# Patient Record
Sex: MALE | Race: WHITE | NOT HISPANIC OR LATINO | Employment: OTHER | URBAN - METROPOLITAN AREA
[De-identification: names, ages, dates, MRNs, and addresses within clinical notes are randomized per-mention and may not be internally consistent; named-entity substitution may affect disease eponyms.]

---

## 2017-01-11 ENCOUNTER — OFFICE VISIT (OUTPATIENT)
Dept: FAMILY MEDICINE | Facility: CLINIC | Age: 67
End: 2017-01-11
Payer: MEDICARE

## 2017-01-11 VITALS
HEART RATE: 63 BPM | HEIGHT: 68 IN | TEMPERATURE: 98 F | DIASTOLIC BLOOD PRESSURE: 78 MMHG | BODY MASS INDEX: 28.95 KG/M2 | SYSTOLIC BLOOD PRESSURE: 130 MMHG | WEIGHT: 191 LBS | OXYGEN SATURATION: 99 %

## 2017-01-11 DIAGNOSIS — R07.89 RIGHT-SIDED CHEST WALL PAIN: Primary | ICD-10-CM

## 2017-01-11 DIAGNOSIS — F41.1 GENERALIZED ANXIETY DISORDER: ICD-10-CM

## 2017-01-11 PROCEDURE — 1159F MED LIST DOCD IN RCRD: CPT | Mod: S$GLB,,,

## 2017-01-11 PROCEDURE — 99499 UNLISTED E&M SERVICE: CPT | Mod: S$GLB,,,

## 2017-01-11 PROCEDURE — 3075F SYST BP GE 130 - 139MM HG: CPT | Mod: S$GLB,,,

## 2017-01-11 PROCEDURE — 3078F DIAST BP <80 MM HG: CPT | Mod: S$GLB,,,

## 2017-01-11 PROCEDURE — 99213 OFFICE O/P EST LOW 20 MIN: CPT | Mod: S$GLB,,,

## 2017-01-11 PROCEDURE — 99999 PR PBB SHADOW E&M-EST. PATIENT-LVL III: CPT | Mod: PBBFAC,,,

## 2017-01-11 RX ORDER — ALPRAZOLAM 0.5 MG/1
0.5 TABLET ORAL 3 TIMES DAILY PRN
Qty: 90 TABLET | Refills: 2 | Status: SHIPPED | OUTPATIENT
Start: 2017-01-19 | End: 2017-04-16 | Stop reason: SDUPTHER

## 2017-01-11 NOTE — MR AVS SNAPSHOT
[facility]  [address]  [place] [address]  Phone: [phone]  Fax: [phone]                  Yair Owens   2017 1:00 PM   Office Visit    Description:  Male : 1950   Provider:  Tiburcio Sierra Jr., MD   Department:  [facility] Medicine           Reason for Visit     Back Pain           Diagnoses this Visit        Comments    Right-sided chest wall pain    -  Primary     Generalized anxiety disorder                To Do List           Goals (5 Years of Data)     None       These Medications        Disp Refills Start End    alprazolam (XANAX) 0.5 MG tablet 90 tablet 2 2017    Take 1 tablet (0.5 mg total) by mouth 3 (three) times daily as needed. - Oral    Pharmacy: [facility]/pharmacy #[id number] - [place]ONATHAN Grubbs - [address].  #: [phone]         [facility] On Call     [facility] On Call Nurse Care Line -  Assistance  Registered nurses in the [facility] provide clinical advisement, health education, appointment booking, and other advisory services.  Call for this free service at [phone].             Medications           Message regarding Medications     Verify the changes and/or additions to your medication regime listed below are the same as discussed with your clinician today.  If any of these changes or additions are incorrect, please notify your healthcare provider.             Verify that the below list of medications is an accurate representation of the medications you are currently taking.  If none reported, the list may be blank. If incorrect, please contact your healthcare provider. Carry this list with you in case of emergency.           Current Medications     alprazolam (XANAX) 0.5 MG tablet Starting on 2017. Take 1 tablet (0.5 mg total) by mouth 3 (three) times daily as needed.    atorvastatin (LIPITOR) 40 MG tablet TAKE 1 TABLET BY MOUTH ONCE DAILY    CIALIS 5 mg tablet TAKE 1 TABLET BY MOUTH EVERY DAY  "AS NEEDED FOR ERECTILE DYSFUNCTION    lisinopril 10 MG tablet Take 1 tablet (10 mg total) by mouth once daily.    indomethacin (INDOCIN) 50 MG capsule Take 1 capsule (50 mg total) by mouth 3 (three) times daily as needed.           Clinical Reference Information           Vital Signs - Last Recorded  Most recent update: 1/11/2017 12:50 PM by Debby Moy LPN    BP Pulse Temp Ht Wt SpO2    130/78 (BP Location: Left arm, Patient Position: Sitting, BP Method: Manual) 63 97.7 °F (36.5 °C) (Oral) 5' 8" (1.727 m) 86.6 kg (191 lb) 99%    BMI                29.04 kg/m2          Blood Pressure          Most Recent Value    BP  130/78      Allergies as of 1/11/2017     Codeine    Ibuprofen      Immunizations Administered on Date of Encounter - 1/11/2017     None      [facility] Sign-Up     Activating your [facility] account is as easy as 1-2-3!     1) Visit [email or web address], select Sign Up Now, enter this activation code and your date of birth, then select Next.  [email or web address]  Expires: 2/25/2017  1:21 PM      2) Create a username and password to use when you visit [facility] in the future and select a security question in case you lose your password and select Next.    3) Enter your e-mail address and click Sign Up!    Additional Information  If you have questions, please e-mail [email or web address] Office or call [phone] to talk to our [facility] staff. Remember, [facility] is NOT to be used for urgent needs. For medical emergencies, dial 911.         Smoking Cessation     If you would like to quit smoking:   You may be eligible for free services if you are a [place] resident and started smoking cigarettes before September 1, 1988.  Call the Smoking Cessation [employer] (SCT) toll free at (669) [phone] or (880) [phone].   Call 5-251-QUIT-NOW if you do not meet the above criteria.            "

## 2017-01-11 NOTE — PROGRESS NOTES
Chief Complaint   Patient presents with    Back Pain     left side       HPI  Yair Owens is a 66 y.o. male who presents to the office for evaluation of some discomfort that he has in the right lateral abdomen/lower chest area.  There is been no traumatic injury, patient notes that this is been going on about 2 weeks, it hurts with certain motions, such as when he turns over in bed from one side to another.  It does not hurt if he laughs, sneezes or coughs.  The patient is a smoker, however, he has cut way down on his cigarette intake.  Pt is known to me.    It is of note the patient has a piece of shrapnel left over from of [place] injury in the right lateral flank area.  This has not given the patient any problems.    HPI    PAST MEDICAL HISTORY:  Past Medical History   Diagnosis Date    Chest pain syndrome     GERD (gastroesophageal reflux disease)     Hypercholesteremia     Hyperlipidemia        PAST SURGICAL HISTORY:  Past Surgical History   Procedure Laterality Date    Bone resection, rib       for thoracic outlet syndrome    Hand surgery      Scrotal surgery       mass    Appendectomy      Spine surgery       C-spine disk (remote)       SOCIAL HISTORY:  Social History     Social History    Marital status:      Spouse name: N/A    Number of children: N/A    Years of education: N/A     Occupational History     [employer] [[email or web address]     Social History Main Topics    Smoking status: Current Every Day Smoker     Packs/day: 0.75     Types: Cigarettes    Smokeless tobacco: Not on file      Comment: .  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 [employer] .      Alcohol use 1.2 oz/week     2 Cans of beer per week      Comment: once a week    Drug use: No    Sexual activity: Yes     Partners: Female     Other Topics Concern    Not on file     Social History Narrative       FAMILY HISTORY:  Family History   Problem Relation Age of Onset    Hypertension Mother     Cancer Father      "Heart disease Father     Asthma Sister        ALLERGIES AND MEDICATIONS: updated and reviewed.  Review of patient's allergies indicates:   Allergen Reactions    Codeine Itching    Ibuprofen Itching     Current Outpatient Prescriptions   Medication Sig Dispense Refill    [START ON 1/19/2017] alprazolam (XANAX) 0.5 MG tablet Take 1 tablet (0.5 mg total) by mouth 3 (three) times daily as needed. 90 tablet 2    atorvastatin (LIPITOR) 40 MG tablet TAKE 1 TABLET BY MOUTH ONCE DAILY 90 tablet 0    CIALIS 5 mg tablet TAKE 1 TABLET BY MOUTH EVERY DAY AS NEEDED FOR ERECTILE DYSFUNCTION 30 tablet 6    lisinopril 10 MG tablet Take 1 tablet (10 mg total) by mouth once daily. 90 tablet 3    indomethacin (INDOCIN) 50 MG capsule Take 1 capsule (50 mg total) by mouth 3 (three) times daily as needed. 45 capsule 3     No current facility-administered medications for this visit.        ROS  Review of Systems   Respiratory: Negative for cough.    Cardiovascular: Positive for chest pain.   Skin: Negative for rash.       Physical Exam  Vitals:    01/11/17 1246   BP: 130/78   Pulse: 63   Temp: 97.7 °F (36.5 °C)    Body mass index is 29.04 kg/(m^2).  Weight: 86.6 kg (191 lb)   Height: 5' 8" (172.7 cm)     Physical Exam   Constitutional: He appears well-developed and well-nourished. No distress.   Pulmonary/Chest: Effort normal and breath sounds normal. He exhibits tenderness (there is some tenderness in the lower most rib and laterally, this is the exact area where the patient has been having his discomfort.  There is no overlying rash, or anything to suggest zoster.).   Abdominal: He exhibits no distension.   Vitals reviewed.      Health Maintenance       Date Due Completion Date    TETANUS VACCINE 1/22/1969 ---    Zoster Vaccine 1/22/2011 ---    Abdominal Aortic Aneurysm Screening 1/22/2016 ---    Influenza Vaccine 8/1/2016 10/29/2012 (Declined)    Override on 10/29/2012: Declined    Pneumococcal (65+) (2 of 2 - PPSV23) 7/18/2017 " 7/18/2016    Lipid Panel 10/20/2021 10/20/2016    Colonoscopy 11/18/2023 11/18/2013    Override on 10/29/2012: Declined          Assessment & Plan    1. Right-sided chest wall pain  I have recommended that the patient uses some Aleve, heat or ice to the area, avoid aggravating activity.  - alprazolam (XANAX) 0.5 MG tablet; Take 1 tablet (0.5 mg total) by mouth 3 (three) times daily as needed.  Dispense: 90 tablet; Refill: 2    2. Generalized anxiety disorder  Alprazolam was refilled.      No Follow-up on file.

## 2017-02-08 ENCOUNTER — OFFICE VISIT (OUTPATIENT)
Dept: FAMILY MEDICINE | Facility: CLINIC | Age: 67
End: 2017-02-08
Payer: MEDICARE

## 2017-02-08 VITALS
WEIGHT: 191.69 LBS | HEART RATE: 68 BPM | TEMPERATURE: 100 F | HEIGHT: 67 IN | RESPIRATION RATE: 16 BRPM | OXYGEN SATURATION: 95 % | BODY MASS INDEX: 30.09 KG/M2 | DIASTOLIC BLOOD PRESSURE: 72 MMHG | SYSTOLIC BLOOD PRESSURE: 134 MMHG

## 2017-02-08 DIAGNOSIS — R05.9 COUGH: ICD-10-CM

## 2017-02-08 DIAGNOSIS — J10.1 INFLUENZA B: Primary | ICD-10-CM

## 2017-02-08 DIAGNOSIS — R50.9 FEVER, UNSPECIFIED FEVER CAUSE: ICD-10-CM

## 2017-02-08 DIAGNOSIS — J06.9 UPPER RESPIRATORY TRACT INFECTION, UNSPECIFIED TYPE: ICD-10-CM

## 2017-02-08 LAB
CTP QC/QA: YES
FLUAV AG NPH QL: NEGATIVE
FLUBV AG NPH QL: POSITIVE

## 2017-02-08 PROCEDURE — 1157F ADVNC CARE PLAN IN RCRD: CPT | Mod: S$GLB,,, | Performed by: NURSE PRACTITIONER

## 2017-02-08 PROCEDURE — 3078F DIAST BP <80 MM HG: CPT | Mod: S$GLB,,, | Performed by: NURSE PRACTITIONER

## 2017-02-08 PROCEDURE — 87804 INFLUENZA ASSAY W/OPTIC: CPT | Mod: QW,S$GLB,, | Performed by: NURSE PRACTITIONER

## 2017-02-08 PROCEDURE — 99999 PR PBB SHADOW E&M-EST. PATIENT-LVL IV: CPT | Mod: PBBFAC,,, | Performed by: NURSE PRACTITIONER

## 2017-02-08 PROCEDURE — 1160F RVW MEDS BY RX/DR IN RCRD: CPT | Mod: S$GLB,,, | Performed by: NURSE PRACTITIONER

## 2017-02-08 PROCEDURE — 1125F AMNT PAIN NOTED PAIN PRSNT: CPT | Mod: S$GLB,,, | Performed by: NURSE PRACTITIONER

## 2017-02-08 PROCEDURE — 99214 OFFICE O/P EST MOD 30 MIN: CPT | Mod: 25,S$GLB,, | Performed by: NURSE PRACTITIONER

## 2017-02-08 PROCEDURE — 1159F MED LIST DOCD IN RCRD: CPT | Mod: S$GLB,,, | Performed by: NURSE PRACTITIONER

## 2017-02-08 PROCEDURE — 3075F SYST BP GE 130 - 139MM HG: CPT | Mod: S$GLB,,, | Performed by: NURSE PRACTITIONER

## 2017-02-08 RX ORDER — PROMETHAZINE HYDROCHLORIDE AND DEXTROMETHORPHAN HYDROBROMIDE 6.25; 15 MG/5ML; MG/5ML
5 SYRUP ORAL
Qty: 180 ML | Refills: 0 | Status: SHIPPED | OUTPATIENT
Start: 2017-02-08 | End: 2017-02-15

## 2017-02-08 RX ORDER — OSELTAMIVIR PHOSPHATE 75 MG/1
75 CAPSULE ORAL 2 TIMES DAILY
Qty: 10 CAPSULE | Refills: 0 | Status: SHIPPED | OUTPATIENT
Start: 2017-02-08 | End: 2017-02-13

## 2017-02-08 NOTE — MEDICAL/APP STUDENT
Subjective:       Patient ID: Yair Owens is a 67 y.o. male.    Chief Complaint: Cough (x's 3 days) and Generalized Body Aches    Cough   This is a recurrent problem. Episode onset: 2 days. The problem has been unchanged. The problem occurs constantly. Cough characteristics: hacking. Associated symptoms include chills, a fever, headaches, myalgias, postnasal drip, rhinorrhea, a sore throat and sweats. Pertinent negatives include no ear pain, nasal congestion, shortness of breath (sore throat) or wheezing. Treatments tried: theraflu. The treatment provided no relief.   Sore Throat    This is a new problem. Episode onset: 3 days. The problem has been unchanged. Neither side of throat is experiencing more pain than the other. There has been no fever. The pain is at a severity of 7/10. The pain is moderate. Associated symptoms include coughing, headaches and a plugged ear sensation. Pertinent negatives include no abdominal pain, congestion, diarrhea, drooling, ear discharge, ear pain, hoarse voice, neck pain, shortness of breath (sore throat), stridor, swollen glands, trouble swallowing or vomiting. He has tried acetaminophen for the symptoms. The treatment provided no relief.     Review of Systems   Constitutional: Positive for appetite change, chills, diaphoresis, fatigue and fever.   HENT: Positive for postnasal drip, rhinorrhea, sneezing and sore throat. Negative for congestion, drooling, ear discharge, ear pain, hoarse voice, sinus pressure and trouble swallowing.    Eyes: Negative for pain, discharge and itching.   Respiratory: Positive for cough. Negative for shortness of breath (sore throat), wheezing and stridor.    Gastrointestinal: Negative for abdominal pain, diarrhea, nausea and vomiting.   Musculoskeletal: Positive for myalgias. Negative for neck pain.   Skin: Negative for color change and pallor.   Neurological: Positive for dizziness, light-headedness and headaches.   Hematological: Negative for  adenopathy.       Objective:      Physical Exam   Constitutional: He is oriented to person, place, and time. Vital signs are normal. He appears well-developed and well-nourished.   HENT:   Head: Normocephalic and atraumatic.   Right Ear: Tympanic membrane, external ear and ear canal normal.   Left Ear: Tympanic membrane, external ear and ear canal normal.   Nose: Mucosal edema and rhinorrhea present. Right sinus exhibits no maxillary sinus tenderness and no frontal sinus tenderness. Left sinus exhibits no maxillary sinus tenderness and no frontal sinus tenderness.   Mouth/Throat: Mucous membranes are normal. Posterior oropharyngeal erythema present. No oropharyngeal exudate.   Eyes: EOM are normal. Pupils are equal, round, and reactive to light.   Neck: Normal range of motion. Neck supple.   Cardiovascular: Normal rate, regular rhythm and normal heart sounds.    Pulmonary/Chest: Effort normal and breath sounds normal. No respiratory distress. He has no wheezes. He has no rales.   Abdominal: Soft.   Lymphadenopathy:     He has no cervical adenopathy.   Neurological: He is alert and oriented to person, place, and time.   Skin: Skin is warm, dry and intact. No rash noted.   Psychiatric: He has a normal mood and affect.   Nursing note and vitals reviewed.      Assessment:       1. Influenza B    2. Upper respiratory tract infection, unspecified type        Plan:     Yair was seen today for cough and generalized body aches.    Diagnoses and all orders for this visit:    Influenza B  -     oseltamivir (TAMIFLU) 75 MG capsule; Take 1 capsule (75 mg total) by mouth 2 (two) times daily.  -     promethazine-dextromethorphan (PROMETHAZINE-DM) 6.25-15 mg/5 mL Syrp; Take 5 mLs by mouth every 4 to 6 hours as needed.    Upper respiratory tract infection, unspecified type  -     oseltamivir (TAMIFLU) 75 MG capsule; Take 1 capsule (75 mg total) by mouth 2 (two) times daily.  -     promethazine-dextromethorphan (PROMETHAZINE-DM)  6.25-15 mg/5 mL Syrp; Take 5 mLs by mouth every 4 to 6 hours as needed.    Take your temperature several times a day. If your fever is 100.4°F for more than a day, call your doctor.   Relax, lie down. Go to bed if you want. Just get off your feet and rest. Also, drink plenty of fluids to avoid dehydration.   Tylenol 500 mg or ibuprofen 200 mg 2 tabs every 6 hours as needed for fever and pain  Breathe steam to open blocked nasal passages.  a hot shower or use a vaporizer. Be careful not to get burned by the steam.   Saline nasal sprays and decongestant tablets help open a stuffy nose. Dry up a runny nose or postnasal drip with antihistamines. Recommend Claritin D daily   Gargle every 2 hours with 1/4 teaspoon of salt dissolved in 1/2 cup of warm water. Suck on throat lozenges and cough drops to moisten your throat.   If coughing is a problem, take an expectorant to loosen mucus. If you have a dry cough, use a cough suppressant. Recommend Delsym.   Put fluid back into your body. Take frequent sips of clear liquids such as water or broth. Do not drink beverages with a lot of sugar in them, such as juices and sodas. These can make diarrhea worse. Also, do not drink sport drinks, such as Gatorade, which dont have the right mix of water, sugar, and minerals, and can make the symptoms worse.   As your appetite returns, you can resume your normal diet. Ask your doctor whether there are any foods you should avoid.     When to Call Your Doctor   When you first notice symptoms, ask your healthcare provider about antiviral medication. If taken soon after flu symptoms start, this can help you get well sooner. (Antibiotics should not be taken for colds or flu.) Also, call your doctor if you have any of the following symptoms or if you arent feeling better after 7 days:   Shortness of breath   Pain or pressure in the chest or abdomen   Worsening symptoms, especially after a period of improvement   Fever of 100.4°F or  higher, or fever that doesnt go down with medication   Sudden dizziness or confusion   Severe or continued vomiting   Signs of dehydration, including extreme thirst, dark urine, infrequent urination, dry mouth   Spotted, red, or very sore throat    © 4779-6209 [employer], [address] [place] [address], [place] [address]. All rights reserved. This information is not intended as a substitute for professional medical care. Always follow your healthcare professional's instructions.    Return if symptoms worsen or fail to improve.

## 2017-02-08 NOTE — PATIENT INSTRUCTIONS
Influenza (Adult)    Influenza is also called the flu. It is a viral illness that affects the air passages of your lungs. It is different from the common cold. The flu can easily be passed from one to person to another. It may be spread through the air by coughing and sneezing. Or it can be spread by touching the sick person and then touching your own eyes, nose, or mouth.  The flu starts 1 to 3 days after you are exposed to the flu virus. It may last for 1 to 2 weeks. You usually dont need to take antibiotics unless you have a complication. This might be an ear or sinus infection or pneumonia.  Symptoms of the flu may be mild or severe. They can include extreme tiredness (wanting to stay in bed all day), chills, fevers, muscle aches, soreness with eye movement, headache, and a dry, hacking cough.  Home care  Follow these guidelines when caring for yourself at home:  · Avoid being around cigarette smoke, whether yours or other peoples.  · Acetaminophen or ibuprofen will help ease your fever, muscle aches, and headache. Dont give aspirin to anyone younger than 18 who has the flu. Aspirin can harm the liver.  · Nausea and loss of appetite are common with the flu. Eat light meals. Drink 6 to 8 glasses of liquids every day. Good choices are water, sport drinks, soft drinks without caffeine, juices, tea, and soup. Extra fluids will also help loosen secretions in your nose and lungs.  · Over-the-counter cold medicines will not make the flu go away faster. But the medicines may help with coughing, sore throat, and congestion in your nose and sinuses. Dont use a decongestant if you have high blood pressure.  · Stay home until your fever has been gone for at least 24 hours without using medicine to reduce fever.  Follow-up care  Follow up with your healthcare provider, or as advised, if you are not getting better over the next week.  If you are 65 or older, talk with your provider about getting a pneumococcal vaccine  every 5 years. You should also get this vaccine if you have chronic asthma or COPD. All adults should get a flu vaccine every fall. Ask your provider about this.  When to seek medical advice  Call your healthcare provider right away if any of these occur:  · Cough with lots of colored sputum (mucus) or blood in your sputum  · Chest pain, shortness of breath, wheezing, or difficulty breathing  · Severe headache, or face, neck, or ear pain  · New rash with fever  · Fever of 100.4°F (38°C) or higher, or as directed by your healthcare provider  · Confusion, behavior change, or seizure  · Severe weakness or dizziness  · You get a fever or cough after getting better for a few days  Date Last Reviewed: 12/23/2014  © 4992-3410 [employer], [employer]. [address] [place] [address], [place] [address]. All rights reserved. This information is not intended as a substitute for professional medical care. Always follow your healthcare professional's instructions.

## 2017-02-08 NOTE — MR AVS SNAPSHOT
[facility]  [address]  [place] [address]  Phone: [phone]  Fax: [phone]                  Yair Owens   2017 8:15 AM   Office Visit    Description:  Male : 1950   Provider:  Clem Felix NP   Department:  [facility]           Reason for Visit     Cough     Generalized Body Aches           Diagnoses this Visit        Comments    Influenza B    -  Primary     Upper respiratory tract infection, unspecified type                To Do List           Goals (5 Years of Data)     None      Follow-Up and Disposition     Return if symptoms worsen or fail to improve.       These Medications        Disp Refills Start End    oseltamivir (TAMIFLU) 75 MG capsule 10 capsule 0 2017    Take 1 capsule (75 mg total) by mouth 2 (two) times daily. - Oral    Pharmacy: [facility]/pharmacy #[id number] - [place], [place]  [address]. Ph #: [phone]       promethazine-dextromethorphan (PROMETHAZINE-DM) 6.25-15 mg/5 mL Syrp 180 mL 0 2017 2/15/2017    Take 5 mLs by mouth every 4 to 6 hours as needed. - Oral    Pharmacy: [facility]/pharmacy #[id number] - [place] - [address]. Ph #: [phone]         [facility] On Call     [facility] On Call Nurse Care Line - 24/7 Assistance  Registered nurses in the [facility] provide clinical advisement, health education, appointment booking, and other advisory services.  Call for this free service at [phone].             Medications           Message regarding Medications     Verify the changes and/or additions to your medication regime listed below are the same as discussed with your clinician today.  If any of these changes or additions are incorrect, please notify your healthcare provider.        START taking these NEW medications        Refills    oseltamivir (TAMIFLU) 75 MG capsule 0    Sig: Take 1 capsule (75 mg total) by mouth 2 (two) times daily.    Class: Normal    Route: Oral     "promethazine-dextromethorphan (PROMETHAZINE-DM) 6.25-15 mg/5 mL Syrp 0    Sig: Take 5 mLs by mouth every 4 to 6 hours as needed.    Class: Normal    Route: Oral           Verify that the below list of medications is an accurate representation of the medications you are currently taking.  If none reported, the list may be blank. If incorrect, please contact your healthcare provider. Carry this list with you in case of emergency.           Current Medications     alprazolam (XANAX) 0.5 MG tablet Take 1 tablet (0.5 mg total) by mouth 3 (three) times daily as needed.    atorvastatin (LIPITOR) 40 MG tablet TAKE 1 TABLET BY MOUTH ONCE DAILY    indomethacin (INDOCIN) 50 MG capsule Take 1 capsule (50 mg total) by mouth 3 (three) times daily as needed.    lisinopril 10 MG tablet Take 1 tablet (10 mg total) by mouth once daily.    CIALIS 5 mg tablet TAKE 1 TABLET BY MOUTH EVERY DAY AS NEEDED FOR ERECTILE DYSFUNCTION    oseltamivir (TAMIFLU) 75 MG capsule Take 1 capsule (75 mg total) by mouth 2 (two) times daily.    promethazine-dextromethorphan (PROMETHAZINE-DM) 6.25-15 mg/5 mL Syrp Take 5 mLs by mouth every 4 to 6 hours as needed.           Clinical Reference Information           Your Vitals Were     BP Pulse Temp Resp Height Weight    134/72 (BP Location: Right arm, Patient Position: Sitting, BP Method: Manual) 68 99.7 °F (37.6 °C) (Oral) 16 5' 7" (1.702 m) 87 kg (191 lb 11 oz)    SpO2 BMI             95% 30.02 kg/m2         Blood Pressure          Most Recent Value    BP  134/72      Allergies as of 2/8/2017     Codeine    Ibuprofen      Immunizations Administered on Date of Encounter - 2/8/2017     None      [facility] Sign-Up     Activating your [facility] account is as easy as 1-2-3!     1) Visit [email or web address], select Sign Up Now, enter this activation code and your date of birth, then select Next.  [email or web address]  Expires: 2/25/2017  1:21 PM      2) Create a username and password to use when you visit [facility] in " the future and select a security question in case you lose your password and select Next.    3) Enter your e-mail address and click Sign Up!    Additional Information  If you have questions, please e-mail [email or web address] or call [phone] to talk to our [facility] staff. Remember, [facility] is NOT to be used for urgent needs. For medical emergencies, dial 911.         Instructions      Influenza (Adult)    Influenza is also called the flu. It is a viral illness that affects the air passages of your lungs. It is different from the common cold. The flu can easily be passed from one to person to another. It may be spread through the air by coughing and sneezing. Or it can be spread by touching the sick person and then touching your own eyes, nose, or mouth.  The flu starts 1 to 3 days after you are exposed to the flu virus. It may last for 1 to 2 weeks. You usually dont need to take antibiotics unless you have a complication. This might be an ear or sinus infection or pneumonia.  Symptoms of the flu may be mild or severe. They can include extreme tiredness (wanting to stay in bed all day), chills, fevers, muscle aches, soreness with eye movement, headache, and a dry, hacking cough.  Home care  Follow these guidelines when caring for yourself at home:  · Avoid being around cigarette smoke, whether yours or other peoples.  · Acetaminophen or ibuprofen will help ease your fever, muscle aches, and headache. Dont give aspirin to anyone younger than 18 who has the flu. Aspirin can harm the liver.  · Nausea and loss of appetite are common with the flu. Eat light meals. Drink 6 to 8 glasses of liquids every day. Good choices are water, sport drinks, soft drinks without caffeine, juices, tea, and soup. Extra fluids will also help loosen secretions in your nose and lungs.  · Over-the-counter cold medicines will not make the flu go away faster. But the medicines may help with coughing, sore throat, and congestion in  your nose and sinuses. Dont use a decongestant if you have high blood pressure.  · Stay home until your fever has been gone for at least 24 hours without using medicine to reduce fever.  Follow-up care  Follow up with your healthcare provider, or as advised, if you are not getting better over the next week.  If you are 65 or older, talk with your provider about getting a pneumococcal vaccine every 5 years. You should also get this vaccine if you have chronic asthma or COPD. All adults should get a flu vaccine every fall. Ask your provider about this.  When to seek medical advice  Call your healthcare provider right away if any of these occur:  · Cough with lots of colored sputum (mucus) or blood in your sputum  · Chest pain, shortness of breath, wheezing, or difficulty breathing  · Severe headache, or face, neck, or ear pain  · New rash with fever  · Fever of 100.4°F (38°C) or higher, or as directed by your healthcare provider  · Confusion, behavior change, or seizure  · Severe weakness or dizziness  · You get a fever or cough after getting better for a few days  Date Last Reviewed: 12/23/2014  © 1789-0769 [employer]. [address] [place] [address]. All rights reserved. This information is not intended as a substitute for professional medical care. Always follow your healthcare professional's instructions.             Smoking Cessation     If you would like to quit smoking:   You may be eligible for free services if you are a [place] resident and started smoking cigarettes before September 1, 1988.  Call the Smoking Cessation Trust (SCT) toll free at (026) 684-8898 or (173) 099-5045.   Call 7-800-QUIT-NOW if you do not meet the above criteria.            Language Assistance Services     ATTENTION: Language assistance services are available, free of charge. Please call [phone].      ATENCIÓN: Si habla español, tiene a cormier disposición servicios gratuitos de asistencia lingüística.  Ray olivarez [phone].     MAXWELL Ý: N?u b?n nói Ti?ng Vi?t, có các d?ch v? h? tr? ngôn ng? mi?n phí dành cho b?n. G?i s? [phone].         [facility] complies with applicable Federal civil rights laws and does not discriminate on the basis of race, color, national origin, age, disability, or sex.

## 2017-02-10 ENCOUNTER — HOSPITAL ENCOUNTER (EMERGENCY)
Facility: OTHER | Age: 67
Discharge: HOME | End: 2017-02-10
Attending: EMERGENCY MEDICINE
Payer: MEDICARE

## 2017-02-10 VITALS
BODY MASS INDEX: 28.93 KG/M2 | SYSTOLIC BLOOD PRESSURE: 117 MMHG | HEIGHT: 66 IN | DIASTOLIC BLOOD PRESSURE: 80 MMHG | RESPIRATION RATE: 18 BRPM | TEMPERATURE: 98 F | WEIGHT: 180 LBS | OXYGEN SATURATION: 100 % | HEART RATE: 80 BPM

## 2017-02-10 DIAGNOSIS — R11.0 NAUSEA: Primary | ICD-10-CM

## 2017-02-10 PROCEDURE — 25000003 PHARM REV CODE 250: Performed by: EMERGENCY MEDICINE

## 2017-02-10 PROCEDURE — 99283 EMERGENCY DEPT VISIT LOW MDM: CPT

## 2017-02-10 RX ORDER — ONDANSETRON 4 MG/1
4 TABLET, FILM COATED ORAL EVERY 8 HOURS PRN
Qty: 12 TABLET | Refills: 0 | Status: SHIPPED | OUTPATIENT
Start: 2017-02-10 | End: 2017-02-16 | Stop reason: CLARIF

## 2017-02-10 RX ORDER — ONDANSETRON 4 MG/1
4 TABLET, ORALLY DISINTEGRATING ORAL
Status: COMPLETED | OUTPATIENT
Start: 2017-02-10 | End: 2017-02-10

## 2017-02-10 RX ADMIN — ONDANSETRON 4 MG: 4 TABLET, ORALLY DISINTEGRATING ORAL at 11:02

## 2017-02-10 NOTE — ED PROVIDER NOTES
Encounter Date: 2/10/2017       History     Chief Complaint   Patient presents with    Nausea     nausea vomiting      Review of patient's allergies indicates:   Allergen Reactions    Codeine Itching    Ibuprofen Itching     HPI Comments: Patient is a 67-year-old male who reports he was diagnosed with influenza on Monday.  He reports he was here in the ED with his wife, when he had sudden onset of feeling hot, nausea, dry heaves, and weakness.  Reports she's feeling better since it occurred with systems mild amount of nausea left.    Patient is a 67 y.o. male presenting with the following complaint: general illness. The history is provided by the patient.   General Illness    The current episode started several days ago. Nothing relieves the symptoms. Nothing aggravates the symptoms. Associated symptoms include abdominal pain, constipation, nausea, congestion, rhinorrhea, cough and URI. Pertinent negatives include no fever, no diarrhea, no vomiting, no sore throat and no shortness of breath.     Past Medical History   Diagnosis Date    Chest pain syndrome     GERD (gastroesophageal reflux disease)     Hypercholesteremia     Hyperlipidemia      No past medical history pertinent negatives.  Past Surgical History   Procedure Laterality Date    Bone resection, rib       for thoracic outlet syndrome    Hand surgery      Scrotal surgery       mass    Appendectomy      Spine surgery       C-spine disk (remote)     Family History   Problem Relation Age of Onset    Hypertension Mother     Cancer Father     Heart disease Father     Asthma Sister      Social History   Substance Use Topics    Smoking status: Current Every Day Smoker     Packs/day: 0.75     Types: Cigarettes    Smokeless tobacco: None      Comment: .  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 [employer] .      Alcohol use 1.2 oz/week     2 Cans of beer per week      Comment: once a week     Review of Systems   Constitutional:  Positive for diaphoresis. Negative for chills and fever.   HENT: Positive for congestion and rhinorrhea. Negative for sore throat.    Respiratory: Positive for cough. Negative for shortness of breath.    Cardiovascular: Negative for palpitations and leg swelling.   Gastrointestinal: Positive for abdominal pain, constipation and nausea. Negative for diarrhea and vomiting.   Skin: Negative for color change and wound.   Neurological: Positive for weakness. Negative for light-headedness.       Physical Exam   Initial Vitals   BP Pulse Resp Temp SpO2   -- -- -- -- --            Physical Exam    Nursing note and vitals reviewed.  Constitutional: Vital signs are normal. He appears well-developed and well-nourished. He is cooperative.   HENT:   Head: Normocephalic and atraumatic.   Cardiovascular: Normal rate, regular rhythm and normal heart sounds.   Pulses:       Radial pulses are 2+ on the right side, and 2+ on the left side.   No pedal edema bilateral lower extremities   Pulmonary/Chest: Effort normal and breath sounds normal.   Abdominal: Normal appearance and bowel sounds are normal. There is no tenderness.   Musculoskeletal:        Cervical back: Normal.        Thoracic back: Normal.        Lumbar back: Normal.   Neurological: He is alert and oriented to person, place, and time.   Skin: Skin is warm and dry.         ED Course   Procedures  Labs Reviewed - No data to display         Possible vasovagal episode, the patient currently receiving therapy for the flu.  Discharged home with recommendations for symptomatically management.                    ED Course     Clinical Impression:   The encounter diagnosis was Nausea.          Rossi Jewell MD  02/10/17 9517

## 2017-02-10 NOTE — DISCHARGE INSTRUCTIONS
Continue any medications are taken for your recent diagnosis of influenza.  Zofran 4 mg 1 dissolved on the tongue every 8 hours as needed for nausea.  Frequent handwashing for household contacts.    Clear liquids as a diet for the next several hours and advance as tolerated.

## 2017-02-10 NOTE — ED AVS SNAPSHOT
[facility] EMERGENCY DEPARTMENT  [address] [place] [address]               Yair Owens   2/10/2017  9:40 AM   ED    Description:  Male : 1950   Department:  [facility] Emergency Department           Your Care was Coordinated By:     Provider Role From To    Rossi Jewell MD Attending Provider 02/10/17 1037 --      Reason for Visit     Nausea           Diagnoses this Visit        Comments    Nausea    -  Primary       ED Disposition     ED Disposition Condition Comment    Discharge             To Do List           Follow-up Information     Follow up with Tiburcio Sierra Jr, MD. Schedule an appointment as soon as possible for a visit in 2 days.    Specialty:  Internal Medicine    Why:  For recheck if symptoms fail to improve or resolve    Contact information:    [place] [address]  [phone]         These Medications        Disp Refills Start End    ondansetron (ZOFRAN) 4 MG tablet 12 tablet 0 2/10/2017     Take 1 tablet (4 mg total) by mouth every 8 (eight) hours as needed for Nausea. - Oral    Pharmacy: [facility]/pharmacy #[id number] - [place] - [address]  #: [phone]         [facility]     [facility] Care Line -  Assistance  Registered nurses in the [facility] On Call Center provide clinical advisement, health education, appointment booking, and other advisory services.  Call for this free service at [phone].             Medications           Message regarding Medications     Verify the changes and/or additions to your medication regime listed below are the same as discussed with your clinician today.  If any of these changes or additions are incorrect, please notify your healthcare provider.        START taking these NEW medications        Refills    ondansetron (ZOFRAN) 4 MG tablet 0    Sig: Take 1 tablet (4 mg total) by mouth every 8 (eight) hours as needed for Nausea.    Class: Print    Route: Oral      These medications were  "administered today        Dose Freq    ondansetron disintegrating tablet 4 mg 4 mg ED 1 Time    Sig: Take 1 tablet (4 mg total) by mouth ED 1 Time.    Class: Normal    Route: Oral           Verify that the below list of medications is an accurate representation of the medications you are currently taking.  If none reported, the list may be blank. If incorrect, please contact your healthcare provider. Carry this list with you in case of emergency.           Current Medications     alprazolam (XANAX) 0.5 MG tablet Take 1 tablet (0.5 mg total) by mouth 3 (three) times daily as needed.    atorvastatin (LIPITOR) 40 MG tablet TAKE 1 TABLET BY MOUTH ONCE DAILY    CIALIS 5 mg tablet TAKE 1 TABLET BY MOUTH EVERY DAY AS NEEDED FOR ERECTILE DYSFUNCTION    indomethacin (INDOCIN) 50 MG capsule Take 1 capsule (50 mg total) by mouth 3 (three) times daily as needed.    lisinopril 10 MG tablet Take 1 tablet (10 mg total) by mouth once daily.    ondansetron (ZOFRAN) 4 MG tablet Take 1 tablet (4 mg total) by mouth every 8 (eight) hours as needed for Nausea.    ondansetron disintegrating tablet 4 mg Take 1 tablet (4 mg total) by mouth ED 1 Time.    oseltamivir (TAMIFLU) 75 MG capsule Take 1 capsule (75 mg total) by mouth 2 (two) times daily.    promethazine-dextromethorphan (PROMETHAZINE-DM) 6.25-15 mg/5 mL Syrp Take 5 mLs by mouth every 4 to 6 hours as needed.           Clinical Reference Information           Your Vitals Were     BP Pulse Temp Height Weight BMI    128/68 (BP Location: Right arm, Patient Position: Lying) 60 98.5 °F (36.9 °C) (Tympanic) 5' 6" (1.676 m) 81.6 kg (180 lb) 29.05 kg/m2      Allergies as of 2/10/2017        Reactions    Codeine Itching    Ibuprofen Itching      Immunizations Administered on Date of Encounter - 2/10/2017     None      ED Micro, Lab, POCT     None      ED Imaging Orders     None        Discharge Instructions       Continue any medications are taken for your recent diagnosis of " influenza.  Zofran 4 mg 1 dissolved on the tongue every 8 hours as needed for nausea.  Frequent handwashing for household contacts.    Clear liquids as a diet for the next several hours and advance as tolerated.      Discharge References/Attachments     NAUSEA AND VOMITING, HOW TO CONTROL (ENGLISH)      [facility] Sign-Up     Activating your [facility] account is as easy as 1-2-3!     1) Visit [email or web address], select Sign Up Now, enter this activation code and your date of birth, then select Next.  [email or web address]  Expires: 2/25/2017  1:21 PM      2) Create a username and password to use when you visit [facility] in the future and select a security question in case you lose your password and select Next.    3) Enter your e-mail address and click Sign Up!    Additional Information  If you have questions, please e-mail [email or web address] or call [phone] to talk to our [facility] staff. Remember, [facility] is NOT to be used for urgent needs. For medical emergencies, dial 911.         Smoking Cessation     If you would like to quit smoking:   You may be eligible for free services if you are a [place] resident and started smoking cigarettes before September 1, 1988.  Call the Smoking Cessation Trust (SCT) toll free at (970) 123-4141 or (964) 238-1046.   Call 4-800-QUIT-NOW if you do not meet the above criteria.             [facility] Emergency Department complies with applicable Federal civil rights laws and does not discriminate on the basis of race, color, national origin, age, disability, or sex.        Language Assistance Services     ATTENTION: Language assistance services are available, free of charge. Please call [phone].      ATENCIÓN: Si habla español, tiene a cormier disposición servicios gratuitos de asistencia lingüística. Llame al [phone].     CHÚ Ý: N?u b?n nói Ti?ng Vi?t, có các d?ch v? h? tr? ngôn ng? mi?n phí dành cho b?n. G?i s? [phone].

## 2017-02-10 NOTE — PROGRESS NOTES
Patient ID: Yair Owens is a 67 y.o. male.     Chief Complaint: Cough (x's 3 days) and Generalized Body Aches     Cough   This is a recurrent problem. Episode onset: 2 days. The problem has been unchanged. The problem occurs constantly. Cough characteristics: hacking. Associated symptoms include chills, a fever, headaches, myalgias, postnasal drip, rhinorrhea, a sore throat and sweats. Pertinent negatives include no ear pain, nasal congestion, shortness of breath (sore throat) or wheezing. Treatments tried: theraflu. The treatment provided no relief.   Sore Throat    This is a new problem. Episode onset: 3 days. The problem has been unchanged. Neither side of throat is experiencing more pain than the other. There has been no fever. The pain is at a severity of 7/10. The pain is moderate. Associated symptoms include coughing, headaches and a plugged ear sensation. Pertinent negatives include no abdominal pain, congestion, diarrhea, drooling, ear discharge, ear pain, hoarse voice, neck pain, shortness of breath (sore throat), stridor, swollen glands, trouble swallowing or vomiting. He has tried acetaminophen for the symptoms. The treatment provided no relief.      Review of Systems   Constitutional: Positive for appetite change, chills, diaphoresis, fatigue and fever.   HENT: Positive for postnasal drip, rhinorrhea, sneezing and sore throat. Negative for congestion, drooling, ear discharge, ear pain, hoarse voice, sinus pressure and trouble swallowing.   Eyes: Negative for pain, discharge and itching.   Respiratory: Positive for cough. Negative for shortness of breath (sore throat), wheezing and stridor.   Gastrointestinal: Negative for abdominal pain, diarrhea, nausea and vomiting.   Musculoskeletal: Positive for myalgias. Negative for neck pain.   Skin: Negative for color change and pallor.   Neurological: Positive for dizziness, light-headedness and headaches.   Hematological: Negative for adenopathy.        Objective:      Physical Exam   Constitutional: He is oriented to person, place, and time. Vital signs are normal. He appears well-developed and well-nourished.   HENT:   Head: Normocephalic and atraumatic.   Right Ear: Tympanic membrane, external ear and ear canal normal.   Left Ear: Tympanic membrane, external ear and ear canal normal.   Nose: Mucosal edema and rhinorrhea present. Right sinus exhibits no maxillary sinus tenderness and no frontal sinus tenderness. Left sinus exhibits no maxillary sinus tenderness and no frontal sinus tenderness.   Mouth/Throat: Mucous membranes are normal. Posterior oropharyngeal erythema present. No oropharyngeal exudate.   Eyes: EOM are normal. Pupils are equal, round, and reactive to light.   Neck: Normal range of motion. Neck supple.   Cardiovascular: Normal rate, regular rhythm and normal heart sounds.   Pulmonary/Chest: Effort normal and breath sounds normal. No respiratory distress. He has no wheezes. He has no rales.   Abdominal: Soft.   Lymphadenopathy:   He has no cervical adenopathy.   Neurological: He is alert and oriented to person, place, and time.   Skin: Skin is warm, dry and intact. No rash noted.   Psychiatric: He has a normal mood and affect.   Nursing note and vitals reviewed.      Assessment:       1. Influenza B    2. Upper respiratory tract infection, unspecified type          Plan:     Yair was seen today for cough and generalized body aches.     Diagnoses and all orders for this visit:     Influenza B  - oseltamivir (TAMIFLU) 75 MG capsule; Take 1 capsule (75 mg total) by mouth 2 (two) times daily.  - promethazine-dextromethorphan (PROMETHAZINE-DM) 6.25-15 mg/5 mL Syrp; Take 5 mLs by mouth every 4 to 6 hours as needed.     Upper respiratory tract infection, unspecified type  - oseltamivir (TAMIFLU) 75 MG capsule; Take 1 capsule (75 mg total) by mouth 2 (two) times daily.  - promethazine-dextromethorphan (PROMETHAZINE-DM) 6.25-15 mg/5 mL Syrp; Take 5 mLs  by mouth every 4 to 6 hours as needed.     · Take your temperature several times a day. If your fever is 100.4°F for more than a day, call your doctor.   · Relax, lie down. Go to bed if you want. Just get off your feet and rest. Also, drink plenty of fluids to avoid dehydration.   · Tylenol 500 mg or ibuprofen 200 mg 2 tabs every 6 hours as needed for fever and pain  · Breathe steam to open blocked nasal passages.  a hot shower or use a vaporizer. Be careful not to get burned by the steam.   · Saline nasal sprays and decongestant tablets help open a stuffy nose. Dry up a runny nose or postnasal drip with antihistamines. Recommend Claritin D daily   · Gargle every 2 hours with 1/4 teaspoon of salt dissolved in 1/2 cup of warm water. Suck on throat lozenges and cough drops to moisten your throat.   · If coughing is a problem, take an expectorant to loosen mucus. If you have a dry cough, use a cough suppressant. Recommend Delsym.   · Put fluid back into your body. Take frequent sips of clear liquids such as water or broth. Do not drink beverages with a lot of sugar in them, such as juices and sodas. These can make diarrhea worse. Also, do not drink sport drinks, such as Gatorade, which dont have the right mix of water, sugar, and minerals, and can make the symptoms worse.   · As your appetite returns, you can resume your normal diet. Ask your doctor whether there are any foods you should avoid.   ·    When to Call Your Doctor   When you first notice symptoms, ask your healthcare provider about antiviral medication. If taken soon after flu symptoms start, this can help you get well sooner. (Antibiotics should not be taken for colds or flu.) Also, call your doctor if you have any of the following symptoms or if you arent feeling better after 7 days:   · Shortness of breath   · Pain or pressure in the chest or abdomen   · Worsening symptoms, especially after a period of improvement   · Fever of 100.4°F or  higher, or fever that doesnt go down with medication   · Sudden dizziness or confusion   · Severe or continued vomiting   · Signs of dehydration, including extreme thirst, dark urine, infrequent urination, dry mouth   · Spotted, red, or very sore throat  ·   © 8873-3543 [employer], [address] [place] [address], [place] [address]. All rights reserved. This information is not intended as a substitute for professional medical care. Always follow your healthcare professional's instructions.     Return if symptoms worsen or fail to improve.

## 2017-02-16 ENCOUNTER — LAB VISIT (OUTPATIENT)
Dept: LAB | Facility: HOSPITAL | Age: 67
End: 2017-02-16
Payer: MEDICARE

## 2017-02-16 ENCOUNTER — OFFICE VISIT (OUTPATIENT)
Dept: FAMILY MEDICINE | Facility: CLINIC | Age: 67
End: 2017-02-16
Payer: MEDICARE

## 2017-02-16 VITALS
TEMPERATURE: 99 F | OXYGEN SATURATION: 98 % | WEIGHT: 182 LBS | BODY MASS INDEX: 28.56 KG/M2 | DIASTOLIC BLOOD PRESSURE: 70 MMHG | SYSTOLIC BLOOD PRESSURE: 132 MMHG | HEART RATE: 64 BPM | HEIGHT: 67 IN

## 2017-02-16 DIAGNOSIS — Z00.00 ROUTINE MEDICAL EXAM: ICD-10-CM

## 2017-02-16 DIAGNOSIS — R53.83 FATIGUE, UNSPECIFIED TYPE: Primary | ICD-10-CM

## 2017-02-16 LAB
ALBUMIN SERPL BCP-MCNC: 3.9 G/DL
ALP SERPL-CCNC: 65 U/L
ALT SERPL W/O P-5'-P-CCNC: 34 U/L
ANION GAP SERPL CALC-SCNC: 7 MMOL/L
AST SERPL-CCNC: 30 U/L
BASOPHILS # BLD AUTO: 0.01 K/UL
BASOPHILS NFR BLD: 0.2 %
BILIRUB SERPL-MCNC: 1 MG/DL
BUN SERPL-MCNC: 13 MG/DL
CALCIUM SERPL-MCNC: 9.7 MG/DL
CHLORIDE SERPL-SCNC: 106 MMOL/L
CHOLEST/HDLC SERPL: 4.4 {RATIO}
CO2 SERPL-SCNC: 27 MMOL/L
COMPLEXED PSA SERPL-MCNC: 1.7 NG/ML
CREAT SERPL-MCNC: 1.2 MG/DL
DIFFERENTIAL METHOD: ABNORMAL
EOSINOPHIL # BLD AUTO: 0 K/UL
EOSINOPHIL NFR BLD: 0.7 %
ERYTHROCYTE [DISTWIDTH] IN BLOOD BY AUTOMATED COUNT: 12.8 %
EST. GFR  (AFRICAN AMERICAN): >60 ML/MIN/1.73 M^2
EST. GFR  (NON AFRICAN AMERICAN): >60 ML/MIN/1.73 M^2
GLUCOSE SERPL-MCNC: 102 MG/DL
HCT VFR BLD AUTO: 43.9 %
HDL/CHOLESTEROL RATIO: 22.5 %
HDLC SERPL-MCNC: 120 MG/DL
HDLC SERPL-MCNC: 27 MG/DL
HGB BLD-MCNC: 15 G/DL
LDLC SERPL CALC-MCNC: 66.4 MG/DL
LYMPHOCYTES # BLD AUTO: 1.8 K/UL
LYMPHOCYTES NFR BLD: 31.8 %
MCH RBC QN AUTO: 30.4 PG
MCHC RBC AUTO-ENTMCNC: 34.2 %
MCV RBC AUTO: 89 FL
MONOCYTES # BLD AUTO: 0.9 K/UL
MONOCYTES NFR BLD: 15.5 %
NEUTROPHILS # BLD AUTO: 2.9 K/UL
NEUTROPHILS NFR BLD: 51.4 %
NONHDLC SERPL-MCNC: 93 MG/DL
PLATELET # BLD AUTO: 200 K/UL
PMV BLD AUTO: 11 FL
POTASSIUM SERPL-SCNC: 5.3 MMOL/L
PROT SERPL-MCNC: 7.5 G/DL
RBC # BLD AUTO: 4.94 M/UL
SODIUM SERPL-SCNC: 140 MMOL/L
TRIGL SERPL-MCNC: 133 MG/DL
WBC # BLD AUTO: 5.56 K/UL

## 2017-02-16 PROCEDURE — 3075F SYST BP GE 130 - 139MM HG: CPT | Mod: S$GLB,,,

## 2017-02-16 PROCEDURE — 1160F RVW MEDS BY RX/DR IN RCRD: CPT | Mod: S$GLB,,,

## 2017-02-16 PROCEDURE — 3078F DIAST BP <80 MM HG: CPT | Mod: S$GLB,,,

## 2017-02-16 PROCEDURE — 80061 LIPID PANEL: CPT

## 2017-02-16 PROCEDURE — 36415 COLL VENOUS BLD VENIPUNCTURE: CPT | Mod: PO

## 2017-02-16 PROCEDURE — 99999 PR PBB SHADOW E&M-EST. PATIENT-LVL III: CPT | Mod: PBBFAC,,,

## 2017-02-16 PROCEDURE — 1159F MED LIST DOCD IN RCRD: CPT | Mod: S$GLB,,,

## 2017-02-16 PROCEDURE — 80053 COMPREHEN METABOLIC PANEL: CPT

## 2017-02-16 PROCEDURE — 1157F ADVNC CARE PLAN IN RCRD: CPT | Mod: S$GLB,,,

## 2017-02-16 PROCEDURE — 99214 OFFICE O/P EST MOD 30 MIN: CPT | Mod: S$GLB,,,

## 2017-02-16 PROCEDURE — 85025 COMPLETE CBC W/AUTO DIFF WBC: CPT

## 2017-02-16 PROCEDURE — 84153 ASSAY OF PSA TOTAL: CPT

## 2017-02-16 NOTE — PROGRESS NOTES
Chief Complaint   Patient presents with    Fatigue       HPI  Yair Owens is a 67 y.o. male who presents to the office today as a follow-up of his influenza B, test proven perhaps a week or more ago.  The patient is still very, very fatigued, his taste buds are not what they used to be, appetite is very poor.  Not having any chills, fever, has a mild residual cough.  At this point, the patient also stated that he's been so sick that he actually stopped smoking.  He wants to stay this way, and I will help him in any way I can.  Pt is known to me.    There is been no specific weight loss, no nausea, vomiting, chest pain, or hemoptysis.    HPI    PAST MEDICAL HISTORY:  Past Medical History   Diagnosis Date    Chest pain syndrome     GERD (gastroesophageal reflux disease)     Hypercholesteremia     Hyperlipidemia        PAST SURGICAL HISTORY:  Past Surgical History   Procedure Laterality Date    Bone resection, rib       for thoracic outlet syndrome    Hand surgery      Scrotal surgery       mass    Appendectomy      Spine surgery       C-spine disk (remote)       SOCIAL HISTORY:  Social History     Social History    Marital status:      Spouse name: N/A    Number of children: N/A    Years of education: N/A     Occupational History     [employer]     Social History Main Topics    Smoking status: Current Every Day Smoker     Packs/day: 0.75     Types: Cigarettes    Smokeless tobacco: Not on file      Comment: .  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 [employer] .      Alcohol use 1.2 oz/week     2 Cans of beer per week      Comment: once a week    Drug use: No    Sexual activity: Yes     Partners: Female     Other Topics Concern    Not on file     Social History Narrative       FAMILY HISTORY:  Family History   Problem Relation Age of Onset    Hypertension Mother     Cancer Father     Heart disease Father     Asthma Sister        ALLERGIES AND MEDICATIONS: updated and  "reviewed.  Review of patient's allergies indicates:   Allergen Reactions    Codeine Itching    Ibuprofen Itching     Current Outpatient Prescriptions   Medication Sig Dispense Refill    alprazolam (XANAX) 0.5 MG tablet Take 1 tablet (0.5 mg total) by mouth 3 (three) times daily as needed. 90 tablet 2    atorvastatin (LIPITOR) 40 MG tablet TAKE 1 TABLET BY MOUTH ONCE DAILY 90 tablet 0    CIALIS 5 mg tablet TAKE 1 TABLET BY MOUTH EVERY DAY AS NEEDED FOR ERECTILE DYSFUNCTION 30 tablet 6    lisinopril 10 MG tablet Take 1 tablet (10 mg total) by mouth once daily. 90 tablet 3    indomethacin (INDOCIN) 50 MG capsule Take 1 capsule (50 mg total) by mouth 3 (three) times daily as needed. 45 capsule 3     No current facility-administered medications for this visit.        ROS  Review of Systems   Constitutional: Positive for appetite change and fatigue.   HENT: Negative for postnasal drip, sinus pressure and sore throat.    Respiratory: Positive for cough (much improved.). Negative for shortness of breath and wheezing.        Physical Exam  Vitals:    02/16/17 1340   BP: 132/70   Pulse: 64   Temp: 98.6 °F (37 °C)    Body mass index is 28.5 kg/(m^2).  Weight: 82.6 kg (181 lb 15.8 oz)   Height: 5' 7" (170.2 cm)     Physical Exam   Constitutional: He is oriented to person, place, and time. He appears well-developed and well-nourished.   HENT:   TMs are normal.  Oropharynx is clear, well hydrated.   Eyes: Conjunctivae are normal. Pupils are equal, round, and reactive to light.   Cardiovascular: Normal rate and regular rhythm.    Pulmonary/Chest: Effort normal and breath sounds normal.   Clear lungs.   Neurological: He is alert and oriented to person, place, and time.   Psychiatric: He has a normal mood and affect. His behavior is normal.   Vitals reviewed.      Health Maintenance       Date Due Completion Date    TETANUS VACCINE 1/22/1969 ---    Zoster Vaccine 1/22/2011 ---    Abdominal Aortic Aneurysm Screening 1/22/2016 " ---    Influenza Vaccine 8/1/2016 10/29/2012 (Declined)    Override on 10/29/2012: Declined    Pneumococcal (65+) (2 of 2 - PPSV23) 7/18/2017 7/18/2016    Lipid Panel 10/20/2021 10/20/2016    Colonoscopy 11/18/2023 11/18/2013    Override on 10/29/2012: Declined          Assessment & Plan    1. Fatigue, unspecified type  This should resolve with time.    2.  flu type B  See history of present illness.    3. Routine medical exam  Patient does not have blood work for a long time, people self as suggested a panel of tests, we'll be happy to do that.  I'll let the patient know the results.  - CBC auto differential; Future  - Lipid panel; Future  - Comprehensive metabolic panel; Future  - PSA, Screening; Future      No Follow-up on file.

## 2017-02-16 NOTE — MR AVS SNAPSHOT
[facility]  [address] [place] [address]  [place] [address]  Phone: [phone]  Fax: [phone]                  Yair Owens   2017 1:40 PM   Office Visit    Description:  Male : 1950   Provider:  Tiburcio Sierra Jr., MD   [facility]  [facility] - [facility]           Reason for Visit     Fatigue           Diagnoses this Visit        Comments    Fatigue, unspecified type    -  Primary      flu type B         Routine medical exam                To Do List           Goals (5 Years of Data)     None      [facility]     [facility] On Call Nurse Care Line -  Assistance  Registered nurses in the [facility] On Call Center provide clinical advisement, health education, appointment booking, and other advisory services.  Call for this free service at [phone].             Medications           Message regarding Medications     Verify the changes and/or additions to your medication regime listed below are the same as discussed with your clinician today.  If any of these changes or additions are incorrect, please notify your healthcare provider.        STOP taking these medications     ondansetron (ZOFRAN) 4 MG tablet Take 1 tablet (4 mg total) by mouth every 8 (eight) hours as needed for Nausea.           Verify that the below list of medications is an accurate representation of the medications you are currently taking.  If none reported, the list may be blank. If incorrect, please contact your healthcare provider. Carry this list with you in case of emergency.           Current Medications     alprazolam (XANAX) 0.5 MG tablet Take 1 tablet (0.5 mg total) by mouth 3 (three) times daily as needed.    atorvastatin (LIPITOR) 40 MG tablet TAKE 1 TABLET BY MOUTH ONCE DAILY    CIALIS 5 mg tablet TAKE 1 TABLET BY MOUTH EVERY DAY AS NEEDED FOR ERECTILE DYSFUNCTION    lisinopril 10 MG tablet Take 1 tablet (10 mg total) by mouth once daily.    indomethacin (INDOCIN) 50 MG capsule Take 1  "capsule (50 mg total) by mouth 3 (three) times daily as needed.           Clinical Reference Information           Your Vitals Were     BP Pulse Temp Height Weight SpO2    132/70 (BP Location: Right arm, Patient Position: Sitting, BP Method: Manual) 64 98.6 °F (37 °C) 5' 7" (1.702 m) 82.6 kg (181 lb 15.8 oz) 98%    BMI                28.5 kg/m2          Blood Pressure          Most Recent Value    BP  132/70      Allergies as of 2/16/2017     Codeine    Ibuprofen      Immunizations Administered on Date of Encounter - 2/16/2017     None      Orders Placed During Today's Visit     Future Labs/Procedures Expected by Expires    CBC auto differential  2/16/2017 2/16/2018    Comprehensive metabolic panel  2/16/2017 2/16/2018    Lipid panel  2/16/2017 2/16/2018    PSA, Screening  2/16/2017 2/16/2018      [facility] Sign-Up     Activating your [facility] account is as easy as 1-2-3!     1) Visit [email or web address], select Sign Up Now, enter this activation code and your date of birth, then select Next.  [email or web address]  Expires: 2/25/2017  1:21 PM      2) Create a username and password to use when you visit [facility] in the future and select a security question in case you lose your password and select Next.    3) Enter your e-mail address and click Sign Up!    Additional Information  If you have questions, please e-mail [email or web address] or call [phone] to talk to our [facility] staff. Remember, [facility] is NOT to be used for urgent needs. For medical emergencies, dial 911.         Smoking Cessation     If you would like to quit smoking:   You may be eligible for free services if you are a [place] resident and started smoking cigarettes before September 1, 1988.  Call the Smoking Cessation Trust (SCT) toll free at (844) 762-8323 or (344) 366-8247.   Call 4-895-QUIT-NOW if you do not meet the above criteria.            Language Assistance Services     ATTENTION: Language assistance services are available, free of " charge. Please call [phone].      ATENCIÓN: Si habla español, tiene a cormier disposición servicios gratuitos de asistencia lingüística. Llame al [phone].     CHÚ Ý: N?u b?n nói Ti?ng Vi?t, có các d?ch v? h? tr? ngôn ng? mi?n phí dành cho b?n. G?i s? [phone].         [facility] complies with applicable Federal civil rights laws and does not discriminate on the basis of race, color, national origin, age, disability, or sex.

## 2017-03-23 RX ORDER — ATORVASTATIN CALCIUM 40 MG/1
40 TABLET, FILM COATED ORAL DAILY
Qty: 90 TABLET | Refills: 3 | Status: SHIPPED | OUTPATIENT
Start: 2017-03-23 | End: 2018-01-23 | Stop reason: SDUPTHER

## 2017-03-23 NOTE — TELEPHONE ENCOUNTER
----- Message from Feliz Hernandez sent at 3/23/2017  9:24 AM CDT -----  Contact: [facility]  REFILL: atorvastatin (LIPITOR) 40 MG tablet

## 2017-04-13 RX ORDER — OMEPRAZOLE 20 MG/1
CAPSULE, DELAYED RELEASE ORAL
Qty: 30 CAPSULE | Refills: 11 | Status: SHIPPED | OUTPATIENT
Start: 2017-04-13 | End: 2018-06-07 | Stop reason: SDUPTHER

## 2017-04-16 DIAGNOSIS — R07.89 RIGHT-SIDED CHEST WALL PAIN: ICD-10-CM

## 2017-04-17 RX ORDER — ALPRAZOLAM 0.5 MG/1
TABLET ORAL
Qty: 90 TABLET | Refills: 2 | Status: SHIPPED | OUTPATIENT
Start: 2017-04-17 | End: 2017-07-17 | Stop reason: SDUPTHER

## 2017-04-19 ENCOUNTER — OFFICE VISIT (OUTPATIENT)
Dept: FAMILY MEDICINE | Facility: CLINIC | Age: 67
End: 2017-04-19
Payer: MEDICARE

## 2017-04-19 VITALS
SYSTOLIC BLOOD PRESSURE: 130 MMHG | OXYGEN SATURATION: 99 % | HEIGHT: 68 IN | TEMPERATURE: 98 F | DIASTOLIC BLOOD PRESSURE: 64 MMHG | WEIGHT: 189.25 LBS | BODY MASS INDEX: 28.68 KG/M2 | HEART RATE: 64 BPM

## 2017-04-19 DIAGNOSIS — K63.5 POLYP OF COLON, UNSPECIFIED PART OF COLON, UNSPECIFIED TYPE: ICD-10-CM

## 2017-04-19 DIAGNOSIS — R43.0 ANOSMIA: Primary | ICD-10-CM

## 2017-04-19 DIAGNOSIS — R43.2 DYSGEUSIA: ICD-10-CM

## 2017-04-19 PROCEDURE — 99999 PR PBB SHADOW E&M-EST. PATIENT-LVL III: CPT | Mod: PBBFAC,,,

## 2017-04-19 PROCEDURE — 1160F RVW MEDS BY RX/DR IN RCRD: CPT | Mod: S$GLB,,,

## 2017-04-19 PROCEDURE — 3078F DIAST BP <80 MM HG: CPT | Mod: S$GLB,,,

## 2017-04-19 PROCEDURE — 3075F SYST BP GE 130 - 139MM HG: CPT | Mod: S$GLB,,,

## 2017-04-19 PROCEDURE — 1159F MED LIST DOCD IN RCRD: CPT | Mod: S$GLB,,,

## 2017-04-19 PROCEDURE — 99213 OFFICE O/P EST LOW 20 MIN: CPT | Mod: S$GLB,,,

## 2017-04-19 NOTE — PROGRESS NOTES
"Chief Complaint   Patient presents with    Follow-up       HPI  Yair Owens is a 67 y.o. male who presents to the office with loss of sense of smell and loss of taste.  He has only partial sensation with each.  Patient is a smoker, he has started smoking again, not smoking very much.  Patient notes that this has happened ever since he had the "flu" in February.  This is been about 2 months.  Not complaining of any postnasal drip, vision changes, dry mouth or the like, but he is using some medications/solutions for dry mouth.  Pt is known to me.Patient had a history of colon polyps, needs to have a redo of his colonoscopy.    HPI    PAST MEDICAL HISTORY:  Past Medical History:   Diagnosis Date    Chest pain syndrome     GERD (gastroesophageal reflux disease)     Hypercholesteremia     Hyperlipidemia        PAST SURGICAL HISTORY:  Past Surgical History:   Procedure Laterality Date    APPENDECTOMY      BONE RESECTION, RIB      for thoracic outlet syndrome    HAND SURGERY      SCROTAL SURGERY      mass    SPINE SURGERY      C-spine disk (remote)       SOCIAL HISTORY:  Social History     Social History    Marital status:      Spouse name: N/A    Number of children: N/A    Years of education: N/A     Occupational History     [employer]     Social History Main Topics    Smoking status: Current Every Day Smoker     Packs/day: 0.75     Types: Cigarettes    Smokeless tobacco: Not on file      Comment: .  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 [employer] .      Alcohol use 1.2 oz/week     2 Cans of beer per week      Comment: once a week    Drug use: No    Sexual activity: Yes     Partners: Female     Other Topics Concern    Not on file     Social History Narrative    No narrative on file       FAMILY HISTORY:  Family History   Problem Relation Age of Onset    Hypertension Mother     Cancer Father     Heart disease Father     Asthma Sister        ALLERGIES AND MEDICATIONS: " "updated and reviewed.  Review of patient's allergies indicates:   Allergen Reactions    Codeine Itching    Ibuprofen Itching     Current Outpatient Prescriptions   Medication Sig Dispense Refill    alprazolam (XANAX) 0.5 MG tablet TAKE 1 TABELT BY MOUTH THREE TIMES DAILY AS NEEDED 90 tablet 2    atorvastatin (LIPITOR) 40 MG tablet Take 1 tablet (40 mg total) by mouth once daily. 90 tablet 3    CIALIS 5 mg tablet TAKE 1 TABLET BY MOUTH EVERY DAY AS NEEDED FOR ERECTILE DYSFUNCTION 30 tablet 6    lisinopril 10 MG tablet Take 1 tablet (10 mg total) by mouth once daily. 90 tablet 3    omeprazole (PRILOSEC) 20 MG capsule TAKE 1 CAPSULE BY MOUTH DAILY 30 capsule 11    indomethacin (INDOCIN) 50 MG capsule Take 1 capsule (50 mg total) by mouth 3 (three) times daily as needed. 45 capsule 3     No current facility-administered medications for this visit.        ROS  Review of Systems   Constitutional: Negative for appetite change and fever.   HENT:        See history of present illness.   Eyes: Negative for visual disturbance.   Gastrointestinal:        No change of bowel habits.  No melena.       Physical Exam  Vitals:    04/19/17 1109   BP: 130/64   Pulse: 64   Temp: 98.4 °F (36.9 °C)    Body mass index is 28.78 kg/(m^2).  Weight: 85.9 kg (189 lb 4.2 oz)   Height: 5' 8" (172.7 cm)     Physical Exam   Constitutional: He appears well-developed and well-nourished. No distress.   HENT:   TMs are normal.  Sinuses are nontender to percussion, oropharynx is clear, well hydrated.   Eyes: Pupils are equal, round, and reactive to light.   Visual fields seem to be normal.   Vitals reviewed.      Health Maintenance       Date Due Completion Date    TETANUS VACCINE 1/22/1969 ---    Zoster Vaccine 1/22/2011 ---    Abdominal Aortic Aneurysm Screening 1/22/2016 ---    Influenza Vaccine 8/1/2016 10/29/2012 (Declined)    Override on 10/29/2012: Declined    Pneumococcal (65+) (2 of 2 - PPSV23) 7/18/2017 7/18/2016    Lipid Panel " 2/16/2022 2/16/2017    Colonoscopy 11/18/2023 11/18/2013    Override on 10/29/2012: Declined          Assessment & Plan    1. Anosmia  Probably related all to ALLERGY or mucous membrane problems, suggest Claritin or some nasal saline on a regular basis.  Patient really needs to stop smoking altogether.  If the patient doesn't have any relief, I recommend ENT referral.    2. Dysgeusia  Actually, same as above.    3. Polyp of colon, unspecified part of colon, unspecified type  Colonoscopy re-ordered.  - Case request GI: COLONOSCOPY      No Follow-up on file.

## 2017-04-19 NOTE — MR AVS SNAPSHOT
[facility]  [address]  [place] [address]  Phone: [phone]  Fax: [phone]                  Yair Owens   2017 11:00 AM   Office Visit    Description:  Male : 1950   Provider:  Tiburcio Sierra Jr., MD   Department:  [facility]           Reason for Visit     Follow-up           Diagnoses this Visit        Comments    Anosmia    -  Primary     Dysgeusia         Polyp of colon, unspecified part of colon, unspecified type                To Do List           Goals (5 Years of Data)     None      [facility]     [facility] Nurse Care Line -  Assistance  Unless otherwise directed by your provider, please contact [facility], our nurse care line that is available for  assistance.     Registered nurses in the [facility] provide: appointment scheduling, clinical advisement, health education, and other advisory services.  Call: [phone] (toll free)               Medications           Message regarding Medications     Verify the changes and/or additions to your medication regime listed below are the same as discussed with your clinician today.  If any of these changes or additions are incorrect, please notify your healthcare provider.             Verify that the below list of medications is an accurate representation of the medications you are currently taking.  If none reported, the list may be blank. If incorrect, please contact your healthcare provider. Carry this list with you in case of emergency.           Current Medications     alprazolam (XANAX) 0.5 MG tablet TAKE 1 TABELT BY MOUTH THREE TIMES DAILY AS NEEDED    atorvastatin (LIPITOR) 40 MG tablet Take 1 tablet (40 mg total) by mouth once daily.    CIALIS 5 mg tablet TAKE 1 TABLET BY MOUTH EVERY DAY AS NEEDED FOR ERECTILE DYSFUNCTION    lisinopril 10 MG tablet Take 1 tablet (10 mg total) by mouth once daily.    omeprazole (PRILOSEC) 20 MG capsule TAKE 1 CAPSULE BY  "MOUTH DAILY    indomethacin (INDOCIN) 50 MG capsule Take 1 capsule (50 mg total) by mouth 3 (three) times daily as needed.           Clinical Reference Information           Your Vitals Were     BP Pulse Temp Height Weight SpO2    130/64 (BP Location: Right arm, Patient Position: Sitting, BP Method: Manual) 64 98.4 °F (36.9 °C) 5' 8" (1.727 m) 85.9 kg (189 lb 4.2 oz) 99%    BMI                28.78 kg/m2          Blood Pressure          Most Recent Value    BP  130/64      Allergies as of 4/19/2017     Codeine    Ibuprofen      Immunizations Administered on Date of Encounter - 4/19/2017     None      Orders Placed During Today's Visit      Normal Orders This Visit    Case request GI: COLONOSCOPY       [facility] Sign-Up     Activating your [facility] account is as easy as 1-2-3!     1) Visit [email or web address], select Sign Up Now, enter this activation code and your date of birth, then select Next.  [email or web address]  Expires: 6/3/2017 11:31 AM      2) Create a username and password to use when you visit [facility] in the future and select a security question in case you lose your password and select Next.    3) Enter your e-mail address and click Sign Up!    Additional Information  If you have questions, please e-mail [email or web address] or call [phone] to talk to our [facility] staff. Remember, [facility] is NOT to be used for urgent needs. For medical emergencies, dial 911.         Smoking Cessation     If you would like to quit smoking:   You may be eligible for free services if you are a [place] resident and started smoking cigarettes before September 1, 1988.  Call the Smoking Cessation Trust (SCT) toll free at (980) 681-8863 or (741) 830-2309.   Call 2-800-QUIT-NOW if you do not meet the above criteria.   Contact us via email: [email or web address]   View our website for more information: [email or web address]        Language Assistance Services     ATTENTION: Language assistance services are " available, free of charge. Please call [phone].      ATENCIÓN: Si habla dimple, tiene a cormier disposición servicios gratuitos de asistencia lingüística. Llame al [phone].     CHÚ Ý: N?u b?n nói Ti?ng Vi?t, có các d?ch v? h? tr? ngôn ng? mi?n phí dành cho b?n. G?i s? [phone].         [facility] complies with applicable Federal civil rights laws and does not discriminate on the basis of race, color, national origin, age, disability, or sex.

## 2017-05-11 ENCOUNTER — OFFICE VISIT (OUTPATIENT)
Dept: FAMILY MEDICINE | Facility: CLINIC | Age: 67
End: 2017-05-11
Payer: MEDICARE

## 2017-05-11 VITALS
HEART RATE: 63 BPM | DIASTOLIC BLOOD PRESSURE: 80 MMHG | TEMPERATURE: 99 F | HEIGHT: 68 IN | WEIGHT: 188.88 LBS | BODY MASS INDEX: 28.62 KG/M2 | OXYGEN SATURATION: 99 % | SYSTOLIC BLOOD PRESSURE: 130 MMHG

## 2017-05-11 DIAGNOSIS — D12.6 ADENOMATOUS POLYP OF COLON, UNSPECIFIED PART OF COLON: Primary | ICD-10-CM

## 2017-05-11 PROCEDURE — 1160F RVW MEDS BY RX/DR IN RCRD: CPT | Mod: S$GLB,,,

## 2017-05-11 PROCEDURE — 99999 PR PBB SHADOW E&M-EST. PATIENT-LVL III: CPT | Mod: PBBFAC,,,

## 2017-05-11 PROCEDURE — 1126F AMNT PAIN NOTED NONE PRSNT: CPT | Mod: S$GLB,,,

## 2017-05-11 PROCEDURE — 1159F MED LIST DOCD IN RCRD: CPT | Mod: S$GLB,,,

## 2017-05-11 PROCEDURE — 99213 OFFICE O/P EST LOW 20 MIN: CPT | Mod: S$GLB,,,

## 2017-05-11 PROCEDURE — 3079F DIAST BP 80-89 MM HG: CPT | Mod: S$GLB,,,

## 2017-05-11 PROCEDURE — 3075F SYST BP GE 130 - 139MM HG: CPT | Mod: S$GLB,,,

## 2017-05-11 NOTE — PROGRESS NOTES
"Chief Complaint   Patient presents with    Follow-up       HPI  Yair Owens is a 67 y.o. male who presents to the office with Questions.  The patient has had a diminished sense of smell and taste for quite a while, ever since he had "flu" earlier this year.  Things are getting better, he is taking some Claritin on a regular basis.  He doesn't have any other cranial nerve problems, such as loss of visual fields, he doesn't have any headaches, no swallowing difficulty.  Pt is known to me.   Patient has also been exercising regularly at a gym, he notes that his heart rate goes up to 110-112 bpm, and wonders if this is a reasonable goal.  The patient does not get particularly short winded, and for sure is not getting any chest pain, claudication or the like.  I reviewed the chart and the patient has seen Dr. Blackburn several years ago, and the patient had a negative EKG, normal stress echo, and had coronaryarterydisease.    HPI    PAST MEDICAL HISTORY:  Past Medical History:   Diagnosis Date    Chest pain syndrome     GERD (gastroesophageal reflux disease)     Hypercholesteremia     Hyperlipidemia        PAST SURGICAL HISTORY:  Past Surgical History:   Procedure Laterality Date    APPENDECTOMY      BONE RESECTION, RIB      for thoracic outlet syndrome    HAND SURGERY      SCROTAL SURGERY      mass    SPINE SURGERY      C-spine disk (remote)       SOCIAL HISTORY:  Social History     Social History    Marital status:      Spouse name: N/A    Number of children: N/A    Years of education: N/A     Occupational History     [employer]     Social History Main Topics    Smoking status: Current Every Day Smoker     Packs/day: 0.75     Types: Cigarettes    Smokeless tobacco: Not on file      Comment: .  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 [employer] .      Alcohol use 1.2 oz/week     2 Cans of beer per week      Comment: once a week    Drug use: No    Sexual activity: Yes     " "Partners: Female     Other Topics Concern    Not on file     Social History Narrative       FAMILY HISTORY:  Family History   Problem Relation Age of Onset    Hypertension Mother     Cancer Father     Heart disease Father     Asthma Sister        ALLERGIES AND MEDICATIONS: updated and reviewed.  Review of patient's allergies indicates:   Allergen Reactions    Codeine Itching    Ibuprofen Itching     Current Outpatient Prescriptions   Medication Sig Dispense Refill    alprazolam (XANAX) 0.5 MG tablet TAKE 1 TABELT BY MOUTH THREE TIMES DAILY AS NEEDED 90 tablet 2    atorvastatin (LIPITOR) 40 MG tablet Take 1 tablet (40 mg total) by mouth once daily. 90 tablet 3    CIALIS 5 mg tablet TAKE 1 TABLET BY MOUTH EVERY DAY AS NEEDED FOR ERECTILE DYSFUNCTION 30 tablet 6    lisinopril 10 MG tablet Take 1 tablet (10 mg total) by mouth once daily. 90 tablet 3    omeprazole (PRILOSEC) 20 MG capsule TAKE 1 CAPSULE BY MOUTH DAILY 30 capsule 11    indomethacin (INDOCIN) 50 MG capsule Take 1 capsule (50 mg total) by mouth 3 (three) times daily as needed. 45 capsule 3     No current facility-administered medications for this visit.        ROS  Review of Systems   Constitutional: Negative for appetite change and unexpected weight change.   Respiratory: Negative for shortness of breath.    Cardiovascular: Negative for leg swelling.        No exertional symptoms.   Gastrointestinal: Positive for anal bleeding (Occasional.).        The patient had several colon polyps removed a few years ago, he has had only one of them being adenomatous polyp, all the other ones were hyperplastic.  Patient is scheduled for follow-up, I explained to the patient which polyps were completely benign, which, if you will, could be "premalignant."   Neurological: Negative for syncope.       Physical Exam  Vitals:    05/11/17 1514   BP: 130/80   Pulse: 63   Temp: 98.6 °F (37 °C)    Body mass index is 28.72 kg/(m^2).  Weight: 85.7 kg (188 lb 13.7 oz) " "  Height: 5' 8" (172.7 cm)     Physical Exam   Constitutional: He appears well-developed and well-nourished. No distress.   Cardiovascular: Normal rate.    Pulmonary/Chest: Effort normal.   Musculoskeletal: He exhibits no edema.   Psychiatric: He has a normal mood and affect. His behavior is normal.       Health Maintenance       Date Due Completion Date    TETANUS VACCINE 1/22/1969 ---    Zoster Vaccine 1/22/2011 ---    Abdominal Aortic Aneurysm Screening 1/22/2016 ---    Pneumococcal (65+) (2 of 2 - PPSV23) 7/18/2017 7/18/2016    Influenza Vaccine 8/1/2017 10/29/2012 (Declined)    Override on 10/29/2012: Declined    Lipid Panel 2/16/2022 2/16/2017    Colonoscopy 11/18/2023 11/18/2013    Override on 10/29/2012: Declined          Assessment & Plan    1. Adenomatous polyp of colon, unspecified part of colon  Patient will go ahead with the colonoscopy.    2.  Questions about cardiovascular health: As the patient increases his exercise capacity, if he really pushes the heart could even go faster.  The most important thing is the patient's symptomatology, if he has chest pain, significant shortness of breath, or diminution of exercise capacity, this is when we would  be concerned.      No Follow-up on file.        "

## 2017-07-13 DIAGNOSIS — R07.89 RIGHT-SIDED CHEST WALL PAIN: ICD-10-CM

## 2017-07-13 NOTE — TELEPHONE ENCOUNTER
----- Message from Eleni Sheehan sent at 7/13/2017 12:11 PM CDT -----  Contact: self   Refill request for Alprazolam .  [phone] . He is scheduled for f/u on 7-24-17 .     pts #   [phone]     LL

## 2017-07-13 NOTE — TELEPHONE ENCOUNTER
Before refilling I need the following information:    Does this patient take this medication daily?  If so how many times a day?  If not, how many tablets a week does he take?      We will need to discuss this further at his OV as alprazolam is not a medication that is typically used for gastrointestinal symptoms.     Thank you,  Talon

## 2017-07-13 NOTE — TELEPHONE ENCOUNTER
Patient states that he has an appointment scheduled for 7/24 with Dr. Ewing but states that he will be out of medication before that date and needs the medication for his nervous stomach.  Informed that Dr. Ewing is out of the office this week and that no other provider will fill this prescription without an appointment.  Verbalized understanding and asked to be notified of Dr. Ewing's decision when he returns.

## 2017-07-14 NOTE — TELEPHONE ENCOUNTER
I will write a short refill on Monday but we will need to discuss coming off of this medication as I do not prescribe long term Xanax due to its multiple potential side effects.    Thank you,  Talon

## 2017-07-17 RX ORDER — ALPRAZOLAM 0.5 MG/1
0.5 TABLET ORAL 3 TIMES DAILY PRN
Qty: 20 TABLET | Refills: 0 | Status: SHIPPED | OUTPATIENT
Start: 2017-07-17 | End: 2017-08-01 | Stop reason: SDUPTHER

## 2017-07-17 NOTE — TELEPHONE ENCOUNTER
Refill printed and signed.  Please notify pt once medication is up front for pickup.     Thank you,  Talon

## 2017-07-20 ENCOUNTER — ANESTHESIA EVENT (OUTPATIENT)
Dept: ENDOSCOPY | Facility: HOSPITAL | Age: 67
End: 2017-07-20
Payer: MEDICARE

## 2017-07-21 ENCOUNTER — ANESTHESIA (OUTPATIENT)
Dept: ENDOSCOPY | Facility: HOSPITAL | Age: 67
End: 2017-07-21
Payer: MEDICARE

## 2017-07-21 ENCOUNTER — SURGERY (OUTPATIENT)
Age: 67
End: 2017-07-21

## 2017-07-21 ENCOUNTER — HOSPITAL ENCOUNTER (OUTPATIENT)
Facility: HOSPITAL | Age: 67
Discharge: HOME | End: 2017-07-21
Attending: INTERNAL MEDICINE | Admitting: INTERNAL MEDICINE
Payer: MEDICARE

## 2017-07-21 VITALS
HEIGHT: 68 IN | WEIGHT: 180 LBS | RESPIRATION RATE: 20 BRPM | SYSTOLIC BLOOD PRESSURE: 130 MMHG | TEMPERATURE: 98 F | OXYGEN SATURATION: 99 % | DIASTOLIC BLOOD PRESSURE: 75 MMHG | HEART RATE: 58 BPM | BODY MASS INDEX: 27.28 KG/M2

## 2017-07-21 PROCEDURE — 63600175 PHARM REV CODE 636 W HCPCS: Performed by: NURSE ANESTHETIST, CERTIFIED REGISTERED

## 2017-07-21 PROCEDURE — D9220A PRA ANESTHESIA: Mod: 33,CRNA,, | Performed by: NURSE ANESTHETIST, CERTIFIED REGISTERED

## 2017-07-21 PROCEDURE — 27201089 HC SNARE, DISP (ANY): Performed by: INTERNAL MEDICINE

## 2017-07-21 PROCEDURE — 88305 TISSUE EXAM BY PATHOLOGIST: CPT | Performed by: PATHOLOGY

## 2017-07-21 PROCEDURE — 37000008 HC ANESTHESIA 1ST 15 MINUTES: Performed by: INTERNAL MEDICINE

## 2017-07-21 PROCEDURE — D9220A PRA ANESTHESIA: Mod: 33,ANES,, | Performed by: ANESTHESIOLOGY

## 2017-07-21 PROCEDURE — 45380 COLONOSCOPY AND BIOPSY: CPT | Performed by: INTERNAL MEDICINE

## 2017-07-21 PROCEDURE — 25000003 PHARM REV CODE 250: Performed by: NURSE ANESTHETIST, CERTIFIED REGISTERED

## 2017-07-21 PROCEDURE — 45385 COLONOSCOPY W/LESION REMOVAL: CPT | Performed by: INTERNAL MEDICINE

## 2017-07-21 PROCEDURE — 25000003 PHARM REV CODE 250: Performed by: ANESTHESIOLOGY

## 2017-07-21 PROCEDURE — 27201012 HC FORCEPS, HOT/COLD, DISP: Performed by: INTERNAL MEDICINE

## 2017-07-21 PROCEDURE — 88305 TISSUE EXAM BY PATHOLOGIST: CPT | Mod: 26,,, | Performed by: PATHOLOGY

## 2017-07-21 PROCEDURE — 37000009 HC ANESTHESIA EA ADD 15 MINS: Performed by: INTERNAL MEDICINE

## 2017-07-21 RX ORDER — PROPOFOL 10 MG/ML
VIAL (ML) INTRAVENOUS
Status: DISCONTINUED | OUTPATIENT
Start: 2017-07-21 | End: 2017-07-21

## 2017-07-21 RX ORDER — SODIUM CHLORIDE 9 MG/ML
INJECTION, SOLUTION INTRAVENOUS CONTINUOUS
Status: DISCONTINUED | OUTPATIENT
Start: 2017-07-21 | End: 2017-07-21 | Stop reason: HOSPADM

## 2017-07-21 RX ORDER — LIDOCAINE HYDROCHLORIDE 10 MG/ML
1 INJECTION, SOLUTION EPIDURAL; INFILTRATION; INTRACAUDAL; PERINEURAL ONCE AS NEEDED
Status: DISCONTINUED | OUTPATIENT
Start: 2017-07-21 | End: 2017-07-21 | Stop reason: HOSPADM

## 2017-07-21 RX ORDER — PROPOFOL 10 MG/ML
VIAL (ML) INTRAVENOUS
Status: DISCONTINUED
Start: 2017-07-21 | End: 2017-07-21 | Stop reason: HOSPADM

## 2017-07-21 RX ORDER — LIDOCAINE HYDROCHLORIDE 20 MG/ML
INJECTION, SOLUTION EPIDURAL; INFILTRATION; INTRACAUDAL; PERINEURAL
Status: DISCONTINUED
Start: 2017-07-21 | End: 2017-07-21 | Stop reason: HOSPADM

## 2017-07-21 RX ORDER — LIDOCAINE HCL/PF 100 MG/5ML
SYRINGE (ML) INTRAVENOUS
Status: DISCONTINUED | OUTPATIENT
Start: 2017-07-21 | End: 2017-07-21

## 2017-07-21 RX ADMIN — LIDOCAINE HYDROCHLORIDE 5 ML: 20 INJECTION, SOLUTION INTRAVENOUS at 08:07

## 2017-07-21 RX ADMIN — PROPOFOL 5 MG: 10 INJECTION, EMULSION INTRAVENOUS at 08:07

## 2017-07-21 RX ADMIN — SODIUM CHLORIDE: 0.9 INJECTION, SOLUTION INTRAVENOUS at 07:07

## 2017-07-21 RX ADMIN — PROPOFOL 150 MG: 10 INJECTION, EMULSION INTRAVENOUS at 08:07

## 2017-07-21 NOTE — TRANSFER OF CARE
"Anesthesia Transfer of Care Note    Patient: Yair Owens    Procedure(s) Performed: Procedure(s) (LRB):  COLONOSCOPY (N/A)    Patient location: PACU    Anesthesia Type: general    Transport from OR: Transported from OR on room air with adequate spontaneous ventilation    Post pain: adequate analgesia    Post assessment: no apparent anesthetic complications and tolerated procedure well    Post vital signs: stable    Level of consciousness: awake, alert and oriented    Nausea/Vomiting: no nausea/vomiting    Complications: none    Transfer of care protocol was followed      Last vitals:   Visit Vitals  /66   Pulse (!) 56   Temp 36.5 °C (97.7 °F) (Oral)   Resp 14   Ht 5' 8" (1.727 m)   Wt 81.6 kg (180 lb)   SpO2 98%   BMI 27.37 kg/m²     "

## 2017-07-21 NOTE — ANESTHESIA POSTPROCEDURE EVALUATION
"Anesthesia Post Evaluation    Patient: Yair Owens    Procedure(s) Performed: Procedure(s) (LRB):  COLONOSCOPY (N/A)    Final Anesthesia Type: general  Patient location during evaluation: GI PACU  Patient participation: Yes- Able to Participate  Level of consciousness: awake and alert  Post-procedure vital signs: reviewed and stable  Pain management: adequate  Airway patency: patent  PONV status at discharge: No PONV  Anesthetic complications: no      Cardiovascular status: blood pressure returned to baseline  Respiratory status: unassisted  Hydration status: euvolemic  Follow-up not needed.        Visit Vitals  /75 (BP Location: Left arm, Patient Position: Lying, BP Method: Automatic)   Pulse (!) 58   Temp 36.5 °C (97.7 °F) (Oral)   Resp 20   Ht 5' 8" (1.727 m)   Wt 81.6 kg (180 lb)   SpO2 99%   BMI 27.37 kg/m²       Pain/Sonny Score: Pain Assessment Performed: Yes (7/21/2017  8:55 AM)  Presence of Pain: denies (7/21/2017  8:40 AM)  Sonny Score: 10 (7/21/2017  8:40 AM)      "

## 2017-07-21 NOTE — DISCHARGE INSTRUCTIONS
4 Steps for Eating Healthier  Changing the way you eat can improve your health. It can lower your cholesterol and blood pressure, and help you stay at a healthy weight. Your diet doesnt have to be bland and boring to be healthy. Just watch your calories and follow these steps:    1. Eat fewer unhealthy fats  · Choose more fish and lean meats instead of fatty cuts of meat.  · Skip butter and lard, and use less margarine.  · Pass on foods that have palm, coconut, or hydrogenated oils.  · Eat fewer high-fat dairy foods like cheese, ice cream, and whole milk.  · Get a heart-healthy cookbook and try some low-fat recipes.  2. Go light on salt  · Keep the saltshaker off the table.  · Limit high-salt ingredients, such as soy sauce, bouillon, and garlic salt.  · Instead of adding salt when cooking, season your food with herbs and flavorings. Try lemon, garlic, and onion.  · Limit convenience foods, such as boxed or canned foods and restaurant food.  · Read food labels and choose lower-sodium options.  3. Limit sugar  · Pause before you add sugars to pancakes, cereal, coffee, or tea. This includes white and brown table sugar, syrup, honey, and molasses. Cut your usual amount by half.  · Use non-sugar sweeteners. Stevia, aspartame, and sucralose can satisfy a sweet tooth without adding calories.  · Swap out sugar-filled soda and other drinks. Buy sugar-free or low-calorie beverages. Remember water is always the best choice.  · Read labels and choose foods with less added sugar. Keep in mind that dairy foods and foods with fruit will have some natural sugar.  · Cut the sugar in recipes by 1/3 to 1/2. Boost the flavor with extracts like almond, vanilla, or orange. Or add spices such as cinnamon or nutmeg.  4. Eat more fiber  · Eat fresh fruits and vegetables every day.  · Boost your diet with whole grains. Go for oats, whole-grain rice, and bran.  · Add beans and lentils to your meals.  · Drink more water to match your fiber  increase. This is to help prevent constipation.  Date Last Reviewed: 5/11/2015  © 2619-1558 [employer]. [address] [place] [address], [place] [address]. All rights reserved. This information is not intended as a substitute for professional medical care. Always follow your healthcare professional's instructions.        Understanding Colon and Rectal Polyps    The colon (also called the large intestine) is a muscular tube that forms the last part of the digestive tract. It absorbs water and stores food waste. The colon is about 4 to 6 feet long. The rectum is the last 6 inches of the colon. The colon and rectum have a smooth lining composed of millions of cells. Changes in these cells can lead to growths in the colon that can become cancerous and should be removed. Multiple tests are available to screen for colon cancer, but the colonoscopy is the most recommended test. During colonoscopy, these polyps can be removed. How often you need this test depends on many things including your condition, your family history, symptoms, and what the findings were at the previous colonoscopy.   When the colon lining changes  Changes that happen in the cells that line the colon or rectum can lead to growths called polyps. Over a period of years, polyps can turn cancerous. Removing polyps early may prevent cancer from ever forming.  Polyps  Polyps are fleshy clumps of tissue that form on the lining of the colon or rectum. Small polyps are usually benign (not cancerous). However, over time, cells in a polyp can change and become cancerous. Certain types of polyps known as adenomatous polyps are premalignant. The risk for invasive cancer increases with the size of the polyp and certain cell and gene features. This means that they can become cancerous if they're not removed. Hyperplastic polyps are benign. They can grow quite large and not turn cancerous.   Cancer  Almost all colorectal cancers start when polyp cells begin  growing abnormally. As a cancerous tumor grows, it may involve more and more of the colon or rectum. In time, cancer can also grow beyond the colon or rectum and spread to nearby organs or to glands called lymph nodes. The cells can also travel to other parts of the body. This is known as metastasis. The earlier a cancerous tumor is removed, the better the chance of preventing its spread.    Date Last Reviewed: 8/1/2016  © 8516-3073 [employer]. [address] [place] [address], [place] [address]. All rights reserved. This information is not intended as a substitute for professional medical care. Always follow your healthcare professional's instructions.

## 2017-07-21 NOTE — ANESTHESIA PREPROCEDURE EVALUATION
07/21/2017  Yair Owens is a 67 y.o., male.    Pre-op Assessment    I have reviewed the Patient Summary Reports.      I have reviewed the Medications.     Review of Systems  Anesthesia Hx:  No problems with previous Anesthesia  History of prior surgery of interest to airway management or planning: Previous anesthesia: General Denies Family Hx of Anesthesia complications.   Denies Personal Hx of Anesthesia complications.   Social:  Smoker, Alcohol Use    Hematology/Oncology:  Hematology Normal   Oncology Normal     EENT/Dental:EENT/Dental Normal   Cardiovascular:   Exercise tolerance: good Hypertension, well controlled    Pulmonary:  Pulmonary Normal    Renal/:  Renal/ Normal     Hepatic/GI:   GERD, well controlled    Musculoskeletal:  Musculoskeletal Normal    Neurological:  Neurology Normal    Endocrine:  Endocrine Normal    Dermatological:  Skin Normal    Psych:   Psychiatric History          Physical Exam  General:  Well nourished    Airway/Jaw/Neck:  Airway Findings: Mouth Opening: Normal Tongue: Normal  General Airway Assessment: Adult  Mallampati: II  TM Distance: Normal, at least 6 cm  Jaw/Neck Findings:  Neck ROM: Normal ROM      Dental:  Dental Findings: In tact   Chest/Lungs:  Chest/Lungs Findings: Normal Respiratory Rate     Heart/Vascular:  Heart Findings: Rate: Normal        Mental Status:  Mental Status Findings:  Cooperative, Alert and Oriented         Anesthesia Plan  Type of Anesthesia, risks & benefits discussed:  Anesthesia Type:  MAC  Patient's Preference: MAC  Intra-op Monitoring Plan:   Intra-op Monitoring Plan Comments:   Post Op Pain Control Plan:   Post Op Pain Control Plan Comments:   Induction:   IV  Beta Blocker:         Informed Consent: Patient understands risks and agrees with Anesthesia plan.  Questions answered. Anesthesia consent signed with patient.  ASA Score: 2      Day of Surgery Review of History & Physical: I have interviewed and examined the patient. I have reviewed the patient's H&P dated:  There are no significant changes.  H&P update referred to the provider.         Ready For Surgery From Anesthesia Perspective.

## 2017-07-21 NOTE — H&P
"Gastroenterology    CC: Hx polyps    HPI 67 y.o. male with hx of polyps      Past Medical History:   Diagnosis Date    Chest pain syndrome     GERD (gastroesophageal reflux disease)     Hypercholesteremia     Hyperlipidemia          Review of Systems  General ROS: negative for chills, fever or weight loss  Cardiovascular ROS: no chest pain or dyspnea on exertion    Physical Examination  /72   Pulse 61   Temp 97.8 °F (36.6 °C)   Resp 16   Ht 5' 8" (1.727 m)   Wt 81.6 kg (180 lb)   SpO2 96%   BMI 27.37 kg/m²   General appearance: alert, cooperative, no distress  HENT: Normocephalic, atraumatic, neck symmetrical, no nasal discharge   Eyes: conjunctivae/corneas clear, no icterus, EOM's intact  Lungs: resp non-labored  Heart: regular rate   Abdomen: soft, non-tender; bowel sounds normoactive; no organomegaly  Extremities: extremities symmetric; no clubbing, cyanosis, or edema  Neurologic: Alert and oriented X 3, normal strength, normal coordination and gait    Assessment:     Hx polyps    Plan:   Colonoscopy      "

## 2017-07-24 ENCOUNTER — OFFICE VISIT (OUTPATIENT)
Dept: FAMILY MEDICINE | Facility: CLINIC | Age: 67
End: 2017-07-24
Payer: MEDICARE

## 2017-07-24 VITALS
WEIGHT: 179.88 LBS | HEART RATE: 61 BPM | OXYGEN SATURATION: 97 % | HEIGHT: 68 IN | RESPIRATION RATE: 16 BRPM | BODY MASS INDEX: 27.26 KG/M2 | SYSTOLIC BLOOD PRESSURE: 136 MMHG | DIASTOLIC BLOOD PRESSURE: 72 MMHG | TEMPERATURE: 98 F

## 2017-07-24 DIAGNOSIS — Z13.6 SCREENING FOR AAA (ABDOMINAL AORTIC ANEURYSM): ICD-10-CM

## 2017-07-24 DIAGNOSIS — F17.200 TOBACCO DEPENDENCE: ICD-10-CM

## 2017-07-24 DIAGNOSIS — I10 ESSENTIAL HYPERTENSION: Primary | ICD-10-CM

## 2017-07-24 DIAGNOSIS — E78.5 HYPERLIPIDEMIA, UNSPECIFIED HYPERLIPIDEMIA TYPE: ICD-10-CM

## 2017-07-24 DIAGNOSIS — Z23 NEED FOR VACCINATION FOR PNEUMOCOCCUS: ICD-10-CM

## 2017-07-24 DIAGNOSIS — F51.04 PSYCHOPHYSIOLOGICAL INSOMNIA: ICD-10-CM

## 2017-07-24 PROCEDURE — 99499 UNLISTED E&M SERVICE: CPT | Mod: S$GLB,,, | Performed by: INTERNAL MEDICINE

## 2017-07-24 PROCEDURE — 90732 PPSV23 VACC 2 YRS+ SUBQ/IM: CPT | Mod: S$GLB,,, | Performed by: INTERNAL MEDICINE

## 2017-07-24 PROCEDURE — 1159F MED LIST DOCD IN RCRD: CPT | Mod: S$GLB,,, | Performed by: INTERNAL MEDICINE

## 2017-07-24 PROCEDURE — 99215 OFFICE O/P EST HI 40 MIN: CPT | Mod: S$GLB,,, | Performed by: INTERNAL MEDICINE

## 2017-07-24 PROCEDURE — 1126F AMNT PAIN NOTED NONE PRSNT: CPT | Mod: S$GLB,,, | Performed by: INTERNAL MEDICINE

## 2017-07-24 PROCEDURE — 99999 PR PBB SHADOW E&M-EST. PATIENT-LVL IV: CPT | Mod: PBBFAC,,, | Performed by: INTERNAL MEDICINE

## 2017-07-24 PROCEDURE — G0009 ADMIN PNEUMOCOCCAL VACCINE: HCPCS | Mod: S$GLB,,, | Performed by: INTERNAL MEDICINE

## 2017-07-24 RX ORDER — HYDROXYZINE PAMOATE 25 MG/1
25 CAPSULE ORAL EVERY 8 HOURS PRN
Qty: 90 CAPSULE | Refills: 0 | Status: SHIPPED | OUTPATIENT
Start: 2017-07-24 | End: 2017-07-28

## 2017-07-24 NOTE — ASSESSMENT & PLAN NOTE
I had a lengthy discussion with the patient regarding the risks of long term BZD use including but not limited to worsening anxiety symptoms, increased risk of dementia, development of dependence, risk of respiratory suppression, and risk of seizures with stopped abruptly.  Currently on low daily dose.  Counseled on options for treatment and he would like to try melatonin.  Will trial hydroxyzine.

## 2017-07-24 NOTE — ASSESSMENT & PLAN NOTE
I counseled the patient on smoking cessation, risks associated with smoking, having a quit date, nicotine replacement, medications that can aid in cessation, and having a plan for future cravings.  The patient stated that he is interested in smoking cessation clinic referral

## 2017-07-24 NOTE — PROGRESS NOTES
"Chief Complaint  Chief Complaint   Patient presents with    [facility]    Medication Management       HPI  Yair Owens is a 67 y.o. male with multiple medical diagnoses as listed in the medical history and problem list that presents for [facility] care.  Pt is new to me but is known to this clinic with his last appointment being 5/11/2017.  The patient was previously followed by one of my partners that has since retired .  I have reviewed their most recent previous OV with their previous PCP, most recent labs and most recent imaging studies and interpreted them myself.   He has been taking alprazolam TID for for many years.  I had provided a short refill of this medication until he could get in to see me to avoid any potential for withdrawals.  He reports that he was taking BZD for "nervous stomach," and insomnia.  He reports that he typically will take 1 tab during the day and 2 at night.  He reports that he has been taking generally 2 tabs at day.        PAST MEDICAL HISTORY:  Past Medical History:   Diagnosis Date    Chest pain syndrome     GERD (gastroesophageal reflux disease)     Hypercholesteremia     Hyperlipidemia        PAST SURGICAL HISTORY:  Past Surgical History:   Procedure Laterality Date    APPENDECTOMY      BONE RESECTION, RIB      for thoracic outlet syndrome    COLONOSCOPY N/A 7/21/2017    Procedure: COLONOSCOPY;  Surgeon: Deepak Servin MD;  Location: [facility];  Service: Endoscopy;  Laterality: N/A;    HAND SURGERY      SCROTAL SURGERY      mass    SPINE SURGERY      C-spine disk (remote)       SOCIAL HISTORY:  Social History     Social History    Marital status:      Spouse name: N/A    Number of children: N/A    Years of education: N/A     Occupational History      [employer]     Social History Main Topics    Smoking status: Current Every Day Smoker     Packs/day: 0.50     Types: Cigarettes    Smokeless tobacco: Not on file      Comment: less than 30 pack year history "    Alcohol use 1.2 oz/week     2 Cans of beer per week      Comment: once a week    Drug use: No    Sexual activity: Yes     Partners: Female     Other Topics Concern    Not on file     Social History Narrative    No narrative on file       FAMILY HISTORY:  Family History   Problem Relation Age of Onset    Hypertension Mother     Cancer Father     Heart disease Father     Asthma Sister        ALLERGIES AND MEDICATIONS: updated and reviewed.  Review of patient's allergies indicates:   Allergen Reactions    Codeine Itching    Ibuprofen Itching     Current Outpatient Prescriptions   Medication Sig Dispense Refill    alprazolam (XANAX) 0.5 MG tablet Take 1 tablet (0.5 mg total) by mouth 3 (three) times daily as needed for Anxiety. 20 tablet 0    atorvastatin (LIPITOR) 40 MG tablet Take 1 tablet (40 mg total) by mouth once daily. 90 tablet 3    CIALIS 5 mg tablet TAKE 1 TABLET BY MOUTH EVERY DAY AS NEEDED FOR ERECTILE DYSFUNCTION 30 tablet 6    lisinopril 10 MG tablet Take 1 tablet (10 mg total) by mouth once daily. 90 tablet 3    omeprazole (PRILOSEC) 20 MG capsule TAKE 1 CAPSULE BY MOUTH DAILY 30 capsule 11    hydrOXYzine pamoate (VISTARIL) 25 MG Cap Take 1 capsule (25 mg total) by mouth every 8 (eight) hours as needed. 90 capsule 0     No current facility-administered medications for this visit.          ROS  Review of Systems   Constitutional: Negative for chills, fatigue, fever and unexpected weight change.   HENT: Negative for congestion, dental problem, ear discharge, ear pain, hearing loss, postnasal drip, rhinorrhea, sinus pressure, sore throat and trouble swallowing.    Eyes: Negative for pain, discharge, redness, itching and visual disturbance.   Respiratory: Negative for cough, chest tightness, shortness of breath and wheezing.    Cardiovascular: Negative for chest pain, palpitations and leg swelling.   Gastrointestinal: Negative for abdominal distention, abdominal pain, blood in stool,  "constipation, diarrhea, nausea and vomiting.        No melena   Endocrine: Negative for cold intolerance, heat intolerance, polydipsia, polyphagia and polyuria.        No nocturia   Genitourinary: Negative for decreased urine volume, difficulty urinating, discharge, dysuria, flank pain, frequency, hematuria, penile pain, penile swelling, scrotal swelling, testicular pain and urgency.   Musculoskeletal: Negative for arthralgias, gait problem, joint swelling, myalgias, neck pain and neck stiffness.   Skin: Negative for color change, pallor and rash.   Neurological: Negative for dizziness, tremors, seizures, syncope, weakness, light-headedness and headaches.   Hematological: Negative for adenopathy. Does not bruise/bleed easily.   Psychiatric/Behavioral: Positive for sleep disturbance. Negative for confusion, decreased concentration and suicidal ideas. The patient is nervous/anxious.         No depression         Physical Exam  Vitals:    07/24/17 0707   BP: 136/72   Pulse: 61   Resp: 16   Temp: 98 °F (36.7 °C)   TempSrc: Oral   SpO2: 97%   Weight: 81.6 kg (179 lb 14.3 oz)   Height: 5' 8" (1.727 m)    Body mass index is 27.35 kg/m².  Weight: 81.6 kg (179 lb 14.3 oz)   Height: 5' 8" (172.7 cm)   General appearance: alert, appears stated age, cooperative and no distress  Head: Normocephalic, without obvious abnormality, atraumatic  Eyes: PERRL EOMI conjunctiva clear  Ears: normal TM's and external ear canals both ears  Nose: Nares normal. Septum midline. Mucosa normal. No drainage or sinus tenderness.  Throat: lips, mucosa, and tongue normal; teeth and gums normal  Neck: no adenopathy, no carotid bruit, no JVD, supple, symmetrical, trachea midline and thyroid not enlarged, symmetric, no tenderness/mass/nodules  Back: symmetric, no curvature. ROM normal. No CVA tenderness.  Lungs: clear to auscultation bilaterally  Heart: regular rate and rhythm, S1, S2 normal, no murmur, click, rub or gallop  Abdomen: soft, non-tender; " bowel sounds normal; no masses,  no organomegaly  Extremities: extremities normal, atraumatic, no cyanosis or edema  Pulses: 2+ and symmetric  Neurologic: Mental status: Alert, oriented, thought content appropriate  Sensory: normal  Motor:grossly normal  Coordination: normal  Gait: Normal      Health Maintenance       Date Due Completion Date    TETANUS VACCINE 01/22/1969 ---    Zoster Vaccine 01/22/2011 ---    Abdominal Aortic Aneurysm Screening 01/22/2016 ---    Pneumococcal (65+) (2 of 2 - PPSV23) 07/18/2017 7/18/2016    Influenza Vaccine 08/01/2017 10/29/2012 (Declined)    Override on 10/29/2012: Declined    Lipid Panel 02/16/2022 2/16/2017    Colonoscopy 07/21/2022 7/21/2017    Override on 7/20/2017: Done    Override on 10/29/2012: Declined            Assessment & Plan  Problem List Items Addressed This Visit        Neuro    Tobacco dependence    Relevant Orders    Ambulatory referral to Smoking Cessation Program    Psychophysiological insomnia    Current Assessment & Plan     I had a lengthy discussion with the patient regarding the risks of long term BZD use including but not limited to worsening anxiety symptoms, increased risk of dementia, development of dependence, risk of respiratory suppression, and risk of seizures with stopped abruptly.  Currently on low daily dose.  Counseled on options for treatment and he would like to try melatonin.  Will trial hydroxyzine.           Relevant Medications    hydrOXYzine pamoate (VISTARIL) 25 MG Cap       Cardiac    Essential hypertension - Primary    Current Assessment & Plan     The current medical regimen is effective;  continue present plan and medications.             Fluids/Electrolytes/Nutrition/GI    Hyperlipidemia    Current Assessment & Plan     The current medical regimen is effective;  continue present plan and medications.            Other Visit Diagnoses     Need for vaccination for pneumococcus        Relevant Orders    Pneumococcal Polysaccharide  Vaccine (23 Valent) (SQ/IM) (Completed)    Screening for AAA (abdominal aortic aneurysm)        Relevant Orders    US Abdominal Aorta            Health Maintenance reviewed, as above.    Follow-up: Return for Routine Physical.     I spent >50% of this 40 minute encounter counseling the patient on diagnoses, risk factors, and treatment.

## 2017-07-24 NOTE — PROGRESS NOTES
Pneumococcal vaccine 23 given to patient & tolerated well.  Pt given VIS & advised to wait 15 minutes for monitoring.

## 2017-07-28 DIAGNOSIS — F51.04 PSYCHOPHYSIOLOGICAL INSOMNIA: Chronic | ICD-10-CM

## 2017-07-28 RX ORDER — TRAZODONE HYDROCHLORIDE 50 MG/1
50 TABLET ORAL NIGHTLY
Qty: 90 TABLET | Refills: 0 | Status: SHIPPED | OUTPATIENT
Start: 2017-07-28 | End: 2017-08-07

## 2017-07-28 NOTE — TELEPHONE ENCOUNTER
Stop the Vistaril.  Will start trazodone.  If no improvement in the next 4-5 days he may increase the trazodone to 2 tablets at night.     Thank you,  Talon

## 2017-07-28 NOTE — TELEPHONE ENCOUNTER
Pt reports that he's having problems taking all sleep aids:  Xanax-he's trying to get off  Vistaril- making him have weird dreams & dizzy when waking up in the morning.  Melatonin- not helping at all (reports taking 10 mg at nights; only 3 nights).  Asking for some kind of help with sleeping aids.  Please advise.

## 2017-07-28 NOTE — TELEPHONE ENCOUNTER
----- Message from Feliz Hernandez sent at 7/28/2017  9:22 AM CDT -----  Contact: Self  Pt states hydrOXYzine pamoate (VISTARIL) 25 MG Cap makes him dizzy  Pt can be reached @ [phone] or [phone].

## 2017-07-31 DIAGNOSIS — R07.89 RIGHT-SIDED CHEST WALL PAIN: ICD-10-CM

## 2017-07-31 DIAGNOSIS — G47.00 INSOMNIA, UNSPECIFIED TYPE: ICD-10-CM

## 2017-07-31 RX ORDER — ALPRAZOLAM 0.5 MG/1
TABLET ORAL
Qty: 20 TABLET | Refills: 0 | Status: CANCELLED | OUTPATIENT
Start: 2017-07-31

## 2017-07-31 NOTE — TELEPHONE ENCOUNTER
----- Message from Teressa Mak sent at 7/31/2017  7:37 AM CDT -----  Contact: self  Patient called stating that medication trazodone (DESYREL) 50 MG tablet is making him feel nauseous. Please contact him at [phone].    Thanks!

## 2017-07-31 NOTE — TELEPHONE ENCOUNTER
Patient reports starting trazodone on Friday but awakened with a rapid heartbeat, dizziness, and nausea on Saturday stating he thought he would have to go to the ED.  On Sunday he took nothing but was still nauseated and reports nausea and lightheadedness this AM.  Patient requesting to resume taking alprazolam until his problem can be identified because he has not been able to sleep.  Please advise.

## 2017-08-01 RX ORDER — ALPRAZOLAM 0.5 MG/1
0.5 TABLET ORAL NIGHTLY PRN
Qty: 30 TABLET | Refills: 0 | Status: SHIPPED | OUTPATIENT
Start: 2017-08-01 | End: 2017-11-02

## 2017-08-01 RX ORDER — ALPRAZOLAM 0.5 MG/1
TABLET ORAL
Qty: 30 TABLET | Refills: 0 | Status: SHIPPED | OUTPATIENT
Start: 2017-08-31 | End: 2017-11-02

## 2017-08-01 NOTE — TELEPHONE ENCOUNTER
Ok for refill but will likely need to start weaning as I suspect he is not sleeping at all from withdrawal of the alprazolam.  Would recommend reducing to 1 tab nightly for a month then 1 tab every other day for a month.  Then off.  Routine physical due at the end of October.  If still struggling with sleep will need to come in for OV.      FYI:  3 month supply at this regimen printed and signed.      Thank you,  Talon

## 2017-08-02 ENCOUNTER — HOSPITAL ENCOUNTER (OUTPATIENT)
Dept: RADIOLOGY | Facility: HOSPITAL | Age: 67
Discharge: HOME | End: 2017-08-02
Attending: INTERNAL MEDICINE
Payer: MEDICARE

## 2017-08-02 DIAGNOSIS — Z13.6 SCREENING FOR AAA (ABDOMINAL AORTIC ANEURYSM): ICD-10-CM

## 2017-08-02 PROCEDURE — 76775 US EXAM ABDO BACK WALL LIM: CPT | Mod: TC

## 2017-08-02 PROCEDURE — 76775 US EXAM ABDO BACK WALL LIM: CPT | Mod: 26,,, | Performed by: INTERNAL MEDICINE

## 2017-08-02 NOTE — PROGRESS NOTES
Your ultrasound results are normal.  Please continue your current medications and doses.  Please feel free to contact me with any questions or concerns.    Sincerely,  Derik Ewing  [email or web address]

## 2017-08-07 ENCOUNTER — OFFICE VISIT (OUTPATIENT)
Dept: FAMILY MEDICINE | Facility: CLINIC | Age: 67
End: 2017-08-07
Payer: MEDICARE

## 2017-08-07 VITALS
WEIGHT: 178.38 LBS | HEIGHT: 68 IN | HEART RATE: 69 BPM | OXYGEN SATURATION: 97 % | BODY MASS INDEX: 27.03 KG/M2 | TEMPERATURE: 98 F | SYSTOLIC BLOOD PRESSURE: 130 MMHG | DIASTOLIC BLOOD PRESSURE: 70 MMHG

## 2017-08-07 DIAGNOSIS — F17.200 TOBACCO DEPENDENCE: Chronic | ICD-10-CM

## 2017-08-07 DIAGNOSIS — F51.04 PSYCHOPHYSIOLOGICAL INSOMNIA: Primary | Chronic | ICD-10-CM

## 2017-08-07 DIAGNOSIS — F41.1 GENERALIZED ANXIETY DISORDER: ICD-10-CM

## 2017-08-07 PROCEDURE — 99499 UNLISTED E&M SERVICE: CPT | Mod: S$GLB,,, | Performed by: INTERNAL MEDICINE

## 2017-08-07 PROCEDURE — 1159F MED LIST DOCD IN RCRD: CPT | Mod: S$GLB,,, | Performed by: INTERNAL MEDICINE

## 2017-08-07 PROCEDURE — 3078F DIAST BP <80 MM HG: CPT | Mod: S$GLB,,, | Performed by: INTERNAL MEDICINE

## 2017-08-07 PROCEDURE — 1125F AMNT PAIN NOTED PAIN PRSNT: CPT | Mod: S$GLB,,, | Performed by: INTERNAL MEDICINE

## 2017-08-07 PROCEDURE — 3008F BODY MASS INDEX DOCD: CPT | Mod: S$GLB,,, | Performed by: INTERNAL MEDICINE

## 2017-08-07 PROCEDURE — 99999 PR PBB SHADOW E&M-EST. PATIENT-LVL III: CPT | Mod: PBBFAC,,, | Performed by: INTERNAL MEDICINE

## 2017-08-07 PROCEDURE — 3075F SYST BP GE 130 - 139MM HG: CPT | Mod: S$GLB,,, | Performed by: INTERNAL MEDICINE

## 2017-08-07 PROCEDURE — 99214 OFFICE O/P EST MOD 30 MIN: CPT | Mod: S$GLB,,, | Performed by: INTERNAL MEDICINE

## 2017-08-07 NOTE — ASSESSMENT & PLAN NOTE
He doesn't want to start the trazodone. He will try a dose of the hydroxyzine when he gets home and let us know.  Continue melatonin.  Counseled that I do not want him to stop his BZD completely.  He may even need a slower wean but will await to hear how things worked with the hydroxyzine today.

## 2017-08-07 NOTE — PROGRESS NOTES
"Chief Complaint  Chief Complaint   Patient presents with    Abdominal Pain     f/u    Anxiety     f/u    Insomnia     fu       HPI  Yair Owens is a 67 y.o. male with multiple medical diagnoses as listed in the medical history and problem list that presents for abdominal pain, insomnia, anxiety follow up.  Pt is known to me with his last appointment 7/24/2017.  Patient felt that he had adverse reaction to both hydroxyzine and trazodone.  It is unclear to me today if this was a true adverse reaction or potential light withdrawal symptoms from the BZD as he took these medications instead of his alprazolam as opposed to in addition to the alprazolam at a lower dose to aid in weaning.     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  He has little symptoms when he is doing things but a fair amount when he is "just sitting around."  He is currently taking melatonin and alprazolam 0.5mg QHS with decent results.  Still having a fair amount of daytime anxiety symptoms that is associated with "anxious stomach," symptoms.  No pain, odynophagia, dysphagia, emesis, C/D.  He is laos trying to quit smoking during this time which has increased some stress.        PAST MEDICAL HISTORY:  Past Medical History:   Diagnosis Date    Chest pain syndrome     GERD (gastroesophageal reflux disease)     Hypercholesteremia     Hyperlipidemia        PAST SURGICAL HISTORY:  Past Surgical History:   Procedure Laterality Date    APPENDECTOMY      BONE RESECTION, RIB      for thoracic outlet syndrome    COLONOSCOPY N/A 7/21/2017    Procedure: COLONOSCOPY;  Surgeon: Deepak Servin MD;  Location: [facility];  Service: Endoscopy;  Laterality: N/A;    HAND SURGERY      SCROTAL SURGERY      mass    SPINE SURGERY      C-spine disk (remote)       SOCIAL HISTORY:  Social History     Social History    Marital status:      Spouse name: N/A    Number of " children: N/A    Years of education: N/A     Occupational History     [employer]     Social History Main Topics    Smoking status: Current Every Day Smoker     Packs/day: 0.50     Types: Cigarettes    Smokeless tobacco: Never Used      Comment: less than 30 pack year history    Alcohol use 1.2 oz/week     2 Cans of beer per week      Comment: once a week    Drug use: No    Sexual activity: Yes     Partners: Female     Other Topics Concern    Not on file     Social History Narrative    No narrative on file       FAMILY HISTORY:  Family History   Problem Relation Age of Onset    Hypertension Mother     Cancer Father     Heart disease Father     Asthma Sister        ALLERGIES AND MEDICATIONS: updated and reviewed.  Review of patient's allergies indicates:   Allergen Reactions    Codeine Itching    Ibuprofen Itching     Current Outpatient Prescriptions   Medication Sig Dispense Refill    alprazolam (XANAX) 0.5 MG tablet Take 1 tablet (0.5 mg total) by mouth nightly as needed for Insomnia. 30 tablet 0    atorvastatin (LIPITOR) 40 MG tablet Take 1 tablet (40 mg total) by mouth once daily. 90 tablet 3    CIALIS 5 mg tablet TAKE 1 TABLET BY MOUTH EVERY DAY AS NEEDED FOR ERECTILE DYSFUNCTION 30 tablet 6    lisinopril 10 MG tablet Take 1 tablet (10 mg total) by mouth once daily. 90 tablet 3    [START ON 8/31/2017] alprazolam (XANAX) 0.5 MG tablet 1 tab PO every other night as needed for sleep 30 tablet 0    omeprazole (PRILOSEC) 20 MG capsule TAKE 1 CAPSULE BY MOUTH DAILY 30 capsule 11     No current facility-administered medications for this visit.          ROS  Review of Systems   Constitutional: Positive for unexpected weight change. Negative for activity change.   HENT: Negative for hearing loss, rhinorrhea and trouble swallowing.    Eyes: Negative for discharge and visual disturbance.   Respiratory: Positive for chest tightness. Negative for wheezing.    Cardiovascular: Negative for chest pain and  "palpitations.   Gastrointestinal: Positive for constipation. Negative for blood in stool, diarrhea and vomiting.   Endocrine: Negative for polydipsia and polyuria.   Genitourinary: Negative for difficulty urinating, hematuria and urgency.   Musculoskeletal: Negative for arthralgias, joint swelling and neck pain.   Neurological: Negative for weakness and headaches.   Psychiatric/Behavioral: Negative for confusion and dysphoric mood.           Physical Exam  Vitals:    08/07/17 1319   BP: 130/70   BP Location: Left arm   Patient Position: Sitting   BP Method: Manual   Pulse: 69   Temp: 97.9 °F (36.6 °C)   SpO2: 97%   Weight: 80.9 kg (178 lb 5.6 oz)   Height: 5' 8" (1.727 m)    Body mass index is 27.12 kg/m².  Weight: 80.9 kg (178 lb 5.6 oz)   Height: 5' 8" (172.7 cm)   General appearance: alert, appears stated age, cooperative and no distress  Head: Normocephalic, without obvious abnormality, atraumatic  Eyes: PERRL EOMI conjunctiva clear  Lungs: regular rate and pattern.  symmetric excursion.  Heart: regular rate and rhythm, S1, S2 normal, no murmur, click, rub or gallop  Extremities: extremities normal, atraumatic, no cyanosis or edema  Neurologic: Mental status: Alert, oriented, thought content appropriate  Motor:grossly normal  Coordination: normal  Gait: Normal   Symmetric facial movements palate elevated symmetrically tongue midline         Health Maintenance       Date Due Completion Date    TETANUS VACCINE 01/22/1969 ---    Zoster Vaccine 01/22/2011 ---    Influenza Vaccine 08/01/2017 10/29/2012 (Declined)    Override on 10/29/2012: Declined    Lipid Panel 02/16/2022 2/16/2017    Colonoscopy 07/21/2022 7/21/2017    Override on 7/20/2017: Done    Override on 10/29/2012: Declined            Assessment & Plan  Problem List Items Addressed This Visit        Psychiatric    Generalized anxiety disorder - Primary    Current Assessment & Plan     Discussed options regarding pharmacologic and non-pharmacologic " treatment modalities.  At this time, I recommended that he make an appointment to see our LCSW. The telephone number was provided.            Pulmonary    Psychophysiological insomnia (Chronic)    Current Assessment & Plan     He doesn't want to start the trazodone. He will try a dose of the hydroxyzine when he gets home and let us know.  Continue melatonin.  Counseled that I do not want him to stop his BZD completely.  He may even need a slower wean but will await to hear how things worked with the hydroxyzine today.            Other Visit Diagnoses    None.           Health Maintenance reviewed, deferred.    Follow-up: Return if symptoms worsen or fail to improve.     I spent >50% of this 25 minute encounter counseling the patient on diagnoses, risk factors, and treatment.

## 2017-08-07 NOTE — PATIENT INSTRUCTIONS
Please call [phone] to schedule your appointment with the counselor at the [facility].  This appointment must be scheduled by the patient themselves; we are unable to schedule it for you.

## 2017-08-08 ENCOUNTER — PATIENT MESSAGE (OUTPATIENT)
Dept: FAMILY MEDICINE | Facility: CLINIC | Age: 67
End: 2017-08-08

## 2017-08-08 NOTE — ASSESSMENT & PLAN NOTE
Doing well with cutting back.  Will continue to support him and monitor progress through smoking cessation clinic.

## 2017-08-08 NOTE — ASSESSMENT & PLAN NOTE
Discussed options regarding pharmacologic and non-pharmacologic treatment modalities.  At this time, I recommended that he make an appointment to see our LCSW. The telephone number was provided.

## 2017-08-22 ENCOUNTER — PATIENT MESSAGE (OUTPATIENT)
Dept: FAMILY MEDICINE | Facility: CLINIC | Age: 67
End: 2017-08-22

## 2017-08-28 ENCOUNTER — PATIENT MESSAGE (OUTPATIENT)
Dept: FAMILY MEDICINE | Facility: CLINIC | Age: 67
End: 2017-08-28

## 2017-08-29 ENCOUNTER — PATIENT MESSAGE (OUTPATIENT)
Dept: FAMILY MEDICINE | Facility: CLINIC | Age: 67
End: 2017-08-29

## 2017-08-31 ENCOUNTER — PATIENT MESSAGE (OUTPATIENT)
Dept: FAMILY MEDICINE | Facility: CLINIC | Age: 67
End: 2017-08-31

## 2017-09-05 ENCOUNTER — OFFICE VISIT (OUTPATIENT)
Dept: PSYCHIATRY | Facility: CLINIC | Age: 67
End: 2017-09-05
Payer: COMMERCIAL

## 2017-09-05 DIAGNOSIS — F41.1 GENERALIZED ANXIETY DISORDER: Primary | ICD-10-CM

## 2017-09-05 PROCEDURE — 90791 PSYCH DIAGNOSTIC EVALUATION: CPT | Mod: S$GLB,,, | Performed by: SOCIAL WORKER

## 2017-09-05 PROCEDURE — 99499 UNLISTED E&M SERVICE: CPT | Mod: S$GLB,,, | Performed by: SOCIAL WORKER

## 2017-09-05 NOTE — PROGRESS NOTES
"Psychiatry Initial Visit (PhD/LCSW)  Diagnostic Interview - CPT 39421    Date: 9/5/2017    Site: [facility]    Referral source: Dr. Ewing    Clinical status of patient: Outpatient    Yair Owens, a 67 y.o. male, for initial evaluation visit.  Met with patient.    Chief complaint/reason for encounter: anxiety and sleep    History of present illness: Patient is a referral from Dr. Ewing for anxiety. Patient reports reason for seeking treatment anxiety and stomach issues. Reports that he has had anxiety for 40+ years. Believes that anxiety symptoms started after returning from [place]. 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 Reports that he spends a lot of time watching TV, trying to quick smoking, and getting off of Xanax which all cause him a great deal of anxiety. Endorses excessive worrying, worries about the news and money. Reports a "nervous" stomach, drinking a coca-cola in the morning will help "calm it down". States that stomach is upset usually in the morning, doesn't happen every morning. Endorses racing thoughts. Denies panic attacks. Denies any other gastrointestinal problems. States that he doesn't feel like he is as active which is why his stomach gets upset. Denies problems with being able to relax. Denies problems with concentration and focus, memory, irritable. Denies problems with headaches or muscle tension. Endorses occasional feelings of sadness and worthlessness. Reports that since he stopped working he has struggled with his purpose.       Reports that he does have some issues with feeling restless at night. Able to fall asleep but wakes up throughout the night, very light sleeper. States that he feels rested. Appetite is good, unchanged. Denies changes in ADL's. Discussed process and frequency of therapy. Discussed limits of confidentiality.     Pain: noncontributory    Symptoms:   · Mood: worthlessness/guilt and loss of " "purpose  · Anxiety: excessive anxiety/worry and gastrointestinal upset  · Substance abuse: denied  · Cognitive functioning: denied  · Health behaviors: noncontributory    Psychiatric history: psychotropic management by PCP    Medical history: none    Family history of psychiatric illness: brother - anxiety, sister - possible     Social history (marriage, employment, etc.): Born and raised in [place]. Reports that childhood was "strange". Mother was  before patient was born. Raised by mother, saw father occasionally. At 17 started skipping school and smoking marijuana. Graduated high school. At 18 knew that he didn't want to be part of that scene went into the [employer]. 2 sister and 1 brother, patient is 4 of 4. 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 Retired, working part time at [place] [employer] with a [employer].  x3, current marriage, Leigha,  for 20+ years. 4 children (46, 41, 36, 32). Arrested x1, AIDE (2014). Financially stable. Wife works as a . Hobbies include fish, casino, out to eat, and TV.        Substance use:   Alcohol: occasional beer   Drugs: none   Tobacco: 15 cigarettes a day, in smoking cessation program   Caffeine: 1 cup of coffee, 2 coca cola a day    Current medications and drug reactions (include OTC, herbal): see medication list    Strengths and liabilities: Strength: Patient accepts guidance/feedback, Strength: Patient is expressive/articulate., Strength: Patient is intelligent., Strength: Patient is motivated for change.    Current Evaluation:     Mental Status Exam:  General Appearance:  unremarkable, age appropriate   Speech: normal tone, normal rate, normal pitch, normal volume      Level of Cooperation: cooperative      Thought Processes: normal and logical   Mood: anxious      Thought Content: normal, no suicidality, no homicidality, delusions, or paranoia   Affect: congruent and appropriate "   Orientation: Oriented x3   Memory: recent >  intact, remote >  intact   Attention Span & Concentration: intact   Fund of General Knowledge: intact and appropriate to age and level of education   Abstract Reasoning: did not assess   Judgment & Insight: good     Language  intact     Diagnostic Impression - Plan:       ICD-10-CM ICD-9-CM   1. Generalized anxiety disorder F41.1 300.02       Plan:individual psychotherapy    Return to Clinic: 2 weeks    Length of Service (minutes): 45     Cassandra Rockweiler, LCSW

## 2017-09-19 RX ORDER — TADALAFIL 5 MG/1
5 TABLET ORAL DAILY PRN
Qty: 30 TABLET | Refills: 6 | Status: SHIPPED | OUTPATIENT
Start: 2017-09-19 | End: 2018-09-17

## 2017-09-26 DIAGNOSIS — N52.9 ERECTILE DYSFUNCTION, UNSPECIFIED ERECTILE DYSFUNCTION TYPE: Primary | ICD-10-CM

## 2017-09-26 RX ORDER — TADALAFIL 5 MG/1
5 TABLET ORAL DAILY PRN
Qty: 30 TABLET | Refills: 6 | Status: SHIPPED | OUTPATIENT
Start: 2017-09-26 | End: 2018-10-19 | Stop reason: SDUPTHER

## 2017-10-23 DIAGNOSIS — F51.04 PSYCHOPHYSIOLOGICAL INSOMNIA: Chronic | ICD-10-CM

## 2017-10-23 RX ORDER — TRAZODONE HYDROCHLORIDE 50 MG/1
50 TABLET ORAL NIGHTLY
Qty: 90 TABLET | Refills: 0 | Status: SHIPPED | OUTPATIENT
Start: 2017-10-23 | End: 2017-11-02

## 2017-10-27 ENCOUNTER — PATIENT MESSAGE (OUTPATIENT)
Dept: FAMILY MEDICINE | Facility: CLINIC | Age: 67
End: 2017-10-27

## 2017-10-30 ENCOUNTER — PATIENT MESSAGE (OUTPATIENT)
Dept: FAMILY MEDICINE | Facility: CLINIC | Age: 67
End: 2017-10-30

## 2017-11-02 ENCOUNTER — PATIENT MESSAGE (OUTPATIENT)
Dept: FAMILY MEDICINE | Facility: CLINIC | Age: 67
End: 2017-11-02

## 2017-11-02 DIAGNOSIS — F51.04 PSYCHOPHYSIOLOGICAL INSOMNIA: Primary | Chronic | ICD-10-CM

## 2017-11-02 RX ORDER — HYDROXYZINE PAMOATE 50 MG/1
50 CAPSULE ORAL NIGHTLY PRN
Qty: 90 CAPSULE | Refills: 1 | Status: SHIPPED | OUTPATIENT
Start: 2017-11-02 | End: 2018-01-23 | Stop reason: SDUPTHER

## 2017-11-29 DIAGNOSIS — I10 ESSENTIAL HYPERTENSION: ICD-10-CM

## 2017-11-30 RX ORDER — LISINOPRIL 10 MG/1
10 TABLET ORAL DAILY
Qty: 90 TABLET | Refills: 1 | Status: SHIPPED | OUTPATIENT
Start: 2017-11-30 | End: 2018-01-23 | Stop reason: SDUPTHER

## 2018-01-23 ENCOUNTER — OFFICE VISIT (OUTPATIENT)
Dept: FAMILY MEDICINE | Facility: CLINIC | Age: 68
End: 2018-01-23
Payer: MEDICARE

## 2018-01-23 ENCOUNTER — LAB VISIT (OUTPATIENT)
Dept: LAB | Facility: HOSPITAL | Age: 68
End: 2018-01-23
Attending: INTERNAL MEDICINE
Payer: MEDICARE

## 2018-01-23 VITALS
BODY MASS INDEX: 30.52 KG/M2 | HEART RATE: 76 BPM | TEMPERATURE: 98 F | DIASTOLIC BLOOD PRESSURE: 78 MMHG | SYSTOLIC BLOOD PRESSURE: 128 MMHG | WEIGHT: 201.38 LBS | OXYGEN SATURATION: 98 % | HEIGHT: 68 IN

## 2018-01-23 DIAGNOSIS — F17.200 TOBACCO DEPENDENCE: Chronic | ICD-10-CM

## 2018-01-23 DIAGNOSIS — Z00.00 ROUTINE MEDICAL EXAM: Primary | ICD-10-CM

## 2018-01-23 DIAGNOSIS — I10 ESSENTIAL HYPERTENSION: Chronic | ICD-10-CM

## 2018-01-23 DIAGNOSIS — K21.9 GASTROESOPHAGEAL REFLUX DISEASE WITHOUT ESOPHAGITIS: ICD-10-CM

## 2018-01-23 DIAGNOSIS — Z00.00 ROUTINE MEDICAL EXAM: ICD-10-CM

## 2018-01-23 DIAGNOSIS — E78.5 HYPERLIPIDEMIA, UNSPECIFIED HYPERLIPIDEMIA TYPE: Chronic | ICD-10-CM

## 2018-01-23 DIAGNOSIS — F51.04 PSYCHOPHYSIOLOGICAL INSOMNIA: Chronic | ICD-10-CM

## 2018-01-23 DIAGNOSIS — R09.89 BILATERAL CAROTID BRUITS: ICD-10-CM

## 2018-01-23 DIAGNOSIS — I70.0 ATHEROSCLEROSIS OF AORTA: ICD-10-CM

## 2018-01-23 LAB
ALBUMIN SERPL BCP-MCNC: 3.9 G/DL
ALP SERPL-CCNC: 82 U/L
ALT SERPL W/O P-5'-P-CCNC: 39 U/L
ANION GAP SERPL CALC-SCNC: 9 MMOL/L
AST SERPL-CCNC: 32 U/L
BASOPHILS # BLD AUTO: 0.05 K/UL
BASOPHILS NFR BLD: 0.7 %
BILIRUB SERPL-MCNC: 1 MG/DL
BUN SERPL-MCNC: 16 MG/DL
CALCIUM SERPL-MCNC: 10 MG/DL
CHLORIDE SERPL-SCNC: 106 MMOL/L
CHOLEST SERPL-MCNC: 171 MG/DL
CHOLEST/HDLC SERPL: 4 {RATIO}
CO2 SERPL-SCNC: 29 MMOL/L
COMPLEXED PSA SERPL-MCNC: 1.2 NG/ML
CREAT SERPL-MCNC: 1.2 MG/DL
DIFFERENTIAL METHOD: NORMAL
EOSINOPHIL # BLD AUTO: 0.2 K/UL
EOSINOPHIL NFR BLD: 3.3 %
ERYTHROCYTE [DISTWIDTH] IN BLOOD BY AUTOMATED COUNT: 12.7 %
EST. GFR  (AFRICAN AMERICAN): >60 ML/MIN/1.73 M^2
EST. GFR  (NON AFRICAN AMERICAN): >60 ML/MIN/1.73 M^2
GLUCOSE SERPL-MCNC: 115 MG/DL
HCT VFR BLD AUTO: 44.9 %
HDLC SERPL-MCNC: 43 MG/DL
HDLC SERPL: 25.1 %
HGB BLD-MCNC: 15 G/DL
IMM GRANULOCYTES # BLD AUTO: 0.03 K/UL
IMM GRANULOCYTES NFR BLD AUTO: 0.4 %
LDLC SERPL CALC-MCNC: 109.4 MG/DL
LYMPHOCYTES # BLD AUTO: 2 K/UL
LYMPHOCYTES NFR BLD: 29.4 %
MCH RBC QN AUTO: 29.8 PG
MCHC RBC AUTO-ENTMCNC: 33.4 G/DL
MCV RBC AUTO: 89 FL
MONOCYTES # BLD AUTO: 0.8 K/UL
MONOCYTES NFR BLD: 11.4 %
NEUTROPHILS # BLD AUTO: 3.8 K/UL
NEUTROPHILS NFR BLD: 54.8 %
NONHDLC SERPL-MCNC: 128 MG/DL
NRBC BLD-RTO: 0 /100 WBC
PLATELET # BLD AUTO: 266 K/UL
PMV BLD AUTO: 10.8 FL
POTASSIUM SERPL-SCNC: 5.4 MMOL/L
PROT SERPL-MCNC: 7.6 G/DL
RBC # BLD AUTO: 5.03 M/UL
SODIUM SERPL-SCNC: 144 MMOL/L
TRIGL SERPL-MCNC: 93 MG/DL
WBC # BLD AUTO: 6.9 K/UL

## 2018-01-23 PROCEDURE — 80061 LIPID PANEL: CPT

## 2018-01-23 PROCEDURE — 36415 COLL VENOUS BLD VENIPUNCTURE: CPT | Mod: PO

## 2018-01-23 PROCEDURE — 99499 UNLISTED E&M SERVICE: CPT | Mod: S$GLB,,, | Performed by: INTERNAL MEDICINE

## 2018-01-23 PROCEDURE — 99999 PR PBB SHADOW E&M-EST. PATIENT-LVL III: CPT | Mod: PBBFAC,,, | Performed by: INTERNAL MEDICINE

## 2018-01-23 PROCEDURE — 85025 COMPLETE CBC W/AUTO DIFF WBC: CPT

## 2018-01-23 PROCEDURE — 99397 PER PM REEVAL EST PAT 65+ YR: CPT | Mod: S$GLB,,, | Performed by: INTERNAL MEDICINE

## 2018-01-23 PROCEDURE — 84153 ASSAY OF PSA TOTAL: CPT

## 2018-01-23 PROCEDURE — 80053 COMPREHEN METABOLIC PANEL: CPT

## 2018-01-23 RX ORDER — ATORVASTATIN CALCIUM 40 MG/1
40 TABLET, FILM COATED ORAL DAILY
Qty: 90 TABLET | Refills: 3 | Status: SHIPPED | OUTPATIENT
Start: 2018-01-23 | End: 2019-02-02 | Stop reason: SDUPTHER

## 2018-01-23 RX ORDER — HYDROXYZINE PAMOATE 50 MG/1
50 CAPSULE ORAL NIGHTLY PRN
Qty: 90 CAPSULE | Refills: 3 | Status: SHIPPED | OUTPATIENT
Start: 2018-01-23 | End: 2018-05-29 | Stop reason: SDUPTHER

## 2018-01-23 RX ORDER — LISINOPRIL 10 MG/1
10 TABLET ORAL DAILY
Qty: 90 TABLET | Refills: 3 | Status: SHIPPED | OUTPATIENT
Start: 2018-01-23 | End: 2019-02-02 | Stop reason: SDUPTHER

## 2018-01-23 NOTE — ASSESSMENT & PLAN NOTE
I counseled the patient on smoking cessation, risks associated with smoking, having a quit date, nicotine replacement, medications that can aid in cessation, and having a plan for future cravings.  The patient stated that he is working on quitting.  Needs low dose CT scan

## 2018-01-23 NOTE — PROGRESS NOTES
Assessment & Plan  Problem List Items Addressed This Visit        Cardiac/Vascular    Hyperlipidemia (Chronic)    Current Assessment & Plan     The current medical regimen is effective;  continue present plan and medications.          Relevant Medications    atorvastatin (LIPITOR) 40 MG tablet    Essential hypertension (Chronic)    Current Assessment & Plan     The current medical regimen is effective;  continue present plan and medications.          Relevant Medications    lisinopril 10 MG tablet    Atherosclerosis of aorta (Chronic)    Overview     US 08/2017.  Stable, asymptomatic chronic condition.  Will continue to maximize risk factor reduction and adjust medication as needed.             GI    GERD (gastroesophageal reflux disease) (Chronic)    Current Assessment & Plan     Only needing PPI PRN. The current medical regimen is effective;  continue present plan and medications.             Other    Tobacco dependence (Chronic)    Current Assessment & Plan     I counseled the patient on smoking cessation, risks associated with smoking, having a quit date, nicotine replacement, medications that can aid in cessation, and having a plan for future cravings.  The patient stated that he is working on quitting.  Needs low dose CT scan         Relevant Orders    CT Chest Lung Screening Low Dose    Psychophysiological insomnia (Chronic)    Current Assessment & Plan     The current medical regimen is effective;  continue present plan and medications.          Relevant Medications    hydrOXYzine pamoate (VISTARIL) 50 MG Cap      Other Visit Diagnoses     Routine medical exam    -  Primary  -    Discussed healthy diet, regular exercise, necessary labs, age appropriate cancer screening, and routine vaccinations.       Relevant Orders    CBC auto differential    Comprehensive metabolic panel    Lipid panel    PSA, Screening    Bilateral carotid bruits    -  Check carotid US.  Smoking cessation    Relevant Orders    US Carotid  Bilateral            Health Maintenance reviewed, up to date.  Deferring zostavax at this time due to potential improved protection with newer vaccine.    Follow-up: Follow-up in about 1 year (around 1/23/2019) for Routine Physical.    ______________________________________________________________________    Chief Complaint  Chief Complaint   Patient presents with    Annual Exam       HPI  Yair Owens is a 68 y.o. male with multiple medical diagnoses as listed in the medical history and problem list that presents for routine physical.  Pt is known to me with his last appointment 8/7/2017.      He has some sore throat after something got a little stuck while swallowing.  He has increased his smoking to 1- 1.5 packs a day.  Has been to smoking cessation but reports that he was having troubles quitting due to wanting to smoke when he is idle.  Can go multiple hours without smoking.  His wife is also a heavy smoker.        PAST MEDICAL HISTORY:  Past Medical History:   Diagnosis Date    Chest pain syndrome     GERD (gastroesophageal reflux disease)     Hypercholesteremia     Hyperlipidemia        PAST SURGICAL HISTORY:  Past Surgical History:   Procedure Laterality Date    APPENDECTOMY      BONE RESECTION, RIB      for thoracic outlet syndrome    COLONOSCOPY N/A 7/21/2017    Procedure: COLONOSCOPY;  Surgeon: Deepak Servin MD;  [facility];  Service: Endoscopy;  Laterality: N/A;    HAND SURGERY      SCROTAL SURGERY      mass    SPINE SURGERY      C-spine disk (remote)       SOCIAL HISTORY:  Social History     Social History    Marital status:      Spouse name: N/A    Number of children: N/A    Years of education: N/A     Occupational History     [employer]     Social History Main Topics    Smoking status: Current Every Day Smoker     Packs/day: 1.00     Years: 60.00     Types: Cigarettes    Smokeless tobacco: Never Used    Alcohol use 1.2 oz/week     2 Cans of beer per week      Comment:  once a week    Drug use: No    Sexual activity: Yes     Partners: Female     Other Topics Concern    Not on file     Social History Narrative    No narrative on file       FAMILY HISTORY:  Family History   Problem Relation Age of Onset    Hypertension Mother     Cancer Father     Heart disease Father     Asthma Sister        ALLERGIES AND MEDICATIONS: updated and reviewed.  Review of patient's allergies indicates:   Allergen Reactions    Codeine Itching    Ibuprofen Itching     Current Outpatient Prescriptions   Medication Sig Dispense Refill    atorvastatin (LIPITOR) 40 MG tablet Take 1 tablet (40 mg total) by mouth once daily. 90 tablet 3    hydrOXYzine pamoate (VISTARIL) 50 MG Cap Take 1 capsule (50 mg total) by mouth nightly as needed (insomnia). 90 capsule 3    lisinopril 10 MG tablet Take 1 tablet (10 mg total) by mouth once daily. 90 tablet 3    omeprazole (PRILOSEC) 20 MG capsule TAKE 1 CAPSULE BY MOUTH DAILY 30 capsule 11    tadalafil (CIALIS) 5 MG tablet Take 1 tablet (5 mg total) by mouth daily as needed. 30 tablet 6    tadalafil (CIALIS) 5 MG tablet Take 1 tablet (5 mg total) by mouth daily as needed. 30 tablet 6     No current facility-administered medications for this visit.          ROS  Review of Systems   Constitutional: Negative for activity change, chills, fever and unexpected weight change.   HENT: Negative for congestion, dental problem, ear pain, hearing loss, rhinorrhea, sore throat and trouble swallowing.    Eyes: Negative for discharge, redness and visual disturbance.   Respiratory: Negative for cough, chest tightness, shortness of breath and wheezing.    Cardiovascular: Negative for chest pain, palpitations and leg swelling.   Gastrointestinal: Negative for abdominal pain, blood in stool, constipation, diarrhea, nausea and vomiting.   Endocrine: Negative for polydipsia, polyphagia and polyuria.   Genitourinary: Negative for decreased urine volume, difficulty urinating,  "dysuria, hematuria and urgency.   Musculoskeletal: Negative for arthralgias, joint swelling, myalgias and neck pain.   Skin: Negative for color change and rash.   Neurological: Negative for dizziness, weakness, light-headedness and headaches.   Psychiatric/Behavioral: Negative for confusion, decreased concentration, dysphoric mood, sleep disturbance and suicidal ideas.           Physical Exam  Vitals:    01/23/18 0806   BP: 128/78   Pulse: 76   Temp: 98.3 °F (36.8 °C)   SpO2: 98%   Weight: 91.3 kg (201 lb 6.2 oz)   Height: 5' 8" (1.727 m)    Body mass index is 30.62 kg/m².  Weight: 91.3 kg (201 lb 6.2 oz)   Height: 5' 8" (172.7 cm)   Physical Exam   Constitutional: He is oriented to person, place, and time. He appears well-developed and well-nourished. No distress.   HENT:   Head: Normocephalic and atraumatic.   Right Ear: Tympanic membrane, external ear and ear canal normal.   Left Ear: Tympanic membrane, external ear and ear canal normal.   Nose: Nose normal. No septal deviation. Right sinus exhibits no maxillary sinus tenderness and no frontal sinus tenderness. Left sinus exhibits no maxillary sinus tenderness and no frontal sinus tenderness.   Mouth/Throat: Uvula is midline and mucous membranes are normal. Posterior oropharyngeal erythema present. No oropharyngeal exudate or posterior oropharyngeal edema. No tonsillar exudate.   Eyes: Conjunctivae, EOM and lids are normal. Pupils are equal, round, and reactive to light. Right eye exhibits no discharge. Left eye exhibits no discharge. No scleral icterus.   Neck: Full passive range of motion without pain. Neck supple. No JVD present. No spinous process tenderness and no muscular tenderness present. Carotid bruit is present (bilateral, R>L). No tracheal deviation present. No thyroid mass and no thyromegaly present.   Cardiovascular: Normal rate, regular rhythm, normal heart sounds and intact distal pulses.  Exam reveals no S3, no S4 and no friction rub.    No " murmur heard.  Pulmonary/Chest: Effort normal and breath sounds normal. He has no wheezes. He has no rhonchi. He has no rales.   Abdominal: Soft. Bowel sounds are normal. He exhibits no distension. There is no tenderness. There is no rebound, no guarding and no CVA tenderness.   Musculoskeletal: Normal range of motion. He exhibits no edema or tenderness.   Lymphadenopathy:        Head (right side): No submental and no submandibular adenopathy present.        Head (left side): No submental and no submandibular adenopathy present.     He has no cervical adenopathy.        Right: No supraclavicular adenopathy present.        Left: No supraclavicular adenopathy present.   Neurological: He is alert and oriented to person, place, and time. Coordination normal.   Motor grossly intact.  Sensory grossly intact.  Symmetric facial movements palate elevated symmetrically tongue midline   Skin: Skin is warm and dry. Capillary refill takes less than 2 seconds. No rash noted. No cyanosis. Nails show no clubbing.   Psychiatric: He has a normal mood and affect. His speech is normal and behavior is normal. Thought content normal. Cognition and memory are normal.         Health Maintenance       Date Due Completion Date    TETANUS VACCINE 01/22/1969 ---    Zoster Vaccine 01/22/2011 ---    Influenza Vaccine 08/01/2017 10/29/2012 (Declined)    Override on 10/29/2012: Declined    Lipid Panel 02/16/2022 2/16/2017    Colonoscopy 07/21/2022 7/21/2017    Override on 7/20/2017: Done    Override on 10/29/2012: Declined

## 2018-01-23 NOTE — ASSESSMENT & PLAN NOTE
Only needing PPI PRN. The current medical regimen is effective;  continue present plan and medications.

## 2018-01-24 ENCOUNTER — PATIENT MESSAGE (OUTPATIENT)
Dept: FAMILY MEDICINE | Facility: CLINIC | Age: 68
End: 2018-01-24

## 2018-01-31 ENCOUNTER — HOSPITAL ENCOUNTER (OUTPATIENT)
Dept: RADIOLOGY | Facility: HOSPITAL | Age: 68
Discharge: HOME | End: 2018-01-31
Attending: INTERNAL MEDICINE
Payer: MEDICARE

## 2018-01-31 ENCOUNTER — HOSPITAL ENCOUNTER (OUTPATIENT)
Dept: RADIOLOGY | Facility: HOSPITAL | Age: 68
Discharge: HOME | End: 2018-01-31
Attending: INTERNAL MEDICINE

## 2018-01-31 DIAGNOSIS — F17.200 TOBACCO DEPENDENCE: Chronic | ICD-10-CM

## 2018-01-31 DIAGNOSIS — R09.89 BILATERAL CAROTID BRUITS: ICD-10-CM

## 2018-01-31 PROCEDURE — 93880 EXTRACRANIAL BILAT STUDY: CPT | Mod: TC

## 2018-01-31 PROCEDURE — 93880 EXTRACRANIAL BILAT STUDY: CPT | Mod: 26,,, | Performed by: RADIOLOGY

## 2018-01-31 PROCEDURE — 76497 UNLISTED CT PROCEDURE: CPT | Mod: TC

## 2018-02-01 ENCOUNTER — PATIENT MESSAGE (OUTPATIENT)
Dept: FAMILY MEDICINE | Facility: CLINIC | Age: 68
End: 2018-02-01

## 2018-03-07 ENCOUNTER — PATIENT MESSAGE (OUTPATIENT)
Dept: FAMILY MEDICINE | Facility: CLINIC | Age: 68
End: 2018-03-07

## 2018-04-10 ENCOUNTER — PATIENT MESSAGE (OUTPATIENT)
Dept: FAMILY MEDICINE | Facility: CLINIC | Age: 68
End: 2018-04-10

## 2018-05-29 ENCOUNTER — PATIENT MESSAGE (OUTPATIENT)
Dept: FAMILY MEDICINE | Facility: CLINIC | Age: 68
End: 2018-05-29

## 2018-05-29 DIAGNOSIS — F51.04 PSYCHOPHYSIOLOGICAL INSOMNIA: Chronic | ICD-10-CM

## 2018-05-29 RX ORDER — HYDROXYZINE PAMOATE 50 MG/1
100 CAPSULE ORAL NIGHTLY PRN
Qty: 180 CAPSULE | Refills: 3 | Status: SHIPPED | OUTPATIENT
Start: 2018-05-29 | End: 2019-05-12 | Stop reason: SDUPTHER

## 2018-06-07 RX ORDER — OMEPRAZOLE 20 MG/1
20 CAPSULE, DELAYED RELEASE ORAL DAILY
Qty: 90 CAPSULE | Refills: 3 | Status: SHIPPED | OUTPATIENT
Start: 2018-06-07 | End: 2019-05-13 | Stop reason: SDUPTHER

## 2018-09-17 ENCOUNTER — OFFICE VISIT (OUTPATIENT)
Dept: FAMILY MEDICINE | Facility: CLINIC | Age: 68
End: 2018-09-17
Payer: MEDICARE

## 2018-09-17 VITALS
WEIGHT: 205 LBS | DIASTOLIC BLOOD PRESSURE: 80 MMHG | HEIGHT: 68 IN | BODY MASS INDEX: 31.07 KG/M2 | TEMPERATURE: 98 F | HEART RATE: 79 BPM | OXYGEN SATURATION: 97 % | SYSTOLIC BLOOD PRESSURE: 130 MMHG

## 2018-09-17 DIAGNOSIS — F17.200 TOBACCO DEPENDENCE: Chronic | ICD-10-CM

## 2018-09-17 DIAGNOSIS — J02.9 SORE THROAT: Primary | ICD-10-CM

## 2018-09-17 PROCEDURE — 99999 PR PBB SHADOW E&M-EST. PATIENT-LVL III: CPT | Mod: PBBFAC,,, | Performed by: INTERNAL MEDICINE

## 2018-09-17 PROCEDURE — 99213 OFFICE O/P EST LOW 20 MIN: CPT | Mod: S$PBB,,, | Performed by: INTERNAL MEDICINE

## 2018-09-17 PROCEDURE — 99213 OFFICE O/P EST LOW 20 MIN: CPT | Mod: PBBFAC,PO | Performed by: INTERNAL MEDICINE

## 2018-09-17 PROCEDURE — 1101F PT FALLS ASSESS-DOCD LE1/YR: CPT | Mod: CPTII,,, | Performed by: INTERNAL MEDICINE

## 2018-09-17 PROCEDURE — 3079F DIAST BP 80-89 MM HG: CPT | Mod: CPTII,,, | Performed by: INTERNAL MEDICINE

## 2018-09-17 PROCEDURE — 3075F SYST BP GE 130 - 139MM HG: CPT | Mod: CPTII,,, | Performed by: INTERNAL MEDICINE

## 2018-09-17 NOTE — PATIENT INSTRUCTIONS
"Increase fluids and rest.  You body needs increased water but other beverages may aid in comfort.  You will know that you have had enough water to be hydrated when your urine is clear or at least a very pale yellow.  You may use Mucinex to help thin thick secretions to allow you to expel them but it only works if you drink more water. Nasal saline may help with removal of mucus as well.  Ibuprofen is preferred for aches and pains as well as fever reduction.  Zyrtec or Allegra may help with some of the runny nose symptoms if you are having them.  If you do not have high blood pressure, then you may use a decongestant such as pseudoephedrine or one of the above medications that have the letter, "-D" following it.  Hot tea with honey can help with sore throats as the heat with reduce the inflammation and the honey will coat your throat to help it feel better. Prescription cough medicine should be used at night to aid in sleep.  Coughing during the day is the body's way of removing the infectious agent; however, the prescription cough medicine may also be used for coughing fits during the day. Lastly, good hand washing and cough hygiene (cough into your elbow) will help prevent the spread of the illness.  A general rule is that you are no longer contagious once you have been without a fever for over 24 hours without requiring fever reducing medications.     "

## 2018-09-17 NOTE — PROGRESS NOTES
Assessment & Plan  Problem List Items Addressed This Visit        Other    Tobacco dependence (Chronic)    Current Assessment & Plan     Down to 4 cig a day!  Keep up the great work! Monitor            Other Visit Diagnoses     Sore throat    -  Primary  -    Resolving with home treatment.  Monitor             Health Maintenance reviewed, deferred to routine physical.    Follow-up: Follow-up if symptoms worsen or fail to improve.    ______________________________________________________________________    Chief Complaint  Chief Complaint   Patient presents with    Sore Throat       HPI  Yair Owens is a 68 y.o. male with multiple medical diagnoses as listed in the medical history and problem list that presents for sore throat for 3 weeks.  Pt is known to me with his last appointment 01/2018.      Answers for HPI/ROS submitted by the patient on 9/16/2018   Sore throat  Chronicity: new  Onset: 1 to 4 weeks ago  Progression since onset: unchanged  Pain worse on: neither  Fever: no fever  Pain - numeric: 2/10  hoarse voice: No  plugged ear sensation: No  swollen glands: No  strep: No  mono: No  Treatments tried: cool liquids  Improvement on treatment: mild  Pain severity: mild    Started with cough that led to a pull in his side and posterior throat pain.  He started gargling with warm salt water and it feels better today.  He is also down to 4 cigarettes a day. He is weaning and going cold turkey.         PAST MEDICAL HISTORY:  Past Medical History:   Diagnosis Date    Chest pain syndrome     GERD (gastroesophageal reflux disease)     Hypercholesteremia     Hyperlipidemia        PAST SURGICAL HISTORY:  Past Surgical History:   Procedure Laterality Date    APPENDECTOMY      BONE RESECTION, RIB      for thoracic outlet syndrome    COLONOSCOPY N/A 7/21/2017    Procedure: COLONOSCOPY;  Surgeon: Deepak Servin MD;  Location: [facility];  Service: Endoscopy;  Laterality: N/A;    COLONOSCOPY N/A 7/21/2017     Performed [facility] at [facility]    COLONOSCOPY N/A 11/18/2013    Performed by Noam Estes MD at [facility] END[facility]    HAND SURGERY      SCROTAL SURGERY      mass    SPINE SURGERY      C-spine disk (remote)       SOCIAL HISTORY:  Social History     Socioeconomic History    Marital status:      Spouse name: Not on file    Number of children: Not on file    Years of education: Not on file    Highest education level: Not on file   Social Needs    Financial resource strain: Not on file    Food insecurity - worry: Not on file    Food insecurity - inability: Not on file    Transportation needs - medical: Not on file    Transportation needs - non-medical: Not on file   Occupational History     Employer:  [employer]   Tobacco Use    Smoking status: Current Every Day Smoker     Packs/day: 1.00     Years: 60.00     Pack years: 60.00     Types: Cigarettes    Smokeless tobacco: Never Used   Substance and Sexual Activity    Alcohol use: Yes     Alcohol/week: 1.2 oz     Types: 2 Cans of beer per week     Comment: once a week    Drug use: No    Sexual activity: Yes     Partners: Female   Other Topics Concern    Not on file   Social History Narrative    Not on file       FAMILY HISTORY:  Family History   Problem Relation Age of Onset    Hypertension Mother     Cancer Father     Heart disease Father     Asthma Sister        ALLERGIES AND MEDICATIONS: updated and reviewed.  Review of patient's allergies indicates:   Allergen Reactions    Codeine Itching    Ibuprofen Itching     Current Outpatient Medications   Medication Sig Dispense Refill    atorvastatin (LIPITOR) 40 MG tablet Take 1 tablet (40 mg total) by mouth once daily. 90 tablet 3    hydrOXYzine pamoate (VISTARIL) 50 MG Cap Take 2 capsules (100 mg total) by mouth nightly as needed (insomnia). 180 capsule 3    lisinopril 10 MG tablet Take 1 tablet (10 mg total) by mouth once daily. 90 tablet 3    omeprazole (PRILOSEC) 20 MG capsule Take 1  "capsule (20 mg total) by mouth once daily. 90 capsule 3    tadalafil (CIALIS) 5 MG tablet Take 1 tablet (5 mg total) by mouth daily as needed. 30 tablet 6     No current facility-administered medications for this visit.          ROS  Review of Systems   Constitutional: Negative for chills, diaphoresis and fever.   HENT: Positive for sore throat. Negative for congestion, drooling, ear discharge, ear pain and trouble swallowing.    Respiratory: Positive for cough. Negative for chest tightness, shortness of breath and stridor.    Cardiovascular: Negative for chest pain, palpitations and leg swelling.   Gastrointestinal: Negative for abdominal pain, diarrhea and vomiting.   Musculoskeletal: Negative for neck pain.   Neurological: Negative for dizziness, light-headedness and headaches.           Physical Exam  Vitals:    09/17/18 0836   BP: 130/80   Pulse: 79   Temp: 98.3 °F (36.8 °C)   SpO2: 97%   Weight: 93 kg (205 lb)   Height: 5' 8" (1.727 m)    Body mass index is 31.17 kg/m².  Weight: 93 kg (205 lb)   Height: 5' 8" (172.7 cm)   Physical Exam   Constitutional: He is oriented to person, place, and time. He appears well-developed and well-nourished. No distress.   HENT:   Head: Normocephalic and atraumatic.   Eyes: Conjunctivae, EOM and lids are normal. Pupils are equal, round, and reactive to light. No scleral icterus.   Neck: Full passive range of motion without pain. Neck supple. No JVD present. Carotid bruit is not present. No thyromegaly present.   Cardiovascular: Normal rate, regular rhythm, normal heart sounds and intact distal pulses. Exam reveals no S3, no S4 and no friction rub.   No murmur heard.  Pulmonary/Chest: Effort normal and breath sounds normal. He has no wheezes. He has no rhonchi. He has no rales.   Abdominal: Soft. Bowel sounds are normal. There is no tenderness.   Musculoskeletal: He exhibits no edema or tenderness.   Lymphadenopathy:        Head (right side): No submental and no submandibular " adenopathy present.        Head (left side): No submental and no submandibular adenopathy present.     He has no cervical adenopathy.        Right: No supraclavicular adenopathy present.        Left: No supraclavicular adenopathy present.   Neurological: He is alert and oriented to person, place, and time.   Motor grossly intact.  Sensory grossly intact.  Symmetric facial movements palate elevated symmetrically tongue midline   Skin: Skin is warm and dry. No rash noted.   Psychiatric: He has a normal mood and affect. His speech is normal and behavior is normal. Thought content normal.         Health Maintenance       Date Due Completion Date    TETANUS VACCINE 01/22/1969 ---    Zoster Vaccine 01/22/2011 ---    Influenza Vaccine 08/01/2018 11/2/2017 (Done)    Override on 11/2/2017: Done    Override on 10/29/2012: Declined    High Dose Statin 01/23/2019 1/23/2018    Colonoscopy 07/21/2022 7/21/2017    Override on 7/20/2017: Done    Override on 10/29/2012: Declined    Lipid Panel 01/23/2023 1/23/2018

## 2018-10-19 ENCOUNTER — PATIENT MESSAGE (OUTPATIENT)
Dept: FAMILY MEDICINE | Facility: CLINIC | Age: 68
End: 2018-10-19

## 2018-10-19 DIAGNOSIS — N52.9 ERECTILE DYSFUNCTION, UNSPECIFIED ERECTILE DYSFUNCTION TYPE: ICD-10-CM

## 2018-10-19 RX ORDER — TADALAFIL 10 MG/1
10 TABLET ORAL DAILY PRN
Qty: 30 TABLET | Refills: 2 | Status: SHIPPED | OUTPATIENT
Start: 2018-10-19 | End: 2019-02-12

## 2018-11-14 ENCOUNTER — PATIENT MESSAGE (OUTPATIENT)
Dept: FAMILY MEDICINE | Facility: CLINIC | Age: 68
End: 2018-11-14

## 2018-11-15 ENCOUNTER — CLINICAL SUPPORT (OUTPATIENT)
Dept: FAMILY MEDICINE | Facility: CLINIC | Age: 68
End: 2018-11-15
Payer: MEDICARE

## 2018-11-15 VITALS — SYSTOLIC BLOOD PRESSURE: 132 MMHG | HEART RATE: 83 BPM | DIASTOLIC BLOOD PRESSURE: 68 MMHG | OXYGEN SATURATION: 98 %

## 2018-11-15 DIAGNOSIS — I10 ESSENTIAL HYPERTENSION: Primary | ICD-10-CM

## 2018-11-15 PROCEDURE — 99999 PR PBB SHADOW E&M-EST. PATIENT-LVL II: CPT | Mod: PBBFAC,,,

## 2018-11-15 PROCEDURE — 99499 UNLISTED E&M SERVICE: CPT | Mod: S$GLB,,, | Performed by: INTERNAL MEDICINE

## 2018-11-15 NOTE — PROGRESS NOTES
Yair Owens 68 y.o. male is here today for Blood Pressure check.   History of HTN yes.    Review of patient's allergies indicates:   Allergen Reactions    Codeine Itching    Ibuprofen Itching     Creatinine   Date Value Ref Range Status   01/23/2018 1.2 0.5 - 1.4 mg/dL Final     Sodium   Date Value Ref Range Status   01/23/2018 144 136 - 145 mmol/L Final     Potassium   Date Value Ref Range Status   01/23/2018 5.4 (H) 3.5 - 5.1 mmol/L Final     Comment:     *No Visible Hemolysis   ]  Patient verifies taking blood pressure medications on a regular basis at the same time of the day.     Current Outpatient Medications:     atorvastatin (LIPITOR) 40 MG tablet, Take 1 tablet (40 mg total) by mouth once daily., Disp: 90 tablet, Rfl: 3    hydrOXYzine pamoate (VISTARIL) 50 MG Cap, Take 2 capsules (100 mg total) by mouth nightly as needed (insomnia)., Disp: 180 capsule, Rfl: 3    lisinopril 10 MG tablet, Take 1 tablet (10 mg total) by mouth once daily., Disp: 90 tablet, Rfl: 3    omeprazole (PRILOSEC) 20 MG capsule, Take 1 capsule (20 mg total) by mouth once daily., Disp: 90 capsule, Rfl: 3    tadalafil (CIALIS) 10 MG tablet, Take 1 tablet (10 mg total) by mouth daily as needed., Disp: 30 tablet, Rfl: 2  Does patient have record of home blood pressure readings no.  Last dose of blood pressure medication was taken at 11/14/18 6pm.  Patient is asymptomatic.   Complains of none.  Pt was concerned about his blood pressure (168/95 , 160/88) during wellness visit on 11/13/18.     Vitals:    11/15/18 0851 11/15/18 0919   BP: (!) 172/84 132/68   Pulse: 83    SpO2: 98%          Dr. Ewing  informed of nurse visit.

## 2019-02-02 DIAGNOSIS — E78.5 HYPERLIPIDEMIA, UNSPECIFIED HYPERLIPIDEMIA TYPE: Chronic | ICD-10-CM

## 2019-02-02 DIAGNOSIS — I10 ESSENTIAL HYPERTENSION: Chronic | ICD-10-CM

## 2019-02-04 RX ORDER — LISINOPRIL 10 MG/1
10 TABLET ORAL DAILY
Qty: 90 TABLET | Refills: 0 | Status: SHIPPED | OUTPATIENT
Start: 2019-02-04 | End: 2019-02-12 | Stop reason: SDUPTHER

## 2019-02-04 RX ORDER — ATORVASTATIN CALCIUM 40 MG/1
TABLET, FILM COATED ORAL
Qty: 90 TABLET | Refills: 0 | Status: SHIPPED | OUTPATIENT
Start: 2019-02-04 | End: 2019-02-12 | Stop reason: SDUPTHER

## 2019-02-12 ENCOUNTER — PATIENT MESSAGE (OUTPATIENT)
Dept: FAMILY MEDICINE | Facility: CLINIC | Age: 69
End: 2019-02-12

## 2019-02-12 ENCOUNTER — LAB VISIT (OUTPATIENT)
Dept: LAB | Facility: HOSPITAL | Age: 69
End: 2019-02-12
Attending: INTERNAL MEDICINE
Payer: MEDICARE

## 2019-02-12 ENCOUNTER — TELEPHONE (OUTPATIENT)
Dept: FAMILY MEDICINE | Facility: CLINIC | Age: 69
End: 2019-02-12

## 2019-02-12 ENCOUNTER — OFFICE VISIT (OUTPATIENT)
Dept: FAMILY MEDICINE | Facility: CLINIC | Age: 69
End: 2019-02-12
Payer: MEDICARE

## 2019-02-12 VITALS
DIASTOLIC BLOOD PRESSURE: 88 MMHG | HEIGHT: 68 IN | OXYGEN SATURATION: 98 % | BODY MASS INDEX: 32.18 KG/M2 | WEIGHT: 212.31 LBS | SYSTOLIC BLOOD PRESSURE: 130 MMHG | HEART RATE: 80 BPM | TEMPERATURE: 99 F

## 2019-02-12 DIAGNOSIS — F17.200 TOBACCO DEPENDENCE: Chronic | ICD-10-CM

## 2019-02-12 DIAGNOSIS — Z87.891 PERSONAL HISTORY OF NICOTINE DEPENDENCE: ICD-10-CM

## 2019-02-12 DIAGNOSIS — I10 ESSENTIAL HYPERTENSION: Chronic | ICD-10-CM

## 2019-02-12 DIAGNOSIS — K21.9 GASTROESOPHAGEAL REFLUX DISEASE WITHOUT ESOPHAGITIS: Chronic | ICD-10-CM

## 2019-02-12 DIAGNOSIS — Z12.5 SCREENING FOR PROSTATE CANCER: ICD-10-CM

## 2019-02-12 DIAGNOSIS — Z12.9 SCREENING FOR CANCER: ICD-10-CM

## 2019-02-12 DIAGNOSIS — Z00.00 ROUTINE MEDICAL EXAM: Primary | ICD-10-CM

## 2019-02-12 DIAGNOSIS — E78.5 HYPERLIPIDEMIA, UNSPECIFIED HYPERLIPIDEMIA TYPE: Chronic | ICD-10-CM

## 2019-02-12 DIAGNOSIS — I70.0 ATHEROSCLEROSIS OF AORTA: ICD-10-CM

## 2019-02-12 DIAGNOSIS — E87.5 HYPERKALEMIA: Primary | ICD-10-CM

## 2019-02-12 DIAGNOSIS — Z00.00 ROUTINE MEDICAL EXAM: ICD-10-CM

## 2019-02-12 LAB
ALBUMIN SERPL BCP-MCNC: 4.3 G/DL
ALP SERPL-CCNC: 70 U/L
ALT SERPL W/O P-5'-P-CCNC: 45 U/L
ANION GAP SERPL CALC-SCNC: 9 MMOL/L
AST SERPL-CCNC: 31 U/L
BASOPHILS # BLD AUTO: 0.05 K/UL
BASOPHILS NFR BLD: 0.7 %
BILIRUB SERPL-MCNC: 1.3 MG/DL
BUN SERPL-MCNC: 18 MG/DL
CALCIUM SERPL-MCNC: 10.3 MG/DL
CHLORIDE SERPL-SCNC: 106 MMOL/L
CHOLEST SERPL-MCNC: 162 MG/DL
CHOLEST/HDLC SERPL: 4.5 {RATIO}
CO2 SERPL-SCNC: 27 MMOL/L
COMPLEXED PSA SERPL-MCNC: 1.4 NG/ML
CREAT SERPL-MCNC: 1.3 MG/DL
DIFFERENTIAL METHOD: NORMAL
EOSINOPHIL # BLD AUTO: 0.2 K/UL
EOSINOPHIL NFR BLD: 3.1 %
ERYTHROCYTE [DISTWIDTH] IN BLOOD BY AUTOMATED COUNT: 12.8 %
EST. GFR  (AFRICAN AMERICAN): >60 ML/MIN/1.73 M^2
EST. GFR  (NON AFRICAN AMERICAN): 56.1 ML/MIN/1.73 M^2
GLUCOSE SERPL-MCNC: 110 MG/DL
HCT VFR BLD AUTO: 47.3 %
HDLC SERPL-MCNC: 36 MG/DL
HDLC SERPL: 22.2 %
HGB BLD-MCNC: 15.7 G/DL
IMM GRANULOCYTES # BLD AUTO: 0.03 K/UL
IMM GRANULOCYTES NFR BLD AUTO: 0.4 %
LDLC SERPL CALC-MCNC: 97.8 MG/DL
LYMPHOCYTES # BLD AUTO: 2 K/UL
LYMPHOCYTES NFR BLD: 29.3 %
MCH RBC QN AUTO: 29.1 PG
MCHC RBC AUTO-ENTMCNC: 33.2 G/DL
MCV RBC AUTO: 88 FL
MONOCYTES # BLD AUTO: 0.9 K/UL
MONOCYTES NFR BLD: 13.5 %
NEUTROPHILS # BLD AUTO: 3.6 K/UL
NEUTROPHILS NFR BLD: 53 %
NONHDLC SERPL-MCNC: 126 MG/DL
NRBC BLD-RTO: 0 /100 WBC
PLATELET # BLD AUTO: 276 K/UL
PMV BLD AUTO: 10.8 FL
POTASSIUM SERPL-SCNC: 6 MMOL/L
PROT SERPL-MCNC: 7.6 G/DL
RBC # BLD AUTO: 5.39 M/UL
SODIUM SERPL-SCNC: 142 MMOL/L
TRIGL SERPL-MCNC: 141 MG/DL
WBC # BLD AUTO: 6.82 K/UL

## 2019-02-12 PROCEDURE — 99214 OFFICE O/P EST MOD 30 MIN: CPT | Mod: S$GLB,,, | Performed by: INTERNAL MEDICINE

## 2019-02-12 PROCEDURE — 99499 RISK ADDL DX/OHS AUDIT: ICD-10-PCS | Mod: S$GLB,,, | Performed by: INTERNAL MEDICINE

## 2019-02-12 PROCEDURE — 80061 LIPID PANEL: CPT

## 2019-02-12 PROCEDURE — 99499 UNLISTED E&M SERVICE: CPT | Mod: S$GLB,,, | Performed by: INTERNAL MEDICINE

## 2019-02-12 PROCEDURE — 36415 COLL VENOUS BLD VENIPUNCTURE: CPT | Mod: PO

## 2019-02-12 PROCEDURE — 3079F DIAST BP 80-89 MM HG: CPT | Mod: CPTII,S$GLB,, | Performed by: INTERNAL MEDICINE

## 2019-02-12 PROCEDURE — 99999 PR PBB SHADOW E&M-EST. PATIENT-LVL III: CPT | Mod: PBBFAC,,, | Performed by: INTERNAL MEDICINE

## 2019-02-12 PROCEDURE — 85025 COMPLETE CBC W/AUTO DIFF WBC: CPT

## 2019-02-12 PROCEDURE — 3075F SYST BP GE 130 - 139MM HG: CPT | Mod: CPTII,S$GLB,, | Performed by: INTERNAL MEDICINE

## 2019-02-12 PROCEDURE — 3075F PR MOST RECENT SYSTOLIC BLOOD PRESS GE 130-139MM HG: ICD-10-PCS | Mod: CPTII,S$GLB,, | Performed by: INTERNAL MEDICINE

## 2019-02-12 PROCEDURE — 84153 ASSAY OF PSA TOTAL: CPT

## 2019-02-12 PROCEDURE — 3079F PR MOST RECENT DIASTOLIC BLOOD PRESSURE 80-89 MM HG: ICD-10-PCS | Mod: CPTII,S$GLB,, | Performed by: INTERNAL MEDICINE

## 2019-02-12 PROCEDURE — 80053 COMPREHEN METABOLIC PANEL: CPT

## 2019-02-12 PROCEDURE — 99214 PR OFFICE/OUTPT VISIT, EST, LEVL IV, 30-39 MIN: ICD-10-PCS | Mod: S$GLB,,, | Performed by: INTERNAL MEDICINE

## 2019-02-12 PROCEDURE — 99999 PR PBB SHADOW E&M-EST. PATIENT-LVL III: ICD-10-PCS | Mod: PBBFAC,,, | Performed by: INTERNAL MEDICINE

## 2019-02-12 RX ORDER — LISINOPRIL 10 MG/1
10 TABLET ORAL DAILY
Qty: 90 TABLET | Refills: 3 | Status: SHIPPED | OUTPATIENT
Start: 2019-02-12 | End: 2019-02-12

## 2019-02-12 RX ORDER — ATORVASTATIN CALCIUM 40 MG/1
40 TABLET, FILM COATED ORAL DAILY
Qty: 90 TABLET | Refills: 3 | Status: SHIPPED | OUTPATIENT
Start: 2019-02-12 | End: 2019-03-22 | Stop reason: SDUPTHER

## 2019-02-12 RX ORDER — AMLODIPINE BESYLATE 5 MG/1
5 TABLET ORAL DAILY
Qty: 90 TABLET | Refills: 0 | Status: SHIPPED | OUTPATIENT
Start: 2019-02-12 | End: 2019-05-10 | Stop reason: SDUPTHER

## 2019-02-12 NOTE — PROGRESS NOTES
Assessment & Plan  Problem List Items Addressed This Visit        Cardiac/Vascular    Hyperlipidemia (Chronic)    Current Assessment & Plan     Cont statin recheck labs         Relevant Medications    atorvastatin (LIPITOR) 40 MG tablet    Essential hypertension (Chronic)    Current Assessment & Plan     The current medical regimen is effective;  continue present plan and medications.          Relevant Medications    lisinopril 10 MG tablet    Other Relevant Orders    CBC auto differential    Comprehensive metabolic panel    Lipid panel    Atherosclerosis of aorta (Chronic)    Overview     US 08/2017.  Stable, asymptomatic chronic condition.  Will continue to maximize risk factor reduction and adjust medication as needed.             GI    GERD (gastroesophageal reflux disease) (Chronic)    Current Assessment & Plan     Only needing PRN but having some nagging cough.  Will dose daily for 2 weeks then back to PRN            Other    Tobacco dependence (Chronic)    Current Assessment & Plan     Down to 2 cigarettes.  Plans to be totally off soon!  Repeat LDCT of the chest         Relevant Orders    CT Chest Lung Screening Low Dose      Other Visit Diagnoses     Routine medical exam    -  Primary  -    Discussed healthy diet, regular exercise, necessary labs, age appropriate cancer screening, and routine vaccinations.       Relevant Orders    CBC auto differential    Comprehensive metabolic panel    Lipid panel    PSA, Screening    Screening for prostate cancer    -  Check PSA    Relevant Orders    PSA, Screening    Screening for cancer    -  Due for repeat screening    Relevant Orders    CT Chest Lung Screening Low Dose    Personal history of nicotine dependence     -  Due for repeat screening    Relevant Orders    CT Chest Lung Screening Low Dose            Health Maintenance reviewed, as above.  Will discuss other vaccines after this workup is complete.    Follow-up: Follow-up in about 1 year (around 2/12/2020) for  Routine Physical.    ______________________________________________________________________    Chief Complaint  Chief Complaint   Patient presents with    Annual Exam       HPI  Yair Owens is a 69 y.o. male with multiple medical diagnoses as listed in the medical history and problem list that presents for routine physical.  Pt is known to me with his last appointment 11/15/2018.      nagging cough with throat irritation for 3 months. Descibes the throat pain as mild and intermittent, hannah lingering remainder of his illness end of last year. Denies difficulty swallowing, vocal changes, cough changes, worsening GERD. He states salt water gargling helps while soda and smoking worsens his cough. Otherwise patient feels well, states he is down to less than 2 cigarettes a day on average and hopes to fully quit soon. He has recently bought an exercise bike and plans to begin exercising. No other complaints at this time.      PAST MEDICAL HISTORY:  Past Medical History:   Diagnosis Date    Chest pain syndrome     GERD (gastroesophageal reflux disease)     Hypercholesteremia     Hyperlipidemia        PAST SURGICAL HISTORY:  Past Surgical History:   Procedure Laterality Date    APPENDECTOMY      BONE RESECTION, RIB      for thoracic outlet syndrome    COLONOSCOPY N/A 7/21/2017    [facility]    COLONOSCOPY N/A 11/18/2013    [facility]am Est[facility] at [facility]    HAND SURGERY      SCROTAL SURGERY      mass    SPINE SURGERY      C-spine disk (remote)       SOCIAL HISTORY:  Social History     Socioeconomic History    Marital status:      Spouse name: Not on file    Number of children: Not on file    Years of education: Not on file    Highest education level: Not on file   Social Needs    Financial resource strain: Not on file    Food insecurity - worry: Not on file    Food insecurity - inability: Not on file    Transportation needs - medical: Not on file     Transportation needs - non-medical: Not on file   Occupational History     Employer: [email or web address]   Tobacco Use    Smoking status: Current Every Day Smoker     Packs/day: 1.00     Years: 60.00     Pack years: 60.00     Types: Cigarettes    Smokeless tobacco: Never Used   Substance and Sexual Activity    Alcohol use: Yes     Alcohol/week: 1.2 oz     Types: 2 Cans of beer per week     Comment: once a week    Drug use: No    Sexual activity: Yes     Partners: Female   Other Topics Concern    Not on file   Social History Narrative    Not on file       FAMILY HISTORY:  Family History   Problem Relation Age of Onset    Hypertension Mother     Cancer Father     Heart disease Father     Asthma Sister     Cancer Sister 78        Rectal cancer       ALLERGIES AND MEDICATIONS: updated and reviewed.  Review of patient's allergies indicates:   Allergen Reactions    Codeine Itching    Ibuprofen Itching     Current Outpatient Medications   Medication Sig Dispense Refill    atorvastatin (LIPITOR) 40 MG tablet Take 1 tablet (40 mg total) by mouth once daily. 90 tablet 3    hydrOXYzine pamoate (VISTARIL) 50 MG Cap Take 2 capsules (100 mg total) by mouth nightly as needed (insomnia). 180 capsule 3    lisinopril 10 MG tablet Take 1 tablet (10 mg total) by mouth once daily. 90 tablet 3    omeprazole (PRILOSEC) 20 MG capsule Take 1 capsule (20 mg total) by mouth once daily. 90 capsule 3     No current facility-administered medications for this visit.          ROS  Review of Systems   Constitutional: Negative for activity change, chills, fever and unexpected weight change.   HENT: Negative for congestion, dental problem, ear pain, hearing loss, rhinorrhea, sore throat and trouble swallowing.    Eyes: Negative for discharge, redness and visual disturbance.   Respiratory: Negative for cough, chest tightness, shortness of breath and wheezing.    Cardiovascular: Negative for chest pain, palpitations and leg swelling.  "  Gastrointestinal: Negative for abdominal pain, blood in stool, constipation, diarrhea, nausea and vomiting.   Endocrine: Negative for polydipsia, polyphagia and polyuria.   Genitourinary: Negative for decreased urine volume, difficulty urinating, dysuria, hematuria and urgency.   Musculoskeletal: Negative for arthralgias, joint swelling, myalgias and neck pain.   Skin: Negative for color change and rash.   Neurological: Negative for dizziness, weakness, light-headedness and headaches.   Psychiatric/Behavioral: Negative for confusion, decreased concentration, dysphoric mood, sleep disturbance and suicidal ideas.           Physical Exam  Vitals:    02/12/19 0838   BP: 130/88   Pulse: 80   Temp: 98.5 °F (36.9 °C)   SpO2: 98%   Weight: 96.3 kg (212 lb 4.9 oz)   Height: 5' 8" (1.727 m)    Body mass index is 32.28 kg/m².  Weight: 96.3 kg (212 lb 4.9 oz)   Height: 5' 8" (172.7 cm)   Physical Exam   Constitutional: He is oriented to person, place, and time. He appears well-developed and well-nourished. No distress.   HENT:   Head: Normocephalic and atraumatic.   Right Ear: Tympanic membrane, external ear and ear canal normal.   Left Ear: Tympanic membrane, external ear and ear canal normal.   Nose: Nose normal. No septal deviation. Right sinus exhibits no maxillary sinus tenderness and no frontal sinus tenderness. Left sinus exhibits no maxillary sinus tenderness and no frontal sinus tenderness.   Mouth/Throat: Uvula is midline and mucous membranes are normal. Posterior oropharyngeal erythema present. No oropharyngeal exudate, posterior oropharyngeal edema or tonsillar abscesses. No tonsillar exudate.   Eyes: Conjunctivae and EOM are normal. Pupils are equal, round, and reactive to light. Right eye exhibits no discharge. Left eye exhibits no discharge. No scleral icterus.   Neck: Neck supple. No JVD present. No spinous process tenderness and no muscular tenderness present. Carotid bruit is not present. No tracheal " deviation present. No thyroid mass and no thyromegaly present.   Cardiovascular: Normal rate, regular rhythm, normal heart sounds and intact distal pulses. Exam reveals no S3, no S4 and no friction rub.   No murmur heard.  Pulmonary/Chest: Effort normal and breath sounds normal. He has no wheezes. He has no rhonchi. He has no rales.   Abdominal: Soft. Bowel sounds are normal. He exhibits no distension. There is no tenderness. There is no rebound, no guarding and no CVA tenderness.   Musculoskeletal: Normal range of motion. He exhibits no edema or tenderness.   Lymphadenopathy:        Head (right side): No submental and no submandibular adenopathy present.        Head (left side): No submental and no submandibular adenopathy present.     He has no cervical adenopathy.   Neurological: He is alert and oriented to person, place, and time. Coordination normal.   Motor grossly intact.  Sensation grossly normal.  Symmetric facial movements palate elevated symmetrically tongue midline    Skin: Skin is warm and dry. Capillary refill takes less than 2 seconds. No rash noted. No cyanosis. Nails show no clubbing.   Psychiatric: He has a normal mood and affect. His speech is normal and behavior is normal. Thought content normal. Cognition and memory are normal.         Health Maintenance       Date Due Completion Date    TETANUS VACCINE 01/22/1969 ---    Zoster Vaccine 01/22/2011 ---    LDCT Lung Screen 01/31/2019 1/31/2018    High Dose Statin 02/12/2020 2/12/2019    Colonoscopy 07/21/2022 7/21/2017    Override on 7/20/2017: Done    Override on 10/29/2012: Declined    Lipid Panel 01/23/2023 1/23/2018

## 2019-02-12 NOTE — MEDICAL/APP STUDENT
Assessment & Plan  Problem List Items Addressed This Visit        Cardiac/Vascular    Hyperlipidemia (Chronic)    Current Assessment & Plan     Cont statin recheck labs         Relevant Medications    atorvastatin (LIPITOR) 40 MG tablet    Essential hypertension (Chronic)    Relevant Medications    lisinopril 10 MG tablet    Other Relevant Orders    CBC auto differential    Comprehensive metabolic panel    Lipid panel    Atherosclerosis of aorta (Chronic)    Overview     US 08/2017.  Stable, asymptomatic chronic condition.  Will continue to maximize risk factor reduction and adjust medication as needed.             GI    GERD (gastroesophageal reflux disease) (Chronic)    Current Assessment & Plan     Only needing PRN but having some nagging cough.  Will dose daily for 2 weeks then back to PRN            Other    Tobacco dependence (Chronic)    Current Assessment & Plan     Down to 2 cigarettes.  Plans to be totally off soon!  Repeat LDCT of the chest         Relevant Orders    CT Chest Lung Screening Low Dose      Other Visit Diagnoses     Routine medical exam    -  Primary    Relevant Orders    CBC auto differential    Comprehensive metabolic panel    Lipid panel    PSA, Screening    Screening for prostate cancer        Relevant Orders    PSA, Screening    Screening for cancer        Relevant Orders    CT Chest Lung Screening Low Dose    Personal history of nicotine dependence         Relevant Orders    CT Chest Lung Screening Low Dose        Tobacco dependence  - Down to 2 cigarettes/d plans to cease totally  - Due for f/u LDCT, ordered today    Sore throat  - lingering from previous visit, continue to avoid exacerbting actors and gargle saltwater for relief, advised that smoking cessation could help  - counseled to take scheduled omeprazole 20 mg OD for 2 weeks to see if it improves Sx    Health Maintenance  - Due for yearly labs, ordered CBC, CMP, Lipid panel, PSA today  - refilled atorvastatin and  lisinopril  - Due for colonoscopy in 2 years  - beginning exercise regimen, counseled to carefully increase exercise as tolerated    Health Maintenance reviewed.    Follow-up: Follow-up in about 1 year (around 2/12/2020) for Routine Physical.    ______________________________________________________________________    Chief Complaint  Chief Complaint   Patient presents with    Annual Exam       HPI  Yair Owens is a 69 y.o. male with multiple medical diagnoses as listed in the medical history and problem list that presents for Annual checkup.  Pt is known to me with his last appointment 11/15/2018.      Mr Yair Owens is a pleasant 70 yo man presenting for his annual checkup and follow up of his smoking, HLD, and nagging cough with throat irritation for 3 months. Descibes the throat pain as mild and intermittent, hannah lingering remainder of his illness end of last year. Denies difficulty swallowing, vocal changes, cough changes, worsening GERD. He states salt water gargling helps while soda and smoking worsens his cough. Otherwise patient feels well, states he is down to less than 2 cigarettes a day on average and hopes to fully quit soon. He has recently bought an exercise bike and plans to begin exercising. He is due for a medication refill today and yearly lab work. Will order repeat LDCT chest today as well. No other complaints at this time.      PAST MEDICAL HISTORY:  Past Medical History:   Diagnosis Date    Chest pain syndrome     GERD (gastroesophageal reflux disease)     Hypercholesteremia     Hyperlipidemia        PAST SURGICAL HISTORY:  Past Surgical History:   Procedure Laterality Date    APPENDECTOMY      BONE RESECTION, RIB      for thoracic outlet syndrome    COLONOSCOPY N/A 7/21/2017    [facility]    COLONOSCOPY N/A 11/18/2013    [facility]    HAND SURGERY      SCROTAL SURGERY      mass    SPINE SURGERY      C-spine  disk (remote)       SOCIAL HISTORY:  Social History     Socioeconomic History    Marital status:      Spouse name: Not on file    Number of children: Not on file    Years of education: Not on file    Highest education level: Not on file   Social Needs    Financial resource strain: Not on file    Food insecurity - worry: Not on file    Food insecurity - inability: Not on file    Transportation needs - medical: Not on file    Transportation needs - non-medical: Not on file   Occupational History     Employer: [employer]   Tobacco Use    Smoking status: Current Every Day Smoker     Packs/day: 1.00     Years: 60.00     Pack years: 60.00     Types: Cigarettes    Smokeless tobacco: Never Used   Substance and Sexual Activity    Alcohol use: Yes     Alcohol/week: 1.2 oz     Types: 2 Cans of beer per week     Comment: once a week    Drug use: No    Sexual activity: Yes     Partners: Female   Other Topics Concern    Not on file   Social History Narrative    Not on file       FAMILY HISTORY:  Family History   Problem Relation Age of Onset    Hypertension Mother     Cancer Father     Heart disease Father     Asthma Sister        ALLERGIES AND MEDICATIONS: updated and reviewed.  Review of patient's allergies indicates:   Allergen Reactions    Codeine Itching    Ibuprofen Itching     Current Outpatient Medications   Medication Sig Dispense Refill    atorvastatin (LIPITOR) 40 MG tablet Take 1 tablet (40 mg total) by mouth once daily. 90 tablet 3    hydrOXYzine pamoate (VISTARIL) 50 MG Cap Take 2 capsules (100 mg total) by mouth nightly as needed (insomnia). 180 capsule 3    lisinopril 10 MG tablet Take 1 tablet (10 mg total) by mouth once daily. 90 tablet 3    omeprazole (PRILOSEC) 20 MG capsule Take 1 capsule (20 mg total) by mouth once daily. 90 capsule 3     No current facility-administered medications for this visit.          ROS  Review of Systems   Constitutional: Negative for activity change,  "appetite change, chills, fatigue, fever and unexpected weight change.   HENT: Positive for congestion, postnasal drip and sore throat. Negative for trouble swallowing and voice change.    Eyes: Negative.    Respiratory: Positive for cough. Negative for chest tightness, shortness of breath and wheezing.    Cardiovascular: Negative for chest pain and palpitations.   Gastrointestinal: Negative.  Negative for constipation, diarrhea and nausea.   Endocrine: Negative.    Genitourinary: Negative.    Musculoskeletal: Negative.    Skin: Negative.    Allergic/Immunologic: Negative.    Neurological: Negative.    Hematological: Positive for adenopathy ( notices nodes in neck fluctuate in size).   Psychiatric/Behavioral: Negative.            Physical Exam  Vitals:    02/12/19 0838   BP: 130/88   Pulse: 80   Temp: 98.5 °F (36.9 °C)   SpO2: 98%   Weight: 96.3 kg (212 lb 4.9 oz)   Height: 5' 8" (1.727 m)    Body mass index is 32.28 kg/m².  Weight: 96.3 kg (212 lb 4.9 oz)   Height: 5' 8" (172.7 cm)   Physical Exam   Constitutional: He is oriented to person, place, and time. He appears well-developed and well-nourished. No distress.   HENT:   Head: Normocephalic and atraumatic.   Cardiovascular: Normal rate, regular rhythm and intact distal pulses.   No murmur heard.  S1 S2 present   Pulmonary/Chest: Effort normal and breath sounds normal. No respiratory distress. He has no wheezes.   Lymphadenopathy:     He has cervical adenopathy ( slightly enlarged node in left submandibular area).   Neurological: He is alert and oriented to person, place, and time.   Skin: Skin is warm and dry.   Psychiatric: He has a normal mood and affect. His behavior is normal. Judgment and thought content normal.   Vitals reviewed.        Health Maintenance       Date Due Completion Date    TETANUS VACCINE 01/22/1969 ---    Zoster Vaccine 01/22/2011 ---    LDCT Lung Screen 01/31/2019 1/31/2018    High Dose Statin 02/12/2020 2/12/2019    Colonoscopy " 07/21/2022 7/21/2017    Override on 7/20/2017: Done    Override on 10/29/2012: Declined    Lipid Panel 01/23/2023 1/23/2018

## 2019-02-12 NOTE — TELEPHONE ENCOUNTER
Please call the patient and inform him that his labs are generally looking good.  However, his potassium is remaining high and it appears to be causing some kidney irritation.  His lisinopril could be causing this.  I want him to stop this medication.  I am sending in a different medication for his BP called amlodipine.  He should take it once a day.      Nurse BP check and repeat BMP in a week please.      Thank you,  Talon

## 2019-02-13 ENCOUNTER — HOSPITAL ENCOUNTER (OUTPATIENT)
Dept: RADIOLOGY | Facility: HOSPITAL | Age: 69
Discharge: HOME | End: 2019-02-13
Attending: INTERNAL MEDICINE
Payer: MEDICARE

## 2019-02-13 DIAGNOSIS — Z87.891 PERSONAL HISTORY OF NICOTINE DEPENDENCE: ICD-10-CM

## 2019-02-13 DIAGNOSIS — F17.200 TOBACCO DEPENDENCE: Chronic | ICD-10-CM

## 2019-02-13 DIAGNOSIS — Z12.9 SCREENING FOR CANCER: ICD-10-CM

## 2019-02-13 PROCEDURE — G0297 LDCT FOR LUNG CA SCREEN: HCPCS | Mod: 26,,, | Performed by: RADIOLOGY

## 2019-02-13 PROCEDURE — G0297 LDCT FOR LUNG CA SCREEN: HCPCS | Mod: TC

## 2019-02-13 PROCEDURE — G0297 CT CHEST LUNG SCREENING LOW DOSE: ICD-10-PCS | Mod: 26,,, | Performed by: RADIOLOGY

## 2019-02-13 NOTE — PROGRESS NOTES
Your CT results are normal.      Sincerely,  Derik Ewing  [email or web address]

## 2019-02-19 ENCOUNTER — CLINICAL SUPPORT (OUTPATIENT)
Dept: FAMILY MEDICINE | Facility: CLINIC | Age: 69
End: 2019-02-19
Payer: MEDICARE

## 2019-02-19 ENCOUNTER — PATIENT MESSAGE (OUTPATIENT)
Dept: FAMILY MEDICINE | Facility: CLINIC | Age: 69
End: 2019-02-19

## 2019-02-19 ENCOUNTER — LAB VISIT (OUTPATIENT)
Dept: LAB | Facility: HOSPITAL | Age: 69
End: 2019-02-19
Attending: INTERNAL MEDICINE
Payer: MEDICARE

## 2019-02-19 VITALS — SYSTOLIC BLOOD PRESSURE: 126 MMHG | HEART RATE: 70 BPM | OXYGEN SATURATION: 98 % | DIASTOLIC BLOOD PRESSURE: 74 MMHG

## 2019-02-19 DIAGNOSIS — I10 ESSENTIAL HYPERTENSION: Chronic | ICD-10-CM

## 2019-02-19 DIAGNOSIS — Z12.5 SCREENING FOR PROSTATE CANCER: Primary | ICD-10-CM

## 2019-02-19 DIAGNOSIS — E87.5 HYPERKALEMIA: ICD-10-CM

## 2019-02-19 DIAGNOSIS — I10 ESSENTIAL HYPERTENSION: Primary | ICD-10-CM

## 2019-02-19 LAB
ANION GAP SERPL CALC-SCNC: 5 MMOL/L
BUN SERPL-MCNC: 19 MG/DL
CALCIUM SERPL-MCNC: 10.2 MG/DL
CHLORIDE SERPL-SCNC: 106 MMOL/L
CO2 SERPL-SCNC: 29 MMOL/L
CREAT SERPL-MCNC: 1.2 MG/DL
EST. GFR  (AFRICAN AMERICAN): >60 ML/MIN/1.73 M^2
EST. GFR  (NON AFRICAN AMERICAN): >60 ML/MIN/1.73 M^2
GLUCOSE SERPL-MCNC: 114 MG/DL
POTASSIUM SERPL-SCNC: 5.3 MMOL/L
SODIUM SERPL-SCNC: 140 MMOL/L

## 2019-02-19 PROCEDURE — 99499 NO LOS: ICD-10-PCS | Mod: S$GLB,,, | Performed by: INTERNAL MEDICINE

## 2019-02-19 PROCEDURE — 36415 COLL VENOUS BLD VENIPUNCTURE: CPT | Mod: PO

## 2019-02-19 PROCEDURE — 99499 UNLISTED E&M SERVICE: CPT | Mod: S$GLB,,, | Performed by: INTERNAL MEDICINE

## 2019-02-19 PROCEDURE — 80048 BASIC METABOLIC PNL TOTAL CA: CPT

## 2019-02-19 PROCEDURE — 99999 PR PBB SHADOW E&M-EST. PATIENT-LVL II: CPT | Mod: PBBFAC,,,

## 2019-02-19 PROCEDURE — 99999 PR PBB SHADOW E&M-EST. PATIENT-LVL II: ICD-10-PCS | Mod: PBBFAC,,,

## 2019-02-19 NOTE — PROGRESS NOTES
Yair Owens 69 y.o. male is here today for Blood Pressure check.   History of HTN yes.    Review of patient's allergies indicates:   Allergen Reactions    Codeine Itching    Ibuprofen Itching     Creatinine   Date Value Ref Range Status   02/12/2019 1.3 0.5 - 1.4 mg/dL Final     Sodium   Date Value Ref Range Status   02/12/2019 142 136 - 145 mmol/L Final     Potassium   Date Value Ref Range Status   02/12/2019 6.0 (H) 3.5 - 5.1 mmol/L Final     Comment:     *No Visible Hemolysis   ]  Patient verifies taking blood pressure medications on a regular basis at the same time of the day.     Current Outpatient Medications:     amLODIPine (NORVASC) 5 MG tablet, Take 1 tablet (5 mg total) by mouth once daily., Disp: 90 tablet, Rfl: 0    atorvastatin (LIPITOR) 40 MG tablet, Take 1 tablet (40 mg total) by mouth once daily., Disp: 90 tablet, Rfl: 3    hydrOXYzine pamoate (VISTARIL) 50 MG Cap, Take 2 capsules (100 mg total) by mouth nightly as needed (insomnia)., Disp: 180 capsule, Rfl: 3    omeprazole (PRILOSEC) 20 MG capsule, Take 1 capsule (20 mg total) by mouth once daily., Disp: 90 capsule, Rfl: 3  Does patient have record of home blood pressure readings no. .   Last dose of blood pressure medication was taken at 6:30 yesterday.  Patient is asymptomatic.   Complains of none.    Vitals:    02/19/19 0724   BP: 126/74   BP Location: Right arm   Patient Position: Sitting   BP Method: Large (Manual)   Pulse: 70   SpO2: 98%         Dr. Ewing informed of nurse visit.

## 2019-02-27 ENCOUNTER — TELEPHONE (OUTPATIENT)
Dept: ORTHOPEDICS | Facility: CLINIC | Age: 69
End: 2019-02-27

## 2019-02-27 DIAGNOSIS — M25.512 LEFT SHOULDER PAIN, UNSPECIFIED CHRONICITY: Primary | ICD-10-CM

## 2019-02-28 ENCOUNTER — OFFICE VISIT (OUTPATIENT)
Dept: ORTHOPEDICS | Facility: CLINIC | Age: 69
End: 2019-02-28
Attending: ORTHOPAEDIC SURGERY
Payer: MEDICARE

## 2019-02-28 VITALS — HEIGHT: 68 IN | WEIGHT: 201 LBS | BODY MASS INDEX: 30.46 KG/M2

## 2019-02-28 DIAGNOSIS — M25.512 ACUTE PAIN OF LEFT SHOULDER: Primary | ICD-10-CM

## 2019-02-28 DIAGNOSIS — M25.812 SHOULDER IMPINGEMENT, LEFT: ICD-10-CM

## 2019-02-28 PROCEDURE — 99999 PR PBB SHADOW E&M-EST. PATIENT-LVL II: CPT | Mod: PBBFAC,,, | Performed by: ORTHOPAEDIC SURGERY

## 2019-02-28 PROCEDURE — 1101F PR PT FALLS ASSESS DOC 0-1 FALLS W/OUT INJ PAST YR: ICD-10-PCS | Mod: CPTII,S$GLB,, | Performed by: ORTHOPAEDIC SURGERY

## 2019-02-28 PROCEDURE — 1101F PT FALLS ASSESS-DOCD LE1/YR: CPT | Mod: CPTII,S$GLB,, | Performed by: ORTHOPAEDIC SURGERY

## 2019-02-28 PROCEDURE — 99204 PR OFFICE/OUTPT VISIT, NEW, LEVL IV, 45-59 MIN: ICD-10-PCS | Mod: S$GLB,,, | Performed by: ORTHOPAEDIC SURGERY

## 2019-02-28 PROCEDURE — 99204 OFFICE O/P NEW MOD 45 MIN: CPT | Mod: S$GLB,,, | Performed by: ORTHOPAEDIC SURGERY

## 2019-02-28 PROCEDURE — 99999 PR PBB SHADOW E&M-EST. PATIENT-LVL II: ICD-10-PCS | Mod: PBBFAC,,, | Performed by: ORTHOPAEDIC SURGERY

## 2019-02-28 RX ORDER — MELOXICAM 15 MG/1
15 TABLET ORAL DAILY
Qty: 30 TABLET | Refills: 0 | Status: SHIPPED | OUTPATIENT
Start: 2019-02-28 | End: 2019-10-08

## 2019-02-28 NOTE — LETTER
Wall Crawl Shoulder Exercises  1) Shoulder Flexion  a. Facing a wall, slowly bring hand up the wall until you feel a stretch (avoiding any movements that cause sharp pain)  b. Hold stretch for 5 seconds   c. Return arm to position A  d. Repeat 10-15 times, twice daily   A.      B.       2) Shoulder Abduction  a. Position shoulder next to wall, slowly bring hand up the wall until you feel a stretch (avoiding any movements that cause sharp pain)  b. Hold stretch for 5 seconds   c. Return arm to position A  d. Repeat 10-15 times, twice daily   A.     B.

## 2019-02-28 NOTE — PROGRESS NOTES
CC: Left shoulder pain.     69 y.o. Male presents today for evaluation of his left shoulder pain. Patient reports in the 1980s he was working for [employer] as a . He also does a lot of work in his yard and this increased recently. He reports a few months ago he was starting his  and feels like he irritated his shoulder. He did have a rib surgically removed on his left side, he said he had thoracic outlet syndrome. He was told he had partially torn rotator cuffs bilaterally about 30 years ago but did not have any intervention done at that time.   How long: Patient reports his has had shoulder pain for about 2 months off and on. He describes his pain as achy, and at times dull or sharp depending on the movement.   What makes it better: Patient feels better when taking Aleve or Ran Back and Body. He also does some at home exercises (forward facing wall crawls) which he states helps as well.   What makes it worse: Patient reports he feels worst with abduction and shoulder extension.  Does it radiate: Patient reports radiating pain into his upper arm from the top of his shoulder.   Attempted treatments: Patient takes Ran Back and Body and Aleve which help his pain a lot. Patient uses Icy Hot but states it did not help him.   Pain score: Patient rates his pain 2/10 today.   Any mechanical symptoms: Patient reports a popping in his shoulder when moving his arm out to the side (abduction)  Feelings of instability: Patient denies any instability.   Problems with ADLs: Patient is doing all of his ADLs and states his pain is not great enough to limit him from activity.       REVIEW OF SYSTEMS:   Constitution: Patient denies fever, chills, night sweats, and weight changes.  Eyes: Patient denies eye pain or vision change.  HENT: Patient denies any headache, ear pain, sore throat, or nasal discharge.  CVS: Patient denies chest pain.  Lungs: Patient denies any shortness of breath or cough.  Abd:  Patient denies any stomach pain, nausea, or vomiting.  Skin: Patient denies any skin rash or itching.    Hematologic/Lymphatic: Patient denies any bleeding problems, or easy bruising.   Musculoskeletal: Patient denies any recent falls. See HPI.  Psych: Patient denies any current anxiety or nervousness.    PAST MEDICAL HISTORY:   Past Medical History:   Diagnosis Date    Chest pain syndrome     GERD (gastroesophageal reflux disease)     Hypercholesteremia     Hyperlipidemia        PAST SURGICAL HISTORY:   Past Surgical History:   Procedure Laterality Date    APPENDECTOMY      BONE RESECTION, RIB      for thoracic outlet syndrome    COLONOSCOPY N/A 7/21/2017    [facility]    COLONOSCOPY N/A 11/18/2013    [facility] at [facility]    HAND SURGERY      SCROTAL SURGERY      mass    SPINE SURGERY      C-spine disk (remote)       FAMILY HISTORY:   Family History   Problem Relation Age of Onset    Hypertension Mother     Cancer Father     Heart disease Father     Asthma Sister     Cancer Sister 78        Rectal cancer       SOCIAL HISTORY:   Social History     Socioeconomic History    Marital status:      Spouse name: Not on file    Number of children: Not on file    Years of education: Not on file    Highest education level: Not on file   Social Needs    Financial resource strain: Not on file    Food insecurity - worry: Not on file    Food insecurity - inability: Not on file    Transportation needs - medical: Not on file    Transportation needs - non-medical: Not on file   Occupational History     Employer:  [employer]   Tobacco Use    Smoking status: Current Every Day Smoker     Packs/day: 1.00     Years: 60.00     Pack years: 60.00     Types: Cigarettes    Smokeless tobacco: Never Used   Substance and Sexual Activity    Alcohol use: Yes     Alcohol/week: 1.2 oz     Types: 2 Cans of beer per week     Comment: once a week    Drug use: No  "   Sexual activity: Yes     Partners: Female   Other Topics Concern    Not on file   Social History Narrative    Not on file       MEDICATIONS:     Current Outpatient Medications:     amLODIPine (NORVASC) 5 MG tablet, Take 1 tablet (5 mg total) by mouth once daily., Disp: 90 tablet, Rfl: 0    atorvastatin (LIPITOR) 40 MG tablet, Take 1 tablet (40 mg total) by mouth once daily., Disp: 90 tablet, Rfl: 3    hydrOXYzine pamoate (VISTARIL) 50 MG Cap, Take 2 capsules (100 mg total) by mouth nightly as needed (insomnia)., Disp: 180 capsule, Rfl: 3    omeprazole (PRILOSEC) 20 MG capsule, Take 1 capsule (20 mg total) by mouth once daily., Disp: 90 capsule, Rfl: 3    ALLERGIES:   Review of patient's allergies indicates:   Allergen Reactions    Codeine Itching    Ibuprofen Itching        PHYSICAL EXAMINATION:  Ht 5' 8" (1.727 m)   Wt 91.2 kg (201 lb)   BMI 30.56 kg/m²   Vitals signs and nursing note have been reviewed.  General: In no acute distress, well developed, well nourished, no diaphoresis  Eyes: EOM full and smooth, no eye redness or discharge  HENT: normocephalic and atraumatic, neck supple, trachea midline, no nasal discharge, no external ear redness or discharge  Cardiovascular: 2+ and symmetric radial bilaterally, no LE edema  Lungs: respirations non-labored, no conversational dyspnea   Abd: non-distended, no rigidity  MSK: no amputation or deformity, no swelling of extremities  Neuro: AAOx3, CN2-12 grossly intact  Skin: No rashes, warm and dry  Psychiatric: cooperative, pleasant, mood and affect appropriate for age    SHOULDER: LEFT  The affected shoulder is compared to the contralateral shoulder.    Observation:    CERVICAL SPINE  Normal head carriage. Normal thoracic kyphosis.  Full AROM in flexion, extension, sidebending, and rotation.    SHOULDER  No ecchymosis, edema, or erythema throughout the shoulder girdle.  No sternal, clavicular, or acromial deformities bilaterally.  No atrophy of the " pectorals, deltoids, supraspinatus, infraspinatus, or biceps bilaterally.  No asymmetry of shoulders bilaterally.    ROM:  Active flexion to 180° on left and 180° on right.   Active abduction to 180° on left and 180° on right.    Active internal rotation to T7 on left and T7 on right.    Active external rotation to T4 on left and T4 on right.    No scapular dyskinesia or winging.    Tenderness:  No tenderness at the SC or AC joint  No tenderness over the clavicle   No tenderness over biceps tendon or bicipital groove  No tenderness over subacromial space    Strength Testing:  Deltoid - 5/5 on left and 5/5 on right  Biceps - 5/5 on left and 5/5 on right  Triceps - 5/5 on left and 5/5 on right  Wrist extension - 5/5 on left and 5/5 on right  Wrist flexion - 5/5 on left and 5/5 on right   - 5/5 on left and 5/5 on right  Finger extension - 5/5 on left and 5/5 on right  Finger abduction - 5/5 on left and 5/5 on right    Special Tests:  Empty can test - negative  Full can test - negative  Bear hug test - negative for weakness, positive for pain  Belly press test - negative for weakness, positive for pain  Resisted external rotation - negative    Neer's test - negative  Hawkin's-Alli test - positive in forward flexion and abduction with internal rotation    ORobertos test - negative  Biceps load 1 test - negative  Biceps load 2 test - negative    Speed's test - negative    Sulcus sign - none  AP load and shift laxity - none    Neurovascular Exam:  2+ radial pulses BL  Sensation intact to light touch in the distal median, radial, and ulnar nerve distributions bilaterally.  Spurlings test - negative  Lhermittes test - negative  Capillary refill intact <2 seconds in all digits bilaterally      IMAGIN. X-ray ordered due to left shoulder pain.   2. X-ray images were reviewed personally by me and then directly with patient.  3. FINDINGS: X-ray images obtained demonstrate mild degenerative changes of the glenohumeral  and acromioclavicular joints  4. IMPRESSION:  Mild degenerative changes of GH and AC joint.       ASSESSMENT:      ICD-10-CM ICD-9-CM   1. Acute pain of left shoulder M25.512 719.41   2. Shoulder impingement, left M75.42 726.2       PLAN:  1-2.  Acute left shoulder pain/impingement -     - Yair has been suffering from left shoulder pain for the past 2 months.  He recently has been doing more work out in the yard and feels like this made his shoulder hurt more.  He states that he has been doing some home exercises that have been helping.  He has also been using Aleve which has helped as well. He is able to do all of his ADLs, but some activities cause more pain than others.  He is not interested in physical therapy at this time.    - XRs ordered in the office today and images were personally reviewed with the patient. See above for further detail.    - Discussed options, including oral NSAIDs, home exercises, ultrasound-guided injections. He is interested in doing home exercises and an oral medication at this time.    - Prescription for Mobic 15 mg sent to his pharmacy.  Advised to take this daily for 2 weeks to get good anti-inflammatory effect.    - Home exercise handout given to him today.  Exercises focusing on wall crawls and scapular strengthening/stability were described to him and all questions answered regarding the exercises.  He was advised to do these once daily.      Future planning includes - if not better with above, consider ultrasound-guided subacromial bursa injection    All questions were answered to the best of my ability and all concerns were addressed at this time.    Follow up in 4 weeks for above, or sooner if needed.

## 2019-03-22 DIAGNOSIS — E78.5 HYPERLIPIDEMIA, UNSPECIFIED HYPERLIPIDEMIA TYPE: Chronic | ICD-10-CM

## 2019-03-22 RX ORDER — ATORVASTATIN CALCIUM 40 MG/1
40 TABLET, FILM COATED ORAL DAILY
Qty: 90 TABLET | Refills: 3 | Status: SHIPPED | OUTPATIENT
Start: 2019-03-22 | End: 2020-03-20

## 2019-03-27 DIAGNOSIS — M25.812 SHOULDER IMPINGEMENT, LEFT: ICD-10-CM

## 2019-03-27 DIAGNOSIS — M25.512 ACUTE PAIN OF LEFT SHOULDER: ICD-10-CM

## 2019-03-27 RX ORDER — MELOXICAM 15 MG/1
TABLET ORAL
Qty: 30 TABLET | Refills: 0 | OUTPATIENT
Start: 2019-03-27

## 2019-05-10 DIAGNOSIS — I10 ESSENTIAL HYPERTENSION: Chronic | ICD-10-CM

## 2019-05-10 RX ORDER — AMLODIPINE BESYLATE 5 MG/1
TABLET ORAL
Qty: 90 TABLET | Refills: 3 | Status: SHIPPED | OUTPATIENT
Start: 2019-05-10 | End: 2019-05-13 | Stop reason: SDUPTHER

## 2019-05-12 DIAGNOSIS — F51.04 PSYCHOPHYSIOLOGICAL INSOMNIA: Chronic | ICD-10-CM

## 2019-05-12 RX ORDER — HYDROXYZINE PAMOATE 50 MG/1
CAPSULE ORAL
Qty: 180 CAPSULE | Refills: 3 | Status: SHIPPED | OUTPATIENT
Start: 2019-05-12 | End: 2020-06-05 | Stop reason: SDUPTHER

## 2019-05-13 DIAGNOSIS — I10 ESSENTIAL HYPERTENSION: Chronic | ICD-10-CM

## 2019-05-13 RX ORDER — AMLODIPINE BESYLATE 5 MG/1
5 TABLET ORAL DAILY
Qty: 90 TABLET | Refills: 3 | Status: SHIPPED | OUTPATIENT
Start: 2019-05-13 | End: 2020-01-17

## 2019-05-13 RX ORDER — OMEPRAZOLE 20 MG/1
20 CAPSULE, DELAYED RELEASE ORAL DAILY
Qty: 90 CAPSULE | Refills: 3 | Status: SHIPPED | OUTPATIENT
Start: 2019-05-13 | End: 2019-11-06

## 2019-05-23 ENCOUNTER — OFFICE VISIT (OUTPATIENT)
Dept: OTOLARYNGOLOGY | Facility: CLINIC | Age: 69
End: 2019-05-23
Payer: MEDICARE

## 2019-05-23 VITALS
BODY MASS INDEX: 31.5 KG/M2 | DIASTOLIC BLOOD PRESSURE: 82 MMHG | SYSTOLIC BLOOD PRESSURE: 140 MMHG | WEIGHT: 207.88 LBS | HEIGHT: 68 IN

## 2019-05-23 DIAGNOSIS — J39.2 THROAT IRRITATION: Primary | ICD-10-CM

## 2019-05-23 DIAGNOSIS — R09.A2 GLOBUS SENSATION: ICD-10-CM

## 2019-05-23 PROCEDURE — 31575 DIAGNOSTIC LARYNGOSCOPY: CPT | Mod: S$GLB,,, | Performed by: OTOLARYNGOLOGY

## 2019-05-23 PROCEDURE — 99203 PR OFFICE/OUTPT VISIT, NEW, LEVL III, 30-44 MIN: ICD-10-PCS | Mod: 25,S$GLB,, | Performed by: OTOLARYNGOLOGY

## 2019-05-23 PROCEDURE — 3077F SYST BP >= 140 MM HG: CPT | Mod: CPTII,S$GLB,, | Performed by: OTOLARYNGOLOGY

## 2019-05-23 PROCEDURE — 3077F PR MOST RECENT SYSTOLIC BLOOD PRESSURE >= 140 MM HG: ICD-10-PCS | Mod: CPTII,S$GLB,, | Performed by: OTOLARYNGOLOGY

## 2019-05-23 PROCEDURE — 31575 PR LARYNGOSCOPY, FLEXIBLE; DIAGNOSTIC: ICD-10-PCS | Mod: S$GLB,,, | Performed by: OTOLARYNGOLOGY

## 2019-05-23 PROCEDURE — 1101F PT FALLS ASSESS-DOCD LE1/YR: CPT | Mod: CPTII,S$GLB,, | Performed by: OTOLARYNGOLOGY

## 2019-05-23 PROCEDURE — 3079F PR MOST RECENT DIASTOLIC BLOOD PRESSURE 80-89 MM HG: ICD-10-PCS | Mod: CPTII,S$GLB,, | Performed by: OTOLARYNGOLOGY

## 2019-05-23 PROCEDURE — 3079F DIAST BP 80-89 MM HG: CPT | Mod: CPTII,S$GLB,, | Performed by: OTOLARYNGOLOGY

## 2019-05-23 PROCEDURE — 1101F PR PT FALLS ASSESS DOC 0-1 FALLS W/OUT INJ PAST YR: ICD-10-PCS | Mod: CPTII,S$GLB,, | Performed by: OTOLARYNGOLOGY

## 2019-05-23 PROCEDURE — 99203 OFFICE O/P NEW LOW 30 MIN: CPT | Mod: 25,S$GLB,, | Performed by: OTOLARYNGOLOGY

## 2019-05-30 NOTE — PROGRESS NOTES
Chief Complaint   Patient presents with    Other     feels that thing get cought in throat and tender at times         69 y.o. male presents with throat irritation after several coughing spells that started in November of 2018. H is a previous smoker. Feels like stuff is getting stuck in his throat. He denies any trouble swallowing. He has a history of GERD and feels that the irritation is worse with Coke and other acidic foods and drinks.      Review of Systems     Constitutional: Negative for fatigue and unexpected weight change.   HENT: per HPI.  Eyes: Negative for visual disturbance.   Respiratory: Negative for shortness of breath, hemoptysis  Cardiovascular: Negative for chest pain and palpitations.   Genitourinary: Negative for dysuria and difficulty urinating.   Musculoskeletal: Negative for decreased ROM, back pain.   Skin: Negative for rash, sunburn, itching.   Neurological: Negative for dizziness and seizures.   Hematological: Negative for adenopathy. Does not bruise/bleed easily.   Psychiatric/Behavioral: Negative for agitation. The patient is not nervous/anxious.   Endocrine: Negative for rapid weight loss/weight gain, heat/cold intolerance.     Past Medical History:   Diagnosis Date    Chest pain syndrome     GERD (gastroesophageal reflux disease)     Hypercholesteremia     Hyperlipidemia        Past Surgical History:   Procedure Laterality Date    APPENDECTOMY      BONE RESECTION, RIB      for thoracic outlet syndrome    COLONOSCOPY N/A 7/21/2017    [facility]    COLONOSCOPY N/A 11/18/2013    [facility] at [facility]    HAND SURGERY      SCROTAL SURGERY      mass    SPINE SURGERY      C-spine disk (remote)       family history includes Asthma in his sister; Cancer in his father; Cancer (age of onset: 78) in his sister; Heart disease in his father; Hypertension in his mother.    Pt  reports that he has been smoking cigarettes.  He has a  60.00 pack-year smoking history. He has never used smokeless tobacco. He reports that he drinks about 1.2 oz of alcohol per week. He reports that he does not use drugs.    Review of patient's allergies indicates:   Allergen Reactions    Codeine Itching    Ibuprofen Itching        Physical Exam    Vitals:    05/23/19 1454   BP: (!) 140/82     Body mass index is 31.61 kg/m².    Physical Exam   Constitutional: He is oriented to person, place, and time. He appears well-developed and well-nourished. No distress.   HENT:   Head: Normocephalic and atraumatic.   Right Ear: Hearing, tympanic membrane, external ear and ear canal normal. Tympanic membrane mobility is normal. No middle ear effusion. No decreased hearing is noted.   Left Ear: Hearing, tympanic membrane, external ear and ear canal normal. Tympanic membrane mobility is normal.  No middle ear effusion. No decreased hearing is noted.   Nose: Nose normal.   Mouth/Throat: Uvula is midline, oropharynx is clear and moist and mucous membranes are normal.   Eyes: Pupils are equal, round, and reactive to light. Conjunctivae, EOM and lids are normal. Right eye exhibits no discharge. Left eye exhibits no discharge.   Neck: Trachea normal, normal range of motion and phonation normal. Neck supple. No tracheal tenderness present. No tracheal deviation, no edema and no erythema present. No thyroid mass and no thyromegaly present.   Cardiovascular: Normal heart sounds.   Pulmonary/Chest: Breath sounds normal. No stridor.   Abdominal: Soft.   Lymphadenopathy:     He has no cervical adenopathy.   Neurological: He is alert and oriented to person, place, and time.   Skin: Skin is warm and dry. No rash noted. He is not diaphoretic. No erythema. No pallor.   Psychiatric: He has a normal mood and affect.   Nursing note and vitals reviewed.    Procedure: Flexible laryngoscopy  In order to fully examine the upper aerodigestive tract, including the larynx, in a patient with a hyperactive  gag reflex, flexible endoscopy is required.  After explaining the procedure and obtaining verbal consent, a timeout was performed with the patient's participation according to the universal protocol. Both nasal cavities were anesthetized with 4% Xylocaine spray mixed with Javon-Synephrine. The flexible laryngoscope was inserted into the nasal cavity and advanced to visualize the nasal cavity, nasopharynx, the posterior oropharynx, hypopharynx, and the endolarynx with the above findings noted. The scope was removed and the procedure terminated. The patient tolerated this procedure well without apparent complication.     Nasopharynx - the torus is clear. There are no lesions of the posterior wall.   Oropharynx - no lesions of the tongue base. There is no obvious fullness or asymmetry.  Hypopharynx - there are no lesions of the pyriform sinuses or postcricoid region    Larynx - there are no lesions of the supraglottic or glottic larynx. Vocal fold mobility is normal with complete closure.     Assessment     1. Throat irritation    2. Globus sensation          Plan  In summary, Mr. Owens is a 69 year old former smoker with several months of throat irritation and globus sensation. Normal exam today. Reassurance given. All questions were answered. Return to clinic if symptoms fail to improve or worsen.

## 2019-10-08 ENCOUNTER — OFFICE VISIT (OUTPATIENT)
Dept: FAMILY MEDICINE | Facility: CLINIC | Age: 69
End: 2019-10-08
Payer: MEDICARE

## 2019-10-08 VITALS
OXYGEN SATURATION: 98 % | SYSTOLIC BLOOD PRESSURE: 130 MMHG | DIASTOLIC BLOOD PRESSURE: 80 MMHG | TEMPERATURE: 99 F | HEART RATE: 82 BPM | WEIGHT: 207.25 LBS | HEIGHT: 68 IN | BODY MASS INDEX: 31.41 KG/M2

## 2019-10-08 DIAGNOSIS — R07.89 CHEST TIGHTNESS OR PRESSURE: Primary | ICD-10-CM

## 2019-10-08 DIAGNOSIS — K21.9 GASTROESOPHAGEAL REFLUX DISEASE WITHOUT ESOPHAGITIS: Chronic | ICD-10-CM

## 2019-10-08 DIAGNOSIS — F41.1 GENERALIZED ANXIETY DISORDER: ICD-10-CM

## 2019-10-08 PROCEDURE — 99499 RISK ADDL DX/OHS AUDIT: ICD-10-PCS | Mod: S$GLB,,, | Performed by: INTERNAL MEDICINE

## 2019-10-08 PROCEDURE — 93005 EKG 12-LEAD: ICD-10-PCS | Mod: S$GLB,,, | Performed by: INTERNAL MEDICINE

## 2019-10-08 PROCEDURE — 1101F PT FALLS ASSESS-DOCD LE1/YR: CPT | Mod: CPTII,S$GLB,, | Performed by: INTERNAL MEDICINE

## 2019-10-08 PROCEDURE — 99999 PR PBB SHADOW E&M-EST. PATIENT-LVL III: CPT | Mod: PBBFAC,,, | Performed by: INTERNAL MEDICINE

## 2019-10-08 PROCEDURE — 3079F DIAST BP 80-89 MM HG: CPT | Mod: CPTII,S$GLB,, | Performed by: INTERNAL MEDICINE

## 2019-10-08 PROCEDURE — 93005 ELECTROCARDIOGRAM TRACING: CPT | Mod: S$GLB,,, | Performed by: INTERNAL MEDICINE

## 2019-10-08 PROCEDURE — 99999 PR PBB SHADOW E&M-EST. PATIENT-LVL III: ICD-10-PCS | Mod: PBBFAC,,, | Performed by: INTERNAL MEDICINE

## 2019-10-08 PROCEDURE — 93010 EKG 12-LEAD: ICD-10-PCS | Mod: S$GLB,,, | Performed by: INTERNAL MEDICINE

## 2019-10-08 PROCEDURE — 99214 PR OFFICE/OUTPT VISIT, EST, LEVL IV, 30-39 MIN: ICD-10-PCS | Mod: S$GLB,,, | Performed by: INTERNAL MEDICINE

## 2019-10-08 PROCEDURE — 1101F PR PT FALLS ASSESS DOC 0-1 FALLS W/OUT INJ PAST YR: ICD-10-PCS | Mod: CPTII,S$GLB,, | Performed by: INTERNAL MEDICINE

## 2019-10-08 PROCEDURE — 99499 UNLISTED E&M SERVICE: CPT | Mod: S$GLB,,, | Performed by: INTERNAL MEDICINE

## 2019-10-08 PROCEDURE — 3075F PR MOST RECENT SYSTOLIC BLOOD PRESS GE 130-139MM HG: ICD-10-PCS | Mod: CPTII,S$GLB,, | Performed by: INTERNAL MEDICINE

## 2019-10-08 PROCEDURE — 3075F SYST BP GE 130 - 139MM HG: CPT | Mod: CPTII,S$GLB,, | Performed by: INTERNAL MEDICINE

## 2019-10-08 PROCEDURE — 99214 OFFICE O/P EST MOD 30 MIN: CPT | Mod: S$GLB,,, | Performed by: INTERNAL MEDICINE

## 2019-10-08 PROCEDURE — 93010 ELECTROCARDIOGRAM REPORT: CPT | Mod: S$GLB,,, | Performed by: INTERNAL MEDICINE

## 2019-10-08 PROCEDURE — 3079F PR MOST RECENT DIASTOLIC BLOOD PRESSURE 80-89 MM HG: ICD-10-PCS | Mod: CPTII,S$GLB,, | Performed by: INTERNAL MEDICINE

## 2019-10-08 NOTE — ASSESSMENT & PLAN NOTE
Increased stress with family situation causing increased stress.  I counseled the patient on several coping mechanisms, recommended he choose a couple to try at a time, and discussed the need to practice them.

## 2019-10-08 NOTE — PROGRESS NOTES
Assessment & Plan  Problem List Items Addressed This Visit        Psychiatric    Generalized anxiety disorder    Current Assessment & Plan     Increased stress with family situation causing increased stress.  I counseled the patient on several coping mechanisms, recommended he choose a couple to try at a time, and discussed the need to practice them.  Discussed sertraline but he doesn't feel this is needed at this time.             GI    GERD (gastroesophageal reflux disease) (Chronic)    Current Assessment & Plan     Increase to 40mg daily.             Other Visit Diagnoses     Chest tightness or pressure    -  Primary  -    EKG per my read shows NSR without AV block, prolonged QTc, Q wave, TWI or ST changes.   Able to exercise for 90 minutes without symptoms.  Also walking improves symptoms.  High suspicion for stress/gastritis.  See above.  If any change in symptoms, will refer to cardiology    Relevant Orders    EKG 12-lead            Follow-up: No follow-ups on file.    ______________________________________________________________________    Chief Complaint  Chief Complaint   Patient presents with    Chest Pain       HPI  Yair Owens is a 69 y.o. male with multiple medical diagnoses as listed in the medical history and problem list that presents for chest tightness x 2-3.  Pt is known to me with his last appointment 02/2019.      It is a pressure in the left side of the chest.  Not exertional.  Can cut grass for 90 minutes without symptoms.  No CP, SOB, palpitations, diaphoresis, vision change, radiation of pain. It improves if he belches.  He has been under a lot of stress with a family situation.  Hasn't taken any OTC meds for this.  It also gets better with going for a walk.     Had normal stress test in 2011 (see legacy documents).      PAST MEDICAL HISTORY:  Past Medical History:   Diagnosis Date    Chest pain syndrome     GERD (gastroesophageal reflux disease)     Hypercholesteremia      Hyperlipidemia        PAST SURGICAL HISTORY:  Past Surgical History:   Procedure Laterality Date    APPENDECTOMY      BONE RESECTION, RIB      for thoracic outlet syndrome    COLONOSCOPY N/A 7/21/2017    Procedure: COLONOSCOPY;  Surgeon: Deepak Servin MD;  [facility];  Service: Endoscopy;  Laterality: N/A;    HAND SURGERY      SCROTAL SURGERY      mass    SPINE SURGERY      C-spine disk (remote)       SOCIAL HISTORY:  Social History     Socioeconomic History    Marital status:      Spouse name: Not on file    Number of children: Not on file    Years of education: Not on file    Highest education level: Not on file   Occupational History     Employer: [employer]   Social Needs    Financial resource strain: Not hard at all    Food insecurity:     Worry: Never true     Inability: Never true    Transportation needs:     Medical: No     Non-medical: No   Tobacco Use    Smoking status: Current Every Day Smoker     Packs/day: 1.00     Years: 60.00     Pack years: 60.00     Types: Cigarettes    Smokeless tobacco: Never Used   Substance and Sexual Activity    Alcohol use: Yes     Alcohol/week: 2.0 standard drinks     Types: 2 Cans of beer per week     Frequency: Monthly or less     Drinks per session: 1 or 2     Binge frequency: Never     Comment: once a week    Drug use: No    Sexual activity: Yes     Partners: Female   Lifestyle    Physical activity:     Days per week: 1 day     Minutes per session: 20 min    Stress: Only a little   Relationships    Social connections:     Talks on phone: Three times a week     Gets together: Once a week     Attends Restorationist service: Not on file     Active member of club or organization: No     Attends meetings of clubs or organizations: Never     Relationship status:    Other Topics Concern    Not on file   Social History Narrative    Not on file       FAMILY HISTORY:  Family History   Problem Relation Age of Onset    Hypertension Mother      Cancer Father     Heart disease Father     Asthma Sister     Cancer Sister 78        Rectal cancer       ALLERGIES AND MEDICATIONS: updated and reviewed.  Review of patient's allergies indicates:   Allergen Reactions    Codeine Itching    Ibuprofen Itching     Current Outpatient Medications   Medication Sig Dispense Refill    amLODIPine (NORVASC) 5 MG tablet Take 1 tablet (5 mg total) by mouth once daily. 90 tablet 3    atorvastatin (LIPITOR) 40 MG tablet Take 1 tablet (40 mg total) by mouth once daily. 90 tablet 3    hydrOXYzine pamoate (VISTARIL) 50 MG Cap TAKE 2 CAPSULES BY MOUTH AT BEDTIME AS NEEDED FOR INSOMNIA 180 capsule 3    omeprazole (PRILOSEC) 20 MG capsule Take 1 capsule (20 mg total) by mouth once daily. 90 capsule 3     No current facility-administered medications for this visit.          ROS  Review of Systems   Constitutional: Negative for activity change, chills, fever and unexpected weight change.   HENT: Negative for congestion, dental problem, ear pain, hearing loss, rhinorrhea, sore throat and trouble swallowing.    Eyes: Negative for discharge, redness and visual disturbance.   Respiratory: Positive for chest tightness. Negative for cough, shortness of breath and wheezing.    Cardiovascular: Negative for chest pain, palpitations and leg swelling.   Gastrointestinal: Negative for abdominal pain, blood in stool, constipation, diarrhea, nausea and vomiting.   Endocrine: Negative for polydipsia, polyphagia and polyuria.   Genitourinary: Negative for decreased urine volume, difficulty urinating, dysuria, hematuria and urgency.   Musculoskeletal: Negative for arthralgias, joint swelling, myalgias and neck pain.   Skin: Negative for color change and rash.   Neurological: Negative for dizziness, weakness, light-headedness and headaches.   Psychiatric/Behavioral: Negative for confusion, decreased concentration, dysphoric mood, sleep disturbance and suicidal ideas.           Physical  "Exam  Vitals:    10/08/19 1442   BP: (!) 148/70   Pulse: 82   Temp: 98.7 °F (37.1 °C)   SpO2: 98%   Weight: 94 kg (207 lb 3.7 oz)   Height: 5' 8" (1.727 m)    Body mass index is 31.51 kg/m².  Weight: 94 kg (207 lb 3.7 oz)   Height: 5' 8" (172.7 cm)   Physical Exam   Constitutional: He is oriented to person, place, and time. He appears well-developed and well-nourished. No distress.   HENT:   Head: Normocephalic and atraumatic.   Eyes: Pupils are equal, round, and reactive to light. Conjunctivae, EOM and lids are normal. No scleral icterus.   Neck: Full passive range of motion without pain. Neck supple. No JVD present. Carotid bruit is not present. No thyromegaly present.   Cardiovascular: Normal rate, regular rhythm, normal heart sounds and intact distal pulses. Exam reveals no S3, no S4 and no friction rub.   No murmur heard.  Pulmonary/Chest: Effort normal and breath sounds normal. He has no wheezes. He has no rhonchi. He has no rales.   Abdominal: Soft. Bowel sounds are normal. There is no tenderness.   Musculoskeletal: He exhibits no edema or tenderness.   Lymphadenopathy:        Head (right side): No submental and no submandibular adenopathy present.        Head (left side): No submental and no submandibular adenopathy present.     He has no cervical adenopathy.   Neurological: He is alert and oriented to person, place, and time.   Motor grossly intact.  Sensory grossly intact.  Symmetric facial movements palate elevated symmetrically tongue midline   Skin: Skin is warm and dry. No rash noted.   Psychiatric: He has a normal mood and affect. His speech is normal and behavior is normal. Thought content normal.         Health Maintenance       Date Due Completion Date    TETANUS VACCINE 01/22/1969 ---    Shingles Vaccine (1 of 2) 01/22/2001 ---    Influenza Vaccine (1) 09/01/2019 9/14/2018    LDCT Lung Screen 02/13/2020 2/13/2019    High Dose Statin 05/23/2020 5/23/2019    Colonoscopy 07/21/2022 7/21/2017    " Override on 7/20/2017: Done    Override on 10/29/2012: Declined    Lipid Panel 02/12/2024 2/12/2019

## 2019-11-06 ENCOUNTER — PATIENT MESSAGE (OUTPATIENT)
Dept: FAMILY MEDICINE | Facility: CLINIC | Age: 69
End: 2019-11-06

## 2019-11-06 DIAGNOSIS — K21.9 GASTROESOPHAGEAL REFLUX DISEASE WITHOUT ESOPHAGITIS: Primary | Chronic | ICD-10-CM

## 2019-11-06 RX ORDER — OMEPRAZOLE 40 MG/1
40 CAPSULE, DELAYED RELEASE ORAL 2 TIMES DAILY
Qty: 90 CAPSULE | Refills: 3 | Status: SHIPPED | OUTPATIENT
Start: 2019-11-06 | End: 2020-02-13 | Stop reason: SDUPTHER

## 2019-11-07 ENCOUNTER — PATIENT MESSAGE (OUTPATIENT)
Dept: FAMILY MEDICINE | Facility: CLINIC | Age: 69
End: 2019-11-07

## 2019-11-22 ENCOUNTER — OFFICE VISIT (OUTPATIENT)
Dept: FAMILY MEDICINE | Facility: CLINIC | Age: 69
End: 2019-11-22
Payer: MEDICARE

## 2019-11-22 VITALS
DIASTOLIC BLOOD PRESSURE: 82 MMHG | SYSTOLIC BLOOD PRESSURE: 138 MMHG | HEIGHT: 68 IN | TEMPERATURE: 99 F | BODY MASS INDEX: 31.11 KG/M2 | OXYGEN SATURATION: 95 % | WEIGHT: 205.25 LBS | HEART RATE: 74 BPM

## 2019-11-22 DIAGNOSIS — J18.9 ATYPICAL PNEUMONIA: Primary | ICD-10-CM

## 2019-11-22 PROCEDURE — 1159F MED LIST DOCD IN RCRD: CPT | Mod: S$GLB,,, | Performed by: INTERNAL MEDICINE

## 2019-11-22 PROCEDURE — 1159F PR MEDICATION LIST DOCUMENTED IN MEDICAL RECORD: ICD-10-PCS | Mod: S$GLB,,, | Performed by: INTERNAL MEDICINE

## 2019-11-22 PROCEDURE — 99999 PR PBB SHADOW E&M-EST. PATIENT-LVL III: CPT | Mod: PBBFAC,,, | Performed by: INTERNAL MEDICINE

## 2019-11-22 PROCEDURE — 99999 PR PBB SHADOW E&M-EST. PATIENT-LVL III: ICD-10-PCS | Mod: PBBFAC,,, | Performed by: INTERNAL MEDICINE

## 2019-11-22 PROCEDURE — 99214 PR OFFICE/OUTPT VISIT, EST, LEVL IV, 30-39 MIN: ICD-10-PCS | Mod: S$GLB,,, | Performed by: INTERNAL MEDICINE

## 2019-11-22 PROCEDURE — 1126F PR PAIN SEVERITY QUANTIFIED, NO PAIN PRESENT: ICD-10-PCS | Mod: S$GLB,,, | Performed by: INTERNAL MEDICINE

## 2019-11-22 PROCEDURE — 1101F PR PT FALLS ASSESS DOC 0-1 FALLS W/OUT INJ PAST YR: ICD-10-PCS | Mod: CPTII,S$GLB,, | Performed by: INTERNAL MEDICINE

## 2019-11-22 PROCEDURE — 1101F PT FALLS ASSESS-DOCD LE1/YR: CPT | Mod: CPTII,S$GLB,, | Performed by: INTERNAL MEDICINE

## 2019-11-22 PROCEDURE — 1126F AMNT PAIN NOTED NONE PRSNT: CPT | Mod: S$GLB,,, | Performed by: INTERNAL MEDICINE

## 2019-11-22 PROCEDURE — 3075F PR MOST RECENT SYSTOLIC BLOOD PRESS GE 130-139MM HG: ICD-10-PCS | Mod: CPTII,S$GLB,, | Performed by: INTERNAL MEDICINE

## 2019-11-22 PROCEDURE — 99214 OFFICE O/P EST MOD 30 MIN: CPT | Mod: S$GLB,,, | Performed by: INTERNAL MEDICINE

## 2019-11-22 PROCEDURE — 3075F SYST BP GE 130 - 139MM HG: CPT | Mod: CPTII,S$GLB,, | Performed by: INTERNAL MEDICINE

## 2019-11-22 PROCEDURE — 3079F PR MOST RECENT DIASTOLIC BLOOD PRESSURE 80-89 MM HG: ICD-10-PCS | Mod: CPTII,S$GLB,, | Performed by: INTERNAL MEDICINE

## 2019-11-22 PROCEDURE — 3079F DIAST BP 80-89 MM HG: CPT | Mod: CPTII,S$GLB,, | Performed by: INTERNAL MEDICINE

## 2019-11-22 RX ORDER — BENZONATATE 200 MG/1
200 CAPSULE ORAL 3 TIMES DAILY PRN
Qty: 30 CAPSULE | Refills: 1 | Status: SHIPPED | OUTPATIENT
Start: 2019-11-22 | End: 2020-02-14

## 2019-11-22 RX ORDER — AZITHROMYCIN 250 MG/1
TABLET, FILM COATED ORAL
Qty: 6 TABLET | Refills: 0 | Status: SHIPPED | OUTPATIENT
Start: 2019-11-22 | End: 2020-02-14

## 2019-11-22 NOTE — PROGRESS NOTES
Assessment & Plan  Problem List Items Addressed This Visit     None      Visit Diagnoses     Atypical pneumonia    -  Primary  -    Start azithromycin.  Tessalon for cough suppression.  If failing to improve by Sunday/Monday would consider adding steroids.     Relevant Medications    azithromycin (ZITHROMAX Z-KENNEDY) 250 MG tablet    benzonatate (TESSALON) 200 MG capsule            Health Maintenance reviewed, deferred.    Follow-up: Follow up if symptoms worsen or fail to improve.    ______________________________________________________________________    Chief Complaint  Chief Complaint   Patient presents with    Cough       HPI  Yair Owens is a 69 y.o. male with multiple medical diagnoses as listed in the medical history and problem list that presents for cough x 10 days.  Pt is known to me with his last appointment 10/8/2019.      Answers for HPI/ROS submitted by the patient on 11/22/2019   Cough  Chronicity: new  Onset: 1 to 4 weeks ago  Progression since onset: unchanged  Frequency: hourly  Cough characteristics: non-productive  ear congestion: No  heartburn: No  hemoptysis: No  nasal congestion: Yes  sweats: No  weight loss: No  Aggravated by: nothing  Risk factors for lung disease: animal exposure, smoking/tobacco exposure  asthma: No  bronchiectasis: No  bronchitis: No  COPD: No  emphysema: No  pneumonia: No  Treatments tried: OTC cough suppressant  Improvement on treatment: mild    Wheezing.  Feels it improves after he can clear some sputum.  Sputum production is yellow.  + sputum throughout the day but the heaviest in the AM or PM    PAST MEDICAL HISTORY:  Past Medical History:   Diagnosis Date    Chest pain syndrome     GERD (gastroesophageal reflux disease)     Hypercholesteremia     Hyperlipidemia        PAST SURGICAL HISTORY:  Past Surgical History:   Procedure Laterality Date    APPENDECTOMY      BONE RESECTION, RIB      for thoracic outlet syndrome    COLONOSCOPY N/A 7/21/2017     Procedure: COLONOSCOPY;  Surgeon: Deepak Servin MD;  Location: [facility];  Service: Endoscopy;  Laterality: N/A;    HAND SURGERY      SCROTAL SURGERY      mass    SPINE SURGERY      C-spine disk (remote)       SOCIAL HISTORY:  Social History     Socioeconomic History    Marital status:      Spouse name: Not on file    Number of children: Not on file    Years of education: Not on file    Highest education level: Not on file   Occupational History     Employer: [employer]   Social Needs    Financial resource strain: Not hard at all    Food insecurity:     Worry: Never true     Inability: Never true    Transportation needs:     Medical: No     Non-medical: No   Tobacco Use    Smoking status: Current Every Day Smoker     Packs/day: 1.00     Years: 60.00     Pack years: 60.00     Types: Cigarettes    Smokeless tobacco: Never Used   Substance and Sexual Activity    Alcohol use: Yes     Alcohol/week: 2.0 standard drinks     Types: 2 Cans of beer per week     Frequency: Monthly or less     Drinks per session: 1 or 2     Binge frequency: Never     Comment: once a week    Drug use: No    Sexual activity: Yes     Partners: Female   Lifestyle    Physical activity:     Days per week: 1 day     Minutes per session: 20 min    Stress: Only a little   Relationships    Social connections:     Talks on phone: Three times a week     Gets together: Once a week     Attends Caodaism service: Not on file     Active member of club or organization: No     Attends meetings of clubs or organizations: Never     Relationship status:    Other Topics Concern    Not on file   Social History Narrative    Not on file       FAMILY HISTORY:  Family History   Problem Relation Age of Onset    Hypertension Mother     Cancer Father     Heart disease Father     Asthma Sister     Cancer Sister 78        Rectal cancer       ALLERGIES AND MEDICATIONS: updated and reviewed.  Review of patient's allergies indicates:  "  Allergen Reactions    Codeine Itching    Ibuprofen Itching     Current Outpatient Medications   Medication Sig Dispense Refill    amLODIPine (NORVASC) 5 MG tablet Take 1 tablet (5 mg total) by mouth once daily. 90 tablet 3    atorvastatin (LIPITOR) 40 MG tablet Take 1 tablet (40 mg total) by mouth once daily. 90 tablet 3    hydrOXYzine pamoate (VISTARIL) 50 MG Cap TAKE 2 CAPSULES BY MOUTH AT BEDTIME AS NEEDED FOR INSOMNIA 180 capsule 3    omeprazole (PRILOSEC) 40 MG capsule Take 1 capsule (40 mg total) by mouth 2 (two) times daily. 90 capsule 3    azithromycin (ZITHROMAX Z-KENNEDY) 250 MG tablet Take 2 pills day 1, then 1 pill day 2-5. 6 tablet 0    benzonatate (TESSALON) 200 MG capsule Take 1 capsule (200 mg total) by mouth 3 (three) times daily as needed for Cough. 30 capsule 1     No current facility-administered medications for this visit.          ROS  Review of Systems   Constitutional: Negative for chills, diaphoresis and fever.   HENT: Positive for postnasal drip and rhinorrhea. Negative for ear pain and sore throat.    Respiratory: Positive for cough and wheezing. Negative for shortness of breath.    Cardiovascular: Negative for chest pain, palpitations and leg swelling.   Musculoskeletal: Negative for myalgias.   Skin: Negative for rash.   Allergic/Immunologic: Negative for environmental allergies.   Neurological: Negative for dizziness, light-headedness and headaches.           Physical Exam  Vitals:    11/22/19 1342   BP: 138/82   BP Location: Right arm   Patient Position: Sitting   BP Method: Small (Automatic)   Pulse: 74   Temp: 98.7 °F (37.1 °C)   TempSrc: Oral   SpO2: 95%   Weight: 93.1 kg (205 lb 4 oz)   Height: 5' 8" (1.727 m)    Body mass index is 31.21 kg/m².  Weight: 93.1 kg (205 lb 4 oz)   Height: 5' 8" (172.7 cm)   Physical Exam   Constitutional: He is oriented to person, place, and time. He appears well-developed and well-nourished. No distress.   HENT:   Head: Normocephalic and " atraumatic.   Right Ear: Tympanic membrane, external ear and ear canal normal.   Left Ear: Tympanic membrane, external ear and ear canal normal.   Nose: Mucosal edema and rhinorrhea present.   Mouth/Throat: Uvula is midline and mucous membranes are normal. Posterior oropharyngeal erythema present. No oropharyngeal exudate, posterior oropharyngeal edema or tonsillar abscesses. No tonsillar exudate.   Eyes: Pupils are equal, round, and reactive to light. Conjunctivae and EOM are normal.   Cardiovascular: Normal rate, regular rhythm and intact distal pulses.   Pulmonary/Chest: Effort normal. No respiratory distress. He has no wheezes. He has rhonchi in the left lower field. He has no rales.   Neurological: He is alert and oriented to person, place, and time.   Symmetric facial movements palate elevated symmetrically tongue midline          Health Maintenance       Date Due Completion Date    TETANUS VACCINE 01/22/1969 ---    Shingles Vaccine (1 of 2) 01/22/2001 ---    LDCT Lung Screen 02/13/2020 2/13/2019    High Dose Statin 11/22/2020 11/22/2019    Colonoscopy 07/21/2022 7/21/2017    Override on 7/20/2017: Done    Override on 10/29/2012: Declined    Lipid Panel 02/12/2024 2/12/2019

## 2019-11-25 ENCOUNTER — PATIENT MESSAGE (OUTPATIENT)
Dept: FAMILY MEDICINE | Facility: CLINIC | Age: 69
End: 2019-11-25

## 2019-11-25 RX ORDER — METHYLPREDNISOLONE 4 MG/1
TABLET ORAL
Qty: 1 PACKAGE | Refills: 0 | Status: SHIPPED | OUTPATIENT
Start: 2019-11-25 | End: 2020-02-14

## 2019-12-12 ENCOUNTER — OFFICE VISIT (OUTPATIENT)
Dept: FAMILY MEDICINE | Facility: CLINIC | Age: 69
End: 2019-12-12
Payer: MEDICARE

## 2019-12-12 VITALS
SYSTOLIC BLOOD PRESSURE: 136 MMHG | HEART RATE: 70 BPM | HEIGHT: 68 IN | OXYGEN SATURATION: 98 % | WEIGHT: 206.56 LBS | BODY MASS INDEX: 31.3 KG/M2 | DIASTOLIC BLOOD PRESSURE: 76 MMHG | TEMPERATURE: 98 F

## 2019-12-12 DIAGNOSIS — M54.2 ACUTE NECK PAIN: Primary | ICD-10-CM

## 2019-12-12 DIAGNOSIS — R11.0 NAUSEA: ICD-10-CM

## 2019-12-12 PROCEDURE — 99214 PR OFFICE/OUTPT VISIT, EST, LEVL IV, 30-39 MIN: ICD-10-PCS | Mod: S$GLB,,, | Performed by: INTERNAL MEDICINE

## 2019-12-12 PROCEDURE — 99214 OFFICE O/P EST MOD 30 MIN: CPT | Mod: S$GLB,,, | Performed by: INTERNAL MEDICINE

## 2019-12-12 PROCEDURE — 99999 PR PBB SHADOW E&M-EST. PATIENT-LVL III: CPT | Mod: PBBFAC,,, | Performed by: INTERNAL MEDICINE

## 2019-12-12 PROCEDURE — 99999 PR PBB SHADOW E&M-EST. PATIENT-LVL III: ICD-10-PCS | Mod: PBBFAC,,, | Performed by: INTERNAL MEDICINE

## 2019-12-12 RX ORDER — TIZANIDINE 2 MG/1
TABLET ORAL
Qty: 60 TABLET | Refills: 2 | Status: SHIPPED | OUTPATIENT
Start: 2019-12-12 | End: 2020-02-14

## 2019-12-12 RX ORDER — ONDANSETRON 4 MG/1
4 TABLET, FILM COATED ORAL EVERY 8 HOURS PRN
Qty: 30 TABLET | Refills: 0 | Status: SHIPPED | OUTPATIENT
Start: 2019-12-12 | End: 2020-02-14

## 2019-12-12 RX ORDER — MELOXICAM 15 MG/1
15 TABLET ORAL DAILY PRN
Qty: 90 TABLET | Refills: 0 | Status: SHIPPED | OUTPATIENT
Start: 2019-12-12 | End: 2020-02-14

## 2019-12-12 NOTE — PROGRESS NOTES
Assessment & Plan  Problem List Items Addressed This Visit     None      Visit Diagnoses     Acute neck pain    -  Primary  -    Continue heat.  Change to mobic and tizanidine.     Relevant Medications    meloxicam (MOBIC) 15 MG tablet    tiZANidine (ZANAFLEX) 2 MG tablet    Nausea    -  Suspect stress induced.  Zofran PRN.      Relevant Medications    ondansetron (ZOFRAN) 4 MG tablet            Health Maintenance reviewed, deferred.    Follow-up: Follow up if symptoms worsen or fail to improve.    ______________________________________________________________________    Chief Complaint  Chief Complaint   Patient presents with    Neck Pain    Shoulder Pain       HPI  Yair Owens is a 69 y.o. male with multiple medical diagnoses as listed in the medical history and problem list that presents for neck and bialteral shoulder pain x 2 weeks.  Pt is known to me with his last appointment 11/22/2019.      Answers for HPI/ROS submitted by the patient on 12/12/2019   Back pain  Chronicity: new  Onset: 1 to 4 weeks ago  Frequency: daily  Progression since onset: unchanged  Pain location: costovertebral angle  Pain quality: aching  Radiates to: does not radiate  Pain - numeric: 3/10  Pain is: worse during the day  Aggravated by: position  Stiffness is present: in the morning  bladder incontinence: No  bowel incontinence: No  leg pain: No  paresis: No  paresthesias: No  pelvic pain: No  perianal numbness: No  tingling: No  weight loss: No  genital pain: No  Pain severity: mild  Treatments tried: heat  Improvement on treatment: mild    He slept funny in his recliner a few times prior to this happening.  No radicular symptoms.  No weakness.  Also tried OTC anti-inflammatory.  Pain is better.        PAST MEDICAL HISTORY:  Past Medical History:   Diagnosis Date    Chest pain syndrome     GERD (gastroesophageal reflux disease)     Hypercholesteremia     Hyperlipidemia        PAST SURGICAL HISTORY:  Past Surgical History:    Procedure Laterality Date    APPENDECTOMY      BONE RESECTION, RIB      for thoracic outlet syndrome    COLONOSCOPY N/A 7/21/2017    Procedure: COLONOSCOPY;  Surgeon: Deepak Servin MD;  Location: [facility];  Service: Endoscopy;  Laterality: N/A;    HAND SURGERY      SCROTAL SURGERY      mass    SPINE SURGERY      C-spine disk (remote)       SOCIAL HISTORY:  Social History     Socioeconomic History    Marital status:      Spouse name: Not on file    Number of children: Not on file    Years of education: Not on file    Highest education level: Not on file   Occupational History     Employer: [employer]   Social Needs    Financial resource strain: Not hard at all    Food insecurity:     Worry: Never true     Inability: Never true    Transportation needs:     Medical: No     Non-medical: No   Tobacco Use    Smoking status: Current Every Day Smoker     Packs/day: 1.00     Years: 60.00     Pack years: 60.00     Types: Cigarettes    Smokeless tobacco: Never Used   Substance and Sexual Activity    Alcohol use: Yes     Alcohol/week: 2.0 standard drinks     Types: 2 Cans of beer per week     Frequency: Monthly or less     Drinks per session: 1 or 2     Binge frequency: Never     Comment: once a week    Drug use: No    Sexual activity: Yes     Partners: Female   Lifestyle    Physical activity:     Days per week: 1 day     Minutes per session: 20 min    Stress: Only a little   Relationships    Social connections:     Talks on phone: Three times a week     Gets together: Once a week     Attends Mandaeism service: Not on file     Active member of club or organization: No     Attends meetings of clubs or organizations: Never     Relationship status:    Other Topics Concern    Not on file   Social History Narrative    Not on file       FAMILY HISTORY:  Family History   Problem Relation Age of Onset    Hypertension Mother     Cancer Father     Heart disease Father     Asthma Sister     Cancer  Sister 78        Rectal cancer       ALLERGIES AND MEDICATIONS: updated and reviewed.  Review of patient's allergies indicates:   Allergen Reactions    Codeine Itching    Ibuprofen Itching     Current Outpatient Medications   Medication Sig Dispense Refill    amLODIPine (NORVASC) 5 MG tablet Take 1 tablet (5 mg total) by mouth once daily. 90 tablet 3    atorvastatin (LIPITOR) 40 MG tablet Take 1 tablet (40 mg total) by mouth once daily. 90 tablet 3    hydrOXYzine pamoate (VISTARIL) 50 MG Cap TAKE 2 CAPSULES BY MOUTH AT BEDTIME AS NEEDED FOR INSOMNIA 180 capsule 3    omeprazole (PRILOSEC) 40 MG capsule Take 1 capsule (40 mg total) by mouth 2 (two) times daily. 90 capsule 3    azithromycin (ZITHROMAX Z-KENNEDY) 250 MG tablet Take 2 pills day 1, then 1 pill day 2-5. (Patient not taking: Reported on 12/12/2019) 6 tablet 0    benzonatate (TESSALON) 200 MG capsule Take 1 capsule (200 mg total) by mouth 3 (three) times daily as needed for Cough. (Patient not taking: Reported on 12/12/2019) 30 capsule 1    meloxicam (MOBIC) 15 MG tablet Take 1 tablet (15 mg total) by mouth daily as needed for Pain. 90 tablet 0    methylPREDNISolone (MEDROL DOSEPACK) 4 mg tablet use as directed (Patient not taking: Reported on 12/12/2019) 1 Package 0    ondansetron (ZOFRAN) 4 MG tablet Take 1 tablet (4 mg total) by mouth every 8 (eight) hours as needed for Nausea. 30 tablet 0    tiZANidine (ZANAFLEX) 2 MG tablet Take 1-2 tabs PO QHS PRN muscle pain/spasms 60 tablet 2     No current facility-administered medications for this visit.          ROS  Review of Systems   Constitutional: Negative for fever.   Respiratory: Negative for cough, chest tightness and shortness of breath.    Cardiovascular: Negative for chest pain.   Gastrointestinal: Positive for nausea. Negative for abdominal pain, constipation, diarrhea and vomiting.   Genitourinary: Negative for dysuria and hematuria.   Musculoskeletal: Positive for arthralgias and neck pain.  "Negative for back pain.   Neurological: Negative for weakness, numbness and headaches.           Physical Exam  Vitals:    12/12/19 1321 12/12/19 1340   BP: (!) 144/80 136/76   Pulse: 70    Temp: 98.3 °F (36.8 °C)    SpO2: 98%    Weight: 93.7 kg (206 lb 9.1 oz)    Height: 5' 8" (1.727 m)     Body mass index is 31.41 kg/m².  Weight: 93.7 kg (206 lb 9.1 oz)   Height: 5' 8" (172.7 cm)   Physical Exam   Constitutional: He is oriented to person, place, and time. He appears well-developed and well-nourished. No distress.   HENT:   Head: Normocephalic and atraumatic.   Eyes: Pupils are equal, round, and reactive to light. Conjunctivae, EOM and lids are normal. No scleral icterus.   Neck: Full passive range of motion without pain. Carotid bruit is not present.   Cardiovascular: Normal rate, regular rhythm and intact distal pulses. Exam reveals no S3 and no S4.   Pulmonary/Chest: Effort normal and breath sounds normal. No respiratory distress. He has no rhonchi.   Musculoskeletal: He exhibits no edema.        Cervical back: He exhibits tenderness. He exhibits no bony tenderness.        Back:    Neurological: He is alert and oriented to person, place, and time.   Motor grossly intact.  Sensory grossly intact.  Symmetric facial movements palate elevated symmetrically tongue midline   Skin: Skin is warm and dry. No rash noted.   Psychiatric: His speech is normal.         Health Maintenance       Date Due Completion Date    TETANUS VACCINE 01/22/1969 ---    Shingles Vaccine (1 of 2) 01/22/2001 ---    LDCT Lung Screen 02/13/2020 2/13/2019    High Dose Statin 12/12/2020 12/12/2019    Colonoscopy 07/21/2022 7/21/2017    Override on 7/20/2017: Done    Override on 10/29/2012: Declined    Lipid Panel 02/12/2024 2/12/2019            "

## 2020-01-13 ENCOUNTER — HOSPITAL ENCOUNTER (EMERGENCY)
Facility: HOSPITAL | Age: 70
Discharge: HOME | End: 2020-01-13
Attending: EMERGENCY MEDICINE
Payer: MEDICARE

## 2020-01-13 VITALS
DIASTOLIC BLOOD PRESSURE: 70 MMHG | RESPIRATION RATE: 16 BRPM | TEMPERATURE: 98 F | OXYGEN SATURATION: 98 % | WEIGHT: 210 LBS | HEART RATE: 77 BPM | SYSTOLIC BLOOD PRESSURE: 143 MMHG | HEIGHT: 68 IN | BODY MASS INDEX: 31.83 KG/M2

## 2020-01-13 DIAGNOSIS — R07.9 CHEST PAIN: ICD-10-CM

## 2020-01-13 LAB
ALBUMIN SERPL BCP-MCNC: 4.8 G/DL (ref 3.5–5.2)
ALP SERPL-CCNC: 75 U/L (ref 55–135)
ALT SERPL W/O P-5'-P-CCNC: 42 U/L (ref 10–44)
ANION GAP SERPL CALC-SCNC: 9 MMOL/L (ref 8–16)
AST SERPL-CCNC: 29 U/L (ref 10–40)
BASOPHILS # BLD AUTO: 0.07 K/UL (ref 0–0.2)
BASOPHILS NFR BLD: 0.8 % (ref 0–1.9)
BILIRUB SERPL-MCNC: 1.4 MG/DL (ref 0.1–1)
BNP SERPL-MCNC: 36 PG/ML (ref 0–99)
BUN SERPL-MCNC: 14 MG/DL (ref 8–23)
CALCIUM SERPL-MCNC: 9.8 MG/DL (ref 8.7–10.5)
CHLORIDE SERPL-SCNC: 106 MMOL/L (ref 95–110)
CO2 SERPL-SCNC: 26 MMOL/L (ref 23–29)
CREAT SERPL-MCNC: 1.2 MG/DL (ref 0.5–1.4)
DIFFERENTIAL METHOD: ABNORMAL
EOSINOPHIL # BLD AUTO: 0.2 K/UL (ref 0–0.5)
EOSINOPHIL NFR BLD: 2.2 % (ref 0–8)
ERYTHROCYTE [DISTWIDTH] IN BLOOD BY AUTOMATED COUNT: 13.2 % (ref 11.5–14.5)
EST. GFR  (AFRICAN AMERICAN): >60 ML/MIN/1.73 M^2
EST. GFR  (NON AFRICAN AMERICAN): >60 ML/MIN/1.73 M^2
GLUCOSE SERPL-MCNC: 105 MG/DL (ref 70–110)
HCT VFR BLD AUTO: 46.5 % (ref 40–54)
HGB BLD-MCNC: 15.6 G/DL (ref 14–18)
IMM GRANULOCYTES # BLD AUTO: 0.03 K/UL (ref 0–0.04)
IMM GRANULOCYTES NFR BLD AUTO: 0.3 % (ref 0–0.5)
LYMPHOCYTES # BLD AUTO: 1.5 K/UL (ref 1–4.8)
LYMPHOCYTES NFR BLD: 17.2 % (ref 18–48)
MCH RBC QN AUTO: 29.4 PG (ref 27–31)
MCHC RBC AUTO-ENTMCNC: 33.5 G/DL (ref 32–36)
MCV RBC AUTO: 88 FL (ref 82–98)
MONOCYTES # BLD AUTO: 0.7 K/UL (ref 0.3–1)
MONOCYTES NFR BLD: 8.3 % (ref 4–15)
NEUTROPHILS # BLD AUTO: 6.2 K/UL (ref 1.8–7.7)
NEUTROPHILS NFR BLD: 71.2 % (ref 38–73)
NRBC BLD-RTO: 0 /100 WBC
PLATELET # BLD AUTO: 238 K/UL (ref 150–350)
PMV BLD AUTO: 10.2 FL (ref 9.2–12.9)
POTASSIUM SERPL-SCNC: 4.3 MMOL/L (ref 3.5–5.1)
PROT SERPL-MCNC: 8.1 G/DL (ref 6–8.4)
RBC # BLD AUTO: 5.3 M/UL (ref 4.6–6.2)
SODIUM SERPL-SCNC: 141 MMOL/L (ref 136–145)
TROPONIN I SERPL DL<=0.01 NG/ML-MCNC: <0.006 NG/ML (ref 0–0.03)
WBC # BLD AUTO: 8.71 K/UL (ref 3.9–12.7)

## 2020-01-13 PROCEDURE — 85025 COMPLETE CBC W/AUTO DIFF WBC: CPT

## 2020-01-13 PROCEDURE — 83880 ASSAY OF NATRIURETIC PEPTIDE: CPT

## 2020-01-13 PROCEDURE — 93010 ELECTROCARDIOGRAM REPORT: CPT | Mod: ,,, | Performed by: INTERNAL MEDICINE

## 2020-01-13 PROCEDURE — 93010 EKG 12-LEAD: ICD-10-PCS | Mod: ,,, | Performed by: INTERNAL MEDICINE

## 2020-01-13 PROCEDURE — 99285 EMERGENCY DEPT VISIT HI MDM: CPT | Mod: 25

## 2020-01-13 PROCEDURE — 80053 COMPREHEN METABOLIC PANEL: CPT

## 2020-01-13 PROCEDURE — 84484 ASSAY OF TROPONIN QUANT: CPT

## 2020-01-13 PROCEDURE — 93005 ELECTROCARDIOGRAM TRACING: CPT

## 2020-01-13 RX ORDER — NAPROXEN SODIUM 220 MG/1
162 TABLET, FILM COATED ORAL
Status: DISCONTINUED | OUTPATIENT
Start: 2020-01-13 | End: 2020-01-13

## 2020-01-13 RX ORDER — NAPROXEN SODIUM 220 MG/1
81 TABLET, FILM COATED ORAL DAILY
Qty: 30 TABLET | Refills: 0 | Status: SHIPPED | OUTPATIENT
Start: 2020-01-13 | End: 2022-06-13

## 2020-01-13 NOTE — ED PROVIDER NOTES
Encounter Date: 1/13/2020  69 y.o. presenting to Emergency Department with chest pain. Patient will be seen by another provider for further evaluation when an exam room is available. Yunier MILLS, 2:07 PM     History     Chief Complaint   Patient presents with    Chest Pain     Pt presenting with co left substernal chest pain that came on around 0730 upon waking. Report nausea and sweating rated @5/10 pain is 2/10 took ASA 81 mg     69-year-old male with history of acid reflux, hypertension, hyperlipidemia, tobacco use presents for evaluation of chest pain. He reports left-sided substernal chest pain that started at 7:30 a.m. this morning and lasted 45 minutes, he states he was at rest when the pain started.  He noted sweating and shortness of breath. He took an aspirin at home.  He presents to the ER this afternoon.  He states chest pain is resolved.  He is no longer feeling short of breath. He has no complaints currently.  He states he was evaluated by cardiologist 6 years ago and had a normal stress test and was diagnosed with angina.  He denies any recent illness.  He states he can normally mow the lawn without any chest pain or shortness of breath. He denies personal history of heart disease.  He states his father had an MI when he was 73.        Review of patient's allergies indicates:   Allergen Reactions    Codeine Itching    Ibuprofen Itching     Past Medical History:   Diagnosis Date    Chest pain syndrome     GERD (gastroesophageal reflux disease)     Hypercholesteremia     Hyperlipidemia      Past Surgical History:   Procedure Laterality Date    APPENDECTOMY      BONE RESECTION, RIB      for thoracic outlet syndrome    COLONOSCOPY N/A 7/21/2017    Procedure: COLONOSCOPY;  Surgeon: Deepak Servin MD;  Location[facility];  Service: Endoscopy;  Laterality: N/A;    HAND SURGERY      SCROTAL SURGERY      mass    SPINE SURGERY      C-spine disk (remote)     Family History   Problem Relation Age  of Onset    Hypertension Mother     Cancer Father     Heart disease Father     Asthma Sister     Cancer Sister 78        Rectal cancer     Social History     Tobacco Use    Smoking status: Current Every Day Smoker     Packs/day: 1.00     Years: 60.00     Pack years: 60.00     Types: Cigarettes    Smokeless tobacco: Never Used   Substance Use Topics    Alcohol use: Yes     Alcohol/week: 2.0 standard drinks     Types: 2 Cans of beer per week     Frequency: Monthly or less     Drinks per session: 1 or 2     Binge frequency: Never     Comment: once a week    Drug use: No     Review of Systems   Constitutional: Negative for chills and fever.   HENT: Negative for congestion and sore throat.    Eyes: Negative for visual disturbance.   Respiratory: Positive for shortness of breath ( resolved). Negative for cough.    Cardiovascular: Positive for chest pain (Resolved). Negative for palpitations.   Gastrointestinal: Negative for abdominal pain, nausea and vomiting.   Genitourinary: Negative for dysuria.   Skin: Negative for rash.   Allergic/Immunologic: Negative for immunocompromised state.   Neurological: Negative for headaches.       Physical Exam     Initial Vitals   BP Pulse Resp Temp SpO2   01/13/20 1324 01/13/20 1324 01/13/20 1324 01/13/20 1324 01/13/20 1531   (!) 164/83 67 18 97.9 °F (36.6 °C) 97 %      MAP       --                Physical Exam    Constitutional: He appears well-developed and well-nourished. He is not diaphoretic. No distress.   HENT:   Mouth/Throat: Oropharynx is clear and moist.   Eyes: Pupils are equal, round, and reactive to light.   Neck: Neck supple.   Cardiovascular: Normal rate and regular rhythm.   Pulses:       Radial pulses are 2+ on the right side, and 2+ on the left side.   Pulmonary/Chest: Breath sounds normal. No respiratory distress. He exhibits no tenderness.   Abdominal: Soft. Bowel sounds are normal.   Musculoskeletal: He exhibits no edema.   Neurological: He is alert.    Skin: Skin is warm and dry.   Psychiatric: He has a normal mood and affect.         ED Course   Procedures  Labs Reviewed   CBC W/ AUTO DIFFERENTIAL - Abnormal; Notable for the following components:       Result Value    Lymph% 17.2 (*)     All other components within normal limits   COMPREHENSIVE METABOLIC PANEL - Abnormal; Notable for the following components:    Total Bilirubin 1.4 (*)     All other components within normal limits   TROPONIN I   B-TYPE NATRIURETIC PEPTIDE     EKG Readings: (Independently Interpreted)   Normal sinus rhythm, rate 67 beats per minute, normal ID interval,  milliseconds.  No STEMI.  No T-wave inversions.     ECG Results          EKG 12-lead (In process)  Result time 01/13/20 13:39:10    In process by Interface, Lab In [facility] (01/13/20 13:39:10)                 Narrative:    Test Reason : R07.9,    Vent. Rate : 067 BPM     Atrial Rate : 067 BPM     P-R Int : 146 ms          QRS Dur : 080 ms      QT Int : 396 ms       P-R-T Axes : 017 018 053 degrees     QTc Int : 418 ms    Normal sinus rhythm  Normal ECG  When compared with ECG of 08-OCT-2019 14:43,  No significant change was found    Referred By:             Confirmed By:                   In process by Interface, Lab In [facility] (01/13/20 13:36:28)                 Narrative:    Test Reason : R07.9,    Vent. Rate : 067 BPM     Atrial Rate : 067 BPM     P-R Int : 146 ms          QRS Dur : 080 ms      QT Int : 396 ms       P-R-T Axes : 017 018 053 degrees     QTc Int : 418 ms    Normal sinus rhythm  Normal ECG  When compared with ECG of 08-OCT-2019 14:43,  No significant change was found    Referred By:             Confirmed By:                             Imaging Results          X-Ray Chest PA And Lateral (Final result)  Result time 01/13/20 14:49:23    Final result by Ruben Gonzalez MD (01/13/20 14:49:23)                 Impression:      See above      Electronically signed by: Ruben Gonzalez  MD  Date:    01/13/2020  Time:    14:49             Narrative:    EXAMINATION:  XR CHEST PA AND LATERAL    CLINICAL HISTORY:  Chest pain, unspecified    TECHNIQUE:  PA and lateral views of the chest were performed.    COMPARISON:  Non 03/20/2013 e    FINDINGS:  Heart size normal.  The lungs are clear.  No pleural effusion                                 Medical Decision Making:   Initial Assessment:   69-year-old male presents with episode of chest pain, diaphoresis and shortness of breath. Symptoms started around 730 this morning, lasted 45 min and resolved.  Currently denies complaints. On exam, patient is well-appearing in no distress.  His EKG showed shows a normal sinus rhythm, no evidence of ischemia.  Troponin which was checked around 3:00 p.m. negative. As symptoms occurred greater than 6 hr prior to troponin being evaluated, I do not think it needs to be trended at this time.  Remaining labs are within acceptable limits, chest x-ray negative for acute finding.  Given high heart score, I do plan to have patient follow up with cardiology clinic this week for further rate assessment of his symptoms. I reviewed strict return precautions with the patient including any return of his chest pain, any new or worsening symptoms. He states he understands and agrees to this plan.  I also advised him to start taking baby aspirin once daily.  Initially I did order an additional 162 mg of aspirin to be administered here, patient declines and states he will take it when he gets home.    Additional MDM:   Heart Score:    History:          Moderately suspicious.  ECG:             Normal  Age:               >65 years  Risk factors: >= 3 risk factors or history of atherosclerotic disease  Troponin:       Less than or equal to normal limit  Final Score: 5                                  Clinical Impression:       ICD-10-CM ICD-9-CM   1. Chest pain R07.9 786.50                             Deysi Pace MD  01/13/20  [id number]

## 2020-01-14 ENCOUNTER — PES CALL (OUTPATIENT)
Dept: ADMINISTRATIVE | Facility: CLINIC | Age: 70
End: 2020-01-14

## 2020-01-14 ENCOUNTER — PATIENT OUTREACH (OUTPATIENT)
Dept: ADMINISTRATIVE | Facility: OTHER | Age: 70
End: 2020-01-14

## 2020-01-17 ENCOUNTER — OFFICE VISIT (OUTPATIENT)
Dept: CARDIOLOGY | Facility: CLINIC | Age: 70
End: 2020-01-17
Payer: MEDICARE

## 2020-01-17 VITALS
SYSTOLIC BLOOD PRESSURE: 150 MMHG | DIASTOLIC BLOOD PRESSURE: 80 MMHG | WEIGHT: 208.88 LBS | RESPIRATION RATE: 15 BRPM | BODY MASS INDEX: 31.66 KG/M2 | HEIGHT: 68 IN | OXYGEN SATURATION: 98 % | HEART RATE: 73 BPM

## 2020-01-17 DIAGNOSIS — R07.9 CHEST PAIN, UNSPECIFIED TYPE: Primary | ICD-10-CM

## 2020-01-17 DIAGNOSIS — R06.09 DYSPNEA ON EXERTION: ICD-10-CM

## 2020-01-17 PROCEDURE — 1159F PR MEDICATION LIST DOCUMENTED IN MEDICAL RECORD: ICD-10-PCS | Mod: S$GLB,,, | Performed by: INTERNAL MEDICINE

## 2020-01-17 PROCEDURE — 3077F SYST BP >= 140 MM HG: CPT | Mod: CPTII,S$GLB,, | Performed by: INTERNAL MEDICINE

## 2020-01-17 PROCEDURE — 1101F PT FALLS ASSESS-DOCD LE1/YR: CPT | Mod: CPTII,S$GLB,, | Performed by: INTERNAL MEDICINE

## 2020-01-17 PROCEDURE — 1125F PR PAIN SEVERITY QUANTIFIED, PAIN PRESENT: ICD-10-PCS | Mod: S$GLB,,, | Performed by: INTERNAL MEDICINE

## 2020-01-17 PROCEDURE — 3079F DIAST BP 80-89 MM HG: CPT | Mod: CPTII,S$GLB,, | Performed by: INTERNAL MEDICINE

## 2020-01-17 PROCEDURE — 3077F PR MOST RECENT SYSTOLIC BLOOD PRESSURE >= 140 MM HG: ICD-10-PCS | Mod: CPTII,S$GLB,, | Performed by: INTERNAL MEDICINE

## 2020-01-17 PROCEDURE — 1159F MED LIST DOCD IN RCRD: CPT | Mod: S$GLB,,, | Performed by: INTERNAL MEDICINE

## 2020-01-17 PROCEDURE — 99204 OFFICE O/P NEW MOD 45 MIN: CPT | Mod: S$GLB,,, | Performed by: INTERNAL MEDICINE

## 2020-01-17 PROCEDURE — 1125F AMNT PAIN NOTED PAIN PRSNT: CPT | Mod: S$GLB,,, | Performed by: INTERNAL MEDICINE

## 2020-01-17 PROCEDURE — 1101F PR PT FALLS ASSESS DOC 0-1 FALLS W/OUT INJ PAST YR: ICD-10-PCS | Mod: CPTII,S$GLB,, | Performed by: INTERNAL MEDICINE

## 2020-01-17 PROCEDURE — 99999 PR PBB SHADOW E&M-EST. PATIENT-LVL III: ICD-10-PCS | Mod: PBBFAC,,, | Performed by: INTERNAL MEDICINE

## 2020-01-17 PROCEDURE — 99204 PR OFFICE/OUTPT VISIT, NEW, LEVL IV, 45-59 MIN: ICD-10-PCS | Mod: S$GLB,,, | Performed by: INTERNAL MEDICINE

## 2020-01-17 PROCEDURE — 99999 PR PBB SHADOW E&M-EST. PATIENT-LVL III: CPT | Mod: PBBFAC,,, | Performed by: INTERNAL MEDICINE

## 2020-01-17 PROCEDURE — 3079F PR MOST RECENT DIASTOLIC BLOOD PRESSURE 80-89 MM HG: ICD-10-PCS | Mod: CPTII,S$GLB,, | Performed by: INTERNAL MEDICINE

## 2020-01-17 RX ORDER — AMLODIPINE BESYLATE 10 MG/1
10 TABLET ORAL DAILY
Qty: 90 TABLET | Refills: 3 | Status: SHIPPED | OUTPATIENT
Start: 2020-01-17 | End: 2021-01-04

## 2020-01-17 NOTE — LETTER
January 17, 2020      Deysi Pace MD  [address]  [place] [address]           [facility] - Cardiology  [address]  [facility] [place] [address]  Phone: [phone]          Patient: Yair Owens   MR Number: [id number]   YOB: 1950   Date of Visit: 1/17/2020       Dear Dr. Deysi Pace:    Thank you for referring Yair Owens to me for evaluation. Attached you will find relevant portions of my assessment and plan of care.    If you have questions, please do not hesitate to call me. I look forward to following Yair Owens along with you.    Sincerely,    Vladimir Avalos MD    Enclosure  CC:  No Recipients    If you would like to receive this communication electronically, please contact [email or web address] or [phone] to request more information on [employer] access.    For providers and/or their staff who would like to refer a patient to [facility], please contact us through our one-stop-shop provider referral line, [facility] , at [phone].    If you feel you have received this communication in error or would no longer like to receive these types of communications, please e-mail [email or web address]

## 2020-01-17 NOTE — PROGRESS NOTES
CARDIOVASCULAR CONSULTATION    REASON FOR CONSULT:   Yair Owens is a 69 y.o. male who presents for evaluation of chest pressure.      HISTORY OF PRESENT ILLNESS:     Patient is a pleasant 61-year-old man.  Came here because evaluation of chest pressure to the emergency room.  Was ruled out.  EKG was done which showed normal sinus rhythm.  States the pressure was substernal, and was much worse than the usual pressure which he feels.  States that he usually feels chest pain and tightness.  Unrelated to activity.  Sometimes can come with activity other times at rest.  Denies orthopnea, PND.  Does have mild dyspnea on exertion.  Used to smoke, but cutting down.      PAST MEDICAL HISTORY:     Past Medical History:   Diagnosis Date    Chest pain syndrome     GERD (gastroesophageal reflux disease)     Hypercholesteremia     Hyperlipidemia        PAST SURGICAL HISTORY:     Past Surgical History:   Procedure Laterality Date    APPENDECTOMY      BONE RESECTION, RIB      for thoracic outlet syndrome    COLONOSCOPY N/A 7/21/2017    Procedure: COLONOSCOPY;  Surgeon: Deepak Servin MD;  Location: [facility];  Service: Endoscopy;  Laterality: N/A;    HAND SURGERY      SCROTAL SURGERY      mass    SPINE SURGERY      C-spine disk (remote)       ALLERGIES AND MEDICATION:     Review of patient's allergies indicates:   Allergen Reactions    Codeine Itching    Ibuprofen Itching        Medication List           Accurate as of January 17, 2020 11:07 AM. If you have any questions, ask your nurse or doctor.               CONTINUE taking these medications    amLODIPine 5 MG tablet  Commonly known as:  NORVASC  Take 1 tablet (5 mg total) by mouth once daily.     aspirin 81 MG Chew  Take 1 tablet (81 mg total) by mouth once daily.     atorvastatin 40 MG tablet  Commonly known as:  LIPITOR  Take 1 tablet (40 mg total) by mouth once daily.     azithromycin 250 MG tablet  Commonly known as:  Zithromax Z-Fazal  Take 2 pills day 1, then  1 pill day 2-5.     benzonatate 200 MG capsule  Commonly known as:  TESSALON  Take 1 capsule (200 mg total) by mouth 3 (three) times daily as needed for Cough.     hydrOXYzine pamoate 50 MG Cap  Commonly known as:  VISTARIL  TAKE 2 CAPSULES BY MOUTH AT BEDTIME AS NEEDED FOR INSOMNIA     meloxicam 15 MG tablet  Commonly known as:  MOBIC  Take 1 tablet (15 mg total) by mouth daily as needed for Pain.     methylPREDNISolone 4 mg tablet  Commonly known as:  MEDROL DOSEPACK  use as directed     omeprazole 40 MG capsule  Commonly known as:  PRILOSEC  Take 1 capsule (40 mg total) by mouth 2 (two) times daily.     ondansetron 4 MG tablet  Commonly known as:  ZOFRAN  Take 1 tablet (4 mg total) by mouth every 8 (eight) hours as needed for Nausea.     tiZANidine 2 MG tablet  Commonly known as:  ZANAFLEX  Take 1-2 tabs PO QHS PRN muscle pain/spasms            SOCIAL HISTORY:     Social History     Socioeconomic History    Marital status:      Spouse name: Not on file    Number of children: Not on file    Years of education: Not on file    Highest education level: Not on file   Occupational History     Employer:  [employer]   Social Needs    Financial resource strain: Not hard at all    Food insecurity:     Worry: Never true     Inability: Never true    Transportation needs:     Medical: No     Non-medical: No   Tobacco Use    Smoking status: Current Every Day Smoker     Packs/day: 1.00     Years: 60.00     Pack years: 60.00     Types: Cigarettes    Smokeless tobacco: Never Used   Substance and Sexual Activity    Alcohol use: Yes     Alcohol/week: 2.0 standard drinks     Types: 2 Cans of beer per week     Frequency: Monthly or less     Drinks per session: 1 or 2     Binge frequency: Never     Comment: once a week    Drug use: No    Sexual activity: Yes     Partners: Female   Lifestyle    Physical activity:     Days per week: 1 day     Minutes per session: 20 min    Stress: Only a little   Relationships     "Social connections:     Talks on phone: Three times a week     Gets together: Once a week     Attends Moravian service: Not on file     Active member of club or organization: No     Attends meetings of clubs or organizations: Never     Relationship status:    Other Topics Concern    Not on file   Social History Narrative    Not on file       FAMILY HISTORY:     Family History   Problem Relation Age of Onset    Hypertension Mother     Cancer Father     Heart disease Father     Asthma Sister     Cancer Sister 78        Rectal cancer       REVIEW OF SYSTEMS:   Review of Systems   Constitution: Negative.   HENT: Negative.    Eyes: Negative.    Cardiovascular: Positive for chest pain and dyspnea on exertion.   Respiratory: Negative.    Endocrine: Negative.    Hematologic/Lymphatic: Negative.    Skin: Negative.    Musculoskeletal: Negative.    Gastrointestinal: Negative.    Genitourinary: Negative.    Neurological: Negative.    Psychiatric/Behavioral: Negative.    Allergic/Immunologic: Negative.        A 10 point review of systems was performed and all the pertinent positives have been mentioned. Rest of review of systems was negative.        PHYSICAL EXAM:     Vitals:    01/17/20 1045   BP: (!) 150/80   Pulse: 73   Resp: 15    Body mass index is 31.76 kg/m².  Weight: 94.7 kg (208 lb 14.2 oz)   Height: 5' 8" (172.7 cm)     Physical Exam   Constitutional: He is oriented to person, place, and time. He appears well-developed and well-nourished.   HENT:   Head: Normocephalic.   Eyes: Pupils are equal, round, and reactive to light. Conjunctivae are normal.   Neck: Normal range of motion. Neck supple.   Cardiovascular: Normal rate, regular rhythm and normal heart sounds.   Pulmonary/Chest: Effort normal and breath sounds normal.   Abdominal: Soft. Bowel sounds are normal.   Neurological: He is alert and oriented to person, place, and time.   Skin: Skin is warm.   Nursing note and vitals reviewed.        DATA: "     Laboratory:  CBC:  Recent Labs   Lab 01/23/18  0840 02/12/19  0824 01/13/20  1500   WBC 6.90 6.82 8.71   Hemoglobin 15.0 15.7 15.6   Hematocrit 44.9 47.3 46.5   Platelets 266 276 238       CHEMISTRIES:  Recent Labs   Lab 02/12/19  0824 02/19/19  0706 01/13/20  1500   Glucose 110 114 H 105   Sodium 142 140 141   Potassium 6.0 H 5.3 H 4.3   BUN, Bld 18 19 14   Creatinine 1.3 1.2 1.2   eGFR if  >60.0 >60.0 >60   eGFR if non  56.1 A >60.0 >60   Calcium 10.3 10.2 9.8       CARDIAC BIOMARKERS:  Recent Labs   Lab 01/13/20  1500   Troponin I <0.006       COAGS:        LIPIDS/LFTS:  Recent Labs   Lab 02/16/17  1427 01/23/18  0840 02/12/19  0824 01/13/20  1500   Cholesterol 120 171 162  --    Triglycerides 133 93 141  --    HDL 27 L 43 36 L  --    LDL Cholesterol 66.4 109.4 97.8  --    Non-HDL Cholesterol 93 128 126  --    AST 30 32 31 29   ALT 34 39 45 H 42       No results found for: HGBA1C    TSH        The 10-year ASCVD risk score (Gilson ARELIS Jr., et al., 2013) is: 30.5%    Values used to calculate the score:      Age: 69 years      Sex: Male      Is Non- : No      Diabetic: No      Tobacco smoker: Yes      Systolic Blood Pressure: 150 mmHg      Is BP treated: Yes      HDL Cholesterol: 36 mg/dL      Total Cholesterol: 162 mg/dL             ASSESSMENT AND PLAN     Patient Active Problem List   Diagnosis    Hyperlipidemia    Essential hypertension    GERD (gastroesophageal reflux disease)    Generalized anxiety disorder    Tobacco dependence    Psychophysiological insomnia    Atherosclerosis of aorta       Number.  Chest pressure:  Still stress test and echocardiogram    Hypertension:  Uncontrolled.  Increase Norvasc to 10 mg daily.  Follow-up after cardiovascular testing.          Thank you very much for involving me in the care of your patient.  Please do not hesitate to contact me if there are any questions.      Vladimir Avalos MD, FACC,  [facility]  Interventional Cardiologist, [facility].           This note was dictated with the help of speech recognition software.  There might be un-intended errors and/or substitutions.

## 2020-01-26 ENCOUNTER — NURSE TRIAGE (OUTPATIENT)
Dept: ADMINISTRATIVE | Facility: CLINIC | Age: 70
End: 2020-01-26

## 2020-01-26 NOTE — TELEPHONE ENCOUNTER
172/95 blood pressure at 3:20. Patient is diagnosed with htn and takes blood pressure meds and reports he took his medication today at 3pm. 163/89 at 4pm. Patient was advised per protocol and was advised to call back with any questions/concerns or worsening symptoms. Patient verbalized understanding.     Reason for Disposition   Systolic BP  >= 160 OR Diastolic >= 100    Additional Information   Negative: Difficult to awaken or acting confused (e.g., disoriented, slurred speech)   Negative: Severe difficulty breathing (e.g., struggling for each breath, speaks in single words)   Negative: [1] Weakness of the face, arm or leg on one side of the body AND [2] new onset   Negative: [1] Numbness (i.e., loss of sensation) of the face, arm or leg on one side of the body AND [2] new onset   Negative: [1] Chest pain lasts > 5 minutes AND [2] history of heart disease  (i.e., heart attack, bypass surgery, angina, angioplasty, CHF)   Negative: [1] Chest pain AND [2] took nitrogylcerin AND [3] pain was not relieved   Negative: Sounds like a life-threatening emergency to the triager   Negative: Symptom is main concern  (e.g., headache, chest pain)   Negative: Low blood pressure is main concern   Negative: [1] Systolic BP  >= 160 OR Diastolic >= 100 AND [2] cardiac or neurologic symptoms (e.g., chest pain, difficulty breathing, unsteady gait, blurred vision)   Negative: [1] Pregnant > 20 weeks AND [2] new hand or face swelling   Negative: [1] Pregnant > 20 weeks AND [2] BP Systolic BP  >= 140 OR Diastolic >= 90   Negative: [1] Systolic BP  >= 200 OR Diastolic >= 120  AND [2] having NO cardiac or neurologic symptoms   Negative: [1] Postpartum < 6 weeks AND [2] BP Systolic BP  >= 140 OR Diastolic >= 90   Negative: [1] Systolic BP  >= 180 OR Diastolic >= 110 AND [2] missed most recent dose of blood pressure medication   Negative: Systolic BP  >= 180 OR Diastolic >= 110   Negative: Ran out of BP  medications    Protocols used: HIGH BLOOD PRESSURE-A-AH

## 2020-01-26 NOTE — TELEPHONE ENCOUNTER
Patient has appointment with cardiology at the end of the week.  He can see me or Fatemeh about this if he wants to be seen sooner.  I would recommend that he complete his cardiology eval that is sched'd for 1/27 prior to seeing one of us.      Thank you,  Talon

## 2020-01-26 NOTE — TELEPHONE ENCOUNTER
Reason for Disposition   [1] Systolic BP  >= 130 OR Diastolic >= 80 AND [2] taking BP medications    Additional Information   Negative: Difficult to awaken or acting confused (e.g., disoriented, slurred speech)   Negative: Severe difficulty breathing (e.g., struggling for each breath, speaks in single words)   Negative: [1] Weakness of the face, arm or leg on one side of the body AND [2] new onset   Negative: [1] Numbness (i.e., loss of sensation) of the face, arm or leg on one side of the body AND [2] new onset   Negative: [1] Chest pain lasts > 5 minutes AND [2] history of heart disease  (i.e., heart attack, bypass surgery, angina, angioplasty, CHF)   Negative: [1] Chest pain AND [2] took nitrogylcerin AND [3] pain was not relieved   Negative: Sounds like a life-threatening emergency to the triager   Negative: Symptom is main concern  (e.g., headache, chest pain)   Negative: Low blood pressure is main concern   Negative: [1] Systolic BP  >= 160 OR Diastolic >= 100 AND [2] cardiac or neurologic symptoms (e.g., chest pain, difficulty breathing, unsteady gait, blurred vision)   Negative: [1] Pregnant > 20 weeks AND [2] new hand or face swelling   Negative: [1] Pregnant > 20 weeks AND [2] BP Systolic BP  >= 140 OR Diastolic >= 90   Negative: [1] Systolic BP  >= 200 OR Diastolic >= 120  AND [2] having NO cardiac or neurologic symptoms   Negative: [1] Postpartum < 6 weeks AND [2] BP Systolic BP  >= 140 OR Diastolic >= 90   Negative: [1] Systolic BP  >= 180 OR Diastolic >= 110 AND [2] missed most recent dose of blood pressure medication   Negative: Systolic BP  >= 180 OR Diastolic >= 110   Negative: Ran out of BP medications   Negative: Systolic BP  >= 160 OR Diastolic >= 100   Negative: [1] Taking BP medications AND [2] feels is having side effects (e.g., impotence, cough, dizzy upon standing)   Negative: [1] Systolic BP  >= 130 OR Diastolic >= 80 AND [2] pregnant    Protocols used: HIGH BLOOD  PRESSURE-A-AH  Patient current /91, Concerned about trends higher than usual and may need medication adjustment. Advised to see MD within 2 wks or prior. Denies any chest pain or SOB, denies any other symptom at this time.

## 2020-01-27 ENCOUNTER — HOSPITAL ENCOUNTER (OUTPATIENT)
Dept: CARDIOLOGY | Facility: HOSPITAL | Age: 70
Discharge: HOME | End: 2020-01-27
Attending: INTERNAL MEDICINE
Payer: MEDICARE

## 2020-01-27 ENCOUNTER — HOSPITAL ENCOUNTER (OUTPATIENT)
Dept: RADIOLOGY | Facility: HOSPITAL | Age: 70
Discharge: HOME | End: 2020-01-27
Attending: INTERNAL MEDICINE
Payer: MEDICARE

## 2020-01-27 ENCOUNTER — PATIENT MESSAGE (OUTPATIENT)
Dept: FAMILY MEDICINE | Facility: CLINIC | Age: 70
End: 2020-01-27

## 2020-01-27 VITALS — HEIGHT: 68 IN | BODY MASS INDEX: 31.52 KG/M2 | WEIGHT: 208 LBS

## 2020-01-27 DIAGNOSIS — R07.9 CHEST PAIN, UNSPECIFIED TYPE: ICD-10-CM

## 2020-01-27 LAB
AORTIC ROOT ANNULUS: 3.71 CM
AORTIC VALVE CUSP SEPERATION: 2.19 CM
ASCENDING AORTA: 3.03 CM
AV INDEX (PROSTH): 0.46
AV MEAN GRADIENT: 18 MMHG
AV PEAK GRADIENT: 27 MMHG
AV VALVE AREA: 1.8 CM2
AV VELOCITY RATIO: 0.43
BSA FOR ECHO PROCEDURE: 2.13 M2
CV ECHO LV RWT: 0.52 CM
CV STRESS BASE HR: 75 BPM
DIASTOLIC BLOOD PRESSURE: 73 MMHG
DOP CALC AO PEAK VEL: 2.6 M/S
DOP CALC AO VTI: 60.58 CM
DOP CALC LVOT AREA: 3.9 CM2
DOP CALC LVOT DIAMETER: 2.24 CM
DOP CALC LVOT PEAK VEL: 1.11 M/S
DOP CALC LVOT STROKE VOLUME: 108.83 CM3
DOP CALCLVOT PEAK VEL VTI: 27.63 CM
E WAVE DECELERATION TIME: 286.09 MSEC
E/A RATIO: 0.88
E/E' RATIO: 8.63 M/S
ECHO LV POSTERIOR WALL: 1.27 CM (ref 0.6–1.1)
FRACTIONAL SHORTENING: 39 % (ref 28–44)
INTERVENTRICULAR SEPTUM: 1.28 CM (ref 0.6–1.1)
IVRT: 0.15 MSEC
LA MAJOR: 5.16 CM
LA MINOR: 5.65 CM
LA WIDTH: 3.93 CM
LEFT ATRIUM SIZE: 3.26 CM
LEFT ATRIUM VOLUME INDEX: 28.3 ML/M2
LEFT ATRIUM VOLUME: 58.74 CM3
LEFT INTERNAL DIMENSION IN SYSTOLE: 2.97 CM (ref 2.1–4)
LEFT VENTRICLE DIASTOLIC VOLUME INDEX: 52.63 ML/M2
LEFT VENTRICLE DIASTOLIC VOLUME: 109.38 ML
LEFT VENTRICLE MASS INDEX: 116 G/M2
LEFT VENTRICLE SYSTOLIC VOLUME INDEX: 16.4 ML/M2
LEFT VENTRICLE SYSTOLIC VOLUME: 34.12 ML
LEFT VENTRICULAR INTERNAL DIMENSION IN DIASTOLE: 4.84 CM (ref 3.5–6)
LEFT VENTRICULAR MASS: 242.05 G
LV LATERAL E/E' RATIO: 6.9 M/S
LV SEPTAL E/E' RATIO: 11.5 M/S
MV PEAK A VEL: 0.78 M/S
MV PEAK E VEL: 0.69 M/S
NUC STRESS EJECTION FRACTION: 60 %
OHS CV CPX 1 MINUTE RECOVERY HEART RATE: 129 BPM
OHS CV CPX 85 PERCENT MAX PREDICTED HEART RATE MALE: 128
OHS CV CPX MAX PREDICTED HEART RATE: 151
OHS CV CPX PATIENT IS FEMALE: 0
OHS CV CPX PATIENT IS MALE: 1
OHS CV CPX PEAK DIASTOLIC BLOOD PRESSURE: 72 MMHG
OHS CV CPX PEAK HEAR RATE: 134 BPM
OHS CV CPX PEAK RATE PRESSURE PRODUCT: NORMAL
OHS CV CPX PEAK SYSTOLIC BLOOD PRESSURE: 206 MMHG
OHS CV CPX PERCENT MAX PREDICTED HEART RATE ACHIEVED: 89
OHS CV CPX RATE PRESSURE PRODUCT PRESENTING: NORMAL
PISA TR MAX VEL: 2.11 M/S
PULM VEIN S/D RATIO: 1.5
PV PEAK D VEL: 0.42 M/S
PV PEAK S VEL: 0.63 M/S
PV PEAK VELOCITY: 1.32 CM/S
RA MAJOR: 4.63 CM
RA PRESSURE: 3 MMHG
RA WIDTH: 3.51 CM
RIGHT VENTRICULAR END-DIASTOLIC DIMENSION: 3.71 CM
RV TISSUE DOPPLER FREE WALL SYSTOLIC VELOCITY 1 (APICAL 4 CHAMBER VIEW): 16.04 CM/S
SINUS: 3.34 CM
STJ: 2.89 CM
STRESS ECHO POST EXERCISE DUR MIN: 5 MINUTES
STRESS ECHO POST EXERCISE DUR SEC: 43 SECONDS
STRESS ECHO TARGET HR: 128 BPM
SYSTOLIC BLOOD PRESSURE: 145 MMHG
TDI LATERAL: 0.1 M/S
TDI SEPTAL: 0.06 M/S
TDI: 0.08 M/S
TR MAX PG: 18 MMHG
TRICUSPID ANNULAR PLANE SYSTOLIC EXCURSION: 2.68 CM
TV REST PULMONARY ARTERY PRESSURE: 21 MMHG

## 2020-01-27 PROCEDURE — 93016 CV STRESS TEST SUPVJ ONLY: CPT | Mod: ,,, | Performed by: INTERNAL MEDICINE

## 2020-01-27 PROCEDURE — 93306 ECHO (CUPID ONLY): ICD-10-PCS | Mod: 26,,, | Performed by: INTERNAL MEDICINE

## 2020-01-27 PROCEDURE — A9502 TC99M TETROFOSMIN: HCPCS

## 2020-01-27 PROCEDURE — 93016 STRESS TEST WITH MYOCARDIAL PERFUSION (CUPID ONLY): ICD-10-PCS | Mod: ,,, | Performed by: INTERNAL MEDICINE

## 2020-01-27 PROCEDURE — 93018 CV STRESS TEST I&R ONLY: CPT | Mod: ,,, | Performed by: INTERNAL MEDICINE

## 2020-01-27 PROCEDURE — 78452 STRESS TEST WITH MYOCARDIAL PERFUSION (CUPID ONLY): ICD-10-PCS | Mod: 26,,, | Performed by: INTERNAL MEDICINE

## 2020-01-27 PROCEDURE — 93306 TTE W/DOPPLER COMPLETE: CPT | Mod: 26,,, | Performed by: INTERNAL MEDICINE

## 2020-01-27 PROCEDURE — 78452 HT MUSCLE IMAGE SPECT MULT: CPT | Mod: 26,,, | Performed by: INTERNAL MEDICINE

## 2020-01-27 PROCEDURE — 93017 CV STRESS TEST TRACING ONLY: CPT

## 2020-01-27 PROCEDURE — 93018 STRESS TEST WITH MYOCARDIAL PERFUSION (CUPID ONLY): ICD-10-PCS | Mod: ,,, | Performed by: INTERNAL MEDICINE

## 2020-01-27 PROCEDURE — 93306 TTE W/DOPPLER COMPLETE: CPT

## 2020-01-27 NOTE — TELEPHONE ENCOUNTER
Voicemail message left for patient to call this office regarding Dr. Ewing's message below.  Dr. Ewing is aware of patient's 1/27 [employer] message and states that the patient needs to see him or Fatemeh prior to his cardiology f/u on 2/4 only if he feels the need to discuss his concerns right away.  Otherwise, the patient can schedule a f/u visit at this office after he sees cardiology again in February.

## 2020-01-27 NOTE — TELEPHONE ENCOUNTER
Patient notified of information below and verbalized understanding.  F/U appointment scheduled with Fatemeh for 2/5/2020.

## 2020-02-03 ENCOUNTER — PATIENT OUTREACH (OUTPATIENT)
Dept: ADMINISTRATIVE | Facility: OTHER | Age: 70
End: 2020-02-03

## 2020-02-04 ENCOUNTER — OFFICE VISIT (OUTPATIENT)
Dept: CARDIOLOGY | Facility: CLINIC | Age: 70
End: 2020-02-04
Payer: MEDICARE

## 2020-02-04 VITALS
DIASTOLIC BLOOD PRESSURE: 74 MMHG | WEIGHT: 208.75 LBS | OXYGEN SATURATION: 98 % | SYSTOLIC BLOOD PRESSURE: 154 MMHG | RESPIRATION RATE: 16 BRPM | BODY MASS INDEX: 31.74 KG/M2 | HEART RATE: 75 BPM

## 2020-02-04 DIAGNOSIS — R07.9 CHEST PAIN, UNSPECIFIED TYPE: Primary | ICD-10-CM

## 2020-02-04 PROCEDURE — 3078F PR MOST RECENT DIASTOLIC BLOOD PRESSURE < 80 MM HG: ICD-10-PCS | Mod: CPTII,S$GLB,, | Performed by: INTERNAL MEDICINE

## 2020-02-04 PROCEDURE — 1101F PT FALLS ASSESS-DOCD LE1/YR: CPT | Mod: CPTII,S$GLB,, | Performed by: INTERNAL MEDICINE

## 2020-02-04 PROCEDURE — 3077F SYST BP >= 140 MM HG: CPT | Mod: CPTII,S$GLB,, | Performed by: INTERNAL MEDICINE

## 2020-02-04 PROCEDURE — 99214 OFFICE O/P EST MOD 30 MIN: CPT | Mod: S$GLB,,, | Performed by: INTERNAL MEDICINE

## 2020-02-04 PROCEDURE — 99999 PR PBB SHADOW E&M-EST. PATIENT-LVL III: ICD-10-PCS | Mod: PBBFAC,,, | Performed by: INTERNAL MEDICINE

## 2020-02-04 PROCEDURE — 99214 PR OFFICE/OUTPT VISIT, EST, LEVL IV, 30-39 MIN: ICD-10-PCS | Mod: S$GLB,,, | Performed by: INTERNAL MEDICINE

## 2020-02-04 PROCEDURE — 1101F PR PT FALLS ASSESS DOC 0-1 FALLS W/OUT INJ PAST YR: ICD-10-PCS | Mod: CPTII,S$GLB,, | Performed by: INTERNAL MEDICINE

## 2020-02-04 PROCEDURE — 1159F MED LIST DOCD IN RCRD: CPT | Mod: S$GLB,,, | Performed by: INTERNAL MEDICINE

## 2020-02-04 PROCEDURE — 1126F AMNT PAIN NOTED NONE PRSNT: CPT | Mod: S$GLB,,, | Performed by: INTERNAL MEDICINE

## 2020-02-04 PROCEDURE — 3078F DIAST BP <80 MM HG: CPT | Mod: CPTII,S$GLB,, | Performed by: INTERNAL MEDICINE

## 2020-02-04 PROCEDURE — 3077F PR MOST RECENT SYSTOLIC BLOOD PRESSURE >= 140 MM HG: ICD-10-PCS | Mod: CPTII,S$GLB,, | Performed by: INTERNAL MEDICINE

## 2020-02-04 PROCEDURE — 99999 PR PBB SHADOW E&M-EST. PATIENT-LVL III: CPT | Mod: PBBFAC,,, | Performed by: INTERNAL MEDICINE

## 2020-02-04 PROCEDURE — 1126F PR PAIN SEVERITY QUANTIFIED, NO PAIN PRESENT: ICD-10-PCS | Mod: S$GLB,,, | Performed by: INTERNAL MEDICINE

## 2020-02-04 PROCEDURE — 1159F PR MEDICATION LIST DOCUMENTED IN MEDICAL RECORD: ICD-10-PCS | Mod: S$GLB,,, | Performed by: INTERNAL MEDICINE

## 2020-02-04 NOTE — PROGRESS NOTES
CARDIOVASCULAR CONSULTATION    REASON FOR CONSULT:   Yair Owens is a 70 y.o. male who presents for evaluation of chest pressure.      HISTORY OF PRESENT ILLNESS:     Patient is a pleasant 61-year-old man.  Came here because evaluation of chest pressure to the emergency room.  Was ruled out.  EKG was done which showed normal sinus rhythm.  States the pressure was substernal, and was much worse than the usual pressure which he feels.  States that he usually feels chest pain and tightness.  Unrelated to activity.  Sometimes can come with activity other times at rest.  Denies orthopnea, PND.  Does have mild dyspnea on exertion.  Used to smoke, but cutting down.    Notes from January 2020:    Patient doing fine.  Denies any further episodes of chest pains, orthopnea, PND.  States that he thinks it is his anxiety.  Stress testing did not reveal any significant issues apart from mild aortic valve stenosis.      The perfusion scan is free of evidence from myocardial ischemia or injury.    There is a  mild intensity fixed defect in the inferior wall of the left ventricle secondary to diaphragm attenuation.    Gated perfusion images showed an ejection fraction of 60% post stress.    The EKG portion of this study is negative for ischemia.    The patient reported no chest pain during the stress test.    There were no arrhythmias during stress.      · Normal left ventricular systolic function. The estimated ejection fraction is 60%.  · Concentric left ventricular hypertrophy.  · Normal LV diastolic function.  · Normal right ventricular systolic function.  · Mild-to-moderate aortic valve stenosis.  · Aortic valve area is 1.80 cm2; peak velocity is 2.60 m/s; mean gradient is 18 mmHg.  · Mild aortic regurgitation.    PAST MEDICAL HISTORY:     Past Medical History:   Diagnosis Date    Chest pain syndrome     GERD (gastroesophageal reflux disease)     Hypercholesteremia     Hyperlipidemia        PAST SURGICAL HISTORY:      Past Surgical History:   Procedure Laterality Date    APPENDECTOMY      BONE RESECTION, RIB      for thoracic outlet syndrome    COLONOSCOPY N/A 7/21/2017    Procedure: COLONOSCOPY;  Surgeon: Deepak Servin MD;  Location: [facility];  Service: Endoscopy;  Laterality: N/A;    HAND SURGERY      SCROTAL SURGERY      mass    SPINE SURGERY      C-spine disk (remote)       ALLERGIES AND MEDICATION:     Review of patient's allergies indicates:   Allergen Reactions    Codeine Itching    Ibuprofen Itching        Medication List           Accurate as of February 4, 2020  9:35 AM. If you have any questions, ask your nurse or doctor.               CONTINUE taking these medications    amLODIPine 10 MG tablet  Commonly known as:  NORVASC  Take 1 tablet (10 mg total) by mouth once daily.     aspirin 81 MG Chew  Take 1 tablet (81 mg total) by mouth once daily.     atorvastatin 40 MG tablet  Commonly known as:  LIPITOR  Take 1 tablet (40 mg total) by mouth once daily.     azithromycin 250 MG tablet  Commonly known as:  Zithromax Z-Fazal  Take 2 pills day 1, then 1 pill day 2-5.     benzonatate 200 MG capsule  Commonly known as:  TESSALON  Take 1 capsule (200 mg total) by mouth 3 (three) times daily as needed for Cough.     hydrOXYzine pamoate 50 MG Cap  Commonly known as:  VISTARIL  TAKE 2 CAPSULES BY MOUTH AT BEDTIME AS NEEDED FOR INSOMNIA     meloxicam 15 MG tablet  Commonly known as:  MOBIC  Take 1 tablet (15 mg total) by mouth daily as needed for Pain.     methylPREDNISolone 4 mg tablet  Commonly known as:  MEDROL DOSEPACK  use as directed     omeprazole 40 MG capsule  Commonly known as:  PRILOSEC  Take 1 capsule (40 mg total) by mouth 2 (two) times daily.     ondansetron 4 MG tablet  Commonly known as:  ZOFRAN  Take 1 tablet (4 mg total) by mouth every 8 (eight) hours as needed for Nausea.     tiZANidine 2 MG tablet  Commonly known as:  ZANAFLEX  Take 1-2 tabs PO QHS PRN muscle pain/spasms            SOCIAL HISTORY:      Social History     Socioeconomic History    Marital status:      Spouse name: Not on file    Number of children: Not on file    Years of education: Not on file    Highest education level: Not on file   Occupational History     Employer: [employer]   Social Needs    Financial resource strain: Not hard at all    Food insecurity:     Worry: Never true     Inability: Never true    Transportation needs:     Medical: No     Non-medical: No   Tobacco Use    Smoking status: Current Every Day Smoker     Packs/day: 1.00     Years: 60.00     Pack years: 60.00     Types: Cigarettes    Smokeless tobacco: Never Used   Substance and Sexual Activity    Alcohol use: Yes     Alcohol/week: 2.0 standard drinks     Types: 2 Cans of beer per week     Frequency: Monthly or less     Drinks per session: 1 or 2     Binge frequency: Never     Comment: once a week    Drug use: No    Sexual activity: Yes     Partners: Female   Lifestyle    Physical activity:     Days per week: 1 day     Minutes per session: 20 min    Stress: Only a little   Relationships    Social connections:     Talks on phone: Three times a week     Gets together: Once a week     Attends Yazidi service: Not on file     Active member of club or organization: No     Attends meetings of clubs or organizations: Never     Relationship status:    Other Topics Concern    Not on file   Social History Narrative    Not on file       FAMILY HISTORY:     Family History   Problem Relation Age of Onset    Hypertension Mother     Cancer Father     Heart disease Father     Asthma Sister     Cancer Sister 78        Rectal cancer       REVIEW OF SYSTEMS:   Review of Systems   Constitution: Negative.   HENT: Negative.    Eyes: Negative.    Respiratory: Negative.    Endocrine: Negative.    Hematologic/Lymphatic: Negative.    Skin: Negative.    Musculoskeletal: Negative.    Gastrointestinal: Negative.    Genitourinary: Negative.    Neurological: Negative.     Psychiatric/Behavioral: Negative.    Allergic/Immunologic: Negative.        A 10 point review of systems was performed and all the pertinent positives have been mentioned. Rest of review of systems was negative.        PHYSICAL EXAM:     Vitals:    02/04/20 0927   BP: (!) 154/74   Pulse: 75   Resp: 16    Body mass index is 31.74 kg/m².  Weight: 94.7 kg (208 lb 12.4 oz)         Physical Exam   Constitutional: He is oriented to person, place, and time. He appears well-developed and well-nourished.   HENT:   Head: Normocephalic.   Eyes: Pupils are equal, round, and reactive to light. Conjunctivae are normal.   Neck: Normal range of motion. Neck supple.   Cardiovascular: Normal rate, regular rhythm and normal heart sounds.   Pulmonary/Chest: Effort normal and breath sounds normal.   Abdominal: Soft. Bowel sounds are normal.   Neurological: He is alert and oriented to person, place, and time.   Skin: Skin is warm.   Nursing note and vitals reviewed.        DATA:     Laboratory:  CBC:  Recent Labs   Lab 01/23/18  0840 02/12/19  0824 01/13/20  1500   WBC 6.90 6.82 8.71   Hemoglobin 15.0 15.7 15.6   Hematocrit 44.9 47.3 46.5   Platelets 266 276 238       CHEMISTRIES:  Recent Labs   Lab 02/12/19  0824 02/19/19  0706 01/13/20  1500   Glucose 110 114 H 105   Sodium 142 140 141   Potassium 6.0 H 5.3 H 4.3   BUN, Bld 18 19 14   Creatinine 1.3 1.2 1.2   eGFR if  >60.0 >60.0 >60   eGFR if non  56.1 A >60.0 >60   Calcium 10.3 10.2 9.8       CARDIAC BIOMARKERS:  Recent Labs   Lab 01/13/20  1500   Troponin I <0.006       COAGS:        LIPIDS/LFTS:  Recent Labs   Lab 02/16/17  1427 01/23/18  0840 02/12/19  0824 01/13/20  1500   Cholesterol 120 171 162  --    Triglycerides 133 93 141  --    HDL 27 L 43 36 L  --    LDL Cholesterol 66.4 109.4 97.8  --    Non-HDL Cholesterol 93 128 126  --    AST 30 32 31 29   ALT 34 39 45 H 42       No results found for: HGBA1C    TSH        The 10-year ASCVD risk score  (Tessy INFANTE Jr., et al., 2013) is: 33%    Values used to calculate the score:      Age: 70 years      Sex: Male      Is Non- : No      Diabetic: No      Tobacco smoker: Yes      Systolic Blood Pressure: 154 mmHg      Is BP treated: Yes      HDL Cholesterol: 36 mg/dL      Total Cholesterol: 162 mg/dL             ASSESSMENT AND PLAN     Patient Active Problem List   Diagnosis    Hyperlipidemia    Essential hypertension    GERD (gastroesophageal reflux disease)    Generalized anxiety disorder    Tobacco dependence    Psychophysiological insomnia    Atherosclerosis of aorta       Number.  Chest pressure:  No significant ischemia noted on stress test.  Patient thinks it is probably related to his anxiety.  2D echocardiogram showed normal left ventricle systolic function    Hypertension:  Blood pressure better controlled at home as per the patient.  I have asked him to maintain a blood pressure diary, low-salt diet, and start regular exercise.  We will follow him back in 3 months.  If blood pressure continues to be elevated we will titrate up medications further.    Mild aortic valve stenosis.  Follow-up echocardiogram in 2-3 years.          Thank you very much for involving me in the care of your patient.  Please do not hesitate to contact me if there are any questions.      Vladimir Avalos MD, FACC, [facility]  Interventional Cardiologist, [facility].           This note was dictated with the help of speech recognition software.  There might be un-intended errors and/or substitutions.

## 2020-02-13 ENCOUNTER — PATIENT MESSAGE (OUTPATIENT)
Dept: FAMILY MEDICINE | Facility: CLINIC | Age: 70
End: 2020-02-13

## 2020-02-13 DIAGNOSIS — K21.9 GASTROESOPHAGEAL REFLUX DISEASE WITHOUT ESOPHAGITIS: Chronic | ICD-10-CM

## 2020-02-13 RX ORDER — OMEPRAZOLE 40 MG/1
40 CAPSULE, DELAYED RELEASE ORAL 2 TIMES DAILY
Qty: 90 CAPSULE | Refills: 3 | Status: SHIPPED | OUTPATIENT
Start: 2020-02-13 | End: 2020-09-28

## 2020-02-13 NOTE — TELEPHONE ENCOUNTER
Patient notified of information below and verbalized understanding. Patient scheduled for annual Friday 1000am 2/14/20.

## 2020-02-14 ENCOUNTER — LAB VISIT (OUTPATIENT)
Dept: LAB | Facility: HOSPITAL | Age: 70
End: 2020-02-14
Attending: INTERNAL MEDICINE
Payer: MEDICARE

## 2020-02-14 ENCOUNTER — OFFICE VISIT (OUTPATIENT)
Dept: FAMILY MEDICINE | Facility: CLINIC | Age: 70
End: 2020-02-14
Payer: MEDICARE

## 2020-02-14 VITALS
WEIGHT: 207 LBS | BODY MASS INDEX: 31.37 KG/M2 | SYSTOLIC BLOOD PRESSURE: 140 MMHG | DIASTOLIC BLOOD PRESSURE: 76 MMHG | TEMPERATURE: 99 F | HEART RATE: 86 BPM | OXYGEN SATURATION: 98 % | HEIGHT: 68 IN

## 2020-02-14 DIAGNOSIS — Z00.00 ROUTINE MEDICAL EXAM: Primary | ICD-10-CM

## 2020-02-14 DIAGNOSIS — I70.0 ATHEROSCLEROSIS OF AORTA: ICD-10-CM

## 2020-02-14 DIAGNOSIS — K21.9 GASTROESOPHAGEAL REFLUX DISEASE WITHOUT ESOPHAGITIS: Chronic | ICD-10-CM

## 2020-02-14 DIAGNOSIS — I10 ESSENTIAL HYPERTENSION: Chronic | ICD-10-CM

## 2020-02-14 DIAGNOSIS — E78.5 HYPERLIPIDEMIA, UNSPECIFIED HYPERLIPIDEMIA TYPE: Chronic | ICD-10-CM

## 2020-02-14 DIAGNOSIS — Z12.9 SCREENING FOR CANCER: ICD-10-CM

## 2020-02-14 DIAGNOSIS — F41.1 GENERALIZED ANXIETY DISORDER: ICD-10-CM

## 2020-02-14 DIAGNOSIS — F17.200 TOBACCO DEPENDENCE: Chronic | ICD-10-CM

## 2020-02-14 DIAGNOSIS — N52.9 ERECTILE DYSFUNCTION, UNSPECIFIED ERECTILE DYSFUNCTION TYPE: Chronic | ICD-10-CM

## 2020-02-14 DIAGNOSIS — Z12.5 SCREENING FOR PROSTATE CANCER: ICD-10-CM

## 2020-02-14 DIAGNOSIS — Z87.891 PERSONAL HISTORY OF NICOTINE DEPENDENCE: ICD-10-CM

## 2020-02-14 DIAGNOSIS — F51.04 PSYCHOPHYSIOLOGICAL INSOMNIA: Chronic | ICD-10-CM

## 2020-02-14 LAB
CHOLEST SERPL-MCNC: 150 MG/DL (ref 120–199)
CHOLEST/HDLC SERPL: 3.5 {RATIO} (ref 2–5)
COMPLEXED PSA SERPL-MCNC: 1.5 NG/ML (ref 0–4)
HDLC SERPL-MCNC: 43 MG/DL (ref 40–75)
HDLC SERPL: 28.7 % (ref 20–50)
LDLC SERPL CALC-MCNC: 87.8 MG/DL (ref 63–159)
NONHDLC SERPL-MCNC: 107 MG/DL
TRIGL SERPL-MCNC: 96 MG/DL (ref 30–150)

## 2020-02-14 PROCEDURE — 3078F PR MOST RECENT DIASTOLIC BLOOD PRESSURE < 80 MM HG: ICD-10-PCS | Mod: CPTII,S$GLB,, | Performed by: INTERNAL MEDICINE

## 2020-02-14 PROCEDURE — 99397 PER PM REEVAL EST PAT 65+ YR: CPT | Mod: S$GLB,,, | Performed by: INTERNAL MEDICINE

## 2020-02-14 PROCEDURE — 3077F PR MOST RECENT SYSTOLIC BLOOD PRESSURE >= 140 MM HG: ICD-10-PCS | Mod: CPTII,S$GLB,, | Performed by: INTERNAL MEDICINE

## 2020-02-14 PROCEDURE — 80061 LIPID PANEL: CPT

## 2020-02-14 PROCEDURE — 99999 PR PBB SHADOW E&M-EST. PATIENT-LVL III: ICD-10-PCS | Mod: PBBFAC,,, | Performed by: INTERNAL MEDICINE

## 2020-02-14 PROCEDURE — 36415 COLL VENOUS BLD VENIPUNCTURE: CPT | Mod: PO

## 2020-02-14 PROCEDURE — 99999 PR PBB SHADOW E&M-EST. PATIENT-LVL III: CPT | Mod: PBBFAC,,, | Performed by: INTERNAL MEDICINE

## 2020-02-14 PROCEDURE — 3077F SYST BP >= 140 MM HG: CPT | Mod: CPTII,S$GLB,, | Performed by: INTERNAL MEDICINE

## 2020-02-14 PROCEDURE — 3078F DIAST BP <80 MM HG: CPT | Mod: CPTII,S$GLB,, | Performed by: INTERNAL MEDICINE

## 2020-02-14 PROCEDURE — 84153 ASSAY OF PSA TOTAL: CPT

## 2020-02-14 PROCEDURE — 99397 PR PREVENTIVE VISIT,EST,65 & OVER: ICD-10-PCS | Mod: S$GLB,,, | Performed by: INTERNAL MEDICINE

## 2020-02-14 RX ORDER — TADALAFIL 10 MG/1
10 TABLET ORAL
Qty: 90 TABLET | Refills: 0 | Status: SHIPPED | OUTPATIENT
Start: 2020-02-14 | End: 2021-01-21

## 2020-02-14 NOTE — PROGRESS NOTES
Assessment & Plan  Problem List Items Addressed This Visit        Psychiatric    Generalized anxiety disorder (Chronic)    Current Assessment & Plan     Improving with exercise.  Prefers to avoid medications if possible.  Keep up the great work!             Cardiac/Vascular    Hyperlipidemia (Chronic)    Current Assessment & Plan     Continue statin.  Check labs         Essential hypertension (Chronic)    Current Assessment & Plan     Improving trend with regular exercise.  Will keep meds the same and monitor at his cardiology follow up         Relevant Orders    CBC auto differential    Comprehensive metabolic panel    Lipid panel    Atherosclerosis of aorta (Chronic)    Overview     US 08/2017.  Stable, asymptomatic chronic condition.  Will continue to maximize risk factor reduction and adjust medication as needed.             Renal/    Erectile dysfunction (Chronic)    Current Assessment & Plan     The current medical regimen is effective;  continue present plan and medications.          Relevant Medications    tadalafil (CIALIS) 10 MG tablet       GI    GERD (gastroesophageal reflux disease) (Chronic)    Current Assessment & Plan     The current medical regimen is effective;  continue present plan and medications.             Other    Tobacco dependence (Chronic)    Current Assessment & Plan     Down to 3-5 cigarettes a day!  Keep up the great work! LDCT ordered         Relevant Orders    CT Chest Lung Screening Low Dose    Psychophysiological insomnia (Chronic)    Current Assessment & Plan     Stable.  Monitor for improvement as his anxiety improves with exercise.            Other Visit Diagnoses     Routine medical exam    -  Primary  -    Discussed healthy diet, regular exercise, necessary labs, age appropriate cancer screening, and routine vaccinations.       Screening for cancer    -  LDCT needed    Relevant Orders    CT Chest Lung Screening Low Dose    Personal history of nicotine dependence     -  LDCT  needed    Relevant Orders    CT Chest Lung Screening Low Dose    Screening for prostate cancer    -  Screen PSA    Relevant Orders    PSA, Screening            Health Maintenance reviewed, as above.    Follow-up: Follow up in about 1 year (around 2/14/2021) for Routine Physical.    ______________________________________________________________________    Chief Complaint  Chief Complaint   Patient presents with    Annual Exam       HPI  Yair Owens is a 70 y.o. male with multiple medical diagnoses as listed in the medical history and problem list that presents for routine physical.  Pt is known to me with his last appointment 12/12/2019.  He had labs las tmonth that showed reassuring CBC, CMP.  Lipid 1 year ago looked good.  He had a reassuring cardiology appointment last week.     He has been going to the gym more often and doing 45 minutes 5x a week.  Home BP readings are fluctuation slightly.  No longer in the 150s.  Split between the 130s & 140s systolic the rest of the time.      He is feeling much better overall.       PAST MEDICAL HISTORY:  Past Medical History:   Diagnosis Date    Chest pain syndrome     GERD (gastroesophageal reflux disease)     Hypercholesteremia     Hyperlipidemia        PAST SURGICAL HISTORY:  Past Surgical History:   Procedure Laterality Date    APPENDECTOMY      BONE RESECTION, RIB      for thoracic outlet syndrome    COLONOSCOPY N/A 7/21/2017    Procedure: COLONOSCOPY;  Surgeon: Deepak Servin MD;  Location: [facility];  Service: Endoscopy;  Laterality: N/A;    HAND SURGERY      SCROTAL SURGERY      mass    SPINE SURGERY      C-spine disk (remote)       SOCIAL HISTORY:  Social History     Socioeconomic History    Marital status:      Spouse name: Not on file    Number of children: Not on file    Years of education: Not on file    Highest education level: Not on file   Occupational History     Employer: [employer]   Social Needs    Financial resource strain: Not  hard at all    Food insecurity:     Worry: Never true     Inability: Never true    Transportation needs:     Medical: No     Non-medical: No   Tobacco Use    Smoking status: Current Every Day Smoker     Packs/day: 1.00     Years: 60.00     Pack years: 60.00     Types: Cigarettes    Smokeless tobacco: Never Used   Substance and Sexual Activity    Alcohol use: Yes     Alcohol/week: 2.0 standard drinks     Types: 2 Cans of beer per week     Frequency: Monthly or less     Drinks per session: 1 or 2     Binge frequency: Never     Comment: once a week    Drug use: No    Sexual activity: Yes     Partners: Female   Lifestyle    Physical activity:     Days per week: 1 day     Minutes per session: 20 min    Stress: Only a little   Relationships    Social connections:     Talks on phone: Three times a week     Gets together: Once a week     Attends Pentecostal service: Not on file     Active member of club or organization: No     Attends meetings of clubs or organizations: Never     Relationship status:    Other Topics Concern    Not on file   Social History Narrative    Not on file       FAMILY HISTORY:  Family History   Problem Relation Age of Onset    Hypertension Mother     Cancer Father     Heart disease Father     Asthma Sister     Cancer Sister 78        Rectal cancer       ALLERGIES AND MEDICATIONS: updated and reviewed.  Review of patient's allergies indicates:   Allergen Reactions    Codeine Itching    Ibuprofen Itching     Current Outpatient Medications   Medication Sig Dispense Refill    amLODIPine (NORVASC) 10 MG tablet Take 1 tablet (10 mg total) by mouth once daily. 90 tablet 3    aspirin 81 MG Chew Take 1 tablet (81 mg total) by mouth once daily. 30 tablet 0    atorvastatin (LIPITOR) 40 MG tablet Take 1 tablet (40 mg total) by mouth once daily. 90 tablet 3    hydrOXYzine pamoate (VISTARIL) 50 MG Cap TAKE 2 CAPSULES BY MOUTH AT BEDTIME AS NEEDED FOR INSOMNIA 180 capsule 3     "omeprazole (PRILOSEC) 40 MG capsule Take 1 capsule (40 mg total) by mouth 2 (two) times daily. 90 capsule 3    tadalafil (CIALIS) 10 MG tablet Take 1 tablet (10 mg total) by mouth as needed (30-45 min prior to intercourse). 90 tablet 0     No current facility-administered medications for this visit.          ROS  Review of Systems   Constitutional: Positive for activity change (moer exercise). Negative for chills, fever and unexpected weight change.   HENT: Negative for congestion, dental problem, ear pain, hearing loss, rhinorrhea, sore throat and trouble swallowing.    Eyes: Negative for discharge, redness and visual disturbance.   Respiratory: Negative for cough, chest tightness, shortness of breath and wheezing.    Cardiovascular: Negative for chest pain, palpitations and leg swelling.   Gastrointestinal: Negative for abdominal pain, blood in stool, constipation, diarrhea, nausea and vomiting.   Endocrine: Negative for polydipsia, polyphagia and polyuria.   Genitourinary: Negative for decreased urine volume, difficulty urinating, dysuria, hematuria and urgency.   Musculoskeletal: Negative for arthralgias, joint swelling, myalgias and neck pain.   Skin: Negative for color change and rash.   Neurological: Negative for dizziness, weakness, light-headedness and headaches.   Psychiatric/Behavioral: Negative for confusion, decreased concentration, dysphoric mood, sleep disturbance and suicidal ideas.           Physical Exam  Vitals:    02/14/20 0955 02/14/20 1021   BP: (!) 140/80 (!) 140/76   Pulse: 86    Temp: 98.7 °F (37.1 °C)    SpO2: 98%    Weight: 93.9 kg (207 lb 0.2 oz)    Height: 5' 8" (1.727 m)     Body mass index is 31.48 kg/m².  Weight: 93.9 kg (207 lb 0.2 oz)   Height: 5' 8" (172.7 cm)   Physical Exam   Constitutional: He is oriented to person, place, and time. He appears well-developed and well-nourished. No distress.   HENT:   Head: Normocephalic and atraumatic.   Right Ear: Tympanic membrane, external " ear and ear canal normal.   Left Ear: Tympanic membrane, external ear and ear canal normal.   Nose: Nose normal. No septal deviation. Right sinus exhibits no maxillary sinus tenderness and no frontal sinus tenderness. Left sinus exhibits no maxillary sinus tenderness and no frontal sinus tenderness.   Mouth/Throat: Uvula is midline, oropharynx is clear and moist and mucous membranes are normal. No tonsillar exudate.   Eyes: Pupils are equal, round, and reactive to light. Conjunctivae and EOM are normal. Right eye exhibits no discharge. Left eye exhibits no discharge. No scleral icterus.   Neck: Neck supple. No JVD present. No spinous process tenderness and no muscular tenderness present. Carotid bruit is not present. No tracheal deviation present. No thyroid mass and no thyromegaly present.   Cardiovascular: Normal rate, regular rhythm, normal heart sounds and intact distal pulses. Exam reveals no S3, no S4 and no friction rub.   No murmur heard.  Pulmonary/Chest: Effort normal and breath sounds normal. He has no wheezes. He has no rhonchi. He has no rales.   Abdominal: Soft. Bowel sounds are normal. He exhibits no distension. There is no tenderness. There is no rebound, no guarding and no CVA tenderness.   Musculoskeletal: Normal range of motion. He exhibits no edema or tenderness.   Lymphadenopathy:        Head (right side): No submental and no submandibular adenopathy present.        Head (left side): No submental and no submandibular adenopathy present.     He has no cervical adenopathy.   Neurological: He is alert and oriented to person, place, and time. Coordination normal.   Motor grossly intact.  Sensation grossly normal.  Symmetric facial movements palate elevated symmetrically tongue midline    Skin: Skin is warm and dry. Capillary refill takes less than 2 seconds. No rash noted. No cyanosis. Nails show no clubbing.   Psychiatric: He has a normal mood and affect. His speech is normal and behavior is  normal. Thought content normal. Cognition and memory are normal.         Health Maintenance       Date Due Completion Date    TETANUS VACCINE 01/22/1969 ---    Shingles Vaccine (1 of 2) 01/22/2001 ---    LDCT Lung Screen 02/13/2020 2/13/2019    High Dose Statin 02/04/2021 2/4/2020    Colonoscopy 07/21/2022 7/21/2017    Override on 7/20/2017: Done    Override on 10/29/2012: Declined    Lipid Panel 02/12/2024 2/12/2019

## 2020-02-14 NOTE — ASSESSMENT & PLAN NOTE
Improving trend with regular exercise.  Will keep meds the same and monitor at his cardiology follow up

## 2020-02-17 NOTE — PROGRESS NOTES
Your lab results are looking good.  Please continue your current medications and doses.  Please feel free to contact me with any questions or concerns.    Sincerely,  Derik Ewing  [email or web address]

## 2020-03-04 ENCOUNTER — PATIENT MESSAGE (OUTPATIENT)
Dept: FAMILY MEDICINE | Facility: CLINIC | Age: 70
End: 2020-03-04

## 2020-03-04 DIAGNOSIS — M54.2 ACUTE NECK PAIN: ICD-10-CM

## 2020-03-04 RX ORDER — TIZANIDINE 2 MG/1
TABLET ORAL
Qty: 180 TABLET | Refills: 0 | OUTPATIENT
Start: 2020-03-04

## 2020-03-04 RX ORDER — MELOXICAM 15 MG/1
TABLET ORAL
Qty: 90 TABLET | Refills: 0 | OUTPATIENT
Start: 2020-03-04

## 2020-03-19 DIAGNOSIS — E78.5 HYPERLIPIDEMIA, UNSPECIFIED HYPERLIPIDEMIA TYPE: Chronic | ICD-10-CM

## 2020-03-20 RX ORDER — ATORVASTATIN CALCIUM 40 MG/1
TABLET, FILM COATED ORAL
Qty: 90 TABLET | Refills: 3 | Status: SHIPPED | OUTPATIENT
Start: 2020-03-20 | End: 2021-06-15

## 2020-05-26 ENCOUNTER — OFFICE VISIT (OUTPATIENT)
Dept: CARDIOLOGY | Facility: CLINIC | Age: 70
End: 2020-05-26
Payer: MEDICARE

## 2020-05-26 ENCOUNTER — PATIENT OUTREACH (OUTPATIENT)
Dept: ADMINISTRATIVE | Facility: HOSPITAL | Age: 70
End: 2020-05-26

## 2020-05-26 DIAGNOSIS — R07.9 CHEST PAIN, UNSPECIFIED TYPE: Primary | ICD-10-CM

## 2020-05-26 PROCEDURE — 1101F PT FALLS ASSESS-DOCD LE1/YR: CPT | Mod: CPTII,95,, | Performed by: INTERNAL MEDICINE

## 2020-05-26 PROCEDURE — 1159F PR MEDICATION LIST DOCUMENTED IN MEDICAL RECORD: ICD-10-PCS | Mod: 95,,, | Performed by: INTERNAL MEDICINE

## 2020-05-26 PROCEDURE — 1159F MED LIST DOCD IN RCRD: CPT | Mod: 95,,, | Performed by: INTERNAL MEDICINE

## 2020-05-26 PROCEDURE — 99499 RISK ADDL DX/OHS AUDIT: ICD-10-PCS | Mod: 95,,, | Performed by: INTERNAL MEDICINE

## 2020-05-26 PROCEDURE — 99499 UNLISTED E&M SERVICE: CPT | Mod: 95,,, | Performed by: INTERNAL MEDICINE

## 2020-05-26 PROCEDURE — 99214 OFFICE O/P EST MOD 30 MIN: CPT | Mod: 95,,, | Performed by: INTERNAL MEDICINE

## 2020-05-26 PROCEDURE — 1101F PR PT FALLS ASSESS DOC 0-1 FALLS W/OUT INJ PAST YR: ICD-10-PCS | Mod: CPTII,95,, | Performed by: INTERNAL MEDICINE

## 2020-05-26 PROCEDURE — 99214 PR OFFICE/OUTPT VISIT, EST, LEVL IV, 30-39 MIN: ICD-10-PCS | Mod: 95,,, | Performed by: INTERNAL MEDICINE

## 2020-05-26 NOTE — PROGRESS NOTES
CARDIOVASCULAR CONSULTATION    REASON FOR CONSULT:   Yair Owens is a 70 y.o. male who presents for evaluation of chest pressure.      HISTORY OF PRESENT ILLNESS:     Patient is a pleasant 61-year-old man.  Came here because evaluation of chest pressure to the emergency room.  Was ruled out.  EKG was done which showed normal sinus rhythm.  States the pressure was substernal, and was much worse than the usual pressure which he feels.  States that he usually feels chest pain and tightness.  Unrelated to activity.  Sometimes can come with activity other times at rest.  Denies orthopnea, PND.  Does have mild dyspnea on exertion.  Used to smoke, but cutting down.    Notes from January 2020:    Patient doing fine.  Denies any further episodes of chest pains, orthopnea, PND.  States that he thinks it is his anxiety.  Stress testing did not reveal any significant issues apart from mild aortic valve stenosis.      The perfusion scan is free of evidence from myocardial ischemia or injury.    There is a  mild intensity fixed defect in the inferior wall of the left ventricle secondary to diaphragm attenuation.    Gated perfusion images showed an ejection fraction of 60% post stress.    The EKG portion of this study is negative for ischemia.    The patient reported no chest pain during the stress test.    There were no arrhythmias during stress.      · Normal left ventricular systolic function. The estimated ejection fraction is 60%.  · Concentric left ventricular hypertrophy.  · Normal LV diastolic function.  · Normal right ventricular systolic function.  · Mild-to-moderate aortic valve stenosis.  · Aortic valve area is 1.80 cm2; peak velocity is 2.60 m/s; mean gradient is 18 mmHg.  · Mild aortic regurgitation.    Notes from may 2020:    The patient location is: his home   The chief complaint leading to consultation is: chest pain    Visit type: audiovisual    Face to Face time with patient: 15 mins   25 minutes of  total time spent on the encounter, which includes face to face time and non-face to face time preparing to see the patient (eg, review of tests), Obtaining and/or reviewing separately obtained history, Documenting clinical information in the electronic or other health record, Independently interpreting results (not separately reported) and communicating results to the patient/family/caregiver, or Care coordination (not separately reported).         Each patient to whom he or she provides medical services by telemedicine is:  (1) informed of the relationship between the physician and patient and the respective role of any other health care provider with respect to management of the patient; and (2) notified that he or she may decline to receive medical services by telemedicine and may withdraw from such care at any time.    Notes:  Patient here for follow-up.  Denies any chest pain at rest on exertion, orthopnea, PND.  States that he has been feeling great.  However he has started smoking more again because of the pandemic and the stress related with the pandemic.      PAST MEDICAL HISTORY:     Past Medical History:   Diagnosis Date    Chest pain syndrome     GERD (gastroesophageal reflux disease)     Hypercholesteremia     Hyperlipidemia        PAST SURGICAL HISTORY:     Past Surgical History:   Procedure Laterality Date    APPENDECTOMY      BONE RESECTION, RIB      for thoracic outlet syndrome    COLONOSCOPY N/A 7/21/2017    Procedure: COLONOSCOPY;  Surgeon: Deepak Servin MD;  Location: [facility];  Service: Endoscopy;  Laterality: N/A;    HAND SURGERY      SCROTAL SURGERY      mass    SPINE SURGERY      C-spine disk (remote)       ALLERGIES AND MEDICATION:     Review of patient's allergies indicates:   Allergen Reactions    Codeine Itching    Ibuprofen Itching        Medication List           Accurate as of May 26, 2020 10:26 AM. If you have any questions, ask your nurse or doctor.               CONTINUE  taking these medications    amLODIPine 10 MG tablet  Commonly known as:  NORVASC  Take 1 tablet (10 mg total) by mouth once daily.     aspirin 81 MG Chew  Take 1 tablet (81 mg total) by mouth once daily.     atorvastatin 40 MG tablet  Commonly known as:  LIPITOR  TAKE 1 TABLET BY MOUTH EVERY DAY     hydrOXYzine pamoate 50 MG Cap  Commonly known as:  VISTARIL  TAKE 2 CAPSULES BY MOUTH AT BEDTIME AS NEEDED FOR INSOMNIA     omeprazole 40 MG capsule  Commonly known as:  PRILOSEC  Take 1 capsule (40 mg total) by mouth 2 (two) times daily.     tadalafiL 10 MG tablet  Commonly known as:  CIALIS  Take 1 tablet (10 mg total) by mouth as needed (30-45 min prior to intercourse).            SOCIAL HISTORY:     Social History     Socioeconomic History    Marital status:      Spouse name: Not on file    Number of children: Not on file    Years of education: Not on file    Highest education level: Not on file   Occupational History     Employer: [employer]   Social Needs    Financial resource strain: Not hard at all    Food insecurity:     Worry: Never true     Inability: Never true    Transportation needs:     Medical: No     Non-medical: No   Tobacco Use    Smoking status: Current Every Day Smoker     Packs/day: 1.00     Years: 60.00     Pack years: 60.00     Types: Cigarettes    Smokeless tobacco: Never Used   Substance and Sexual Activity    Alcohol use: Yes     Alcohol/week: 2.0 standard drinks     Types: 2 Cans of beer per week     Frequency: Monthly or less     Drinks per session: 1 or 2     Binge frequency: Never     Comment: once a week    Drug use: No    Sexual activity: Yes     Partners: Female   Lifestyle    Physical activity:     Days per week: 1 day     Minutes per session: 20 min    Stress: Only a little   Relationships    Social connections:     Talks on phone: Three times a week     Gets together: Once a week     Attends Gnosticism service: Not on file     Active member of club or organization:  No     Attends meetings of clubs or organizations: Never     Relationship status:    Other Topics Concern    Not on file   Social History Narrative    Not on file       FAMILY HISTORY:     Family History   Problem Relation Age of Onset    Hypertension Mother     Cancer Father     Heart disease Father     Asthma Sister     Cancer Sister 78        Rectal cancer       REVIEW OF SYSTEMS:   Review of Systems   Constitution: Negative.   HENT: Negative.    Eyes: Negative.    Respiratory: Negative.    Endocrine: Negative.    Hematologic/Lymphatic: Negative.    Skin: Negative.    Musculoskeletal: Negative.    Gastrointestinal: Negative.    Genitourinary: Negative.    Neurological: Negative.    Psychiatric/Behavioral: Negative.    Allergic/Immunologic: Negative.        A 10 point review of systems was performed and all the pertinent positives have been mentioned. Rest of review of systems was negative.        PHYSICAL EXAM:     There were no vitals filed for this visit. There is no height or weight on file to calculate BMI.            Patient self reported /84, HR 75 BPM, wt 201 lb, temp 97.1    Physical Exam   Constitutional: He is oriented to person, place, and time. He appears well-developed and well-nourished.   HENT:   Head: Normocephalic.   Eyes: Pupils are equal, round, and reactive to light. Conjunctivae are normal.   Neck: Normal range of motion. Neck supple.   Cardiovascular: Normal rate, regular rhythm and normal heart sounds.   Pulmonary/Chest: Effort normal and breath sounds normal.   Abdominal: Soft. Bowel sounds are normal.   Neurological: He is alert and oriented to person, place, and time.   Skin: Skin is warm.   Nursing note and vitals reviewed.        DATA:     Laboratory:  CBC:  Recent Labs   Lab 01/23/18  0840 02/12/19  0824 01/13/20  1500   WBC 6.90 6.82 8.71   Hemoglobin 15.0 15.7 15.6   Hematocrit 44.9 47.3 46.5   Platelets 266 276 238       CHEMISTRIES:  Recent Labs   Lab  02/12/19  0824 02/19/19  0706 01/13/20  1500   Glucose 110 114 H 105   Sodium 142 140 141   Potassium 6.0 H 5.3 H 4.3   BUN, Bld 18 19 14   Creatinine 1.3 1.2 1.2   eGFR if  >60.0 >60.0 >60   eGFR if non  56.1 A >60.0 >60   Calcium 10.3 10.2 9.8       CARDIAC BIOMARKERS:  Recent Labs   Lab 01/13/20  1500   Troponin I <0.006       COAGS:        LIPIDS/LFTS:  Recent Labs   Lab 01/23/18  0840 02/12/19  0824 01/13/20  1500 02/14/20  1030   Cholesterol 171 162  --  150   Triglycerides 93 141  --  96   HDL 43 36 L  --  43   LDL Cholesterol 109.4 97.8  --  87.8   Non-HDL Cholesterol 128 126  --  107   AST 32 31 29  --    ALT 39 45 H 42  --        No results found for: HGBA1C    TSH        The 10-year ASCVD risk score (Fernwoodnatalia INFANTE Jr., et al., 2013) is: 25.6%    Values used to calculate the score:      Age: 70 years      Sex: Male      Is Non- : No      Diabetic: No      Tobacco smoker: Yes      Systolic Blood Pressure: 140 mmHg      Is BP treated: Yes      HDL Cholesterol: 43 mg/dL      Total Cholesterol: 150 mg/dL             ASSESSMENT AND PLAN     Patient Active Problem List   Diagnosis    Hyperlipidemia    Essential hypertension    GERD (gastroesophageal reflux disease)    Generalized anxiety disorder    Tobacco dependence    Psychophysiological insomnia    Atherosclerosis of aorta    Erectile dysfunction       1.  Chest pressure:  No significant ischemia noted on stress test.  Patient thinks it is probably related to his anxiety.  2D echocardiogram showed normal left ventricle systolic function    2. Hypertension:  Blood pressure better controlled at home as per the patient.  I have asked him to maintain a blood pressure diary, low-salt diet, and start regular exercise.  We will follow him back in 3 months.  If blood pressure continues to be elevated we will titrate up medications further.    3. Mild aortic valve stenosis.  Follow-up echocardiogram in 2-3  years.    4.  Smoking cessation has been highly encouraged.          Thank you very much for involving me in the care of your patient.  Please do not hesitate to contact me if there are any questions.      Vladimir Avalos MD, FAC, [facility]  Interventional Cardiologist, [facility].           This note was dictated with the help of speech recognition software.  There might be un-intended errors and/or substitutions.

## 2020-05-29 DIAGNOSIS — Z11.59 NEED FOR HEPATITIS C SCREENING TEST: ICD-10-CM

## 2020-06-05 ENCOUNTER — OFFICE VISIT (OUTPATIENT)
Dept: FAMILY MEDICINE | Facility: CLINIC | Age: 70
End: 2020-06-05
Payer: MEDICARE

## 2020-06-05 VITALS — SYSTOLIC BLOOD PRESSURE: 139 MMHG | DIASTOLIC BLOOD PRESSURE: 76 MMHG | HEART RATE: 75 BPM

## 2020-06-05 DIAGNOSIS — F51.04 PSYCHOPHYSIOLOGICAL INSOMNIA: Chronic | ICD-10-CM

## 2020-06-05 DIAGNOSIS — R60.0 PEDAL EDEMA: Primary | ICD-10-CM

## 2020-06-05 DIAGNOSIS — F43.22 ADJUSTMENT DISORDER WITH ANXIOUS MOOD: ICD-10-CM

## 2020-06-05 DIAGNOSIS — I10 ESSENTIAL HYPERTENSION: Chronic | ICD-10-CM

## 2020-06-05 PROCEDURE — 1159F PR MEDICATION LIST DOCUMENTED IN MEDICAL RECORD: ICD-10-PCS | Mod: 95,,, | Performed by: INTERNAL MEDICINE

## 2020-06-05 PROCEDURE — 3078F PR MOST RECENT DIASTOLIC BLOOD PRESSURE < 80 MM HG: ICD-10-PCS | Mod: CPTII,95,, | Performed by: INTERNAL MEDICINE

## 2020-06-05 PROCEDURE — 1101F PR PT FALLS ASSESS DOC 0-1 FALLS W/OUT INJ PAST YR: ICD-10-PCS | Mod: CPTII,95,, | Performed by: INTERNAL MEDICINE

## 2020-06-05 PROCEDURE — 3078F DIAST BP <80 MM HG: CPT | Mod: CPTII,95,, | Performed by: INTERNAL MEDICINE

## 2020-06-05 PROCEDURE — 3075F SYST BP GE 130 - 139MM HG: CPT | Mod: CPTII,95,, | Performed by: INTERNAL MEDICINE

## 2020-06-05 PROCEDURE — 1101F PT FALLS ASSESS-DOCD LE1/YR: CPT | Mod: CPTII,95,, | Performed by: INTERNAL MEDICINE

## 2020-06-05 PROCEDURE — 3075F PR MOST RECENT SYSTOLIC BLOOD PRESS GE 130-139MM HG: ICD-10-PCS | Mod: CPTII,95,, | Performed by: INTERNAL MEDICINE

## 2020-06-05 PROCEDURE — 99214 PR OFFICE/OUTPT VISIT, EST, LEVL IV, 30-39 MIN: ICD-10-PCS | Mod: 95,,, | Performed by: INTERNAL MEDICINE

## 2020-06-05 PROCEDURE — 1159F MED LIST DOCD IN RCRD: CPT | Mod: 95,,, | Performed by: INTERNAL MEDICINE

## 2020-06-05 PROCEDURE — 99214 OFFICE O/P EST MOD 30 MIN: CPT | Mod: 95,,, | Performed by: INTERNAL MEDICINE

## 2020-06-05 RX ORDER — HYDROXYZINE PAMOATE 50 MG/1
CAPSULE ORAL
Qty: 180 CAPSULE | Refills: 3 | Status: SHIPPED | OUTPATIENT
Start: 2020-06-05 | End: 2021-05-26

## 2020-06-05 NOTE — PATIENT INSTRUCTIONS
"Headspace - Guides your through meditation.  The first 10 programs are free.    Stop, Think, and Breath - customizable meditation kelsey with ability to perform "check-ins"      The breathing exercises worked for you in the past.     Think about positive psychology/gratitude journal.  There are AG talks on Youtube if you are interested in these items.    "

## 2020-06-05 NOTE — ASSESSMENT & PLAN NOTE
Took BP while on virtual visit.  The current medical regimen is effective;  continue present plan and medications.

## 2020-06-05 NOTE — PROGRESS NOTES
Assessment & Plan  Problem List Items Addressed This Visit        Cardiac/Vascular    Essential hypertension (Chronic)    Current Assessment & Plan     Took BP while on virtual visit.  The current medical regimen is effective;  continue present plan and medications.            Other Visit Diagnoses     Pedal edema    -  Primary  -    Resolved.  Suspect combination of sodium increase, prolonged sitting and CCB.  Monitor    Adjustment disorder with anxious mood    -  I counseled the patient on several coping mechanisms, recommended he choose a couple to try at a time, and discussed the need to practice them.  He is not interested in SSRI at this time.               Health Maintenance reviewed, vaccines from pharmacy.    The patient location is:  Patient Home   The chief complaint leading to consultation is: noted below  Visit type: Virtual visit with synchronous audio and video  Total time spent with patient: 25  Each patient to whom he or she provides medical services by telemedicine is:  (1) informed of the relationship between the physician and patient and the respective role of any other health care provider with respect to management of the patient; and (2) notified that he or she may decline to receive medical services by telemedicine and may withdraw from such care at any time.    Follow-up: Follow up if symptoms worsen or fail to improve.    ______________________________________________________________________    Chief Complaint  Chief Complaint   Patient presents with    Foot Swelling       HPI  Yair Owens is a 70 y.o. male with multiple medical diagnoses as listed in the medical history and problem list that presents for foot swelling x 5 days via virtual visit.  Pt is known to me with his last appointment 2/14/2020.      Foot swelling started 5 days ago.  Now resolved.  He reports some increase in sodium due to changes in his dietary intake during pandemic.  No CP, swelling, palpitations. He has been  sitting for longer periods of time while working on puzzles etc prior to the onset.     He has also had some increase in his stress and anxiety.  Mostly from pandemic and concerns about his wife health.  He was using the treadmill 45 min daily prior to this.  He has used breathing exercises previously with good results.  No over panic attacks.  Sleep a bit worse.       PAST MEDICAL HISTORY:  Past Medical History:   Diagnosis Date    Chest pain syndrome     GERD (gastroesophageal reflux disease)     Hypercholesteremia     Hyperlipidemia        PAST SURGICAL HISTORY:  Past Surgical History:   Procedure Laterality Date    APPENDECTOMY      BONE RESECTION, RIB      for thoracic outlet syndrome    COLONOSCOPY N/A 7/21/2017    Procedure: COLONOSCOPY;  Surgeon: Deepak Servin MD;  Location: [facility];  Service: Endoscopy;  Laterality: N/A;    HAND SURGERY      SCROTAL SURGERY      mass    SPINE SURGERY      C-spine disk (remote)       SOCIAL HISTORY:  Social History     Socioeconomic History    Marital status:      Spouse name: Not on file    Number of children: Not on file    Years of education: Not on file    Highest education level: Not on file   Occupational History     Employer: [employer]   Social Needs    Financial resource strain: Not hard at all    Food insecurity:     Worry: Never true     Inability: Never true    Transportation needs:     Medical: No     Non-medical: No   Tobacco Use    Smoking status: Current Every Day Smoker     Packs/day: 1.00     Years: 60.00     Pack years: 60.00     Types: Cigarettes    Smokeless tobacco: Never Used   Substance and Sexual Activity    Alcohol use: Yes     Alcohol/week: 2.0 standard drinks     Types: 2 Cans of beer per week     Frequency: Monthly or less     Drinks per session: 1 or 2     Binge frequency: Never     Comment: once a week    Drug use: No    Sexual activity: Yes     Partners: Female   Lifestyle    Physical activity:     Days per week: 1  day     Minutes per session: 20 min    Stress: Only a little   Relationships    Social connections:     Talks on phone: Three times a week     Gets together: Once a week     Attends Shinto service: Not on file     Active member of club or organization: No     Attends meetings of clubs or organizations: Never     Relationship status:    Other Topics Concern    Not on file   Social History Narrative    Not on file       FAMILY HISTORY:  Family History   Problem Relation Age of Onset    Hypertension Mother     Cancer Father     Heart disease Father     Asthma Sister     Cancer Sister 78        Rectal cancer       ALLERGIES AND MEDICATIONS: updated and reviewed.  Review of patient's allergies indicates:   Allergen Reactions    Codeine Itching    Ibuprofen Itching     Current Outpatient Medications   Medication Sig Dispense Refill    amLODIPine (NORVASC) 10 MG tablet Take 1 tablet (10 mg total) by mouth once daily. 90 tablet 3    atorvastatin (LIPITOR) 40 MG tablet TAKE 1 TABLET BY MOUTH EVERY DAY 90 tablet 3    hydrOXYzine pamoate (VISTARIL) 50 MG Cap TAKE 2 CAPSULES BY MOUTH AT BEDTIME AS NEEDED FOR INSOMNIA 180 capsule 3    omeprazole (PRILOSEC) 40 MG capsule Take 1 capsule (40 mg total) by mouth 2 (two) times daily. 90 capsule 3    tadalafil (CIALIS) 10 MG tablet Take 1 tablet (10 mg total) by mouth as needed (30-45 min prior to intercourse). 90 tablet 0    aspirin 81 MG Chew Take 1 tablet (81 mg total) by mouth once daily. 30 tablet 0     No current facility-administered medications for this visit.          ROS  Review of Systems   Constitutional: Positive for activity change. Negative for unexpected weight change.   HENT: Negative for hearing loss, rhinorrhea and trouble swallowing.    Eyes: Negative for discharge and visual disturbance.   Respiratory: Negative for chest tightness, shortness of breath and wheezing.    Cardiovascular: Positive for leg swelling. Negative for chest pain and  palpitations.   Gastrointestinal: Negative for blood in stool, constipation, diarrhea and vomiting.   Endocrine: Negative for polydipsia and polyuria.   Genitourinary: Negative for difficulty urinating, hematuria and urgency.   Musculoskeletal: Negative for arthralgias and neck pain.   Neurological: Negative for weakness and headaches.   Psychiatric/Behavioral: Negative for confusion and dysphoric mood. The patient is nervous/anxious.            Physical Exam  Physical Exam   Constitutional: He is oriented to person, place, and time. He appears well-developed and well-nourished. No distress.   HENT:   Head: Normocephalic and atraumatic.   Eyes: Conjunctivae and EOM are normal.   Neck: Normal range of motion.   Pulmonary/Chest: Effort normal. No respiratory distress.   Musculoskeletal: He exhibits no edema.   Neurological: He is alert and oriented to person, place, and time.   Psychiatric: He has a normal mood and affect. His behavior is normal. Judgment and thought content normal.         Health Maintenance       Date Due Completion Date    Hepatitis C Screening 1950 ---    TETANUS VACCINE 01/22/1969 ---    Shingles Vaccine (1 of 2) 01/22/2001 ---    LDCT Lung Screen 02/13/2020 2/13/2019    PROSTATE-SPECIFIC ANTIGEN 02/14/2021 2/14/2020    High Dose Statin 03/20/2021 3/20/2020    Colonoscopy 07/21/2022 7/21/2017    Lipid Panel 02/14/2025 2/14/2020

## 2020-07-10 ENCOUNTER — OFFICE VISIT (OUTPATIENT)
Dept: FAMILY MEDICINE | Facility: CLINIC | Age: 70
End: 2020-07-10
Payer: MEDICARE

## 2020-07-10 DIAGNOSIS — F41.1 GENERALIZED ANXIETY DISORDER: Chronic | ICD-10-CM

## 2020-07-10 DIAGNOSIS — R11.0 NAUSEA: ICD-10-CM

## 2020-07-10 DIAGNOSIS — I10 ESSENTIAL HYPERTENSION: Primary | Chronic | ICD-10-CM

## 2020-07-10 PROCEDURE — 99499 UNLISTED E&M SERVICE: CPT | Mod: 95,,, | Performed by: INTERNAL MEDICINE

## 2020-07-10 PROCEDURE — 1101F PT FALLS ASSESS-DOCD LE1/YR: CPT | Mod: CPTII,95,, | Performed by: INTERNAL MEDICINE

## 2020-07-10 PROCEDURE — 1159F MED LIST DOCD IN RCRD: CPT | Mod: 95,,, | Performed by: INTERNAL MEDICINE

## 2020-07-10 PROCEDURE — 1101F PR PT FALLS ASSESS DOC 0-1 FALLS W/OUT INJ PAST YR: ICD-10-PCS | Mod: CPTII,95,, | Performed by: INTERNAL MEDICINE

## 2020-07-10 PROCEDURE — 99214 PR OFFICE/OUTPT VISIT, EST, LEVL IV, 30-39 MIN: ICD-10-PCS | Mod: 95,,, | Performed by: INTERNAL MEDICINE

## 2020-07-10 PROCEDURE — 1159F PR MEDICATION LIST DOCUMENTED IN MEDICAL RECORD: ICD-10-PCS | Mod: 95,,, | Performed by: INTERNAL MEDICINE

## 2020-07-10 PROCEDURE — 99214 OFFICE O/P EST MOD 30 MIN: CPT | Mod: 95,,, | Performed by: INTERNAL MEDICINE

## 2020-07-10 PROCEDURE — 99499 RISK ADDL DX/OHS AUDIT: ICD-10-PCS | Mod: 95,,, | Performed by: INTERNAL MEDICINE

## 2020-07-10 RX ORDER — SERTRALINE HYDROCHLORIDE 50 MG/1
TABLET, FILM COATED ORAL
Qty: 90 TABLET | Refills: 0 | Status: SHIPPED | OUTPATIENT
Start: 2020-07-10 | End: 2020-09-28

## 2020-07-10 RX ORDER — ONDANSETRON 4 MG/1
4 TABLET, FILM COATED ORAL EVERY 8 HOURS PRN
Qty: 30 TABLET | Refills: 0 | Status: SHIPPED | OUTPATIENT
Start: 2020-07-10 | End: 2021-02-25 | Stop reason: CLARIF

## 2020-07-10 NOTE — ASSESSMENT & PLAN NOTE
Start low dose sertraline.  I have discussed the common side effects of this medication and black box warnings (if applicable to this medication) with the patient and answered all of the questions they had at the time of this visit regarding this medication.

## 2020-07-10 NOTE — PROGRESS NOTES
Assessment & Plan  Problem List Items Addressed This Visit        Psychiatric    Generalized anxiety disorder (Chronic)    Current Assessment & Plan     Start low dose sertraline.  I have discussed the common side effects of this medication and black box warnings (if applicable to this medication) with the patient and answered all of the questions they had at the time of this visit regarding this medication.          Relevant Medications    sertraline (ZOLOFT) 50 MG tablet       Cardiac/Vascular    Essential hypertension - Primary (Chronic)    Current Assessment & Plan     Elevated at home.  Likely secondary to increase in stress/anxiety           Other Visit Diagnoses     Nausea    -  The current medical regimen is effective;  continue present plan and medications.     Relevant Medications    ondansetron (ZOFRAN) 4 MG tablet            Health Maintenance reviewed, vaccines deferred.    The patient location is:  Patient Home   The chief complaint leading to consultation is: noted below  Visit type: Virtual visit with synchronous audio and video  Total time spent with patient: 20  Each patient to whom he or she provides medical services by telemedicine is:  (1) informed of the relationship between the physician and patient and the respective role of any other health care provider with respect to management of the patient; and (2) notified that he or she may decline to receive medical services by telemedicine and may withdraw from such care at any time.    Follow-up: Follow up in about 4 weeks (around 8/7/2020) for follow up for chronic conditions.    ______________________________________________________________________    Chief Complaint  Chief Complaint   Patient presents with    Hypertension    Anxiety       HPI  Yair Owens is a 70 y.o. male with multiple medical diagnoses as listed in the medical history and problem list that presents for HTN follow up and loss of appetite/anxiety follow up via virtual  visit.  Pt is known to me with his last appointment 6/5/2020.      Answers for HPI/ROS submitted by the patient on 7/8/2020   Hypertension  Chronicity: recurrent  Onset: more than 1 month ago  Progression since onset: waxing and waning  Condition status: controlled  anxiety: Yes  blurred vision: No  malaise/fatigue: Yes  orthopnea: No  peripheral edema: No  PND: No  sweats: No  CAD risks: family history, smoking/tobacco exposure, stress  Compliance problems: exercise  Past treatments: ACE inhibitors  Improvement on treatment: moderate    BP running 134-147/77-81 at home.  Having more anxiety attacks.  Nearly went to the ED the other day.  He laid down and it took 2 hours for it to pass.  He has been trying breathing exercises.  He is working on using his treadmill at home but this is causing some hip pain.  He has tried his hydroxyzine which helps some.        PAST MEDICAL HISTORY:  Past Medical History:   Diagnosis Date    Chest pain syndrome     GERD (gastroesophageal reflux disease)     Hypercholesteremia     Hyperlipidemia        PAST SURGICAL HISTORY:  Past Surgical History:   Procedure Laterality Date    APPENDECTOMY      BONE RESECTION, RIB      for thoracic outlet syndrome    COLONOSCOPY N/A 7/21/2017    Procedure: COLONOSCOPY;  Surgeon: Deepak Servin MD;  [facility];  Service: Endoscopy;  Laterality: N/A;    HAND SURGERY      SCROTAL SURGERY      mass    SPINE SURGERY      C-spine disk (remote)       SOCIAL HISTORY:  Social History     Socioeconomic History    Marital status:      Spouse name: Not on file    Number of children: Not on file    Years of education: Not on file    Highest education level: Not on file   Occupational History     Employer: [employer]   Social Needs    Financial resource strain: Not hard at all    Food insecurity     Worry: Never true     Inability: Never true    Transportation needs     Medical: No     Non-medical: No   Tobacco Use    Smoking status:  Current Every Day Smoker     Packs/day: 1.00     Years: 60.00     Pack years: 60.00     Types: Cigarettes    Smokeless tobacco: Never Used   Substance and Sexual Activity    Alcohol use: Yes     Alcohol/week: 2.0 standard drinks     Types: 2 Cans of beer per week     Frequency: Monthly or less     Drinks per session: 1 or 2     Binge frequency: Never     Comment: once a week    Drug use: No    Sexual activity: Yes     Partners: Female   Lifestyle    Physical activity     Days per week: 1 day     Minutes per session: 20 min    Stress: Only a little   Relationships    Social connections     Talks on phone: Three times a week     Gets together: Once a week     Attends Yazidi service: Not on file     Active member of club or organization: No     Attends meetings of clubs or organizations: Never     Relationship status:    Other Topics Concern    Not on file   Social History Narrative    Not on file       FAMILY HISTORY:  Family History   Problem Relation Age of Onset    Hypertension Mother     Cancer Father     Heart disease Father     Asthma Sister     Cancer Sister 78        Rectal cancer       ALLERGIES AND MEDICATIONS: updated and reviewed.  Review of patient's allergies indicates:   Allergen Reactions    Codeine Itching    Ibuprofen Itching     Current Outpatient Medications   Medication Sig Dispense Refill    amLODIPine (NORVASC) 10 MG tablet Take 1 tablet (10 mg total) by mouth once daily. 90 tablet 3    aspirin 81 MG Chew Take 1 tablet (81 mg total) by mouth once daily. 30 tablet 0    atorvastatin (LIPITOR) 40 MG tablet TAKE 1 TABLET BY MOUTH EVERY DAY 90 tablet 3    hydrOXYzine pamoate (VISTARIL) 50 MG Cap TAKE 2 CAPSULES BY MOUTH AT BEDTIME AS NEEDED FOR INSOMNIA 180 capsule 3    omeprazole (PRILOSEC) 40 MG capsule Take 1 capsule (40 mg total) by mouth 2 (two) times daily. 90 capsule 3    ondansetron (ZOFRAN) 4 MG tablet Take 1 tablet (4 mg total) by mouth every 8 (eight) hours  as needed for Nausea. 30 tablet 0    sertraline (ZOLOFT) 50 MG tablet Take half a tab PO daily for 6 days then increase to 1 tab PO daily 90 tablet 0    tadalafil (CIALIS) 10 MG tablet Take 1 tablet (10 mg total) by mouth as needed (30-45 min prior to intercourse). 90 tablet 0     No current facility-administered medications for this visit.          ROS  Review of Systems   Constitutional: Positive for appetite change.   Respiratory: Negative for shortness of breath.    Cardiovascular: Negative for chest pain and palpitations.   Musculoskeletal: Negative for neck pain.   Neurological: Negative for headaches.   Psychiatric/Behavioral: Positive for sleep disturbance. Negative for dysphoric mood. The patient is nervous/anxious.            Physical Exam  Physical Exam  Constitutional:       General: He is not in acute distress.     Appearance: He is well-developed.   HENT:      Head: Normocephalic and atraumatic.   Eyes:      Conjunctiva/sclera: Conjunctivae normal.   Neck:      Musculoskeletal: Normal range of motion.   Pulmonary:      Effort: Pulmonary effort is normal. No respiratory distress.   Neurological:      Mental Status: He is alert and oriented to person, place, and time.   Psychiatric:         Behavior: Behavior normal.         Thought Content: Thought content normal.         Judgment: Judgment normal.           Health Maintenance       Date Due Completion Date    Hepatitis C Screening 1950 ---    TETANUS VACCINE 01/22/1969 ---    Shingles Vaccine (1 of 2) 01/22/2001 ---    LDCT Lung Screen 02/13/2020 2/13/2019    Influenza Vaccine (1) 09/01/2020 10/1/2019    PROSTATE-SPECIFIC ANTIGEN 02/14/2021 2/14/2020    High Dose Statin 06/05/2021 6/5/2020    Colorectal Cancer Screening 07/21/2022 7/21/2017    Lipid Panel 02/14/2025 2/14/2020

## 2020-08-07 ENCOUNTER — OFFICE VISIT (OUTPATIENT)
Dept: FAMILY MEDICINE | Facility: CLINIC | Age: 70
End: 2020-08-07
Payer: MEDICARE

## 2020-08-07 VITALS — DIASTOLIC BLOOD PRESSURE: 75 MMHG | SYSTOLIC BLOOD PRESSURE: 127 MMHG | HEART RATE: 72 BPM | TEMPERATURE: 97 F

## 2020-08-07 DIAGNOSIS — F17.200 TOBACCO DEPENDENCE: Chronic | ICD-10-CM

## 2020-08-07 DIAGNOSIS — I10 ESSENTIAL HYPERTENSION: Primary | Chronic | ICD-10-CM

## 2020-08-07 DIAGNOSIS — G25.0 ESSENTIAL TREMOR: ICD-10-CM

## 2020-08-07 DIAGNOSIS — F41.1 GENERALIZED ANXIETY DISORDER: Chronic | ICD-10-CM

## 2020-08-07 PROCEDURE — 99214 PR OFFICE/OUTPT VISIT, EST, LEVL IV, 30-39 MIN: ICD-10-PCS | Mod: 95,,, | Performed by: INTERNAL MEDICINE

## 2020-08-07 PROCEDURE — 1101F PT FALLS ASSESS-DOCD LE1/YR: CPT | Mod: CPTII,95,, | Performed by: INTERNAL MEDICINE

## 2020-08-07 PROCEDURE — 3078F PR MOST RECENT DIASTOLIC BLOOD PRESSURE < 80 MM HG: ICD-10-PCS | Mod: CPTII,95,, | Performed by: INTERNAL MEDICINE

## 2020-08-07 PROCEDURE — 1159F MED LIST DOCD IN RCRD: CPT | Mod: 95,,, | Performed by: INTERNAL MEDICINE

## 2020-08-07 PROCEDURE — 3078F DIAST BP <80 MM HG: CPT | Mod: CPTII,95,, | Performed by: INTERNAL MEDICINE

## 2020-08-07 PROCEDURE — 1159F PR MEDICATION LIST DOCUMENTED IN MEDICAL RECORD: ICD-10-PCS | Mod: 95,,, | Performed by: INTERNAL MEDICINE

## 2020-08-07 PROCEDURE — 3074F SYST BP LT 130 MM HG: CPT | Mod: CPTII,95,, | Performed by: INTERNAL MEDICINE

## 2020-08-07 PROCEDURE — 99214 OFFICE O/P EST MOD 30 MIN: CPT | Mod: 95,,, | Performed by: INTERNAL MEDICINE

## 2020-08-07 PROCEDURE — 3074F PR MOST RECENT SYSTOLIC BLOOD PRESSURE < 130 MM HG: ICD-10-PCS | Mod: CPTII,95,, | Performed by: INTERNAL MEDICINE

## 2020-08-07 PROCEDURE — 1101F PR PT FALLS ASSESS DOC 0-1 FALLS W/OUT INJ PAST YR: ICD-10-PCS | Mod: CPTII,95,, | Performed by: INTERNAL MEDICINE

## 2020-08-07 RX ORDER — PROPRANOLOL HYDROCHLORIDE 10 MG/1
10 TABLET ORAL 3 TIMES DAILY PRN
Qty: 90 TABLET | Refills: 1 | Status: SHIPPED | OUTPATIENT
Start: 2020-08-07 | End: 2020-08-29

## 2020-08-07 NOTE — PROGRESS NOTES
Assessment & Plan  Problem List Items Addressed This Visit        Psychiatric    Generalized anxiety disorder (Chronic)    Current Assessment & Plan     The current medical regimen is effective;  continue present plan and medications.             Cardiac/Vascular    Essential hypertension - Primary (Chronic)    Current Assessment & Plan     The current medical regimen is effective;  continue present plan and medications.             Other    Tobacco dependence (Chronic)    Current Assessment & Plan     Down to 3 cig a day.  Keep up the great work!            Other Visit Diagnoses     Essential tremor    -  Start trial of propranolol.  I have discussed the common side effects of this medication and black box warnings (if applicable to this medication) with the patient and answered all of the questions they had at the time of this visit regarding this medication.     Relevant Medications    propranoloL (INDERAL) 10 MG tablet            Health Maintenance reviewed, deferred vaccinations.    The patient location is:  Patient Home   The chief complaint leading to consultation is: noted below  Visit type: Virtual visit with synchronous audio and video  Total time spent with patient: 15  Each patient to whom he or she provides medical services by telemedicine is:  (1) informed of the relationship between the physician and patient and the respective role of any other health care provider with respect to management of the patient; and (2) notified that he or she may decline to receive medical services by telemedicine and may withdraw from such care at any time.    Follow-up: Follow up if symptoms worsen or fail to improve.    ______________________________________________________________________    Chief Complaint  Chief Complaint   Patient presents with    Hypertension    Anxiety    Insomnia    Tremors       HPI  Yair Owens is a 70 y.o. male with multiple medical diagnoses as listed in the medical history and  problem list that presents for HTN anxiety insomnia follow up and c/o tremors via virtual visit.  Pt is known to me with his last appointment 7/10/2020.      He reports that his anxiety is under much better control.  BP running under tighter control as well.  He is sleeping better and only needing 1 hydroxyzine.  He is noticing his nervous stomach is still there.  Has also noticed some tremors.  Mostly with writing, drawing holding a glass.  Still getting nervous mostly surrounding his wife's health and return to school as a teacher.       PAST MEDICAL HISTORY:  Past Medical History:   Diagnosis Date    Chest pain syndrome     GERD (gastroesophageal reflux disease)     Hypercholesteremia     Hyperlipidemia        PAST SURGICAL HISTORY:  Past Surgical History:   Procedure Laterality Date    APPENDECTOMY      BONE RESECTION, RIB      for thoracic outlet syndrome    COLONOSCOPY N/A 7/21/2017    Procedure: COLONOSCOPY;  Surgeon: Deepak Servin MD;  Location: [facility];  Service: Endoscopy;  Laterality: N/A;    HAND SURGERY      SCROTAL SURGERY      mass    SPINE SURGERY      C-spine disk (remote)       SOCIAL HISTORY:  Social History     Socioeconomic History    Marital status:      Spouse name: Not on file    Number of children: Not on file    Years of education: Not on file    Highest education level: Not on file   Occupational History     Employer: [employer]   Social Needs    Financial resource strain: Not hard at all    Food insecurity     Worry: Never true     Inability: Never true    Transportation needs     Medical: No     Non-medical: No   Tobacco Use    Smoking status: Current Every Day Smoker     Packs/day: 1.00     Years: 60.00     Pack years: 60.00     Types: Cigarettes    Smokeless tobacco: Never Used   Substance and Sexual Activity    Alcohol use: Yes     Alcohol/week: 2.0 standard drinks     Types: 2 Cans of beer per week     Frequency: Monthly or less     Drinks per session: 1 or  2     Binge frequency: Never     Comment: once a week    Drug use: No    Sexual activity: Yes     Partners: Female   Lifestyle    Physical activity     Days per week: 1 day     Minutes per session: 20 min    Stress: Only a little   Relationships    Social connections     Talks on phone: Three times a week     Gets together: Once a week     Attends Jewish service: Not on file     Active member of club or organization: No     Attends meetings of clubs or organizations: Never     Relationship status:    Other Topics Concern    Not on file   Social History Narrative    Not on file       FAMILY HISTORY:  Family History   Problem Relation Age of Onset    Hypertension Mother     Cancer Father     Heart disease Father     Asthma Sister     Cancer Sister 78        Rectal cancer       ALLERGIES AND MEDICATIONS: updated and reviewed.  Review of patient's allergies indicates:   Allergen Reactions    Codeine Itching    Ibuprofen Itching     Current Outpatient Medications   Medication Sig Dispense Refill    amLODIPine (NORVASC) 10 MG tablet Take 1 tablet (10 mg total) by mouth once daily. 90 tablet 3    aspirin 81 MG Chew Take 1 tablet (81 mg total) by mouth once daily. 30 tablet 0    atorvastatin (LIPITOR) 40 MG tablet TAKE 1 TABLET BY MOUTH EVERY DAY 90 tablet 3    hydrOXYzine pamoate (VISTARIL) 50 MG Cap TAKE 2 CAPSULES BY MOUTH AT BEDTIME AS NEEDED FOR INSOMNIA 180 capsule 3    ondansetron (ZOFRAN) 4 MG tablet Take 1 tablet (4 mg total) by mouth every 8 (eight) hours as needed for Nausea. 30 tablet 0    sertraline (ZOLOFT) 50 MG tablet Take half a tab PO daily for 6 days then increase to 1 tab PO daily 90 tablet 0    omeprazole (PRILOSEC) 40 MG capsule Take 1 capsule (40 mg total) by mouth 2 (two) times daily. 90 capsule 3    propranoloL (INDERAL) 10 MG tablet Take 1 tablet (10 mg total) by mouth 3 (three) times daily as needed (tremors or anxiety). 90 tablet 1    tadalafil (CIALIS) 10 MG  tablet Take 1 tablet (10 mg total) by mouth as needed (30-45 min prior to intercourse). 90 tablet 0     No current facility-administered medications for this visit.          ROS  Review of Systems   Constitutional: Negative for activity change and unexpected weight change.   HENT: Negative for hearing loss, rhinorrhea and trouble swallowing.    Eyes: Negative for discharge and visual disturbance.   Respiratory: Negative for chest tightness and wheezing.    Cardiovascular: Negative for chest pain and palpitations.   Gastrointestinal: Negative for blood in stool, constipation, diarrhea and vomiting.   Endocrine: Negative for polydipsia and polyuria.   Genitourinary: Negative for difficulty urinating, hematuria and urgency.   Musculoskeletal: Negative for arthralgias, joint swelling and neck pain.   Neurological: Positive for tremors. Negative for weakness and headaches.   Psychiatric/Behavioral: Negative for confusion, dysphoric mood and sleep disturbance. The patient is nervous/anxious.            Physical Exam  Physical Exam  Constitutional:       General: He is not in acute distress.     Appearance: He is well-developed.   HENT:      Head: Normocephalic and atraumatic.   Eyes:      Conjunctiva/sclera: Conjunctivae normal.   Neck:      Musculoskeletal: Normal range of motion.   Pulmonary:      Effort: Pulmonary effort is normal. No respiratory distress.   Neurological:      Mental Status: He is alert and oriented to person, place, and time.      Motor: Tremor present.      Comments: Patient asked to draw a spiral and it is noted to have greater and greater deviation from a smooth line   Psychiatric:         Behavior: Behavior normal.         Thought Content: Thought content normal.         Judgment: Judgment normal.           Health Maintenance       Date Due Completion Date    Hepatitis C Screening 1950 ---    TETANUS VACCINE 01/22/1969 ---    Shingles Vaccine (1 of 2) 01/22/2001 ---    LDCT Lung Screen  02/13/2020 2/13/2019    Influenza Vaccine (1) 09/01/2020 10/1/2019    PROSTATE-SPECIFIC ANTIGEN 02/14/2021 2/14/2020    High Dose Statin 06/05/2021 6/5/2020    Colorectal Cancer Screening 07/21/2022 7/21/2017    Lipid Panel 02/14/2025 2/14/2020

## 2020-09-23 DIAGNOSIS — G25.0 ESSENTIAL TREMOR: ICD-10-CM

## 2020-09-24 RX ORDER — PROPRANOLOL HYDROCHLORIDE 10 MG/1
10 TABLET ORAL 3 TIMES DAILY PRN
Qty: 270 TABLET | Refills: 1 | OUTPATIENT
Start: 2020-09-24 | End: 2021-01-01

## 2020-12-18 ENCOUNTER — OFFICE VISIT (OUTPATIENT)
Dept: FAMILY MEDICINE | Facility: CLINIC | Age: 70
End: 2020-12-18
Payer: MEDICARE

## 2020-12-18 VITALS
DIASTOLIC BLOOD PRESSURE: 84 MMHG | TEMPERATURE: 97 F | HEIGHT: 68 IN | WEIGHT: 202 LBS | SYSTOLIC BLOOD PRESSURE: 145 MMHG | HEART RATE: 84 BPM | BODY MASS INDEX: 30.62 KG/M2

## 2020-12-18 DIAGNOSIS — I10 ESSENTIAL HYPERTENSION: Primary | Chronic | ICD-10-CM

## 2020-12-18 DIAGNOSIS — F17.200 TOBACCO DEPENDENCE: Chronic | ICD-10-CM

## 2020-12-18 DIAGNOSIS — F41.1 GENERALIZED ANXIETY DISORDER: Chronic | ICD-10-CM

## 2020-12-18 PROCEDURE — 1159F MED LIST DOCD IN RCRD: CPT | Mod: 95,,, | Performed by: INTERNAL MEDICINE

## 2020-12-18 PROCEDURE — 99214 PR OFFICE/OUTPT VISIT, EST, LEVL IV, 30-39 MIN: ICD-10-PCS | Mod: 95,,, | Performed by: INTERNAL MEDICINE

## 2020-12-18 PROCEDURE — 99499 RISK ADDL DX/OHS AUDIT: ICD-10-PCS | Mod: 95,,, | Performed by: INTERNAL MEDICINE

## 2020-12-18 PROCEDURE — 99499 UNLISTED E&M SERVICE: CPT | Mod: 95,,, | Performed by: INTERNAL MEDICINE

## 2020-12-18 PROCEDURE — 99214 OFFICE O/P EST MOD 30 MIN: CPT | Mod: 95,,, | Performed by: INTERNAL MEDICINE

## 2020-12-18 PROCEDURE — 3079F DIAST BP 80-89 MM HG: CPT | Mod: CPTII,95,, | Performed by: INTERNAL MEDICINE

## 2020-12-18 PROCEDURE — 3079F PR MOST RECENT DIASTOLIC BLOOD PRESSURE 80-89 MM HG: ICD-10-PCS | Mod: CPTII,95,, | Performed by: INTERNAL MEDICINE

## 2020-12-18 PROCEDURE — 3008F PR BODY MASS INDEX (BMI) DOCUMENTED: ICD-10-PCS | Mod: CPTII,95,, | Performed by: INTERNAL MEDICINE

## 2020-12-18 PROCEDURE — 3077F PR MOST RECENT SYSTOLIC BLOOD PRESSURE >= 140 MM HG: ICD-10-PCS | Mod: CPTII,95,, | Performed by: INTERNAL MEDICINE

## 2020-12-18 PROCEDURE — 3008F BODY MASS INDEX DOCD: CPT | Mod: CPTII,95,, | Performed by: INTERNAL MEDICINE

## 2020-12-18 PROCEDURE — 1159F PR MEDICATION LIST DOCUMENTED IN MEDICAL RECORD: ICD-10-PCS | Mod: 95,,, | Performed by: INTERNAL MEDICINE

## 2020-12-18 PROCEDURE — 3077F SYST BP >= 140 MM HG: CPT | Mod: CPTII,95,, | Performed by: INTERNAL MEDICINE

## 2020-12-18 RX ORDER — AMOXICILLIN 500 MG
CAPSULE ORAL DAILY
COMMUNITY
End: 2024-01-09

## 2020-12-18 RX ORDER — ASPIRIN 325 MG
50 TABLET, DELAYED RELEASE (ENTERIC COATED) ORAL DAILY
COMMUNITY
End: 2022-12-14

## 2020-12-18 RX ORDER — SERTRALINE HYDROCHLORIDE 50 MG/1
50 TABLET, FILM COATED ORAL DAILY
Qty: 90 TABLET | Refills: 0 | Status: SHIPPED | OUTPATIENT
Start: 2020-12-18 | End: 2021-03-10 | Stop reason: SDUPTHER

## 2020-12-18 NOTE — PROGRESS NOTES
Assessment & Plan  Problem List Items Addressed This Visit        Psychiatric    Generalized anxiety disorder (Chronic)    Current Assessment & Plan     Suspect this is the cause of his elevated BP.  Restart sertraline (never took it daily).           Relevant Medications    sertraline (ZOLOFT) 50 MG tablet       Cardiac/Vascular    Essential hypertension - Primary (Chronic)    Current Assessment & Plan     Will continue with current regimen and address NINA.              Other    Tobacco dependence (Chronic)    Current Assessment & Plan     Hasn't smoked in a couple of days. Keep up the great work!                  Health Maintenance reviewed, deferred.    The patient location is:  Patient Home   The chief complaint leading to consultation is: noted below  Visit type: Virtual visit with synchronous audio and video  Total time spent with patient: 20  Each patient to whom he or she provides medical services by telemedicine is:  (1) informed of the relationship between the physician and patient and the respective role of any other health care provider with respect to management of the patient; and (2) notified that he or she may decline to receive medical services by telemedicine and may withdraw from such care at any time.    Follow-up: Follow up portal follow up in about  2 weeks.    ______________________________________________________________________    Chief Complaint  Chief Complaint   Patient presents with    Hypertension    Chest Pain       HPI  Yair Owens is a 70 y.o. male with multiple medical diagnoses as listed in the medical history and problem list that presents for HTN and chest pain follow up via virtual visit.  Pt is known to me with his last appointment 8/7/2020.      Answers for HPI/ROS submitted by the patient on 12/15/2020   Hypertension  Chronicity: recurrent  Onset: more than 1 year ago  Progression since onset: waxing and waning  Condition status: resistant  anxiety: Yes  blurred vision:  "No  malaise/fatigue: No  orthopnea: No  peripheral edema: No  PND: No  sweats: No  Agents associated with hypertension: no associated agents  CAD risks: dyslipidemia, sedentary lifestyle, smoking/tobacco exposure, stress  Compliance problems: exercise  Past treatments: ACE inhibitors  Improvement on treatment: mild    Had an episode of dull chest pain yesterday that lasted nearly all day.  It was associated with an "electrical impulse" in the left arm during this event.  He will do 15 min on the treadmill without any exertional chest pain.  No associated with diaphoresis, vision change, dizziness, SOB, palpitations.  He took a propranolol and symptoms resolved.  Normal stress test 1/2020.    Anxiety has been high lately.  He has not been on his sertraline.        PAST MEDICAL HISTORY:  Past Medical History:   Diagnosis Date    Chest pain syndrome     GERD (gastroesophageal reflux disease)     Hypercholesteremia     Hyperlipidemia        PAST SURGICAL HISTORY:  Past Surgical History:   Procedure Laterality Date    APPENDECTOMY      BONE RESECTION, RIB      for thoracic outlet syndrome    COLONOSCOPY N/A 7/21/2017    Procedure: COLONOSCOPY;  Surgeon: Deepak Servin MD;  Location: [facility];  Service: Endoscopy;  Laterality: N/A;    HAND SURGERY      SCROTAL SURGERY      mass    SPINE SURGERY      C-spine disk (remote)       SOCIAL HISTORY:  Social History     Socioeconomic History    Marital status:      Spouse name: Not on file    Number of children: Not on file    Years of education: Not on file    Highest education level: Not on file   Occupational History     Employer: [employer]   Social Needs    Financial resource strain: Not hard at all    Food insecurity     Worry: Never true     Inability: Never true    Transportation needs     Medical: No     Non-medical: No   Tobacco Use    Smoking status: Current Every Day Smoker     Packs/day: 1.00     Years: 60.00     Pack years: 60.00     Types: " Cigarettes    Smokeless tobacco: Never Used   Substance and Sexual Activity    Alcohol use: Yes     Alcohol/week: 2.0 standard drinks     Types: 2 Cans of beer per week     Frequency: Monthly or less     Drinks per session: 1 or 2     Binge frequency: Never     Comment: once a week    Drug use: No    Sexual activity: Yes     Partners: Female   Lifestyle    Physical activity     Days per week: 1 day     Minutes per session: 20 min    Stress: Only a little   Relationships    Social connections     Talks on phone: Three times a week     Gets together: Once a week     Attends Evangelical service: Not on file     Active member of club or organization: No     Attends meetings of clubs or organizations: Never     Relationship status:    Other Topics Concern    Not on file   Social History Narrative    Not on file       FAMILY HISTORY:  Family History   Problem Relation Age of Onset    Hypertension Mother     Cancer Father     Heart disease Father     Asthma Sister     Cancer Sister 78        Rectal cancer       ALLERGIES AND MEDICATIONS: updated and reviewed.  Review of patient's allergies indicates:   Allergen Reactions    Codeine Itching    Ibuprofen Itching     Current Outpatient Medications   Medication Sig Dispense Refill    amLODIPine (NORVASC) 10 MG tablet Take 1 tablet (10 mg total) by mouth once daily. 90 tablet 3    aspirin 81 MG Chew Take 1 tablet (81 mg total) by mouth once daily. 30 tablet 0    atorvastatin (LIPITOR) 40 MG tablet TAKE 1 TABLET BY MOUTH EVERY DAY 90 tablet 3    co-enzyme Q-10 50 mg capsule Take 50 mg by mouth once daily.      hydrOXYzine pamoate (VISTARIL) 50 MG Cap TAKE 2 CAPSULES BY MOUTH AT BEDTIME AS NEEDED FOR INSOMNIA 180 capsule 3    multivitamin capsule Take 1 capsule by mouth once daily.      omega-3 fatty acids/fish oil (FISH OIL-OMEGA-3 FATTY ACIDS) 300-1,000 mg capsule Take by mouth once daily.      propranoloL (INDERAL) 10 MG tablet TAKE 1 TABLET (10 MG  TOTAL) BY MOUTH 3 (THREE) TIMES DAILY AS NEEDED (TREMORS OR ANXIETY). 270 tablet 1    omeprazole (PRILOSEC) 40 MG capsule TAKE 1 CAPSULE BY MOUTH TWO TIMES DAILY 180 capsule 1    ondansetron (ZOFRAN) 4 MG tablet Take 1 tablet (4 mg total) by mouth every 8 (eight) hours as needed for Nausea. 30 tablet 0    sertraline (ZOLOFT) 50 MG tablet Take 1 tablet (50 mg total) by mouth once daily. 90 tablet 0    tadalafil (CIALIS) 10 MG tablet Take 1 tablet (10 mg total) by mouth as needed (30-45 min prior to intercourse). 90 tablet 0     No current facility-administered medications for this visit.          ROS  Review of Systems   Constitutional: Negative for chills, fatigue and fever.   Respiratory: Negative for shortness of breath.    Cardiovascular: Positive for chest pain. Negative for palpitations and leg swelling.   Musculoskeletal: Positive for arthralgias (twisted his ankle, improving) and myalgias (strained his thigh, improving). Negative for neck pain.   Neurological: Negative for dizziness, light-headedness and headaches.   Psychiatric/Behavioral: The patient is nervous/anxious (stable).            Physical Exam  Physical Exam  Constitutional:       General: He is not in acute distress.     Appearance: He is well-developed.   HENT:      Head: Normocephalic and atraumatic.   Eyes:      Conjunctiva/sclera: Conjunctivae normal.   Neck:      Musculoskeletal: Normal range of motion.   Pulmonary:      Effort: Pulmonary effort is normal. No respiratory distress.   Neurological:      Mental Status: He is alert and oriented to person, place, and time.   Psychiatric:         Behavior: Behavior normal.         Thought Content: Thought content normal.         Judgment: Judgment normal.           Health Maintenance       Date Due Completion Date    Hepatitis C Screening 1950 ---    TETANUS VACCINE 01/22/1969 ---    Shingles Vaccine (1 of 2) 01/22/2001 ---    LDCT Lung Screen 02/13/2020 2/13/2019    PROSTATE-SPECIFIC  ANTIGEN 02/14/2021 2/14/2020    High Dose Statin 08/07/2021 8/7/2020    Colorectal Cancer Screening 07/21/2022 7/21/2017    Lipid Panel 02/14/2025 2/14/2020

## 2020-12-22 ENCOUNTER — PATIENT MESSAGE (OUTPATIENT)
Dept: FAMILY MEDICINE | Facility: CLINIC | Age: 70
End: 2020-12-22

## 2020-12-22 DIAGNOSIS — I10 ESSENTIAL HYPERTENSION: Chronic | ICD-10-CM

## 2020-12-23 ENCOUNTER — PATIENT MESSAGE (OUTPATIENT)
Dept: FAMILY MEDICINE | Facility: CLINIC | Age: 70
End: 2020-12-23

## 2020-12-23 RX ORDER — METOPROLOL SUCCINATE 25 MG/1
25 TABLET, EXTENDED RELEASE ORAL DAILY
Qty: 90 TABLET | Refills: 0 | Status: SHIPPED | OUTPATIENT
Start: 2020-12-23 | End: 2021-03-10 | Stop reason: SDUPTHER

## 2021-01-01 ENCOUNTER — PATIENT MESSAGE (OUTPATIENT)
Dept: FAMILY MEDICINE | Facility: CLINIC | Age: 71
End: 2021-01-01

## 2021-01-01 ENCOUNTER — HOSPITAL ENCOUNTER (EMERGENCY)
Facility: HOSPITAL | Age: 71
Discharge: HOME | End: 2021-01-01
Attending: EMERGENCY MEDICINE
Payer: MEDICARE

## 2021-01-01 ENCOUNTER — NURSE TRIAGE (OUTPATIENT)
Dept: ADMINISTRATIVE | Facility: CLINIC | Age: 71
End: 2021-01-01

## 2021-01-01 VITALS
HEIGHT: 68 IN | DIASTOLIC BLOOD PRESSURE: 76 MMHG | SYSTOLIC BLOOD PRESSURE: 137 MMHG | BODY MASS INDEX: 30.62 KG/M2 | TEMPERATURE: 98 F | RESPIRATION RATE: 18 BRPM | WEIGHT: 202 LBS | OXYGEN SATURATION: 97 % | HEART RATE: 60 BPM

## 2021-01-01 DIAGNOSIS — R00.1 BRADYCARDIA: ICD-10-CM

## 2021-01-01 PROCEDURE — 93005 ELECTROCARDIOGRAM TRACING: CPT

## 2021-01-01 PROCEDURE — 93010 EKG 12-LEAD: ICD-10-PCS | Mod: ,,, | Performed by: INTERNAL MEDICINE

## 2021-01-01 PROCEDURE — 93010 ELECTROCARDIOGRAM REPORT: CPT | Mod: ,,, | Performed by: INTERNAL MEDICINE

## 2021-01-01 PROCEDURE — 99283 EMERGENCY DEPT VISIT LOW MDM: CPT | Mod: 25

## 2021-01-01 RX ORDER — HYDROCHLOROTHIAZIDE 25 MG/1
25 TABLET ORAL DAILY
Qty: 90 TABLET | Refills: 3 | Status: SHIPPED | OUTPATIENT
Start: 2021-01-01 | End: 2021-12-27 | Stop reason: SDUPTHER

## 2021-01-04 ENCOUNTER — HOSPITAL ENCOUNTER (EMERGENCY)
Facility: HOSPITAL | Age: 71
Discharge: HOME | End: 2021-01-04
Attending: EMERGENCY MEDICINE
Payer: MEDICARE

## 2021-01-04 ENCOUNTER — TELEPHONE (OUTPATIENT)
Dept: FAMILY MEDICINE | Facility: CLINIC | Age: 71
End: 2021-01-04

## 2021-01-04 VITALS
BODY MASS INDEX: 30.01 KG/M2 | TEMPERATURE: 98 F | HEIGHT: 68 IN | WEIGHT: 198 LBS | RESPIRATION RATE: 18 BRPM | SYSTOLIC BLOOD PRESSURE: 147 MMHG | OXYGEN SATURATION: 96 % | DIASTOLIC BLOOD PRESSURE: 84 MMHG | HEART RATE: 69 BPM

## 2021-01-04 DIAGNOSIS — R07.9 CHEST PAIN: Primary | ICD-10-CM

## 2021-01-04 LAB
ALBUMIN SERPL BCP-MCNC: 4.5 G/DL (ref 3.5–5.2)
ALP SERPL-CCNC: 77 U/L (ref 55–135)
ALT SERPL W/O P-5'-P-CCNC: 45 U/L (ref 10–44)
ANION GAP SERPL CALC-SCNC: 11 MMOL/L (ref 8–16)
AST SERPL-CCNC: 32 U/L (ref 10–40)
BASOPHILS # BLD AUTO: 0.04 K/UL (ref 0–0.2)
BASOPHILS NFR BLD: 0.4 % (ref 0–1.9)
BILIRUB SERPL-MCNC: 1.4 MG/DL (ref 0.1–1)
BNP SERPL-MCNC: 13 PG/ML (ref 0–99)
BUN SERPL-MCNC: 19 MG/DL (ref 8–23)
CALCIUM SERPL-MCNC: 9.5 MG/DL (ref 8.7–10.5)
CHLORIDE SERPL-SCNC: 101 MMOL/L (ref 95–110)
CO2 SERPL-SCNC: 24 MMOL/L (ref 23–29)
CREAT SERPL-MCNC: 1.2 MG/DL (ref 0.5–1.4)
CTP QC/QA: YES
DIFFERENTIAL METHOD: ABNORMAL
EOSINOPHIL # BLD AUTO: 0 K/UL (ref 0–0.5)
EOSINOPHIL NFR BLD: 0.2 % (ref 0–8)
ERYTHROCYTE [DISTWIDTH] IN BLOOD BY AUTOMATED COUNT: 12.5 % (ref 11.5–14.5)
EST. GFR  (AFRICAN AMERICAN): >60 ML/MIN/1.73 M^2
EST. GFR  (NON AFRICAN AMERICAN): >60 ML/MIN/1.73 M^2
GLUCOSE SERPL-MCNC: 133 MG/DL (ref 70–110)
HCT VFR BLD AUTO: 45.6 % (ref 40–54)
HGB BLD-MCNC: 15.3 G/DL (ref 14–18)
IMM GRANULOCYTES # BLD AUTO: 0.04 K/UL (ref 0–0.04)
IMM GRANULOCYTES NFR BLD AUTO: 0.4 % (ref 0–0.5)
LYMPHOCYTES # BLD AUTO: 1.2 K/UL (ref 1–4.8)
LYMPHOCYTES NFR BLD: 11.3 % (ref 18–48)
MCH RBC QN AUTO: 28.4 PG (ref 27–31)
MCHC RBC AUTO-ENTMCNC: 33.6 G/DL (ref 32–36)
MCV RBC AUTO: 85 FL (ref 82–98)
MONOCYTES # BLD AUTO: 0.9 K/UL (ref 0.3–1)
MONOCYTES NFR BLD: 8.7 % (ref 4–15)
NEUTROPHILS # BLD AUTO: 8.5 K/UL (ref 1.8–7.7)
NEUTROPHILS NFR BLD: 79 % (ref 38–73)
NRBC BLD-RTO: 0 /100 WBC
PLATELET # BLD AUTO: 297 K/UL (ref 150–350)
PMV BLD AUTO: 10.2 FL (ref 9.2–12.9)
POTASSIUM SERPL-SCNC: 3.9 MMOL/L (ref 3.5–5.1)
PROT SERPL-MCNC: 7.7 G/DL (ref 6–8.4)
RBC # BLD AUTO: 5.39 M/UL (ref 4.6–6.2)
SARS-COV-2 RDRP RESP QL NAA+PROBE: NEGATIVE
SODIUM SERPL-SCNC: 136 MMOL/L (ref 136–145)
TROPONIN I SERPL DL<=0.01 NG/ML-MCNC: <0.006 NG/ML (ref 0–0.03)
WBC # BLD AUTO: 10.79 K/UL (ref 3.9–12.7)

## 2021-01-04 PROCEDURE — 84484 ASSAY OF TROPONIN QUANT: CPT

## 2021-01-04 PROCEDURE — 93010 EKG 12-LEAD: ICD-10-PCS | Mod: ,,, | Performed by: INTERNAL MEDICINE

## 2021-01-04 PROCEDURE — 80053 COMPREHEN METABOLIC PANEL: CPT

## 2021-01-04 PROCEDURE — U0002 COVID-19 LAB TEST NON-CDC: HCPCS | Performed by: EMERGENCY MEDICINE

## 2021-01-04 PROCEDURE — 85025 COMPLETE CBC W/AUTO DIFF WBC: CPT

## 2021-01-04 PROCEDURE — 99285 EMERGENCY DEPT VISIT HI MDM: CPT | Mod: 25

## 2021-01-04 PROCEDURE — 93010 ELECTROCARDIOGRAM REPORT: CPT | Mod: ,,, | Performed by: INTERNAL MEDICINE

## 2021-01-04 PROCEDURE — 83880 ASSAY OF NATRIURETIC PEPTIDE: CPT

## 2021-01-04 PROCEDURE — 93005 ELECTROCARDIOGRAM TRACING: CPT

## 2021-01-05 ENCOUNTER — PATIENT MESSAGE (OUTPATIENT)
Dept: FAMILY MEDICINE | Facility: CLINIC | Age: 71
End: 2021-01-05

## 2021-01-05 ENCOUNTER — TELEPHONE (OUTPATIENT)
Dept: FAMILY MEDICINE | Facility: CLINIC | Age: 71
End: 2021-01-05

## 2021-01-05 ENCOUNTER — PES CALL (OUTPATIENT)
Dept: ADMINISTRATIVE | Facility: CLINIC | Age: 71
End: 2021-01-05

## 2021-01-07 ENCOUNTER — PATIENT MESSAGE (OUTPATIENT)
Dept: FAMILY MEDICINE | Facility: CLINIC | Age: 71
End: 2021-01-07

## 2021-01-13 ENCOUNTER — PATIENT MESSAGE (OUTPATIENT)
Dept: CARDIOLOGY | Facility: CLINIC | Age: 71
End: 2021-01-13

## 2021-01-17 ENCOUNTER — PATIENT MESSAGE (OUTPATIENT)
Dept: CARDIOLOGY | Facility: CLINIC | Age: 71
End: 2021-01-17

## 2021-01-20 ENCOUNTER — PATIENT OUTREACH (OUTPATIENT)
Dept: ADMINISTRATIVE | Facility: OTHER | Age: 71
End: 2021-01-20

## 2021-01-21 ENCOUNTER — OFFICE VISIT (OUTPATIENT)
Dept: CARDIOLOGY | Facility: CLINIC | Age: 71
End: 2021-01-21
Payer: MEDICARE

## 2021-01-21 DIAGNOSIS — E78.5 HYPERLIPIDEMIA, UNSPECIFIED HYPERLIPIDEMIA TYPE: ICD-10-CM

## 2021-01-21 DIAGNOSIS — I70.0 ATHEROSCLEROSIS OF AORTA: ICD-10-CM

## 2021-01-21 DIAGNOSIS — F17.200 TOBACCO DEPENDENCE: ICD-10-CM

## 2021-01-21 DIAGNOSIS — N52.9 ERECTILE DYSFUNCTION, UNSPECIFIED ERECTILE DYSFUNCTION TYPE: ICD-10-CM

## 2021-01-21 DIAGNOSIS — I35.0 AORTIC VALVE STENOSIS, ETIOLOGY OF CARDIAC VALVE DISEASE UNSPECIFIED: ICD-10-CM

## 2021-01-21 DIAGNOSIS — K21.9 GASTROESOPHAGEAL REFLUX DISEASE WITHOUT ESOPHAGITIS: ICD-10-CM

## 2021-01-21 DIAGNOSIS — F41.1 GENERALIZED ANXIETY DISORDER: ICD-10-CM

## 2021-01-21 DIAGNOSIS — I10 ESSENTIAL HYPERTENSION: ICD-10-CM

## 2021-01-21 DIAGNOSIS — R07.9 CHEST PAIN, UNSPECIFIED TYPE: Primary | ICD-10-CM

## 2021-01-21 PROCEDURE — 1159F PR MEDICATION LIST DOCUMENTED IN MEDICAL RECORD: ICD-10-PCS | Mod: 95,,, | Performed by: INTERNAL MEDICINE

## 2021-01-21 PROCEDURE — 1159F MED LIST DOCD IN RCRD: CPT | Mod: 95,,, | Performed by: INTERNAL MEDICINE

## 2021-01-21 PROCEDURE — 99214 PR OFFICE/OUTPT VISIT, EST, LEVL IV, 30-39 MIN: ICD-10-PCS | Mod: 95,,, | Performed by: INTERNAL MEDICINE

## 2021-01-21 PROCEDURE — 99214 OFFICE O/P EST MOD 30 MIN: CPT | Mod: 95,,, | Performed by: INTERNAL MEDICINE

## 2021-01-21 RX ORDER — NITROGLYCERIN 0.4 MG/1
0.4 TABLET SUBLINGUAL EVERY 5 MIN PRN
Qty: 90 TABLET | Refills: 11 | Status: SHIPPED | OUTPATIENT
Start: 2021-01-21 | End: 2022-12-14

## 2021-01-26 ENCOUNTER — PATIENT MESSAGE (OUTPATIENT)
Dept: CARDIOLOGY | Facility: CLINIC | Age: 71
End: 2021-01-26

## 2021-01-29 ENCOUNTER — HOSPITAL ENCOUNTER (OUTPATIENT)
Dept: RADIOLOGY | Facility: HOSPITAL | Age: 71
Discharge: HOME | End: 2021-01-29
Attending: INTERNAL MEDICINE
Payer: MEDICARE

## 2021-01-29 ENCOUNTER — HOSPITAL ENCOUNTER (OUTPATIENT)
Dept: CARDIOLOGY | Facility: HOSPITAL | Age: 71
Discharge: HOME | End: 2021-01-29
Attending: INTERNAL MEDICINE
Payer: MEDICARE

## 2021-01-29 DIAGNOSIS — R07.9 CHEST PAIN, UNSPECIFIED TYPE: ICD-10-CM

## 2021-01-29 LAB
AORTIC ROOT ANNULUS: 2.79 CM
AORTIC VALVE CUSP SEPERATION: 1.65 CM
ASCENDING AORTA: 3.34 CM
AV INDEX (PROSTH): 0.33
AV MEAN GRADIENT: 10 MMHG
AV PEAK GRADIENT: 19 MMHG
AV VALVE AREA: 1.87 CM2
AV VELOCITY RATIO: 0.38
CV ECHO LV RWT: 0.5 CM
CV STRESS BASE HR: 59 BPM
DOP CALC AO PEAK VEL: 2.19 M/S
DOP CALC AO VTI: 47.9 CM
DOP CALC LVOT AREA: 5.7 CM2
DOP CALC LVOT DIAMETER: 2.7 CM
DOP CALC LVOT PEAK VEL: 0.84 M/S
DOP CALC LVOT STROKE VOLUME: 89.79 CM3
DOP CALCLVOT PEAK VEL VTI: 15.69 CM
E WAVE DECELERATION TIME: 348.27 MSEC
E/A RATIO: 1.07
E/E' RATIO: 12.8 M/S
ECHO LV POSTERIOR WALL: 1.15 CM (ref 0.6–1.1)
FRACTIONAL SHORTENING: 29 % (ref 28–44)
INTERVENTRICULAR SEPTUM: 1.18 CM (ref 0.6–1.1)
IVRT: 117.98 MSEC
LA MAJOR: 5.04 CM
LEFT ATRIUM SIZE: 4.12 CM
LEFT INTERNAL DIMENSION IN SYSTOLE: 3.29 CM (ref 2.1–4)
LEFT VENTRICLE DIASTOLIC VOLUME: 99.13 ML
LEFT VENTRICLE SYSTOLIC VOLUME: 43.79 ML
LEFT VENTRICULAR INTERNAL DIMENSION IN DIASTOLE: 4.64 CM (ref 3.5–6)
LEFT VENTRICULAR MASS: 199.21 G
LV LATERAL E/E' RATIO: 12 M/S
LV SEPTAL E/E' RATIO: 13.71 M/S
MV PEAK A VEL: 0.9 M/S
MV PEAK E VEL: 0.96 M/S
NUC STRESS DIASTOLIC VOLUME INDEX: 76
NUC STRESS EJECTION FRACTION: 68 %
NUC STRESS SYSTOLIC VOLUME INDEX: 25
OHS CV CPX 1 MINUTE RECOVERY HEART RATE: 117 BPM
OHS CV CPX 85 PERCENT MAX PREDICTED HEART RATE MALE: 128
OHS CV CPX ESTIMATED METS: 10
OHS CV CPX MAX PREDICTED HEART RATE: 150
OHS CV CPX PATIENT IS FEMALE: 0
OHS CV CPX PATIENT IS MALE: 1
OHS CV CPX PEAK HEAR RATE: 127 BPM
OHS CV CPX PERCENT MAX PREDICTED HEART RATE ACHIEVED: 85
PISA TR MAX VEL: 2 M/S
PULM VEIN S/D RATIO: 0.91
PV PEAK D VEL: 0.44 M/S
PV PEAK S VEL: 0.4 M/S
PV PEAK VELOCITY: 0.95 CM/S
RA MAJOR: 5.2 CM
RA PRESSURE: 8 MMHG
RA WIDTH: 3.5 CM
RIGHT VENTRICULAR END-DIASTOLIC DIMENSION: 3.17 CM
RV TISSUE DOPPLER FREE WALL SYSTOLIC VELOCITY 1 (APICAL 4 CHAMBER VIEW): 14.39 CM/S
SINUS: 3.3 CM
STJ: 2.99 CM
STRESS ECHO POST EXERCISE DUR MIN: 8 MINUTES
STRESS ECHO POST EXERCISE DUR SEC: 32 SECONDS
STRESS ST DEPRESSION: 1.5 MM
TDI LATERAL: 0.08 M/S
TDI SEPTAL: 0.07 M/S
TDI: 0.08 M/S
TR MAX PG: 16 MMHG
TRICUSPID ANNULAR PLANE SYSTOLIC EXCURSION: 2.43 CM
TV PEAK E VEL: 0.68 M/S
TV REST PULMONARY ARTERY PRESSURE: 24 MMHG

## 2021-01-29 PROCEDURE — 93018 STRESS TEST WITH MYOCARDIAL PERFUSION (CUPID ONLY): ICD-10-PCS | Mod: ,,, | Performed by: INTERNAL MEDICINE

## 2021-01-29 PROCEDURE — 93016 CV STRESS TEST SUPVJ ONLY: CPT | Mod: ,,, | Performed by: INTERNAL MEDICINE

## 2021-01-29 PROCEDURE — 93016 STRESS TEST WITH MYOCARDIAL PERFUSION (CUPID ONLY): ICD-10-PCS | Mod: ,,, | Performed by: INTERNAL MEDICINE

## 2021-01-29 PROCEDURE — 78452 HT MUSCLE IMAGE SPECT MULT: CPT | Mod: 26,,, | Performed by: INTERNAL MEDICINE

## 2021-01-29 PROCEDURE — 78452 STRESS TEST WITH MYOCARDIAL PERFUSION (CUPID ONLY): ICD-10-PCS | Mod: 26,,, | Performed by: INTERNAL MEDICINE

## 2021-01-29 PROCEDURE — 78452 HT MUSCLE IMAGE SPECT MULT: CPT

## 2021-01-29 PROCEDURE — 93017 CV STRESS TEST TRACING ONLY: CPT

## 2021-01-29 PROCEDURE — 93306 TTE W/DOPPLER COMPLETE: CPT

## 2021-01-29 PROCEDURE — 93306 ECHO (CUPID ONLY): ICD-10-PCS | Mod: 26,,, | Performed by: INTERNAL MEDICINE

## 2021-01-29 PROCEDURE — A9502 TC99M TETROFOSMIN: HCPCS

## 2021-01-29 PROCEDURE — 93018 CV STRESS TEST I&R ONLY: CPT | Mod: ,,, | Performed by: INTERNAL MEDICINE

## 2021-01-29 PROCEDURE — 93306 TTE W/DOPPLER COMPLETE: CPT | Mod: 26,,, | Performed by: INTERNAL MEDICINE

## 2021-02-01 ENCOUNTER — PATIENT OUTREACH (OUTPATIENT)
Dept: ADMINISTRATIVE | Facility: HOSPITAL | Age: 71
End: 2021-02-01

## 2021-02-22 ENCOUNTER — PATIENT OUTREACH (OUTPATIENT)
Dept: ADMINISTRATIVE | Facility: OTHER | Age: 71
End: 2021-02-22

## 2021-02-22 ENCOUNTER — OFFICE VISIT (OUTPATIENT)
Dept: CARDIOLOGY | Facility: CLINIC | Age: 71
End: 2021-02-22
Payer: MEDICARE

## 2021-02-22 DIAGNOSIS — I10 ESSENTIAL HYPERTENSION: ICD-10-CM

## 2021-02-22 DIAGNOSIS — I25.10 CAD (CORONARY ARTERY DISEASE): ICD-10-CM

## 2021-02-22 DIAGNOSIS — E78.5 HYPERLIPIDEMIA, UNSPECIFIED HYPERLIPIDEMIA TYPE: ICD-10-CM

## 2021-02-22 DIAGNOSIS — I25.10 CORONARY ARTERY DISEASE INVOLVING NATIVE CORONARY ARTERY, ANGINA PRESENCE UNSPECIFIED, UNSPECIFIED WHETHER NATIVE OR TRANSPLANTED HEART: Primary | ICD-10-CM

## 2021-02-22 DIAGNOSIS — I70.0 ATHEROSCLEROSIS OF AORTA: ICD-10-CM

## 2021-02-22 DIAGNOSIS — N52.9 ERECTILE DYSFUNCTION, UNSPECIFIED ERECTILE DYSFUNCTION TYPE: ICD-10-CM

## 2021-02-22 DIAGNOSIS — K21.9 GASTROESOPHAGEAL REFLUX DISEASE WITHOUT ESOPHAGITIS: ICD-10-CM

## 2021-02-22 PROCEDURE — 1159F MED LIST DOCD IN RCRD: CPT | Mod: 95,,, | Performed by: INTERNAL MEDICINE

## 2021-02-22 PROCEDURE — 99215 OFFICE O/P EST HI 40 MIN: CPT | Mod: 95,,, | Performed by: INTERNAL MEDICINE

## 2021-02-22 PROCEDURE — 1159F PR MEDICATION LIST DOCUMENTED IN MEDICAL RECORD: ICD-10-PCS | Mod: 95,,, | Performed by: INTERNAL MEDICINE

## 2021-02-22 PROCEDURE — 99215 PR OFFICE/OUTPT VISIT, EST, LEVL V, 40-54 MIN: ICD-10-PCS | Mod: 95,,, | Performed by: INTERNAL MEDICINE

## 2021-02-22 RX ORDER — DIPHENHYDRAMINE HCL 25 MG
50 CAPSULE ORAL ONCE
Status: CANCELLED | OUTPATIENT
Start: 2021-02-22 | End: 2021-02-22

## 2021-02-22 RX ORDER — SODIUM CHLORIDE 9 MG/ML
INJECTION, SOLUTION INTRAVENOUS CONTINUOUS
Status: CANCELLED | OUTPATIENT
Start: 2021-02-22

## 2021-02-22 RX ORDER — CLOPIDOGREL 300 MG/1
600 TABLET, FILM COATED ORAL ONCE
Status: CANCELLED | OUTPATIENT
Start: 2021-03-02

## 2021-02-25 ENCOUNTER — HOSPITAL ENCOUNTER (OUTPATIENT)
Dept: PREADMISSION TESTING | Facility: HOSPITAL | Age: 71
Discharge: HOME | End: 2021-02-25
Attending: INTERNAL MEDICINE
Payer: MEDICARE

## 2021-02-25 VITALS
WEIGHT: 200.38 LBS | RESPIRATION RATE: 18 BRPM | BODY MASS INDEX: 30.37 KG/M2 | SYSTOLIC BLOOD PRESSURE: 134 MMHG | DIASTOLIC BLOOD PRESSURE: 83 MMHG | HEART RATE: 56 BPM | HEIGHT: 68 IN | OXYGEN SATURATION: 98 %

## 2021-02-25 DIAGNOSIS — I25.10 CORONARY ARTERY DISEASE INVOLVING NATIVE CORONARY ARTERY, ANGINA PRESENCE UNSPECIFIED, UNSPECIFIED WHETHER NATIVE OR TRANSPLANTED HEART: ICD-10-CM

## 2021-02-25 LAB
ANION GAP SERPL CALC-SCNC: 7 MMOL/L (ref 8–16)
BASOPHILS # BLD AUTO: 0.07 K/UL (ref 0–0.2)
BASOPHILS NFR BLD: 0.8 % (ref 0–1.9)
BUN SERPL-MCNC: 21 MG/DL (ref 8–23)
CALCIUM SERPL-MCNC: 9.4 MG/DL (ref 8.7–10.5)
CHLORIDE SERPL-SCNC: 103 MMOL/L (ref 95–110)
CO2 SERPL-SCNC: 29 MMOL/L (ref 23–29)
CREAT SERPL-MCNC: 1.3 MG/DL (ref 0.5–1.4)
DIFFERENTIAL METHOD: NORMAL
EOSINOPHIL # BLD AUTO: 0.2 K/UL (ref 0–0.5)
EOSINOPHIL NFR BLD: 2.8 % (ref 0–8)
ERYTHROCYTE [DISTWIDTH] IN BLOOD BY AUTOMATED COUNT: 12.8 % (ref 11.5–14.5)
EST. GFR  (AFRICAN AMERICAN): >60 ML/MIN/1.73 M^2
EST. GFR  (NON AFRICAN AMERICAN): 55 ML/MIN/1.73 M^2
GLUCOSE SERPL-MCNC: 115 MG/DL (ref 70–110)
HCT VFR BLD AUTO: 43.3 % (ref 40–54)
HGB BLD-MCNC: 14.5 G/DL (ref 14–18)
IMM GRANULOCYTES # BLD AUTO: 0.04 K/UL (ref 0–0.04)
IMM GRANULOCYTES NFR BLD AUTO: 0.5 % (ref 0–0.5)
LYMPHOCYTES # BLD AUTO: 1.7 K/UL (ref 1–4.8)
LYMPHOCYTES NFR BLD: 19.7 % (ref 18–48)
MCH RBC QN AUTO: 28.2 PG (ref 27–31)
MCHC RBC AUTO-ENTMCNC: 33.5 G/DL (ref 32–36)
MCV RBC AUTO: 84 FL (ref 82–98)
MONOCYTES # BLD AUTO: 1 K/UL (ref 0.3–1)
MONOCYTES NFR BLD: 11.5 % (ref 4–15)
NEUTROPHILS # BLD AUTO: 5.6 K/UL (ref 1.8–7.7)
NEUTROPHILS NFR BLD: 64.7 % (ref 38–73)
NRBC BLD-RTO: 0 /100 WBC
PLATELET # BLD AUTO: 241 K/UL (ref 150–350)
PMV BLD AUTO: 9.7 FL (ref 9.2–12.9)
POTASSIUM SERPL-SCNC: 4.7 MMOL/L (ref 3.5–5.1)
RBC # BLD AUTO: 5.15 M/UL (ref 4.6–6.2)
SODIUM SERPL-SCNC: 139 MMOL/L (ref 136–145)
WBC # BLD AUTO: 8.66 K/UL (ref 3.9–12.7)

## 2021-02-25 PROCEDURE — 36415 COLL VENOUS BLD VENIPUNCTURE: CPT

## 2021-02-25 PROCEDURE — 85025 COMPLETE CBC W/AUTO DIFF WBC: CPT

## 2021-02-25 PROCEDURE — 80048 BASIC METABOLIC PNL TOTAL CA: CPT

## 2021-03-01 ENCOUNTER — HOSPITAL ENCOUNTER (OUTPATIENT)
Dept: PREADMISSION TESTING | Facility: HOSPITAL | Age: 71
Discharge: HOME | End: 2021-03-01
Attending: INTERNAL MEDICINE
Payer: MEDICARE

## 2021-03-01 DIAGNOSIS — Z01.818 PREOP TESTING: ICD-10-CM

## 2021-03-01 LAB — SARS-COV-2 RDRP RESP QL NAA+PROBE: NEGATIVE

## 2021-03-01 PROCEDURE — U0002 COVID-19 LAB TEST NON-CDC: HCPCS

## 2021-03-02 ENCOUNTER — HOSPITAL ENCOUNTER (OUTPATIENT)
Facility: HOSPITAL | Age: 71
Discharge: HOME | End: 2021-03-02
Attending: INTERNAL MEDICINE | Admitting: INTERNAL MEDICINE
Payer: MEDICARE

## 2021-03-02 VITALS
SYSTOLIC BLOOD PRESSURE: 131 MMHG | DIASTOLIC BLOOD PRESSURE: 76 MMHG | BODY MASS INDEX: 30.47 KG/M2 | TEMPERATURE: 98 F | RESPIRATION RATE: 21 BRPM | OXYGEN SATURATION: 95 % | HEART RATE: 58 BPM | WEIGHT: 200.38 LBS

## 2021-03-02 DIAGNOSIS — I25.10 CORONARY ARTERY DISEASE INVOLVING NATIVE CORONARY ARTERY, ANGINA PRESENCE UNSPECIFIED, UNSPECIFIED WHETHER NATIVE OR TRANSPLANTED HEART: ICD-10-CM

## 2021-03-02 DIAGNOSIS — E78.5 HYPERLIPIDEMIA, UNSPECIFIED HYPERLIPIDEMIA TYPE: ICD-10-CM

## 2021-03-02 DIAGNOSIS — I70.0 ATHEROSCLEROSIS OF AORTA: ICD-10-CM

## 2021-03-02 DIAGNOSIS — F51.04 PSYCHOPHYSIOLOGICAL INSOMNIA: ICD-10-CM

## 2021-03-02 DIAGNOSIS — N52.9 ERECTILE DYSFUNCTION, UNSPECIFIED ERECTILE DYSFUNCTION TYPE: ICD-10-CM

## 2021-03-02 DIAGNOSIS — Z01.818 PREOP TESTING: Primary | ICD-10-CM

## 2021-03-02 DIAGNOSIS — K21.9 GASTROESOPHAGEAL REFLUX DISEASE WITHOUT ESOPHAGITIS: ICD-10-CM

## 2021-03-02 DIAGNOSIS — F17.200 TOBACCO DEPENDENCE: ICD-10-CM

## 2021-03-02 DIAGNOSIS — I25.10 CAD (CORONARY ARTERY DISEASE): ICD-10-CM

## 2021-03-02 DIAGNOSIS — F41.1 GENERALIZED ANXIETY DISORDER: ICD-10-CM

## 2021-03-02 DIAGNOSIS — G25.0 ESSENTIAL TREMOR: ICD-10-CM

## 2021-03-02 DIAGNOSIS — I10 ESSENTIAL HYPERTENSION: ICD-10-CM

## 2021-03-02 PROCEDURE — C1887 CATHETER, GUIDING: HCPCS | Performed by: INTERNAL MEDICINE

## 2021-03-02 PROCEDURE — C1894 INTRO/SHEATH, NON-LASER: HCPCS | Performed by: INTERNAL MEDICINE

## 2021-03-02 PROCEDURE — 99152 PR MOD CONSCIOUS SEDATION, SAME PHYS, 5+ YRS, FIRST 15 MIN: ICD-10-PCS | Mod: ,,, | Performed by: INTERNAL MEDICINE

## 2021-03-02 PROCEDURE — 93458 PR CATH PLACE/CORON ANGIO, IMG SUPER/INTERP,W LEFT HEART VENTRICULOGRAPHY: ICD-10-PCS | Mod: 26,,, | Performed by: INTERNAL MEDICINE

## 2021-03-02 PROCEDURE — 93458 L HRT ARTERY/VENTRICLE ANGIO: CPT | Mod: 26,,, | Performed by: INTERNAL MEDICINE

## 2021-03-02 PROCEDURE — 93458 L HRT ARTERY/VENTRICLE ANGIO: CPT | Performed by: INTERNAL MEDICINE

## 2021-03-02 PROCEDURE — 25000003 PHARM REV CODE 250: Performed by: INTERNAL MEDICINE

## 2021-03-02 PROCEDURE — C1769 GUIDE WIRE: HCPCS | Performed by: INTERNAL MEDICINE

## 2021-03-02 PROCEDURE — 99152 MOD SED SAME PHYS/QHP 5/>YRS: CPT | Mod: ,,, | Performed by: INTERNAL MEDICINE

## 2021-03-02 PROCEDURE — 25500020 PHARM REV CODE 255: Performed by: INTERNAL MEDICINE

## 2021-03-02 PROCEDURE — 63600175 PHARM REV CODE 636 W HCPCS: Performed by: INTERNAL MEDICINE

## 2021-03-02 PROCEDURE — 99152 MOD SED SAME PHYS/QHP 5/>YRS: CPT | Performed by: INTERNAL MEDICINE

## 2021-03-02 RX ORDER — ONDANSETRON 2 MG/ML
4 INJECTION INTRAMUSCULAR; INTRAVENOUS EVERY 12 HOURS PRN
Status: DISCONTINUED | OUTPATIENT
Start: 2021-03-02 | End: 2021-03-02 | Stop reason: HOSPADM

## 2021-03-02 RX ORDER — CLOPIDOGREL 300 MG/1
600 TABLET, FILM COATED ORAL ONCE
Status: COMPLETED | OUTPATIENT
Start: 2021-03-02 | End: 2021-03-02

## 2021-03-02 RX ORDER — HEPARIN SODIUM 1000 [USP'U]/ML
INJECTION, SOLUTION INTRAVENOUS; SUBCUTANEOUS
Status: DISCONTINUED | OUTPATIENT
Start: 2021-03-02 | End: 2021-03-02 | Stop reason: HOSPADM

## 2021-03-02 RX ORDER — SODIUM CHLORIDE 9 MG/ML
INJECTION, SOLUTION INTRAVENOUS CONTINUOUS
Status: DISCONTINUED | OUTPATIENT
Start: 2021-03-02 | End: 2021-03-02 | Stop reason: HOSPADM

## 2021-03-02 RX ORDER — MIDAZOLAM HYDROCHLORIDE 1 MG/ML
INJECTION, SOLUTION INTRAMUSCULAR; INTRAVENOUS
Status: DISCONTINUED | OUTPATIENT
Start: 2021-03-02 | End: 2021-03-02 | Stop reason: HOSPADM

## 2021-03-02 RX ORDER — NITROGLYCERIN 20 MG/100ML
INJECTION INTRAVENOUS
Status: DISCONTINUED | OUTPATIENT
Start: 2021-03-02 | End: 2021-03-02 | Stop reason: HOSPADM

## 2021-03-02 RX ORDER — LIDOCAINE HYDROCHLORIDE 10 MG/ML
INJECTION, SOLUTION EPIDURAL; INFILTRATION; INTRACAUDAL; PERINEURAL
Status: DISCONTINUED | OUTPATIENT
Start: 2021-03-02 | End: 2021-03-02 | Stop reason: HOSPADM

## 2021-03-02 RX ORDER — DIPHENHYDRAMINE HCL 50 MG
50 CAPSULE ORAL ONCE
Status: COMPLETED | OUTPATIENT
Start: 2021-03-02 | End: 2021-03-02

## 2021-03-02 RX ORDER — VERAPAMIL HYDROCHLORIDE 2.5 MG/ML
INJECTION, SOLUTION INTRAVENOUS
Status: DISCONTINUED | OUTPATIENT
Start: 2021-03-02 | End: 2021-03-02 | Stop reason: HOSPADM

## 2021-03-02 RX ORDER — FENTANYL CITRATE 50 UG/ML
INJECTION, SOLUTION INTRAMUSCULAR; INTRAVENOUS
Status: DISCONTINUED | OUTPATIENT
Start: 2021-03-02 | End: 2021-03-02 | Stop reason: HOSPADM

## 2021-03-02 RX ORDER — ACETAMINOPHEN 325 MG/1
650 TABLET ORAL EVERY 4 HOURS PRN
Status: DISCONTINUED | OUTPATIENT
Start: 2021-03-02 | End: 2021-03-02 | Stop reason: HOSPADM

## 2021-03-02 RX ADMIN — SODIUM CHLORIDE: 0.9 INJECTION, SOLUTION INTRAVENOUS at 06:03

## 2021-03-02 RX ADMIN — DIPHENHYDRAMINE HYDROCHLORIDE 50 MG: 50 CAPSULE ORAL at 06:03

## 2021-03-02 RX ADMIN — CLOPIDOGREL BISULFATE 600 MG: 300 TABLET, FILM COATED ORAL at 06:03

## 2021-03-10 ENCOUNTER — PATIENT MESSAGE (OUTPATIENT)
Dept: FAMILY MEDICINE | Facility: CLINIC | Age: 71
End: 2021-03-10

## 2021-03-10 DIAGNOSIS — I10 ESSENTIAL HYPERTENSION: Chronic | ICD-10-CM

## 2021-03-10 DIAGNOSIS — F41.1 GENERALIZED ANXIETY DISORDER: Chronic | ICD-10-CM

## 2021-03-10 RX ORDER — SERTRALINE HYDROCHLORIDE 50 MG/1
50 TABLET, FILM COATED ORAL DAILY
Qty: 90 TABLET | Refills: 0 | Status: SHIPPED | OUTPATIENT
Start: 2021-03-10 | End: 2021-08-26

## 2021-03-10 RX ORDER — METOPROLOL SUCCINATE 25 MG/1
25 TABLET, EXTENDED RELEASE ORAL DAILY
Qty: 90 TABLET | Refills: 0 | Status: SHIPPED | OUTPATIENT
Start: 2021-03-10 | End: 2021-06-02

## 2021-03-15 ENCOUNTER — PATIENT OUTREACH (OUTPATIENT)
Dept: ADMINISTRATIVE | Facility: HOSPITAL | Age: 71
End: 2021-03-15

## 2021-03-17 ENCOUNTER — OFFICE VISIT (OUTPATIENT)
Dept: CARDIOLOGY | Facility: CLINIC | Age: 71
End: 2021-03-17
Payer: MEDICARE

## 2021-03-17 VITALS
SYSTOLIC BLOOD PRESSURE: 138 MMHG | OXYGEN SATURATION: 97 % | WEIGHT: 205 LBS | HEART RATE: 75 BPM | HEIGHT: 68 IN | RESPIRATION RATE: 17 BRPM | BODY MASS INDEX: 31.07 KG/M2 | DIASTOLIC BLOOD PRESSURE: 82 MMHG

## 2021-03-17 DIAGNOSIS — F17.200 TOBACCO DEPENDENCE: ICD-10-CM

## 2021-03-17 DIAGNOSIS — K21.9 GASTROESOPHAGEAL REFLUX DISEASE WITHOUT ESOPHAGITIS: ICD-10-CM

## 2021-03-17 DIAGNOSIS — E78.5 HYPERLIPIDEMIA, UNSPECIFIED HYPERLIPIDEMIA TYPE: ICD-10-CM

## 2021-03-17 DIAGNOSIS — I10 ESSENTIAL HYPERTENSION: ICD-10-CM

## 2021-03-17 DIAGNOSIS — I25.10 CORONARY ARTERY DISEASE, ANGINA PRESENCE UNSPECIFIED, UNSPECIFIED VESSEL OR LESION TYPE, UNSPECIFIED WHETHER NATIVE OR TRANSPLANTED HEART: Primary | ICD-10-CM

## 2021-03-17 DIAGNOSIS — F41.1 GENERALIZED ANXIETY DISORDER: ICD-10-CM

## 2021-03-17 DIAGNOSIS — I70.0 ATHEROSCLEROSIS OF AORTA: ICD-10-CM

## 2021-03-17 DIAGNOSIS — N52.9 ERECTILE DYSFUNCTION, UNSPECIFIED ERECTILE DYSFUNCTION TYPE: ICD-10-CM

## 2021-03-17 PROCEDURE — 3075F SYST BP GE 130 - 139MM HG: CPT | Mod: CPTII,S$GLB,, | Performed by: INTERNAL MEDICINE

## 2021-03-17 PROCEDURE — 3288F PR FALLS RISK ASSESSMENT DOCUMENTED: ICD-10-PCS | Mod: CPTII,S$GLB,, | Performed by: INTERNAL MEDICINE

## 2021-03-17 PROCEDURE — 1101F PT FALLS ASSESS-DOCD LE1/YR: CPT | Mod: CPTII,S$GLB,, | Performed by: INTERNAL MEDICINE

## 2021-03-17 PROCEDURE — 1101F PR PT FALLS ASSESS DOC 0-1 FALLS W/OUT INJ PAST YR: ICD-10-PCS | Mod: CPTII,S$GLB,, | Performed by: INTERNAL MEDICINE

## 2021-03-17 PROCEDURE — 99999 PR PBB SHADOW E&M-EST. PATIENT-LVL IV: CPT | Mod: PBBFAC,,, | Performed by: INTERNAL MEDICINE

## 2021-03-17 PROCEDURE — 3288F FALL RISK ASSESSMENT DOCD: CPT | Mod: CPTII,S$GLB,, | Performed by: INTERNAL MEDICINE

## 2021-03-17 PROCEDURE — 99214 OFFICE O/P EST MOD 30 MIN: CPT | Mod: S$GLB,,, | Performed by: INTERNAL MEDICINE

## 2021-03-17 PROCEDURE — 3079F DIAST BP 80-89 MM HG: CPT | Mod: CPTII,S$GLB,, | Performed by: INTERNAL MEDICINE

## 2021-03-17 PROCEDURE — 99214 PR OFFICE/OUTPT VISIT, EST, LEVL IV, 30-39 MIN: ICD-10-PCS | Mod: S$GLB,,, | Performed by: INTERNAL MEDICINE

## 2021-03-17 PROCEDURE — 3008F PR BODY MASS INDEX (BMI) DOCUMENTED: ICD-10-PCS | Mod: CPTII,S$GLB,, | Performed by: INTERNAL MEDICINE

## 2021-03-17 PROCEDURE — 1159F MED LIST DOCD IN RCRD: CPT | Mod: S$GLB,,, | Performed by: INTERNAL MEDICINE

## 2021-03-17 PROCEDURE — 99999 PR PBB SHADOW E&M-EST. PATIENT-LVL IV: ICD-10-PCS | Mod: PBBFAC,,, | Performed by: INTERNAL MEDICINE

## 2021-03-17 PROCEDURE — 1126F AMNT PAIN NOTED NONE PRSNT: CPT | Mod: S$GLB,,, | Performed by: INTERNAL MEDICINE

## 2021-03-17 PROCEDURE — 1126F PR PAIN SEVERITY QUANTIFIED, NO PAIN PRESENT: ICD-10-PCS | Mod: S$GLB,,, | Performed by: INTERNAL MEDICINE

## 2021-03-17 PROCEDURE — 3079F PR MOST RECENT DIASTOLIC BLOOD PRESSURE 80-89 MM HG: ICD-10-PCS | Mod: CPTII,S$GLB,, | Performed by: INTERNAL MEDICINE

## 2021-03-17 PROCEDURE — 3075F PR MOST RECENT SYSTOLIC BLOOD PRESS GE 130-139MM HG: ICD-10-PCS | Mod: CPTII,S$GLB,, | Performed by: INTERNAL MEDICINE

## 2021-03-17 PROCEDURE — 1159F PR MEDICATION LIST DOCUMENTED IN MEDICAL RECORD: ICD-10-PCS | Mod: S$GLB,,, | Performed by: INTERNAL MEDICINE

## 2021-03-17 PROCEDURE — 3008F BODY MASS INDEX DOCD: CPT | Mod: CPTII,S$GLB,, | Performed by: INTERNAL MEDICINE

## 2021-03-26 ENCOUNTER — LAB VISIT (OUTPATIENT)
Dept: LAB | Facility: HOSPITAL | Age: 71
End: 2021-03-26
Attending: INTERNAL MEDICINE
Payer: MEDICARE

## 2021-03-26 DIAGNOSIS — Z11.59 NEED FOR HEPATITIS C SCREENING TEST: ICD-10-CM

## 2021-03-26 DIAGNOSIS — Z12.5 SCREENING FOR PROSTATE CANCER: ICD-10-CM

## 2021-03-26 DIAGNOSIS — I10 ESSENTIAL HYPERTENSION: Chronic | ICD-10-CM

## 2021-03-26 LAB
ALBUMIN SERPL BCP-MCNC: 4.1 G/DL (ref 3.5–5.2)
ALP SERPL-CCNC: 92 U/L (ref 55–135)
ALT SERPL W/O P-5'-P-CCNC: 43 U/L (ref 10–44)
ANION GAP SERPL CALC-SCNC: 9 MMOL/L (ref 8–16)
AST SERPL-CCNC: 34 U/L (ref 10–40)
BASOPHILS # BLD AUTO: 0.07 K/UL (ref 0–0.2)
BASOPHILS NFR BLD: 1 % (ref 0–1.9)
BILIRUB SERPL-MCNC: 1 MG/DL (ref 0.1–1)
BUN SERPL-MCNC: 18 MG/DL (ref 8–23)
CALCIUM SERPL-MCNC: 9.6 MG/DL (ref 8.7–10.5)
CHLORIDE SERPL-SCNC: 104 MMOL/L (ref 95–110)
CHOLEST SERPL-MCNC: 178 MG/DL (ref 120–199)
CHOLEST/HDLC SERPL: 4.3 {RATIO} (ref 2–5)
CO2 SERPL-SCNC: 30 MMOL/L (ref 23–29)
COMPLEXED PSA SERPL-MCNC: 1.6 NG/ML (ref 0–4)
CREAT SERPL-MCNC: 1.2 MG/DL (ref 0.5–1.4)
DIFFERENTIAL METHOD: NORMAL
EOSINOPHIL # BLD AUTO: 0.3 K/UL (ref 0–0.5)
EOSINOPHIL NFR BLD: 3.9 % (ref 0–8)
ERYTHROCYTE [DISTWIDTH] IN BLOOD BY AUTOMATED COUNT: 13.5 % (ref 11.5–14.5)
EST. GFR  (AFRICAN AMERICAN): >60 ML/MIN/1.73 M^2
EST. GFR  (NON AFRICAN AMERICAN): >60 ML/MIN/1.73 M^2
GLUCOSE SERPL-MCNC: 114 MG/DL (ref 70–110)
HCT VFR BLD AUTO: 43.8 % (ref 40–54)
HDLC SERPL-MCNC: 41 MG/DL (ref 40–75)
HDLC SERPL: 23 % (ref 20–50)
HGB BLD-MCNC: 14.2 G/DL (ref 14–18)
IMM GRANULOCYTES # BLD AUTO: 0.03 K/UL (ref 0–0.04)
IMM GRANULOCYTES NFR BLD AUTO: 0.4 % (ref 0–0.5)
LDLC SERPL CALC-MCNC: 103.4 MG/DL (ref 63–159)
LYMPHOCYTES # BLD AUTO: 2.2 K/UL (ref 1–4.8)
LYMPHOCYTES NFR BLD: 29.9 % (ref 18–48)
MCH RBC QN AUTO: 28.5 PG (ref 27–31)
MCHC RBC AUTO-ENTMCNC: 32.4 G/DL (ref 32–36)
MCV RBC AUTO: 88 FL (ref 82–98)
MONOCYTES # BLD AUTO: 0.9 K/UL (ref 0.3–1)
MONOCYTES NFR BLD: 12.4 % (ref 4–15)
NEUTROPHILS # BLD AUTO: 3.8 K/UL (ref 1.8–7.7)
NEUTROPHILS NFR BLD: 52.4 % (ref 38–73)
NONHDLC SERPL-MCNC: 137 MG/DL
NRBC BLD-RTO: 0 /100 WBC
PLATELET # BLD AUTO: 319 K/UL (ref 150–350)
PMV BLD AUTO: 11 FL (ref 9.2–12.9)
POTASSIUM SERPL-SCNC: 4.6 MMOL/L (ref 3.5–5.1)
PROT SERPL-MCNC: 7.3 G/DL (ref 6–8.4)
RBC # BLD AUTO: 4.98 M/UL (ref 4.6–6.2)
SODIUM SERPL-SCNC: 143 MMOL/L (ref 136–145)
TRIGL SERPL-MCNC: 168 MG/DL (ref 30–150)
WBC # BLD AUTO: 7.19 K/UL (ref 3.9–12.7)

## 2021-03-26 PROCEDURE — 84153 ASSAY OF PSA TOTAL: CPT | Performed by: INTERNAL MEDICINE

## 2021-03-26 PROCEDURE — 86803 HEPATITIS C AB TEST: CPT | Performed by: INTERNAL MEDICINE

## 2021-03-26 PROCEDURE — 36415 COLL VENOUS BLD VENIPUNCTURE: CPT | Mod: PO | Performed by: INTERNAL MEDICINE

## 2021-03-26 PROCEDURE — 80053 COMPREHEN METABOLIC PANEL: CPT | Performed by: INTERNAL MEDICINE

## 2021-03-26 PROCEDURE — 85025 COMPLETE CBC W/AUTO DIFF WBC: CPT | Performed by: INTERNAL MEDICINE

## 2021-03-26 PROCEDURE — 80061 LIPID PANEL: CPT | Performed by: INTERNAL MEDICINE

## 2021-03-29 ENCOUNTER — OFFICE VISIT (OUTPATIENT)
Dept: FAMILY MEDICINE | Facility: CLINIC | Age: 71
End: 2021-03-29
Payer: MEDICARE

## 2021-03-29 VITALS
DIASTOLIC BLOOD PRESSURE: 80 MMHG | TEMPERATURE: 98 F | HEART RATE: 67 BPM | SYSTOLIC BLOOD PRESSURE: 126 MMHG | WEIGHT: 208 LBS | BODY MASS INDEX: 31.52 KG/M2 | OXYGEN SATURATION: 99 % | HEIGHT: 68 IN

## 2021-03-29 DIAGNOSIS — I70.0 ATHEROSCLEROSIS OF AORTA: Chronic | ICD-10-CM

## 2021-03-29 DIAGNOSIS — I10 ESSENTIAL HYPERTENSION: ICD-10-CM

## 2021-03-29 DIAGNOSIS — Z00.00 ROUTINE MEDICAL EXAM: Primary | ICD-10-CM

## 2021-03-29 DIAGNOSIS — Z11.59 NEED FOR HEPATITIS C SCREENING TEST: ICD-10-CM

## 2021-03-29 DIAGNOSIS — E78.5 HYPERLIPIDEMIA, UNSPECIFIED HYPERLIPIDEMIA TYPE: ICD-10-CM

## 2021-03-29 DIAGNOSIS — I25.10 CORONARY ARTERY DISEASE, ANGINA PRESENCE UNSPECIFIED, UNSPECIFIED VESSEL OR LESION TYPE, UNSPECIFIED WHETHER NATIVE OR TRANSPLANTED HEART: ICD-10-CM

## 2021-03-29 DIAGNOSIS — F41.1 GENERALIZED ANXIETY DISORDER: ICD-10-CM

## 2021-03-29 DIAGNOSIS — F17.200 TOBACCO DEPENDENCE: ICD-10-CM

## 2021-03-29 LAB — HCV AB SERPL QL IA: NEGATIVE

## 2021-03-29 PROCEDURE — 99214 OFFICE O/P EST MOD 30 MIN: CPT | Mod: S$GLB,,, | Performed by: INTERNAL MEDICINE

## 2021-03-29 PROCEDURE — 1126F PR PAIN SEVERITY QUANTIFIED, NO PAIN PRESENT: ICD-10-PCS | Mod: S$GLB,,, | Performed by: INTERNAL MEDICINE

## 2021-03-29 PROCEDURE — 3288F FALL RISK ASSESSMENT DOCD: CPT | Mod: CPTII,S$GLB,, | Performed by: INTERNAL MEDICINE

## 2021-03-29 PROCEDURE — 3074F PR MOST RECENT SYSTOLIC BLOOD PRESSURE < 130 MM HG: ICD-10-PCS | Mod: CPTII,S$GLB,, | Performed by: INTERNAL MEDICINE

## 2021-03-29 PROCEDURE — 1126F AMNT PAIN NOTED NONE PRSNT: CPT | Mod: S$GLB,,, | Performed by: INTERNAL MEDICINE

## 2021-03-29 PROCEDURE — 99214 PR OFFICE/OUTPT VISIT, EST, LEVL IV, 30-39 MIN: ICD-10-PCS | Mod: S$GLB,,, | Performed by: INTERNAL MEDICINE

## 2021-03-29 PROCEDURE — 99499 RISK ADDL DX/OHS AUDIT: ICD-10-PCS | Mod: S$GLB,,, | Performed by: INTERNAL MEDICINE

## 2021-03-29 PROCEDURE — 99499 UNLISTED E&M SERVICE: CPT | Mod: S$GLB,,, | Performed by: INTERNAL MEDICINE

## 2021-03-29 PROCEDURE — 99999 PR PBB SHADOW E&M-EST. PATIENT-LVL IV: ICD-10-PCS | Mod: PBBFAC,,, | Performed by: INTERNAL MEDICINE

## 2021-03-29 PROCEDURE — 1101F PT FALLS ASSESS-DOCD LE1/YR: CPT | Mod: CPTII,S$GLB,, | Performed by: INTERNAL MEDICINE

## 2021-03-29 PROCEDURE — 3074F SYST BP LT 130 MM HG: CPT | Mod: CPTII,S$GLB,, | Performed by: INTERNAL MEDICINE

## 2021-03-29 PROCEDURE — 3079F PR MOST RECENT DIASTOLIC BLOOD PRESSURE 80-89 MM HG: ICD-10-PCS | Mod: CPTII,S$GLB,, | Performed by: INTERNAL MEDICINE

## 2021-03-29 PROCEDURE — 3079F DIAST BP 80-89 MM HG: CPT | Mod: CPTII,S$GLB,, | Performed by: INTERNAL MEDICINE

## 2021-03-29 PROCEDURE — 3008F BODY MASS INDEX DOCD: CPT | Mod: CPTII,S$GLB,, | Performed by: INTERNAL MEDICINE

## 2021-03-29 PROCEDURE — 1101F PR PT FALLS ASSESS DOC 0-1 FALLS W/OUT INJ PAST YR: ICD-10-PCS | Mod: CPTII,S$GLB,, | Performed by: INTERNAL MEDICINE

## 2021-03-29 PROCEDURE — 3008F PR BODY MASS INDEX (BMI) DOCUMENTED: ICD-10-PCS | Mod: CPTII,S$GLB,, | Performed by: INTERNAL MEDICINE

## 2021-03-29 PROCEDURE — 99999 PR PBB SHADOW E&M-EST. PATIENT-LVL IV: CPT | Mod: PBBFAC,,, | Performed by: INTERNAL MEDICINE

## 2021-03-29 PROCEDURE — 3288F PR FALLS RISK ASSESSMENT DOCUMENTED: ICD-10-PCS | Mod: CPTII,S$GLB,, | Performed by: INTERNAL MEDICINE

## 2021-05-26 DIAGNOSIS — F51.04 PSYCHOPHYSIOLOGICAL INSOMNIA: Chronic | ICD-10-CM

## 2021-05-26 RX ORDER — HYDROXYZINE PAMOATE 50 MG/1
CAPSULE ORAL
Qty: 180 CAPSULE | Refills: 0 | Status: SHIPPED | OUTPATIENT
Start: 2021-05-26 | End: 2021-08-17

## 2021-06-02 DIAGNOSIS — I10 ESSENTIAL HYPERTENSION: Chronic | ICD-10-CM

## 2021-06-02 RX ORDER — METOPROLOL SUCCINATE 25 MG/1
TABLET, EXTENDED RELEASE ORAL
Qty: 90 TABLET | Refills: 1 | Status: SHIPPED | OUTPATIENT
Start: 2021-06-02 | End: 2021-06-11 | Stop reason: SDUPTHER

## 2021-06-11 ENCOUNTER — OFFICE VISIT (OUTPATIENT)
Dept: CARDIOLOGY | Facility: CLINIC | Age: 71
End: 2021-06-11
Payer: MEDICARE

## 2021-06-11 DIAGNOSIS — I10 ESSENTIAL HYPERTENSION: Chronic | ICD-10-CM

## 2021-06-11 PROCEDURE — 1159F MED LIST DOCD IN RCRD: CPT | Mod: 95,,, | Performed by: INTERNAL MEDICINE

## 2021-06-11 PROCEDURE — 1159F PR MEDICATION LIST DOCUMENTED IN MEDICAL RECORD: ICD-10-PCS | Mod: 95,,, | Performed by: INTERNAL MEDICINE

## 2021-06-11 PROCEDURE — 99214 OFFICE O/P EST MOD 30 MIN: CPT | Mod: 95,,, | Performed by: INTERNAL MEDICINE

## 2021-06-11 PROCEDURE — 99499 UNLISTED E&M SERVICE: CPT | Mod: 95,,, | Performed by: INTERNAL MEDICINE

## 2021-06-11 PROCEDURE — 99214 PR OFFICE/OUTPT VISIT, EST, LEVL IV, 30-39 MIN: ICD-10-PCS | Mod: 95,,, | Performed by: INTERNAL MEDICINE

## 2021-06-11 PROCEDURE — 99499 RISK ADDL DX/OHS AUDIT: ICD-10-PCS | Mod: 95,,, | Performed by: INTERNAL MEDICINE

## 2021-06-11 RX ORDER — METOPROLOL SUCCINATE 25 MG/1
25 TABLET, EXTENDED RELEASE ORAL DAILY
Qty: 90 TABLET | Refills: 3 | Status: SHIPPED | OUTPATIENT
Start: 2021-06-11 | End: 2021-10-06

## 2021-08-26 DIAGNOSIS — F41.1 GENERALIZED ANXIETY DISORDER: Chronic | ICD-10-CM

## 2021-08-26 RX ORDER — SERTRALINE HYDROCHLORIDE 50 MG/1
TABLET, FILM COATED ORAL
Qty: 90 TABLET | Refills: 1 | Status: SHIPPED | OUTPATIENT
Start: 2021-08-26 | End: 2022-02-21

## 2021-10-14 ENCOUNTER — PES CALL (OUTPATIENT)
Dept: ADMINISTRATIVE | Facility: CLINIC | Age: 71
End: 2021-10-14

## 2021-11-18 ENCOUNTER — PES CALL (OUTPATIENT)
Dept: ADMINISTRATIVE | Facility: CLINIC | Age: 71
End: 2021-11-18
Payer: MEDICARE

## 2021-11-18 ENCOUNTER — TELEPHONE (OUTPATIENT)
Dept: FAMILY MEDICINE | Facility: CLINIC | Age: 71
End: 2021-11-18
Payer: MEDICARE

## 2021-11-22 ENCOUNTER — TELEPHONE (OUTPATIENT)
Dept: ADMINISTRATIVE | Facility: CLINIC | Age: 71
End: 2021-11-22
Payer: MEDICARE

## 2021-11-23 ENCOUNTER — PATIENT MESSAGE (OUTPATIENT)
Dept: FAMILY MEDICINE | Facility: CLINIC | Age: 71
End: 2021-11-23
Payer: MEDICARE

## 2021-11-23 DIAGNOSIS — F51.04 PSYCHOPHYSIOLOGICAL INSOMNIA: Chronic | ICD-10-CM

## 2021-11-23 RX ORDER — HYDROXYZINE PAMOATE 50 MG/1
CAPSULE ORAL
Qty: 180 CAPSULE | Refills: 0 | Status: SHIPPED | OUTPATIENT
Start: 2021-11-23 | End: 2022-02-17

## 2021-11-24 ENCOUNTER — OFFICE VISIT (OUTPATIENT)
Dept: HOME HEALTH SERVICES | Facility: CLINIC | Age: 71
End: 2021-11-24
Payer: MEDICARE

## 2021-11-24 ENCOUNTER — TELEPHONE (OUTPATIENT)
Dept: ADMINISTRATIVE | Facility: CLINIC | Age: 71
End: 2021-11-24
Payer: MEDICARE

## 2021-11-24 VITALS
HEIGHT: 68 IN | BODY MASS INDEX: 31.83 KG/M2 | DIASTOLIC BLOOD PRESSURE: 76 MMHG | TEMPERATURE: 98 F | SYSTOLIC BLOOD PRESSURE: 140 MMHG | HEART RATE: 64 BPM | WEIGHT: 210 LBS

## 2021-11-24 DIAGNOSIS — N52.9 ERECTILE DYSFUNCTION, UNSPECIFIED ERECTILE DYSFUNCTION TYPE: Chronic | ICD-10-CM

## 2021-11-24 DIAGNOSIS — I25.10 CORONARY ARTERY DISEASE, UNSPECIFIED VESSEL OR LESION TYPE, UNSPECIFIED WHETHER ANGINA PRESENT, UNSPECIFIED WHETHER NATIVE OR TRANSPLANTED HEART: Chronic | ICD-10-CM

## 2021-11-24 DIAGNOSIS — Z00.00 ENCOUNTER FOR PREVENTIVE HEALTH EXAMINATION: Primary | ICD-10-CM

## 2021-11-24 DIAGNOSIS — I10 ESSENTIAL HYPERTENSION: Chronic | ICD-10-CM

## 2021-11-24 DIAGNOSIS — F51.04 PSYCHOPHYSIOLOGICAL INSOMNIA: Chronic | ICD-10-CM

## 2021-11-24 DIAGNOSIS — F41.1 GENERALIZED ANXIETY DISORDER: Chronic | ICD-10-CM

## 2021-11-24 DIAGNOSIS — E78.5 HYPERLIPIDEMIA, UNSPECIFIED HYPERLIPIDEMIA TYPE: Chronic | ICD-10-CM

## 2021-11-24 DIAGNOSIS — F17.200 TOBACCO DEPENDENCE: Chronic | ICD-10-CM

## 2021-11-24 DIAGNOSIS — I70.0 ATHEROSCLEROSIS OF AORTA: Chronic | ICD-10-CM

## 2021-11-24 DIAGNOSIS — K21.9 GASTROESOPHAGEAL REFLUX DISEASE WITHOUT ESOPHAGITIS: Chronic | ICD-10-CM

## 2021-11-24 PROCEDURE — 99499 RISK ADDL DX/OHS AUDIT: ICD-10-PCS | Mod: S$GLB,,, | Performed by: NURSE PRACTITIONER

## 2021-11-24 PROCEDURE — G0439 PR MEDICARE ANNUAL WELLNESS SUBSEQUENT VISIT: ICD-10-PCS | Mod: 95,,, | Performed by: NURSE PRACTITIONER

## 2021-11-24 PROCEDURE — G0439 PPPS, SUBSEQ VISIT: HCPCS | Mod: 95,,, | Performed by: NURSE PRACTITIONER

## 2021-11-24 PROCEDURE — 99499 UNLISTED E&M SERVICE: CPT | Mod: S$GLB,,, | Performed by: NURSE PRACTITIONER

## 2021-11-30 ENCOUNTER — TELEPHONE (OUTPATIENT)
Dept: FAMILY MEDICINE | Facility: CLINIC | Age: 71
End: 2021-11-30
Payer: MEDICARE

## 2021-11-30 ENCOUNTER — PATIENT MESSAGE (OUTPATIENT)
Dept: FAMILY MEDICINE | Facility: CLINIC | Age: 71
End: 2021-11-30
Payer: MEDICARE

## 2021-12-01 ENCOUNTER — PATIENT MESSAGE (OUTPATIENT)
Dept: FAMILY MEDICINE | Facility: CLINIC | Age: 71
End: 2021-12-01
Payer: MEDICARE

## 2021-12-02 ENCOUNTER — PATIENT MESSAGE (OUTPATIENT)
Dept: ADMINISTRATIVE | Facility: OTHER | Age: 71
End: 2021-12-02
Payer: MEDICARE

## 2021-12-08 ENCOUNTER — PATIENT MESSAGE (OUTPATIENT)
Dept: FAMILY MEDICINE | Facility: CLINIC | Age: 71
End: 2021-12-08
Payer: MEDICARE

## 2021-12-08 DIAGNOSIS — I10 ESSENTIAL HYPERTENSION: Chronic | ICD-10-CM

## 2021-12-08 RX ORDER — LOSARTAN POTASSIUM 25 MG/1
25 TABLET ORAL DAILY
Qty: 90 TABLET | Refills: 3 | Status: SHIPPED | OUTPATIENT
Start: 2021-12-08 | End: 2022-11-25

## 2021-12-23 ENCOUNTER — PES CALL (OUTPATIENT)
Dept: ADMINISTRATIVE | Facility: CLINIC | Age: 71
End: 2021-12-23
Payer: MEDICARE

## 2021-12-27 NOTE — TELEPHONE ENCOUNTER
Care Due:                  Date            Visit Type   Department     Provider  --------------------------------------------------------------------------------                                             [facility]                                           MED/ INTERNAL  Last Visit: 03-      None         MED/ J[facility] Ewing  Next Visit: None Scheduled  None         None Found                                                            Last  Test          Frequency    Reason                     Performed    Due Date  --------------------------------------------------------------------------------    Office Visit  12 months..  atorvastatin, losartan,    03- 03-                             omeprazole, sertraline...    CMP.........  12 months..  atorvastatin, losartan...  Not Found    Overdue    Lipid Panel.  12 months..  atorvastatin.............  Not Found    Overdue    Powered by [employer]. Reference number: [id number].   12/27/2021 3:44:26 PM CST

## 2021-12-27 NOTE — TELEPHONE ENCOUNTER
This Rx Request does not qualify for assessment with the OR.    Please review Protocol Details and the Care Due Message for extra clinical information.     Reasons Rx Request may be deferred:  Labs/Vitals overdue  Labs/Vitals abnormal  Patient has been seen in the ED/Hospital since the last PCP visit  Medication is outside of scope

## 2021-12-28 ENCOUNTER — PES CALL (OUTPATIENT)
Dept: ADMINISTRATIVE | Facility: CLINIC | Age: 71
End: 2021-12-28
Payer: MEDICARE

## 2021-12-28 ENCOUNTER — PATIENT MESSAGE (OUTPATIENT)
Dept: FAMILY MEDICINE | Facility: CLINIC | Age: 71
End: 2021-12-28
Payer: MEDICARE

## 2021-12-28 RX ORDER — HYDROCHLOROTHIAZIDE 25 MG/1
25 TABLET ORAL DAILY
Qty: 90 TABLET | Refills: 3 | Status: SHIPPED | OUTPATIENT
Start: 2021-12-28 | End: 2022-07-13

## 2021-12-30 ENCOUNTER — PES CALL (OUTPATIENT)
Dept: ADMINISTRATIVE | Facility: CLINIC | Age: 71
End: 2021-12-30
Payer: MEDICARE

## 2022-01-27 ENCOUNTER — PES CALL (OUTPATIENT)
Dept: ADMINISTRATIVE | Facility: CLINIC | Age: 72
End: 2022-01-27
Payer: MEDICARE

## 2022-01-31 ENCOUNTER — OFFICE VISIT (OUTPATIENT)
Dept: FAMILY MEDICINE | Facility: CLINIC | Age: 72
End: 2022-01-31
Payer: MEDICARE

## 2022-01-31 VITALS
TEMPERATURE: 98 F | BODY MASS INDEX: 32.99 KG/M2 | HEART RATE: 64 BPM | HEIGHT: 68 IN | OXYGEN SATURATION: 97 % | DIASTOLIC BLOOD PRESSURE: 74 MMHG | SYSTOLIC BLOOD PRESSURE: 126 MMHG | WEIGHT: 217.69 LBS

## 2022-01-31 DIAGNOSIS — N52.1 ERECTILE DYSFUNCTION DUE TO DISEASES CLASSIFIED ELSEWHERE: Chronic | ICD-10-CM

## 2022-01-31 DIAGNOSIS — F41.1 GENERALIZED ANXIETY DISORDER: Chronic | ICD-10-CM

## 2022-01-31 DIAGNOSIS — I10 ESSENTIAL HYPERTENSION: Chronic | ICD-10-CM

## 2022-01-31 DIAGNOSIS — E78.5 HYPERLIPIDEMIA, UNSPECIFIED HYPERLIPIDEMIA TYPE: Chronic | ICD-10-CM

## 2022-01-31 DIAGNOSIS — K21.9 GASTROESOPHAGEAL REFLUX DISEASE, UNSPECIFIED WHETHER ESOPHAGITIS PRESENT: Chronic | ICD-10-CM

## 2022-01-31 DIAGNOSIS — F51.04 PSYCHOPHYSIOLOGICAL INSOMNIA: Chronic | ICD-10-CM

## 2022-01-31 DIAGNOSIS — F17.200 TOBACCO DEPENDENCE: Chronic | ICD-10-CM

## 2022-01-31 DIAGNOSIS — Z00.00 ENCOUNTER FOR PREVENTIVE HEALTH EXAMINATION: Primary | ICD-10-CM

## 2022-01-31 DIAGNOSIS — I25.10 CORONARY ARTERY DISEASE INVOLVING NATIVE HEART, UNSPECIFIED VESSEL OR LESION TYPE, UNSPECIFIED WHETHER ANGINA PRESENT: Chronic | ICD-10-CM

## 2022-01-31 DIAGNOSIS — I70.0 ATHEROSCLEROSIS OF AORTA: Chronic | ICD-10-CM

## 2022-01-31 PROCEDURE — 99999 PR PBB SHADOW E&M-EST. PATIENT-LVL V: CPT | Mod: PBBFAC,,, | Performed by: NURSE PRACTITIONER

## 2022-01-31 PROCEDURE — 3074F SYST BP LT 130 MM HG: CPT | Mod: CPTII,S$GLB,, | Performed by: NURSE PRACTITIONER

## 2022-01-31 PROCEDURE — 99499 UNLISTED E&M SERVICE: CPT | Mod: S$GLB,,, | Performed by: NURSE PRACTITIONER

## 2022-01-31 PROCEDURE — 1126F PR PAIN SEVERITY QUANTIFIED, NO PAIN PRESENT: ICD-10-PCS | Mod: CPTII,S$GLB,, | Performed by: NURSE PRACTITIONER

## 2022-01-31 PROCEDURE — 99999 PR PBB SHADOW E&M-EST. PATIENT-LVL V: ICD-10-PCS | Mod: PBBFAC,,, | Performed by: NURSE PRACTITIONER

## 2022-01-31 PROCEDURE — 3288F FALL RISK ASSESSMENT DOCD: CPT | Mod: CPTII,S$GLB,, | Performed by: NURSE PRACTITIONER

## 2022-01-31 PROCEDURE — 99499 RISK ADDL DX/OHS AUDIT: ICD-10-PCS | Mod: S$GLB,,, | Performed by: NURSE PRACTITIONER

## 2022-01-31 PROCEDURE — 1159F PR MEDICATION LIST DOCUMENTED IN MEDICAL RECORD: ICD-10-PCS | Mod: CPTII,S$GLB,, | Performed by: NURSE PRACTITIONER

## 2022-01-31 PROCEDURE — G0439 PR MEDICARE ANNUAL WELLNESS SUBSEQUENT VISIT: ICD-10-PCS | Mod: S$GLB,,, | Performed by: NURSE PRACTITIONER

## 2022-01-31 PROCEDURE — 1126F AMNT PAIN NOTED NONE PRSNT: CPT | Mod: CPTII,S$GLB,, | Performed by: NURSE PRACTITIONER

## 2022-01-31 PROCEDURE — 1101F PR PT FALLS ASSESS DOC 0-1 FALLS W/OUT INJ PAST YR: ICD-10-PCS | Mod: CPTII,S$GLB,, | Performed by: NURSE PRACTITIONER

## 2022-01-31 PROCEDURE — 3008F PR BODY MASS INDEX (BMI) DOCUMENTED: ICD-10-PCS | Mod: CPTII,S$GLB,, | Performed by: NURSE PRACTITIONER

## 2022-01-31 PROCEDURE — 1170F PR FUNCTIONAL STATUS ASSESSED: ICD-10-PCS | Mod: CPTII,S$GLB,, | Performed by: NURSE PRACTITIONER

## 2022-01-31 PROCEDURE — 3008F BODY MASS INDEX DOCD: CPT | Mod: CPTII,S$GLB,, | Performed by: NURSE PRACTITIONER

## 2022-01-31 PROCEDURE — 1160F RVW MEDS BY RX/DR IN RCRD: CPT | Mod: CPTII,S$GLB,, | Performed by: NURSE PRACTITIONER

## 2022-01-31 PROCEDURE — 1159F MED LIST DOCD IN RCRD: CPT | Mod: CPTII,S$GLB,, | Performed by: NURSE PRACTITIONER

## 2022-01-31 PROCEDURE — 3288F PR FALLS RISK ASSESSMENT DOCUMENTED: ICD-10-PCS | Mod: CPTII,S$GLB,, | Performed by: NURSE PRACTITIONER

## 2022-01-31 PROCEDURE — G0439 PPPS, SUBSEQ VISIT: HCPCS | Mod: S$GLB,,, | Performed by: NURSE PRACTITIONER

## 2022-01-31 PROCEDURE — 3074F PR MOST RECENT SYSTOLIC BLOOD PRESSURE < 130 MM HG: ICD-10-PCS | Mod: CPTII,S$GLB,, | Performed by: NURSE PRACTITIONER

## 2022-01-31 PROCEDURE — 1160F PR REVIEW ALL MEDS BY PRESCRIBER/CLIN PHARMACIST DOCUMENTED: ICD-10-PCS | Mod: CPTII,S$GLB,, | Performed by: NURSE PRACTITIONER

## 2022-01-31 PROCEDURE — 1170F FXNL STATUS ASSESSED: CPT | Mod: CPTII,S$GLB,, | Performed by: NURSE PRACTITIONER

## 2022-01-31 PROCEDURE — 3078F DIAST BP <80 MM HG: CPT | Mod: CPTII,S$GLB,, | Performed by: NURSE PRACTITIONER

## 2022-01-31 PROCEDURE — 3078F PR MOST RECENT DIASTOLIC BLOOD PRESSURE < 80 MM HG: ICD-10-PCS | Mod: CPTII,S$GLB,, | Performed by: NURSE PRACTITIONER

## 2022-01-31 PROCEDURE — 1101F PT FALLS ASSESS-DOCD LE1/YR: CPT | Mod: CPTII,S$GLB,, | Performed by: NURSE PRACTITIONER

## 2022-01-31 NOTE — PATIENT INSTRUCTIONS
Counseling and Referral of Other Preventative  (Italic type indicates deductible and co-insurance are waived)    Patient Name: Yair Owens  Today's Date: 1/31/2022    Health Maintenance       Date Due Completion Date    TETANUS VACCINE Never done ---    Shingles Vaccine (1 of 2) Never done ---    LDCT Lung Screen 01/31/2023 (Originally 2/13/2020) 2/13/2019    PROSTATE-SPECIFIC ANTIGEN 03/26/2022 3/26/2021    Colorectal Cancer Screening 07/21/2022 7/21/2017    High Dose Statin 01/31/2023 1/31/2022    Lipid Panel 03/26/2026 3/26/2021        No orders of the defined types were placed in this encounter.    The following information is provided to all patients.  This information is to help you find resources for any of the problems found today that may be affecting your health:                Living healthy guide: www.[facility].[place].gov      Understanding Diabetes: www.diabetes.org      Eating healthy: www.cdc.gov/healthyweight      [facility] home safety checklist: www.cdc.gov/steadi/patient.html      Agency on Aging: www.goea.[place].[facility]      Alcoholics anonymous (AA): www.aa.org      Physical Activity: www.brina.nih.gov/wc4upih      Tobacco use: www.quitwithusla.org

## 2022-01-31 NOTE — PROGRESS NOTES
"  Yair Owens presented for a  Medicare AWV and comprehensive Health Risk Assessment today. The following components were reviewed and updated:    · Medical history  · Family History  · Social history  · Allergies and Current Medications  · Health Risk Assessment  · Health Maintenance  · Care Team       ** See Completed Assessments for Annual Wellness Visit within the encounter summary.**       The following assessments were completed:  · Living Situation  · CAGE  · Depression Screening  · Timed Get Up and Go  · Whisper Test  · Cognitive Function Screening  · Nutrition Screening  · ADL Screening  · PAQ Screening          Vitals:    01/31/22 1309   BP: 126/74   BP Location: Right arm   Patient Position: Sitting   Pulse: 64   Temp: 98.4 °F (36.9 °C)   TempSrc: Oral   SpO2: 97%   Weight: 98.8 kg (217 lb 11.3 oz)   Height: 5' 8" (1.727 m)     Body mass index is 33.1 kg/m².  Physical Exam  Vitals and nursing note reviewed.   Constitutional:       Appearance: Normal appearance.   Cardiovascular:      Rate and Rhythm: Normal rate.      Pulses: Normal pulses.      Heart sounds: Normal heart sounds.   Pulmonary:      Effort: Pulmonary effort is normal.      Breath sounds: Normal breath sounds.   Abdominal:      General: Bowel sounds are normal.      Palpations: Abdomen is soft.   Musculoskeletal:         General: Normal range of motion.   Skin:     General: Skin is warm and dry.   Neurological:      Mental Status: He is alert and oriented to person, place, and time.   Psychiatric:         Mood and Affect: Mood normal.         Behavior: Behavior normal.             Diagnoses and health risks identified today and associated recommendations/orders:    1. Encounter for preventive health examination  Pt was seen today for an Annual Wellness visit. Healthcare maintenance and screening recommendations were discussed and updated as indicated. Return in one year for AWV.    Review current opioid prescriptions: n/a  Screen for " potential Substance Use Disorders: n/a    2. Atherosclerosis of aorta  Chronic. Abdominal U/S 8/2/17. The current medical regimen is effective;  continue present plan and medications.    3. Coronary artery disease involving native heart, unspecified vessel or lesion type, unspecified whether angina present  The current medical regimen is effective;  continue present plan and medications.    4. Essential hypertension  Stable. The current medical regimen is effective;  continue present plan and medications.    5. Hyperlipidemia, unspecified hyperlipidemia type  The current medical regimen is effective;  continue present plan and medications.    6. Generalized anxiety disorder  The current medical regimen is effective;  continue present plan and medications.    7. Gastroesophageal reflux disease, unspecified whether esophagitis present  The current medical regimen is effective;  continue present plan and medications.    8. Psychophysiological insomnia  The current medical regimen is effective;  continue present plan and medications.    9. Erectile dysfunction due to diseases classified elsewhere  The current medical regimen is effective;  continue present plan and medications.    10. Tobacco dependence  I counseled the patient on smoking cessation, risks associated with smoking, having a quit date, nicotine replacement, medications that can aid in cessation, and having a plan for future cravings.  The patient stated that he is ready to quit. Smokes 2 cigarettes a day, down from a pack a day. Wants to quit on his own.           Provided Yair with a 5-10 year written screening schedule and personal prevention plan. Recommendations were developed using the USPSTF age appropriate recommendations. Education, counseling, and referrals were provided as needed. After Visit Summary printed and given to patient which includes a list of additional screenings\tests needed.    Follow up in about 8 weeks (around 3/29/2022) with  provider.    ROWDY Schulte  I offered to discuss advanced care planning, including how to pick a person who would make decisions for you if you were unable to make them for yourself, called a health care power of , and what kind of decisions you might make such as use of life sustaining treatments such as ventilators and tube feeding when faced with a life limiting illness recorded on a living will that they will need to know. (How you want to be cared for as you near the end of your natural life)     X Patient is interested in learning more about how to make advanced directives.  I provided them paperwork and offered to discuss this with them.

## 2022-02-17 DIAGNOSIS — F51.04 PSYCHOPHYSIOLOGICAL INSOMNIA: Chronic | ICD-10-CM

## 2022-02-17 RX ORDER — HYDROXYZINE PAMOATE 50 MG/1
CAPSULE ORAL
Qty: 180 CAPSULE | Refills: 0 | Status: SHIPPED | OUTPATIENT
Start: 2022-02-17 | End: 2022-05-09 | Stop reason: SDUPTHER

## 2022-02-17 NOTE — TELEPHONE ENCOUNTER
Refill approved.  Due for routine physical in ~2 months with labs prior.  Please sched with me    Thank you,  Talon

## 2022-03-09 ENCOUNTER — PATIENT MESSAGE (OUTPATIENT)
Dept: FAMILY MEDICINE | Facility: CLINIC | Age: 72
End: 2022-03-09
Payer: MEDICARE

## 2022-05-23 ENCOUNTER — PATIENT MESSAGE (OUTPATIENT)
Dept: SMOKING CESSATION | Facility: CLINIC | Age: 72
End: 2022-05-23
Payer: MEDICARE

## 2022-06-11 ENCOUNTER — LAB VISIT (OUTPATIENT)
Dept: LAB | Facility: HOSPITAL | Age: 72
End: 2022-06-11
Attending: INTERNAL MEDICINE
Payer: MEDICARE

## 2022-06-11 DIAGNOSIS — Z11.59 NEED FOR HEPATITIS C SCREENING TEST: ICD-10-CM

## 2022-06-11 DIAGNOSIS — I10 ESSENTIAL HYPERTENSION: ICD-10-CM

## 2022-06-11 LAB
ALBUMIN SERPL BCP-MCNC: 3.9 G/DL (ref 3.5–5.2)
ALP SERPL-CCNC: 78 U/L (ref 55–135)
ALT SERPL W/O P-5'-P-CCNC: 57 U/L (ref 10–44)
ANION GAP SERPL CALC-SCNC: 12 MMOL/L (ref 8–16)
AST SERPL-CCNC: 44 U/L (ref 10–40)
BASOPHILS # BLD AUTO: 0.06 K/UL (ref 0–0.2)
BASOPHILS NFR BLD: 0.9 % (ref 0–1.9)
BILIRUB SERPL-MCNC: 0.8 MG/DL (ref 0.1–1)
BUN SERPL-MCNC: 19 MG/DL (ref 8–23)
CALCIUM SERPL-MCNC: 9.5 MG/DL (ref 8.7–10.5)
CHLORIDE SERPL-SCNC: 104 MMOL/L (ref 95–110)
CHOLEST SERPL-MCNC: 154 MG/DL (ref 120–199)
CHOLEST/HDLC SERPL: 3.8 {RATIO} (ref 2–5)
CO2 SERPL-SCNC: 23 MMOL/L (ref 23–29)
CREAT SERPL-MCNC: 1.3 MG/DL (ref 0.5–1.4)
DIFFERENTIAL METHOD: ABNORMAL
EOSINOPHIL # BLD AUTO: 0.3 K/UL (ref 0–0.5)
EOSINOPHIL NFR BLD: 4 % (ref 0–8)
ERYTHROCYTE [DISTWIDTH] IN BLOOD BY AUTOMATED COUNT: 12.9 % (ref 11.5–14.5)
EST. GFR  (AFRICAN AMERICAN): >60 ML/MIN/1.73 M^2
EST. GFR  (NON AFRICAN AMERICAN): 54.9 ML/MIN/1.73 M^2
GLUCOSE SERPL-MCNC: 109 MG/DL (ref 70–110)
HCT VFR BLD AUTO: 42.7 % (ref 40–54)
HDLC SERPL-MCNC: 41 MG/DL (ref 40–75)
HDLC SERPL: 26.6 % (ref 20–50)
HGB BLD-MCNC: 13.8 G/DL (ref 14–18)
IMM GRANULOCYTES # BLD AUTO: 0.03 K/UL (ref 0–0.04)
IMM GRANULOCYTES NFR BLD AUTO: 0.5 % (ref 0–0.5)
LDLC SERPL CALC-MCNC: 94 MG/DL (ref 63–159)
LYMPHOCYTES # BLD AUTO: 1.8 K/UL (ref 1–4.8)
LYMPHOCYTES NFR BLD: 28.6 % (ref 18–48)
MCH RBC QN AUTO: 28.8 PG (ref 27–31)
MCHC RBC AUTO-ENTMCNC: 32.3 G/DL (ref 32–36)
MCV RBC AUTO: 89 FL (ref 82–98)
MONOCYTES # BLD AUTO: 0.9 K/UL (ref 0.3–1)
MONOCYTES NFR BLD: 14.2 % (ref 4–15)
NEUTROPHILS # BLD AUTO: 3.3 K/UL (ref 1.8–7.7)
NEUTROPHILS NFR BLD: 51.8 % (ref 38–73)
NONHDLC SERPL-MCNC: 113 MG/DL
NRBC BLD-RTO: 0 /100 WBC
PLATELET # BLD AUTO: 257 K/UL (ref 150–450)
PMV BLD AUTO: 10.3 FL (ref 9.2–12.9)
POTASSIUM SERPL-SCNC: 4.7 MMOL/L (ref 3.5–5.1)
PROT SERPL-MCNC: 7 G/DL (ref 6–8.4)
RBC # BLD AUTO: 4.79 M/UL (ref 4.6–6.2)
SODIUM SERPL-SCNC: 139 MMOL/L (ref 136–145)
TRIGL SERPL-MCNC: 95 MG/DL (ref 30–150)
WBC # BLD AUTO: 6.32 K/UL (ref 3.9–12.7)

## 2022-06-11 PROCEDURE — 80053 COMPREHEN METABOLIC PANEL: CPT | Performed by: INTERNAL MEDICINE

## 2022-06-11 PROCEDURE — 86803 HEPATITIS C AB TEST: CPT | Performed by: INTERNAL MEDICINE

## 2022-06-11 PROCEDURE — 80061 LIPID PANEL: CPT | Performed by: INTERNAL MEDICINE

## 2022-06-11 PROCEDURE — 36415 COLL VENOUS BLD VENIPUNCTURE: CPT | Mod: PO | Performed by: INTERNAL MEDICINE

## 2022-06-11 PROCEDURE — 85025 COMPLETE CBC W/AUTO DIFF WBC: CPT | Performed by: INTERNAL MEDICINE

## 2022-06-13 ENCOUNTER — PATIENT MESSAGE (OUTPATIENT)
Dept: ADMINISTRATIVE | Facility: OTHER | Age: 72
End: 2022-06-13
Payer: MEDICARE

## 2022-06-13 ENCOUNTER — OFFICE VISIT (OUTPATIENT)
Dept: FAMILY MEDICINE | Facility: CLINIC | Age: 72
End: 2022-06-13
Payer: MEDICARE

## 2022-06-13 VITALS
SYSTOLIC BLOOD PRESSURE: 126 MMHG | BODY MASS INDEX: 33.9 KG/M2 | HEART RATE: 67 BPM | TEMPERATURE: 98 F | OXYGEN SATURATION: 97 % | DIASTOLIC BLOOD PRESSURE: 62 MMHG | HEIGHT: 68 IN | WEIGHT: 223.69 LBS

## 2022-06-13 DIAGNOSIS — Z86.010 HISTORY OF COLON POLYPS: ICD-10-CM

## 2022-06-13 DIAGNOSIS — Z00.00 ROUTINE MEDICAL EXAM: Primary | ICD-10-CM

## 2022-06-13 DIAGNOSIS — E78.5 HYPERLIPIDEMIA, UNSPECIFIED HYPERLIPIDEMIA TYPE: Chronic | ICD-10-CM

## 2022-06-13 DIAGNOSIS — F17.200 TOBACCO DEPENDENCE: Chronic | ICD-10-CM

## 2022-06-13 DIAGNOSIS — R74.8 ELEVATED LIVER ENZYMES: ICD-10-CM

## 2022-06-13 DIAGNOSIS — F51.04 PSYCHOPHYSIOLOGICAL INSOMNIA: Chronic | ICD-10-CM

## 2022-06-13 DIAGNOSIS — I70.0 ATHEROSCLEROSIS OF AORTA: Chronic | ICD-10-CM

## 2022-06-13 DIAGNOSIS — I10 ESSENTIAL HYPERTENSION: Chronic | ICD-10-CM

## 2022-06-13 DIAGNOSIS — I25.10 CORONARY ARTERY DISEASE INVOLVING NATIVE HEART WITHOUT ANGINA PECTORIS, UNSPECIFIED VESSEL OR LESION TYPE: Chronic | ICD-10-CM

## 2022-06-13 DIAGNOSIS — F41.1 GENERALIZED ANXIETY DISORDER: Chronic | ICD-10-CM

## 2022-06-13 DIAGNOSIS — R09.89 RIGHT CAROTID BRUIT: ICD-10-CM

## 2022-06-13 LAB — HCV AB SERPL QL IA: NEGATIVE

## 2022-06-13 PROCEDURE — 3008F PR BODY MASS INDEX (BMI) DOCUMENTED: ICD-10-PCS | Mod: CPTII,S$GLB,, | Performed by: INTERNAL MEDICINE

## 2022-06-13 PROCEDURE — 3288F FALL RISK ASSESSMENT DOCD: CPT | Mod: CPTII,S$GLB,, | Performed by: INTERNAL MEDICINE

## 2022-06-13 PROCEDURE — 99397 PR PREVENTIVE VISIT,EST,65 & OVER: ICD-10-PCS | Mod: S$GLB,,, | Performed by: INTERNAL MEDICINE

## 2022-06-13 PROCEDURE — 3074F SYST BP LT 130 MM HG: CPT | Mod: CPTII,S$GLB,, | Performed by: INTERNAL MEDICINE

## 2022-06-13 PROCEDURE — 3288F PR FALLS RISK ASSESSMENT DOCUMENTED: ICD-10-PCS | Mod: CPTII,S$GLB,, | Performed by: INTERNAL MEDICINE

## 2022-06-13 PROCEDURE — 4010F PR ACE/ARB THEARPY RXD/TAKEN: ICD-10-PCS | Mod: CPTII,S$GLB,, | Performed by: INTERNAL MEDICINE

## 2022-06-13 PROCEDURE — 1126F AMNT PAIN NOTED NONE PRSNT: CPT | Mod: CPTII,S$GLB,, | Performed by: INTERNAL MEDICINE

## 2022-06-13 PROCEDURE — 1159F MED LIST DOCD IN RCRD: CPT | Mod: CPTII,S$GLB,, | Performed by: INTERNAL MEDICINE

## 2022-06-13 PROCEDURE — 3078F DIAST BP <80 MM HG: CPT | Mod: CPTII,S$GLB,, | Performed by: INTERNAL MEDICINE

## 2022-06-13 PROCEDURE — 99397 PER PM REEVAL EST PAT 65+ YR: CPT | Mod: S$GLB,,, | Performed by: INTERNAL MEDICINE

## 2022-06-13 PROCEDURE — 1160F PR REVIEW ALL MEDS BY PRESCRIBER/CLIN PHARMACIST DOCUMENTED: ICD-10-PCS | Mod: CPTII,S$GLB,, | Performed by: INTERNAL MEDICINE

## 2022-06-13 PROCEDURE — 4010F ACE/ARB THERAPY RXD/TAKEN: CPT | Mod: CPTII,S$GLB,, | Performed by: INTERNAL MEDICINE

## 2022-06-13 PROCEDURE — 1160F RVW MEDS BY RX/DR IN RCRD: CPT | Mod: CPTII,S$GLB,, | Performed by: INTERNAL MEDICINE

## 2022-06-13 PROCEDURE — 1159F PR MEDICATION LIST DOCUMENTED IN MEDICAL RECORD: ICD-10-PCS | Mod: CPTII,S$GLB,, | Performed by: INTERNAL MEDICINE

## 2022-06-13 PROCEDURE — 3074F PR MOST RECENT SYSTOLIC BLOOD PRESSURE < 130 MM HG: ICD-10-PCS | Mod: CPTII,S$GLB,, | Performed by: INTERNAL MEDICINE

## 2022-06-13 PROCEDURE — 1126F PR PAIN SEVERITY QUANTIFIED, NO PAIN PRESENT: ICD-10-PCS | Mod: CPTII,S$GLB,, | Performed by: INTERNAL MEDICINE

## 2022-06-13 PROCEDURE — 99999 PR PBB SHADOW E&M-EST. PATIENT-LVL III: ICD-10-PCS | Mod: PBBFAC,,, | Performed by: INTERNAL MEDICINE

## 2022-06-13 PROCEDURE — 1101F PT FALLS ASSESS-DOCD LE1/YR: CPT | Mod: CPTII,S$GLB,, | Performed by: INTERNAL MEDICINE

## 2022-06-13 PROCEDURE — 99999 PR PBB SHADOW E&M-EST. PATIENT-LVL III: CPT | Mod: PBBFAC,,, | Performed by: INTERNAL MEDICINE

## 2022-06-13 PROCEDURE — 3008F BODY MASS INDEX DOCD: CPT | Mod: CPTII,S$GLB,, | Performed by: INTERNAL MEDICINE

## 2022-06-13 PROCEDURE — 3078F PR MOST RECENT DIASTOLIC BLOOD PRESSURE < 80 MM HG: ICD-10-PCS | Mod: CPTII,S$GLB,, | Performed by: INTERNAL MEDICINE

## 2022-06-13 PROCEDURE — 1101F PR PT FALLS ASSESS DOC 0-1 FALLS W/OUT INJ PAST YR: ICD-10-PCS | Mod: CPTII,S$GLB,, | Performed by: INTERNAL MEDICINE

## 2022-06-13 RX ORDER — ASPIRIN 81 MG/1
81 TABLET ORAL DAILY
Refills: 0 | COMMUNITY
Start: 2022-06-13 | End: 2023-08-30 | Stop reason: SDUPTHER

## 2022-06-13 RX ORDER — SERTRALINE HYDROCHLORIDE 25 MG/1
25 TABLET, FILM COATED ORAL DAILY
Qty: 90 TABLET | Refills: 0 | Status: SHIPPED | OUTPATIENT
Start: 2022-06-13 | End: 2022-09-08

## 2022-06-13 NOTE — PROGRESS NOTES
Assessment & Plan  Problem List Items Addressed This Visit        Psychiatric    Generalized anxiety disorder (Chronic)    Current Assessment & Plan     Doing great.  Will reduce to 25mg for 4 weeks then plan to stop.  He will message me with any concerns.            Relevant Medications    sertraline (ZOLOFT) 25 MG tablet       Cardiac/Vascular    Hyperlipidemia (Chronic)    Current Assessment & Plan     The current medical regimen is effective;  continue present plan and medications.            Relevant Orders    Lipids Digital Medicine (LDMP) Enrollment Order (Completed)    Lipids Digital Medicine (LDMP): Assign Onboarding Questionnaires (Completed)    Essential hypertension (Chronic)    Overview     Followed by digital team           Current Assessment & Plan     The current medical regimen is effective;  continue present plan and medications.            CAD (coronary artery disease) (Chronic)    Current Assessment & Plan     The current medical regimen is effective;  continue present plan and medications.            Relevant Medications    aspirin (ECOTRIN) 81 MG EC tablet    Atherosclerosis of aorta (Chronic)    Overview     US 08/2017.  Stable, asymptomatic chronic condition.  Will continue to maximize risk factor reduction and adjust medication as needed.               Other    Psychophysiological insomnia (Chronic)    Current Assessment & Plan     The current medical regimen is effective;  continue present plan and medications.              Other Visit Diagnoses     Routine medical exam    -  Primary  -    Discussed healthy diet, regular exercise, necessary labs, age appropriate cancer screening, and routine vaccinations.       History of colon polyps    -  Due for follow up and slight decline iin H/H.    Relevant Orders    Case Request Endoscopy: COLONOSCOPY (Completed)    Elevated liver enzymes    -  Recheck labs in 6 months.     Relevant Orders    Gamma GT    Hepatic Function Panel    Right carotid bruit     -  Last US 01/2018.  Repeat for surveillance.     Relevant Orders    CV Ultrasound Bilateral Doppler Carotid            Health Maintenance reviewed, counseled on tetanus and shingles from pharmacy.    Follow-up: Follow up in about 6 months (around 12/13/2022) for liver labs only. .  ______________________________________________________________________    Chief Complaint  Chief Complaint   Patient presents with    Annual Exam       HPI  Yair Owens is a 72 y.o. male with medical diagnoses as listed in the medical history and problem list that presents for routine physical.  Pt is known to me with their last appointment 1/31/2022.  He had labs prior to this OV that showed generally reassuring CMP, lipids.  His liver enzymes were up slightly.  CBC generally good but slight drop in H/H over the last few years.     No acute complaints.  He would like to try and reduce his total pill count for HTN.  Also would like to try to wean off of sertraline.     Has been eating a much healthier diet.     He is down to 1 pack of cigarettes every 10 days.  Seems contemplative to quit.  Wants to quit on his own.       PAST MEDICAL HISTORY:  Past Medical History:   Diagnosis Date    Chest pain syndrome     Colon polyps     GERD (gastroesophageal reflux disease)     Hypercholesteremia     Hyperlipidemia     Hypertension        PAST SURGICAL HISTORY:  Past Surgical History:   Procedure Laterality Date    APPENDECTOMY      BONE RESECTION, RIB      for thoracic outlet syndrome    COLONOSCOPY N/A 7/21/2017    Procedure: COLONOSCOPY;  Surgeon: Deepak Servin MD;  Location: [facility] ENDO;  Service: Endoscopy;  Laterality: N/A;    HAND SURGERY      LEFT HEART CATHETERIZATION Left 3/2/2021    Procedure: Left heart cath, radial, 730 am;  Surgeon: Vladimir Avalos MD;  Location: [facility] CATH LAB;  Service: Cardiology;  Laterality: Left;  RN PREOP 2/25/2021--COVID NEGATIVE ON 3/1  H/P INCOMPLETE    SCROTAL SURGERY      mass     ULTRASOUND GUIDANCE  3/2/2021    Procedure: Ultrasound Guidance;  Surgeon: Vladimir Avalos MD;  Location: [facility] CATH LAB;  Service: Cardiology;;       SOCIAL HISTORY:  Social History     Socioeconomic History    Marital status:    Occupational History     Employer: [employer]   Tobacco Use    Smoking status: Current Every Day Smoker     Packs/day: 0.00     Years: 60.00     Pack years: 0.00     Types: Cigarettes    Smokeless tobacco: Never Used    Tobacco comment: 2 cigarettes a day   Substance and Sexual Activity    Alcohol use: Never    Drug use: No    Sexual activity: Yes     Partners: Female     Social Determinants of Health     Financial Resource Strain: Low Risk     Difficulty of Paying Living Expenses: Not hard at all   Food Insecurity: No Food Insecurity    Worried About Running Out of Food in the Last Year: Never true    Ran Out of Food in the Last Year: Never true   Transportation Needs: No Transportation Needs    Lack of Transportation (Medical): No    Lack of Transportation (Non-Medical): No   Physical Activity: Insufficiently Active    Days of Exercise per Week: 1 day    Minutes of Exercise per Session: 120 min   Stress: No Stress Concern Present    Feeling of Stress : Only a little   Social Connections: Moderately Isolated    Frequency of Communication with Friends and Family: Three times a week    Frequency of Social Gatherings with Friends and Family: Once a week    Attends Zoroastrian Services: Never    Active Member of Clubs or Organizations: No    Attends Club or Organization Meetings: Never    Marital Status:    Housing Stability: Low Risk     Unable to Pay for Housing in the Last Year: No    Number of Places Lived in the Last Year: 1    Unstable Housing in the Last Year: No       FAMILY HISTORY:  Family History   Problem Relation Age of Onset    No Known Problems Mother     Cancer Father     Heart disease Father     Asthma Sister     Cancer Sister 78         Rectal cancer    Lupus Daughter     No Known Problems Son     No Known Problems Brother        ALLERGIES AND MEDICATIONS: updated and reviewed.  Review of patient's allergies indicates:   Allergen Reactions    Codeine Itching    Ibuprofen Itching     Current Outpatient Medications   Medication Sig Dispense Refill    amLODIPine (NORVASC) 10 MG tablet TAKE 1 TABLET BY MOUTH EVERY DAY 90 tablet 0    atorvastatin (LIPITOR) 40 MG tablet TAKE 1 TABLET BY MOUTH EVERY DAY 90 tablet 3    co-enzyme Q-10 50 mg capsule Take 50 mg by mouth once daily.      hydroCHLOROthiazide (HYDRODIURIL) 25 MG tablet Take 1 tablet (25 mg total) by mouth once daily. 90 tablet 3    hydrOXYzine pamoate (VISTARIL) 50 MG Cap TAKE 2 CAPSULES BY MOUTH AT BEDTIME AS NEEDED FOR INSOMNIA 180 capsule 1    losartan (COZAAR) 25 MG tablet Take 1 tablet (25 mg total) by mouth once daily. 90 tablet 3    metoprolol succinate (TOPROL-XL) 25 MG 24 hr tablet TAKE 1 TABLET BY MOUTH EVERY DAY 90 tablet 3    multivitamin capsule Take 1 capsule by mouth once daily.      omega-3 fatty acids/fish oil (FISH OIL-OMEGA-3 FATTY ACIDS) 300-1,000 mg capsule Take by mouth once daily.      omeprazole (PRILOSEC) 40 MG capsule TAKE 1 CAPSULE BY MOUTH  TWICE DAILY 180 capsule 3    vitamin D (VITAMIN D3) 1000 units Tab Take 1,000 Units by mouth once daily.      aspirin (ECOTRIN) 81 MG EC tablet Take 1 tablet (81 mg total) by mouth once daily.  0    nitroGLYCERIN (NITROSTAT) 0.4 MG SL tablet Place 1 tablet (0.4 mg total) under the tongue every 5 (five) minutes as needed for Chest pain. 90 tablet 11    sertraline (ZOLOFT) 25 MG tablet Take 1 tablet (25 mg total) by mouth once daily. 90 tablet 0     No current facility-administered medications for this visit.         ROS  Review of Systems   Constitutional: Negative for chills, fever and unexpected weight change.   HENT: Negative for congestion, dental problem, ear pain, hearing loss, rhinorrhea, sore throat and  "trouble swallowing.    Eyes: Negative for discharge, redness and visual disturbance.   Respiratory: Negative for cough, chest tightness, shortness of breath and wheezing.    Cardiovascular: Negative for chest pain, palpitations and leg swelling.   Gastrointestinal: Negative for abdominal pain, constipation, diarrhea, nausea and vomiting.   Endocrine: Negative for polydipsia, polyphagia and polyuria.   Genitourinary: Negative for decreased urine volume, dysuria and hematuria.   Musculoskeletal: Negative for arthralgias and myalgias.   Skin: Negative for color change and rash.   Neurological: Negative for dizziness, weakness, light-headedness and headaches.   Psychiatric/Behavioral: Negative for decreased concentration, dysphoric mood, sleep disturbance and suicidal ideas.           Physical Exam  Vitals:    06/13/22 0752   BP: 126/62   Pulse: 67   Temp: 97.7 °F (36.5 °C)   TempSrc: Oral   SpO2: 97%   Weight: 101.5 kg (223 lb 10.5 oz)   Height: 5' 8" (1.727 m)    Body mass index is 34.01 kg/m².  Weight: 101.5 kg (223 lb 10.5 oz)   Height: 5' 8" (172.7 cm)   Physical Exam  Constitutional:       General: He is not in acute distress.     Appearance: He is well-developed.   HENT:      Head: Normocephalic and atraumatic.   Eyes:      General: Lids are normal. No scleral icterus.     Conjunctiva/sclera: Conjunctivae normal.      Pupils: Pupils are equal, round, and reactive to light.   Neck:      Thyroid: No thyromegaly.      Vascular: Carotid bruit present. No JVD.   Cardiovascular:      Rate and Rhythm: Normal rate and regular rhythm.      Heart sounds: Normal heart sounds. No murmur (soft systolic) heard.    No friction rub. No S3 or S4 sounds.   Pulmonary:      Effort: Pulmonary effort is normal.      Breath sounds: Normal breath sounds. No wheezing, rhonchi or rales.   Abdominal:      General: Bowel sounds are normal. There is no distension.      Palpations: Abdomen is soft.      Tenderness: There is no abdominal " tenderness.   Musculoskeletal:         General: No tenderness.      Cervical back: Full passive range of motion without pain and neck supple.      Right lower leg: No edema.      Left lower leg: No edema.   Skin:     General: Skin is warm and dry.      Findings: No rash.   Neurological:      Mental Status: He is alert and oriented to person, place, and time.   Psychiatric:         Speech: Speech normal.         Behavior: Behavior normal.         Thought Content: Thought content normal.             Health Maintenance       Date Due Completion Date    TETANUS VACCINE Never done ---    Aspirin/Antiplatelet Therapy Never done ---    Shingles Vaccine (1 of 2) Never done ---    PROSTATE-SPECIFIC ANTIGEN 03/26/2022 3/26/2021    Colorectal Cancer Screening 07/21/2022 7/21/2017    LDCT Lung Screen 01/31/2023 (Originally 2/13/2020) 2/13/2019    COVID-19 Vaccine (5 - Booster for Moderna series) 08/10/2022 4/10/2022    High Dose Statin 01/31/2023 1/31/2022    Lipid Panel 06/11/2027 6/11/2022

## 2022-06-13 NOTE — ASSESSMENT & PLAN NOTE
Doing great.  Will reduce to 25mg for 4 weeks then plan to stop.  He will message me with any concerns.

## 2022-06-16 DIAGNOSIS — E78.5 HYPERLIPIDEMIA, UNSPECIFIED HYPERLIPIDEMIA TYPE: Chronic | ICD-10-CM

## 2022-06-16 RX ORDER — ATORVASTATIN CALCIUM 40 MG/1
TABLET, FILM COATED ORAL
Qty: 90 TABLET | Refills: 3 | Status: SHIPPED | OUTPATIENT
Start: 2022-06-16 | End: 2023-06-05 | Stop reason: SDUPTHER

## 2022-06-16 NOTE — TELEPHONE ENCOUNTER
No new care gaps identified.  [facility] Embedded Care Gaps. Reference number: [id number]. 6/16/2022   12:34:47 AM KELLYT

## 2022-06-16 NOTE — TELEPHONE ENCOUNTER
Refill Decision Note   Yair Owens  is requesting a refill authorization.  Brief Assessment and Rationale for Refill:  Approve     Medication Therapy Plan:       Medication Reconciliation Completed: No   Comments:     No Care Gaps recommended.     Note composed:10:06 AM 06/16/2022

## 2022-08-02 ENCOUNTER — PATIENT MESSAGE (OUTPATIENT)
Dept: ADMINISTRATIVE | Facility: OTHER | Age: 72
End: 2022-08-02
Payer: MEDICARE

## 2022-08-11 ENCOUNTER — PATIENT MESSAGE (OUTPATIENT)
Dept: FAMILY MEDICINE | Facility: CLINIC | Age: 72
End: 2022-08-11
Payer: MEDICARE

## 2022-08-29 ENCOUNTER — PATIENT MESSAGE (OUTPATIENT)
Dept: FAMILY MEDICINE | Facility: CLINIC | Age: 72
End: 2022-08-29
Payer: MEDICARE

## 2022-08-30 ENCOUNTER — TELEPHONE (OUTPATIENT)
Dept: GASTROENTEROLOGY | Facility: CLINIC | Age: 72
End: 2022-08-30
Payer: MEDICARE

## 2022-08-30 DIAGNOSIS — I10 ESSENTIAL HYPERTENSION: Chronic | ICD-10-CM

## 2022-08-30 DIAGNOSIS — Z86.010 PERSONAL HISTORY OF COLONIC POLYPS: Primary | ICD-10-CM

## 2022-08-30 RX ORDER — METOPROLOL SUCCINATE 25 MG/1
TABLET, EXTENDED RELEASE ORAL
Qty: 90 TABLET | Refills: 0 | Status: SHIPPED | OUTPATIENT
Start: 2022-08-30 | End: 2022-12-06 | Stop reason: SDUPTHER

## 2022-09-02 NOTE — TELEPHONE ENCOUNTER
Called pt to advise of appointment details pt advised he would accept appt scheduled for 10/4/22 @ 11:20 am

## 2022-09-25 ENCOUNTER — NURSE TRIAGE (OUTPATIENT)
Dept: ADMINISTRATIVE | Facility: CLINIC | Age: 72
End: 2022-09-25
Payer: MEDICARE

## 2022-09-25 ENCOUNTER — HOSPITAL ENCOUNTER (EMERGENCY)
Facility: HOSPITAL | Age: 72
Discharge: HOME | End: 2022-09-25
Attending: EMERGENCY MEDICINE
Payer: MEDICARE

## 2022-09-25 VITALS
RESPIRATION RATE: 18 BRPM | OXYGEN SATURATION: 97 % | HEART RATE: 61 BPM | SYSTOLIC BLOOD PRESSURE: 160 MMHG | BODY MASS INDEX: 32.58 KG/M2 | TEMPERATURE: 99 F | DIASTOLIC BLOOD PRESSURE: 80 MMHG | HEIGHT: 68 IN | WEIGHT: 215 LBS

## 2022-09-25 DIAGNOSIS — R93.89 ABNORMAL X-RAY: ICD-10-CM

## 2022-09-25 DIAGNOSIS — S90.30XA: ICD-10-CM

## 2022-09-25 DIAGNOSIS — M25.471 RIGHT ANKLE SWELLING: Primary | ICD-10-CM

## 2022-09-25 DIAGNOSIS — M25.571 RIGHT ANKLE PAIN: ICD-10-CM

## 2022-09-25 PROCEDURE — 99283 EMERGENCY DEPT VISIT LOW MDM: CPT

## 2022-09-25 NOTE — DISCHARGE INSTRUCTIONS
There is an abnormality in your x-ray, there is a concern that you could have a broken bone.  Is important to follow up with Orthopedics for recheck.    § Please return to the Emergency Department for any new or worsening symptoms including: fever, chest pain, shortness of breath, loss of consciousness, dizziness, weakness, or any other concerns.     § Schedule an appointment for follow up with Orthopedics as soon as possible for a recheck of your symptoms. If you do not have one, contact the one listed on your discharge paperwork or call the [facility] Appointment Desk at [phone] to schedule an appointment.     § If you require follow up care from a specialist and are unable to schedule an appointment with them directly, please contact your Primary Care Provider on the next business day to set up a referral.      Rest, Use Ice Pack, Compress (use the ACE Wrap), and Elevate to help with pain and swelling.  Walking boot to help with symptoms.

## 2022-09-25 NOTE — TELEPHONE ENCOUNTER
Pt c/o right ankle redness, swelling and tender to touch. Stated that it started after sitting for prolong period. Care advice to be seen within 4 hours in UC. Verbalized understanding. Encounter routed to provider.       Reason for Disposition   [1] Redness AND [2] painful when touched AND [3] no fever    Additional Information   Negative: Difficulty breathing   Negative: Entire foot is cool or blue in comparison to other side   Negative: Fever   Negative: Patient sounds very sick or weak to the triager   Negative: [1] SEVERE pain (e.g., excruciating, unable to walk) AND [2] not improved after 2 hours of pain medicine   Negative: [1] Can't move swollen ankle at all AND [2] no fever    Protocols used: Ankle Swelling-A-AH

## 2022-09-25 NOTE — ED PROVIDER NOTES
Encounter Date: 9/25/2022       History     Chief Complaint   Patient presents with    Joint Swelling     Per opt he had swelling to R ankle x 1 week ago that resolved on its own but has since returned. Denies pain, injury or trauma. Reports mild tenderness to touch. Ambulatory       CC: Right Ankle Pain/Swelling    HPI: Yair Owens, a 72 y.o. male presents to the ED with a 3 day history of intermittent pain to the right lateral ankle with swelling.  Some mild bruising over the right lateral foot.  He reports no injuries or trauma.  He actually reports no pain currently.  No medications or treatment attempted prior to arrival.  He attempted to contact his primary care doctor to make an appointment but is not able to be seen till December.  Attempted to go to urgent care as directed by the nurse on call, is not able to get in.  He presents here for evaluation.  He reports no calf pain or calf swelling.  He does report a history gout, typically in his left 1st MTP.  No previous history of right ankle gout.  He has no redness or wounds, no drainage.    Patient Active Problem List:     Hyperlipidemia     Essential hypertension     GERD (gastroesophageal reflux disease)     Generalized anxiety disorder     Tobacco dependence     Psychophysiological insomnia     Atherosclerosis of aorta     Erectile dysfunction     CAD (coronary artery disease)        The history is provided by the patient. No  was used.   Review of patient's allergies indicates:   Allergen Reactions    Codeine Itching    Ibuprofen Itching     Past Medical History:   Diagnosis Date    Chest pain syndrome     Colon polyps     GERD (gastroesophageal reflux disease)     Gout, unspecified     Hypercholesteremia     Hyperlipidemia     Hypertension      Past Surgical History:   Procedure Laterality Date    APPENDECTOMY      BONE RESECTION, RIB      for thoracic outlet syndrome    COLONOSCOPY N/A 7/21/2017    Procedure: COLONOSCOPY;   Surgeon: Deepak Servin MD;  Location: [facility] ENDO;  Service: Endoscopy;  Laterality: N/A;    HAND SURGERY      LEFT HEART CATHETERIZATION Left 3/2/2021    Procedure: Left heart cath, radial, 730 am;  Surgeon: Vladimir Avalos MD;  Location: [facility] CATH LAB;  Service: Cardiology;  Laterality: Left;  RN PREOP 2/25/2021--COVID NEGATIVE ON 3/1  H/P INCOMPLETE    SCROTAL SURGERY      mass    ULTRASOUND GUIDANCE  3/2/2021    Procedure: Ultrasound Guidance;  Surgeon: Vladimir Avalos MD;  Location: [facility] CATH LAB;  Service: Cardiology;;     Family History   Problem Relation Age of Onset    No Known Problems Mother     Cancer Father     Heart disease Father     Asthma Sister     Cancer Sister 78        Rectal cancer    Lupus Daughter     No Known Problems Son     No Known Problems Brother      Social History     Tobacco Use    Smoking status: Every Day     Packs/day: 0.00     Years: 60.00     Pack years: 0.00     Types: Cigarettes    Smokeless tobacco: Never    Tobacco comments:     2 cigarettes a day   Substance Use Topics    Alcohol use: Never    Drug use: No     Review of Systems   Constitutional:  Negative for fever.   Cardiovascular:  Negative for leg swelling.   Gastrointestinal:  Negative for vomiting.   Musculoskeletal:  Positive for arthralgias (Right ankle) and joint swelling (R ankle). Negative for back pain, gait problem, neck pain and neck stiffness.   Skin:  Negative for rash and wound.   Neurological:  Negative for numbness.   Psychiatric/Behavioral:  Negative for confusion.      Physical Exam     Initial Vitals [09/25/22 1542]   BP Pulse Resp Temp SpO2   (!) 143/77 76 18 97.8 °F (36.6 °C) 98 %      MAP       --         Physical Exam    Nursing note and vitals reviewed.  Constitutional: He appears well-developed and well-nourished. He is not diaphoretic. He is cooperative.  Non-toxic appearance. He does not have a sickly appearance. He does not appear ill. No distress.   HENT:   Head: Normocephalic and atraumatic.    Right Ear: External ear normal.   Left Ear: External ear normal.   Nose: Nose normal.   Mouth/Throat: Mucous membranes are normal. No trismus in the jaw.   Neck: Phonation normal.   Normal range of motion.  Cardiovascular:  Normal rate, regular rhythm and intact distal pulses.           Pulses:       Dorsalis pedis pulses are 2+ on the right side and 2+ on the left side.   Pulmonary/Chest: No respiratory distress.   Musculoskeletal:      Cervical back: Normal range of motion.      Comments: Asymmetric swelling of the right ankle when compared to left.  No erythema or open wounds.  There is some superficial bruising over the right proximal dorsal lateral foot.  No tenderness over the right medial malleolus. Localizes intermittent pain to the right inferior right lateral malleolus although reports no pain on palpation or with ambulation. He is able to range the ankle. Very minimal increased warmth compared to the opposite side.      Neurological: He is alert and oriented to person, place, and time. No sensory deficit. Coordination normal. GCS eye subscore is 4. GCS verbal subscore is 5. GCS motor subscore is 6.   Skin: Skin is warm, dry and intact. Capillary refill takes less than 2 seconds. No bruising, no laceration and no rash noted. No cyanosis or erythema.   Psychiatric: He has a normal mood and affect. His speech is normal and behavior is normal. Judgment and thought content normal.       ED Course   Orthopedic Injury    Date/Time: 9/25/2022 6:09 PM  Performed by: RWODY Valiente  Authorized by: Ruben Dennison MD     Location procedure was performed:  [facility] EMERGENCY DEPARTMENT  Consent Done?:  Yes  Universal Protocol:     Verbal consent obtained?: Yes      Risks and benefits: Risks, benefits and alternatives were discussed      Patient states understanding of procedure being performed: Yes      Patient identity confirmed:  Verbally with patient  Injury:     Injury location:  Ankle    Injury type:  Fracture vs remote injury.      Pre-procedure assessment:     Neurovascular status: Neurovascularly intact      Distal perfusion: normal      Neurological function: normal      Range of motion: normal        Selections made in this section will also lock the Injury type section above.:     Immobilization:  Brace (Walking Boot)  Labs Reviewed - No data to display       Imaging Results              X-Ray Foot Complete Right (Final result)  Result time 09/25/22 16:48:02      Final result by Mechelle Aleman MD (09/25/22 16:48:02)                   Impression:      As above      Electronically signed by: Mechelle Aleman MD  Date:    09/25/2022  Time:    16:48               Narrative:    EXAMINATION:  XR FOOT COMPLETE 3 VIEW RIGHT    CLINICAL HISTORY:  . Pain in right ankle and joints of right foot    TECHNIQUE:  AP, lateral, and oblique views of the right foot were performed.    COMPARISON:  None.    FINDINGS:  Normal alignment.  Normal mineralization.  No fracture.  No osseous lesions.  There is mild joint space narrowing at the 1st metatarsophalangeal joint with osteophyte formation.  There is a small well corticated ossicle adjacent to the tip of the fibula and also adjacent to the tip of the medial malleolus which can be seen with prior small avulsion fractures.  The soft tissues appear normal.                                       X-Ray Ankle Complete Right (Final result)  Result time 09/25/22 16:45:32      Final result by Ney Winston MD (09/25/22 16:45:32)                   Impression:      Findings suspicious for lateral malleolar acute nondisplaced fracture with associated nonspecific ankle joint effusion.  Correlate for point tenderness at this region.      Electronically signed by: Ney Winston MD  Date:    09/25/2022  Time:    16:45               Narrative:    EXAMINATION:  XR ANKLE COMPLETE 3 VIEW RIGHT    CLINICAL HISTORY:  Pain in right ankle and joints of right foot    TECHNIQUE:  AP,  lateral, and oblique images of the right ankle were performed.    COMPARISON:  None    FINDINGS:  There is soft tissue swelling about the ankle, most prominent overlying the lateral malleolus.  There is slight cortical step-off at the lateral malleolar tip best seen on the oblique view which could reflect nondisplaced fracture given the overlying soft tissue swelling.  Small well corticated ossific bodies adjacent to the medial and lateral malleolar tips without donor site seen, presumably accessory ossicles or sequela of remote trauma.  No dislocation or destructive osseous process.  There is suspected nonspecific ankle joint effusion.  Mild degenerative change about the ankle and dorsal hindfoot.  Scattered atherosclerotic vascular calcifications noted.  No subcutaneous emphysema or radiodense retained foreign body.                                       Medications - No data to display        APC / Resident Notes:   This is an evaluation of a 72 y.o. male that presents to the Emergency Department for 3 day history of right atraumatic ankle pain - although at the time of exam, reports no pain. Physical Exam shows a non-toxic, afebrile, and well appearing male.  Asymmetric swelling of the right ankle when compared to the left.  There is no erythema.  No open wounds.  There is some very mild increased warmth when compared to the opposite side.  He is able to range the ankle.  Patient localizes the pain when he has it to the inferior right lateral malleolus but has no pain at this time.  There is some bruising over the right dorsal lateral foot without tenderness over the 5th metatarsal.  No tenderness over the knee.  Calfs are soft without pain. Vital signs are reassuring. If available, previous records reviewed. RESULTS:  X-ray the ankle as read by the radiologist shows findings suspicious for lateral malleolar acute nondisplaced fracture with effusion.  There is a cortical step-off of the lateral malleolar tip best  seen on the oblique view which could reflect nondisplaced fracture.  Small well corticated ossific bodies adjacent to the medial and lateral malleolar tips without donor sites.  Presumably remote trauma.  X-ray of the foot as read by several radiologist shows small well corticated ossicle adjacent to the tip of the fibula and also adjacent to the tip of the medial malleolus which can be seen with prior small avulsion fractures.  As 2 separate radiologist over-read each of the parts, have messaged radiology to review.  Spoke with Dr. Winston.  There is concern for a nondisplaced fracture.  Patient not amenable to splint.  Will place him in a walking boot have him follow up with Orthopedics.     My overall impression is Right Ankle Swelling, Intermittent Pain (none currently) and abnormal xray concerning for potential nondisplaced fracture vs remote injury. Patient reports no falls/injures or trauma to correspond to the injury. Has no pain on exam and ambulates without pain. Patient amenable to walking boot vs splint. I considered, but at this time, do not suspect dislocation, septic joint, cellulitis, compartment syndrome.  Again offered pain medication, declined.    Discharge Meds/Instructions:  Walking boot, advised of the potential for fracture, encouraged follow up with Orthopedics. The diagnosis, treatment plan, instructions for follow-up as well as ED return precautions were discussed. All questions have been answered.  LOGAN Hernandez, FNP-C                   Clinical Impression:   Final diagnoses:  [S90.30XA] Superficial bruising of foot  [M25.571] Right ankle pain  [M25.471] Right ankle swelling (Primary)  [R93.89] Abnormal x-ray        ED Disposition Condition    Discharge Stable          ED Prescriptions    None       Follow-up Information       Follow up With Specialties Details Why Contact Info    Annabel Siu MD Orthopedic Surgery Call in 1 day To discuss your ED visit & schedule follow-up [phone]  [facility] [id number]  [phone]      [facility] - Emergency Dept Emergency Medicine Go to  If symptoms worsen [address]  [facility] [phone]             Brenden Vanessa, P  09/25/22 [id number]

## 2022-09-26 ENCOUNTER — OFFICE VISIT (OUTPATIENT)
Dept: CARDIOLOGY | Facility: CLINIC | Age: 72
End: 2022-09-26
Payer: MEDICARE

## 2022-09-26 VITALS
SYSTOLIC BLOOD PRESSURE: 130 MMHG | BODY MASS INDEX: 32.74 KG/M2 | HEIGHT: 68 IN | HEART RATE: 68 BPM | OXYGEN SATURATION: 96 % | DIASTOLIC BLOOD PRESSURE: 82 MMHG | RESPIRATION RATE: 18 BRPM | WEIGHT: 216.06 LBS

## 2022-09-26 DIAGNOSIS — I70.0 ATHEROSCLEROSIS OF AORTA: Chronic | ICD-10-CM

## 2022-09-26 DIAGNOSIS — N52.1 ERECTILE DYSFUNCTION DUE TO DISEASES CLASSIFIED ELSEWHERE: Chronic | ICD-10-CM

## 2022-09-26 DIAGNOSIS — E78.5 HYPERLIPIDEMIA, UNSPECIFIED HYPERLIPIDEMIA TYPE: Chronic | ICD-10-CM

## 2022-09-26 DIAGNOSIS — F41.1 GENERALIZED ANXIETY DISORDER: Chronic | ICD-10-CM

## 2022-09-26 DIAGNOSIS — I10 ESSENTIAL HYPERTENSION: Primary | Chronic | ICD-10-CM

## 2022-09-26 DIAGNOSIS — K21.9 GASTROESOPHAGEAL REFLUX DISEASE, UNSPECIFIED WHETHER ESOPHAGITIS PRESENT: Chronic | ICD-10-CM

## 2022-09-26 PROCEDURE — 1101F PR PT FALLS ASSESS DOC 0-1 FALLS W/OUT INJ PAST YR: ICD-10-PCS | Mod: CPTII,S$GLB,, | Performed by: INTERNAL MEDICINE

## 2022-09-26 PROCEDURE — 1101F PT FALLS ASSESS-DOCD LE1/YR: CPT | Mod: CPTII,S$GLB,, | Performed by: INTERNAL MEDICINE

## 2022-09-26 PROCEDURE — 3079F DIAST BP 80-89 MM HG: CPT | Mod: CPTII,S$GLB,, | Performed by: INTERNAL MEDICINE

## 2022-09-26 PROCEDURE — 3288F FALL RISK ASSESSMENT DOCD: CPT | Mod: CPTII,S$GLB,, | Performed by: INTERNAL MEDICINE

## 2022-09-26 PROCEDURE — 1160F PR REVIEW ALL MEDS BY PRESCRIBER/CLIN PHARMACIST DOCUMENTED: ICD-10-PCS | Mod: CPTII,S$GLB,, | Performed by: INTERNAL MEDICINE

## 2022-09-26 PROCEDURE — 1126F PR PAIN SEVERITY QUANTIFIED, NO PAIN PRESENT: ICD-10-PCS | Mod: CPTII,S$GLB,, | Performed by: INTERNAL MEDICINE

## 2022-09-26 PROCEDURE — 1159F PR MEDICATION LIST DOCUMENTED IN MEDICAL RECORD: ICD-10-PCS | Mod: CPTII,S$GLB,, | Performed by: INTERNAL MEDICINE

## 2022-09-26 PROCEDURE — 1160F RVW MEDS BY RX/DR IN RCRD: CPT | Mod: CPTII,S$GLB,, | Performed by: INTERNAL MEDICINE

## 2022-09-26 PROCEDURE — 4010F PR ACE/ARB THEARPY RXD/TAKEN: ICD-10-PCS | Mod: CPTII,S$GLB,, | Performed by: INTERNAL MEDICINE

## 2022-09-26 PROCEDURE — 99214 PR OFFICE/OUTPT VISIT, EST, LEVL IV, 30-39 MIN: ICD-10-PCS | Mod: S$GLB,,, | Performed by: INTERNAL MEDICINE

## 2022-09-26 PROCEDURE — 99214 OFFICE O/P EST MOD 30 MIN: CPT | Mod: S$GLB,,, | Performed by: INTERNAL MEDICINE

## 2022-09-26 PROCEDURE — 3288F PR FALLS RISK ASSESSMENT DOCUMENTED: ICD-10-PCS | Mod: CPTII,S$GLB,, | Performed by: INTERNAL MEDICINE

## 2022-09-26 PROCEDURE — 3075F PR MOST RECENT SYSTOLIC BLOOD PRESS GE 130-139MM HG: ICD-10-PCS | Mod: CPTII,S$GLB,, | Performed by: INTERNAL MEDICINE

## 2022-09-26 PROCEDURE — 93000 EKG 12-LEAD: ICD-10-PCS | Mod: S$GLB,,, | Performed by: INTERNAL MEDICINE

## 2022-09-26 PROCEDURE — 3075F SYST BP GE 130 - 139MM HG: CPT | Mod: CPTII,S$GLB,, | Performed by: INTERNAL MEDICINE

## 2022-09-26 PROCEDURE — 99999 PR PBB SHADOW E&M-EST. PATIENT-LVL IV: CPT | Mod: PBBFAC,,, | Performed by: INTERNAL MEDICINE

## 2022-09-26 PROCEDURE — 99999 PR PBB SHADOW E&M-EST. PATIENT-LVL IV: ICD-10-PCS | Mod: PBBFAC,,, | Performed by: INTERNAL MEDICINE

## 2022-09-26 PROCEDURE — 1126F AMNT PAIN NOTED NONE PRSNT: CPT | Mod: CPTII,S$GLB,, | Performed by: INTERNAL MEDICINE

## 2022-09-26 PROCEDURE — 1159F MED LIST DOCD IN RCRD: CPT | Mod: CPTII,S$GLB,, | Performed by: INTERNAL MEDICINE

## 2022-09-26 PROCEDURE — 3008F BODY MASS INDEX DOCD: CPT | Mod: CPTII,S$GLB,, | Performed by: INTERNAL MEDICINE

## 2022-09-26 PROCEDURE — 3008F PR BODY MASS INDEX (BMI) DOCUMENTED: ICD-10-PCS | Mod: CPTII,S$GLB,, | Performed by: INTERNAL MEDICINE

## 2022-09-26 PROCEDURE — 3079F PR MOST RECENT DIASTOLIC BLOOD PRESSURE 80-89 MM HG: ICD-10-PCS | Mod: CPTII,S$GLB,, | Performed by: INTERNAL MEDICINE

## 2022-09-26 PROCEDURE — 93000 ELECTROCARDIOGRAM COMPLETE: CPT | Mod: S$GLB,,, | Performed by: INTERNAL MEDICINE

## 2022-09-26 PROCEDURE — 4010F ACE/ARB THERAPY RXD/TAKEN: CPT | Mod: CPTII,S$GLB,, | Performed by: INTERNAL MEDICINE

## 2022-09-26 NOTE — PROGRESS NOTES
CARDIOVASCULAR CONSULTATION    REASON FOR CONSULT:   Yair Owens is a 72 y.o. male who presents for evaluation of chest pressure.      HISTORY OF PRESENT ILLNESS:     Patient is a pleasant 61-year-old man.  Came here because evaluation of chest pressure to the emergency room.  Was ruled out.  EKG was done which showed normal sinus rhythm.  States the pressure was substernal, and was much worse than the usual pressure which he feels.  States that he usually feels chest pain and tightness.  Unrelated to activity.  Sometimes can come with activity other times at rest.  Denies orthopnea, PND.  Does have mild dyspnea on exertion.  Used to smoke, but cutting down.    Notes from January 2020:    Patient doing fine.  Denies any further episodes of chest pains, orthopnea, PND.  States that he thinks it is his anxiety.  Stress testing did not reveal any significant issues apart from mild aortic valve stenosis.      The perfusion scan is free of evidence from myocardial ischemia or injury.    There is a  mild intensity fixed defect in the inferior wall of the left ventricle secondary to diaphragm attenuation.    Gated perfusion images showed an ejection fraction of 60% post stress.    The EKG portion of this study is negative for ischemia.    The patient reported no chest pain during the stress test.    There were no arrhythmias during stress.      Normal left ventricular systolic function. The estimated ejection fraction is 60%.  Concentric left ventricular hypertrophy.  Normal LV diastolic function.  Normal right ventricular systolic function.  Mild-to-moderate aortic valve stenosis.  Aortic valve area is 1.80 cm2; peak velocity is 2.60 m/s; mean gradient is 18 mmHg.  Mild aortic regurgitation.    Notes from may 2020:    The patient location is: his home   The chief complaint leading to consultation is: chest pain    Visit type: audiovisual    Face to Face time with patient: 15 mins   25 minutes of total time spent on the  encounter, which includes face to face time and non-face to face time preparing to see the patient (eg, review of tests), Obtaining and/or reviewing separately obtained history, Documenting clinical information in the electronic or other health record, Independently interpreting results (not separately reported) and communicating results to the patient/family/caregiver, or Care coordination (not separately reported).         Each patient to whom he or she provides medical services by telemedicine is:  (1) informed of the relationship between the physician and patient and the respective role of any other health care provider with respect to management of the patient; and (2) notified that he or she may decline to receive medical services by telemedicine and may withdraw from such care at any time.    Notes:  Patient here for follow-up.  Denies any chest pain at rest on exertion, orthopnea, PND.  States that he has been feeling great.  However he has started smoking more again because of the pandemic and the stress related with the pandemic.        Jan 2021:      The patient location is: his home  The chief complaint leading to consultation is: chest pain    Visit type: audiovisual    Face to Face time with patient: 15 mins  25 minutes of total time spent on the encounter, which includes face to face time and non-face to face time preparing to see the patient (eg, review of tests), Obtaining and/or reviewing separately obtained history, Documenting clinical information in the electronic or other health record, Independently interpreting results (not separately reported) and communicating results to the patient/family/caregiver, or Care coordination (not separately reported).         Each patient to whom he or she provides medical services by telemedicine is:  (1) informed of the relationship between the physician and patient and the respective role of any other health care provider with respect to management of the  patient; and (2) notified that he or she may decline to receive medical services by telemedicine and may withdraw from such care at any time.    Notes:  Patient here via audiovisual visit.  Had an episode of chest pain which brought him to the ER.  Describes it as left-sided.  At rest.  EKG was done which showed normal sinus rhythm with nonspecific ST changes and some mild ST depressions.  Since then has not had any further chest pains.  States that that day his blood pressure was high around 170 mm of mercury.  Doing fine.      Notes from feb 2021    The patient location is:  his home  The chief complaint leading to consultation is:  Chest pain    Visit type: audiovisual    Face to Face time with patient: 20 mins  30  minutes of total time spent on the encounter, which includes face to face time and non-face to face time preparing to see the patient (eg, review of tests), Obtaining and/or reviewing separately obtained history, Documenting clinical information in the electronic or other health record, Independently interpreting results (not separately reported) and communicating results to the patient/family/caregiver, or Care coordination (not separately reported).         Each patient to whom he or she provides medical services by telemedicine is:  (1) informed of the relationship between the physician and patient and the respective role of any other health care provider with respect to management of the patient; and (2) notified that he or she may decline to receive medical services by telemedicine and may withdraw from such care at any time.    Notes:       Patient here follow-up via audiovisual visit.  Occasional chest pain.  Really in the center of the chest and feels like tightness.  Sometimes in the left side and sometimes in the left shoulder.  Feels the left-sided pain goes to his left shoulder.  No relation with activity.  Can occur at rest or on exertion.  Stress test showed normal myocardial perfusion.   Although the EKG portion was positive for ischemia.    Normal myocardial perfusion scan. There is no evidence of myocardial ischemia or infarction.    There is a  mild intensity fixed defect in the inferobasilar wall of the left ventricle secondary to diaphragm attenuation.    The gated perfusion images showed an ejection fraction of 68% post stress.    There is normal wall motion post stress.    The EKG portion of this study is positive for ischemia.      The left ventricle is normal in size with mild concentric hypertrophy and normal systolic function. The estimated ejection fraction is 60%  Normal right ventricular size with normal right ventricular systolic function.  There is mild aortic valve stenosis.  Aortic valve area is 1.87 cm2; peak velocity is 2.19 m/s; mean gradient is 10 mmHg.    Notes from March 2021:  Patient here for follow-up after angiogram.  Mild nonobstructive CAD on angiogram.  No complications from angiogram.  Doing fine.  Denies any chest pains at rest on exertion.  States he thinks it is his anxiety which is bothering him.    The estimated blood loss was <50 mL.  There was non-obstructive coronary artery disease..        Left main:  Distal 10% stenosis     Lad:  Luminal irregularities.  Mid 10-20% stenosis     Circumflex:  Luminal irregularities     RCA:  Luminal irregularities     Access: We accessed the right radial artery using ultrasound guidance. The vessel appeared widely patent, suitable for endovascular access. The images were interpreted by me and saved.  Access was closed with radial band.           June 21:    The patient location is: his home  The chief complaint leading to consultation is: fu    Visit type: audiovisual    Face to Face time with patient: 15 min  25  minutes of total time spent on the encounter, which includes face to face time and non-face to face time preparing to see the patient (eg, review of tests), Obtaining and/or reviewing separately obtained history,  Documenting clinical information in the electronic or other health record, Independently interpreting results (not separately reported) and communicating results to the patient/family/caregiver, or Care coordination (not separately reported).         Each patient to whom he or she provides medical services by telemedicine is:  (1) informed of the relationship between the physician and patient and the respective role of any other health care provider with respect to management of the patient; and (2) notified that he or she may decline to receive medical services by telemedicine and may withdraw from such care at any time.    Notes:   Has been having left sided chest pain, not related to exertion, pin point in the left of the chest. Does not occur when he is cutting his lawn.  Denies orthopnea, PND, swelling of feet.  States blood pressure is usually well controlled at home.      September 2022: Patient here for follow-up.  Denies any chest pains at rest on exertion, orthopnea, PND.  Has been doing fine.    PAST MEDICAL HISTORY:     Past Medical History:   Diagnosis Date    Chest pain syndrome     Colon polyps     GERD (gastroesophageal reflux disease)     Gout, unspecified     Hypercholesteremia     Hyperlipidemia     Hypertension        PAST SURGICAL HISTORY:     Past Surgical History:   Procedure Laterality Date    APPENDECTOMY      BONE RESECTION, RIB      for thoracic outlet syndrome    COLONOSCOPY N/A 7/21/2017    Procedure: COLONOSCOPY;  Surgeon: Deepak Servin MD;  Location: [facility] ENDO;  Service: Endoscopy;  Laterality: N/A;    HAND SURGERY      LEFT HEART CATHETERIZATION Left 3/2/2021    Procedure: Left heart cath, radial, 730 am;  Surgeon: Vladimir Avalos MD;  Location: [facility] CATH LAB;  Service: Cardiology;  Laterality: Left;  RN PREOP 2/25/2021--COVID NEGATIVE ON 3/1  H/P INCOMPLETE    SCROTAL SURGERY      mass    ULTRASOUND GUIDANCE  3/2/2021    Procedure: Ultrasound Guidance;  Surgeon: Vladimir Avalos MD;   Location: [facility] CATH LAB;  Service: Cardiology;;       ALLERGIES AND MEDICATION:     Review of patient's allergies indicates:   Allergen Reactions    Codeine Itching    Ibuprofen Itching        Medication List            Accurate as of September 26, 2022  2:15 PM. If you have any questions, ask your nurse or doctor.                CONTINUE taking these medications      amLODIPine 10 MG tablet  Commonly known as: NORVASC  TAKE 1 TABLET BY MOUTH EVERY DAY     aspirin 81 MG EC tablet  Commonly known as: ECOTRIN  Take 1 tablet (81 mg total) by mouth once daily.     atorvastatin 40 MG tablet  Commonly known as: LIPITOR  TAKE 1 TABLET BY MOUTH EVERY DAY     co-enzyme Q-10 50 mg capsule     fish oil-omega-3 fatty acids 300-1,000 mg capsule     hydrOXYzine pamoate 50 MG Cap  Commonly known as: VISTARIL  TAKE 2 CAPSULES BY MOUTH AT BEDTIME AS NEEDED FOR INSOMNIA     losartan 25 MG tablet  Commonly known as: COZAAR  Take 1 tablet (25 mg total) by mouth once daily.     metoprolol succinate 25 MG 24 hr tablet  Commonly known as: TOPROL-XL  TAKE 1 TABLET BY MOUTH EVERY DAY     multivitamin capsule     nitroGLYCERIN 0.4 MG SL tablet  Commonly known as: NITROSTAT  Place 1 tablet (0.4 mg total) under the tongue every 5 (five) minutes as needed for Chest pain.     omeprazole 40 MG capsule  Commonly known as: PRILOSEC  TAKE 1 CAPSULE BY MOUTH  TWICE DAILY     sertraline 25 MG tablet  Commonly known as: ZOLOFT  TAKE 1 TABLET BY MOUTH EVERY DAY     vitamin D 1000 units Tab  Commonly known as: VITAMIN D3              SOCIAL HISTORY:     Social History     Socioeconomic History    Marital status:    Occupational History     Employer:  [employer]   Tobacco Use    Smoking status: Every Day     Packs/day: 0.00     Years: 60.00     Pack years: 0.00     Types: Cigarettes    Smokeless tobacco: Never    Tobacco comments:     2 cigarettes a day   Substance and Sexual Activity    Alcohol use: Never    Drug use: No    Sexual activity: Yes      Partners: Female     Social Determinants of Health     Financial Resource Strain: Low Risk     Difficulty of Paying Living Expenses: Not hard at all   Food Insecurity: No Food Insecurity    Worried About Running Out of Food in the Last Year: Never true    Ran Out of Food in the Last Year: Never true   Transportation Needs: No Transportation Needs    Lack of Transportation (Medical): No    Lack of Transportation (Non-Medical): No   Physical Activity: Insufficiently Active    Days of Exercise per Week: 1 day    Minutes of Exercise per Session: 20 min   Stress: No Stress Concern Present    Feeling of Stress : Only a little   Social Connections: Moderately Isolated    Frequency of Communication with Friends and Family: Three times a week    Frequency of Social Gatherings with Friends and Family: Once a week    Attends Taoist Services: Never    Active Member of Clubs or Organizations: No    Attends Club or Organization Meetings: Never    Marital Status:    Housing Stability: Low Risk     Unable to Pay for Housing in the Last Year: No    Number of Places Lived in the Last Year: 1    Unstable Housing in the Last Year: No       FAMILY HISTORY:     Family History   Problem Relation Age of Onset    No Known Problems Mother     Cancer Father     Heart disease Father     Asthma Sister     Cancer Sister 78        Rectal cancer    Lupus Daughter     No Known Problems Son     No Known Problems Brother        REVIEW OF SYSTEMS:   Review of Systems   Constitutional: Negative.   HENT: Negative.     Eyes: Negative.    Respiratory: Negative.     Endocrine: Negative.    Hematologic/Lymphatic: Negative.    Skin: Negative.    Musculoskeletal: Negative.    Gastrointestinal: Negative.    Genitourinary: Negative.    Neurological: Negative.    Psychiatric/Behavioral: Negative.     Allergic/Immunologic: Negative.      A 10 point review of systems was performed and all the pertinent positives have been mentioned. Rest of review of  "systems was negative.        PHYSICAL EXAM:     Vitals:    09/26/22 1357   BP: 130/82   Pulse: 68   Resp: 18    Body mass index is 32.85 kg/m².  Weight: 98 kg (216 lb 0.8 oz)   Height: 5' 8" (172.7 cm)      Physical Exam  Vitals and nursing note reviewed.   Constitutional:       Appearance: Normal appearance. He is well-developed.   HENT:      Head: Normocephalic and atraumatic.      Right Ear: Hearing normal.      Left Ear: Hearing normal.      Nose: Nose normal.   Eyes:      General: Lids are normal.      Conjunctiva/sclera: Conjunctivae normal.      Pupils: Pupils are equal, round, and reactive to light.   Cardiovascular:      Rate and Rhythm: Normal rate and regular rhythm.      Pulses: Normal pulses.      Heart sounds: Murmur heard.   Pulmonary:      Effort: Pulmonary effort is normal.      Breath sounds: Normal breath sounds.   Abdominal:      Palpations: Abdomen is soft.      Tenderness: There is no abdominal tenderness.   Musculoskeletal:         General: No deformity.      Cervical back: Normal range of motion and neck supple.   Skin:     General: Skin is warm and dry.   Neurological:      Mental Status: He is alert and oriented to person, place, and time.   Psychiatric:         Speech: Speech normal.            DATA:     Laboratory:  CBC:  Recent Labs   Lab 02/25/21  1000 03/26/21  0800 06/11/22  0800   WBC 8.66 7.19 6.32   Hemoglobin 14.5 14.2 13.8 L   Hematocrit 43.3 43.8 42.7   Platelets 241 319 257         CHEMISTRIES:  Recent Labs   Lab 02/25/21  1000 03/26/21  0800 06/11/22  0800   Glucose 115 H 114 H 109   Sodium 139 143 139   Potassium 4.7 4.6 4.7   BUN 21 18 19   Creatinine 1.3 1.2 1.3   eGFR if African American >60 >60 >60.0   eGFR if non  55 A >60 54.9 A   Calcium 9.4 9.6 9.5         CARDIAC BIOMARKERS:  Recent Labs   Lab 01/13/20  1500 01/04/21  1450   Troponin I <0.006 <0.006         COAGS:        LIPIDS/LFTS:  Recent Labs   Lab 02/14/20  1030 01/04/21  1450 03/26/21  0800 " 06/11/22  0800   Cholesterol 150  --  178 154   Triglycerides 96  --  168 H 95   HDL 43  --  41 41   LDL Cholesterol 87.8  --  103.4 94.0   Non-HDL Cholesterol 107  --  137 113   AST  --  32 34 44 H   ALT  --  45 H 43 57 H         No results found for: HGBA1C    TSH        The 10-year ASCVD risk score (Francis DK, et al., 2019) is: 25.9%    Values used to calculate the score:      Age: 72 years      Sex: Male      Is Non- : No      Diabetic: No      Tobacco smoker: Yes      Systolic Blood Pressure: 130 mmHg      Is BP treated: Yes      HDL Cholesterol: 41 mg/dL      Total Cholesterol: 154 mg/dL             ASSESSMENT AND PLAN     Patient Active Problem List   Diagnosis    Hyperlipidemia    Essential hypertension    GERD (gastroesophageal reflux disease)    Generalized anxiety disorder    Tobacco dependence    Psychophysiological insomnia    Atherosclerosis of aorta    Erectile dysfunction    CAD (coronary artery disease)       1.  Chest pressure:  Resolved Coronary angiogram showed nonobstructive CAD.      2. Hypertension:  Blood pressure  controlled.  I have asked him to maintain a blood pressure diary, low-salt diet, and start regular exercise.     3. Mild aortic valve stenosis.     4.  Smoking cessation has been highly encouraged.      Follow-up in 6 months    Thank you very much for involving me in the care of your patient.  Please do not hesitate to contact me if there are any questions.      Vladimir Avalos MD, FACC, [facility]  Interventional Cardiologist, [facility].           This note was dictated with the help of speech recognition software.  There might be un-intended errors and/or substitutions.

## 2022-10-05 ENCOUNTER — OFFICE VISIT (OUTPATIENT)
Dept: ORTHOPEDICS | Facility: CLINIC | Age: 72
End: 2022-10-05
Payer: MEDICARE

## 2022-10-05 VITALS
DIASTOLIC BLOOD PRESSURE: 80 MMHG | RESPIRATION RATE: 18 BRPM | HEIGHT: 68 IN | SYSTOLIC BLOOD PRESSURE: 138 MMHG | BODY MASS INDEX: 33.41 KG/M2 | HEART RATE: 60 BPM | WEIGHT: 220.44 LBS | OXYGEN SATURATION: 98 %

## 2022-10-05 DIAGNOSIS — R93.89 ABNORMAL X-RAY: ICD-10-CM

## 2022-10-05 DIAGNOSIS — M25.471 RIGHT ANKLE SWELLING: ICD-10-CM

## 2022-10-05 PROCEDURE — 1126F PR PAIN SEVERITY QUANTIFIED, NO PAIN PRESENT: ICD-10-PCS | Mod: CPTII,S$GLB,, | Performed by: ORTHOPAEDIC SURGERY

## 2022-10-05 PROCEDURE — 3079F DIAST BP 80-89 MM HG: CPT | Mod: CPTII,S$GLB,, | Performed by: ORTHOPAEDIC SURGERY

## 2022-10-05 PROCEDURE — 1160F RVW MEDS BY RX/DR IN RCRD: CPT | Mod: CPTII,S$GLB,, | Performed by: ORTHOPAEDIC SURGERY

## 2022-10-05 PROCEDURE — 1159F PR MEDICATION LIST DOCUMENTED IN MEDICAL RECORD: ICD-10-PCS | Mod: CPTII,S$GLB,, | Performed by: ORTHOPAEDIC SURGERY

## 2022-10-05 PROCEDURE — 3079F PR MOST RECENT DIASTOLIC BLOOD PRESSURE 80-89 MM HG: ICD-10-PCS | Mod: CPTII,S$GLB,, | Performed by: ORTHOPAEDIC SURGERY

## 2022-10-05 PROCEDURE — 3288F PR FALLS RISK ASSESSMENT DOCUMENTED: ICD-10-PCS | Mod: CPTII,S$GLB,, | Performed by: ORTHOPAEDIC SURGERY

## 2022-10-05 PROCEDURE — 1101F PR PT FALLS ASSESS DOC 0-1 FALLS W/OUT INJ PAST YR: ICD-10-PCS | Mod: CPTII,S$GLB,, | Performed by: ORTHOPAEDIC SURGERY

## 2022-10-05 PROCEDURE — 99203 PR OFFICE/OUTPT VISIT, NEW, LEVL III, 30-44 MIN: ICD-10-PCS | Mod: S$GLB,,, | Performed by: ORTHOPAEDIC SURGERY

## 2022-10-05 PROCEDURE — 3008F BODY MASS INDEX DOCD: CPT | Mod: CPTII,S$GLB,, | Performed by: ORTHOPAEDIC SURGERY

## 2022-10-05 PROCEDURE — 1101F PT FALLS ASSESS-DOCD LE1/YR: CPT | Mod: CPTII,S$GLB,, | Performed by: ORTHOPAEDIC SURGERY

## 2022-10-05 PROCEDURE — 3075F SYST BP GE 130 - 139MM HG: CPT | Mod: CPTII,S$GLB,, | Performed by: ORTHOPAEDIC SURGERY

## 2022-10-05 PROCEDURE — 3288F FALL RISK ASSESSMENT DOCD: CPT | Mod: CPTII,S$GLB,, | Performed by: ORTHOPAEDIC SURGERY

## 2022-10-05 PROCEDURE — 3075F PR MOST RECENT SYSTOLIC BLOOD PRESS GE 130-139MM HG: ICD-10-PCS | Mod: CPTII,S$GLB,, | Performed by: ORTHOPAEDIC SURGERY

## 2022-10-05 PROCEDURE — 4010F PR ACE/ARB THEARPY RXD/TAKEN: ICD-10-PCS | Mod: CPTII,S$GLB,, | Performed by: ORTHOPAEDIC SURGERY

## 2022-10-05 PROCEDURE — 3008F PR BODY MASS INDEX (BMI) DOCUMENTED: ICD-10-PCS | Mod: CPTII,S$GLB,, | Performed by: ORTHOPAEDIC SURGERY

## 2022-10-05 PROCEDURE — 99999 PR PBB SHADOW E&M-EST. PATIENT-LVL V: CPT | Mod: PBBFAC,,, | Performed by: ORTHOPAEDIC SURGERY

## 2022-10-05 PROCEDURE — 1126F AMNT PAIN NOTED NONE PRSNT: CPT | Mod: CPTII,S$GLB,, | Performed by: ORTHOPAEDIC SURGERY

## 2022-10-05 PROCEDURE — 1159F MED LIST DOCD IN RCRD: CPT | Mod: CPTII,S$GLB,, | Performed by: ORTHOPAEDIC SURGERY

## 2022-10-05 PROCEDURE — 1160F PR REVIEW ALL MEDS BY PRESCRIBER/CLIN PHARMACIST DOCUMENTED: ICD-10-PCS | Mod: CPTII,S$GLB,, | Performed by: ORTHOPAEDIC SURGERY

## 2022-10-05 PROCEDURE — 99203 OFFICE O/P NEW LOW 30 MIN: CPT | Mod: S$GLB,,, | Performed by: ORTHOPAEDIC SURGERY

## 2022-10-05 PROCEDURE — 99999 PR PBB SHADOW E&M-EST. PATIENT-LVL V: ICD-10-PCS | Mod: PBBFAC,,, | Performed by: ORTHOPAEDIC SURGERY

## 2022-10-05 PROCEDURE — 4010F ACE/ARB THERAPY RXD/TAKEN: CPT | Mod: CPTII,S$GLB,, | Performed by: ORTHOPAEDIC SURGERY

## 2022-10-05 NOTE — PROGRESS NOTES
Assessment: 72 y.o. male with right ankle pain, swelling     I explained my diagnostic impression and the reasoning behind it in detail, using layman's terms.     Plan:   - Activity as tolerated   - Return to clinic in 4 weeks if pain or swelling persists. Return sooner if symptoms worsen or fail to improve.    All questions were answered in detail. The patient is in full agreement with the treatment plan and will proceed accordingly.    Chief Complaint   Patient presents with    Right Ankle - Swelling       Initial visit (10/5/22): Yair Owens is a 72 y.o. male who presents today complaining of Swelling of the Right Ankle     Was seen in the ER for R ankle pain and swelling laterally   No trauma  Pain has resolved  Still swollen   No n/t       This patient was seen in consultation at the request of Brenden Vanessa    This is the extent of the patient's complaints at this time.        Review of patient's allergies indicates:   Allergen Reactions    Codeine Itching    Ibuprofen Itching         Current Outpatient Medications:     amLODIPine (NORVASC) 10 MG tablet, TAKE 1 TABLET BY MOUTH EVERY DAY, Disp: 90 tablet, Rfl: 0    aspirin (ECOTRIN) 81 MG EC tablet, Take 1 tablet (81 mg total) by mouth once daily., Disp: , Rfl: 0    atorvastatin (LIPITOR) 40 MG tablet, TAKE 1 TABLET BY MOUTH EVERY DAY, Disp: 90 tablet, Rfl: 3    co-enzyme Q-10 50 mg capsule, Take 50 mg by mouth once daily., Disp: , Rfl:     hydrOXYzine pamoate (VISTARIL) 50 MG Cap, TAKE 2 CAPSULES BY MOUTH AT BEDTIME AS NEEDED FOR INSOMNIA, Disp: 180 capsule, Rfl: 1    losartan (COZAAR) 25 MG tablet, Take 1 tablet (25 mg total) by mouth once daily., Disp: 90 tablet, Rfl: 3    metoprolol succinate (TOPROL-XL) 25 MG 24 hr tablet, TAKE 1 TABLET BY MOUTH EVERY DAY, Disp: 90 tablet, Rfl: 0    multivitamin capsule, Take 1 capsule by mouth once daily., Disp: , Rfl:     omega-3 fatty acids/fish oil (FISH OIL-OMEGA-3 FATTY ACIDS) 300-1,000 mg capsule, Take by mouth  "once daily., Disp: , Rfl:     omeprazole (PRILOSEC) 40 MG capsule, TAKE 1 CAPSULE BY MOUTH  TWICE DAILY, Disp: 180 capsule, Rfl: 3    nitroGLYCERIN (NITROSTAT) 0.4 MG SL tablet, Place 1 tablet (0.4 mg total) under the tongue every 5 (five) minutes as needed for Chest pain., Disp: 90 tablet, Rfl: 11    sertraline (ZOLOFT) 25 MG tablet, TAKE 1 TABLET BY MOUTH EVERY DAY, Disp: 90 tablet, Rfl: 3    vitamin D (VITAMIN D3) 1000 units Tab, Take 1,000 Units by mouth once daily., Disp: , Rfl:     Physical Exam:   Vitals:    10/05/22 1046   BP: 138/80   Pulse: 60   Resp: 18   SpO2: 98%   Weight: 100 kg (220 lb 7.4 oz)   Height: 5' 8" (1.727 m)   PainSc: 0-No pain   PainLoc: Ankle       General: Weight: 100 kg (220 lb 7.4 oz) Body mass index is 33.52 kg/m².  Patient is alert, awake and oriented to time, place and person. Mood and affect are appropriate.  Patient does not appear to be in any distress, denies any constitutional symptoms and appears stated age.   HEENT:  Pupils are equal and round, sclera are not injected. External examination of ears and nose reveals no abnormalities. Cranial nerves II-X are grossly intact  Skin:  no rashes, abrasions or open wounds on the affected extremity   Resp:  No respiratory distress or audible wheezing   CV: 2+  pulses, all extremities warm and well perfused   Right Ankle   Swelling over lateral mal  Very minimal tenderness over tip of lateral mal   Ltsi s/s/sp/dp/t  + ehl/fhl/ta/gs  2+ DP     Imaging: 3 views of the right ankle show a calcification at tip of lateral mal - unclear if this is acute or chronic    I personally reviewed and interpreted the patient's imaging obtained today in clinic       A note notifying Brenden Vanessa of my findings was sent via the electronic medical record     This note was created by combination of typed  and M-Modal dictation. Transcription and phonetic errors may be present.  If there are any questions, please contact me.    Past Medical " History:   Diagnosis Date    Chest pain syndrome     Colon polyps     GERD (gastroesophageal reflux disease)     Gout, unspecified     Hypercholesteremia     Hyperlipidemia     Hypertension        Active Problem List with Overview Notes    Diagnosis Date Noted    CAD (coronary artery disease) 02/22/2021    Erectile dysfunction 02/14/2020    Atherosclerosis of aorta 01/23/2018     US 08/2017.  Stable, asymptomatic chronic condition.  Will continue to maximize risk factor reduction and adjust medication as needed.       Tobacco dependence 07/24/2017    Psychophysiological insomnia 07/24/2017    Generalized anxiety disorder 01/11/2017    Hyperlipidemia 10/29/2012    Essential hypertension 10/29/2012     Followed by digital team      GERD (gastroesophageal reflux disease) 10/29/2012       Past Surgical History:   Procedure Laterality Date    APPENDECTOMY      BONE RESECTION, RIB      for thoracic outlet syndrome    COLONOSCOPY N/A 7/21/2017    Procedure: COLONOSCOPY;  Surgeon: Deepak Servin MD;  Location: [facility] ENDO;  Service: Endoscopy;  Laterality: N/A;    HAND SURGERY      LEFT HEART CATHETERIZATION Left 3/2/2021    Procedure: Left heart cath, radial, 730 am;  Surgeon: Vladimir Avalos MD;  Location: [facility] CATH LAB;  Service: Cardiology;  Laterality: Left;  RN PREOP 2/25/2021--COVID NEGATIVE ON 3/1  H/P INCOMPLETE    SCROTAL SURGERY      mass    ULTRASOUND GUIDANCE  3/2/2021    Procedure: Ultrasound Guidance;  Surgeon: Vladimir Avalos MD;  Location: [facility] CATH LAB;  Service: Cardiology;;       Family History   Problem Relation Age of Onset    No Known Problems Mother     Cancer Father     Heart disease Father     Asthma Sister     Cancer Sister 78        Rectal cancer    Lupus Daughter     No Known Problems Son     No Known Problems Brother

## 2022-10-06 ENCOUNTER — CLINICAL SUPPORT (OUTPATIENT)
Dept: ENDOSCOPY | Facility: HOSPITAL | Age: 72
End: 2022-10-06
Attending: INTERNAL MEDICINE
Payer: MEDICARE

## 2022-10-06 DIAGNOSIS — Z86.010 PERSONAL HISTORY OF COLONIC POLYPS: ICD-10-CM

## 2022-10-06 RX ORDER — SOD SULF/POT CHLORIDE/MAG SULF 1.479 G
12 TABLET ORAL DAILY
Qty: 24 TABLET | Refills: 0 | Status: SHIPPED | OUTPATIENT
Start: 2022-10-06 | End: 2022-12-14 | Stop reason: ALTCHOICE

## 2022-10-07 ENCOUNTER — HOSPITAL ENCOUNTER (OUTPATIENT)
Dept: CARDIOLOGY | Facility: HOSPITAL | Age: 72
Discharge: HOME | End: 2022-10-07
Attending: INTERNAL MEDICINE
Payer: MEDICARE

## 2022-10-07 ENCOUNTER — PATIENT MESSAGE (OUTPATIENT)
Dept: FAMILY MEDICINE | Facility: CLINIC | Age: 72
End: 2022-10-07
Payer: MEDICARE

## 2022-10-07 DIAGNOSIS — E80.6 HYPERBILIRUBINEMIA: ICD-10-CM

## 2022-10-07 DIAGNOSIS — R74.8 ELEVATED LIVER ENZYMES: Primary | ICD-10-CM

## 2022-10-07 DIAGNOSIS — R09.89 RIGHT CAROTID BRUIT: ICD-10-CM

## 2022-10-07 LAB
LEFT CBA DIAS: 12 CM/S
LEFT CBA SYS: 36 CM/S
LEFT CCA DIST DIAS: 18 CM/S
LEFT CCA DIST SYS: 48 CM/S
LEFT CCA MID DIAS: 16 CM/S
LEFT CCA MID SYS: 62 CM/S
LEFT CCA PROX DIAS: 14 CM/S
LEFT CCA PROX SYS: 66 CM/S
LEFT ECA DIAS: 9 CM/S
LEFT ECA SYS: 67 CM/S
LEFT ICA DIST DIAS: 33 CM/S
LEFT ICA DIST SYS: 80 CM/S
LEFT ICA MID DIAS: 22 CM/S
LEFT ICA MID SYS: 64 CM/S
LEFT ICA PROX DIAS: 13 CM/S
LEFT ICA PROX SYS: 52 CM/S
LEFT VERTEBRAL DIAS: 14 CM/S
LEFT VERTEBRAL SYS: 66 CM/S
OHS CV CAROTID RIGHT ICA EDV HIGHEST: 20
OHS CV CAROTID ULTRASOUND LEFT ICA/CCA RATIO: 1.67
OHS CV CAROTID ULTRASOUND RIGHT ICA/CCA RATIO: 2.18
OHS CV PV CAROTID LEFT HIGHEST CCA: 66
OHS CV PV CAROTID LEFT HIGHEST ICA: 80
OHS CV PV CAROTID RIGHT HIGHEST CCA: 52
OHS CV PV CAROTID RIGHT HIGHEST ICA: 83
OHS CV US CAROTID LEFT HIGHEST EDV: 33
RIGHT CBA DIAS: 9 CM/S
RIGHT CBA SYS: 30 CM/S
RIGHT CCA DIST DIAS: 7 CM/S
RIGHT CCA DIST SYS: 38 CM/S
RIGHT CCA MID DIAS: 8 CM/S
RIGHT CCA MID SYS: 50 CM/S
RIGHT CCA PROX DIAS: 9 CM/S
RIGHT CCA PROX SYS: 52 CM/S
RIGHT ECA DIAS: 9 CM/S
RIGHT ECA SYS: 83 CM/S
RIGHT ICA DIST DIAS: 18 CM/S
RIGHT ICA DIST SYS: 83 CM/S
RIGHT ICA MID DIAS: 20 CM/S
RIGHT ICA MID SYS: 62 CM/S
RIGHT ICA PROX DIAS: 18 CM/S
RIGHT ICA PROX SYS: 69 CM/S
RIGHT VERTEBRAL DIAS: 9 CM/S
RIGHT VERTEBRAL SYS: 54 CM/S

## 2022-10-07 PROCEDURE — 93880 EXTRACRANIAL BILAT STUDY: CPT | Mod: 26,,, | Performed by: INTERNAL MEDICINE

## 2022-10-07 PROCEDURE — 93880 CV US DOPPLER CAROTID (CUPID ONLY): ICD-10-PCS | Mod: 26,,, | Performed by: INTERNAL MEDICINE

## 2022-10-07 PROCEDURE — 93880 EXTRACRANIAL BILAT STUDY: CPT

## 2022-10-20 ENCOUNTER — PATIENT MESSAGE (OUTPATIENT)
Dept: FAMILY MEDICINE | Facility: CLINIC | Age: 72
End: 2022-10-20
Payer: MEDICARE

## 2022-10-20 ENCOUNTER — HOSPITAL ENCOUNTER (OUTPATIENT)
Dept: RADIOLOGY | Facility: HOSPITAL | Age: 72
Discharge: HOME | End: 2022-10-20
Attending: INTERNAL MEDICINE
Payer: MEDICARE

## 2022-10-20 DIAGNOSIS — R74.8 ELEVATED LIVER ENZYMES: ICD-10-CM

## 2022-10-20 DIAGNOSIS — E80.6 HYPERBILIRUBINEMIA: ICD-10-CM

## 2022-10-20 PROCEDURE — 76705 ECHO EXAM OF ABDOMEN: CPT | Mod: 26,,, | Performed by: RADIOLOGY

## 2022-10-20 PROCEDURE — 76705 US ABDOMEN LIMITED: ICD-10-PCS | Mod: 26,,, | Performed by: RADIOLOGY

## 2022-10-20 PROCEDURE — 76705 ECHO EXAM OF ABDOMEN: CPT | Mod: TC

## 2022-10-20 NOTE — PROGRESS NOTES
Yair,    Your ultrasound results are generally looking ok in terms of there are no signs of cancer.      There was some enlargement of the liver and some fatty liver type changes which would explain your labs.  There isn't any medication specifically for this.  It is all about the healthy eating lifestyle that we have previously discussed (reminders below).      There was also an incidental finding of a non-cancerous cyst on the kidney.  These are very common and it doesn't need any additional monitoring.      Please feel free to contact us with any questions or concerns.    Sincerely,  Derik Ewing MD

## 2022-10-25 ENCOUNTER — TELEPHONE (OUTPATIENT)
Dept: FAMILY MEDICINE | Facility: CLINIC | Age: 72
End: 2022-10-25
Payer: MEDICARE

## 2022-10-25 NOTE — TELEPHONE ENCOUNTER
Patient requested an appt via [facility]. Patient stated that his swollen ankle was taking care of.

## 2022-11-03 ENCOUNTER — OFFICE VISIT (OUTPATIENT)
Dept: FAMILY MEDICINE | Facility: CLINIC | Age: 72
End: 2022-11-03
Payer: MEDICARE

## 2022-11-03 DIAGNOSIS — F51.04 PSYCHOPHYSIOLOGICAL INSOMNIA: Chronic | ICD-10-CM

## 2022-11-03 DIAGNOSIS — I10 ESSENTIAL HYPERTENSION: Chronic | ICD-10-CM

## 2022-11-03 DIAGNOSIS — K76.0 FATTY LIVER: Chronic | ICD-10-CM

## 2022-11-03 PROCEDURE — 99214 PR OFFICE/OUTPT VISIT, EST, LEVL IV, 30-39 MIN: ICD-10-PCS | Mod: 95,,, | Performed by: INTERNAL MEDICINE

## 2022-11-03 PROCEDURE — 1160F PR REVIEW ALL MEDS BY PRESCRIBER/CLIN PHARMACIST DOCUMENTED: ICD-10-PCS | Mod: CPTII,95,, | Performed by: INTERNAL MEDICINE

## 2022-11-03 PROCEDURE — 4010F ACE/ARB THERAPY RXD/TAKEN: CPT | Mod: CPTII,95,, | Performed by: INTERNAL MEDICINE

## 2022-11-03 PROCEDURE — 1159F MED LIST DOCD IN RCRD: CPT | Mod: CPTII,95,, | Performed by: INTERNAL MEDICINE

## 2022-11-03 PROCEDURE — 4010F PR ACE/ARB THEARPY RXD/TAKEN: ICD-10-PCS | Mod: CPTII,95,, | Performed by: INTERNAL MEDICINE

## 2022-11-03 PROCEDURE — 1160F RVW MEDS BY RX/DR IN RCRD: CPT | Mod: CPTII,95,, | Performed by: INTERNAL MEDICINE

## 2022-11-03 PROCEDURE — 99214 OFFICE O/P EST MOD 30 MIN: CPT | Mod: 95,,, | Performed by: INTERNAL MEDICINE

## 2022-11-03 PROCEDURE — 1159F PR MEDICATION LIST DOCUMENTED IN MEDICAL RECORD: ICD-10-PCS | Mod: CPTII,95,, | Performed by: INTERNAL MEDICINE

## 2022-11-03 RX ORDER — HYDROXYZINE PAMOATE 50 MG/1
CAPSULE ORAL
Qty: 180 CAPSULE | Refills: 1 | Status: SHIPPED | OUTPATIENT
Start: 2022-11-03 | End: 2023-04-19

## 2022-11-03 NOTE — PROGRESS NOTES
Assessment & Plan  Problem List Items Addressed This Visit          Cardiac/Vascular    Essential hypertension (Chronic)    Overview     Followed by digital team         Current Assessment & Plan     The current medical regimen is effective;  continue present plan and medications.             GI    Fatty liver (Chronic)    Current Assessment & Plan     The patient is asked to make an attempt to improve diet and exercise patterns to aid in medical management of this problem. We specifically discussed recommendations for a mostly plant based diet with limited red meats/refined grains/processed foods/added sugars.             Other    Psychophysiological insomnia (Chronic)  -   The current medical regimen is effective;  continue present plan and medications.     Relevant Medications    hydrOXYzine pamoate (VISTARIL) 50 MG Cap       I spent >50% of this 40 minute encounter counseling the patient on diagnoses, risk factors, and treatment.        The patient location is:  Patient Home   The chief complaint leading to consultation is: noted below  Visit type: Virtual visit with synchronous audio and video  Total time spent with patient: 30  Each patient to whom he or she provides medical services by telemedicine is:  (1) informed of the relationship between the physician and patient and the respective role of any other health care provider with respect to management of the patient; and (2) notified that he or she may decline to receive medical services by telemedicine and may withdraw from such care at any time.    Follow-up: Follow up if symptoms worsen or fail to improve.    ______________________________________________________________________    Chief Complaint  No chief complaint on file.      HPI  Yair Owens is a 72 y.o. male with multiple medical diagnoses as listed in the medical history and problem list that presents for fatty liver follow up via virtual visit.  Pt is known to me with their last appointment  6/13/2022.      Generally doing well.  Had some questions about his labs and US and how this relates to his medications.      Some troubles sleeping since reading and having concerns and readings on the internet.       PAST MEDICAL HISTORY:  Past Medical History:   Diagnosis Date    Chest pain syndrome     Colon polyps     GERD (gastroesophageal reflux disease)     Gout, unspecified     Hypercholesteremia     Hyperlipidemia     Hypertension        PAST SURGICAL HISTORY:  Past Surgical History:   Procedure Laterality Date    APPENDECTOMY      BONE RESECTION, RIB      for thoracic outlet syndrome    COLONOSCOPY N/A 7/21/2017    Procedure: COLONOSCOPY;  Surgeon: Deepak Servin MD;  Location: [facility] ENDO;  Service: Endoscopy;  Laterality: N/A;    HAND SURGERY      LEFT HEART CATHETERIZATION Left 3/2/2021    Procedure: Left heart cath, radial, 730 am;  Surgeon: Vladimir Avalos MD;  Location: [facility] CATH LAB;  Service: Cardiology;  Laterality: Left;  RN PREOP 2/25/2021--COVID NEGATIVE ON 3/1  H/P INCOMPLETE    SCROTAL SURGERY      mass    ULTRASOUND GUIDANCE  3/2/2021    Procedure: Ultrasound Guidance;  Surgeon: Vladimir Avalos MD;  Location: [facility] CATH LAB;  Service: Cardiology;;       SOCIAL HISTORY:  Social History     Socioeconomic History    Marital status:    Occupational History     Employer:  [employer]   Tobacco Use    Smoking status: Every Day     Packs/day: 0.00     Years: 60.00     Pack years: 0.00     Types: Cigarettes    Smokeless tobacco: Never    Tobacco comments:     2 cigarettes a day   Substance and Sexual Activity    Alcohol use: Never    Drug use: No    Sexual activity: Yes     Partners: Female     Social Determinants of Health     Financial Resource Strain: Low Risk     Difficulty of Paying Living Expenses: Not hard at all   Food Insecurity: No Food Insecurity    Worried About Running Out of Food in the Last Year: Never true    Ran Out of Food in the Last Year: Never true   Transportation Needs: No  Transportation Needs    Lack of Transportation (Medical): No    Lack of Transportation (Non-Medical): No   Physical Activity: Sufficiently Active    Days of Exercise per Week: 5 days    Minutes of Exercise per Session: 60 min   Stress: Stress Concern Present    Feeling of Stress : To some extent   Social Connections: Moderately Isolated    Frequency of Communication with Friends and Family: Three times a week    Frequency of Social Gatherings with Friends and Family: Once a week    Attends Mormon Services: Never    Active Member of Clubs or Organizations: No    Attends Club or Organization Meetings: Patient refused    Marital Status:    Housing Stability: Low Risk     Unable to Pay for Housing in the Last Year: No    Number of Places Lived in the Last Year: 1    Unstable Housing in the Last Year: No       FAMILY HISTORY:  Family History   Problem Relation Age of Onset    No Known Problems Mother     Cancer Father     Heart disease Father     Asthma Sister     Cancer Sister 78        Rectal cancer    Lupus Daughter     No Known Problems Son     No Known Problems Brother        ALLERGIES AND MEDICATIONS: updated and reviewed.  Review of patient's allergies indicates:   Allergen Reactions    Codeine Itching    Ibuprofen Itching     Current Outpatient Medications   Medication Sig Dispense Refill    amLODIPine (NORVASC) 10 MG tablet TAKE 1 TABLET BY MOUTH EVERY DAY 90 tablet 0    aspirin (ECOTRIN) 81 MG EC tablet Take 1 tablet (81 mg total) by mouth once daily.  0    atorvastatin (LIPITOR) 40 MG tablet TAKE 1 TABLET BY MOUTH EVERY DAY 90 tablet 3    co-enzyme Q-10 50 mg capsule Take 50 mg by mouth once daily.      hydrOXYzine pamoate (VISTARIL) 50 MG Cap TAKE 2 CAPSULES BY MOUTH AT BEDTIME AS NEEDED FOR INSOMNIA 180 capsule 1    losartan (COZAAR) 25 MG tablet Take 1 tablet (25 mg total) by mouth once daily. 90 tablet 3    metoprolol succinate (TOPROL-XL) 25 MG 24 hr tablet TAKE 1 TABLET BY MOUTH EVERY DAY 90  tablet 0    multivitamin capsule Take 1 capsule by mouth once daily.      nitroGLYCERIN (NITROSTAT) 0.4 MG SL tablet Place 1 tablet (0.4 mg total) under the tongue every 5 (five) minutes as needed for Chest pain. 90 tablet 11    omega-3 fatty acids/fish oil (FISH OIL-OMEGA-3 FATTY ACIDS) 300-1,000 mg capsule Take by mouth once daily.      omeprazole (PRILOSEC) 40 MG capsule Take 1 capsule (40 mg total) by mouth 2 (two) times daily. 180 capsule 3    sertraline (ZOLOFT) 25 MG tablet TAKE 1 TABLET BY MOUTH EVERY DAY 90 tablet 3    sod sulf-pot chloride-mag sulf (SUTAB) 1.479-0.188- 0.225 gram tablet Take 12 tablets by mouth once daily. BIN:[id number]:[id number] 24 tablet 0    vitamin D (VITAMIN D3) 1000 units Tab Take 1,000 Units by mouth once daily.       No current facility-administered medications for this visit.         ROS  Review of Systems   Constitutional:  Negative for activity change and unexpected weight change.   HENT:  Negative for hearing loss, rhinorrhea and trouble swallowing.    Eyes:  Negative for discharge and visual disturbance.   Respiratory:  Negative for chest tightness and wheezing.    Cardiovascular:  Negative for chest pain and palpitations.   Gastrointestinal:  Negative for blood in stool, constipation, diarrhea and vomiting.   Endocrine: Negative for polydipsia and polyuria.   Genitourinary:  Negative for difficulty urinating, hematuria and urgency.   Musculoskeletal:  Negative for arthralgias, joint swelling and neck pain.   Neurological:  Negative for weakness and headaches.   Psychiatric/Behavioral:  Negative for confusion and dysphoric mood.          Physical Exam  Physical Exam  Constitutional:       General: He is not in acute distress.     Appearance: Normal appearance. He is well-developed.   HENT:      Head: Normocephalic and atraumatic.   Eyes:      Extraocular Movements: Extraocular movements intact.      Conjunctiva/sclera: Conjunctivae normal.    Pulmonary:      Effort: Pulmonary effort is normal. No respiratory distress.   Musculoskeletal:      Cervical back: Normal range of motion.   Neurological:      General: No focal deficit present.      Mental Status: He is alert and oriented to person, place, and time.   Psychiatric:         Mood and Affect: Mood and affect normal.         Behavior: Behavior normal.         Thought Content: Thought content normal.         Judgment: Judgment normal.         Health Maintenance         Date Due Completion Date    TETANUS VACCINE Never done ---    Shingles Vaccine (1 of 2) Never done ---    COVID-19 Vaccine (5 - Booster for Moderna series) 06/05/2022 4/10/2022    Colorectal Cancer Screening 07/21/2022 7/21/2017    High Dose Statin 10/05/2023 10/5/2022    Aspirin/Antiplatelet Therapy 10/05/2023 10/5/2022    Lipid Panel 06/11/2027 6/11/2022

## 2022-11-03 NOTE — ASSESSMENT & PLAN NOTE
The patient is asked to make an attempt to improve diet and exercise patterns to aid in medical management of this problem. We specifically discussed recommendations for a mostly plant based diet with limited red meats/refined grains/processed foods/added sugars.

## 2022-11-11 ENCOUNTER — PATIENT MESSAGE (OUTPATIENT)
Dept: OTHER | Facility: OTHER | Age: 72
End: 2022-11-11
Payer: MEDICARE

## 2022-11-20 ENCOUNTER — NURSE TRIAGE (OUTPATIENT)
Dept: ADMINISTRATIVE | Facility: CLINIC | Age: 72
End: 2022-11-20
Payer: MEDICARE

## 2022-11-20 NOTE — TELEPHONE ENCOUNTER
"OOC incoming call -     Pt c/o  scheduled for colonoscopy tues, now has head cold and wants to know if he can still have his procedure. Pt declines being triaged for a "head cold." Afebrile.  Info only protocol followed and pt advised to call back on Monday and ask for the department doing the procedure. Encounter routed to PCP/staff and Sam Obrien MD per pt's request as high priority    Invited Pt to call ooc at 1 [phone] if he wants to be triaged or if he has any new or worsening symptoms or questions. Alert and oriented, Pt agrees with above.   Reason for Disposition   [1] Caller requesting NON-URGENT health information AND [2] PCP's office is the best resource    Protocols used: Information Only Call - No Triage-A-    "

## 2022-11-21 ENCOUNTER — TELEPHONE (OUTPATIENT)
Dept: ENDOSCOPY | Facility: HOSPITAL | Age: 72
End: 2022-11-21
Payer: MEDICARE

## 2022-11-21 ENCOUNTER — TELEPHONE (OUTPATIENT)
Dept: GASTROENTEROLOGY | Facility: CLINIC | Age: 72
End: 2022-11-21
Payer: MEDICARE

## 2022-11-21 NOTE — TELEPHONE ENCOUNTER
Pt called and stated that he have a head cold and he will come for his procedure tomorrow   Pt confirm date and time of procedure

## 2022-11-22 ENCOUNTER — ANESTHESIA (OUTPATIENT)
Dept: ENDOSCOPY | Facility: HOSPITAL | Age: 72
End: 2022-11-22
Payer: MEDICARE

## 2022-11-22 ENCOUNTER — ANESTHESIA EVENT (OUTPATIENT)
Dept: ENDOSCOPY | Facility: HOSPITAL | Age: 72
End: 2022-11-22
Payer: MEDICARE

## 2022-11-22 ENCOUNTER — HOSPITAL ENCOUNTER (OUTPATIENT)
Facility: HOSPITAL | Age: 72
Discharge: HOME | End: 2022-11-22
Attending: INTERNAL MEDICINE | Admitting: INTERNAL MEDICINE
Payer: MEDICARE

## 2022-11-22 VITALS
OXYGEN SATURATION: 100 % | TEMPERATURE: 98 F | RESPIRATION RATE: 18 BRPM | DIASTOLIC BLOOD PRESSURE: 67 MMHG | HEART RATE: 63 BPM | SYSTOLIC BLOOD PRESSURE: 152 MMHG

## 2022-11-22 DIAGNOSIS — Z12.11 SPECIAL SCREENING FOR MALIGNANT NEOPLASMS, COLON: Primary | ICD-10-CM

## 2022-11-22 PROCEDURE — 88305 TISSUE EXAM BY PATHOLOGIST: CPT | Mod: 26,,, | Performed by: PATHOLOGY

## 2022-11-22 PROCEDURE — 45385 COLONOSCOPY W/LESION REMOVAL: CPT | Mod: PT | Performed by: INTERNAL MEDICINE

## 2022-11-22 PROCEDURE — 63600175 PHARM REV CODE 636 W HCPCS: Performed by: NURSE ANESTHETIST, CERTIFIED REGISTERED

## 2022-11-22 PROCEDURE — 37000008 HC ANESTHESIA 1ST 15 MINUTES: Performed by: INTERNAL MEDICINE

## 2022-11-22 PROCEDURE — 25000003 PHARM REV CODE 250: Performed by: NURSE ANESTHETIST, CERTIFIED REGISTERED

## 2022-11-22 PROCEDURE — 45385 PR COLONOSCOPY,REMV LESN,SNARE: ICD-10-PCS | Mod: PT,,, | Performed by: INTERNAL MEDICINE

## 2022-11-22 PROCEDURE — D9220A PRA ANESTHESIA: ICD-10-PCS | Mod: CRNA,,, | Performed by: NURSE ANESTHETIST, CERTIFIED REGISTERED

## 2022-11-22 PROCEDURE — 88305 TISSUE EXAM BY PATHOLOGIST: ICD-10-PCS | Mod: 26,,, | Performed by: PATHOLOGY

## 2022-11-22 PROCEDURE — 88305 TISSUE EXAM BY PATHOLOGIST: CPT | Performed by: PATHOLOGY

## 2022-11-22 PROCEDURE — 27201089 HC SNARE, DISP (ANY): Performed by: INTERNAL MEDICINE

## 2022-11-22 PROCEDURE — D9220A PRA ANESTHESIA: Mod: ANES,,, | Performed by: ANESTHESIOLOGY

## 2022-11-22 PROCEDURE — 45385 COLONOSCOPY W/LESION REMOVAL: CPT | Mod: PT,,, | Performed by: INTERNAL MEDICINE

## 2022-11-22 PROCEDURE — 25000003 PHARM REV CODE 250: Performed by: INTERNAL MEDICINE

## 2022-11-22 PROCEDURE — D9220A PRA ANESTHESIA: ICD-10-PCS | Mod: ANES,,, | Performed by: ANESTHESIOLOGY

## 2022-11-22 PROCEDURE — 27200997: Performed by: INTERNAL MEDICINE

## 2022-11-22 PROCEDURE — 37000009 HC ANESTHESIA EA ADD 15 MINS: Performed by: INTERNAL MEDICINE

## 2022-11-22 PROCEDURE — D9220A PRA ANESTHESIA: Mod: CRNA,,, | Performed by: NURSE ANESTHETIST, CERTIFIED REGISTERED

## 2022-11-22 RX ORDER — SODIUM CHLORIDE 9 MG/ML
INJECTION, SOLUTION INTRAVENOUS CONTINUOUS
Status: DISCONTINUED | OUTPATIENT
Start: 2022-11-22 | End: 2022-11-22 | Stop reason: HOSPADM

## 2022-11-22 RX ORDER — PROPOFOL 10 MG/ML
INJECTION, EMULSION INTRAVENOUS
Status: DISCONTINUED
Start: 2022-11-22 | End: 2022-11-22 | Stop reason: HOSPADM

## 2022-11-22 RX ORDER — PROPOFOL 10 MG/ML
VIAL (ML) INTRAVENOUS
Status: DISCONTINUED | OUTPATIENT
Start: 2022-11-22 | End: 2022-11-22

## 2022-11-22 RX ORDER — LIDOCAINE HYDROCHLORIDE 20 MG/ML
INJECTION INTRAVENOUS
Status: DISCONTINUED | OUTPATIENT
Start: 2022-11-22 | End: 2022-11-22

## 2022-11-22 RX ORDER — LIDOCAINE HYDROCHLORIDE 10 MG/ML
INJECTION, SOLUTION EPIDURAL; INFILTRATION; INTRACAUDAL; PERINEURAL
Status: DISCONTINUED
Start: 2022-11-22 | End: 2022-11-22 | Stop reason: HOSPADM

## 2022-11-22 RX ADMIN — LIDOCAINE HYDROCHLORIDE 50 MG: 20 INJECTION, SOLUTION INTRAVENOUS at 08:11

## 2022-11-22 RX ADMIN — PROPOFOL 50 MG: 10 INJECTION, EMULSION INTRAVENOUS at 08:11

## 2022-11-22 RX ADMIN — LIDOCAINE HYDROCHLORIDE 50 MG: 20 INJECTION, SOLUTION INTRAVENOUS at 07:11

## 2022-11-22 RX ADMIN — SODIUM CHLORIDE: 0.9 INJECTION, SOLUTION INTRAVENOUS at 07:11

## 2022-11-22 RX ADMIN — PROPOFOL 100 MG: 10 INJECTION, EMULSION INTRAVENOUS at 07:11

## 2022-11-22 RX ADMIN — PROPOFOL 100 MG: 10 INJECTION, EMULSION INTRAVENOUS at 08:11

## 2022-11-22 NOTE — PLAN OF CARE
Patient alert, oriented, and steady on feet.  Patient denies any pain or discomfort at this time.  Patient has discharge information and verbalizes understanding.  Patient has all of his belongings.  Patient discharged home with wife Alli.

## 2022-11-22 NOTE — PROVATION PATIENT INSTRUCTIONS
Discharge Summary/Instructions after an Endoscopic Procedure  Patient Name: Yair Owens  Patient MRN: [id number]  Patient YOB: 1950 Tuesday, November 22, 2022  Sam Obrien MD  Dear patient,  As a result of recent federal legislation (The Federal Cures Act), you may   receive lab or pathology results from your procedure in your [facility]   account before your physician is able to contact you. Your physician or   their representative will relay the results to you with their   recommendations at their soonest availability.  Thank you,  RESTRICTIONS:  During your procedure today, you received medications for sedation.  These   medications may affect your judgment, balance and coordination.  Therefore,   for 24 hours, you have the following restrictions:   - DO NOT drive a car, operate machinery, make legal/financial decisions,   sign important papers or drink alcohol.    ACTIVITY:  Today: no heavy lifting, straining or running due to procedural   sedation/anesthesia.  The following day: return to full activity including work.  DIET:  Eat and drink normally unless instructed otherwise.     TREATMENT FOR COMMON SIDE EFFECTS:  - Mild abdominal pain, nausea, belching, bloating or excessive gas:  rest,   eat lightly and use a heating pad.  - Sore Throat: treat with throat lozenges and/or gargle with warm salt   water.  - Because air was used during the procedure, expelling large amounts of air   from your rectum or belching is normal.  - If a bowel prep was taken, you may not have a bowel movement for 1-3 days.    This is normal.  SYMPTOMS TO WATCH FOR AND REPORT TO YOUR PHYSICIAN:  1. Abdominal pain or bloating, other than gas cramps.  2. Chest pain.  3. Back pain.  4. Signs of infection such as: chills or fever occurring within 24 hours   after the procedure.  5. Rectal bleeding, which would show as bright red, maroon, or black stools.   (A tablespoon of blood from the rectum is not serious, especially if    hemorrhoids are present.)  6. Vomiting.  7. Weakness or dizziness.  GO DIRECTLY TO THE NEAREST EMERGENCY ROOM IF YOU HAVE ANY OF THE FOLLOWING:      Difficulty breathing              Chills and/or fever over 101 F   Persistent vomiting and/or vomiting blood   Severe abdominal pain   Severe chest pain   Black, tarry stools   Bleeding- more than one tablespoon   Any other symptom or condition that you feel may need urgent attention  Your doctor recommends these additional instructions:  If any biopsies were taken, your doctors clinic will contact you in 1 to 2   weeks with any results.  - Discharge patient to home.   - High fiber diet.   - Continue present medications.   - Await pathology results.   - Repeat colonoscopy in 5 years for surveillance.  For questions, problems or results please call your physician - Sam Obrien MD at Work:  [phone].  [facility] can be reached at [phone]     IF A COMPLICATION OR EMERGENCY SITUATION ARISES AND YOU ARE UNABLE TO REACH   YOUR PHYSICIAN - GO DIRECTLY TO THE EMERGENCY ROOM.  Sam Obrien MD  11/22/2022 8:27:46 AM  This report has been verified and signed electronically.  Dear patient,  As a result of recent federal legislation (The Federal Cures Act), you may   receive lab or pathology results from your procedure in your [facility]   account before your physician is able to contact you. Your physician or   their representative will relay the results to you with their   recommendations at their soonest availability.  Thank you,  PROVATION

## 2022-11-22 NOTE — TRANSFER OF CARE
Anesthesia Transfer of Care Note    Patient: Yair Owens    Procedure(s) Performed: Procedure(s) (LRB):  COLONOSCOPY (N/A)    Patient location: GI    Anesthesia Type: general    Transport from OR: Transported from OR on room air with adequate spontaneous ventilation    Post pain: adequate analgesia    Post assessment: no apparent anesthetic complications and tolerated procedure well    Post vital signs: stable    Level of consciousness: awake    Nausea/Vomiting: no nausea/vomiting    Complications: none    Transfer of care protocol was followed      Last vitals:   Visit Vitals  /64   Pulse (!) 54   Temp 36.5 °C (97.7 °F) (Oral)   Resp 18   SpO2 98%

## 2022-11-22 NOTE — H&P
Short Stay Endoscopy History and Physical    PCP - Derik Ewing MD    Procedure - Colonoscopy  ASA - per anesthesia  Mallampati - per anesthesia  History of Anesthesia problems - no  Family history Anesthesia problems - no   Plan of anesthesia - General, MAC    HPI:  This is a 72 y.o. male here for evaluation of : personal history of colon polyps      ROS:  Constitutional: No fevers, chills, No weight loss  CV: No chest pain  Pulm: No cough, No shortness of breath  GI: see HPI  Derm: No rash    Medical History:  has a past medical history of Chest pain syndrome, Colon polyps, GERD (gastroesophageal reflux disease), Gout, unspecified, Hypercholesteremia, Hyperlipidemia, and Hypertension.    Surgical History:  has a past surgical history that includes Bone Resection, Rib; Hand surgery; Scrotal surgery; Appendectomy; Colonoscopy (N/A, 7/21/2017); Left heart catheterization (Left, 3/2/2021); and Ultrasound guidance (3/2/2021).    Family History: family history includes Asthma in his sister; Cancer in his father; Cancer (age of onset: 78) in his sister; Heart disease in his father; Lupus in his daughter; No Known Problems in his brother, mother, and son.. Otherwise no colon cancer, inflammatory bowel disease, or GI malignancies.    Social History:  reports that he has been smoking cigarettes. He has never used smokeless tobacco. He reports that he does not drink alcohol and does not use drugs.    Review of patient's allergies indicates:   Allergen Reactions    Codeine Itching    Ibuprofen Itching       Medications:   Medications Prior to Admission   Medication Sig Dispense Refill Last Dose    amLODIPine (NORVASC) 10 MG tablet TAKE 1 TABLET BY MOUTH EVERY DAY 90 tablet 0 11/20/2022    aspirin (ECOTRIN) 81 MG EC tablet Take 1 tablet (81 mg total) by mouth once daily.  0 11/20/2022    atorvastatin (LIPITOR) 40 MG tablet TAKE 1 TABLET BY MOUTH EVERY DAY 90 tablet 3 11/20/2022    co-enzyme Q-10 50 mg capsule Take 50  mg by mouth once daily.   11/20/2022    hydrOXYzine pamoate (VISTARIL) 50 MG Cap TAKE 2 CAPSULES BY MOUTH AT BEDTIME AS NEEDED FOR INSOMNIA 180 capsule 1 11/20/2022    losartan (COZAAR) 25 MG tablet Take 1 tablet (25 mg total) by mouth once daily. 90 tablet 3 11/20/2022    metoprolol succinate (TOPROL-XL) 25 MG 24 hr tablet TAKE 1 TABLET BY MOUTH EVERY DAY 90 tablet 0 11/20/2022    multivitamin capsule Take 1 capsule by mouth once daily.   11/20/2022    nitroGLYCERIN (NITROSTAT) 0.4 MG SL tablet Place 1 tablet (0.4 mg total) under the tongue every 5 (five) minutes as needed for Chest pain. 90 tablet 11     omega-3 fatty acids/fish oil (FISH OIL-OMEGA-3 FATTY ACIDS) 300-1,000 mg capsule Take by mouth once daily.   11/20/2022    omeprazole (PRILOSEC) 40 MG capsule Take 1 capsule (40 mg total) by mouth 2 (two) times daily. 180 capsule 3 11/20/2022         Physical Exam:    Vital Signs:   Vitals:    11/22/22 0722   BP: (!) 162/73   Pulse:    Resp:    Temp:        Gen: NAD, lying comfortably  HENT: NCAT, oropharynx clear  Eyes: anicteric sclerae, EOMI grossly  Neck: supple, no visible masses/goiter  Cardiac: RRR  Lungs: non-labored breathing  Abd: soft, NT/ND, normoactive BS  Ext: no LE edema, warm, well perfused  Skin: skin intact on exposed body parts, no visible rashes, lesions  Neuro: A&Ox4, neuro exam grossly intact, moves all extremities  Psych: appropriate mood, affect        Labs:  Lab Results   Component Value Date    WBC 6.32 06/11/2022    HGB 13.8 (L) 06/11/2022    HCT 42.7 06/11/2022     06/11/2022    CHOL 154 06/11/2022    TRIG 95 06/11/2022    HDL 41 06/11/2022    ALT 73 (H) 10/07/2022    AST 47 (H) 10/07/2022     06/11/2022    K 4.7 06/11/2022     06/11/2022    CREATININE 1.3 06/11/2022    BUN 19 06/11/2022    CO2 23 06/11/2022    TSH 0.862 03/20/2013    PSA 1.6 03/26/2021       Plan:  Colonoscopy for history of colon polyps    I have explained the risks and benefits of endoscopy  procedures to the patient including but not limited to bleeding, perforation, infection, and death.  The patient was asked if they understand and allowed to ask any further questions to their satisfaction.    Sam Obrien MD

## 2022-11-22 NOTE — ANESTHESIA PREPROCEDURE EVALUATION
11/22/2022  Yair Owens is a 72 y.o., male.      Pre-op Assessment     I have reviewed the Nursing Notes.       Review of Systems  Anesthesia Hx:  No problems with previous Anesthesia    Social:  Smoker    Cardiovascular:   Denies Pacemaker. Hypertension Valvular problems/Murmurs, AS CAD    Denies CABG/stent.  hyperlipidemia ECG has been reviewed. [id number]  Mild AS  ? The left ventricle is normal in size with mild concentric hypertrophy and normal systolic function. The estimated ejection fraction is 60%  ? Normal right ventricular size with normal right ventricular systolic function.  ? There is mild aortic valve stenosis.  ? Aortic valve area is 1.87 cm2; peak velocity is 2.19 m/s; mean gradient is 10 mmHg.   Pulmonary:  Pulmonary Normal    Renal/:  Renal/ Normal     Hepatic/GI:   GERD Liver Disease,    Neurological:  Neurology Normal    Endocrine:   Denies Diabetes. Denies Hypothyroidism. Denies Hyperthyroidism.  Obesity / BMI > 30  Psych:   anxiety          Physical Exam  General: Well nourished, Cooperative, Alert and Oriented    Airway:  Mallampati: III   Mouth Opening: Normal  TM Distance: Normal  Tongue: Normal  Neck ROM: Normal ROM    Dental:  Intact        Anesthesia Plan  Type of Anesthesia, risks & benefits discussed:    Anesthesia Type: Gen Natural Airway  Intra-op Monitoring Plan: Standard ASA Monitors  Induction:  IV  Informed Consent: Informed consent signed with the Patient and all parties understand the risks and agree with anesthesia plan.  All questions answered.   ASA Score: 2  Day of Surgery Review of History & Physical: H&P Update referred to the surgeon/provider.    Ready For Surgery From Anesthesia Perspective.     .

## 2022-11-23 NOTE — ANESTHESIA POSTPROCEDURE EVALUATION
Anesthesia Post Evaluation    Patient: Yair Owens    Procedure(s) Performed: Procedure(s) (LRB):  COLONOSCOPY (N/A)    Final Anesthesia Type: general      Patient location during evaluation: GI PACU  Patient participation: Yes- Able to Participate  Level of consciousness: awake and alert  Post-procedure vital signs: reviewed and stable  Pain management: adequate  Airway patency: patent    PONV status at discharge: No PONV  Anesthetic complications: no      Cardiovascular status: blood pressure returned to baseline and hemodynamically stable  Respiratory status: unassisted and spontaneous ventilation  Hydration status: euvolemic  Follow-up not needed.          Vitals Value Taken Time   /67 11/22/22 0856   Temp 36.5 °C (97.7 °F) 11/22/22 0826   Pulse 63 11/22/22 0856   Resp 18 11/22/22 0856   SpO2 100 % 11/22/22 0856         Event Time   Out of Recovery 08:57:00         Pain/Sonny Score: Sonny Score: 10 (11/22/2022  9:05 AM)

## 2022-12-02 ENCOUNTER — PES CALL (OUTPATIENT)
Dept: ADMINISTRATIVE | Facility: CLINIC | Age: 72
End: 2022-12-02
Payer: MEDICARE

## 2022-12-05 ENCOUNTER — PATIENT MESSAGE (OUTPATIENT)
Dept: ADMINISTRATIVE | Facility: OTHER | Age: 72
End: 2022-12-05
Payer: COMMERCIAL

## 2022-12-05 DIAGNOSIS — K76.0 FATTY LIVER: Primary | Chronic | ICD-10-CM

## 2022-12-05 LAB
FINAL PATHOLOGIC DIAGNOSIS: NORMAL
Lab: NORMAL

## 2022-12-14 ENCOUNTER — OFFICE VISIT (OUTPATIENT)
Dept: HEPATOLOGY | Facility: CLINIC | Age: 72
End: 2022-12-14
Payer: MEDICARE

## 2022-12-14 ENCOUNTER — PATIENT MESSAGE (OUTPATIENT)
Dept: HEPATOLOGY | Facility: CLINIC | Age: 72
End: 2022-12-14

## 2022-12-14 ENCOUNTER — TELEPHONE (OUTPATIENT)
Dept: HEPATOLOGY | Facility: CLINIC | Age: 72
End: 2022-12-14

## 2022-12-14 VITALS — HEIGHT: 68 IN | WEIGHT: 207 LBS | BODY MASS INDEX: 31.37 KG/M2

## 2022-12-14 DIAGNOSIS — K76.0 FATTY LIVER: Primary | Chronic | ICD-10-CM

## 2022-12-14 DIAGNOSIS — E78.5 HYPERLIPIDEMIA, UNSPECIFIED HYPERLIPIDEMIA TYPE: Chronic | ICD-10-CM

## 2022-12-14 DIAGNOSIS — E66.9 CLASS 1 OBESITY WITH BODY MASS INDEX (BMI) OF 31.0 TO 31.9 IN ADULT, UNSPECIFIED OBESITY TYPE, UNSPECIFIED WHETHER SERIOUS COMORBIDITY PRESENT: ICD-10-CM

## 2022-12-14 DIAGNOSIS — R16.0 HEPATOMEGALY: ICD-10-CM

## 2022-12-14 DIAGNOSIS — I10 HYPERTENSION, UNSPECIFIED TYPE: ICD-10-CM

## 2022-12-14 PROBLEM — E66.811 CLASS 1 OBESITY WITH BODY MASS INDEX (BMI) OF 31.0 TO 31.9 IN ADULT: Status: ACTIVE | Noted: 2022-12-14

## 2022-12-14 PROCEDURE — 1160F PR REVIEW ALL MEDS BY PRESCRIBER/CLIN PHARMACIST DOCUMENTED: ICD-10-PCS | Mod: CPTII,95,, | Performed by: NURSE PRACTITIONER

## 2022-12-14 PROCEDURE — 1159F MED LIST DOCD IN RCRD: CPT | Mod: CPTII,95,, | Performed by: NURSE PRACTITIONER

## 2022-12-14 PROCEDURE — 99214 OFFICE O/P EST MOD 30 MIN: CPT | Mod: 95,,, | Performed by: NURSE PRACTITIONER

## 2022-12-14 PROCEDURE — 4010F PR ACE/ARB THEARPY RXD/TAKEN: ICD-10-PCS | Mod: CPTII,95,, | Performed by: NURSE PRACTITIONER

## 2022-12-14 PROCEDURE — 1160F RVW MEDS BY RX/DR IN RCRD: CPT | Mod: CPTII,95,, | Performed by: NURSE PRACTITIONER

## 2022-12-14 PROCEDURE — 99214 PR OFFICE/OUTPT VISIT, EST, LEVL IV, 30-39 MIN: ICD-10-PCS | Mod: 95,,, | Performed by: NURSE PRACTITIONER

## 2022-12-14 PROCEDURE — 4010F ACE/ARB THERAPY RXD/TAKEN: CPT | Mod: CPTII,95,, | Performed by: NURSE PRACTITIONER

## 2022-12-14 PROCEDURE — 1159F PR MEDICATION LIST DOCUMENTED IN MEDICAL RECORD: ICD-10-PCS | Mod: CPTII,95,, | Performed by: NURSE PRACTITIONER

## 2022-12-14 PROCEDURE — 3008F BODY MASS INDEX DOCD: CPT | Mod: CPTII,95,, | Performed by: NURSE PRACTITIONER

## 2022-12-14 PROCEDURE — 3008F PR BODY MASS INDEX (BMI) DOCUMENTED: ICD-10-PCS | Mod: CPTII,95,, | Performed by: NURSE PRACTITIONER

## 2022-12-14 RX ORDER — BNT162B2 ORIGINAL AND OMICRON BA.4/BA.5 .1125; .1125 MG/2.25ML; MG/2.25ML
INJECTION, SUSPENSION INTRAMUSCULAR
COMMUNITY
Start: 2022-09-12 | End: 2023-11-09

## 2022-12-14 RX ORDER — INFLUENZA A VIRUS A/VICTORIA/2570/2019 IVR-215 (H1N1) ANTIGEN (FORMALDEHYDE INACTIVATED), INFLUENZA A VIRUS A/DARWIN/9/2021 SAN-010 (H3N2) ANTIGEN (FORMALDEHYDE INACTIVATED), INFLUENZA B VIRUS B/PHUKET/3073/2013 ANTIGEN (FORMALDEHYDE INACTIVATED), AND INFLUENZA B VIRUS B/MICHIGAN/01/2021 ANTIGEN (FORMALDEHYDE INACTIVATED) 60; 60; 60; 60 UG/.7ML; UG/.7ML; UG/.7ML; UG/.7ML
INJECTION, SUSPENSION INTRAMUSCULAR
COMMUNITY
Start: 2022-08-31 | End: 2023-02-03 | Stop reason: ALTCHOICE

## 2022-12-14 RX ORDER — COVID-19 MOLECULAR TEST ASSAY
KIT MISCELLANEOUS
COMMUNITY
Start: 2022-09-22 | End: 2023-08-30

## 2022-12-14 RX ORDER — HYDROCHLOROTHIAZIDE 25 MG/1
25 TABLET ORAL
COMMUNITY
Start: 2022-09-19 | End: 2022-12-14

## 2022-12-14 NOTE — PROGRESS NOTES
The patient location is: [place] ([place], [place]).   The chief complaint leading to consultation is: Fatty Liver    Visit type: audiovisual    Face to Face time with patient: 20  30 minutes of total time spent on the encounter, which includes face to face time and non-face to face time preparing to see the patient (eg, review of tests), Obtaining and/or reviewing separately obtained history, Documenting clinical information in the electronic or other health record, Independently interpreting results (not separately reported) and communicating results to the patient/family/caregiver, or Care coordination (not separately reported).     Each patient to whom he or she provides medical services by telemedicine is:  (1) informed of the relationship between the physician and patient and the respective role of any other health care provider with respect to management of the patient; and (2) notified that he or she may decline to receive medical services by telemedicine and may withdraw from such care at any time.    Notes:     [facility] New Patient Visit    Reason for Visit: Fatty Liver    PCP: Derik Ewing    HPI:  This is a 72 y.o. male with PMH noted below, here for evaluation of fatty liver.    The patient's risk factors for fatty liver disease include:     Obesity                                        Yes; BMI 31.47  Dyslipidemia                                Yes; Well controlled on Atorvastatin 40 mg daily.  Insulin resistance/Diabetes         No; No HgbA1c on file.  Family history of diabetes           No    He has had intermittently elevated liver enzymes in a hepatocellular pattern since at least 5/2004. Most recent LFT's in 10/2022 showed a T. Bili of 1.6, D. Bili of 0.5, ALT of 73, AST of 47, ALP of 63. He had gained 25-30 lbs during the pandemic. He has made dietary changes, and is exercising daily (walking briskly), and has lost 11 pounds. Abdominal Ultrasound in 10/2022 showed mild hepatomegaly  (17.7 cm), due to fatty infiltration of the liver; spleen size was normal (9.62 cm). He has never undergone a liver biopsy or non-invasive exam.     He has no known family history of liver disease. He denies any history of heavy alcohol use. He smokes marijuana occasionally, but does not use IV or intranasal drugs. HCV negative on prior labs.   He is well appearing, and denies any signs or symptoms of hepatic decompensation including jaundice, dark urine, pruritus, abdominal distention, lower extremity edema, hematemesis, melena, or periods of confusion suggestive of hepatic encephalopathy.      PMHX:  has a past medical history of Chest pain syndrome, Colon polyps, Fatty liver, GERD (gastroesophageal reflux disease), Gout, unspecified, Hypercholesteremia, Hyperlipidemia, and Hypertension.    PSHX:  has a past surgical history that includes Bone Resection, Rib; Hand surgery; Scrotal surgery; Appendectomy; Colonoscopy (N/A, 7/21/2017); Left heart catheterization (Left, 3/2/2021); Ultrasound guidance (3/2/2021); and Colonoscopy (N/A, 11/22/2022).    The patient's social and family histories were reviewed by me and updated in the appropriate section of the electronic medical record.    Review of patient's allergies indicates:   Allergen Reactions    Codeine Itching    Ibuprofen Itching     Current Outpatient Medications on File Prior to Visit   Medication Sig Dispense Refill    NON FORMULARY MEDICATION Keto ACV diet pill      amLODIPine (NORVASC) 10 MG tablet TAKE 1 TABLET BY MOUTH EVERY DAY 90 tablet 0    aspirin (ECOTRIN) 81 MG EC tablet Take 1 tablet (81 mg total) by mouth once daily.  0    atorvastatin (LIPITOR) 40 MG tablet TAKE 1 TABLET BY MOUTH EVERY DAY 90 tablet 3    BINAXNOW COVID-19 AG SELF TEST Kit Use as Directed on the Package      FLUZONE HIGHDOSE QUAD 22-23  mcg/0.7 mL Syrg       hydrOXYzine pamoate (VISTARIL) 50 MG Cap TAKE 2 CAPSULES BY MOUTH AT BEDTIME AS NEEDED FOR INSOMNIA 180 capsule 1     losartan (COZAAR) 25 MG tablet TAKE 1 TABLET BY MOUTH EVERY DAY 90 tablet 3    metoprolol succinate (TOPROL-XL) 25 MG 24 hr tablet TAKE 1 TABLET BY MOUTH EVERY DAY 90 tablet 3    multivitamin capsule Take 1 capsule by mouth once daily.      omega-3 fatty acids/fish oil (FISH OIL-OMEGA-3 FATTY ACIDS) 300-1,000 mg capsule Take by mouth once daily.      omeprazole (PRILOSEC) 40 MG capsule Take 1 capsule (40 mg total) by mouth 2 (two) times daily. 180 capsule 3    PFIZER COVID BIVAL,12Y UP,,PF, 30 mcg/0.3 mL injection       [DISCONTINUED] co-enzyme Q-10 50 mg capsule Take 50 mg by mouth once daily.      [DISCONTINUED] hydroCHLOROthiazide (HYDRODIURIL) 25 MG tablet Take 25 mg by mouth.      [DISCONTINUED] nitroGLYCERIN (NITROSTAT) 0.4 MG SL tablet Place 1 tablet (0.4 mg total) under the tongue every 5 (five) minutes as needed for Chest pain. 90 tablet 11    [DISCONTINUED] propranoloL (INDERAL) 10 MG tablet TAKE 1 TABLET (10 MG TOTAL) BY MOUTH 3 (THREE) TIMES DAILY AS NEEDED (TREMORS OR ANXIETY). 270 tablet 1    [DISCONTINUED] sod sulf-pot chloride-mag sulf (SUTAB) 1.479-0.188- 0.225 gram tablet Take 12 tablets by mouth once daily. BIN:[id number]:CNGROUP:[id number] 24 tablet 0     No current facility-administered medications on file prior to visit.       SOCIAL HISTORY:   Social History     Tobacco Use   Smoking Status Every Day    Packs/day: 0.00    Years: 60.00    Pack years: 0.00    Types: Cigarettes   Smokeless Tobacco Never   Tobacco Comments    2 cigarettes a day       Social History     Substance and Sexual Activity   Alcohol Use Never       Social History     Substance and Sexual Activity   Drug Use No       ROS:   GENERAL: Denies fever, chills, weight loss/gain, fatigue  HEENT: Denies headaches, dizziness, vision/hearing changes  CARDIOVASCULAR: Denies chest pain, palpitations, or edema  RESPIRATORY: Denies dyspnea, cough  GI: Denies abdominal pain, rectal bleeding, nausea, vomiting. No change  "in bowel pattern or color  : Denies dysuria, hematuria   SKIN: Denies rash, itching   NEURO: Denies confusion, memory loss, or mood changes  PSYCH: Denies depression or anxiety  HEME/LYMPH: Denies easy bruising or bleeding    Objective Findings:    PHYSICAL EXAM:   Friendly White male, in no acute distress; alert and oriented to person, place and time.  VITALS: Ht 5' 8" (1.727 m)   Wt 93.9 kg (207 lb)   BMI 31.47 kg/m²  (Not done - Telehealth visit).  HENT: Normocephalic, without obvious abnormality.   EYES: Sclerae anicteric.  NECK: No obvious masses.  CARDIOVASCULAR: No peripheral edema, per patient report.  RESPIRATORY: Normal respiratory effort.  GI: Soft, non-tender, non-distended.   EXTREMITIES:  No clubbing, cyanosis or edema.  SKIN: Warm and dry. No jaundice. No rashes noted to exposed skin.   NEURO:  Normal gait. No asterixis.  PSYCH:  Memory intact. Thought and speech pattern appropriate. Behavior normal. No depression or anxiety noted.    DIAGNOSTIC STUDIES:    US Abdomen Limited  Narrative: EXAMINATION:  US ABDOMEN LIMITED    CLINICAL HISTORY:  elevated liver enzymes and bili.  Normal GGT/Alk phos; Abnormal levels of other serum enzymes    TECHNIQUE:  Limited ultrasound of the right upper quadrant of the abdomen (including pancreas, liver, gallbladder, common bile duct, and spleen) was performed.    COMPARISON:  None.    FINDINGS:  Liver: Normal in size, measuring 17.7 cm. Fatty liver infiltration. No focal hepatic lesions.    Gallbladder: No calculi, wall thickening, or pericholecystic fluid.  No sonographic Sparks's sign.    Biliary system: The common duct is not dilated, measuring 3.3 mm.  No intrahepatic ductal dilatation.    Spleen: The spleen measures 9.62 with multiple calcifications.    Miscellaneous: No upper abdominal ascites.    Right kidney:    There is a simple cyst in the interpolar regions of the right kidney measuring 2.9 x 3.3 x 3.0 cm.  Impression: 1.  Hepatomegaly with hepatic " steatosis.    2.  Multiple splenic calcifications.  Suggest correlation with prior granulomatous disease exposure.    3.  Simple cyst in the interpolar region of the right kidney.    Electronically signed by: James Fritz MD  Date:    10/20/2022  Time:    08:34    FIBROSCAN - Ordered at visit.    EDUCATION:  Per AVS.    ASSESSMENT & PLAN:  72 y.o. White male with:    1. Fatty liver  Hepatic Function Panel    Hepatitis B Surface Antigen    Hepatitis B Core Antibody, Total    Hepatitis A antibody, IgG    Hepatitis B Surface Antibody, Qual/Quant    FibroScan [place] (Vibration Controlled Transient Elastography)      2. Hepatomegaly  Hepatic Function Panel    Hepatitis B Surface Antigen    Hepatitis B Core Antibody, Total    Hepatitis A antibody, IgG    Hepatitis B Surface Antibody, Qual/Quant    FibroScan [place] (Vibration Controlled Transient Elastography)      3. Hypertension, unspecified type  Hepatic Function Panel    Hepatitis B Surface Antigen    Hepatitis B Core Antibody, Total    Hepatitis A antibody, IgG    Hepatitis B Surface Antibody, Qual/Quant    FibroScan [place] (Vibration Controlled Transient Elastography)      4. Hyperlipidemia, unspecified hyperlipidemia type  Hepatic Function Panel    Hepatitis B Surface Antigen    Hepatitis B Core Antibody, Total    Hepatitis A antibody, IgG    Hepatitis B Surface Antibody, Qual/Quant    FibroScan [place] (Vibration Controlled Transient Elastography)      5. Class 1 obesity with body mass index (BMI) of 31.0 to 31.9 in adult, unspecified obesity type, unspecified whether serious comorbidity present  Hepatic Function Panel    Hepatitis B Surface Antigen    Hepatitis B Core Antibody, Total    Hepatitis A antibody, IgG    Hepatitis B Surface Antibody, Qual/Quant    FibroScan [place] (Vibration Controlled Transient Elastography)        - Schedule Fibroscan to stage liver disease.  - Recommend an ultrasound of the liver every 1-2 years, depending  upon Fibroscan results.  - Repeat liver function tests in now.   - Will also check titer levles for Hepatitis A and B, with next blood draw, and arrange for vaccination if needed.  - Avoid alcohol.   - Recommend additional weight loss goal of 20 lbs, through diet and exercise.  - Recommend good control of cholesterol, blood pressure, & blood sugar levels.    Follow up in about 3 weeks (around 1/4/2023). with Fibroscan and labs before visit.    Thank you for allowing me to participate in the care of Yair Owens       Hepatology Nurse Practitioner  [facility]  12/14/2022 @ [id number]    CC'ed note to:   No ref. provider found  Derik Ewing MD

## 2022-12-14 NOTE — PATIENT INSTRUCTIONS
1. Schedule Fibroscan to assess for fat and scar tissue in the liver. Please fast 3-4 hours prior to the exam.  2. Ultrasound of the liver every 1-2 years, depending upon Fibroscan results.  3. Repeat liver function tests in now.   4. We will also check immunity markers for Hepatitis A and B and arrange for vaccination if needed.  5. Recommend a referral to a nutritionist to discuss dietary changes. Please call [phone] or email [email or web address] to get scheduled.   6. Return to clinic in 3-4 weeks.    There is no FDA approved therapy for non-alcoholic fatty liver disease. Therefore, these things are important:  1. Weight loss goal of 20 lbs.  2. Low carb/sugar, high fiber and protein diet.Try to limit your carb intake to LESS than 30-45 grams of carbs with a meal or LESS than 5-10 grams with any snack (total of any snack foods eaten during that time). Use MyFitness Pal kelsey to add up your carbs through the day. Do NOT drink any beverages with calories or carbs (this can lead to high blood sugar and weight gain). Also, some of our patients have been very successful with weight loss using the pre made/planned meal planning services that limit calories and portion size (one example is Sensible Meals but there are many out there).  3. Exercise, 5 days per week, 30 minutes per day, as tolerated.  4. Recommend good control of cholesterol, blood pressure, & blood sugar levels.    In some people, fatty liver can progress to steatohepatitis (inflamatory fatty liver) and possibly to cirrhosis, putting one at increased risk for liver cancer, liver failure, and death. Therefore, the lifestyle changes are very important to decrease this risk.     Website with information about fatty liver and inflammation related to fatty liver (RUTLEDGE) = www.nashtruth.com  AND www.NASHactually.com

## 2022-12-14 NOTE — TELEPHONE ENCOUNTER
----- Message from Deysi Rader NP sent at 12/14/2022  2:33 PM CST -----  Please arrange labs and Fibroscan in 3-4 weeks RTC same day.

## 2022-12-18 NOTE — TELEPHONE ENCOUNTER
No new care gaps identified.  [facility] Embedded Care Gaps. Reference number: [id number]. 12/18/2022   7:56:34 AM CST

## 2022-12-19 RX ORDER — HYDROCHLOROTHIAZIDE 25 MG/1
TABLET ORAL
Qty: 90 TABLET | Refills: 3 | OUTPATIENT
Start: 2022-12-19

## 2022-12-19 NOTE — TELEPHONE ENCOUNTER
Quick DC. Inappropriate Request    Refill Authorization Note   Yair Owens  is requesting a refill authorization.  Brief Assessment and Rationale for Refill:  Quick Discontinue  Medication Therapy Plan:  D/C 7/13/22    Medication Reconciliation Completed:  No      Comments:     Note composed:3:48 PM 12/19/2022

## 2022-12-30 ENCOUNTER — PES CALL (OUTPATIENT)
Dept: ADMINISTRATIVE | Facility: CLINIC | Age: 72
End: 2022-12-30
Payer: MEDICARE

## 2023-01-09 ENCOUNTER — LAB VISIT (OUTPATIENT)
Dept: LAB | Facility: HOSPITAL | Age: 73
End: 2023-01-09
Attending: INTERNAL MEDICINE
Payer: MEDICARE

## 2023-01-09 ENCOUNTER — PATIENT MESSAGE (OUTPATIENT)
Dept: HEPATOLOGY | Facility: CLINIC | Age: 73
End: 2023-01-09

## 2023-01-09 ENCOUNTER — PROCEDURE VISIT (OUTPATIENT)
Dept: HEPATOLOGY | Facility: CLINIC | Age: 73
End: 2023-01-09
Payer: MEDICARE

## 2023-01-09 ENCOUNTER — OFFICE VISIT (OUTPATIENT)
Dept: HEPATOLOGY | Facility: CLINIC | Age: 73
End: 2023-01-09
Payer: MEDICARE

## 2023-01-09 ENCOUNTER — PATIENT MESSAGE (OUTPATIENT)
Dept: GASTROENTEROLOGY | Facility: CLINIC | Age: 73
End: 2023-01-09
Payer: MEDICARE

## 2023-01-09 VITALS
SYSTOLIC BLOOD PRESSURE: 141 MMHG | RESPIRATION RATE: 18 BRPM | OXYGEN SATURATION: 94 % | TEMPERATURE: 98 F | HEIGHT: 68 IN | DIASTOLIC BLOOD PRESSURE: 72 MMHG | WEIGHT: 201.5 LBS | HEART RATE: 74 BPM | BODY MASS INDEX: 30.54 KG/M2

## 2023-01-09 DIAGNOSIS — R16.0 HEPATOMEGALY: ICD-10-CM

## 2023-01-09 DIAGNOSIS — E78.5 HYPERLIPIDEMIA, UNSPECIFIED HYPERLIPIDEMIA TYPE: Chronic | ICD-10-CM

## 2023-01-09 DIAGNOSIS — E66.9 CLASS 1 OBESITY WITH BODY MASS INDEX (BMI) OF 31.0 TO 31.9 IN ADULT, UNSPECIFIED OBESITY TYPE, UNSPECIFIED WHETHER SERIOUS COMORBIDITY PRESENT: ICD-10-CM

## 2023-01-09 DIAGNOSIS — I10 PRIMARY HYPERTENSION: ICD-10-CM

## 2023-01-09 DIAGNOSIS — I10 HYPERTENSION, UNSPECIFIED TYPE: ICD-10-CM

## 2023-01-09 DIAGNOSIS — K76.0 FATTY LIVER: Primary | Chronic | ICD-10-CM

## 2023-01-09 DIAGNOSIS — K76.0 FATTY LIVER: Chronic | ICD-10-CM

## 2023-01-09 LAB
ALBUMIN SERPL BCP-MCNC: 4.1 G/DL (ref 3.5–5.2)
ALP SERPL-CCNC: 61 U/L (ref 55–135)
ALT SERPL W/O P-5'-P-CCNC: 37 U/L (ref 10–44)
AST SERPL-CCNC: 33 U/L (ref 10–40)
BILIRUB DIRECT SERPL-MCNC: 0.3 MG/DL (ref 0.1–0.3)
BILIRUB SERPL-MCNC: 1 MG/DL (ref 0.1–1)
HAV IGG SER QL IA: REACTIVE
HBV CORE AB SERPL QL IA: NORMAL
HBV SURFACE AG SERPL QL IA: NORMAL
PROT SERPL-MCNC: 7.9 G/DL (ref 6–8.4)

## 2023-01-09 PROCEDURE — 1160F PR REVIEW ALL MEDS BY PRESCRIBER/CLIN PHARMACIST DOCUMENTED: ICD-10-PCS | Mod: CPTII,S$GLB,, | Performed by: NURSE PRACTITIONER

## 2023-01-09 PROCEDURE — 1159F MED LIST DOCD IN RCRD: CPT | Mod: CPTII,S$GLB,, | Performed by: NURSE PRACTITIONER

## 2023-01-09 PROCEDURE — 76981 USE PARENCHYMA: CPT | Mod: S$GLB,,, | Performed by: NURSE PRACTITIONER

## 2023-01-09 PROCEDURE — 3008F PR BODY MASS INDEX (BMI) DOCUMENTED: ICD-10-PCS | Mod: CPTII,S$GLB,, | Performed by: NURSE PRACTITIONER

## 2023-01-09 PROCEDURE — 3288F PR FALLS RISK ASSESSMENT DOCUMENTED: ICD-10-PCS | Mod: CPTII,S$GLB,, | Performed by: NURSE PRACTITIONER

## 2023-01-09 PROCEDURE — 99499 RISK ADDL DX/OHS AUDIT: ICD-10-PCS | Mod: S$GLB,,, | Performed by: NURSE PRACTITIONER

## 2023-01-09 PROCEDURE — 99999 PR PBB SHADOW E&M-EST. PATIENT-LVL V: ICD-10-PCS | Mod: PBBFAC,,, | Performed by: NURSE PRACTITIONER

## 2023-01-09 PROCEDURE — 99999 PR PBB SHADOW E&M-EST. PATIENT-LVL V: CPT | Mod: PBBFAC,,, | Performed by: NURSE PRACTITIONER

## 2023-01-09 PROCEDURE — 99214 PR OFFICE/OUTPT VISIT, EST, LEVL IV, 30-39 MIN: ICD-10-PCS | Mod: S$GLB,,, | Performed by: NURSE PRACTITIONER

## 2023-01-09 PROCEDURE — 86704 HEP B CORE ANTIBODY TOTAL: CPT | Performed by: NURSE PRACTITIONER

## 2023-01-09 PROCEDURE — 1126F AMNT PAIN NOTED NONE PRSNT: CPT | Mod: CPTII,S$GLB,, | Performed by: NURSE PRACTITIONER

## 2023-01-09 PROCEDURE — 3077F PR MOST RECENT SYSTOLIC BLOOD PRESSURE >= 140 MM HG: ICD-10-PCS | Mod: CPTII,S$GLB,, | Performed by: NURSE PRACTITIONER

## 2023-01-09 PROCEDURE — 86790 VIRUS ANTIBODY NOS: CPT | Performed by: NURSE PRACTITIONER

## 2023-01-09 PROCEDURE — 99499 UNLISTED E&M SERVICE: CPT | Mod: S$GLB,,, | Performed by: NURSE PRACTITIONER

## 2023-01-09 PROCEDURE — 80076 HEPATIC FUNCTION PANEL: CPT | Performed by: NURSE PRACTITIONER

## 2023-01-09 PROCEDURE — 3078F PR MOST RECENT DIASTOLIC BLOOD PRESSURE < 80 MM HG: ICD-10-PCS | Mod: CPTII,S$GLB,, | Performed by: NURSE PRACTITIONER

## 2023-01-09 PROCEDURE — 3077F SYST BP >= 140 MM HG: CPT | Mod: CPTII,S$GLB,, | Performed by: NURSE PRACTITIONER

## 2023-01-09 PROCEDURE — 86706 HEP B SURFACE ANTIBODY: CPT | Performed by: NURSE PRACTITIONER

## 2023-01-09 PROCEDURE — 87340 HEPATITIS B SURFACE AG IA: CPT | Performed by: NURSE PRACTITIONER

## 2023-01-09 PROCEDURE — 76981 FIBROSCAN NEW ORLEANS: ICD-10-PCS | Mod: S$GLB,,, | Performed by: NURSE PRACTITIONER

## 2023-01-09 PROCEDURE — 1160F RVW MEDS BY RX/DR IN RCRD: CPT | Mod: CPTII,S$GLB,, | Performed by: NURSE PRACTITIONER

## 2023-01-09 PROCEDURE — 1126F PR PAIN SEVERITY QUANTIFIED, NO PAIN PRESENT: ICD-10-PCS | Mod: CPTII,S$GLB,, | Performed by: NURSE PRACTITIONER

## 2023-01-09 PROCEDURE — 3288F FALL RISK ASSESSMENT DOCD: CPT | Mod: CPTII,S$GLB,, | Performed by: NURSE PRACTITIONER

## 2023-01-09 PROCEDURE — 1101F PT FALLS ASSESS-DOCD LE1/YR: CPT | Mod: CPTII,S$GLB,, | Performed by: NURSE PRACTITIONER

## 2023-01-09 PROCEDURE — 3008F BODY MASS INDEX DOCD: CPT | Mod: CPTII,S$GLB,, | Performed by: NURSE PRACTITIONER

## 2023-01-09 PROCEDURE — 1159F PR MEDICATION LIST DOCUMENTED IN MEDICAL RECORD: ICD-10-PCS | Mod: CPTII,S$GLB,, | Performed by: NURSE PRACTITIONER

## 2023-01-09 PROCEDURE — 3078F DIAST BP <80 MM HG: CPT | Mod: CPTII,S$GLB,, | Performed by: NURSE PRACTITIONER

## 2023-01-09 PROCEDURE — 36415 COLL VENOUS BLD VENIPUNCTURE: CPT | Performed by: NURSE PRACTITIONER

## 2023-01-09 PROCEDURE — 1101F PR PT FALLS ASSESS DOC 0-1 FALLS W/OUT INJ PAST YR: ICD-10-PCS | Mod: CPTII,S$GLB,, | Performed by: NURSE PRACTITIONER

## 2023-01-09 PROCEDURE — 99214 OFFICE O/P EST MOD 30 MIN: CPT | Mod: S$GLB,,, | Performed by: NURSE PRACTITIONER

## 2023-01-09 NOTE — PROGRESS NOTES
[facility] Established Patient Visit    Reason for Visit: Fatty Liver    PCP: Derik Ewing    HPI:  This is a 72 y.o. male with PMH noted below, here for follow up for fatty liver. He was last seen in clinic by myself for a virtual visit in 12/2022.    The patient's risk factors for fatty liver disease include:     Obesity                                        Yes; BMI 30.64  Dyslipidemia                                Yes; Well controlled on Atorvastatin 40 mg daily.  Insulin resistance/Diabetes         No; No HgbA1c on file.  Family history of diabetes           No    He has had intermittently elevated liver enzymes in a hepatocellular pattern since at least 5/2004. Most recent LFT's in 10/2022 showed a T. Bili of 1.6, D. Bili of 0.5, ALT of 73, AST of 47, ALP of 63. He had gained 25-30 lbs during the pandemic. He has made dietary changes, and is exercising daily (walking briskly), and has lost 22 pounds since 6/2022. Abdominal Ultrasound in 10/2022 showed mild hepatomegaly (17.7 cm), due to fatty infiltration of the liver; spleen size was normal (9.62 cm). He has never undergone a liver biopsy or non-invasive exam.     He has no known family history of liver disease. He denies any history of heavy alcohol use. He smokes marijuana occasionally, but does not use IV or intranasal drugs. HCV negative on prior labs. Fibroscan today to stage his liver disease is suggestive of moderate fatty infiltration of the liver (S2), with F0-F1 fibrosis and a low likelihood of cirrhosis. He is well appearing, and denies any signs or symptoms of hepatic decompensation including jaundice, dark urine, pruritus, abdominal distention, lower extremity edema, hematemesis, melena, or periods of confusion suggestive of hepatic encephalopathy.      PMHX:  has a past medical history of Chest pain syndrome, Colon polyps, Fatty liver, GERD (gastroesophageal reflux disease), Gout, unspecified, Hypercholesteremia,  Hyperlipidemia, and Hypertension.    PSHX:  has a past surgical history that includes Bone Resection, Rib; Hand surgery; Scrotal surgery; Appendectomy; Colonoscopy (N/A, 7/21/2017); Left heart catheterization (Left, 3/2/2021); Ultrasound guidance (3/2/2021); and Colonoscopy (N/A, 11/22/2022).    The patient's social and family histories were reviewed by me and updated in the appropriate section of the electronic medical record.    Review of patient's allergies indicates:   Allergen Reactions    Codeine Itching    Ibuprofen Itching     Current Outpatient Medications on File Prior to Visit   Medication Sig Dispense Refill    amLODIPine (NORVASC) 10 MG tablet TAKE 1 TABLET BY MOUTH EVERY DAY 90 tablet 0    aspirin (ECOTRIN) 81 MG EC tablet Take 1 tablet (81 mg total) by mouth once daily.  0    atorvastatin (LIPITOR) 40 MG tablet TAKE 1 TABLET BY MOUTH EVERY DAY 90 tablet 3    BINAXNOW COVID-19 AG SELF TEST Kit Use as Directed on the Package      hydrOXYzine pamoate (VISTARIL) 50 MG Cap TAKE 2 CAPSULES BY MOUTH AT BEDTIME AS NEEDED FOR INSOMNIA 180 capsule 1    losartan (COZAAR) 25 MG tablet TAKE 1 TABLET BY MOUTH EVERY DAY 90 tablet 3    metoprolol succinate (TOPROL-XL) 25 MG 24 hr tablet TAKE 1 TABLET BY MOUTH EVERY DAY 90 tablet 3    multivitamin capsule Take 1 capsule by mouth once daily.      NON FORMULARY MEDICATION Keto ACV diet pill      omega-3 fatty acids/fish oil (FISH OIL-OMEGA-3 FATTY ACIDS) 300-1,000 mg capsule Take by mouth once daily.      omeprazole (PRILOSEC) 40 MG capsule Take 1 capsule (40 mg total) by mouth 2 (two) times daily. 180 capsule 3    PFIZER COVID BIVAL,12Y UP,,PF, 30 mcg/0.3 mL injection       FLUZONE HIGHDOSE QUAD 22-23  mcg/0.7 mL Syrg       [DISCONTINUED] propranoloL (INDERAL) 10 MG tablet TAKE 1 TABLET (10 MG TOTAL) BY MOUTH 3 (THREE) TIMES DAILY AS NEEDED (TREMORS OR ANXIETY). 270 tablet 1     No current facility-administered medications on file prior to visit.       SOCIAL  "HISTORY:   Social History     Tobacco Use   Smoking Status Every Day    Packs/day: 0.00    Years: 60.00    Pack years: 0.00    Types: Cigarettes   Smokeless Tobacco Never   Tobacco Comments    2 cigarettes a day       Social History     Substance and Sexual Activity   Alcohol Use Never       Social History     Substance and Sexual Activity   Drug Use No       ROS:   GENERAL: Denies fever, chills, weight loss/gain, fatigue  HEENT: Denies headaches, dizziness, vision/hearing changes  CARDIOVASCULAR: Denies chest pain, palpitations, or edema  RESPIRATORY: Denies dyspnea, cough  GI: Denies abdominal pain, rectal bleeding, nausea, vomiting. No change in bowel pattern or color  : Denies dysuria, hematuria   SKIN: Denies rash, itching   NEURO: Denies confusion, memory loss, or mood changes  PSYCH: Denies depression or anxiety  HEME/LYMPH: Denies easy bruising or bleeding    Objective Findings:    PHYSICAL EXAM:   Friendly White male, in no acute distress; alert and oriented to person, place and time.  VITALS: BP (!) 141/72 (BP Location: Right arm, Patient Position: Sitting, BP Method: Medium (Automatic))   Pulse 74   Temp 97.9 °F (36.6 °C) (Oral)   Resp 18   Ht 5' 8" (1.727 m)   Wt 91.4 kg (201 lb 8 oz)   SpO2 (!) 94%   BMI 30.64 kg/m²  (Not done - Telehealth visit).  HENT: Normocephalic, without obvious abnormality.   EYES: Sclerae anicteric.  NECK: No obvious masses.  CARDIOVASCULAR: No peripheral edema, per patient report.  RESPIRATORY: Normal respiratory effort.  GI: Soft, non-tender, non-distended.   EXTREMITIES:  No clubbing, cyanosis or edema.  SKIN: Warm and dry. No jaundice. No rashes noted to exposed skin.   NEURO:  Normal gait. No asterixis.  PSYCH:  Memory intact. Thought and speech pattern appropriate. Behavior normal. No depression or anxiety noted.    DIAGNOSTIC STUDIES:    US Abdomen Limited  Narrative: EXAMINATION:  US ABDOMEN LIMITED    CLINICAL HISTORY:  elevated liver enzymes and bili.  Normal " GGT/Alk phos; Abnormal levels of other serum enzymes    TECHNIQUE:  Limited ultrasound of the right upper quadrant of the abdomen (including pancreas, liver, gallbladder, common bile duct, and spleen) was performed.    COMPARISON:  None.    FINDINGS:  Liver: Normal in size, measuring 17.7 cm. Fatty liver infiltration. No focal hepatic lesions.    Gallbladder: No calculi, wall thickening, or pericholecystic fluid.  No sonographic Sparks's sign.    Biliary system: The common duct is not dilated, measuring 3.3 mm.  No intrahepatic ductal dilatation.    Spleen: The spleen measures 9.62 with multiple calcifications.    Miscellaneous: No upper abdominal ascites.    Right kidney:    There is a simple cyst in the interpolar regions of the right kidney measuring 2.9 x 3.3 x 3.0 cm.  Impression: 1.  Hepatomegaly with hepatic steatosis.    2.  Multiple splenic calcifications.  Suggest correlation with prior granulomatous disease exposure.    3.  Simple cyst in the interpolar region of the right kidney.    Electronically signed by: James Fritz MD  Date:    10/20/2022  Time:    08:34    FIBROSCAN 1/9/2023:    Findings  Median liver stiffness score:  3.7  CAP Reading: dB/m:  270     IQR/med %:  19  Interpretation  Fibrosis interpretation is based on medial liver stiffness - Kilopascal (kPa).     Fibrosis Stage:  F 0-1  Steatosis interpretation is based on controlled attenuation parameter - (dB/m).     Steatosis Grade:  S2    EDUCATION:  Per AVS.    ASSESSMENT & PLAN:  72 y.o. White male with:    1. Fatty liver  Ambulatory referral/consult to Hepatology    Hepatic Function Panel    FibroScan [place] (Vibration Controlled Transient Elastography)      2. Hepatomegaly  Hepatic Function Panel    FibroScan [place] (Vibration Controlled Transient Elastography)      3. Class 1 obesity with body mass index (BMI) of 31.0 to 31.9 in adult, unspecified obesity type, unspecified whether serious comorbidity present  Hepatic Function  Panel    FibroScan [place] (Vibration Controlled Transient Elastography)      4. Hyperlipidemia, unspecified hyperlipidemia type  Hepatic Function Panel    FibroScan [place] (Vibration Controlled Transient Elastography)      5. Primary hypertension  Hepatic Function Panel    FibroScan [place] (Vibration Controlled Transient Elastography)        - Fibroscan today to stage liver disease.  - Recommend an ultrasound of the liver every 2 years, next due 10/2024.  - Repeat liver function tests today (done).  - Will also check titer levles for Hepatitis A and B, with next blood draw, and arrange for vaccination if needed.  - Avoid alcohol.   - Recommend additional weight loss goal of 15-20 lbs, through diet and exercise.  - Recommend good control of cholesterol, blood pressure, & blood sugar levels.    Follow up in about 1 year (around 1/9/2024). with Fibroscan and labs before visit.    Thank you for allowing me to participate in the care of Yair DIMA Owens       Hepatology Nurse Practitioner  [facility]    CC'ed note to:   Sam Obrien MD Oliver B Mollere, MD

## 2023-01-09 NOTE — PATIENT INSTRUCTIONS
- Fibroscan today to stage liver disease.  - Recommend an ultrasound of the liver every 2 years, next due 10/2024.  - Repeat liver function tests today (done).  - Will also check titer levles for Hepatitis A and B, with next blood draw, and arrange for vaccination if needed.  - Avoid alcohol.   - Recommend additional weight loss goal of 15-20 lbs, through diet and exercise.  - Recommend good control of cholesterol, blood pressure, & blood sugar levels.  - Return to clinic in 1 year with Fibroscan and labs prior to visit.

## 2023-01-09 NOTE — PROCEDURES
FibroScan [place]    Date/Time: 1/9/2023 1:30 PM  Performed by: Deysi Rader NP  Authorized by: Deysi Rader NP     Diagnosis:  NAFLD    Probe:  XL    Universal Protocol: Patient's identity, procedure and site were verified, confirmatory pause was performed.  Discussed procedure including risks and potential complications.  Questions answered.  Patient verbalizes understanding and wishes to proceed with VCTE.     Procedure: After providing explanations of the procedure, patient was placed in the supine position with right arm in maximum abduction to allow optimal exposure of right lateral abdomen.  Patient was briefly assessed, Testing was performed in the mid-axillary location, 50Hz Shear Wave pulses were applied and the resulting Shear Wave and Propagation Speed detected with a 3.5 MHz ultrasonic signal, using the FibroScan probe, Skin to liver capsule distance and liver parenchyma were accessed during the entire examination with the FibroScan probe, Patient was instructed to breathe normally and to abstain from sudden movements during the procedure, allowing for random measurements of liver stiffness. At least 10 Shear Waves were produced, Individual measurements of each Shear Wave were calculated.  Patient tolerated the procedure well with no complications.  Meets discharge criteria as was dismissed.  Rates pain 0 out of 10.  Patient will follow up with ordering provider to review results.    Findings  Median liver stiffness score:  3.7  CAP Reading: dB/m:  270    IQR/med %:  19  Interpretation  Fibrosis interpretation is based on medial liver stiffness - Kilopascal (kPa).    Fibrosis Stage:  F 0-1  Steatosis interpretation is based on controlled attenuation parameter - (dB/m).    Steatosis Grade:  S2

## 2023-01-12 LAB
HBV SURFACE AB SER QL IA: NEGATIVE
HBV SURFACE AB SERPL IA-ACNC: <3 MIU/ML

## 2023-01-13 ENCOUNTER — TELEPHONE (OUTPATIENT)
Dept: FAMILY MEDICINE | Facility: CLINIC | Age: 73
End: 2023-01-13
Payer: MEDICARE

## 2023-01-13 ENCOUNTER — PATIENT MESSAGE (OUTPATIENT)
Dept: HEPATOLOGY | Facility: CLINIC | Age: 73
End: 2023-01-13
Payer: MEDICARE

## 2023-01-13 DIAGNOSIS — Z23 NEED FOR HEPATITIS B VACCINATION: Primary | ICD-10-CM

## 2023-01-13 NOTE — TELEPHONE ENCOUNTER
----- Message from Daniella Shipley sent at 1/13/2023 11:27 AM CST -----  .Type: Patient Call Back    Who called: Self    What is the request in detail: Requesting a prescription for HEP B Vaccine    Can the clinic reply by [facility]? No    Would the patient rather a call back or a response via [facility]? Call    Best call back number:.[phone] (home)       Additional Information:      [facility] [id number] [facility] [employer] - [place]ET[place] [address] [address]  Phone: [phone] Fax: [phone]

## 2023-01-17 ENCOUNTER — PATIENT MESSAGE (OUTPATIENT)
Dept: FAMILY MEDICINE | Facility: CLINIC | Age: 73
End: 2023-01-17
Payer: MEDICARE

## 2023-01-17 DIAGNOSIS — Z23 NEED FOR HEPATITIS B VACCINATION: Primary | ICD-10-CM

## 2023-01-18 ENCOUNTER — PATIENT MESSAGE (OUTPATIENT)
Dept: HEPATOLOGY | Facility: CLINIC | Age: 73
End: 2023-01-18
Payer: MEDICARE

## 2023-01-18 ENCOUNTER — PATIENT MESSAGE (OUTPATIENT)
Dept: FAMILY MEDICINE | Facility: CLINIC | Age: 73
End: 2023-01-18
Payer: MEDICARE

## 2023-01-18 DIAGNOSIS — K21.9 GASTROESOPHAGEAL REFLUX DISEASE WITHOUT ESOPHAGITIS: Chronic | ICD-10-CM

## 2023-01-18 RX ORDER — OMEPRAZOLE 40 MG/1
CAPSULE, DELAYED RELEASE ORAL
Qty: 180 CAPSULE | Refills: 3 | Status: SHIPPED | OUTPATIENT
Start: 2023-01-18 | End: 2023-11-24

## 2023-01-18 NOTE — TELEPHONE ENCOUNTER
Refill Routing Note   Medication(s) are not appropriate for processing by [facility] for the following reason(s):      - Dosage exceeds [facility] protocol    [facility] action(s):  Route          Medication reconciliation completed: No     Appointments  past 12m or future 3m with PCP    Date Provider   Last Visit   11/3/2022 Derik Ewing MD   Next Visit   Visit date not found Derik Ewing MD   ED visits in past 90 days: 0        Note composed:3:30 PM 01/18/2023

## 2023-01-18 NOTE — TELEPHONE ENCOUNTER
No new care gaps identified.  [facility] Embedded Care Gaps. Reference number: [id number]. 1/18/2023   11:10:50 AM CST

## 2023-01-26 ENCOUNTER — TELEPHONE (OUTPATIENT)
Dept: FAMILY MEDICINE | Facility: CLINIC | Age: 73
End: 2023-01-26
Payer: MEDICARE

## 2023-01-26 NOTE — TELEPHONE ENCOUNTER
Called Patient's [facility] worker regarding Dr. Ewing's response.  No answer.  Left voice message on an identified recorder requesting a call back.

## 2023-01-26 NOTE — TELEPHONE ENCOUNTER
----- Message from Brisa Knight sent at 1/26/2023 12:16 PM CST -----  Type: Patient Call Back        Who called: [facility]         What is the request in detail: She states she has question about this pt is taking this medication hepatitis B virus, PF, (ENGERIX-B) 10 mcg/0.5 mL Syrg        Can the clinic reply by [facility]? No         Would the patient rather a call back or a response via [facility]? Yes         Best call back number:  [phone]                   Thank You

## 2023-01-26 NOTE — TELEPHONE ENCOUNTER
[facility] is requesting clarification on Hepatitis B Vaccine 20mcg/0.5ml. asking if patient is needing one dose per day or two? Please advise

## 2023-02-01 ENCOUNTER — PATIENT MESSAGE (OUTPATIENT)
Dept: ADMINISTRATIVE | Facility: CLINIC | Age: 73
End: 2023-02-01
Payer: MEDICARE

## 2023-02-01 ENCOUNTER — TELEPHONE (OUTPATIENT)
Dept: ADMINISTRATIVE | Facility: CLINIC | Age: 73
End: 2023-02-01
Payer: MEDICARE

## 2023-02-01 NOTE — TELEPHONE ENCOUNTER
Called pt; informed pt I was calling to confirm his virtual EAWV on 2/3/23 at 1:00pm and to see if he needed any help; pt stated he did not need any help and completed his e-pre check already; pt informed to login 15 minutes prior to appt time; sent message through portal

## 2023-02-03 ENCOUNTER — TELEPHONE (OUTPATIENT)
Dept: ADMINISTRATIVE | Facility: CLINIC | Age: 73
End: 2023-02-03
Payer: MEDICARE

## 2023-02-03 ENCOUNTER — OFFICE VISIT (OUTPATIENT)
Dept: FAMILY MEDICINE | Facility: CLINIC | Age: 73
End: 2023-02-03
Payer: MEDICARE

## 2023-02-03 VITALS
BODY MASS INDEX: 30.01 KG/M2 | SYSTOLIC BLOOD PRESSURE: 123 MMHG | OXYGEN SATURATION: 98 % | HEART RATE: 58 BPM | WEIGHT: 198 LBS | TEMPERATURE: 97 F | HEIGHT: 68 IN | DIASTOLIC BLOOD PRESSURE: 75 MMHG

## 2023-02-03 DIAGNOSIS — J84.10 CALCIFIED GRANULOMA OF LUNG: ICD-10-CM

## 2023-02-03 DIAGNOSIS — F51.04 PSYCHOPHYSIOLOGICAL INSOMNIA: Chronic | ICD-10-CM

## 2023-02-03 DIAGNOSIS — D69.2 PURPURA: ICD-10-CM

## 2023-02-03 DIAGNOSIS — K21.9 GASTROESOPHAGEAL REFLUX DISEASE, UNSPECIFIED WHETHER ESOPHAGITIS PRESENT: Chronic | ICD-10-CM

## 2023-02-03 DIAGNOSIS — N52.1 ERECTILE DYSFUNCTION DUE TO DISEASES CLASSIFIED ELSEWHERE: Chronic | ICD-10-CM

## 2023-02-03 DIAGNOSIS — Z00.00 ENCOUNTER FOR PREVENTIVE HEALTH EXAMINATION: Primary | ICD-10-CM

## 2023-02-03 DIAGNOSIS — F17.200 TOBACCO DEPENDENCE: Chronic | ICD-10-CM

## 2023-02-03 DIAGNOSIS — E78.5 HYPERLIPIDEMIA, UNSPECIFIED HYPERLIPIDEMIA TYPE: Chronic | ICD-10-CM

## 2023-02-03 DIAGNOSIS — I70.0 ATHEROSCLEROSIS OF AORTA: Chronic | ICD-10-CM

## 2023-02-03 DIAGNOSIS — K76.0 FATTY LIVER: Chronic | ICD-10-CM

## 2023-02-03 DIAGNOSIS — F41.1 GENERALIZED ANXIETY DISORDER: Chronic | ICD-10-CM

## 2023-02-03 DIAGNOSIS — I10 PRIMARY HYPERTENSION: ICD-10-CM

## 2023-02-03 DIAGNOSIS — I25.10 CORONARY ARTERY DISEASE INVOLVING NATIVE HEART WITHOUT ANGINA PECTORIS, UNSPECIFIED VESSEL OR LESION TYPE: Chronic | ICD-10-CM

## 2023-02-03 PROBLEM — J98.4 CALCIFIED GRANULOMA OF LUNG: Status: ACTIVE | Noted: 2023-02-03

## 2023-02-03 PROCEDURE — 3008F BODY MASS INDEX DOCD: CPT | Mod: CPTII,95,,

## 2023-02-03 PROCEDURE — 3074F SYST BP LT 130 MM HG: CPT | Mod: CPTII,95,,

## 2023-02-03 PROCEDURE — G0439 PR MEDICARE ANNUAL WELLNESS SUBSEQUENT VISIT: ICD-10-PCS | Mod: 95,,,

## 2023-02-03 PROCEDURE — 1126F AMNT PAIN NOTED NONE PRSNT: CPT | Mod: CPTII,95,,

## 2023-02-03 PROCEDURE — 1159F PR MEDICATION LIST DOCUMENTED IN MEDICAL RECORD: ICD-10-PCS | Mod: CPTII,95,,

## 2023-02-03 PROCEDURE — 1101F PR PT FALLS ASSESS DOC 0-1 FALLS W/OUT INJ PAST YR: ICD-10-PCS | Mod: CPTII,95,,

## 2023-02-03 PROCEDURE — 1126F PR PAIN SEVERITY QUANTIFIED, NO PAIN PRESENT: ICD-10-PCS | Mod: CPTII,95,,

## 2023-02-03 PROCEDURE — 3288F FALL RISK ASSESSMENT DOCD: CPT | Mod: CPTII,95,,

## 2023-02-03 PROCEDURE — 1170F PR FUNCTIONAL STATUS ASSESSED: ICD-10-PCS | Mod: CPTII,95,,

## 2023-02-03 PROCEDURE — 3074F PR MOST RECENT SYSTOLIC BLOOD PRESSURE < 130 MM HG: ICD-10-PCS | Mod: CPTII,95,,

## 2023-02-03 PROCEDURE — G0439 PPPS, SUBSEQ VISIT: HCPCS | Mod: 95,,,

## 2023-02-03 PROCEDURE — 3288F PR FALLS RISK ASSESSMENT DOCUMENTED: ICD-10-PCS | Mod: CPTII,95,,

## 2023-02-03 PROCEDURE — 99499 UNLISTED E&M SERVICE: CPT | Mod: 95,,,

## 2023-02-03 PROCEDURE — 1160F RVW MEDS BY RX/DR IN RCRD: CPT | Mod: CPTII,95,,

## 2023-02-03 PROCEDURE — 1160F PR REVIEW ALL MEDS BY PRESCRIBER/CLIN PHARMACIST DOCUMENTED: ICD-10-PCS | Mod: CPTII,95,,

## 2023-02-03 PROCEDURE — 3078F DIAST BP <80 MM HG: CPT | Mod: CPTII,95,,

## 2023-02-03 PROCEDURE — 3078F PR MOST RECENT DIASTOLIC BLOOD PRESSURE < 80 MM HG: ICD-10-PCS | Mod: CPTII,95,,

## 2023-02-03 PROCEDURE — 99499 RISK ADDL DX/OHS AUDIT: ICD-10-PCS | Mod: 95,,,

## 2023-02-03 PROCEDURE — 1159F MED LIST DOCD IN RCRD: CPT | Mod: CPTII,95,,

## 2023-02-03 PROCEDURE — 3008F PR BODY MASS INDEX (BMI) DOCUMENTED: ICD-10-PCS | Mod: CPTII,95,,

## 2023-02-03 PROCEDURE — 1170F FXNL STATUS ASSESSED: CPT | Mod: CPTII,95,,

## 2023-02-03 PROCEDURE — 1101F PT FALLS ASSESS-DOCD LE1/YR: CPT | Mod: CPTII,95,,

## 2023-02-03 RX ORDER — BROMPHENIRAMINE MALEATE, DEXTROMETHORPHAN HBR, PHENYLEPHRINE HCL, DIPHENHYDRAMINE HCL, PHENYLEPHRINE HCL 0.52G
0.52 KIT ORAL DAILY
COMMUNITY
End: 2024-01-09

## 2023-02-03 RX ORDER — MV/FA/DHA/EPA/FISH OIL/SAW/GNK 400MCG-200
1 COMBINATION PACKAGE (EA) ORAL DAILY
COMMUNITY
End: 2023-05-17

## 2023-02-03 NOTE — PATIENT INSTRUCTIONS
Counseling and Referral of Other Preventative  (Italic type indicates deductible and co-insurance are waived)    Patient Name: Yair Owens  Today's Date: 2/3/2023    Health Maintenance       Date Due Completion Date    TETANUS VACCINE 05/01/2023 (Originally 1/22/1968) ---    COVID-19 Vaccine (5 - Booster for Moderna series) 05/01/2023 (Originally 6/5/2022) 4/10/2022    Shingles Vaccine (2 of 2) 03/07/2023 1/10/2023    High Dose Statin 01/09/2024 1/9/2023    Aspirin/Antiplatelet Therapy 01/09/2024 1/9/2023    Lipid Panel 06/11/2027 6/11/2022    Colorectal Cancer Screening 11/22/2027 11/22/2022        No orders of the defined types were placed in this encounter.      The following information is provided to all patients.  This information is to help you find resources for any of the problems found today that may be affecting your health:                Living healthy guide: www.[facility].[place].gov      Understanding Diabetes: www.diabetes.org      Eating healthy: www.cdc.gov/healthyweight      CDC home safety checklist: www.cdc.gov/steadi/patient.html      Agency on Aging: www.goea.[place].gov      Alcoholics anonymous (AA): www.aa.org      Physical Activity: www.brina.nih.gov/hh4rmma      Tobacco use: www.quitwithusla.org

## 2023-02-03 NOTE — PROGRESS NOTES
"    The patient location is: [place]  The chief complaint leading to consultation is: Medicare AWV     Visit type: audiovisual    Face to Face time with patient: 35  60 minutes of total time spent on the encounter, which includes face to face time and non-face to face time preparing to see the patient (eg, review of tests), Obtaining and/or reviewing separately obtained history, Documenting clinical information in the electronic or other health record, Independently interpreting results (not separately reported) and communicating results to the patient/family/caregiver, or Care coordination (not separately reported).         Each patient to whom he or she provides medical services by telemedicine is:  (1) informed of the relationship between the physician and patient and the respective role of any other health care provider with respect to management of the patient; and (2) notified that he or she may decline to receive medical services by telemedicine and may withdraw from such care at any time.    Notes:       Yair Owens presented for a  Medicare AWV and comprehensive Health Risk Assessment today. The following components were reviewed and updated:    Medical history  Family History  Social history  Allergies and Current Medications  Health Risk Assessment  Health Maintenance  Care Team         ** See Completed Assessments for Annual Wellness Visit within the encounter summary.**         The following assessments were completed:  Living Situation  CAGE  Depression Screening  Fall Risk Assessment (MACH 10)  Hearing Assessment(HHI)  Cognitive Function Screening  Nutrition Screening  ADL Screening  PAQ Screening      Vitals:    02/03/23 1257   BP: 123/75   Pulse: (!) 58   Temp: 97.1 °F (36.2 °C)   SpO2: 98%   Weight: 89.8 kg (198 lb)   Height: 5' 8" (1.727 m)     Body mass index is 30.11 kg/m².  Physical Exam  Constitutional:       General: He is not in acute distress.     Appearance: Normal appearance. He is " well-developed and well-groomed.   Skin:     Coloration: Skin is not pale.      Findings: Bruising present.      Comments: Bruising noted to lower forearms and hands bilaterally.    Neurological:      Mental Status: He is alert and oriented to person, place, and time.   Psychiatric:         Mood and Affect: Mood normal.         Speech: Speech normal.         Behavior: Behavior normal. Behavior is cooperative.         Thought Content: Thought content normal.             Diagnoses and health risks identified today and associated recommendations/orders:    1. Encounter for preventive health examination  2. Calcified granuloma of lung  Chronic. Stable. Followed by PCP.     3. Atherosclerosis of aorta  Chronic; stable on medication. Followed by Cardiology.    4. Purpura  Chronic. Stable. Followed by PCP.     5. Generalized anxiety disorder  Chronic. Stable. Followed by PCP.     6. Hyperlipidemia, unspecified hyperlipidemia type  Chronic; stable on medication. Followed by Cardiology.    7. Coronary artery disease involving native heart without angina pectoris, unspecified vessel or lesion type  Chronic; stable on medication. Followed by Cardiology.    8. Primary hypertension  Chronic; stable on medication. Followed by Cardiology.    9. Erectile dysfunction due to diseases classified elsewhere  Chronic; stable. Followed by PCP.    10. Gastroesophageal reflux disease, unspecified whether esophagitis present  Chronic; stable on medication. Followed by PCP.     11. Fatty liver  Chronic. Stable. Followed by Hepatology.     12. Tobacco dependence  Smoking cessation. Followed by PCP.     13. Psychophysiological insomnia  Chronic; stable on medication. Followed by PCP.      Review for Opioid Screening: Patient does not have rx for Opioids.    Review for Substance Use Disorders: Patient does not use substance.    Provided Yair with a 5-10 year written screening schedule and personal prevention plan. Recommendations were developed  using the USPSTF age appropriate recommendations. Education, counseling, and referrals were provided as needed. After Visit Summary printed and given to patient which includes a list of additional screenings\tests needed.    Follow up in about 1 year (around 2/3/2024) for your next annual wellness visit.    Rose Marie Epperson NP    Advance Care Planning     I offered to discuss advanced care planning, including how to pick a person who would make decisions for you if you were unable to make them for yourself, called a health care power of , and what kind of decisions you might make such as use of life sustaining treatments such as ventilators and tube feeding when faced with a life limiting illness recorded on a living will that they will need to know. (How you want to be cared for as you near the end of your natural life)     X Patient is interested in learning more about how to make advanced directives.  I provided them paperwork and offered to discuss this with them.

## 2023-02-21 ENCOUNTER — PATIENT MESSAGE (OUTPATIENT)
Dept: HEPATOLOGY | Facility: CLINIC | Age: 73
End: 2023-02-21
Payer: MEDICARE

## 2023-03-15 ENCOUNTER — OFFICE VISIT (OUTPATIENT)
Dept: FAMILY MEDICINE | Facility: CLINIC | Age: 73
End: 2023-03-15
Payer: MEDICARE

## 2023-03-15 VITALS — BODY MASS INDEX: 28.64 KG/M2 | WEIGHT: 189 LBS | HEIGHT: 68 IN

## 2023-03-15 DIAGNOSIS — I10 PRIMARY HYPERTENSION: Chronic | ICD-10-CM

## 2023-03-15 DIAGNOSIS — F17.210 NICOTINE DEPENDENCE, CIGARETTES, UNCOMPLICATED: ICD-10-CM

## 2023-03-15 DIAGNOSIS — R00.1 BRADYCARDIA: Primary | ICD-10-CM

## 2023-03-15 DIAGNOSIS — F17.200 TOBACCO DEPENDENCE: Chronic | ICD-10-CM

## 2023-03-15 PROCEDURE — 1159F PR MEDICATION LIST DOCUMENTED IN MEDICAL RECORD: ICD-10-PCS | Mod: CPTII,95,, | Performed by: INTERNAL MEDICINE

## 2023-03-15 PROCEDURE — 4010F PR ACE/ARB THEARPY RXD/TAKEN: ICD-10-PCS | Mod: CPTII,95,, | Performed by: INTERNAL MEDICINE

## 2023-03-15 PROCEDURE — 1159F MED LIST DOCD IN RCRD: CPT | Mod: CPTII,95,, | Performed by: INTERNAL MEDICINE

## 2023-03-15 PROCEDURE — 4010F ACE/ARB THERAPY RXD/TAKEN: CPT | Mod: CPTII,95,, | Performed by: INTERNAL MEDICINE

## 2023-03-15 PROCEDURE — 3008F PR BODY MASS INDEX (BMI) DOCUMENTED: ICD-10-PCS | Mod: CPTII,95,, | Performed by: INTERNAL MEDICINE

## 2023-03-15 PROCEDURE — 99214 OFFICE O/P EST MOD 30 MIN: CPT | Mod: 95,,, | Performed by: INTERNAL MEDICINE

## 2023-03-15 PROCEDURE — 1160F RVW MEDS BY RX/DR IN RCRD: CPT | Mod: CPTII,95,, | Performed by: INTERNAL MEDICINE

## 2023-03-15 PROCEDURE — 1160F PR REVIEW ALL MEDS BY PRESCRIBER/CLIN PHARMACIST DOCUMENTED: ICD-10-PCS | Mod: CPTII,95,, | Performed by: INTERNAL MEDICINE

## 2023-03-15 PROCEDURE — 3008F BODY MASS INDEX DOCD: CPT | Mod: CPTII,95,, | Performed by: INTERNAL MEDICINE

## 2023-03-15 PROCEDURE — 99214 PR OFFICE/OUTPT VISIT, EST, LEVL IV, 30-39 MIN: ICD-10-PCS | Mod: 95,,, | Performed by: INTERNAL MEDICINE

## 2023-03-15 RX ORDER — ZOSTER VACCINE RECOMBINANT, ADJUVANTED 50 MCG/0.5
KIT INTRAMUSCULAR
COMMUNITY
Start: 2023-01-10 | End: 2023-11-09

## 2023-03-15 RX ORDER — HEPATITIS B VACCINE (RECOMBINANT) 20 UG/ML
INJECTION, SUSPENSION INTRAMUSCULAR
COMMUNITY
Start: 2023-01-18 | End: 2023-11-09

## 2023-03-15 NOTE — ASSESSMENT & PLAN NOTE
The current medical regimen is effective;  continue present plan and medications. Ok to hold metoprolol and monitor closely with digital.

## 2023-03-15 NOTE — PROGRESS NOTES
Assessment & Plan  Problem List Items Addressed This Visit          Cardiac/Vascular    Hypertension (Chronic)    Overview     Followed by digital team         Current Assessment & Plan     The current medical regimen is effective;  continue present plan and medications. Ok to hold metoprolol and monitor closely with digital.           Other Visit Diagnoses       Bradycardia    -  Primary  -    Ok to with holding BB until he sees cardiology              Health Maintenance reviewed, due for repeat LDCT.    The patient location is:  Patient Home   The chief complaint leading to consultation is: noted below  Visit type: Virtual visit with synchronous audio and video  Total time spent with patient: 12  Each patient to whom he or she provides medical services by telemedicine is:  (1) informed of the relationship between the physician and patient and the respective role of any other health care provider with respect to management of the patient; and (2) notified that he or she may decline to receive medical services by telemedicine and may withdraw from such care at any time.    Follow-up: Follow up if symptoms worsen or fail to improve.    ______________________________________________________________________    Chief Complaint  Chief Complaint   Patient presents with    Hypertension    Bradycardia       HPI  Yair Owens is a 73 y.o. male with multiple medical diagnoses as listed in the medical history and problem list that presents for HTN follow up and bradycardia via virtual visit.  Pt is known to me with their last appointment 11/3/2022.      Taking and tolerating medications as prescribed without perceived side effects.  No CP, SOB, palpitations.      Noticed some bradycardia.  Asymptomatic.  Also wanting to reduce his polypharm if possible.  He has been walking 4-5 mi at a time    PAST MEDICAL HISTORY:  Past Medical History:   Diagnosis Date    Chest pain syndrome     Colon polyps     Fatty liver     GERD  (gastroesophageal reflux disease)     Gout, unspecified     Hypercholesteremia     Hyperlipidemia     Hypertension        PAST SURGICAL HISTORY:  Past Surgical History:   Procedure Laterality Date    APPENDECTOMY      BONE RESECTION, RIB      for thoracic outlet syndrome    COLONOSCOPY N/A 7/21/2017    Procedure: COLONOSCOPY;  Surgeon: Deepak Servin MD;  Location: [facility] ENDO;  Service: Endoscopy;  Laterality: N/A;    COLONOSCOPY N/A 11/22/2022    Procedure: COLONOSCOPY;  Surgeon: Sam Obrien MD;  Location: [facility] ENDO;  Service: Endoscopy;  Laterality: N/A;  vacc-sutab-inst portal-tb    HAND SURGERY      LEFT HEART CATHETERIZATION Left 3/2/2021    Procedure: Left heart cath, radial, 730 am;  Surgeon: Vladimir Avalos MD;  Location: [facility] CATH LAB;  Service: Cardiology;  Laterality: Left;  RN PREOP 2/25/2021--COVID NEGATIVE ON 3/1  H/P INCOMPLETE    SCROTAL SURGERY      mass    ULTRASOUND GUIDANCE  3/2/2021    Procedure: Ultrasound Guidance;  Surgeon: Vladimir Avalos MD;  Location: [facility] CATH LAB;  Service: Cardiology;;       SOCIAL HISTORY:  Social History     Socioeconomic History    Marital status:    Occupational History     Employer: [employer]   Tobacco Use    Smoking status: Every Day     Packs/day: 0.50     Years: 60.00     Pack years: 30.00     Types: Cigarettes     Start date: 1967    Smokeless tobacco: Never    Tobacco comments:     0.5 ppd or less    Substance and Sexual Activity    Alcohol use: Never    Drug use: No    Sexual activity: Yes     Partners: Female     Social Determinants of Health     Financial Resource Strain: Low Risk     Difficulty of Paying Living Expenses: Not hard at all   Food Insecurity: No Food Insecurity    Worried About Running Out of Food in the Last Year: Never true    Ran Out of Food in the Last Year: Never true   Transportation Needs: No Transportation Needs    Lack of Transportation (Medical): No    Lack of Transportation (Non-Medical): No   Physical Activity: Sufficiently  Active    Days of Exercise per Week: 7 days    Minutes of Exercise per Session: 50 min   Stress: No Stress Concern Present    Feeling of Stress : Only a little   Social Connections: Moderately Isolated    Frequency of Communication with Friends and Family: Three times a week    Frequency of Social Gatherings with Friends and Family: Once a week    Attends Temple Services: Never    Active Member of Clubs or Organizations: No    Attends Club or Organization Meetings: Never    Marital Status:    Housing Stability: Low Risk     Unable to Pay for Housing in the Last Year: No    Number of Places Lived in the Last Year: 1    Unstable Housing in the Last Year: No       FAMILY HISTORY:  Family History   Problem Relation Age of Onset    No Known Problems Mother     Cancer Father     Heart disease Father     Asthma Sister     Cancer Sister 78        Rectal cancer    Stroke Brother     Lupus Daughter     No Known Problems Son        ALLERGIES AND MEDICATIONS: updated and reviewed.  Review of patient's allergies indicates:   Allergen Reactions    Codeine Itching    Ibuprofen Itching     Current Outpatient Medications   Medication Sig Dispense Refill    amLODIPine (NORVASC) 10 MG tablet TAKE 1 TABLET BY MOUTH EVERY DAY 90 tablet 2    aspirin (ECOTRIN) 81 MG EC tablet Take 1 tablet (81 mg total) by mouth once daily.  0    atorvastatin (LIPITOR) 40 MG tablet TAKE 1 TABLET BY MOUTH EVERY DAY 90 tablet 3    BINAXNOW COVID-19 AG SELF TEST Kit Use as Directed on the Package      ENGERIX-B, PF, 20 mcg/mL Syrg       hepatitis B vacc-CpG 1018, PF, 20 mcg/0.5 mL Syrg Inject by [facility], then return for second dose in 4 weeks (Patient not taking: Reported on 2/3/2023) 0.5 mL 0    hepatitis B virus, PF, (ENGERIX-B) 10 mcg/0.5 mL Syrg Inject 0.5 mLs into the muscle every 30 days. (Patient not taking: Reported on 2/3/2023) 2 each 0    hydrOXYzine pamoate (VISTARIL) 50 MG Cap TAKE 2 CAPSULES BY MOUTH AT BEDTIME AS NEEDED FOR INSOMNIA 180  capsule 1    krill oil 500 mg Cap Take 1 tablet by mouth once daily.      losartan (COZAAR) 25 MG tablet TAKE 1 TABLET BY MOUTH EVERY DAY 90 tablet 3    metoprolol succinate (TOPROL-XL) 25 MG 24 hr tablet TAKE 1 TABLET BY MOUTH EVERY DAY 90 tablet 3    multivitamin capsule Take 1 capsule by mouth once daily.      NON FORMULARY MEDICATION Take 1 tablet by mouth once daily. Beet root extract      omega-3 fatty acids/fish oil (FISH OIL-OMEGA-3 FATTY ACIDS) 300-1,000 mg capsule Take by mouth once daily.      omeprazole (PRILOSEC) 40 MG capsule TAKE 1 CAPSULE BY MOUTH  TWICE DAILY 180 capsule 3    PFIZER COVID BIVAL,12Y UP,,PF, 30 mcg/0.3 mL injection       psyllium 0.52 gram capsule Take 0.52 g by mouth once daily.      SHINGRIX, PF, 50 mcg/0.5 mL injection        No current facility-administered medications for this visit.         ROS  Review of Systems   Constitutional:  Positive for activity change. Negative for unexpected weight change.   HENT:  Negative for hearing loss, rhinorrhea and trouble swallowing.    Eyes:  Negative for discharge and visual disturbance.   Respiratory:  Negative for chest tightness and wheezing.    Cardiovascular:  Negative for chest pain and palpitations.   Gastrointestinal:  Negative for blood in stool, constipation, diarrhea and vomiting.   Endocrine: Negative for polydipsia and polyuria.   Genitourinary:  Negative for difficulty urinating, hematuria and urgency.   Musculoskeletal:  Negative for arthralgias, joint swelling and neck pain.   Neurological:  Negative for weakness and headaches.   Psychiatric/Behavioral:  Negative for confusion and dysphoric mood.          Physical Exam  Physical Exam  Constitutional:       General: He is not in acute distress.     Appearance: Normal appearance. He is well-developed.   HENT:      Head: Normocephalic and atraumatic.   Eyes:      Extraocular Movements: Extraocular movements intact.      Conjunctiva/sclera: Conjunctivae normal.   Pulmonary:       Effort: Pulmonary effort is normal. No respiratory distress.   Musculoskeletal:      Cervical back: Normal range of motion.   Neurological:      General: No focal deficit present.      Mental Status: He is alert and oriented to person, place, and time.   Psychiatric:         Mood and Affect: Mood and affect normal.         Behavior: Behavior normal.         Thought Content: Thought content normal.         Judgment: Judgment normal.         Health Maintenance         Date Due Completion Date    LDCT Lung Screen 02/13/2020 2/13/2019    TETANUS VACCINE 05/01/2023 (Originally 1/22/1968) ---    COVID-19 Vaccine (5 - Booster for Moderna series) 05/01/2023 (Originally 6/5/2022) 4/10/2022    High Dose Statin 02/03/2024 2/3/2023    Aspirin/Antiplatelet Therapy 02/03/2024 2/3/2023    Lipid Panel 06/11/2027 6/11/2022    Colorectal Cancer Screening 11/22/2027 11/22/2022

## 2023-03-24 ENCOUNTER — OFFICE VISIT (OUTPATIENT)
Dept: CARDIOLOGY | Facility: CLINIC | Age: 73
End: 2023-03-24
Payer: MEDICARE

## 2023-03-24 VITALS
SYSTOLIC BLOOD PRESSURE: 138 MMHG | RESPIRATION RATE: 18 BRPM | DIASTOLIC BLOOD PRESSURE: 72 MMHG | BODY MASS INDEX: 29.57 KG/M2 | HEIGHT: 68 IN | HEART RATE: 77 BPM | OXYGEN SATURATION: 98 % | WEIGHT: 195.13 LBS

## 2023-03-24 DIAGNOSIS — E78.5 HYPERLIPIDEMIA, UNSPECIFIED HYPERLIPIDEMIA TYPE: Chronic | ICD-10-CM

## 2023-03-24 DIAGNOSIS — I25.10 CORONARY ARTERY DISEASE INVOLVING NATIVE HEART WITHOUT ANGINA PECTORIS, UNSPECIFIED VESSEL OR LESION TYPE: Chronic | ICD-10-CM

## 2023-03-24 DIAGNOSIS — N52.1 ERECTILE DYSFUNCTION DUE TO DISEASES CLASSIFIED ELSEWHERE: Chronic | ICD-10-CM

## 2023-03-24 DIAGNOSIS — I10 PRIMARY HYPERTENSION: Primary | Chronic | ICD-10-CM

## 2023-03-24 DIAGNOSIS — F17.200 TOBACCO DEPENDENCE: Chronic | ICD-10-CM

## 2023-03-24 DIAGNOSIS — K21.9 GASTROESOPHAGEAL REFLUX DISEASE, UNSPECIFIED WHETHER ESOPHAGITIS PRESENT: Chronic | ICD-10-CM

## 2023-03-24 DIAGNOSIS — I70.0 ATHEROSCLEROSIS OF AORTA: Chronic | ICD-10-CM

## 2023-03-24 DIAGNOSIS — K76.0 FATTY LIVER: Chronic | ICD-10-CM

## 2023-03-24 PROCEDURE — 3075F PR MOST RECENT SYSTOLIC BLOOD PRESS GE 130-139MM HG: ICD-10-PCS | Mod: CPTII,S$GLB,, | Performed by: INTERNAL MEDICINE

## 2023-03-24 PROCEDURE — 1160F RVW MEDS BY RX/DR IN RCRD: CPT | Mod: CPTII,S$GLB,, | Performed by: INTERNAL MEDICINE

## 2023-03-24 PROCEDURE — 99214 PR OFFICE/OUTPT VISIT, EST, LEVL IV, 30-39 MIN: ICD-10-PCS | Mod: S$GLB,,, | Performed by: INTERNAL MEDICINE

## 2023-03-24 PROCEDURE — 3078F PR MOST RECENT DIASTOLIC BLOOD PRESSURE < 80 MM HG: ICD-10-PCS | Mod: CPTII,S$GLB,, | Performed by: INTERNAL MEDICINE

## 2023-03-24 PROCEDURE — 1126F PR PAIN SEVERITY QUANTIFIED, NO PAIN PRESENT: ICD-10-PCS | Mod: CPTII,S$GLB,, | Performed by: INTERNAL MEDICINE

## 2023-03-24 PROCEDURE — 1159F PR MEDICATION LIST DOCUMENTED IN MEDICAL RECORD: ICD-10-PCS | Mod: CPTII,S$GLB,, | Performed by: INTERNAL MEDICINE

## 2023-03-24 PROCEDURE — 1159F MED LIST DOCD IN RCRD: CPT | Mod: CPTII,S$GLB,, | Performed by: INTERNAL MEDICINE

## 2023-03-24 PROCEDURE — 3075F SYST BP GE 130 - 139MM HG: CPT | Mod: CPTII,S$GLB,, | Performed by: INTERNAL MEDICINE

## 2023-03-24 PROCEDURE — 99999 PR PBB SHADOW E&M-EST. PATIENT-LVL V: CPT | Mod: PBBFAC,,, | Performed by: INTERNAL MEDICINE

## 2023-03-24 PROCEDURE — 4010F ACE/ARB THERAPY RXD/TAKEN: CPT | Mod: CPTII,S$GLB,, | Performed by: INTERNAL MEDICINE

## 2023-03-24 PROCEDURE — 3078F DIAST BP <80 MM HG: CPT | Mod: CPTII,S$GLB,, | Performed by: INTERNAL MEDICINE

## 2023-03-24 PROCEDURE — 3288F FALL RISK ASSESSMENT DOCD: CPT | Mod: CPTII,S$GLB,, | Performed by: INTERNAL MEDICINE

## 2023-03-24 PROCEDURE — 1126F AMNT PAIN NOTED NONE PRSNT: CPT | Mod: CPTII,S$GLB,, | Performed by: INTERNAL MEDICINE

## 2023-03-24 PROCEDURE — 93000 EKG 12-LEAD: ICD-10-PCS | Mod: S$GLB,,, | Performed by: INTERNAL MEDICINE

## 2023-03-24 PROCEDURE — 1101F PR PT FALLS ASSESS DOC 0-1 FALLS W/OUT INJ PAST YR: ICD-10-PCS | Mod: CPTII,S$GLB,, | Performed by: INTERNAL MEDICINE

## 2023-03-24 PROCEDURE — 4010F PR ACE/ARB THEARPY RXD/TAKEN: ICD-10-PCS | Mod: CPTII,S$GLB,, | Performed by: INTERNAL MEDICINE

## 2023-03-24 PROCEDURE — 93000 ELECTROCARDIOGRAM COMPLETE: CPT | Mod: S$GLB,,, | Performed by: INTERNAL MEDICINE

## 2023-03-24 PROCEDURE — 1101F PT FALLS ASSESS-DOCD LE1/YR: CPT | Mod: CPTII,S$GLB,, | Performed by: INTERNAL MEDICINE

## 2023-03-24 PROCEDURE — 3008F BODY MASS INDEX DOCD: CPT | Mod: CPTII,S$GLB,, | Performed by: INTERNAL MEDICINE

## 2023-03-24 PROCEDURE — 3288F PR FALLS RISK ASSESSMENT DOCUMENTED: ICD-10-PCS | Mod: CPTII,S$GLB,, | Performed by: INTERNAL MEDICINE

## 2023-03-24 PROCEDURE — 99214 OFFICE O/P EST MOD 30 MIN: CPT | Mod: S$GLB,,, | Performed by: INTERNAL MEDICINE

## 2023-03-24 PROCEDURE — 1160F PR REVIEW ALL MEDS BY PRESCRIBER/CLIN PHARMACIST DOCUMENTED: ICD-10-PCS | Mod: CPTII,S$GLB,, | Performed by: INTERNAL MEDICINE

## 2023-03-24 PROCEDURE — 3008F PR BODY MASS INDEX (BMI) DOCUMENTED: ICD-10-PCS | Mod: CPTII,S$GLB,, | Performed by: INTERNAL MEDICINE

## 2023-03-24 PROCEDURE — 99999 PR PBB SHADOW E&M-EST. PATIENT-LVL V: ICD-10-PCS | Mod: PBBFAC,,, | Performed by: INTERNAL MEDICINE

## 2023-03-24 NOTE — PROGRESS NOTES
CARDIOVASCULAR CONSULTATION    REASON FOR CONSULT:   Yair Owens is a 73 y.o. male who presents for evaluation of chest pressure.      HISTORY OF PRESENT ILLNESS:     Patient is a pleasant 61-year-old man.  Came here because evaluation of chest pressure to the emergency room.  Was ruled out.  EKG was done which showed normal sinus rhythm.  States the pressure was substernal, and was much worse than the usual pressure which he feels.  States that he usually feels chest pain and tightness.  Unrelated to activity.  Sometimes can come with activity other times at rest.  Denies orthopnea, PND.  Does have mild dyspnea on exertion.  Used to smoke, but cutting down.    Notes from January 2020:    Patient doing fine.  Denies any further episodes of chest pains, orthopnea, PND.  States that he thinks it is his anxiety.  Stress testing did not reveal any significant issues apart from mild aortic valve stenosis.      The perfusion scan is free of evidence from myocardial ischemia or injury.    There is a  mild intensity fixed defect in the inferior wall of the left ventricle secondary to diaphragm attenuation.    Gated perfusion images showed an ejection fraction of 60% post stress.    The EKG portion of this study is negative for ischemia.    The patient reported no chest pain during the stress test.    There were no arrhythmias during stress.      Normal left ventricular systolic function. The estimated ejection fraction is 60%.  Concentric left ventricular hypertrophy.  Normal LV diastolic function.  Normal right ventricular systolic function.  Mild-to-moderate aortic valve stenosis.  Aortic valve area is 1.80 cm2; peak velocity is 2.60 m/s; mean gradient is 18 mmHg.  Mild aortic regurgitation.    Notes from may 2020:    The patient location is: his home   The chief complaint leading to consultation is: chest pain    Visit type: audiovisual    Face to Face time with patient: 15 mins   25 minutes of total time spent on the  encounter, which includes face to face time and non-face to face time preparing to see the patient (eg, review of tests), Obtaining and/or reviewing separately obtained history, Documenting clinical information in the electronic or other health record, Independently interpreting results (not separately reported) and communicating results to the patient/family/caregiver, or Care coordination (not separately reported).         Each patient to whom he or she provides medical services by telemedicine is:  (1) informed of the relationship between the physician and patient and the respective role of any other health care provider with respect to management of the patient; and (2) notified that he or she may decline to receive medical services by telemedicine and may withdraw from such care at any time.    Notes:  Patient here for follow-up.  Denies any chest pain at rest on exertion, orthopnea, PND.  States that he has been feeling great.  However he has started smoking more again because of the pandemic and the stress related with the pandemic.        Jan 2021:      The patient location is: his home  The chief complaint leading to consultation is: chest pain    Visit type: audiovisual    Face to Face time with patient: 15 mins  25 minutes of total time spent on the encounter, which includes face to face time and non-face to face time preparing to see the patient (eg, review of tests), Obtaining and/or reviewing separately obtained history, Documenting clinical information in the electronic or other health record, Independently interpreting results (not separately reported) and communicating results to the patient/family/caregiver, or Care coordination (not separately reported).         Each patient to whom he or she provides medical services by telemedicine is:  (1) informed of the relationship between the physician and patient and the respective role of any other health care provider with respect to management of the  patient; and (2) notified that he or she may decline to receive medical services by telemedicine and may withdraw from such care at any time.    Notes:  Patient here via audiovisual visit.  Had an episode of chest pain which brought him to the ER.  Describes it as left-sided.  At rest.  EKG was done which showed normal sinus rhythm with nonspecific ST changes and some mild ST depressions.  Since then has not had any further chest pains.  States that that day his blood pressure was high around 170 mm of mercury.  Doing fine.      Notes from feb 2021    The patient location is:  his home  The chief complaint leading to consultation is:  Chest pain    Visit type: audiovisual    Face to Face time with patient: 20 mins  30  minutes of total time spent on the encounter, which includes face to face time and non-face to face time preparing to see the patient (eg, review of tests), Obtaining and/or reviewing separately obtained history, Documenting clinical information in the electronic or other health record, Independently interpreting results (not separately reported) and communicating results to the patient/family/caregiver, or Care coordination (not separately reported).         Each patient to whom he or she provides medical services by telemedicine is:  (1) informed of the relationship between the physician and patient and the respective role of any other health care provider with respect to management of the patient; and (2) notified that he or she may decline to receive medical services by telemedicine and may withdraw from such care at any time.    Notes:       Patient here follow-up via audiovisual visit.  Occasional chest pain.  Really in the center of the chest and feels like tightness.  Sometimes in the left side and sometimes in the left shoulder.  Feels the left-sided pain goes to his left shoulder.  No relation with activity.  Can occur at rest or on exertion.  Stress test showed normal myocardial perfusion.   Although the EKG portion was positive for ischemia.    Normal myocardial perfusion scan. There is no evidence of myocardial ischemia or infarction.    There is a  mild intensity fixed defect in the inferobasilar wall of the left ventricle secondary to diaphragm attenuation.    The gated perfusion images showed an ejection fraction of 68% post stress.    There is normal wall motion post stress.    The EKG portion of this study is positive for ischemia.      The left ventricle is normal in size with mild concentric hypertrophy and normal systolic function. The estimated ejection fraction is 60%  Normal right ventricular size with normal right ventricular systolic function.  There is mild aortic valve stenosis.  Aortic valve area is 1.87 cm2; peak velocity is 2.19 m/s; mean gradient is 10 mmHg.    Notes from March 2021:  Patient here for follow-up after angiogram.  Mild nonobstructive CAD on angiogram.  No complications from angiogram.  Doing fine.  Denies any chest pains at rest on exertion.  States he thinks it is his anxiety which is bothering him.    The estimated blood loss was <50 mL.  There was non-obstructive coronary artery disease..        Left main:  Distal 10% stenosis     Lad:  Luminal irregularities.  Mid 10-20% stenosis     Circumflex:  Luminal irregularities     RCA:  Luminal irregularities     Access: We accessed the right radial artery using ultrasound guidance. The vessel appeared widely patent, suitable for endovascular access. The images were interpreted by me and saved.  Access was closed with radial band.           June 21:    The patient location is: his home  The chief complaint leading to consultation is: fu    Visit type: audiovisual    Face to Face time with patient: 15 min  25  minutes of total time spent on the encounter, which includes face to face time and non-face to face time preparing to see the patient (eg, review of tests), Obtaining and/or reviewing separately obtained history,  Documenting clinical information in the electronic or other health record, Independently interpreting results (not separately reported) and communicating results to the patient/family/caregiver, or Care coordination (not separately reported).         Each patient to whom he or she provides medical services by telemedicine is:  (1) informed of the relationship between the physician and patient and the respective role of any other health care provider with respect to management of the patient; and (2) notified that he or she may decline to receive medical services by telemedicine and may withdraw from such care at any time.    Notes:   Has been having left sided chest pain, not related to exertion, pin point in the left of the chest. Does not occur when he is cutting his lawn.  Denies orthopnea, PND, swelling of feet.  States blood pressure is usually well controlled at home.      September 2022: Patient here for follow-up.  Denies any chest pains at rest on exertion, orthopnea, PND.  Has been doing fine.    MARCH 23:  Follow-up.  Denies any chest pains at rest on exertion, orthopnea, PND, swelling of feet.    PAST MEDICAL HISTORY:     Past Medical History:   Diagnosis Date    Chest pain syndrome     Colon polyps     Fatty liver     GERD (gastroesophageal reflux disease)     Gout, unspecified     Hypercholesteremia     Hyperlipidemia     Hypertension        PAST SURGICAL HISTORY:     Past Surgical History:   Procedure Laterality Date    APPENDECTOMY      BONE RESECTION, RIB      for thoracic outlet syndrome    COLONOSCOPY N/A 7/21/2017    Procedure: COLONOSCOPY;  Surgeon: Deepak Servin MD;  Location: [facility] ENDO;  Service: Endoscopy;  Laterality: N/A;    COLONOSCOPY N/A 11/22/2022    Procedure: COLONOSCOPY;  Surgeon: Sam Obrien MD;  Location: [facility] ENDO;  Service: Endoscopy;  Laterality: N/A;  vacc-sutab-inst portal-tb    HAND SURGERY      LEFT HEART CATHETERIZATION Left 3/2/2021    Procedure: Left heart cath, radial,  730 am;  Surgeon: Vladimir Avalos MD;  Location: [facility] CATH LAB;  Service: Cardiology;  Laterality: Left;  RN PREOP 2/25/2021--COVID NEGATIVE ON 3/1  H/P INCOMPLETE    SCROTAL SURGERY      mass    ULTRASOUND GUIDANCE  3/2/2021    Procedure: Ultrasound Guidance;  Surgeon: Vladimir Avalos MD;  Location: [facility] CATH LAB;  Service: Cardiology;;       ALLERGIES AND MEDICATION:     Review of patient's allergies indicates:   Allergen Reactions    Codeine Itching    Ibuprofen Itching        Medication List            Accurate as of March 24, 2023  9:26 AM. If you have any questions, ask your nurse or doctor.                CONTINUE taking these medications      amLODIPine 10 MG tablet  Commonly known as: NORVASC  TAKE 1 TABLET BY MOUTH EVERY DAY     aspirin 81 MG EC tablet  Commonly known as: ECOTRIN  Take 1 tablet (81 mg total) by mouth once daily.     atorvastatin 40 MG tablet  Commonly known as: LIPITOR  TAKE 1 TABLET BY MOUTH EVERY DAY     BINAXNOW COVID-19 AG SELF TEST Kit  Generic drug: COVID-19 antigen test     fish oil-omega-3 fatty acids 300-1,000 mg capsule     hepatitis B vacc-CpG 1018 (PF) 20 mcg/0.5 mL Syrg  Inject by [facility], then return for second dose in 4 weeks     * hepatitis B virus (PF) 10 mcg/0.5 mL Syrg  Commonly known as: ENGERIX-B  Inject 0.5 mLs into the muscle every 30 days.     * ENGERIX-B (PF) 20 mcg/mL Syrg  Generic drug: hepatitis B virus vacc.rec(PF)     hydrOXYzine pamoate 50 MG Cap  Commonly known as: VISTARIL  TAKE 2 CAPSULES BY MOUTH AT BEDTIME AS NEEDED FOR INSOMNIA     krill oil 500 mg Cap     losartan 25 MG tablet  Commonly known as: COZAAR  TAKE 1 TABLET BY MOUTH EVERY DAY     metoprolol succinate 25 MG 24 hr tablet  Commonly known as: TOPROL-XL  TAKE 1 TABLET BY MOUTH EVERY DAY     multivitamin capsule     NON FORMULARY MEDICATION     omeprazole 40 MG capsule  Commonly known as: PRILOSEC  TAKE 1 CAPSULE BY MOUTH  TWICE DAILY     PFIZER COVID BIVAL(12Y UP)(PF) 30 mcg/0.3 mL injection  Generic  drug: sars-cov-2 (covid-19 pfizer omicron)     psyllium 0.52 gram capsule     SHINGRIX (PF) 50 mcg/0.5 mL injection  Generic drug: varicella-zoster gE-AS01B (PF)           * This list has 2 medication(s) that are the same as other medications prescribed for you. Read the directions carefully, and ask your doctor or other care provider to review them with you.                  SOCIAL HISTORY:     Social History     Socioeconomic History    Marital status:    Occupational History     Employer: [employer]   Tobacco Use    Smoking status: Every Day     Packs/day: 0.50     Years: 60.00     Pack years: 30.00     Types: Cigarettes     Start date: 1967    Smokeless tobacco: Never    Tobacco comments:     0.5 ppd or less    Substance and Sexual Activity    Alcohol use: Never    Drug use: No    Sexual activity: Yes     Partners: Female     Social Determinants of Health     Financial Resource Strain: Low Risk     Difficulty of Paying Living Expenses: Not hard at all   Food Insecurity: No Food Insecurity    Worried About Running Out of Food in the Last Year: Never true    Ran Out of Food in the Last Year: Never true   Transportation Needs: No Transportation Needs    Lack of Transportation (Medical): No    Lack of Transportation (Non-Medical): No   Physical Activity: Sufficiently Active    Days of Exercise per Week: 7 days    Minutes of Exercise per Session: 60 min   Stress: No Stress Concern Present    Feeling of Stress : Not at all   Social Connections: Moderately Isolated    Frequency of Communication with Friends and Family: More than three times a week    Frequency of Social Gatherings with Friends and Family: Once a week    Attends Yazidism Services: Never    Active Member of Clubs or Organizations: No    Attends Club or Organization Meetings: Never    Marital Status:    Housing Stability: Low Risk     Unable to Pay for Housing in the Last Year: No    Number of Places Lived in the Last Year: 1    Unstable  "Housing in the Last Year: No       FAMILY HISTORY:     Family History   Problem Relation Age of Onset    No Known Problems Mother     Cancer Father     Heart disease Father     Asthma Sister     Cancer Sister 78        Rectal cancer    Stroke Brother     Lupus Daughter     No Known Problems Son        REVIEW OF SYSTEMS:   Review of Systems   Constitutional: Negative.   HENT: Negative.     Eyes: Negative.    Respiratory: Negative.     Endocrine: Negative.    Hematologic/Lymphatic: Negative.    Skin: Negative.    Musculoskeletal: Negative.    Gastrointestinal: Negative.    Genitourinary: Negative.    Neurological: Negative.    Psychiatric/Behavioral: Negative.     Allergic/Immunologic: Negative.      A 10 point review of systems was performed and all the pertinent positives have been mentioned. Rest of review of systems was negative.        PHYSICAL EXAM:     Vitals:    03/24/23 0909   BP: 138/72   Pulse: 77   Resp: 18    Body mass index is 29.67 kg/m².  Weight: 88.5 kg (195 lb 1.7 oz)   Height: 5' 8" (172.7 cm)      Physical Exam  Vitals and nursing note reviewed.   Constitutional:       Appearance: Normal appearance. He is well-developed.   HENT:      Head: Normocephalic and atraumatic.      Right Ear: Hearing normal.      Left Ear: Hearing normal.      Nose: Nose normal.   Eyes:      General: Lids are normal.      Conjunctiva/sclera: Conjunctivae normal.      Pupils: Pupils are equal, round, and reactive to light.   Cardiovascular:      Rate and Rhythm: Normal rate and regular rhythm.      Pulses: Normal pulses.      Heart sounds: Murmur heard.   Pulmonary:      Effort: Pulmonary effort is normal.      Breath sounds: Normal breath sounds.   Abdominal:      Palpations: Abdomen is soft.      Tenderness: There is no abdominal tenderness.   Musculoskeletal:         General: No deformity.      Cervical back: Normal range of motion and neck supple.   Skin:     General: Skin is warm and dry.   Neurological:      Mental " Status: He is alert and oriented to person, place, and time.   Psychiatric:         Speech: Speech normal.            DATA:     Laboratory:  CBC:  Recent Labs   Lab 02/25/21  1000 03/26/21  0800 06/11/22  0800   WBC 8.66 7.19 6.32   Hemoglobin 14.5 14.2 13.8 L   Hematocrit 43.3 43.8 42.7   Platelets 241 319 257         CHEMISTRIES:  Recent Labs   Lab 02/25/21  1000 03/26/21  0800 06/11/22  0800   Glucose 115 H 114 H 109   Sodium 139 143 139   Potassium 4.7 4.6 4.7   BUN 21 18 19   Creatinine 1.3 1.2 1.3   eGFR if African American >60 >60 >60.0   eGFR if non  55 A >60 54.9 A   Calcium 9.4 9.6 9.5         CARDIAC BIOMARKERS:  Recent Labs   Lab 01/04/21  1450   Troponin I <0.006         COAGS:        LIPIDS/LFTS:  Recent Labs   Lab 03/26/21  0800 06/11/22  0800 10/07/22  0748 01/09/23  1145   Cholesterol 178 154  --   --    Triglycerides 168 H 95  --   --    HDL 41 41  --   --    LDL Cholesterol 103.4 94.0  --   --    Non-HDL Cholesterol 137 113  --   --    AST 34 44 H 47 H 33   ALT 43 57 H 73 H 37         No results found for: HGBA1C    TSH        The 10-year ASCVD risk score (Francis METCALF, et al., 2019) is: 29.6%    Values used to calculate the score:      Age: 73 years      Sex: Male      Is Non- : No      Diabetic: No      Tobacco smoker: Yes      Systolic Blood Pressure: 138 mmHg      Is BP treated: Yes      HDL Cholesterol: 41 mg/dL      Total Cholesterol: 154 mg/dL             ASSESSMENT AND PLAN     Patient Active Problem List   Diagnosis    Hyperlipidemia    Hypertension    GERD (gastroesophageal reflux disease)    Generalized anxiety disorder    Tobacco dependence    Psychophysiological insomnia    Atherosclerosis of aorta    Erectile dysfunction    CAD (coronary artery disease)    Fatty liver    Hepatomegaly    Class 1 obesity with body mass index (BMI) of 30.0 to 30.9 in adult    Calcified granuloma of lung    Purpura       1.  Chest pressure:  Resolved Coronary  angiogram showed nonobstructive CAD.      2. Hypertension:  Blood pressure  controlled.  I have asked him to maintain a blood pressure diary, low-salt diet, and start regular exercise.     3. Mild aortic valve stenosis.     4.  Smoking cessation has been highly encouraged.      Follow-up in 6 months    Thank you very much for involving me in the care of your patient.  Please do not hesitate to contact me if there are any questions.      Vladimir Avalos MD, FACC, [facility]  Interventional Cardiologist, [facility].           This note was dictated with the help of speech recognition software.  There might be un-intended errors and/or substitutions.

## 2023-04-20 ENCOUNTER — PATIENT MESSAGE (OUTPATIENT)
Dept: FAMILY MEDICINE | Facility: CLINIC | Age: 73
End: 2023-04-20
Payer: MEDICARE

## 2023-04-21 ENCOUNTER — PATIENT MESSAGE (OUTPATIENT)
Dept: FAMILY MEDICINE | Facility: CLINIC | Age: 73
End: 2023-04-21
Payer: MEDICARE

## 2023-04-21 DIAGNOSIS — F51.04 PSYCHOPHYSIOLOGICAL INSOMNIA: Chronic | ICD-10-CM

## 2023-04-24 ENCOUNTER — TELEPHONE (OUTPATIENT)
Dept: SMOKING CESSATION | Facility: CLINIC | Age: 73
End: 2023-04-24
Payer: MEDICARE

## 2023-04-24 RX ORDER — MIRTAZAPINE 15 MG/1
15 TABLET, FILM COATED ORAL NIGHTLY PRN
Qty: 90 TABLET | Refills: 0 | Status: SHIPPED | OUTPATIENT
Start: 2023-04-24 | End: 2023-05-17

## 2023-04-24 NOTE — TELEPHONE ENCOUNTER
Smoking Cessation counselor contacted patient in attempt to reschedule canceled intake appointment,patient stated he is not interested in rescheduling appointment at this time.     Kiki Landeros RRT,MSW,LMSW,TTS  [phone]

## 2023-05-08 ENCOUNTER — PATIENT MESSAGE (OUTPATIENT)
Dept: FAMILY MEDICINE | Facility: CLINIC | Age: 73
End: 2023-05-08
Payer: MEDICARE

## 2023-05-17 ENCOUNTER — OFFICE VISIT (OUTPATIENT)
Dept: FAMILY MEDICINE | Facility: CLINIC | Age: 73
End: 2023-05-17
Payer: MEDICARE

## 2023-05-17 VITALS
HEIGHT: 68 IN | OXYGEN SATURATION: 97 % | DIASTOLIC BLOOD PRESSURE: 68 MMHG | WEIGHT: 189.63 LBS | BODY MASS INDEX: 28.74 KG/M2 | SYSTOLIC BLOOD PRESSURE: 122 MMHG | TEMPERATURE: 98 F | HEART RATE: 68 BPM

## 2023-05-17 DIAGNOSIS — M54.6 ACUTE MIDLINE THORACIC BACK PAIN: ICD-10-CM

## 2023-05-17 DIAGNOSIS — I10 PRIMARY HYPERTENSION: ICD-10-CM

## 2023-05-17 DIAGNOSIS — I25.10 CORONARY ARTERY DISEASE INVOLVING NATIVE HEART WITHOUT ANGINA PECTORIS, UNSPECIFIED VESSEL OR LESION TYPE: Chronic | ICD-10-CM

## 2023-05-17 DIAGNOSIS — M65.341 TRIGGER RING FINGER OF RIGHT HAND: Primary | ICD-10-CM

## 2023-05-17 DIAGNOSIS — K76.0 FATTY LIVER: Chronic | ICD-10-CM

## 2023-05-17 PROCEDURE — 99999 PR PBB SHADOW E&M-EST. PATIENT-LVL IV: ICD-10-PCS | Mod: PBBFAC,,, | Performed by: INTERNAL MEDICINE

## 2023-05-17 PROCEDURE — 1125F AMNT PAIN NOTED PAIN PRSNT: CPT | Mod: CPTII,,, | Performed by: INTERNAL MEDICINE

## 2023-05-17 PROCEDURE — 3008F BODY MASS INDEX DOCD: CPT | Mod: CPTII,,, | Performed by: INTERNAL MEDICINE

## 2023-05-17 PROCEDURE — 3074F PR MOST RECENT SYSTOLIC BLOOD PRESSURE < 130 MM HG: ICD-10-PCS | Mod: CPTII,,, | Performed by: INTERNAL MEDICINE

## 2023-05-17 PROCEDURE — 99214 PR OFFICE/OUTPT VISIT, EST, LEVL IV, 30-39 MIN: ICD-10-PCS | Mod: ,,, | Performed by: INTERNAL MEDICINE

## 2023-05-17 PROCEDURE — 1101F PT FALLS ASSESS-DOCD LE1/YR: CPT | Mod: CPTII,,, | Performed by: INTERNAL MEDICINE

## 2023-05-17 PROCEDURE — 3008F PR BODY MASS INDEX (BMI) DOCUMENTED: ICD-10-PCS | Mod: CPTII,,, | Performed by: INTERNAL MEDICINE

## 2023-05-17 PROCEDURE — 4010F ACE/ARB THERAPY RXD/TAKEN: CPT | Mod: CPTII,,, | Performed by: INTERNAL MEDICINE

## 2023-05-17 PROCEDURE — 99999 PR PBB SHADOW E&M-EST. PATIENT-LVL IV: CPT | Mod: PBBFAC,,, | Performed by: INTERNAL MEDICINE

## 2023-05-17 PROCEDURE — 1101F PR PT FALLS ASSESS DOC 0-1 FALLS W/OUT INJ PAST YR: ICD-10-PCS | Mod: CPTII,,, | Performed by: INTERNAL MEDICINE

## 2023-05-17 PROCEDURE — 1125F PR PAIN SEVERITY QUANTIFIED, PAIN PRESENT: ICD-10-PCS | Mod: CPTII,,, | Performed by: INTERNAL MEDICINE

## 2023-05-17 PROCEDURE — 4010F PR ACE/ARB THEARPY RXD/TAKEN: ICD-10-PCS | Mod: CPTII,,, | Performed by: INTERNAL MEDICINE

## 2023-05-17 PROCEDURE — 3074F SYST BP LT 130 MM HG: CPT | Mod: CPTII,,, | Performed by: INTERNAL MEDICINE

## 2023-05-17 PROCEDURE — 3288F PR FALLS RISK ASSESSMENT DOCUMENTED: ICD-10-PCS | Mod: CPTII,,, | Performed by: INTERNAL MEDICINE

## 2023-05-17 PROCEDURE — 3078F PR MOST RECENT DIASTOLIC BLOOD PRESSURE < 80 MM HG: ICD-10-PCS | Mod: CPTII,,, | Performed by: INTERNAL MEDICINE

## 2023-05-17 PROCEDURE — 3288F FALL RISK ASSESSMENT DOCD: CPT | Mod: CPTII,,, | Performed by: INTERNAL MEDICINE

## 2023-05-17 PROCEDURE — 99214 OFFICE O/P EST MOD 30 MIN: CPT | Mod: ,,, | Performed by: INTERNAL MEDICINE

## 2023-05-17 PROCEDURE — 3078F DIAST BP <80 MM HG: CPT | Mod: CPTII,,, | Performed by: INTERNAL MEDICINE

## 2023-05-17 PROCEDURE — 1159F MED LIST DOCD IN RCRD: CPT | Mod: CPTII,,, | Performed by: INTERNAL MEDICINE

## 2023-05-17 PROCEDURE — 1159F PR MEDICATION LIST DOCUMENTED IN MEDICAL RECORD: ICD-10-PCS | Mod: CPTII,,, | Performed by: INTERNAL MEDICINE

## 2023-05-17 NOTE — PROGRESS NOTES
Chief complaint: Upper back pain, trigger finger, tailbone pain    Patient is a 73-year-old white male new to me.  He has had a trigger finger in the right 4th finger that has resolved once and is back now.  Moderately painful.  Discussed he could get a cortisone injection but he wants to try and see if it will resolve on its own    Also one month of some central upper back pain worse with moving or pulling the arms backwards.  It is almost resolved only 1/10 at this point.  We discussed the pathophysiology of muscle injury and they are still some healing that needs to occur it was probably some activity he did with his upper arms rather than his lower back     Also tailbone has been hurting.  He sat for a very long extended period time on a hard chair which was likely precipitating factor it seems to be positional we discussed he needs to avoid any position that aggravates the problem    Review of systems: No other myalgias arthralgias no neurological symptoms, no sciatica        Past Medical History:   Diagnosis Date    CAD (coronary artery disease) 2/22/2021    Calcified granuloma of lung 2/3/2023    LLL calcified granuloma seen on CT Chest Lung Screening Low Dose 01/31/2018    Chest pain syndrome     Colon polyps     Erectile dysfunction 2/14/2020    Fatty liver     Generalized anxiety disorder 1/11/2017    GERD (gastroesophageal reflux disease)     Gout, unspecified     Hyperlipidemia     Hypertension     Psychophysiological insomnia 7/24/2017    Tobacco dependence 7/24/2017     BACK: spine with no defect, good ROM near to floor, good extension, stable. Both legs full ROM, no defects, joints stable, strength 5/5. Negative straight leg testing.  Subjective location was in the central insertions of the rhomboids and so forth but really soft and not tender today.  He does have a palpable locking right 4th finger    Diagnoses and all orders for this visit:    Trigger ring finger of right hand, refer referral or  self refer to orthopedics when he is ready    Acute midline thoracic back pain, resolving strain of the upper back muscles, reassured and discussed pathophysiology of the injury and healing    Primary hypertension, chronic and stable    Coronary artery disease involving native heart without angina pectoris, unspecified vessel or lesion type, no angina    Fatty liver, reviewed liver clinic notes and that with weight loss his ALT has normalized

## 2023-05-31 ENCOUNTER — TELEPHONE (OUTPATIENT)
Dept: FAMILY MEDICINE | Facility: CLINIC | Age: 73
End: 2023-05-31
Payer: MEDICARE

## 2023-05-31 NOTE — TELEPHONE ENCOUNTER
Called patient's mobile, no answer. Patient inquired about cancelling labs, but the labs are due apart of his Care gaps. Last office visit was with Dr. ALEX Plasencia on 5/17/23.

## 2023-05-31 NOTE — TELEPHONE ENCOUNTER
----- Message from Tala Jose sent at 5/31/2023 12:19 PM CDT -----  .Type: Patient Call Back    Who called:self     What is the request in detail:  patient states an appointment was made at last appointment and now he does not see appointment and first available is not until Sept, he has labs scheduled but states it doesn't make sense to get labs if his appointment has been cancelled   Can the clinic reply by [facility]? call    Would the patient rather a call back or a response via [facility]? call    Best call back number [phone]    Additional Information:

## 2023-05-31 NOTE — TELEPHONE ENCOUNTER
Spoke with caregiver to inform her that patient should still come to get labs drawn, because it is apart of his Care Gap yearly labs due.

## 2023-05-31 NOTE — TELEPHONE ENCOUNTER
----- Message from Chino Mar MA sent at 5/31/2023  2:34 PM CDT -----  Type:  Patient Returning Call    Who Called: Self    Who Left Message for Patient: OZZY NEAL    Does the patient know what this is regarding?:NO    Would the patient rather a call back or a response via [facility]? yes    Best Call Back Number: [phone] (home)

## 2023-06-01 ENCOUNTER — PATIENT MESSAGE (OUTPATIENT)
Dept: FAMILY MEDICINE | Facility: CLINIC | Age: 73
End: 2023-06-01
Payer: MEDICARE

## 2023-06-01 ENCOUNTER — OFFICE VISIT (OUTPATIENT)
Dept: ORTHOPEDICS | Facility: CLINIC | Age: 73
End: 2023-06-01
Payer: MEDICARE

## 2023-06-01 ENCOUNTER — TELEPHONE (OUTPATIENT)
Dept: FAMILY MEDICINE | Facility: CLINIC | Age: 73
End: 2023-06-01
Payer: MEDICARE

## 2023-06-01 DIAGNOSIS — M65.341 TRIGGER FINGER, RIGHT RING FINGER: Primary | ICD-10-CM

## 2023-06-01 DIAGNOSIS — M79.641 PAIN OF RIGHT HAND: Primary | ICD-10-CM

## 2023-06-01 DIAGNOSIS — M65.30 TRIGGER FINGER, UNSPECIFIED FINGER, UNSPECIFIED LATERALITY: Primary | ICD-10-CM

## 2023-06-01 PROCEDURE — 1101F PR PT FALLS ASSESS DOC 0-1 FALLS W/OUT INJ PAST YR: ICD-10-PCS | Mod: HCNC,CPTII,S$GLB, | Performed by: ORTHOPAEDIC SURGERY

## 2023-06-01 PROCEDURE — 1101F PT FALLS ASSESS-DOCD LE1/YR: CPT | Mod: HCNC,CPTII,S$GLB, | Performed by: ORTHOPAEDIC SURGERY

## 2023-06-01 PROCEDURE — 4010F PR ACE/ARB THEARPY RXD/TAKEN: ICD-10-PCS | Mod: HCNC,CPTII,S$GLB, | Performed by: ORTHOPAEDIC SURGERY

## 2023-06-01 PROCEDURE — 99999 PR PBB SHADOW E&M-EST. PATIENT-LVL III: CPT | Mod: PBBFAC,HCNC,, | Performed by: ORTHOPAEDIC SURGERY

## 2023-06-01 PROCEDURE — 1126F PR PAIN SEVERITY QUANTIFIED, NO PAIN PRESENT: ICD-10-PCS | Mod: HCNC,CPTII,S$GLB, | Performed by: ORTHOPAEDIC SURGERY

## 2023-06-01 PROCEDURE — 4010F ACE/ARB THERAPY RXD/TAKEN: CPT | Mod: HCNC,CPTII,S$GLB, | Performed by: ORTHOPAEDIC SURGERY

## 2023-06-01 PROCEDURE — 1159F PR MEDICATION LIST DOCUMENTED IN MEDICAL RECORD: ICD-10-PCS | Mod: HCNC,CPTII,S$GLB, | Performed by: ORTHOPAEDIC SURGERY

## 2023-06-01 PROCEDURE — 20550 TENDON SHEATH: ICD-10-PCS | Mod: HCNC,RT,S$GLB, | Performed by: ORTHOPAEDIC SURGERY

## 2023-06-01 PROCEDURE — 99213 OFFICE O/P EST LOW 20 MIN: CPT | Mod: 25,HCNC,S$GLB, | Performed by: ORTHOPAEDIC SURGERY

## 2023-06-01 PROCEDURE — 1126F AMNT PAIN NOTED NONE PRSNT: CPT | Mod: HCNC,CPTII,S$GLB, | Performed by: ORTHOPAEDIC SURGERY

## 2023-06-01 PROCEDURE — 3288F FALL RISK ASSESSMENT DOCD: CPT | Mod: HCNC,CPTII,S$GLB, | Performed by: ORTHOPAEDIC SURGERY

## 2023-06-01 PROCEDURE — 1160F PR REVIEW ALL MEDS BY PRESCRIBER/CLIN PHARMACIST DOCUMENTED: ICD-10-PCS | Mod: HCNC,CPTII,S$GLB, | Performed by: ORTHOPAEDIC SURGERY

## 2023-06-01 PROCEDURE — 99999 PR PBB SHADOW E&M-EST. PATIENT-LVL III: ICD-10-PCS | Mod: PBBFAC,HCNC,, | Performed by: ORTHOPAEDIC SURGERY

## 2023-06-01 PROCEDURE — 20550 NJX 1 TENDON SHEATH/LIGAMENT: CPT | Mod: HCNC,RT,S$GLB, | Performed by: ORTHOPAEDIC SURGERY

## 2023-06-01 PROCEDURE — 99213 PR OFFICE/OUTPT VISIT, EST, LEVL III, 20-29 MIN: ICD-10-PCS | Mod: 25,HCNC,S$GLB, | Performed by: ORTHOPAEDIC SURGERY

## 2023-06-01 PROCEDURE — 1159F MED LIST DOCD IN RCRD: CPT | Mod: HCNC,CPTII,S$GLB, | Performed by: ORTHOPAEDIC SURGERY

## 2023-06-01 PROCEDURE — 3288F PR FALLS RISK ASSESSMENT DOCUMENTED: ICD-10-PCS | Mod: HCNC,CPTII,S$GLB, | Performed by: ORTHOPAEDIC SURGERY

## 2023-06-01 PROCEDURE — 1160F RVW MEDS BY RX/DR IN RCRD: CPT | Mod: HCNC,CPTII,S$GLB, | Performed by: ORTHOPAEDIC SURGERY

## 2023-06-01 RX ORDER — TRIAMCINOLONE ACETONIDE 40 MG/ML
20 INJECTION, SUSPENSION INTRA-ARTICULAR; INTRAMUSCULAR
Status: DISCONTINUED | OUTPATIENT
Start: 2023-06-01 | End: 2023-06-01 | Stop reason: HOSPADM

## 2023-06-01 RX ADMIN — TRIAMCINOLONE ACETONIDE 20 MG: 40 INJECTION, SUSPENSION INTRA-ARTICULAR; INTRAMUSCULAR at 11:06

## 2023-06-01 NOTE — TELEPHONE ENCOUNTER
----- Message from Silva Batres sent at 6/1/2023  3:17 PM CDT -----  Regarding: Return Call  .Type:  Patient Returning Call    Who Called: Self     Who Left Message for Patient: not sure     Does the patient know what this is regarding?: appointment     Would the patient rather a call back or a response via [facility]? Call     Best Call Back Number: [phone]

## 2023-06-01 NOTE — PROCEDURES
Tendon Sheath    Date/Time: 6/1/2023 11:15 AM  Performed by: Annabel Siu MD  Authorized by: Annabel Siu MD     Consent Done?:  Yes (Verbal)  Indications:  Pain  Timeout: prior to procedure the correct patient, procedure, and site was verified    Prep: patient was prepped and draped in usual sterile fashion      Local anesthesia used?: Yes    Local anesthetic:  Topical anesthetic  Location:  Ring finger  Site:  R ring flexor tendon sheath  Needle size:  25 G  Approach:  Volar  Medications:  20 mg triamcinolone acetonide 40 mg/mL  Patient tolerance:  Patient tolerated the procedure well with no immediate complications

## 2023-06-01 NOTE — PROGRESS NOTES
Follow up visit    History of Present Illness:   Yair comes to the office for  evaluation of right ring finger trigger finger. Started a few weeks ago. No injury. Worse with motion or use of the hand. No numbness or tingling. Had similar symptoms in the past, resolved with use of a wrist brace.  This brace did not help this time.    Ankle pain has resolved      ROS: unremarkable and no change since last visit    Physical Examination:    NAD  Right hand  Triggering of the right index finger, this is nonpainful   Locking of the right.  This is his complaint today.  There is a palpable nodule at the A1 pulley but this is not tender  Negative Tinel's at the carpal tunnel  LTSI m/u/r  2+ RP  + EPL, IO, FDS, FDP      Radiographic imaging:  Three views of the right hand negative for degenerative changes or fracture    Assessment/Plan:  73 y.o. male  with Right ring finger trigger finger    We discussed the etiology of persistent pain and further treatment options.  Injection of the right trigger finger - ring  performed, please see procedure note for more details.  Prior to the injection risks and benefits of corticosteroid injection were discussed with the patient including pain, infection, bleeding, skin color changes, swelling, steroid flare. We discussed that over time injections can result in chondral damage, acceleration of arthritis formation, damage to tendons and damage to joints.  The patient consented for the procedure.  Post-injection instructions were given to the patient in writing.  Return for follow up visit:  P.r.n.    All questions were answered in detail. The patient  verbalized the understanding of the treatment plan and is in full agreement with the treatment plan.

## 2023-06-01 NOTE — TELEPHONE ENCOUNTER
----- Message from Silva Batres sent at 5/31/2023  3:37 PM CDT -----  Regarding: Return Call  .Type:  Patient Returning Call    Who Called: Self     Who Left Message for Patient: OZZY    Does the patient know what this is regarding?: Apt with Dr Ewing    Would the patient rather a call back or a response via [facility]? Call     Best Call Back Number: .[phone]

## 2023-06-02 ENCOUNTER — LAB VISIT (OUTPATIENT)
Dept: LAB | Facility: HOSPITAL | Age: 73
End: 2023-06-02
Attending: INTERNAL MEDICINE
Payer: MEDICARE

## 2023-06-02 DIAGNOSIS — E78.5 HYPERLIPIDEMIA, UNSPECIFIED HYPERLIPIDEMIA TYPE: Chronic | ICD-10-CM

## 2023-06-02 LAB
ALBUMIN SERPL BCP-MCNC: 3.8 G/DL (ref 3.5–5.2)
ALP SERPL-CCNC: 71 U/L (ref 55–135)
ALT SERPL W/O P-5'-P-CCNC: 21 U/L (ref 10–44)
ANION GAP SERPL CALC-SCNC: 10 MMOL/L (ref 8–16)
AST SERPL-CCNC: 22 U/L (ref 10–40)
BILIRUB SERPL-MCNC: 1 MG/DL (ref 0.1–1)
BUN SERPL-MCNC: 20 MG/DL (ref 8–23)
CALCIUM SERPL-MCNC: 9.8 MG/DL (ref 8.7–10.5)
CHLORIDE SERPL-SCNC: 105 MMOL/L (ref 95–110)
CHOLEST SERPL-MCNC: 125 MG/DL (ref 120–199)
CHOLEST/HDLC SERPL: 3.2 {RATIO} (ref 2–5)
CO2 SERPL-SCNC: 25 MMOL/L (ref 23–29)
CREAT SERPL-MCNC: 1.2 MG/DL (ref 0.5–1.4)
EST. GFR  (NO RACE VARIABLE): >60 ML/MIN/1.73 M^2
GLUCOSE SERPL-MCNC: 95 MG/DL (ref 70–110)
HDLC SERPL-MCNC: 39 MG/DL (ref 40–75)
HDLC SERPL: 31.2 % (ref 20–50)
LDLC SERPL CALC-MCNC: 76.2 MG/DL (ref 63–159)
NONHDLC SERPL-MCNC: 86 MG/DL
POTASSIUM SERPL-SCNC: 4.6 MMOL/L (ref 3.5–5.1)
PROT SERPL-MCNC: 7.2 G/DL (ref 6–8.4)
SODIUM SERPL-SCNC: 140 MMOL/L (ref 136–145)
TRIGL SERPL-MCNC: 49 MG/DL (ref 30–150)

## 2023-06-02 PROCEDURE — 80061 LIPID PANEL: CPT | Mod: HCNC | Performed by: INTERNAL MEDICINE

## 2023-06-02 PROCEDURE — 80053 COMPREHEN METABOLIC PANEL: CPT | Mod: HCNC | Performed by: INTERNAL MEDICINE

## 2023-06-02 PROCEDURE — 36415 COLL VENOUS BLD VENIPUNCTURE: CPT | Mod: HCNC,PO | Performed by: INTERNAL MEDICINE

## 2023-06-03 ENCOUNTER — PATIENT MESSAGE (OUTPATIENT)
Dept: ADMINISTRATIVE | Facility: OTHER | Age: 73
End: 2023-06-03
Payer: MEDICARE

## 2023-06-05 DIAGNOSIS — E78.5 HYPERLIPIDEMIA, UNSPECIFIED HYPERLIPIDEMIA TYPE: Chronic | ICD-10-CM

## 2023-06-05 NOTE — TELEPHONE ENCOUNTER
No care due was identified.  Health [facility] Embedded Care Due Messages. Reference number: [id number].   6/05/2023 1:24:18 PM CDT

## 2023-06-06 RX ORDER — ATORVASTATIN CALCIUM 40 MG/1
40 TABLET, FILM COATED ORAL DAILY
Qty: 90 TABLET | Refills: 3 | Status: SHIPPED | OUTPATIENT
Start: 2023-06-06

## 2023-06-06 NOTE — TELEPHONE ENCOUNTER
Refill Decision Note   Yair Owens  is requesting a refill authorization.  Brief Assessment and Rationale for Refill:  Approve     Medication Therapy Plan:  previous order matches    Medication Reconciliation Completed: No   Comments:     No Care Gaps recommended.     Note composed:7:41 AM 06/06/2023

## 2023-06-26 ENCOUNTER — PATIENT MESSAGE (OUTPATIENT)
Dept: FAMILY MEDICINE | Facility: CLINIC | Age: 73
End: 2023-06-26
Payer: MEDICARE

## 2023-07-13 ENCOUNTER — PATIENT MESSAGE (OUTPATIENT)
Dept: HEPATOLOGY | Facility: CLINIC | Age: 73
End: 2023-07-13
Payer: MEDICARE

## 2023-08-30 ENCOUNTER — OFFICE VISIT (OUTPATIENT)
Dept: FAMILY MEDICINE | Facility: CLINIC | Age: 73
End: 2023-08-30
Payer: MEDICARE

## 2023-08-30 VITALS
OXYGEN SATURATION: 96 % | SYSTOLIC BLOOD PRESSURE: 126 MMHG | BODY MASS INDEX: 28.04 KG/M2 | HEIGHT: 68 IN | WEIGHT: 185 LBS | DIASTOLIC BLOOD PRESSURE: 76 MMHG

## 2023-08-30 DIAGNOSIS — I10 PRIMARY HYPERTENSION: Chronic | ICD-10-CM

## 2023-08-30 DIAGNOSIS — R39.9 LOWER URINARY TRACT SYMPTOMS (LUTS): Chronic | ICD-10-CM

## 2023-08-30 DIAGNOSIS — K76.0 FATTY LIVER: Chronic | ICD-10-CM

## 2023-08-30 DIAGNOSIS — I25.10 CORONARY ARTERY DISEASE INVOLVING NATIVE HEART WITHOUT ANGINA PECTORIS, UNSPECIFIED VESSEL OR LESION TYPE: Chronic | ICD-10-CM

## 2023-08-30 PROCEDURE — 3074F PR MOST RECENT SYSTOLIC BLOOD PRESSURE < 130 MM HG: ICD-10-PCS | Mod: CPTII,95,, | Performed by: INTERNAL MEDICINE

## 2023-08-30 PROCEDURE — 99214 OFFICE O/P EST MOD 30 MIN: CPT | Mod: 95,,, | Performed by: INTERNAL MEDICINE

## 2023-08-30 PROCEDURE — 1101F PT FALLS ASSESS-DOCD LE1/YR: CPT | Mod: CPTII,95,, | Performed by: INTERNAL MEDICINE

## 2023-08-30 PROCEDURE — 3288F FALL RISK ASSESSMENT DOCD: CPT | Mod: CPTII,95,, | Performed by: INTERNAL MEDICINE

## 2023-08-30 PROCEDURE — 4010F PR ACE/ARB THEARPY RXD/TAKEN: ICD-10-PCS | Mod: CPTII,95,, | Performed by: INTERNAL MEDICINE

## 2023-08-30 PROCEDURE — 1126F AMNT PAIN NOTED NONE PRSNT: CPT | Mod: CPTII,95,, | Performed by: INTERNAL MEDICINE

## 2023-08-30 PROCEDURE — 1126F PR PAIN SEVERITY QUANTIFIED, NO PAIN PRESENT: ICD-10-PCS | Mod: CPTII,95,, | Performed by: INTERNAL MEDICINE

## 2023-08-30 PROCEDURE — 3008F PR BODY MASS INDEX (BMI) DOCUMENTED: ICD-10-PCS | Mod: CPTII,95,, | Performed by: INTERNAL MEDICINE

## 2023-08-30 PROCEDURE — 99214 PR OFFICE/OUTPT VISIT, EST, LEVL IV, 30-39 MIN: ICD-10-PCS | Mod: 95,,, | Performed by: INTERNAL MEDICINE

## 2023-08-30 PROCEDURE — 1160F RVW MEDS BY RX/DR IN RCRD: CPT | Mod: CPTII,95,, | Performed by: INTERNAL MEDICINE

## 2023-08-30 PROCEDURE — 1101F PR PT FALLS ASSESS DOC 0-1 FALLS W/OUT INJ PAST YR: ICD-10-PCS | Mod: CPTII,95,, | Performed by: INTERNAL MEDICINE

## 2023-08-30 PROCEDURE — 3078F DIAST BP <80 MM HG: CPT | Mod: CPTII,95,, | Performed by: INTERNAL MEDICINE

## 2023-08-30 PROCEDURE — 3288F PR FALLS RISK ASSESSMENT DOCUMENTED: ICD-10-PCS | Mod: CPTII,95,, | Performed by: INTERNAL MEDICINE

## 2023-08-30 PROCEDURE — 1159F MED LIST DOCD IN RCRD: CPT | Mod: CPTII,95,, | Performed by: INTERNAL MEDICINE

## 2023-08-30 PROCEDURE — 3078F PR MOST RECENT DIASTOLIC BLOOD PRESSURE < 80 MM HG: ICD-10-PCS | Mod: CPTII,95,, | Performed by: INTERNAL MEDICINE

## 2023-08-30 PROCEDURE — 4010F ACE/ARB THERAPY RXD/TAKEN: CPT | Mod: CPTII,95,, | Performed by: INTERNAL MEDICINE

## 2023-08-30 PROCEDURE — 1160F PR REVIEW ALL MEDS BY PRESCRIBER/CLIN PHARMACIST DOCUMENTED: ICD-10-PCS | Mod: CPTII,95,, | Performed by: INTERNAL MEDICINE

## 2023-08-30 PROCEDURE — 1159F PR MEDICATION LIST DOCUMENTED IN MEDICAL RECORD: ICD-10-PCS | Mod: CPTII,95,, | Performed by: INTERNAL MEDICINE

## 2023-08-30 PROCEDURE — 3008F BODY MASS INDEX DOCD: CPT | Mod: CPTII,95,, | Performed by: INTERNAL MEDICINE

## 2023-08-30 PROCEDURE — 3074F SYST BP LT 130 MM HG: CPT | Mod: CPTII,95,, | Performed by: INTERNAL MEDICINE

## 2023-08-30 RX ORDER — ASPIRIN 81 MG/1
81 TABLET ORAL DAILY
Refills: 0 | COMMUNITY
Start: 2023-08-30 | End: 2024-08-29

## 2023-08-30 NOTE — PROGRESS NOTES
Assessment & Plan  Problem List Items Addressed This Visit          Cardiac/Vascular    Hypertension (Chronic)    Overview     Followed by digital team         Current Assessment & Plan     Doing great.  He is down to amlodipine 5mg with his losartan. The current medical regimen is effective;  continue present plan and medications.          CAD (coronary artery disease) (Chronic)    Relevant Medications    aspirin (ECOTRIN) 81 MG EC tablet       Renal/    Lower urinary tract symptoms (LUTS) (Chronic)    Current Assessment & Plan     He has already ordered tadalafil online.  Discussed safety for taking 5mg daily for LUTS.  I have discussed the common side effects of this(these) medication(s) and black box warnings (if applicable) with the patient and answered all of the questions they had at the time of this visit regarding this(these) medication(s).     Discussed back-up plan for flomax.              GI    Fatty liver (Chronic)    Current Assessment & Plan     Doing great.  Labs markedly improved.              Health Maintenance reviewed, previously placed orders for low-dose CT. COVID boost of new formulation once available.    The patient location is:  Patient home   The chief complaint leading to consultation is: noted below  Visit type: Virtual visit with synchronous audio and video  Total time spent with patient: 15  Each patient to whom he or she provides medical services by telemedicine is:  (1) informed of the relationship between the physician and patient and the respective role of any other health care provider with respect to management of the patient; and (2) notified that he or she may decline to receive medical services by telemedicine and may withdraw from such care at any time.    Follow-up: Follow up in about 6 months (around 2/29/2024) for Routine Physical.    ______________________________________________________________________    Chief Complaint  Chief Complaint   Patient presents with     Urinary Frequency       HPI  Yair Owens is a 73 y.o. male with multiple medical diagnoses as listed in the medical history and problem list that presents for urinary frequency for several weeks via virtual visit.  Their last appointment 5/17/2023.        He has been having an increased rate of urinary frequency.  He also reports some incomplete bladder emptying symptoms.  He will have to returned to use the restroom a few minutes after going to urinate.  Some mild straining.  He is questions about various CAM options to treat this.  He additionally reports that he is looked into Cialis for daily use on the Internet.    He has been doing very well with digital hypertension.  His home readings are looking great.  He has been able to wean his amlodipine to 5 mg.    He has been making some excellent improvements with his lifestyle.  Diet is much improved.  He is exercising much more.  His most recent CMP showed continued improvement of his liver enzymes        ALLERGIES AND MEDICATIONS: updated and reviewed.  Review of patient's allergies indicates:   Allergen Reactions    Codeine Itching    Ibuprofen Itching    Remeron [mirtazapine] Anxiety     Did not help the patient sleep and created anxiety.     Current Outpatient Medications   Medication Sig Dispense Refill    amLODIPine (NORVASC) 10 MG tablet Take 5 mg by mouth once daily.      atorvastatin (LIPITOR) 40 MG tablet Take 1 tablet (40 mg total) by mouth once daily. 90 tablet 3    losartan (COZAAR) 25 MG tablet TAKE 1 TABLET BY MOUTH EVERY DAY 90 tablet 3    multivitamin capsule Take 1 capsule by mouth once daily.      omega-3 fatty acids/fish oil (FISH OIL-OMEGA-3 FATTY ACIDS) 300-1,000 mg capsule Take by mouth once daily.      omeprazole (PRILOSEC) 40 MG capsule TAKE 1 CAPSULE BY MOUTH  TWICE DAILY 180 capsule 3    psyllium 0.52 gram capsule Take 0.52 g by mouth once daily.      aspirin (ECOTRIN) 81 MG EC tablet Take 1 tablet (81 mg total) by mouth once daily.   0    ENGERIX-B, PF, 20 mcg/mL Syrg       hepatitis B vacc-CpG 1018, PF, 20 mcg/0.5 mL Syrg Inject by [facility], then return for second dose in 4 weeks (Patient not taking: Reported on 8/30/2023) 0.5 mL 0    hepatitis B virus, PF, (ENGERIX-B) 10 mcg/0.5 mL Syrg Inject 0.5 mLs into the muscle every 30 days. (Patient not taking: Reported on 8/30/2023) 2 each 0    NON FORMULARY MEDICATION Take 1 tablet by mouth once daily. Beet root extract      PFIZER COVID BIVAL,12Y UP,,PF, 30 mcg/0.3 mL injection       SHINGRIX, PF, 50 mcg/0.5 mL injection        No current facility-administered medications for this visit.         ROS  Review of Systems   Constitutional:  Negative for activity change and unexpected weight change.   HENT:  Negative for hearing loss, rhinorrhea and trouble swallowing.    Eyes:  Negative for discharge and visual disturbance.   Respiratory:  Negative for chest tightness and wheezing.    Cardiovascular:  Negative for chest pain and palpitations.   Gastrointestinal:  Negative for blood in stool, constipation, diarrhea and vomiting.   Endocrine: Negative for polydipsia and polyuria.   Genitourinary:  Positive for difficulty urinating, frequency and urgency. Negative for hematuria.   Musculoskeletal:  Negative for arthralgias, joint swelling and neck pain.   Neurological:  Negative for weakness and headaches.   Psychiatric/Behavioral:  Negative for confusion and dysphoric mood.            Physical Exam  Physical Exam  Constitutional:       General: He is not in acute distress.     Appearance: Normal appearance. He is well-developed.   HENT:      Head: Normocephalic and atraumatic.   Eyes:      Extraocular Movements: Extraocular movements intact.      Conjunctiva/sclera: Conjunctivae normal.   Pulmonary:      Effort: Pulmonary effort is normal. No respiratory distress.   Musculoskeletal:      Cervical back: Normal range of motion.   Neurological:      General: No focal deficit present.      Mental Status: He is  alert and oriented to person, place, and time.   Psychiatric:         Mood and Affect: Mood and affect normal.         Behavior: Behavior normal.         Thought Content: Thought content normal.         Judgment: Judgment normal.

## 2023-08-30 NOTE — ASSESSMENT & PLAN NOTE
Doing great.  He is down to amlodipine 5mg with his losartan. The current medical regimen is effective;  continue present plan and medications.

## 2023-08-30 NOTE — ASSESSMENT & PLAN NOTE
He has already ordered tadalafil online.  Discussed safety for taking 5mg daily for LUTS.  I have discussed the common side effects of this(these) medication(s) and black box warnings (if applicable) with the patient and answered all of the questions they had at the time of this visit regarding this(these) medication(s).     Discussed back-up plan for flomax.

## 2023-09-08 ENCOUNTER — TELEPHONE (OUTPATIENT)
Dept: FAMILY MEDICINE | Facility: CLINIC | Age: 73
End: 2023-09-08
Payer: MEDICARE

## 2023-09-08 ENCOUNTER — PATIENT MESSAGE (OUTPATIENT)
Dept: FAMILY MEDICINE | Facility: CLINIC | Age: 73
End: 2023-09-08
Payer: MEDICARE

## 2023-09-08 DIAGNOSIS — N52.9 ERECTILE DYSFUNCTION, UNSPECIFIED ERECTILE DYSFUNCTION TYPE: Chronic | ICD-10-CM

## 2023-09-08 DIAGNOSIS — R39.9 LOWER URINARY TRACT SYMPTOMS (LUTS): Primary | Chronic | ICD-10-CM

## 2023-09-08 RX ORDER — TADALAFIL 5 MG/1
5 TABLET ORAL DAILY
Qty: 90 TABLET | Refills: 1 | Status: SHIPPED | OUTPATIENT
Start: 2023-09-08 | End: 2023-11-27 | Stop reason: SDUPTHER

## 2023-09-08 NOTE — TELEPHONE ENCOUNTER
No care due was identified.  Health [facility] Embedded Care Due Messages. Reference number: [id number].   9/08/2023 8:52:51 AM CDT

## 2023-09-08 NOTE — TELEPHONE ENCOUNTER
----- Message from Juana Dean sent at 9/8/2023  1:32 PM CDT -----  Regarding: [id number]  Type: Patient Call Back    Who called:self     What is the request in detail: pt states he was calling in regards to the Cealis medication that the provider was suppose to prescribe him. Pt will like a call back from the nurse.    Can the clinic reply by [facility]?no     Would the patient rather a call back or a response via [facility]? Call back     Best call back number:[phone]      Additional Information:

## 2023-09-13 ENCOUNTER — PATIENT MESSAGE (OUTPATIENT)
Dept: OTHER | Facility: OTHER | Age: 73
End: 2023-09-13
Payer: MEDICARE

## 2023-09-27 ENCOUNTER — OFFICE VISIT (OUTPATIENT)
Dept: UROLOGY | Facility: CLINIC | Age: 73
End: 2023-09-27
Payer: MEDICARE

## 2023-09-27 ENCOUNTER — LAB VISIT (OUTPATIENT)
Dept: LAB | Facility: HOSPITAL | Age: 73
End: 2023-09-27
Attending: UROLOGY
Payer: MEDICARE

## 2023-09-27 VITALS — BODY MASS INDEX: 27.89 KG/M2 | WEIGHT: 183.44 LBS

## 2023-09-27 DIAGNOSIS — N40.1 BPH WITH URINARY OBSTRUCTION: Primary | ICD-10-CM

## 2023-09-27 DIAGNOSIS — N40.1 BPH WITH URINARY OBSTRUCTION: ICD-10-CM

## 2023-09-27 DIAGNOSIS — N13.8 BPH WITH URINARY OBSTRUCTION: Primary | ICD-10-CM

## 2023-09-27 DIAGNOSIS — N13.8 BPH WITH URINARY OBSTRUCTION: ICD-10-CM

## 2023-09-27 DIAGNOSIS — R33.9 INCOMPLETE BLADDER EMPTYING: ICD-10-CM

## 2023-09-27 DIAGNOSIS — R35.1 NOCTURIA: ICD-10-CM

## 2023-09-27 PROCEDURE — 99204 OFFICE O/P NEW MOD 45 MIN: CPT | Mod: S$GLB,,, | Performed by: UROLOGY

## 2023-09-27 PROCEDURE — 1159F MED LIST DOCD IN RCRD: CPT | Mod: CPTII,S$GLB,, | Performed by: UROLOGY

## 2023-09-27 PROCEDURE — 1159F PR MEDICATION LIST DOCUMENTED IN MEDICAL RECORD: ICD-10-PCS | Mod: CPTII,S$GLB,, | Performed by: UROLOGY

## 2023-09-27 PROCEDURE — 99999 PR PBB SHADOW E&M-EST. PATIENT-LVL III: ICD-10-PCS | Mod: PBBFAC,,, | Performed by: UROLOGY

## 2023-09-27 PROCEDURE — 84153 ASSAY OF PSA TOTAL: CPT | Performed by: UROLOGY

## 2023-09-27 PROCEDURE — 99204 PR OFFICE/OUTPT VISIT, NEW, LEVL IV, 45-59 MIN: ICD-10-PCS | Mod: S$GLB,,, | Performed by: UROLOGY

## 2023-09-27 PROCEDURE — 3288F PR FALLS RISK ASSESSMENT DOCUMENTED: ICD-10-PCS | Mod: CPTII,S$GLB,, | Performed by: UROLOGY

## 2023-09-27 PROCEDURE — 1101F PT FALLS ASSESS-DOCD LE1/YR: CPT | Mod: CPTII,S$GLB,, | Performed by: UROLOGY

## 2023-09-27 PROCEDURE — 81001 URINALYSIS AUTO W/SCOPE: CPT | Mod: S$GLB,,, | Performed by: UROLOGY

## 2023-09-27 PROCEDURE — 36415 COLL VENOUS BLD VENIPUNCTURE: CPT | Performed by: UROLOGY

## 2023-09-27 PROCEDURE — 1160F RVW MEDS BY RX/DR IN RCRD: CPT | Mod: CPTII,S$GLB,, | Performed by: UROLOGY

## 2023-09-27 PROCEDURE — 1126F PR PAIN SEVERITY QUANTIFIED, NO PAIN PRESENT: ICD-10-PCS | Mod: CPTII,S$GLB,, | Performed by: UROLOGY

## 2023-09-27 PROCEDURE — 99999 PR PBB SHADOW E&M-EST. PATIENT-LVL III: CPT | Mod: PBBFAC,,, | Performed by: UROLOGY

## 2023-09-27 PROCEDURE — 1101F PR PT FALLS ASSESS DOC 0-1 FALLS W/OUT INJ PAST YR: ICD-10-PCS | Mod: CPTII,S$GLB,, | Performed by: UROLOGY

## 2023-09-27 PROCEDURE — 3008F PR BODY MASS INDEX (BMI) DOCUMENTED: ICD-10-PCS | Mod: CPTII,S$GLB,, | Performed by: UROLOGY

## 2023-09-27 PROCEDURE — 4010F PR ACE/ARB THEARPY RXD/TAKEN: ICD-10-PCS | Mod: CPTII,S$GLB,, | Performed by: UROLOGY

## 2023-09-27 PROCEDURE — 3008F BODY MASS INDEX DOCD: CPT | Mod: CPTII,S$GLB,, | Performed by: UROLOGY

## 2023-09-27 PROCEDURE — 1160F PR REVIEW ALL MEDS BY PRESCRIBER/CLIN PHARMACIST DOCUMENTED: ICD-10-PCS | Mod: CPTII,S$GLB,, | Performed by: UROLOGY

## 2023-09-27 PROCEDURE — 1126F AMNT PAIN NOTED NONE PRSNT: CPT | Mod: CPTII,S$GLB,, | Performed by: UROLOGY

## 2023-09-27 PROCEDURE — 3288F FALL RISK ASSESSMENT DOCD: CPT | Mod: CPTII,S$GLB,, | Performed by: UROLOGY

## 2023-09-27 PROCEDURE — 4010F ACE/ARB THERAPY RXD/TAKEN: CPT | Mod: CPTII,S$GLB,, | Performed by: UROLOGY

## 2023-09-27 PROCEDURE — 81001 PR  URINALYSIS, AUTO, W/SCOPE: ICD-10-PCS | Mod: S$GLB,,, | Performed by: UROLOGY

## 2023-09-27 RX ORDER — TAMSULOSIN HYDROCHLORIDE 0.4 MG/1
0.4 CAPSULE ORAL DAILY
Qty: 30 CAPSULE | Refills: 11 | Status: SHIPPED | OUTPATIENT
Start: 2023-09-27 | End: 2024-09-21

## 2023-09-27 NOTE — PROGRESS NOTES
Subjective:       Patient ID: Yair Owens is a 73 y.o. male who was referred by Self, Aaareferral    Chief Complaint:   Chief Complaint   Patient presents with    Elevated PSA       Benign Prostatic Hypertrophy  Patient complains of lower urinary tract symptoms. He reports nocturia two times a night and weak stream. He denies urgency. Patient states symptoms are of moderate severity. Onset of symptoms was several months ago and was gradual in onset.  He has no personal history and no family history of prostate cancer. He reports a history of no complicating symptoms. He denies flank pain, gross hematuria, kidney stones, and recurrent UTI.    ACTIVE MEDICAL ISSUES:  Patient Active Problem List   Diagnosis    Hyperlipidemia    Hypertension    GERD (gastroesophageal reflux disease)    Generalized anxiety disorder    Tobacco dependence    Psychophysiological insomnia    Atherosclerosis of aorta    Erectile dysfunction    CAD (coronary artery disease)    Fatty liver    Hepatomegaly    Class 1 obesity with body mass index (BMI) of 30.0 to 30.9 in adult    Calcified granuloma of lung    Purpura    Lower urinary tract symptoms (LUTS)       PAST MEDICAL HISTORY  Past Medical History:   Diagnosis Date    CAD (coronary artery disease) 2/22/2021    Calcified granuloma of lung 2/3/2023    LLL calcified granuloma seen on CT Chest Lung Screening Low Dose 01/31/2018    Chest pain syndrome     Colon polyps     Erectile dysfunction 2/14/2020    Fatty liver     Generalized anxiety disorder 1/11/2017    GERD (gastroesophageal reflux disease)     Gout, unspecified     Hyperlipidemia     Hypertension     Psychophysiological insomnia 7/24/2017    Tobacco dependence 7/24/2017       PAST SURGICAL HISTORY:  Past Surgical History:   Procedure Laterality Date    APPENDECTOMY      BONE RESECTION, RIB      for thoracic outlet syndrome    COLONOSCOPY N/A 7/21/2017    Procedure: COLONOSCOPY;  Surgeon: Deepak Servin MD;  Location: [facility];   Service: Endoscopy;  Laterality: N/A;    COLONOSCOPY N/A 11/22/2022    Procedure: COLONOSCOPY;  Surgeon: Sam Obrien MD;  Location: [facility] ENDO;  Service: Endoscopy;  Laterality: N/A;  vacc-sutab-inst portal-tb    HAND SURGERY      LEFT HEART CATHETERIZATION Left 3/2/2021    Procedure: Left heart cath, radial, 730 am;  Surgeon: Vladimir Avalos MD;  Location: [facility] CATH LAB;  Service: Cardiology;  Laterality: Left;  RN PREOP 2/25/2021--COVID NEGATIVE ON 3/1  H/P INCOMPLETE    SCROTAL SURGERY      mass    ULTRASOUND GUIDANCE  3/2/2021    Procedure: Ultrasound Guidance;  Surgeon: Vladimir Avalos MD;  Location: [facility] CATH LAB;  Service: Cardiology;;       SOCIAL HISTORY:  Social History     Tobacco Use    Smoking status: Every Day     Current packs/day: 0.50     Average packs/day: 0.5 packs/day for 60.0 years (30.0 ttl pk-yrs)     Types: Cigarettes     Start date: 1967     Passive exposure: Current    Smokeless tobacco: Never    Tobacco comments:     0.5 ppd or less    Substance Use Topics    Alcohol use: Never    Drug use: No       FAMILY HISTORY:  Family History   Problem Relation Age of Onset    No Known Problems Mother     Cancer Father     Heart disease Father     Asthma Sister     Cancer Sister 78        Rectal cancer    Stroke Brother     Lupus Daughter     No Known Problems Son        ALLERGIES AND MEDICATIONS: updated and reviewed.  Review of patient's allergies indicates:   Allergen Reactions    Codeine Itching    Ibuprofen Itching    Remeron [mirtazapine] Anxiety     Did not help the patient sleep and created anxiety.     Current Outpatient Medications   Medication Sig    amLODIPine (NORVASC) 10 MG tablet Take 5 mg by mouth once daily.    aspirin (ECOTRIN) 81 MG EC tablet Take 1 tablet (81 mg total) by mouth once daily.    atorvastatin (LIPITOR) 40 MG tablet Take 1 tablet (40 mg total) by mouth once daily.    losartan (COZAAR) 25 MG tablet TAKE 1 TABLET BY MOUTH EVERY DAY    multivitamin capsule Take 1 capsule  by mouth once daily.    omega-3 fatty acids/fish oil (FISH OIL-OMEGA-3 FATTY ACIDS) 300-1,000 mg capsule Take by mouth once daily.    omeprazole (PRILOSEC) 40 MG capsule TAKE 1 CAPSULE BY MOUTH  TWICE DAILY    PFIZER COVID BIVAL,12Y UP,,PF, 30 mcg/0.3 mL injection     psyllium 0.52 gram capsule Take 0.52 g by mouth once daily.    tadalafiL (CIALIS) 5 MG tablet Take 1 tablet (5 mg total) by mouth once daily.    ENGERIX-B, PF, 20 mcg/mL Syrg     hepatitis B vacc-CpG 1018, PF, 20 mcg/0.5 mL Syrg Inject by [facility], then return for second dose in 4 weeks (Patient not taking: Reported on 8/30/2023)    hepatitis B virus, PF, (ENGERIX-B) 10 mcg/0.5 mL Syrg Inject 0.5 mLs into the muscle every 30 days. (Patient not taking: Reported on 8/30/2023)    NON FORMULARY MEDICATION Take 1 tablet by mouth once daily. Beet root extract    SHINGRIX, PF, 50 mcg/0.5 mL injection     tamsulosin (FLOMAX) 0.4 mg Cap Take 1 capsule (0.4 mg total) by mouth once daily.     No current facility-administered medications for this visit.       Review of Systems   Constitutional:  Negative for chills and fever.   HENT:  Negative for congestion.    Respiratory:  Negative for chest tightness and shortness of breath.    Cardiovascular:  Negative for chest pain and palpitations.   Gastrointestinal:  Negative for abdominal pain, constipation, diarrhea, nausea and vomiting.   Genitourinary:  Negative for difficulty urinating, dysuria, flank pain, hematuria and urgency.   Musculoskeletal:  Negative for arthralgias.   Neurological:  Negative for dizziness.   Psychiatric/Behavioral:  Negative for confusion.        Objective:      Vitals:    09/27/23 1413   Weight: 83.2 kg (183 lb 6.8 oz)     Physical Exam  Vitals and nursing note reviewed.   Constitutional:       Appearance: He is well-developed.   HENT:      Head: Normocephalic.   Eyes:      Conjunctiva/sclera: Conjunctivae normal.   Neck:      Thyroid: No thyromegaly.      Trachea: No tracheal deviation.    Cardiovascular:      Rate and Rhythm: Normal rate.      Heart sounds: Normal heart sounds.   Pulmonary:      Effort: Pulmonary effort is normal. No respiratory distress.      Breath sounds: Normal breath sounds. No wheezing.   Abdominal:      General: There is no distension.      Palpations: Abdomen is soft. There is no mass.      Tenderness: There is no abdominal tenderness. There is no guarding or rebound.      Hernia: No hernia is present. There is no hernia in the right inguinal area or left inguinal area.   Genitourinary:     Penis: Normal.       Testes: Normal.         Right: Mass or tenderness not present.         Left: Mass or tenderness not present.      Prostate: Enlarged. Not tender.      Rectum: Normal. No mass, tenderness or external hemorrhoid.      Comments: 40 gm smooth  Musculoskeletal:         General: No tenderness.      Cervical back: Normal range of motion.   Lymphadenopathy:      Lower Body: No right inguinal adenopathy. No left inguinal adenopathy.   Skin:     General: Skin is warm and dry.      Findings: No erythema or rash.   Neurological:      Mental Status: He is alert and oriented to person, place, and time.   Psychiatric:         Behavior: Behavior normal.         Thought Content: Thought content normal.         Judgment: Judgment normal.         Urine dipstick shows negative for all components.  Micro exam: not done.    Assessment:       1. BPH with urinary obstruction    2. Nocturia    3. Incomplete bladder emptying          Plan:       1. BPH with urinary obstruction    - Prostate Specific Antigen, Diagnostic; Future  - tamsulosin (FLOMAX) 0.4 mg Cap; Take 1 capsule (0.4 mg total) by mouth once daily.  Dispense: 30 capsule; Refill: 11    2. Nocturia    - POCT urinalysis, dipstick or tablet reag    3. Incomplete bladder emptying    - US Retroperitoneal Complete; Future            Follow up in about 6 weeks (around 11/8/2023) for Follow up Established, Review X-ray, Review PSA.

## 2023-09-28 LAB — COMPLEXED PSA SERPL-MCNC: 1.8 NG/ML (ref 0–4)

## 2023-09-29 ENCOUNTER — PATIENT MESSAGE (OUTPATIENT)
Dept: FAMILY MEDICINE | Facility: CLINIC | Age: 73
End: 2023-09-29
Payer: MEDICARE

## 2023-10-08 DIAGNOSIS — I10 ESSENTIAL HYPERTENSION: Chronic | ICD-10-CM

## 2023-10-08 RX ORDER — LOSARTAN POTASSIUM 25 MG/1
TABLET ORAL
Qty: 90 TABLET | Refills: 2 | Status: SHIPPED | OUTPATIENT
Start: 2023-10-08 | End: 2023-11-27 | Stop reason: SDUPTHER

## 2023-10-08 NOTE — TELEPHONE ENCOUNTER
No care due was identified.  Health [facility] Embedded Care Due Messages. Reference number: [id number].   10/08/2023 5:34:19 PM CDT

## 2023-10-09 ENCOUNTER — PATIENT MESSAGE (OUTPATIENT)
Dept: FAMILY MEDICINE | Facility: CLINIC | Age: 73
End: 2023-10-09
Payer: MEDICARE

## 2023-10-09 NOTE — TELEPHONE ENCOUNTER
Refill Decision Note   Yair Owens  is requesting a refill authorization.  Brief Assessment and Rationale for Refill:  Approve     Medication Therapy Plan:         Comments:     Note composed:7:12 PM 10/08/2023

## 2023-10-10 ENCOUNTER — OFFICE VISIT (OUTPATIENT)
Dept: FAMILY MEDICINE | Facility: CLINIC | Age: 73
End: 2023-10-10
Payer: MEDICARE

## 2023-10-10 ENCOUNTER — APPOINTMENT (OUTPATIENT)
Dept: RADIOLOGY | Facility: HOSPITAL | Age: 73
End: 2023-10-10
Attending: NURSE PRACTITIONER
Payer: MEDICARE

## 2023-10-10 ENCOUNTER — PATIENT MESSAGE (OUTPATIENT)
Dept: FAMILY MEDICINE | Facility: CLINIC | Age: 73
End: 2023-10-10

## 2023-10-10 VITALS
DIASTOLIC BLOOD PRESSURE: 72 MMHG | SYSTOLIC BLOOD PRESSURE: 127 MMHG | OXYGEN SATURATION: 97 % | BODY MASS INDEX: 27.37 KG/M2 | WEIGHT: 180 LBS | HEART RATE: 70 BPM | TEMPERATURE: 98 F

## 2023-10-10 DIAGNOSIS — I25.10 CORONARY ARTERY DISEASE INVOLVING NATIVE HEART WITHOUT ANGINA PECTORIS, UNSPECIFIED VESSEL OR LESION TYPE: Chronic | ICD-10-CM

## 2023-10-10 DIAGNOSIS — J06.9 VIRAL URI WITH COUGH: Primary | ICD-10-CM

## 2023-10-10 DIAGNOSIS — I10 PRIMARY HYPERTENSION: Chronic | ICD-10-CM

## 2023-10-10 DIAGNOSIS — E78.5 HYPERLIPIDEMIA, UNSPECIFIED HYPERLIPIDEMIA TYPE: Chronic | ICD-10-CM

## 2023-10-10 DIAGNOSIS — J06.9 VIRAL URI WITH COUGH: ICD-10-CM

## 2023-10-10 DIAGNOSIS — I70.0 ATHEROSCLEROSIS OF AORTA: Chronic | ICD-10-CM

## 2023-10-10 DIAGNOSIS — J84.10 CALCIFIED GRANULOMA OF LUNG: ICD-10-CM

## 2023-10-10 PROCEDURE — 4010F PR ACE/ARB THEARPY RXD/TAKEN: ICD-10-PCS | Mod: CPTII,95,, | Performed by: NURSE PRACTITIONER

## 2023-10-10 PROCEDURE — 1159F MED LIST DOCD IN RCRD: CPT | Mod: CPTII,95,, | Performed by: NURSE PRACTITIONER

## 2023-10-10 PROCEDURE — 71046 XR CHEST PA AND LATERAL: ICD-10-PCS | Mod: 26,,, | Performed by: INTERNAL MEDICINE

## 2023-10-10 PROCEDURE — 1159F PR MEDICATION LIST DOCUMENTED IN MEDICAL RECORD: ICD-10-PCS | Mod: CPTII,95,, | Performed by: NURSE PRACTITIONER

## 2023-10-10 PROCEDURE — 3074F PR MOST RECENT SYSTOLIC BLOOD PRESSURE < 130 MM HG: ICD-10-PCS | Mod: CPTII,95,, | Performed by: NURSE PRACTITIONER

## 2023-10-10 PROCEDURE — 71046 X-RAY EXAM CHEST 2 VIEWS: CPT | Mod: 26,,, | Performed by: INTERNAL MEDICINE

## 2023-10-10 PROCEDURE — 3074F SYST BP LT 130 MM HG: CPT | Mod: CPTII,95,, | Performed by: NURSE PRACTITIONER

## 2023-10-10 PROCEDURE — 3078F PR MOST RECENT DIASTOLIC BLOOD PRESSURE < 80 MM HG: ICD-10-PCS | Mod: CPTII,95,, | Performed by: NURSE PRACTITIONER

## 2023-10-10 PROCEDURE — 99214 PR OFFICE/OUTPT VISIT, EST, LEVL IV, 30-39 MIN: ICD-10-PCS | Mod: 95,,, | Performed by: NURSE PRACTITIONER

## 2023-10-10 PROCEDURE — 3008F PR BODY MASS INDEX (BMI) DOCUMENTED: ICD-10-PCS | Mod: CPTII,95,, | Performed by: NURSE PRACTITIONER

## 2023-10-10 PROCEDURE — 3078F DIAST BP <80 MM HG: CPT | Mod: CPTII,95,, | Performed by: NURSE PRACTITIONER

## 2023-10-10 PROCEDURE — 3008F BODY MASS INDEX DOCD: CPT | Mod: CPTII,95,, | Performed by: NURSE PRACTITIONER

## 2023-10-10 PROCEDURE — 1160F RVW MEDS BY RX/DR IN RCRD: CPT | Mod: CPTII,95,, | Performed by: NURSE PRACTITIONER

## 2023-10-10 PROCEDURE — 1160F PR REVIEW ALL MEDS BY PRESCRIBER/CLIN PHARMACIST DOCUMENTED: ICD-10-PCS | Mod: CPTII,95,, | Performed by: NURSE PRACTITIONER

## 2023-10-10 PROCEDURE — 99214 OFFICE O/P EST MOD 30 MIN: CPT | Mod: 95,,, | Performed by: NURSE PRACTITIONER

## 2023-10-10 PROCEDURE — 71046 X-RAY EXAM CHEST 2 VIEWS: CPT | Mod: TC,FY,PN

## 2023-10-10 PROCEDURE — 4010F ACE/ARB THERAPY RXD/TAKEN: CPT | Mod: CPTII,95,, | Performed by: NURSE PRACTITIONER

## 2023-10-10 RX ORDER — ONDANSETRON 4 MG/1
4 TABLET, ORALLY DISINTEGRATING ORAL EVERY 8 HOURS PRN
Qty: 30 TABLET | Refills: 1 | Status: SHIPPED | OUTPATIENT
Start: 2023-10-10 | End: 2024-01-09

## 2023-10-10 RX ORDER — FLUTICASONE PROPIONATE 50 MCG
1 SPRAY, SUSPENSION (ML) NASAL DAILY
Qty: 16 G | Refills: 5 | Status: SHIPPED | OUTPATIENT
Start: 2023-10-10 | End: 2023-11-09

## 2023-10-10 RX ORDER — LEVOCETIRIZINE DIHYDROCHLORIDE 5 MG/1
5 TABLET, FILM COATED ORAL NIGHTLY
Qty: 30 TABLET | Refills: 1 | Status: SHIPPED | OUTPATIENT
Start: 2023-10-10 | End: 2023-11-09

## 2023-10-10 NOTE — PROGRESS NOTES
The patient location is: [facility]  The chief complaint leading to consultation is: cough and fatigue    Visit type: audiovisual    Face to Face time with patient: 15 minutes  20 minutes of total time spent on the encounter, which includes face to face time and non-face to face time preparing to see the patient (eg, review of tests), Obtaining and/or reviewing separately obtained history, Documenting clinical information in the electronic or other health record, Independently interpreting results (not separately reported) and communicating results to the patient/family/caregiver, or Care coordination (not separately reported).         Each patient to whom he or she provides medical services by telemedicine is:  (1) informed of the relationship between the physician and patient and the respective role of any other health care provider with respect to management of the patient; and (2) notified that he or she may decline to receive medical services by telemedicine and may withdraw from such care at any time.    Notes:   History of Present Illness   Yair Owens is a 73 y.o. man with medical history as listed below with virtual visit today for evaluation of cough x 2 weeks. He reports mostly dry hacking cough with intermittent wheezing and overall fatigue. He also reports head congestion. He denies fevers or shortness of breath. He has tried Delsym with no relief. He has no additional complaints and is otherwise healthy on today's visit.    Past Medical History:   Diagnosis Date    CAD (coronary artery disease) 2/22/2021    Calcified granuloma of lung 2/3/2023    LLL calcified granuloma seen on CT Chest Lung Screening Low Dose 01/31/2018    Chest pain syndrome     Colon polyps     Erectile dysfunction 2/14/2020    Fatty liver     Generalized anxiety disorder 1/11/2017    GERD (gastroesophageal reflux disease)     Gout, unspecified     Hyperlipidemia     Hypertension     Psychophysiological insomnia  7/24/2017    Tobacco dependence 7/24/2017       Past Surgical History:   Procedure Laterality Date    APPENDECTOMY      BONE RESECTION, RIB      for thoracic outlet syndrome    COLONOSCOPY N/A 7/21/2017    Procedure: COLONOSCOPY;  Surgeon: Deepak Servin MD;  Location: [facility] ENDO;  Service: Endoscopy;  Laterality: N/A;    COLONOSCOPY N/A 11/22/2022    Procedure: COLONOSCOPY;  Surgeon: Sam Obrien MD;  Location: [facility] ENDO;  Service: Endoscopy;  Laterality: N/A;  vacc-sutab-inst portal-tb    HAND SURGERY      LEFT HEART CATHETERIZATION Left 3/2/2021    Procedure: Left heart cath, radial, 730 am;  Surgeon: Vladimir Avalos MD;  Location: [facility] CATH LAB;  Service: Cardiology;  Laterality: Left;  RN PREOP 2/25/2021--COVID NEGATIVE ON 3/1  H/P INCOMPLETE    SCROTAL SURGERY      mass    ULTRASOUND GUIDANCE  3/2/2021    Procedure: Ultrasound Guidance;  Surgeon: Vladimir Avalos MD;  Location: [facility] CATH LAB;  Service: Cardiology;;       Social History     Socioeconomic History    Marital status:    Occupational History     Employer: [employer]   Tobacco Use    Smoking status: Every Day     Current packs/day: 0.50     Average packs/day: 0.5 packs/day for 60.1 years (30.0 ttl pk-yrs)     Types: Cigarettes     Start date: 1967     Passive exposure: Current    Smokeless tobacco: Never    Tobacco comments:     0.5 ppd or less    Substance and Sexual Activity    Alcohol use: Never    Drug use: No    Sexual activity: Yes     Partners: Female     Social Determinants of Health     Financial Resource Strain: Low Risk  (10/8/2023)    Overall Financial Resource Strain (CARDIA)     Difficulty of Paying Living Expenses: Not hard at all   Food Insecurity: No Food Insecurity (10/8/2023)    Hunger Vital Sign     Worried About Running Out of Food in the Last Year: Never true     Ran Out of Food in the Last Year: Never true   Transportation Needs: No Transportation Needs (10/8/2023)    PRAPARE - Transportation     Lack of Transportation  (Medical): No     Lack of Transportation (Non-Medical): No   Physical Activity: Sufficiently Active (10/8/2023)    Exercise Vital Sign     Days of Exercise per Week: 7 days     Minutes of Exercise per Session: 60 min   Stress: No Stress Concern Present (9/27/2023)    [employer] - Occupational Stress Questionnaire     Feeling of Stress : Only a little   Social Connections: Moderately Isolated (10/8/2023)    Social Connection and Isolation Panel [NHANES]     Frequency of Communication with Friends and Family: Three times a week     Frequency of Social Gatherings with Friends and Family: Once a week     Attends Hindu Services: Never     Active Member of Clubs or Organizations: No     Attends Club or Organization Meetings: Never     Marital Status:    Housing Stability: Low Risk  (10/8/2023)    Housing Stability Vital Sign     Unable to Pay for Housing in the Last Year: No     Number of Places Lived in the Last Year: 1     Unstable Housing in the Last Year: No       Family History   Problem Relation Age of Onset    No Known Problems Mother     Cancer Father     Heart disease Father     Asthma Sister     Cancer Sister 78        Rectal cancer    Stroke Brother     Lupus Daughter     No Known Problems Son        Review of Systems  Review of Systems   Constitutional:  Positive for malaise/fatigue. Negative for chills, fever and weight loss.   HENT:  Positive for congestion. Negative for ear pain, sinus pain and sore throat.    Respiratory:  Positive for cough and wheezing. Negative for hemoptysis and shortness of breath.    Cardiovascular:  Negative for chest pain.   Gastrointestinal:  Negative for heartburn.   Musculoskeletal:  Negative for myalgias.   Skin:  Negative for rash.   Neurological:  Negative for headaches.   Endo/Heme/Allergies:  Negative for environmental allergies.     A complete review of systems was otherwise negative.    Physical Exam  /72   Pulse 70   Temp  97.8 °F (36.6 °C) (Oral)   Wt 81.6 kg (180 lb)   SpO2 97%   BMI 27.37 kg/m²   General appearance: alert, appears stated age, cooperative, and no distress  Lungs:  respirations are even and unlabored  Skin:  no visible rash or lesions  Neurologic: Grossly normal    Assessment/Plan  Viral URI with cough  Likely viral URI/common cold.  Given duration of cough with fatigue and wheezing- chest x-ray to r/o PNA.  Trial of Xyzal and Flonase with Zofran PRN nausea.  Continue Delsym PRN cough.  Rest and increase hydration.  Further management as indicated pending x-ray.  If no improvement, would suggest in office visit for examination.  -     fluticasone propionate (FLONASE) 50 mcg/actuation nasal spray; 1 spray (50 mcg total) by Each Nostril route once daily.  Dispense: 16 g; Refill: 5  -     levocetirizine (XYZAL) 5 MG tablet; Take 1 tablet (5 mg total) by mouth every evening.  Dispense: 30 tablet; Refill: 1  -     ondansetron (ZOFRAN-ODT) 4 MG TbDL; Take 1 tablet (4 mg total) by mouth every 8 (eight) hours as needed (nausea).  Dispense: 30 tablet; Refill: 1  -     X-Ray Chest PA And Lateral; Future; Expected date: 10/10/2023    Calcified granuloma of lung  Stable. Monitor.    Primary hypertension  The current medical regimen is effective;  continue present plan and medications.    Hyperlipidemia, unspecified hyperlipidemia type  The current medical regimen is effective;  continue present plan and medications.    Atherosclerosis of aorta  Stable. Monitor.    Coronary artery disease involving native heart without angina pectoris, unspecified vessel or lesion type  The current medical regimen is effective;  continue present plan and medications.    Patient has verbalized understanding and is in agreement with plan of care.    Follow up if symptoms worsen or fail to improve.    Answers submitted by the patient for this visit:  Cough Questionnaire (Submitted on 10/8/2023)  Chief Complaint: Cough  Chronicity: new  Onset: 1  to 4 weeks ago  Progression since onset: unchanged  Frequency: hourly  ear congestion: No  nasal congestion: No  postnasal drip: Yes  rhinorrhea: No  sweats: No  Aggravated by: nothing  Risk factors for lung disease: smoking/tobacco exposure  asthma: No  bronchiectasis: No  bronchitis: No  COPD: No  emphysema: No  pneumonia: No  Treatments tried: OTC cough suppressant

## 2023-10-14 ENCOUNTER — HOSPITAL ENCOUNTER (OUTPATIENT)
Dept: RADIOLOGY | Facility: HOSPITAL | Age: 73
Discharge: HOME | End: 2023-10-14
Attending: UROLOGY
Payer: MEDICARE

## 2023-10-14 DIAGNOSIS — R33.9 INCOMPLETE BLADDER EMPTYING: ICD-10-CM

## 2023-10-14 PROCEDURE — 76770 US RETROPERITONEAL COMPLETE: ICD-10-PCS | Mod: 26,,, | Performed by: RADIOLOGY

## 2023-10-14 PROCEDURE — 76770 US EXAM ABDO BACK WALL COMP: CPT | Mod: 26,,, | Performed by: RADIOLOGY

## 2023-10-14 PROCEDURE — 76770 US EXAM ABDO BACK WALL COMP: CPT | Mod: TC

## 2023-10-17 ENCOUNTER — PATIENT MESSAGE (OUTPATIENT)
Dept: OTHER | Facility: OTHER | Age: 73
End: 2023-10-17
Payer: MEDICARE

## 2023-10-31 ENCOUNTER — OFFICE VISIT (OUTPATIENT)
Dept: CARDIOLOGY | Facility: CLINIC | Age: 73
End: 2023-10-31
Payer: MEDICARE

## 2023-10-31 ENCOUNTER — OFFICE VISIT (OUTPATIENT)
Dept: UROLOGY | Facility: CLINIC | Age: 73
End: 2023-10-31
Payer: MEDICARE

## 2023-10-31 VITALS
WEIGHT: 185.19 LBS | HEART RATE: 69 BPM | RESPIRATION RATE: 18 BRPM | BODY MASS INDEX: 28.07 KG/M2 | SYSTOLIC BLOOD PRESSURE: 142 MMHG | WEIGHT: 185.19 LBS | HEIGHT: 68 IN | BODY MASS INDEX: 28.16 KG/M2 | OXYGEN SATURATION: 98 % | DIASTOLIC BLOOD PRESSURE: 84 MMHG

## 2023-10-31 DIAGNOSIS — N52.8 OTHER MALE ERECTILE DYSFUNCTION: ICD-10-CM

## 2023-10-31 DIAGNOSIS — I10 PRIMARY HYPERTENSION: Primary | ICD-10-CM

## 2023-10-31 DIAGNOSIS — N40.1 BPH WITH URINARY OBSTRUCTION: Primary | ICD-10-CM

## 2023-10-31 DIAGNOSIS — R33.9 INCOMPLETE BLADDER EMPTYING: ICD-10-CM

## 2023-10-31 DIAGNOSIS — I25.10 CORONARY ARTERY DISEASE INVOLVING NATIVE HEART WITHOUT ANGINA PECTORIS, UNSPECIFIED VESSEL OR LESION TYPE: Chronic | ICD-10-CM

## 2023-10-31 DIAGNOSIS — E78.5 HYPERLIPIDEMIA, UNSPECIFIED HYPERLIPIDEMIA TYPE: Chronic | ICD-10-CM

## 2023-10-31 DIAGNOSIS — I70.0 ATHEROSCLEROSIS OF AORTA: Chronic | ICD-10-CM

## 2023-10-31 DIAGNOSIS — N13.8 BPH WITH URINARY OBSTRUCTION: Primary | ICD-10-CM

## 2023-10-31 DIAGNOSIS — R35.1 NOCTURIA: ICD-10-CM

## 2023-10-31 PROCEDURE — 99214 PR OFFICE/OUTPT VISIT, EST, LEVL IV, 30-39 MIN: ICD-10-PCS | Mod: S$GLB,,, | Performed by: UROLOGY

## 2023-10-31 PROCEDURE — 3008F BODY MASS INDEX DOCD: CPT | Mod: CPTII,S$GLB,, | Performed by: UROLOGY

## 2023-10-31 PROCEDURE — 99214 PR OFFICE/OUTPT VISIT, EST, LEVL IV, 30-39 MIN: ICD-10-PCS | Mod: S$GLB,,, | Performed by: INTERNAL MEDICINE

## 2023-10-31 PROCEDURE — 93000 EKG 12-LEAD: ICD-10-PCS | Mod: S$GLB,,, | Performed by: INTERNAL MEDICINE

## 2023-10-31 PROCEDURE — 1126F PR PAIN SEVERITY QUANTIFIED, NO PAIN PRESENT: ICD-10-PCS | Mod: CPTII,S$GLB,, | Performed by: UROLOGY

## 2023-10-31 PROCEDURE — 99999 PR PBB SHADOW E&M-EST. PATIENT-LVL IV: CPT | Mod: PBBFAC,,, | Performed by: UROLOGY

## 2023-10-31 PROCEDURE — 1159F MED LIST DOCD IN RCRD: CPT | Mod: CPTII,S$GLB,, | Performed by: UROLOGY

## 2023-10-31 PROCEDURE — 99999 PR PBB SHADOW E&M-EST. PATIENT-LVL V: CPT | Mod: PBBFAC,,, | Performed by: INTERNAL MEDICINE

## 2023-10-31 PROCEDURE — 3077F PR MOST RECENT SYSTOLIC BLOOD PRESSURE >= 140 MM HG: ICD-10-PCS | Mod: CPTII,S$GLB,, | Performed by: INTERNAL MEDICINE

## 2023-10-31 PROCEDURE — 99999 PR PBB SHADOW E&M-EST. PATIENT-LVL V: ICD-10-PCS | Mod: PBBFAC,,, | Performed by: INTERNAL MEDICINE

## 2023-10-31 PROCEDURE — 3288F FALL RISK ASSESSMENT DOCD: CPT | Mod: CPTII,S$GLB,, | Performed by: INTERNAL MEDICINE

## 2023-10-31 PROCEDURE — 1160F RVW MEDS BY RX/DR IN RCRD: CPT | Mod: CPTII,S$GLB,, | Performed by: UROLOGY

## 2023-10-31 PROCEDURE — 93000 ELECTROCARDIOGRAM COMPLETE: CPT | Mod: S$GLB,,, | Performed by: INTERNAL MEDICINE

## 2023-10-31 PROCEDURE — 3008F BODY MASS INDEX DOCD: CPT | Mod: CPTII,S$GLB,, | Performed by: INTERNAL MEDICINE

## 2023-10-31 PROCEDURE — 1101F PT FALLS ASSESS-DOCD LE1/YR: CPT | Mod: CPTII,S$GLB,, | Performed by: UROLOGY

## 2023-10-31 PROCEDURE — 1159F PR MEDICATION LIST DOCUMENTED IN MEDICAL RECORD: ICD-10-PCS | Mod: CPTII,S$GLB,, | Performed by: UROLOGY

## 2023-10-31 PROCEDURE — 99214 OFFICE O/P EST MOD 30 MIN: CPT | Mod: S$GLB,,, | Performed by: INTERNAL MEDICINE

## 2023-10-31 PROCEDURE — 3288F PR FALLS RISK ASSESSMENT DOCUMENTED: ICD-10-PCS | Mod: CPTII,S$GLB,, | Performed by: INTERNAL MEDICINE

## 2023-10-31 PROCEDURE — 99214 OFFICE O/P EST MOD 30 MIN: CPT | Mod: S$GLB,,, | Performed by: UROLOGY

## 2023-10-31 PROCEDURE — 1160F RVW MEDS BY RX/DR IN RCRD: CPT | Mod: CPTII,S$GLB,, | Performed by: INTERNAL MEDICINE

## 2023-10-31 PROCEDURE — 4010F PR ACE/ARB THEARPY RXD/TAKEN: ICD-10-PCS | Mod: CPTII,S$GLB,, | Performed by: INTERNAL MEDICINE

## 2023-10-31 PROCEDURE — 1126F AMNT PAIN NOTED NONE PRSNT: CPT | Mod: CPTII,S$GLB,, | Performed by: INTERNAL MEDICINE

## 2023-10-31 PROCEDURE — 3288F FALL RISK ASSESSMENT DOCD: CPT | Mod: CPTII,S$GLB,, | Performed by: UROLOGY

## 2023-10-31 PROCEDURE — 3288F PR FALLS RISK ASSESSMENT DOCUMENTED: ICD-10-PCS | Mod: CPTII,S$GLB,, | Performed by: UROLOGY

## 2023-10-31 PROCEDURE — 1160F PR REVIEW ALL MEDS BY PRESCRIBER/CLIN PHARMACIST DOCUMENTED: ICD-10-PCS | Mod: CPTII,S$GLB,, | Performed by: UROLOGY

## 2023-10-31 PROCEDURE — 1101F PR PT FALLS ASSESS DOC 0-1 FALLS W/OUT INJ PAST YR: ICD-10-PCS | Mod: CPTII,S$GLB,, | Performed by: INTERNAL MEDICINE

## 2023-10-31 PROCEDURE — 4010F ACE/ARB THERAPY RXD/TAKEN: CPT | Mod: CPTII,S$GLB,, | Performed by: UROLOGY

## 2023-10-31 PROCEDURE — 1101F PR PT FALLS ASSESS DOC 0-1 FALLS W/OUT INJ PAST YR: ICD-10-PCS | Mod: CPTII,S$GLB,, | Performed by: UROLOGY

## 2023-10-31 PROCEDURE — 1101F PT FALLS ASSESS-DOCD LE1/YR: CPT | Mod: CPTII,S$GLB,, | Performed by: INTERNAL MEDICINE

## 2023-10-31 PROCEDURE — 1159F PR MEDICATION LIST DOCUMENTED IN MEDICAL RECORD: ICD-10-PCS | Mod: CPTII,S$GLB,, | Performed by: INTERNAL MEDICINE

## 2023-10-31 PROCEDURE — 1126F AMNT PAIN NOTED NONE PRSNT: CPT | Mod: CPTII,S$GLB,, | Performed by: UROLOGY

## 2023-10-31 PROCEDURE — 3008F PR BODY MASS INDEX (BMI) DOCUMENTED: ICD-10-PCS | Mod: CPTII,S$GLB,, | Performed by: INTERNAL MEDICINE

## 2023-10-31 PROCEDURE — 1159F MED LIST DOCD IN RCRD: CPT | Mod: CPTII,S$GLB,, | Performed by: INTERNAL MEDICINE

## 2023-10-31 PROCEDURE — 99999 PR PBB SHADOW E&M-EST. PATIENT-LVL IV: ICD-10-PCS | Mod: PBBFAC,,, | Performed by: UROLOGY

## 2023-10-31 PROCEDURE — 1160F PR REVIEW ALL MEDS BY PRESCRIBER/CLIN PHARMACIST DOCUMENTED: ICD-10-PCS | Mod: CPTII,S$GLB,, | Performed by: INTERNAL MEDICINE

## 2023-10-31 PROCEDURE — 3008F PR BODY MASS INDEX (BMI) DOCUMENTED: ICD-10-PCS | Mod: CPTII,S$GLB,, | Performed by: UROLOGY

## 2023-10-31 PROCEDURE — 4010F PR ACE/ARB THEARPY RXD/TAKEN: ICD-10-PCS | Mod: CPTII,S$GLB,, | Performed by: UROLOGY

## 2023-10-31 PROCEDURE — 1126F PR PAIN SEVERITY QUANTIFIED, NO PAIN PRESENT: ICD-10-PCS | Mod: CPTII,S$GLB,, | Performed by: INTERNAL MEDICINE

## 2023-10-31 PROCEDURE — 4010F ACE/ARB THERAPY RXD/TAKEN: CPT | Mod: CPTII,S$GLB,, | Performed by: INTERNAL MEDICINE

## 2023-10-31 PROCEDURE — 3079F DIAST BP 80-89 MM HG: CPT | Mod: CPTII,S$GLB,, | Performed by: INTERNAL MEDICINE

## 2023-10-31 PROCEDURE — 3079F PR MOST RECENT DIASTOLIC BLOOD PRESSURE 80-89 MM HG: ICD-10-PCS | Mod: CPTII,S$GLB,, | Performed by: INTERNAL MEDICINE

## 2023-10-31 PROCEDURE — 3077F SYST BP >= 140 MM HG: CPT | Mod: CPTII,S$GLB,, | Performed by: INTERNAL MEDICINE

## 2023-10-31 NOTE — PROGRESS NOTES
Subjective:       Patient ID: Yair Owens is a 73 y.o. male The patient's last visit with me was on 9/27/2023.     Chief Complaint:   Chief Complaint   Patient presents with    Follow-up       Benign Prostatic Hypertrophy  Patient complains of lower urinary tract symptoms. He reports nocturia two times a night and weak stream. He denies urgency. Patient states symptoms are of moderate severity. Onset of symptoms was several months ago and was gradual in onset.  He has no personal history and no family history of prostate cancer. He reports a history of no complicating symptoms. He denies flank pain, gross hematuria, kidney stones, and recurrent UTI.    10/31/2023  He is curious about supplements.    ACTIVE MEDICAL ISSUES:  Patient Active Problem List   Diagnosis    Hyperlipidemia    Hypertension    GERD (gastroesophageal reflux disease)    Generalized anxiety disorder    Tobacco dependence    Psychophysiological insomnia    Atherosclerosis of aorta    Erectile dysfunction    CAD (coronary artery disease)    Fatty liver    Hepatomegaly    Class 1 obesity with body mass index (BMI) of 30.0 to 30.9 in adult    Calcified granuloma of lung    Purpura    Lower urinary tract symptoms (LUTS)       PAST MEDICAL HISTORY  Past Medical History:   Diagnosis Date    CAD (coronary artery disease) 2/22/2021    Calcified granuloma of lung 2/3/2023    LLL calcified granuloma seen on CT Chest Lung Screening Low Dose 01/31/2018    Chest pain syndrome     Colon polyps     Erectile dysfunction 2/14/2020    Fatty liver     Generalized anxiety disorder 1/11/2017    GERD (gastroesophageal reflux disease)     Gout, unspecified     Hyperlipidemia     Hypertension     Psychophysiological insomnia 7/24/2017    Tobacco dependence 7/24/2017       PAST SURGICAL HISTORY:  Past Surgical History:   Procedure Laterality Date    APPENDECTOMY      BONE RESECTION, RIB      for thoracic outlet syndrome    COLONOSCOPY N/A 7/21/2017    Procedure:  COLONOSCOPY;  Surgeon: Deepak Servin MD;  Location: [facility] ENDO;  Service: Endoscopy;  Laterality: N/A;    COLONOSCOPY N/A 11/22/2022    Procedure: COLONOSCOPY;  Surgeon: Sam Obrien MD;  Location: [facility] ENDO;  Service: Endoscopy;  Laterality: N/A;  vacc-sutab-inst portal-tb    HAND SURGERY      LEFT HEART CATHETERIZATION Left 3/2/2021    Procedure: Left heart cath, radial, 730 am;  Surgeon: Vladimir Avalos MD;  Location: [facility] CATH LAB;  Service: Cardiology;  Laterality: Left;  RN PREOP 2/25/2021--COVID NEGATIVE ON 3/1  H/P INCOMPLETE    SCROTAL SURGERY      mass    ULTRASOUND GUIDANCE  3/2/2021    Procedure: Ultrasound Guidance;  Surgeon: Vladimir Avalos MD;  Location: [facility] CATH LAB;  Service: Cardiology;;       SOCIAL HISTORY:  Social History     Tobacco Use    Smoking status: Every Day     Current packs/day: 0.50     Average packs/day: 0.5 packs/day for 60.1 years (30.1 ttl pk-yrs)     Types: Cigarettes     Start date: 1967     Passive exposure: Current    Smokeless tobacco: Never    Tobacco comments:     0.5 ppd or less    Substance Use Topics    Alcohol use: Never    Drug use: No       FAMILY HISTORY:  Family History   Problem Relation Age of Onset    No Known Problems Mother     Cancer Father     Heart disease Father     Asthma Sister     Cancer Sister 78        Rectal cancer    Stroke Brother     Lupus Daughter     No Known Problems Son        ALLERGIES AND MEDICATIONS: updated and reviewed.  Review of patient's allergies indicates:   Allergen Reactions    Codeine Itching    Ibuprofen Itching    Remeron [mirtazapine] Anxiety     Did not help the patient sleep and created anxiety.     Current Outpatient Medications   Medication Sig    amLODIPine (NORVASC) 10 MG tablet Take 5 mg by mouth once daily.    aspirin (ECOTRIN) 81 MG EC tablet Take 1 tablet (81 mg total) by mouth once daily.    atorvastatin (LIPITOR) 40 MG tablet Take 1 tablet (40 mg total) by mouth once daily.    fluticasone propionate (FLONASE) 50  mcg/actuation nasal spray 1 spray (50 mcg total) by Each Nostril route once daily.    levocetirizine (XYZAL) 5 MG tablet Take 1 tablet (5 mg total) by mouth every evening.    losartan (COZAAR) 25 MG tablet TAKE 1 TABLET BY MOUTH EVERY DAY    multivitamin capsule Take 1 capsule by mouth once daily.    omega-3 fatty acids/fish oil (FISH OIL-OMEGA-3 FATTY ACIDS) 300-1,000 mg capsule Take by mouth once daily.    omeprazole (PRILOSEC) 40 MG capsule TAKE 1 CAPSULE BY MOUTH  TWICE DAILY    ondansetron (ZOFRAN-ODT) 4 MG TbDL Take 1 tablet (4 mg total) by mouth every 8 (eight) hours as needed (nausea).    PFIZER COVID BIVAL,12Y UP,,PF, 30 mcg/0.3 mL injection     psyllium 0.52 gram capsule Take 0.52 g by mouth once daily.    tadalafiL (CIALIS) 5 MG tablet Take 1 tablet (5 mg total) by mouth once daily.    tamsulosin (FLOMAX) 0.4 mg Cap Take 1 capsule (0.4 mg total) by mouth once daily.    ENGERIX-B, PF, 20 mcg/mL Syrg     hepatitis B vacc-CpG 1018, PF, 20 mcg/0.5 mL Syrg Inject by [facility], then return for second dose in 4 weeks (Patient not taking: Reported on 8/30/2023)    hepatitis B virus, PF, (ENGERIX-B) 10 mcg/0.5 mL Syrg Inject 0.5 mLs into the muscle every 30 days. (Patient not taking: Reported on 8/30/2023)    NON FORMULARY MEDICATION Take 1 tablet by mouth once daily. Beet root extract    SHINGRIX, PF, 50 mcg/0.5 mL injection      No current facility-administered medications for this visit.       Review of Systems   Constitutional:  Negative for chills and fever.   HENT:  Negative for congestion.    Respiratory:  Negative for chest tightness and shortness of breath.    Cardiovascular:  Negative for chest pain and palpitations.   Gastrointestinal:  Negative for abdominal pain, constipation, diarrhea, nausea and vomiting.   Genitourinary:  Negative for difficulty urinating, dysuria, flank pain, hematuria and urgency.   Musculoskeletal:  Negative for arthralgias.   Neurological:  Negative for dizziness.    Psychiatric/Behavioral:  Negative for confusion.        Objective:      Vitals:    10/31/23 1141   Weight: 84 kg (185 lb 3 oz)       Physical Exam  Vitals and nursing note reviewed.   Constitutional:       Appearance: He is well-developed.   HENT:      Head: Normocephalic.   Eyes:      Conjunctiva/sclera: Conjunctivae normal.   Neck:      Thyroid: No thyromegaly.      Trachea: No tracheal deviation.   Cardiovascular:      Rate and Rhythm: Normal rate.      Heart sounds: Normal heart sounds.   Pulmonary:      Effort: Pulmonary effort is normal. No respiratory distress.      Breath sounds: Normal breath sounds. No wheezing.   Abdominal:      General: There is no distension.      Palpations: Abdomen is soft. There is no mass.      Tenderness: There is no abdominal tenderness. There is no guarding or rebound.      Hernia: No hernia is present. There is no hernia in the right inguinal area or left inguinal area.   Genitourinary:     Penis: Normal.       Testes: Normal.         Right: Mass or tenderness not present.         Left: Mass or tenderness not present.      Prostate: Enlarged. Not tender.      Rectum: Normal. No mass, tenderness or external hemorrhoid.      Comments: 40 gm smooth  Musculoskeletal:         General: No tenderness.      Cervical back: Normal range of motion.   Lymphadenopathy:      Lower Body: No right inguinal adenopathy. No left inguinal adenopathy.   Skin:     General: Skin is warm and dry.      Findings: No erythema or rash.   Neurological:      Mental Status: He is alert and oriented to person, place, and time.   Psychiatric:         Behavior: Behavior normal.         Thought Content: Thought content normal.         Judgment: Judgment normal.         Urine dipstick shows negative for all components.  Micro exam: not done.    Component Ref Range & Units 1 mo ago   PSA Diagnostic 0.00 - 4.00 ng/mL 1.8    Comment: The testing method is a chemiluminescent microparticle immunoassay   manufactured  by [email or web address] and performed on the    or   [employer] system. Values obtained with different assay manufacturers   for   methods may be different and cannot be used interchangeably.   PSA Expected levels:   Hormonal Therapy: <0.05 ng/ml   Prostatectomy: <0.01 ng/ml   Radiation Therapy: <1.00 ng/ml    Resulting Agency  [facility]              Narrative  Performed by: [facility]  3rd floor lab      Specimen Collected: 09/27/23 14:46 Last Resulted: 09/28/23 11:49               US Retroperitoneal Complete  Order: [id number]  Status: Final result     Visible to patient: Yes (seen)     Next appt: Today at 10:40 AM in Cardiology (Vladimir Avalos MD)     Dx: Incomplete bladder emptying     0 Result Notes  Details    Reading Physician Reading Date Result Priority   Ramez Morocho Jr., MD  [phone] 10/14/2023 Routine     Narrative & Impression  EXAMINATION:  US RETROPERITONEAL COMPLETE     CLINICAL HISTORY:  Retention of urine, unspecified     TECHNIQUE:  Ultrasound of the kidneys and urinary bladder was performed including color flow and Doppler evaluation of the kidneys.     COMPARISON:  10/20/2022     FINDINGS:  Right kidney: The right kidney measures 10.7 cm. No cortical thinning or loss of corticomedullary distinction. Resistive index measures 0.69.  No stones, mass, or hydronephrosis.     Left kidney: The left kidney measures 11.1 cm. No cortical thinning or loss of corticomedullary distinction.  Resistive index measures 0.66.  No stones, mass, or hydronephrosis.     Prevoid bladder volume 99 cc.  Postvoid residual 68 cc.  Prostate enlarged with base protruding into the core the bladder with an estimated volume of 76 cc.     Impression:     Significant postvoid residual with low volume or low capacity bladder could indicate neurogenic bladder or sequela of chronic bladder outlet obstruction.     Prostatomegaly        Electronically signed by: Ramez Simpson Jr  Date:                                             10/14/2023  Time:                                           11:33           Exam Ended: 10/14/23 08:25           Assessment:       1. BPH with urinary obstruction    2. Nocturia    3. Incomplete bladder emptying    4. Other male erectile dysfunction            Plan:       1. BPH with urinary obstruction  Flomax  DIMAS next time  - POCT urinalysis, dipstick or tablet reag    2. Nocturia  Limit evening fluids    3. Incomplete bladder emptying  stable    4. Other male erectile dysfunction  Cialis            Follow up in about 6 months (around 4/30/2024) for Follow up Established.

## 2023-10-31 NOTE — PROGRESS NOTES
CARDIOVASCULAR CONSULTATION    REASON FOR CONSULT:   Yair Owens is a 73 y.o. male who presents for evaluation of chest pressure.      HISTORY OF PRESENT ILLNESS:     Patient is a pleasant 61-year-old man.  Came here because evaluation of chest pressure to the emergency room.  Was ruled out.  EKG was done which showed normal sinus rhythm.  States the pressure was substernal, and was much worse than the usual pressure which he feels.  States that he usually feels chest pain and tightness.  Unrelated to activity.  Sometimes can come with activity other times at rest.  Denies orthopnea, PND.  Does have mild dyspnea on exertion.  Used to smoke, but cutting down.    Notes from January 2020:    Patient doing fine.  Denies any further episodes of chest pains, orthopnea, PND.  States that he thinks it is his anxiety.  Stress testing did not reveal any significant issues apart from mild aortic valve stenosis.      The perfusion scan is free of evidence from myocardial ischemia or injury.    There is a  mild intensity fixed defect in the inferior wall of the left ventricle secondary to diaphragm attenuation.    Gated perfusion images showed an ejection fraction of 60% post stress.    The EKG portion of this study is negative for ischemia.    The patient reported no chest pain during the stress test.    There were no arrhythmias during stress.      Normal left ventricular systolic function. The estimated ejection fraction is 60%.  Concentric left ventricular hypertrophy.  Normal LV diastolic function.  Normal right ventricular systolic function.  Mild-to-moderate aortic valve stenosis.  Aortic valve area is 1.80 cm2; peak velocity is 2.60 m/s; mean gradient is 18 mmHg.  Mild aortic regurgitation.    Notes from may 2020:    The patient location is: his home   The chief complaint leading to consultation is: chest pain    Visit type: audiovisual    Face to Face time with patient: 15 mins   25 minutes of total time spent on the  encounter, which includes face to face time and non-face to face time preparing to see the patient (eg, review of tests), Obtaining and/or reviewing separately obtained history, Documenting clinical information in the electronic or other health record, Independently interpreting results (not separately reported) and communicating results to the patient/family/caregiver, or Care coordination (not separately reported).         Each patient to whom he or she provides medical services by telemedicine is:  (1) informed of the relationship between the physician and patient and the respective role of any other health care provider with respect to management of the patient; and (2) notified that he or she may decline to receive medical services by telemedicine and may withdraw from such care at any time.    Notes:  Patient here for follow-up.  Denies any chest pain at rest on exertion, orthopnea, PND.  States that he has been feeling great.  However he has started smoking more again because of the pandemic and the stress related with the pandemic.        Jan 2021:      The patient location is: his home  The chief complaint leading to consultation is: chest pain    Visit type: audiovisual    Face to Face time with patient: 15 mins  25 minutes of total time spent on the encounter, which includes face to face time and non-face to face time preparing to see the patient (eg, review of tests), Obtaining and/or reviewing separately obtained history, Documenting clinical information in the electronic or other health record, Independently interpreting results (not separately reported) and communicating results to the patient/family/caregiver, or Care coordination (not separately reported).         Each patient to whom he or she provides medical services by telemedicine is:  (1) informed of the relationship between the physician and patient and the respective role of any other health care provider with respect to management of the  patient; and (2) notified that he or she may decline to receive medical services by telemedicine and may withdraw from such care at any time.    Notes:  Patient here via audiovisual visit.  Had an episode of chest pain which brought him to the ER.  Describes it as left-sided.  At rest.  EKG was done which showed normal sinus rhythm with nonspecific ST changes and some mild ST depressions.  Since then has not had any further chest pains.  States that that day his blood pressure was high around 170 mm of mercury.  Doing fine.      Notes from feb 2021    The patient location is:  his home  The chief complaint leading to consultation is:  Chest pain    Visit type: audiovisual    Face to Face time with patient: 20 mins  30  minutes of total time spent on the encounter, which includes face to face time and non-face to face time preparing to see the patient (eg, review of tests), Obtaining and/or reviewing separately obtained history, Documenting clinical information in the electronic or other health record, Independently interpreting results (not separately reported) and communicating results to the patient/family/caregiver, or Care coordination (not separately reported).         Each patient to whom he or she provides medical services by telemedicine is:  (1) informed of the relationship between the physician and patient and the respective role of any other health care provider with respect to management of the patient; and (2) notified that he or she may decline to receive medical services by telemedicine and may withdraw from such care at any time.    Notes:       Patient here follow-up via audiovisual visit.  Occasional chest pain.  Really in the center of the chest and feels like tightness.  Sometimes in the left side and sometimes in the left shoulder.  Feels the left-sided pain goes to his left shoulder.  No relation with activity.  Can occur at rest or on exertion.  Stress test showed normal myocardial perfusion.   Although the EKG portion was positive for ischemia.    Normal myocardial perfusion scan. There is no evidence of myocardial ischemia or infarction.    There is a  mild intensity fixed defect in the inferobasilar wall of the left ventricle secondary to diaphragm attenuation.    The gated perfusion images showed an ejection fraction of 68% post stress.    There is normal wall motion post stress.    The EKG portion of this study is positive for ischemia.      The left ventricle is normal in size with mild concentric hypertrophy and normal systolic function. The estimated ejection fraction is 60%  Normal right ventricular size with normal right ventricular systolic function.  There is mild aortic valve stenosis.  Aortic valve area is 1.87 cm2; peak velocity is 2.19 m/s; mean gradient is 10 mmHg.    Notes from March 2021:  Patient here for follow-up after angiogram.  Mild nonobstructive CAD on angiogram.  No complications from angiogram.  Doing fine.  Denies any chest pains at rest on exertion.  States he thinks it is his anxiety which is bothering him.    The estimated blood loss was <50 mL.  There was non-obstructive coronary artery disease..        Left main:  Distal 10% stenosis     Lad:  Luminal irregularities.  Mid 10-20% stenosis     Circumflex:  Luminal irregularities     RCA:  Luminal irregularities     Access: We accessed the right radial artery using ultrasound guidance. The vessel appeared widely patent, suitable for endovascular access. The images were interpreted by me and saved.  Access was closed with radial band.           June 21:    The patient location is: his home  The chief complaint leading to consultation is: fu    Visit type: audiovisual    Face to Face time with patient: 15 min  25  minutes of total time spent on the encounter, which includes face to face time and non-face to face time preparing to see the patient (eg, review of tests), Obtaining and/or reviewing separately obtained history,  Documenting clinical information in the electronic or other health record, Independently interpreting results (not separately reported) and communicating results to the patient/family/caregiver, or Care coordination (not separately reported).         Each patient to whom he or she provides medical services by telemedicine is:  (1) informed of the relationship between the physician and patient and the respective role of any other health care provider with respect to management of the patient; and (2) notified that he or she may decline to receive medical services by telemedicine and may withdraw from such care at any time.    Notes:   Has been having left sided chest pain, not related to exertion, pin point in the left of the chest. Does not occur when he is cutting his lawn.  Denies orthopnea, PND, swelling of feet.  States blood pressure is usually well controlled at home.      September 2022: Patient here for follow-up.  Denies any chest pains at rest on exertion, orthopnea, PND.  Has been doing fine.    MARCH 23:  Follow-up.  Denies any chest pains at rest on exertion, orthopnea, PND, swelling of feet.      Notes from October 2023: Patient here for follow-up.  Doing fine.  Walks about 30 miles a week.  Denies chest pains at rest on exertion, orthopnea, PND.    PAST MEDICAL HISTORY:     Past Medical History:   Diagnosis Date    CAD (coronary artery disease) 2/22/2021    Calcified granuloma of lung 2/3/2023    LLL calcified granuloma seen on CT Chest Lung Screening Low Dose 01/31/2018    Chest pain syndrome     Colon polyps     Erectile dysfunction 2/14/2020    Fatty liver     Generalized anxiety disorder 1/11/2017    GERD (gastroesophageal reflux disease)     Gout, unspecified     Hyperlipidemia     Hypertension     Psychophysiological insomnia 7/24/2017    Tobacco dependence 7/24/2017       PAST SURGICAL HISTORY:     Past Surgical History:   Procedure Laterality Date    APPENDECTOMY      BONE RESECTION, RIB       for thoracic outlet syndrome    COLONOSCOPY N/A 7/21/2017    Procedure: COLONOSCOPY;  Surgeon: Deepak Servin MD;  Location: [facility] ENDO;  Service: Endoscopy;  Laterality: N/A;    COLONOSCOPY N/A 11/22/2022    Procedure: COLONOSCOPY;  Surgeon: Sam Obrien MD;  Location: [facility] ENDO;  Service: Endoscopy;  Laterality: N/A;  vacc-sutab-inst portal-tb    HAND SURGERY      LEFT HEART CATHETERIZATION Left 3/2/2021    Procedure: Left heart cath, radial, 730 am;  Surgeon: Vladimir Avalos MD;  Location: [facility] CATH LAB;  Service: Cardiology;  Laterality: Left;  RN PREOP 2/25/2021--COVID NEGATIVE ON 3/1  H/P INCOMPLETE    SCROTAL SURGERY      mass    ULTRASOUND GUIDANCE  3/2/2021    Procedure: Ultrasound Guidance;  Surgeon: Vladimir Avalos MD;  Location: [facility] CATH LAB;  Service: Cardiology;;       ALLERGIES AND MEDICATION:     Review of patient's allergies indicates:   Allergen Reactions    Codeine Itching    Ibuprofen Itching    Remeron [mirtazapine] Anxiety     Did not help the patient sleep and created anxiety.        Medication List            Accurate as of October 31, 2023 12:20 PM. If you have any questions, ask your nurse or doctor.                CONTINUE taking these medications      amLODIPine 10 MG tablet  Commonly known as: NORVASC     aspirin 81 MG EC tablet  Commonly known as: ECOTRIN  Take 1 tablet (81 mg total) by mouth once daily.     atorvastatin 40 MG tablet  Commonly known as: LIPITOR  Take 1 tablet (40 mg total) by mouth once daily.     fish oil-omega-3 fatty acids 300-1,000 mg capsule     fluticasone propionate 50 mcg/actuation nasal spray  Commonly known as: FLONASE  1 spray (50 mcg total) by Each Nostril route once daily.     hepatitis B vacc-CpG 1018 (PF) 20 mcg/0.5 mL Syrg  Inject by [facility], then return for second dose in 4 weeks     * hepatitis B virus (PF) 10 mcg/0.5 mL Syrg  Commonly known as: ENGERIX-B  Inject 0.5 mLs into the muscle every 30 days.     * ENGERIX-B (PF) 20 mcg/mL Syrg  Generic drug:  hepatitis B virus vacc.rec(PF)     levocetirizine 5 MG tablet  Commonly known as: XYZAL  Take 1 tablet (5 mg total) by mouth every evening.     losartan 25 MG tablet  Commonly known as: COZAAR  TAKE 1 TABLET BY MOUTH EVERY DAY     multivitamin capsule     NON FORMULARY MEDICATION     omeprazole 40 MG capsule  Commonly known as: PRILOSEC  TAKE 1 CAPSULE BY MOUTH  TWICE DAILY     ondansetron 4 MG Tbdl  Commonly known as: ZOFRAN-ODT  Take 1 tablet (4 mg total) by mouth every 8 (eight) hours as needed (nausea).     PFIZER COVID BIVAL(12Y UP)(PF) 30 mcg/0.3 mL injection  Generic drug: sars-cov-2 (covid-19 pfizer omicron)     psyllium 0.52 gram capsule     SHINGRIX (PF) 50 mcg/0.5 mL injection  Generic drug: varicella-zoster gE-AS01B (PF)     tadalafiL 5 MG tablet  Commonly known as: CIALIS  Take 1 tablet (5 mg total) by mouth once daily.     tamsulosin 0.4 mg Cap  Commonly known as: FLOMAX  Take 1 capsule (0.4 mg total) by mouth once daily.           * This list has 2 medication(s) that are the same as other medications prescribed for you. Read the directions carefully, and ask your doctor or other care provider to review them with you.                  SOCIAL HISTORY:     Social History     Socioeconomic History    Marital status:    Occupational History     Employer:  [employer]   Tobacco Use    Smoking status: Every Day     Current packs/day: 0.50     Average packs/day: 0.5 packs/day for 60.1 years (30.1 ttl pk-yrs)     Types: Cigarettes     Start date: 1967     Passive exposure: Current    Smokeless tobacco: Never    Tobacco comments:     0.5 ppd or less    Substance and Sexual Activity    Alcohol use: Never    Drug use: No    Sexual activity: Yes     Partners: Female     Social Determinants of Health     Financial Resource Strain: Low Risk  (10/31/2023)    Overall Financial Resource Strain (CARDIA)     Difficulty of Paying Living Expenses: Not hard at all   Food Insecurity: No Food Insecurity (10/31/2023)     Hunger Vital Sign     Worried About Running Out of Food in the Last Year: Never true     Ran Out of Food in the Last Year: Never true   Transportation Needs: No Transportation Needs (10/31/2023)    PRAPARE - Transportation     Lack of Transportation (Medical): No     Lack of Transportation (Non-Medical): No   Physical Activity: Sufficiently Active (10/31/2023)    Exercise Vital Sign     Days of Exercise per Week: 7 days     Minutes of Exercise per Session: 60 min   Stress: No Stress Concern Present (10/31/2023)    [place] [employer] - Occupational Stress Questionnaire     Feeling of Stress : Only a little   Social Connections: Moderately Isolated (10/31/2023)    Social Connection and Isolation Panel [NHANES]     Frequency of Communication with Friends and Family: Twice a week     Frequency of Social Gatherings with Friends and Family: Once a week     Attends Taoist Services: Never     Active Member of Clubs or Organizations: No     Attends Club or Organization Meetings: Patient refused     Marital Status:    Housing Stability: Low Risk  (10/31/2023)    Housing Stability Vital Sign     Unable to Pay for Housing in the Last Year: No     Number of Places Lived in the Last Year: 1     Unstable Housing in the Last Year: No       FAMILY HISTORY:     Family History   Problem Relation Age of Onset    No Known Problems Mother     Cancer Father     Heart disease Father     Asthma Sister     Cancer Sister 78        Rectal cancer    Stroke Brother     Lupus Daughter     No Known Problems Son        REVIEW OF SYSTEMS:   Review of Systems   Constitutional: Negative.   HENT: Negative.     Eyes: Negative.    Respiratory: Negative.     Endocrine: Negative.    Hematologic/Lymphatic: Negative.    Skin: Negative.    Musculoskeletal: Negative.    Gastrointestinal: Negative.    Genitourinary: Negative.    Neurological: Negative.    Psychiatric/Behavioral: Negative.     Allergic/Immunologic: Negative.   "      A 10 point review of systems was performed and all the pertinent positives have been mentioned. Rest of review of systems was negative.        PHYSICAL EXAM:     Vitals:    10/31/23 1133   BP: (!) 142/84   Pulse:    Resp:     Body mass index is 28.16 kg/m².  Weight: 84 kg (185 lb 3 oz)   Height: 5' 8" (172.7 cm)      Physical Exam  Vitals and nursing note reviewed.   Constitutional:       Appearance: Normal appearance. He is well-developed.   HENT:      Head: Normocephalic and atraumatic.      Right Ear: Hearing normal.      Left Ear: Hearing normal.      Nose: Nose normal.   Eyes:      General: Lids are normal.      Conjunctiva/sclera: Conjunctivae normal.      Pupils: Pupils are equal, round, and reactive to light.   Cardiovascular:      Rate and Rhythm: Normal rate and regular rhythm.      Pulses: Normal pulses.      Heart sounds: Murmur heard.   Pulmonary:      Effort: Pulmonary effort is normal.      Breath sounds: Normal breath sounds.   Abdominal:      Palpations: Abdomen is soft.      Tenderness: There is no abdominal tenderness.   Musculoskeletal:         General: No deformity.      Cervical back: Normal range of motion and neck supple.   Skin:     General: Skin is warm and dry.   Neurological:      Mental Status: He is alert and oriented to person, place, and time.   Psychiatric:         Speech: Speech normal.              DATA:     Laboratory:  CBC:  Recent Labs   Lab 02/25/21  1000 03/26/21  0800 06/11/22  0800   WBC 8.66 7.19 6.32   Hemoglobin 14.5 14.2 13.8 L   Hematocrit 43.3 43.8 42.7   Platelets 241 319 257       CHEMISTRIES:  Recent Labs   Lab 02/25/21  1000 03/26/21  0800 06/11/22  0800 06/02/23  1053   Glucose 115 H 114 H 109 95   Sodium 139 143 139 140   Potassium 4.7 4.6 4.7 4.6   BUN 21 18 19 20   Creatinine 1.3 1.2 1.3 1.2   eGFR if African American >60 >60 >60.0  --    eGFR if non  55 A >60 54.9 A  --    Calcium 9.4 9.6 9.5 9.8       CARDIAC BIOMARKERS:  Recent Labs " "  Lab 01/04/21  1450   Troponin I <0.006       COAGS:        LIPIDS/LFTS:  Recent Labs   Lab 03/26/21  0800 06/11/22  0800 10/07/22  0748 01/09/23  1145 06/02/23  1053   Cholesterol 178 154  --   --  125   Triglycerides 168 H 95  --   --  49   HDL 41 41  --   --  39 L   LDL Cholesterol 103.4 94.0  --   --  76.2   Non-HDL Cholesterol 137 113  --   --  86   AST 34 44 H 47 H 33 22   ALT 43 57 H 73 H 37 21       No results found for: "HGBA1C"    TSH        The ASCVD Risk score (Francis DK, et al., 2019) failed to calculate for the following reasons:    The valid total cholesterol range is 130 to 320 mg/dL             ASSESSMENT AND PLAN     Patient Active Problem List   Diagnosis    Hyperlipidemia    Hypertension    GERD (gastroesophageal reflux disease)    Generalized anxiety disorder    Tobacco dependence    Psychophysiological insomnia    Atherosclerosis of aorta    Erectile dysfunction    CAD (coronary artery disease)    Fatty liver    Hepatomegaly    Class 1 obesity with body mass index (BMI) of 30.0 to 30.9 in adult    Calcified granuloma of lung    Purpura    Lower urinary tract symptoms (LUTS)       1.  Chest pressure:  Resolved Coronary angiogram showed nonobstructive CAD.      2. Hypertension:  Blood pressure  controlled.  I have asked him to maintain a blood pressure diary, low-salt diet, and start regular exercise.     3. Mild aortic valve stenosis.     4.  Smoking cessation has been highly encouraged.      Follow-up in 6 months    Thank you very much for involving me in the care of your patient.  Please do not hesitate to contact me if there are any questions.      Vladimir Avalso MD, FACC, [facility]  Interventional Cardiologist, [facility].           This note was dictated with the help of speech recognition software.  There might be un-intended errors and/or substitutions.                          "

## 2023-11-03 ENCOUNTER — HOSPITAL ENCOUNTER (EMERGENCY)
Facility: HOSPITAL | Age: 73
Discharge: HOME | End: 2023-11-03
Attending: STUDENT IN AN ORGANIZED HEALTH CARE EDUCATION/TRAINING PROGRAM
Payer: MEDICARE

## 2023-11-03 VITALS
BODY MASS INDEX: 27.58 KG/M2 | RESPIRATION RATE: 18 BRPM | OXYGEN SATURATION: 95 % | WEIGHT: 182 LBS | HEART RATE: 60 BPM | HEIGHT: 68 IN | SYSTOLIC BLOOD PRESSURE: 136 MMHG | DIASTOLIC BLOOD PRESSURE: 65 MMHG | TEMPERATURE: 98 F

## 2023-11-03 DIAGNOSIS — F41.9 ANXIETY: ICD-10-CM

## 2023-11-03 DIAGNOSIS — R11.0 NAUSEA: Primary | ICD-10-CM

## 2023-11-03 DIAGNOSIS — R07.9 CHEST PAIN: ICD-10-CM

## 2023-11-03 LAB
ALBUMIN SERPL BCP-MCNC: 4 G/DL (ref 3.5–5.2)
ALP SERPL-CCNC: 61 U/L (ref 55–135)
ALT SERPL W/O P-5'-P-CCNC: 23 U/L (ref 10–44)
ANION GAP SERPL CALC-SCNC: 8 MMOL/L (ref 8–16)
AST SERPL-CCNC: 25 U/L (ref 10–40)
BASOPHILS # BLD AUTO: 0.03 K/UL (ref 0–0.2)
BASOPHILS NFR BLD: 0.5 % (ref 0–1.9)
BILIRUB SERPL-MCNC: 1.2 MG/DL (ref 0.1–1)
BNP SERPL-MCNC: 60 PG/ML (ref 0–99)
BUN SERPL-MCNC: 13 MG/DL (ref 8–23)
CALCIUM SERPL-MCNC: 9.3 MG/DL (ref 8.7–10.5)
CHLORIDE SERPL-SCNC: 108 MMOL/L (ref 95–110)
CO2 SERPL-SCNC: 23 MMOL/L (ref 23–29)
CREAT SERPL-MCNC: 1 MG/DL (ref 0.5–1.4)
DIFFERENTIAL METHOD: ABNORMAL
EOSINOPHIL # BLD AUTO: 0.1 K/UL (ref 0–0.5)
EOSINOPHIL NFR BLD: 2 % (ref 0–8)
ERYTHROCYTE [DISTWIDTH] IN BLOOD BY AUTOMATED COUNT: 13.4 % (ref 11.5–14.5)
EST. GFR  (NO RACE VARIABLE): >60 ML/MIN/1.73 M^2
GLUCOSE SERPL-MCNC: 144 MG/DL (ref 70–110)
HCT VFR BLD AUTO: 39.3 % (ref 40–54)
HGB BLD-MCNC: 12.9 G/DL (ref 14–18)
IMM GRANULOCYTES # BLD AUTO: 0.01 K/UL (ref 0–0.04)
IMM GRANULOCYTES NFR BLD AUTO: 0.2 % (ref 0–0.5)
LYMPHOCYTES # BLD AUTO: 1.4 K/UL (ref 1–4.8)
LYMPHOCYTES NFR BLD: 23.1 % (ref 18–48)
MCH RBC QN AUTO: 29.5 PG (ref 27–31)
MCHC RBC AUTO-ENTMCNC: 32.8 G/DL (ref 32–36)
MCV RBC AUTO: 90 FL (ref 82–98)
MONOCYTES # BLD AUTO: 0.6 K/UL (ref 0.3–1)
MONOCYTES NFR BLD: 9.6 % (ref 4–15)
NEUTROPHILS # BLD AUTO: 3.9 K/UL (ref 1.8–7.7)
NEUTROPHILS NFR BLD: 64.6 % (ref 38–73)
NRBC BLD-RTO: 0 /100 WBC
PLATELET # BLD AUTO: 218 K/UL (ref 150–450)
PMV BLD AUTO: 10.3 FL (ref 9.2–12.9)
POTASSIUM SERPL-SCNC: 4.2 MMOL/L (ref 3.5–5.1)
PROT SERPL-MCNC: 7 G/DL (ref 6–8.4)
RBC # BLD AUTO: 4.38 M/UL (ref 4.6–6.2)
SODIUM SERPL-SCNC: 139 MMOL/L (ref 136–145)
TROPONIN I SERPL DL<=0.01 NG/ML-MCNC: <0.006 NG/ML (ref 0–0.03)
WBC # BLD AUTO: 6.02 K/UL (ref 3.9–12.7)

## 2023-11-03 PROCEDURE — 99285 EMERGENCY DEPT VISIT HI MDM: CPT | Mod: 25

## 2023-11-03 PROCEDURE — 85025 COMPLETE CBC W/AUTO DIFF WBC: CPT | Performed by: NURSE PRACTITIONER

## 2023-11-03 PROCEDURE — 93010 ELECTROCARDIOGRAM REPORT: CPT | Mod: ,,, | Performed by: INTERNAL MEDICINE

## 2023-11-03 PROCEDURE — 83880 ASSAY OF NATRIURETIC PEPTIDE: CPT | Performed by: NURSE PRACTITIONER

## 2023-11-03 PROCEDURE — 80053 COMPREHEN METABOLIC PANEL: CPT | Performed by: NURSE PRACTITIONER

## 2023-11-03 PROCEDURE — 93005 ELECTROCARDIOGRAM TRACING: CPT

## 2023-11-03 PROCEDURE — 84484 ASSAY OF TROPONIN QUANT: CPT | Performed by: NURSE PRACTITIONER

## 2023-11-03 PROCEDURE — 96374 THER/PROPH/DIAG INJ IV PUSH: CPT

## 2023-11-03 PROCEDURE — 63600175 PHARM REV CODE 636 W HCPCS: Performed by: STUDENT IN AN ORGANIZED HEALTH CARE EDUCATION/TRAINING PROGRAM

## 2023-11-03 PROCEDURE — 93010 EKG 12-LEAD: ICD-10-PCS | Mod: ,,, | Performed by: INTERNAL MEDICINE

## 2023-11-03 RX ORDER — ONDANSETRON 2 MG/ML
4 INJECTION INTRAMUSCULAR; INTRAVENOUS
Status: COMPLETED | OUTPATIENT
Start: 2023-11-03 | End: 2023-11-03

## 2023-11-03 RX ADMIN — ONDANSETRON 4 MG: 2 INJECTION INTRAMUSCULAR; INTRAVENOUS at 05:11

## 2023-11-03 NOTE — ED TRIAGE NOTES
"Pt to the ED with complaints of nonradiating, intermittent left anterior chest "pressure" since this morning, accompanied by nausea and facial flushing. Pt reports hx of anxiety, does not take medications for it, but states he thinks this is his anxiety acting up. Pt also reports he smokes a pack of cigarettes every 3 days but is trying to quit and hasnt had a cigarette today. Pt AAOx4, wife at bedside  "

## 2023-11-03 NOTE — DISCHARGE INSTRUCTIONS
Diagnosis: chest pain    Tests you had showed: EKG and labs did not show a heart attack.    Home Care Instructions:  - Continue taking your home medications as prescribed    Take ibuprofen (also called Advil, Motrin) for your pain. This medicine is available over-the-counter in 200 mg tablets.  - You may take 600 mg every 6 hours, or 800 mg every 8 hours as needed   - Do not take more than this amount, as it can cause kidney problems, bleeding in your stomach, and other serious problems.   - Do not also take naproxen (Aleve) at the same time or on the same day  - If you have heart problems or uncontrolled high blood pressure, you should not take ibuprofen for more than 3 days without discussing with your doctor    If your pain is not controlled with ibuprofen, you may also take acetaminophen (also called Tylenol).  - You may take up to 1,000 mg of Tylenol every 6 hours as needed  - Do not take more than 4,000 mg in 24 hours (1 day) as this may cause liver damage  - Many other medicines include acetaminophen (Tylenol) such as: Norco, Vicodin, Tylenol #3, many cold medicines, etc.  - Please read all labels carefully and do not combine medicines that include acetaminophen.  - If you have a history of liver disease or drink alcohol heavily, do not take acetaminophen (Tylenol) since it can damage your liver    Follow-Up Plan:  - Follow-up with: Primary care doctor within 3 - 5 days  - I referred him to psychiatry to follow up as an outpatient for anxiety  - Follow-up for additional testing and/or evaluation as directed by your primary doctor    Return to the Emergency Department for symptoms including but not limited to: worsening symptoms, shortness of breath or chest pain, vomiting with inability to hold down fluids, fevers greater than 100.4°F, dizziness, passing out/fainting/unconsciousness, or other concerning symptoms.

## 2023-11-03 NOTE — ED PROVIDER NOTES
"Encounter Date: 11/3/2023    SCRIBE #1 NOTE: I, Claudine Kenzie, am scribing for, and in the presence of,  Yordan West MD.       History     Chief Complaint   Patient presents with    Chest Pain     Pt to ER with reports of chest tightness with nausea and flushing that started today. Pt reports has hx of anxiety      72 yo M with a PMHx of HTN, HLD, CAD, anxiety, presenting to the ED for chest pain and multiple other complaints. He reports this morning, he woke up with intermittent left sided chest "tightness" pain and nausea. He also reports "warmth" to his L sided face, but denies any L sided weakness, speech deficits, off balance sensation, other neurologic deficits. Further notes nausea, but denies vomiting, diarrhea, constipation, melena, hematochezia, attempted Tx w/ Zofran. He currently reports feeling anxious, and per independent historian, family member at bedside, he has been suffering from worsening anxiety since 9/2023. He is currently not on any anxiolytics. No other exacerbating or alleviating factors. Denies back pain, dental pain, mouth pain, dysuria, hematuria, or other associated symptoms. No recent sick contacts. Admits to daily tobacco (7-8 cigarettes daily, none today), and marijuana use (several days/week).     The history is provided by the patient and a significant other.     Review of patient's allergies indicates:   Allergen Reactions    Codeine Itching    Ibuprofen Itching    Remeron [mirtazapine] Anxiety     Did not help the patient sleep and created anxiety.     Past Medical History:   Diagnosis Date    CAD (coronary artery disease) 2/22/2021    Calcified granuloma of lung 2/3/2023    LLL calcified granuloma seen on CT Chest Lung Screening Low Dose 01/31/2018    Chest pain syndrome     Colon polyps     Erectile dysfunction 2/14/2020    Fatty liver     Generalized anxiety disorder 1/11/2017    GERD (gastroesophageal reflux disease)     Gout, unspecified     Hyperlipidemia     " Hypertension     Psychophysiological insomnia 7/24/2017    Tobacco dependence 7/24/2017     Past Surgical History:   Procedure Laterality Date    APPENDECTOMY      BONE RESECTION, RIB      for thoracic outlet syndrome    COLONOSCOPY N/A 7/21/2017    Procedure: COLONOSCOPY;  Surgeon: Deepak Servin MD;  Location: [facility] ENDO;  Service: Endoscopy;  Laterality: N/A;    COLONOSCOPY N/A 11/22/2022    Procedure: COLONOSCOPY;  Surgeon: Sam Obrien MD;  Location: [facility] ENDO;  Service: Endoscopy;  Laterality: N/A;  vacc-sutab-inst portal-tb    HAND SURGERY      LEFT HEART CATHETERIZATION Left 3/2/2021    Procedure: Left heart cath, radial, 730 am;  Surgeon: Vladimir Avalos MD;  Location: [facility] CATH LAB;  Service: Cardiology;  Laterality: Left;  RN PREOP 2/25/2021--COVID NEGATIVE ON 3/1  H/P INCOMPLETE    SCROTAL SURGERY      mass    ULTRASOUND GUIDANCE  3/2/2021    Procedure: Ultrasound Guidance;  Surgeon: Vladimir Avalos MD;  Location: [facility] CATH LAB;  Service: Cardiology;;     Family History   Problem Relation Age of Onset    No Known Problems Mother     Cancer Father     Heart disease Father     Asthma Sister     Cancer Sister 78        Rectal cancer    Stroke Brother     Lupus Daughter     No Known Problems Son      Social History     Tobacco Use    Smoking status: Every Day     Current packs/day: 0.50     Average packs/day: 0.5 packs/day for 60.1 years (30.1 ttl pk-yrs)     Types: Cigarettes     Start date: 1967     Passive exposure: Current    Smokeless tobacco: Never    Tobacco comments:     0.5 ppd or less    Substance Use Topics    Alcohol use: Never    Drug use: No     Review of Systems    Physical Exam     Initial Vitals [11/03/23 1619]   BP Pulse Resp Temp SpO2   (!) 179/79 70 18 97.5 °F (36.4 °C) 96 %      MAP       --         Physical Exam    Nursing note and vitals reviewed.  Constitutional: He appears well-developed and well-nourished. He is not diaphoretic. No distress.   HENT:   Head: Normocephalic and  atraumatic.   Eyes: Conjunctivae and EOM are normal. Pupils are equal, round, and reactive to light.   Neck: Neck supple.   Normal range of motion.  Cardiovascular:  Normal rate, regular rhythm, normal heart sounds and intact distal pulses.           No murmur heard.  Pulmonary/Chest: Breath sounds normal. No respiratory distress. He has no wheezes. He has no rhonchi. He has no rales.   Abdominal: Abdomen is soft. He exhibits no distension. There is no abdominal tenderness. There is no rebound and no guarding.   Musculoskeletal:         General: No tenderness or edema.      Cervical back: Normal range of motion and neck supple.     Neurological: He is alert and oriented to person, place, and time. He has normal strength. No cranial nerve deficit or sensory deficit. GCS score is 15. GCS eye subscore is 4. GCS verbal subscore is 5. GCS motor subscore is 6.   NIHSS (National [place] [employer] Stroke Scale)   1a  Level of consciousness: 0=alert; keenly responsive  1b. LOC questions:  0 = Answers both questions correctly  1c. LOC commands: 0=Answers both tasks correctly  2.  Best Gaze: 0=normal  3. Visual: 0=No visual loss  4. Facial Palsy: 0=Normal symmetric movement  5a. Motor left arm: 0=No drift, limb holds 90 (or 45) degrees for full 10 seconds  5b.  Motor right arm: 0=No drift, limb holds 90 (or 45) degrees for full 10 seconds  6a. motor left le=No drift, limb holds 90 (or 45) degrees for full 10 seconds  6b  Motor right le=No drift, limb holds 90 (or 45) degrees for full 10 seconds  7. Limb Ataxia: 0=Absent  8.  Sensory: 0=Normal; no sensory loss  9. Best Language:  0=No aphasia, normal  10. Dysarthria: 0=Normal  11. Extinction and Inattention: 0=No abnormality       Total:   0      Equal and intact sensation in V1, V2, and V3 distribution     Skin: Skin is warm and dry. Capillary refill takes less than 2 seconds. No pallor.         ED Course   Procedures  Labs Reviewed   CBC W/ AUTO DIFFERENTIAL -  "Abnormal; Notable for the following components:       Result Value    RBC 4.38 (*)     Hemoglobin 12.9 (*)     Hematocrit 39.3 (*)     All other components within normal limits   COMPREHENSIVE METABOLIC PANEL - Abnormal; Notable for the following components:    Glucose 144 (*)     Total Bilirubin 1.2 (*)     All other components within normal limits   TROPONIN I   B-TYPE NATRIURETIC PEPTIDE     EKG Readings: (Independently Interpreted)   Normal sinus rhythm, rate 66, no STEMI, normal intervals, overall unremarkable       Imaging Results              X-Ray Chest 1 View (Final result)  Result time 11/03/23 17:12:39   Procedure changed from X-Ray Chest PA And Lateral     Final result by David Harris MD (11/03/23 17:12:39)                   Impression:      No acute finding or detrimental change when compared with 10/10/2023.      Electronically signed by: David Harris MD  Date:    11/03/2023  Time:    17:12               Narrative:    EXAMINATION:  XR CHEST 1 VIEW    CLINICAL HISTORY:  Provided history is "chest pain;  Chest pain, unspecified".    TECHNIQUE:  One view of the chest.    COMPARISON:  10/10/2023.    FINDINGS:  Cardiac wires overlie the chest.  Cardiomediastinal silhouette is stable.  Atherosclerotic calcifications overlie the aortic arch.  Mild bibasilar subsegmental atelectasis versus scarring.  No confluent area of consolidation.  No sizable pleural effusion.  No pneumothorax.  Surgical clips overlie the left lower neck/upper chest adjacent to the clavicle.  No detrimental change when compared with the prior study.                                       Medications   ondansetron injection 4 mg (4 mg Intravenous Given 11/3/23 1710)     Medical Decision Making  Hemodynamically stable and well-appearing 73-year-old male presents with nausea and anxiety    Differential: Pancreatitis, electrolyte abnormality, ACS, LIONEL, anxiety    We have Zofran for nausea.  See ED course for additional " information.    Amount and/or Complexity of Data Reviewed  Independent Historian:      Details: See hpi  External Data Reviewed: notes.     Details: Reviewed previous notes and patient was prescribed Zoloft    TTE 1/2020  · Normal left ventricular systolic function. The estimated ejection fraction is 60%.  · Concentric left ventricular hypertrophy.  · Normal LV diastolic function.  · Normal right ventricular systolic function.  · Mild-to-moderate aortic valve stenosis.  · Aortic valve area is 1.80 cm2; peak velocity is 2.60 m/s; mean gradient is 18 mmHg.  · Mild aortic regurgitation.       [facility] 3/2021  · There was non-obstructive coronary artery disease..        Left main:  Distal 10% stenosis     Lad:  Luminal irregularities.  Mid 10-20% stenosis     Circumflex:  Luminal irregularities     RCA:  Luminal irregularities  Labs: ordered. Decision-making details documented in ED Course.  Radiology: ordered and independent interpretation performed. Decision-making details documented in ED Course.  ECG/medicine tests: ordered and independent interpretation performed. Decision-making details documented in ED Course.    Risk  Prescription drug management.  Diagnosis or treatment significantly limited by social determinants of health.            Scribe Attestation:   Scribe #1: I performed the above scribed service and the documentation accurately describes the services I performed. I attest to the accuracy of the note.        ED Course as of 11/03/23 2232 Fri Nov 03, 2023   1644 EKG 12-lead  EKG independently interpreted by me shows normal sinus rhythm, rate 66, no STEMI, normal intervals, normal T-wave morphology and ST segments, overall unremarkable [BD]   1732 Comprehensive metabolic panel(!)  CMP unremarkable without significant electrolyte derangement, impaired renal function, or elevated LFTs   [BD]   1733 CBC auto differential(!)  CBC unremarkable without leukocytosis, significant anemia, or decreased platelets    [BD]   1733 Troponin I: <0.006  ACS unlikely.  We will not repeat, as patient's symptoms have been going on constantly all day [BD]   1733 BNP: 60  Inconsistent with CHF [BD]   1733 X-Ray Chest 1 View  Chest x-ray independently interpreted by me shows no acute process such as pneumonia, pneumothorax, or pulmonary edema.    [BD]   1813 Patient's laboratory workup is unremarkable for any acute process.  I refer the patient to psychiatry for outpatient follow-up this is anxiety.  Blood pressures normalized for his age.  He is resting comfortably in bed with no symptoms at this time. Patient will be discharged at this time. Patient has been given home care instructions, follow up instructions, and strict return precautions. They agree with and are comfortable with the plan.    [BD]      ED Course User Index  [BD] Yordan West MD                  I, Yordan West, personally performed the services described in this documentation. All medical record entries made by the scribe were at my direction and in my presence. I have reviewed the chart and agree that the record reflects my personal performance and is accurate and complete.    Clinical Impression:   Final diagnoses:  [F41.9] Anxiety  [R11.0] Nausea (Primary)        ED Disposition Condition    Discharge Stable          ED Prescriptions    None       Follow-up Information       Follow up With Specialties Details Why Contact Info Additional Information    [facility] [place] [facility]   [address] [facility] [address]  [address]  [facility] [id number] [phone] Please park in garage or Medical Of[address] Bldg surface lot and use Medical Office Bldg elevator. Check in at [address].    Primary Care Physician  Schedule an appointment as soon as possible for a visit  As needed and to discuss recent ER visit               Yordan West MD  11/03/23 7391

## 2023-11-06 ENCOUNTER — PATIENT MESSAGE (OUTPATIENT)
Dept: ADMINISTRATIVE | Facility: HOSPITAL | Age: 73
End: 2023-11-06
Payer: MEDICARE

## 2023-11-06 ENCOUNTER — TELEPHONE (OUTPATIENT)
Dept: PSYCHIATRY | Facility: CLINIC | Age: 73
End: 2023-11-06
Payer: MEDICARE

## 2023-11-06 NOTE — TELEPHONE ENCOUNTER
Pt called to make appt for anxiety for rx management. Ref in chart, appt made w/ pt for 11/9 @ 3:30pm w/ Marilu. No concerns voiced.

## 2023-11-07 ENCOUNTER — TELEPHONE (OUTPATIENT)
Dept: FAMILY MEDICINE | Facility: CLINIC | Age: 73
End: 2023-11-07
Payer: MEDICARE

## 2023-11-07 ENCOUNTER — NURSE TRIAGE (OUTPATIENT)
Dept: ADMINISTRATIVE | Facility: CLINIC | Age: 73
End: 2023-11-07
Payer: MEDICARE

## 2023-11-07 NOTE — TELEPHONE ENCOUNTER
Reason for Disposition   MODERATE anxiety (e.g., persistent or frequent anxiety symptoms; interferes with sleep, school, or work)    Additional Information   Negative: SEVERE difficulty breathing (e.g., struggling for each breath, speaks in single words)   Negative: Bluish (or gray) lips or face   Negative: Difficult to awaken or acting confused (e.g., disoriented, slurred speech)   Negative: Hysterical or combative behavior   Negative: Sounds like a life-threatening emergency to the triager   Negative: Chest pain   Negative: Difficulty breathing and persists > 10 minutes and not relieved by reassurance provided by triager   Negative: Lightheadedness or dizziness and persists > 10 minutes and not relieved by reassurance provided by triager   Negative: SEVERE anxiety (e.g., extremely anxious with intense emotional symptoms such as feeling of unreality, urge to flee, unable to calm down; unable to cope or function), which is not better after 10 minutes of reassurance and Care Advice   Negative: Panic attack symptoms (e.g., sudden onset of intense fear and symptoms such as dizziness, feeling of impending doom or fear of dying, hyperventilation, numbness or tingling, sweating, trembling), and has not been evaluated for this by doctor (or NP/PA)   Negative: Panic attack symptoms (diagnosed in the past) that is not better with usual treatment, reassurance, or Care Advice   Negative: Alcohol or drug use, known or suspected, and feeling very shaky (i.e., visible tremors of hands)   Negative: Patient sounds very sick or weak to the triager   Negative: Patient sounds very upset or troubled to the triager    Protocols used: Anxiety and Panic Attack-A-OH      Yair states he is having anxiety episodes.  Says this is not new.  He states he has read that hydroxizine is good for it, and wants an order for this medication.  He states he was seen in ED 11/03 for anxiety and has made an appt with Marilu Chavez in psychiatry.  Appt is on 03/09 this week. He states he is a [employer] vet, and lately he has been having some trouble sleeping, moreso than in the past.  Able to function, is retired and exercises daily. No suicidal or homicidal ideations.  Per [facility] triage protocol, recommend he be seen within 3 days.  His appt is already made, and while he was on the phone with me, his hydroxyzine was re-ordered, per family member in his home with him.  Home care advice given, per protocol, and he was also provided hotline numbers for his use for crisis / talk lines.  No other needs at this time.  Encouraged call back for any worsening symptoms, new concerns.  Message to Dr Ewing, pcp, and Marilu Chavez.  Please contact caller directly with any additional care advice.

## 2023-11-07 NOTE — TELEPHONE ENCOUNTER
----- Message from Glenis Crowley sent at 11/7/2023  3:45 PM CST -----  Regarding: Self [phone]  Type: Patient Call Back     What is the request in detail: Pt is requesting a call back in regards to his anxiety, pt would like to speak to GLORIA Nelson or Dr Ewing     Can the clinic reply by [facility]? No     Would the patient rather a call back or a response via [facility]? Call back    Best call back number: .[phone]      Additional Information:    Thank you.

## 2023-11-09 ENCOUNTER — OFFICE VISIT (OUTPATIENT)
Dept: PSYCHIATRY | Facility: CLINIC | Age: 73
End: 2023-11-09
Payer: MEDICARE

## 2023-11-09 ENCOUNTER — TELEPHONE (OUTPATIENT)
Dept: FAMILY MEDICINE | Facility: CLINIC | Age: 73
End: 2023-11-09
Payer: MEDICARE

## 2023-11-09 ENCOUNTER — PATIENT OUTREACH (OUTPATIENT)
Dept: ADMINISTRATIVE | Facility: HOSPITAL | Age: 73
End: 2023-11-09
Payer: MEDICARE

## 2023-11-09 VITALS — SYSTOLIC BLOOD PRESSURE: 122 MMHG | DIASTOLIC BLOOD PRESSURE: 74 MMHG

## 2023-11-09 DIAGNOSIS — F41.1 GENERALIZED ANXIETY DISORDER WITH PANIC ATTACKS: Primary | ICD-10-CM

## 2023-11-09 DIAGNOSIS — Z86.59 HISTORY OF DEPRESSION: ICD-10-CM

## 2023-11-09 DIAGNOSIS — F41.9 ANXIETY: ICD-10-CM

## 2023-11-09 DIAGNOSIS — F41.0 GENERALIZED ANXIETY DISORDER WITH PANIC ATTACKS: Primary | ICD-10-CM

## 2023-11-09 PROCEDURE — 1159F PR MEDICATION LIST DOCUMENTED IN MEDICAL RECORD: ICD-10-PCS | Mod: CPTII,95,, | Performed by: PHYSICIAN ASSISTANT

## 2023-11-09 PROCEDURE — 90792 PSYCH DIAG EVAL W/MED SRVCS: CPT | Mod: 95,,, | Performed by: PHYSICIAN ASSISTANT

## 2023-11-09 PROCEDURE — 1160F RVW MEDS BY RX/DR IN RCRD: CPT | Mod: CPTII,95,, | Performed by: PHYSICIAN ASSISTANT

## 2023-11-09 PROCEDURE — 4010F PR ACE/ARB THEARPY RXD/TAKEN: ICD-10-PCS | Mod: CPTII,95,, | Performed by: PHYSICIAN ASSISTANT

## 2023-11-09 PROCEDURE — 1160F PR REVIEW ALL MEDS BY PRESCRIBER/CLIN PHARMACIST DOCUMENTED: ICD-10-PCS | Mod: CPTII,95,, | Performed by: PHYSICIAN ASSISTANT

## 2023-11-09 PROCEDURE — 1159F MED LIST DOCD IN RCRD: CPT | Mod: CPTII,95,, | Performed by: PHYSICIAN ASSISTANT

## 2023-11-09 PROCEDURE — 4010F ACE/ARB THERAPY RXD/TAKEN: CPT | Mod: CPTII,95,, | Performed by: PHYSICIAN ASSISTANT

## 2023-11-09 PROCEDURE — 90792 PR PSYCHIATRIC DIAGNOSTIC EVALUATION W/MEDICAL SERVICES: ICD-10-PCS | Mod: 95,,, | Performed by: PHYSICIAN ASSISTANT

## 2023-11-09 RX ORDER — INFLUENZA A VIRUS A/VICTORIA/4897/2022 IVR-238 (H1N1) ANTIGEN (FORMALDEHYDE INACTIVATED), INFLUENZA A VIRUS A/DARWIN/6/2021 IVR-227 (H3N2) ANTIGEN (FORMALDEHYDE INACTIVATED), INFLUENZA B VIRUS B/AUSTRIA/1359417/2021 BVR-26 ANTIGEN (FORMALDEHYDE INACTIVATED), INFLUENZA B VIRUS B/PHUKET/3073/2013 BVR-1B ANTIGEN (FORMALDEHYDE INACTIVATED) 15; 15; 15; 15 UG/.5ML; UG/.5ML; UG/.5ML; UG/.5ML
INJECTION, SUSPENSION INTRAMUSCULAR
COMMUNITY
Start: 2023-08-26 | End: 2024-03-19 | Stop reason: ALTCHOICE

## 2023-11-09 RX ORDER — COVID-19 VACCINE, MRNA 50 UG/.5ML
INJECTION, SUSPENSION INTRAMUSCULAR
COMMUNITY
Start: 2023-09-24 | End: 2023-11-09

## 2023-11-09 RX ORDER — PREDNISONE 10 MG/1
10 TABLET ORAL 2 TIMES DAILY
COMMUNITY
Start: 2023-09-18 | End: 2023-12-08

## 2023-11-09 RX ORDER — TETANUS TOXOID, REDUCED DIPHTHERIA TOXOID AND ACELLULAR PERTUSSIS VACCINE, ADSORBED 5; 2.5; 8; 8; 2.5 [IU]/.5ML; [IU]/.5ML; UG/.5ML; UG/.5ML; UG/.5ML
SUSPENSION INTRAMUSCULAR
COMMUNITY
Start: 2023-07-13 | End: 2024-01-09

## 2023-11-09 NOTE — PROGRESS NOTES
Incoming blood pressure readings received via the [facility] portal as followed:    /76 taken on 11/06/2023.  /74 taken on 11/06/2023.    BP's recorded.
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
## 2023-11-16 ENCOUNTER — OFFICE VISIT (OUTPATIENT)
Dept: FAMILY MEDICINE | Facility: CLINIC | Age: 73
End: 2023-11-16
Payer: MEDICARE

## 2023-11-16 ENCOUNTER — LAB VISIT (OUTPATIENT)
Dept: LAB | Facility: HOSPITAL | Age: 73
End: 2023-11-16
Attending: INTERNAL MEDICINE
Payer: MEDICARE

## 2023-11-16 VITALS
WEIGHT: 179.88 LBS | DIASTOLIC BLOOD PRESSURE: 62 MMHG | HEIGHT: 68 IN | HEART RATE: 70 BPM | TEMPERATURE: 98 F | OXYGEN SATURATION: 97 % | SYSTOLIC BLOOD PRESSURE: 137 MMHG | BODY MASS INDEX: 27.26 KG/M2

## 2023-11-16 DIAGNOSIS — F51.04 PSYCHOPHYSIOLOGICAL INSOMNIA: ICD-10-CM

## 2023-11-16 DIAGNOSIS — E78.5 HYPERLIPIDEMIA, UNSPECIFIED HYPERLIPIDEMIA TYPE: ICD-10-CM

## 2023-11-16 DIAGNOSIS — R74.8 ELEVATED LIVER ENZYMES: ICD-10-CM

## 2023-11-16 DIAGNOSIS — R23.2 HOT FLASH IN MALE: ICD-10-CM

## 2023-11-16 DIAGNOSIS — D64.9 ANEMIA, UNSPECIFIED TYPE: ICD-10-CM

## 2023-11-16 DIAGNOSIS — N52.9 ERECTILE DYSFUNCTION, UNSPECIFIED ERECTILE DYSFUNCTION TYPE: ICD-10-CM

## 2023-11-16 DIAGNOSIS — J84.10 CALCIFIED GRANULOMA OF LUNG: ICD-10-CM

## 2023-11-16 DIAGNOSIS — R73.9 HYPERGLYCEMIA: ICD-10-CM

## 2023-11-16 DIAGNOSIS — Z12.5 SCREENING FOR PROSTATE CANCER: ICD-10-CM

## 2023-11-16 DIAGNOSIS — I70.0 ATHEROSCLEROSIS OF AORTA: ICD-10-CM

## 2023-11-16 DIAGNOSIS — K76.0 FATTY LIVER: ICD-10-CM

## 2023-11-16 DIAGNOSIS — Z00.00 ROUTINE MEDICAL EXAM: Primary | ICD-10-CM

## 2023-11-16 DIAGNOSIS — F41.1 GENERALIZED ANXIETY DISORDER: ICD-10-CM

## 2023-11-16 DIAGNOSIS — I77.9 BILATERAL CAROTID ARTERY DISEASE, UNSPECIFIED TYPE: ICD-10-CM

## 2023-11-16 DIAGNOSIS — K21.9 GASTROESOPHAGEAL REFLUX DISEASE, UNSPECIFIED WHETHER ESOPHAGITIS PRESENT: Chronic | ICD-10-CM

## 2023-11-16 DIAGNOSIS — I10 PRIMARY HYPERTENSION: ICD-10-CM

## 2023-11-16 DIAGNOSIS — Z00.00 ROUTINE MEDICAL EXAM: ICD-10-CM

## 2023-11-16 DIAGNOSIS — R63.4 WEIGHT LOSS: ICD-10-CM

## 2023-11-16 DIAGNOSIS — Z86.010 HISTORY OF COLONIC POLYPS: ICD-10-CM

## 2023-11-16 DIAGNOSIS — I25.10 CORONARY ARTERY DISEASE INVOLVING NATIVE HEART WITHOUT ANGINA PECTORIS, UNSPECIFIED VESSEL OR LESION TYPE: ICD-10-CM

## 2023-11-16 PROBLEM — Z86.0100 HISTORY OF COLONIC POLYPS: Status: ACTIVE | Noted: 2023-11-16

## 2023-11-16 LAB
BASOPHILS # BLD AUTO: 0.04 K/UL (ref 0–0.2)
BASOPHILS NFR BLD: 0.6 % (ref 0–1.9)
DIFFERENTIAL METHOD: ABNORMAL
EOSINOPHIL # BLD AUTO: 0.1 K/UL (ref 0–0.5)
EOSINOPHIL NFR BLD: 1.7 % (ref 0–8)
ERYTHROCYTE [DISTWIDTH] IN BLOOD BY AUTOMATED COUNT: 13.8 % (ref 11.5–14.5)
ERYTHROCYTE [SEDIMENTATION RATE] IN BLOOD BY PHOTOMETRIC METHOD: 3 MM/HR (ref 0–23)
ESTIMATED AVG GLUCOSE: 105 MG/DL (ref 68–131)
FERRITIN SERPL-MCNC: 33 NG/ML (ref 20–300)
HBA1C MFR BLD: 5.3 % (ref 4–5.6)
HCT VFR BLD AUTO: 42.5 % (ref 40–54)
HGB BLD-MCNC: 13.8 G/DL (ref 14–18)
IMM GRANULOCYTES # BLD AUTO: 0.02 K/UL (ref 0–0.04)
IMM GRANULOCYTES NFR BLD AUTO: 0.3 % (ref 0–0.5)
IRON SERPL-MCNC: 112 UG/DL (ref 45–160)
LYMPHOCYTES # BLD AUTO: 1.6 K/UL (ref 1–4.8)
LYMPHOCYTES NFR BLD: 24.8 % (ref 18–48)
MCH RBC QN AUTO: 30.3 PG (ref 27–31)
MCHC RBC AUTO-ENTMCNC: 32.5 G/DL (ref 32–36)
MCV RBC AUTO: 93 FL (ref 82–98)
MONOCYTES # BLD AUTO: 0.7 K/UL (ref 0.3–1)
MONOCYTES NFR BLD: 11 % (ref 4–15)
NEUTROPHILS # BLD AUTO: 3.9 K/UL (ref 1.8–7.7)
NEUTROPHILS NFR BLD: 61.6 % (ref 38–73)
NRBC BLD-RTO: 0 /100 WBC
PLATELET # BLD AUTO: 253 K/UL (ref 150–450)
PMV BLD AUTO: 11.3 FL (ref 9.2–12.9)
RBC # BLD AUTO: 4.56 M/UL (ref 4.6–6.2)
RETICS/RBC NFR AUTO: 0.8 % (ref 0.4–2)
SATURATED IRON: 24 % (ref 20–50)
TESTOST SERPL-MCNC: 535 NG/DL (ref 304–1227)
TOTAL IRON BINDING CAPACITY: 465 UG/DL (ref 250–450)
TRANSFERRIN SERPL-MCNC: 314 MG/DL (ref 200–375)
TSH SERPL DL<=0.005 MIU/L-ACNC: 0.72 UIU/ML (ref 0.4–4)
WBC # BLD AUTO: 6.34 K/UL (ref 3.9–12.7)

## 2023-11-16 PROCEDURE — 1126F PR PAIN SEVERITY QUANTIFIED, NO PAIN PRESENT: ICD-10-PCS | Mod: CPTII,S$GLB,, | Performed by: INTERNAL MEDICINE

## 2023-11-16 PROCEDURE — 3078F DIAST BP <80 MM HG: CPT | Mod: CPTII,S$GLB,, | Performed by: INTERNAL MEDICINE

## 2023-11-16 PROCEDURE — 99999 PR PBB SHADOW E&M-EST. PATIENT-LVL IV: CPT | Mod: PBBFAC,,, | Performed by: INTERNAL MEDICINE

## 2023-11-16 PROCEDURE — 1159F PR MEDICATION LIST DOCUMENTED IN MEDICAL RECORD: ICD-10-PCS | Mod: CPTII,S$GLB,, | Performed by: INTERNAL MEDICINE

## 2023-11-16 PROCEDURE — 4010F PR ACE/ARB THEARPY RXD/TAKEN: ICD-10-PCS | Mod: CPTII,S$GLB,, | Performed by: INTERNAL MEDICINE

## 2023-11-16 PROCEDURE — 82728 ASSAY OF FERRITIN: CPT | Performed by: INTERNAL MEDICINE

## 2023-11-16 PROCEDURE — 99499 UNLISTED E&M SERVICE: CPT | Mod: S$GLB,,, | Performed by: INTERNAL MEDICINE

## 2023-11-16 PROCEDURE — 1101F PR PT FALLS ASSESS DOC 0-1 FALLS W/OUT INJ PAST YR: ICD-10-PCS | Mod: CPTII,S$GLB,, | Performed by: INTERNAL MEDICINE

## 2023-11-16 PROCEDURE — 3075F PR MOST RECENT SYSTOLIC BLOOD PRESS GE 130-139MM HG: ICD-10-PCS | Mod: CPTII,S$GLB,, | Performed by: INTERNAL MEDICINE

## 2023-11-16 PROCEDURE — 4010F ACE/ARB THERAPY RXD/TAKEN: CPT | Mod: CPTII,S$GLB,, | Performed by: INTERNAL MEDICINE

## 2023-11-16 PROCEDURE — 36415 COLL VENOUS BLD VENIPUNCTURE: CPT | Mod: PO | Performed by: INTERNAL MEDICINE

## 2023-11-16 PROCEDURE — 99397 PR PREVENTIVE VISIT,EST,65 & OVER: ICD-10-PCS | Mod: GZ,,, | Performed by: INTERNAL MEDICINE

## 2023-11-16 PROCEDURE — 84443 ASSAY THYROID STIM HORMONE: CPT | Performed by: INTERNAL MEDICINE

## 2023-11-16 PROCEDURE — 3075F SYST BP GE 130 - 139MM HG: CPT | Mod: CPTII,S$GLB,, | Performed by: INTERNAL MEDICINE

## 2023-11-16 PROCEDURE — 3288F PR FALLS RISK ASSESSMENT DOCUMENTED: ICD-10-PCS | Mod: CPTII,S$GLB,, | Performed by: INTERNAL MEDICINE

## 2023-11-16 PROCEDURE — 84403 ASSAY OF TOTAL TESTOSTERONE: CPT | Performed by: INTERNAL MEDICINE

## 2023-11-16 PROCEDURE — 3008F PR BODY MASS INDEX (BMI) DOCUMENTED: ICD-10-PCS | Mod: CPTII,S$GLB,, | Performed by: INTERNAL MEDICINE

## 2023-11-16 PROCEDURE — 1101F PT FALLS ASSESS-DOCD LE1/YR: CPT | Mod: CPTII,S$GLB,, | Performed by: INTERNAL MEDICINE

## 2023-11-16 PROCEDURE — 3078F PR MOST RECENT DIASTOLIC BLOOD PRESSURE < 80 MM HG: ICD-10-PCS | Mod: CPTII,S$GLB,, | Performed by: INTERNAL MEDICINE

## 2023-11-16 PROCEDURE — 85045 AUTOMATED RETICULOCYTE COUNT: CPT | Performed by: INTERNAL MEDICINE

## 2023-11-16 PROCEDURE — 1159F MED LIST DOCD IN RCRD: CPT | Mod: CPTII,S$GLB,, | Performed by: INTERNAL MEDICINE

## 2023-11-16 PROCEDURE — 84466 ASSAY OF TRANSFERRIN: CPT | Performed by: INTERNAL MEDICINE

## 2023-11-16 PROCEDURE — 83036 HEMOGLOBIN GLYCOSYLATED A1C: CPT | Performed by: INTERNAL MEDICINE

## 2023-11-16 PROCEDURE — 99999 PR PBB SHADOW E&M-EST. PATIENT-LVL IV: ICD-10-PCS | Mod: PBBFAC,,, | Performed by: INTERNAL MEDICINE

## 2023-11-16 PROCEDURE — 99214 PR OFFICE/OUTPT VISIT, EST, LEVL IV, 30-39 MIN: ICD-10-PCS | Mod: 25,S$GLB,, | Performed by: INTERNAL MEDICINE

## 2023-11-16 PROCEDURE — 85025 COMPLETE CBC W/AUTO DIFF WBC: CPT | Performed by: INTERNAL MEDICINE

## 2023-11-16 PROCEDURE — 3008F BODY MASS INDEX DOCD: CPT | Mod: CPTII,S$GLB,, | Performed by: INTERNAL MEDICINE

## 2023-11-16 PROCEDURE — 99397 PER PM REEVAL EST PAT 65+ YR: CPT | Mod: GZ,,, | Performed by: INTERNAL MEDICINE

## 2023-11-16 PROCEDURE — 1126F AMNT PAIN NOTED NONE PRSNT: CPT | Mod: CPTII,S$GLB,, | Performed by: INTERNAL MEDICINE

## 2023-11-16 PROCEDURE — 3288F FALL RISK ASSESSMENT DOCD: CPT | Mod: CPTII,S$GLB,, | Performed by: INTERNAL MEDICINE

## 2023-11-16 PROCEDURE — 85652 RBC SED RATE AUTOMATED: CPT | Performed by: INTERNAL MEDICINE

## 2023-11-16 PROCEDURE — 99214 OFFICE O/P EST MOD 30 MIN: CPT | Mod: 25,S$GLB,, | Performed by: INTERNAL MEDICINE

## 2023-11-16 PROCEDURE — 83540 ASSAY OF IRON: CPT | Performed by: INTERNAL MEDICINE

## 2023-11-16 RX ORDER — HYDROXYZINE PAMOATE 50 MG/1
100 CAPSULE ORAL NIGHTLY PRN
COMMUNITY
Start: 2023-11-07 | End: 2023-12-08

## 2023-11-16 NOTE — PROGRESS NOTES
Chief complaint: physical      Seen once by me 5/23    Patient is a 73-year-old white male new to me 5/23 .  Regarding health maintenance he quit smoking but wife started back again so he started back and he knows he needs to quit.  He is up-to-date on his colon and prostate cancer screening.    Colon 11/22  PSA 9/23 per           ROS:   CONST: weight stable. EYES: no vision change. ENT: no sore throat. CV: no chest pain w/ exertion. RESP: no shortness of breath. GI: no nausea, vomiting, diarrhea. No dysphagia. : no urinary issues. MUSCULOSKELETAL: no new myalgias or arthralgias. SKIN: no new changes. NEURO: no focal deficits. PSYCH: no new issues. ENDOCRINE: no polyuria. HEME: no lymph nodes. ALLERGY: no general pruritis.        Past Medical History:   Diagnosis Date    CAD (coronary artery disease) 02/22/2021    Cath 2021  There was non-obstructive coronary artery disease..   Left main:  Distal 10% stenosis  Lad:  Luminal irregularities.  Mid 10-20% stenosis   Circumflex:  Luminal irregularities   RCA:  Luminal irregularities    Calcified granuloma of lung 02/03/2023    LLL calcified granuloma seen on CT Chest Lung Screening Low Dose 01/31/2018    Erectile dysfunction 02/14/2020    Fatty liver     Generalized anxiety disorder 01/11/2017    GERD (gastroesophageal reflux disease)     Gout, unspecified     History of colonic polyps 11/16/2023    2 polyps 11/22 -5 yrs    Hyperlipidemia     Hypertension     Psychophysiological insomnia 07/24/2017    Tobacco dependence 07/24/2017   1-19 % carotid dis    Past Surgical History:   Procedure Laterality Date    APPENDECTOMY      BONE RESECTION, RIB      for thoracic outlet syndrome    COLONOSCOPY N/A 7/21/2017    Procedure: COLONOSCOPY;  Surgeon: Deepak Servin MD;  Location: [facility] ENDO;  Service: Endoscopy;  Laterality: N/A;    COLONOSCOPY N/A 11/22/2022    Procedure: COLONOSCOPY;  Surgeon: Sam Obrien MD;  Location: [facility] ENDO;  Service: Endoscopy;  Laterality: N/A;   vacc-sutab-inst portal-tb    HAND SURGERY      LEFT HEART CATHETERIZATION Left 3/2/2021    Procedure: Left heart cath, radial, 730 am;  Surgeon: Vladimir Avalos MD;  Location: [facility] CATH LAB;  Service: Cardiology;  Laterality: Left;  RN PREOP 2/25/2021--COVID NEGATIVE ON 3/1  H/P INCOMPLETE    SCROTAL SURGERY      mass    ULTRASOUND GUIDANCE  3/2/2021    Procedure: Ultrasound Guidance;  Surgeon: Vladimir Avalos MD;  Location: [facility] CATH LAB;  Service: Cardiology;;     Social History     Socioeconomic History    Marital status:    Occupational History     Employer: [employer]   Tobacco Use    Smoking status: Every Day     Current packs/day: 0.50     Average packs/day: 0.5 packs/day for 60.2 years (30.1 ttl pk-yrs)     Types: Cigarettes     Start date: 1967     Passive exposure: Current    Smokeless tobacco: Never    Tobacco comments:     0.5 ppd or less    Substance and Sexual Activity    Alcohol use: Never    Drug use: No    Sexual activity: Yes     Partners: Female     Social Determinants of Health     Financial Resource Strain: Low Risk  (11/14/2023)    Overall Financial Resource Strain (CARDIA)     Difficulty of Paying Living Expenses: Not hard at all   Food Insecurity: No Food Insecurity (11/14/2023)    Hunger Vital Sign     Worried About Running Out of Food in the Last Year: Never true     Ran Out of Food in the Last Year: Never true   Transportation Needs: No Transportation Needs (11/14/2023)    PRAPARE - Transportation     Lack of Transportation (Medical): No     Lack of Transportation (Non-Medical): No   Physical Activity: Sufficiently Active (11/14/2023)    Exercise Vital Sign     Days of Exercise per Week: 7 days     Minutes of Exercise per Session: 60 min   Stress: Stress Concern Present (11/14/2023)    [place] [employer] - Occupational Stress Questionnaire     Feeling of Stress : To some extent   Social Connections: Moderately Isolated (11/14/2023)    Social Connection and  Isolation Panel [NHANES]     Frequency of Communication with Friends and Family: Three times a week     Frequency of Social Gatherings with Friends and Family: Once a week     Attends Mu-ism Services: Never     Active Member of Clubs or Organizations: No     Attends Club or Organization Meetings: Never     Marital Status:    Housing Stability: Low Risk  (11/14/2023)    Housing Stability Vital Sign     Unable to Pay for Housing in the Last Year: No     Number of Places Lived in the Last Year: 1     Unstable Housing in the Last Year: No     family history includes Asthma in his sister; Cancer in his father; Cancer (age of onset: 78) in his sister; Heart disease in his father; Lupus in his daughter; No Known Problems in his mother and son; Stroke in his brother.    Gen: no distress  EYES: conjunctiva clear, non-icteric, PERRL  ENT: nose clear, nasal mucosa normal, oropharynx clear and moist, teeth good  NECK:supple, thyroid non-palpable  RESP: effort is good, lungs clear  CV: heart RRR w/slight systolic murmur which does radiate to the left carotid.  No bruit on the right  no edema  GI: abdomen soft, non-distended, non-tender, no hepatosplenomegaly  MS: gait normal, no clubbing or cyanosis of the digits  SKIN: no rashes, warm to touch    Yair was seen today for follow-up.    Diagnoses and all orders for this visit:    Routine medical exam, up-to-date on cancer screening  -     CBC Auto Differential; Future  -     Iron and TIBC; Future  -     Ferritin; Future  -     Hemoglobin A1C; Future  -     TSH; Future    Screening for prostate cancer    History of colonic polyps                                              Additional evaluation and management issues:    Additionally patient has numerous other medical issues to address separate from his physical.      We reviewed that his anemia is new and he sees no blood in his urine and it was checked by Urology.  No GI blood loss that is obvious.  No melena.  He only  takes an aspirin.  He has not had a thyroid checked in years.  He did lose about 42 lb in the last year with walking daily.  Sometimes over the past two weeks he will feel hot but no sweating.  It lasts about an hour.  Will rule out thyroid issues.  No tachycardia.  Anemia is new, glucose sl up, no A1c. No blood loss.     Reviewed recent trip to the ER for what turned out to be a panic attack and now he is had hydroxyzine added.  He has followed up with Psychiatry.  Reviewed interval cardiac testing and carotid ultrasound    Cath 2021  There was non-obstructive coronary artery disease..   Left main:  Distal 10% stenosis  Lad:  Luminal irregularities.  Mid 10-20% stenosis   Circumflex:  Luminal irregularities   RCA:  Luminal irregularities      Assessment and plan:             Primary hypertension, chronic and stable    Hyperlipidemia, unspecified hyperlipidemia type, chronic and stable    Coronary artery disease involving native heart without angina pectoris, unspecified vessel or lesion type, no angina    Erectile dysfunction, unspecified erectile dysfunction type, chronic and stable    Psychophysiological insomnia    Generalized anxiety disorder, follows with psychiatry now on hydroxyzine having been to the ER with a panic attack    Elevated liver enzymes, improved with weight loss    Atherosclerosis of aorta, chronic and stable    Fatty liver, seen liver clinic in the past    Calcified granuloma of lung, chronic and stable    Gastroesophageal reflux disease, unspecified whether esophagitis present, chronic and stable    Anemia, unspecified type, new problem, workup anemia, may need stool occult blood  -     CBC Auto Differential; Future  -     Iron and TIBC; Future  -     Ferritin; Future  -     TSH; Future  -     Testosterone; Future  Reticulocyte  Hyperglycemia, rule out diabetes  -     Hemoglobin A1C; Future    Hot flash in male  -     Testosterone; Future    Bilateral carotid artery disease, unspecified type,  report reviewed and minimal    Cardiac murmur, eventually may need echo    Weight loss  -     Sedimentation rate; Future

## 2023-11-23 DIAGNOSIS — K21.9 GASTROESOPHAGEAL REFLUX DISEASE WITHOUT ESOPHAGITIS: Chronic | ICD-10-CM

## 2023-11-23 NOTE — TELEPHONE ENCOUNTER
No care due was identified.  Health [facility] Embedded Care Due Messages. Reference number: [id number].   11/23/2023 4:20:05 AM CST

## 2023-11-24 RX ORDER — OMEPRAZOLE 40 MG/1
CAPSULE, DELAYED RELEASE ORAL
Qty: 180 CAPSULE | Refills: 3 | Status: SHIPPED | OUTPATIENT
Start: 2023-11-24

## 2023-11-24 NOTE — TELEPHONE ENCOUNTER
Refill Routing Note   Medication(s) are not appropriate for processing by [facility] for the following reason(s):        Outside of protocol    [facility] action(s):  Route        Medication Therapy Plan: Patient's total daily dose exceeds [facility] criteria.      Appointments  past 12m or future 3m with PCP    Date Provider   Last Visit   11/16/2023 Chaitanya Plasencia MD   Next Visit   1/10/2024 Chaitanya Plasencia MD   ED visits in past 90 days: 1        Note composed:8:28 AM 11/24/2023

## 2023-11-27 DIAGNOSIS — R39.9 LOWER URINARY TRACT SYMPTOMS (LUTS): Chronic | ICD-10-CM

## 2023-11-27 DIAGNOSIS — N52.9 ERECTILE DYSFUNCTION, UNSPECIFIED ERECTILE DYSFUNCTION TYPE: Chronic | ICD-10-CM

## 2023-11-27 DIAGNOSIS — I10 ESSENTIAL HYPERTENSION: Chronic | ICD-10-CM

## 2023-11-27 NOTE — TELEPHONE ENCOUNTER
No care due was identified.  [facility] Embedded Care Due Messages. Reference number: [id number].   11/27/2023 3:41:02 PM CST

## 2023-11-27 NOTE — TELEPHONE ENCOUNTER
No care due was identified.  [facility] Embedded Care Due Messages. Reference number: [id number].   11/27/2023 3:40:24 PM CST

## 2023-11-29 RX ORDER — TADALAFIL 5 MG/1
5 TABLET ORAL DAILY
Qty: 90 TABLET | Refills: 1 | Status: SHIPPED | OUTPATIENT
Start: 2023-11-29 | End: 2024-03-01 | Stop reason: SDUPTHER

## 2023-11-29 NOTE — TELEPHONE ENCOUNTER
Refill Routing Note   Medication(s) are not appropriate for processing by [facility] for the following reason(s):        New or recently adjusted medication    [facility] action(s):  Defer               Appointments  past 12m or future 3m with PCP    Date Provider   Last Visit   8/30/2023 Derik Ewing MD   Next Visit   Visit date not found Derik Ewing MD   ED visits in past 90 days: 1        Note composed:10:20 AM 11/29/2023

## 2023-11-30 ENCOUNTER — PATIENT MESSAGE (OUTPATIENT)
Dept: ADMINISTRATIVE | Facility: OTHER | Age: 73
End: 2023-11-30
Payer: MEDICARE

## 2023-11-30 RX ORDER — LOSARTAN POTASSIUM 25 MG/1
25 TABLET ORAL DAILY
Qty: 90 TABLET | Refills: 2 | Status: SHIPPED | OUTPATIENT
Start: 2023-11-30 | End: 2023-12-20 | Stop reason: SDUPTHER

## 2023-12-08 ENCOUNTER — OFFICE VISIT (OUTPATIENT)
Dept: PSYCHIATRY | Facility: CLINIC | Age: 73
End: 2023-12-08
Payer: MEDICARE

## 2023-12-08 DIAGNOSIS — F41.0 GENERALIZED ANXIETY DISORDER WITH PANIC ATTACKS: Primary | ICD-10-CM

## 2023-12-08 DIAGNOSIS — F41.1 GENERALIZED ANXIETY DISORDER WITH PANIC ATTACKS: Primary | ICD-10-CM

## 2023-12-08 DIAGNOSIS — Z86.59 HISTORY OF DEPRESSION: ICD-10-CM

## 2023-12-08 PROCEDURE — 3044F PR MOST RECENT HEMOGLOBIN A1C LEVEL <7.0%: ICD-10-PCS | Mod: CPTII,95,, | Performed by: PHYSICIAN ASSISTANT

## 2023-12-08 PROCEDURE — 1159F PR MEDICATION LIST DOCUMENTED IN MEDICAL RECORD: ICD-10-PCS | Mod: CPTII,95,, | Performed by: PHYSICIAN ASSISTANT

## 2023-12-08 PROCEDURE — 1159F MED LIST DOCD IN RCRD: CPT | Mod: CPTII,95,, | Performed by: PHYSICIAN ASSISTANT

## 2023-12-08 PROCEDURE — 99214 OFFICE O/P EST MOD 30 MIN: CPT | Mod: 95,,, | Performed by: PHYSICIAN ASSISTANT

## 2023-12-08 PROCEDURE — 4010F PR ACE/ARB THEARPY RXD/TAKEN: ICD-10-PCS | Mod: CPTII,95,, | Performed by: PHYSICIAN ASSISTANT

## 2023-12-08 PROCEDURE — 4010F ACE/ARB THERAPY RXD/TAKEN: CPT | Mod: CPTII,95,, | Performed by: PHYSICIAN ASSISTANT

## 2023-12-08 PROCEDURE — 1160F PR REVIEW ALL MEDS BY PRESCRIBER/CLIN PHARMACIST DOCUMENTED: ICD-10-PCS | Mod: CPTII,95,, | Performed by: PHYSICIAN ASSISTANT

## 2023-12-08 PROCEDURE — 3044F HG A1C LEVEL LT 7.0%: CPT | Mod: CPTII,95,, | Performed by: PHYSICIAN ASSISTANT

## 2023-12-08 PROCEDURE — 1160F RVW MEDS BY RX/DR IN RCRD: CPT | Mod: CPTII,95,, | Performed by: PHYSICIAN ASSISTANT

## 2023-12-08 PROCEDURE — 99214 PR OFFICE/OUTPT VISIT, EST, LEVL IV, 30-39 MIN: ICD-10-PCS | Mod: 95,,, | Performed by: PHYSICIAN ASSISTANT

## 2023-12-08 RX ORDER — MIRTAZAPINE 15 MG/1
7.5-15 TABLET, FILM COATED ORAL NIGHTLY
Qty: 30 TABLET | Refills: 2 | Status: SHIPPED | OUTPATIENT
Start: 2023-12-08 | End: 2024-01-29

## 2023-12-08 RX ORDER — HYDROXYZINE HYDROCHLORIDE 50 MG/1
50 TABLET, FILM COATED ORAL 3 TIMES DAILY PRN
Qty: 90 TABLET | Refills: 1 | Status: SHIPPED | OUTPATIENT
Start: 2023-12-08 | End: 2024-06-05

## 2023-12-08 NOTE — PROGRESS NOTES
Outpatient Psychiatry Follow up Visit (PA-C)    12/8/2023    Yair Owens, a 73 y.o. male, presenting for initial evaluation visit. Met with patient.    Reason for Encounter: Referral from ED . Patient complains of No chief complaint on file.    The patient location is: Home at address on record in [place]  The chief complaint leading to consultation is:  Anxiety    Visit type: audiovisual    Face to Face time with patient:  18 min  35 minutes of total time spent on the encounter, which includes face to face time and non-face to face time preparing to see the patient (eg, review of tests), Obtaining and/or reviewing separately obtained history, Documenting clinical information in the electronic or other health record, Independently interpreting results (not separately reported) and communicating results to the patient/family/caregiver, or Care coordination (not separately reported).         Each patient to whom he or she provides medical services by telemedicine is:  (1) informed of the relationship between the physician and patient and the respective role of any other health care provider with respect to management of the patient; and (2) notified that he or she may decline to receive medical services by telemedicine and may withdraw from such care at any time.    Notes:       History of Present Illness:  Pt presents for follow up of anxiety and related sleep difficulties.  He had been doing well with hydroxyzine 50 mg BID prn but reports increased anxiety lately, especially when thinking about how he doesn't communicate with his children.  Feels the holidays might be a rougher time of year.  Also reports his niece passed away last month, they were close.  Has tried trazodone in the past, states it didn't work.  Had taken sertraline a couple of years ago but doesn't remember the effect or shy it was discontinued.    We discussed his need for hobbies, how he generally takes good care of his health.  Pt shows me  some modes of the [employer] and [employer] that he had built.  States that his wife collects elephants and has 1,200 of them so there's not much room left for his hobbies.    Review Of Systems:     GENERAL:  No weight gain or loss  SKIN:  No rashes or lacerations  HEAD:  No headaches  CHEST:  No shortness of breath, hyperventilation or cough  CARDIOVASCULAR:  No tachycardia or chest pain  ABDOMEN:  No nausea, vomiting, pain, constipation or diarrhea  MUSCULOSKELETAL:  No pain or stiffness of the joints  NEUROLOGIC:  No weakness, sensory changes, seizures, confusion, memory loss, tremor or other abnormal movements      Current Evaluation:     Nutritional Screening: Considering the patient's height and weight, medications, medical history and preferences, should a referral be made to the dietitian? no    Constitutional  Vitals:  Most recent vital signs, dated less than 90 days prior to this appointment, were reviewed.    There were no vitals filed for this visit.     General:  unremarkable, age appropriate     Musculoskeletal  Muscle Strength/Tone:  not examined   Gait & Station:  Not observed     Psychiatric  Speech:  no latency; no press   Mood & Affect:  steady, happy  congruent and appropriate, full, bright   Thought Process:  normal and logical   Associations:  intact   Thought Content:  normal, no suicidality, no homicidality, delusions, or paranoia   Insight:  has awareness of illness   Judgement: behavior is adequate to circumstances   Orientation:  grossly intact   Memory: intact for content of interview   Language: grossly intact   Attention Span & Concentration:  able to focus   Fund of Knowledge:  intact and appropriate to age and level of education       Relevant Elements of Neurological Exam:  not observed    Functioning in Relationships:  Spouse/partner: Good  Peers: Good  Employers: NA    Laboratory Data  Lab Visit on 11/16/2023   Component Date Value Ref Range Status    WBC 11/16/2023 6.34  3.90 -  12.70 K/uL Final    RBC 11/16/2023 4.56 (L)  4.60 - 6.20 M/uL Final    Hemoglobin 11/16/2023 13.8 (L)  14.0 - 18.0 g/dL Final    Hematocrit 11/16/2023 42.5  40.0 - 54.0 % Final    MCV 11/16/2023 93  82 - 98 fL Final    MCH 11/16/2023 30.3  27.0 - 31.0 pg Final    MCHC 11/16/2023 32.5  32.0 - 36.0 g/dL Final    RDW 11/16/2023 13.8  11.5 - 14.5 % Final    Platelets 11/16/2023 253  150 - 450 K/uL Final    MPV 11/16/2023 11.3  9.2 - 12.9 fL Final    Immature Granulocytes 11/16/2023 0.3  0.0 - 0.5 % Final    Gran # (ANC) 11/16/2023 3.9  1.8 - 7.7 K/uL Final    Immature Grans (Abs) 11/16/2023 0.02  0.00 - 0.04 K/uL Final    Lymph # 11/16/2023 1.6  1.0 - 4.8 K/uL Final    Mono # 11/16/2023 0.7  0.3 - 1.0 K/uL Final    Eos # 11/16/2023 0.1  0.0 - 0.5 K/uL Final    Baso # 11/16/2023 0.04  0.00 - 0.20 K/uL Final    nRBC 11/16/2023 0  0 /100 WBC Final    Gran % 11/16/2023 61.6  38.0 - 73.0 % Final    Lymph % 11/16/2023 24.8  18.0 - 48.0 % Final    Mono % 11/16/2023 11.0  4.0 - 15.0 % Final    Eosinophil % 11/16/2023 1.7  0.0 - 8.0 % Final    Basophil % 11/16/2023 0.6  0.0 - 1.9 % Final    Differential Method 11/16/2023 Automated   Final    Iron 11/16/2023 112  45 - 160 ug/dL Final    Transferrin 11/16/2023 314  200 - 375 mg/dL Final    TIBC 11/16/2023 465 (H)  250 - 450 ug/dL Final    Saturated Iron 11/16/2023 24  20 - 50 % Final    Ferritin 11/16/2023 33  20.0 - 300.0 ng/mL Final    Hemoglobin A1C 11/16/2023 5.3  4.0 - 5.6 % Final    Estimated Avg Glucose 11/16/2023 105  68 - 131 mg/dL Final    TSH 11/16/2023 0.725  0.400 - 4.000 uIU/mL Final    Testosterone, Total 11/16/2023 535  304 - 1227 ng/dL Final    Sed Rate 11/16/2023 3  0 - 23 mm/Hr Final    Retic 11/16/2023 0.8  0.4 - 2.0 % Final         Medications  Outpatient Encounter Medications as of 12/8/2023   Medication Sig Dispense Refill    amLODIPine (NORVASC) 10 MG tablet Take 10 mg by mouth once daily.      aspirin (ECOTRIN) 81 MG EC tablet Take 1 tablet (81 mg total)  by mouth once daily.  0    atorvastatin (LIPITOR) 40 MG tablet Take 1 tablet (40 mg total) by mouth once daily. 90 tablet 3    BOOSTRIX TDAP 2.5-8-5 Lf-mcg-Lf/0.5mL Syrg injection       FLUAD QUAD 2023-24,65Y UP,,PF, 60 mcg (15 mcg x 4)/0.5 mL Syrg       hydrOXYzine (ATARAX) 50 MG tablet Take 1 tablet (50 mg total) by mouth 3 (three) times daily as needed for Anxiety. 90 tablet 1    losartan (COZAAR) 25 MG tablet Take 1 tablet (25 mg total) by mouth once daily. 90 tablet 2    mirtazapine (REMERON) 15 MG tablet Take 0.5-1 tablets (7.5-15 mg total) by mouth every evening. 30 tablet 2    multivitamin capsule Take 1 capsule by mouth once daily.      omega-3 fatty acids/fish oil (FISH OIL-OMEGA-3 FATTY ACIDS) 300-1,000 mg capsule Take by mouth once daily.      omeprazole (PRILOSEC) 40 MG capsule TAKE 1 CAPSULE BY MOUTH TWICE  DAILY 180 capsule 3    ondansetron (ZOFRAN-ODT) 4 MG TbDL Take 1 tablet (4 mg total) by mouth every 8 (eight) hours as needed (nausea). 30 tablet 1    psyllium 0.52 gram capsule Take 0.52 g by mouth once daily.      tadalafiL (CIALIS) 5 MG tablet Take 1 tablet (5 mg total) by mouth once daily. 90 tablet 1    tamsulosin (FLOMAX) 0.4 mg Cap Take 1 capsule (0.4 mg total) by mouth once daily. 30 capsule 11    [DISCONTINUED] hydrOXYzine pamoate (VISTARIL) 50 MG Cap Take 100 mg by mouth nightly as needed.      [DISCONTINUED] predniSONE (DELTASONE) 10 MG tablet Take 10 mg by mouth 2 (two) times daily.      [DISCONTINUED] propranoloL (INDERAL) 10 MG tablet TAKE 1 TABLET (10 MG TOTAL) BY MOUTH 3 (THREE) TIMES DAILY AS NEEDED (TREMORS OR ANXIETY). 270 tablet 1     No facility-administered encounter medications on file as of 12/8/2023.           Assessment - Diagnosis - Goals:     Impression: NINA and panic attacks with a hx of depression, increased anxiety and worsened insomnia possibly related to the holidays.  Will try mirtazapine, pt understands the risk of increased appetite, states he has a good diet and  exercises regularly so is ok with that risk.      ICD-10-CM ICD-9-CM   1. Generalized anxiety disorder with panic attacks  F41.1 300.02    F41.0 300.01   2. History of depression  Z86.59 V11.8       Trial of mirtazapine 7.5-15 mg qHS targeting sleep and anxiety.  Risks/bebefits/side effects discussed, including weight gain.    Continue hydroxyzine 50 mg BID   F/u 1 month    Strengths and Liabilities: Strength: Patient is intelligent., Strength: Patient has positive support network.      Treatment Plan/Recommendations:   Medication Management: Continue current medications.      Return to Clinic: 1 month, as needed

## 2024-01-04 DIAGNOSIS — I10 ESSENTIAL (PRIMARY) HYPERTENSION: ICD-10-CM

## 2024-01-04 RX ORDER — AMLODIPINE BESYLATE 10 MG/1
10 TABLET ORAL
Qty: 90 TABLET | Refills: 2 | Status: SHIPPED | OUTPATIENT
Start: 2024-01-04

## 2024-01-09 ENCOUNTER — OFFICE VISIT (OUTPATIENT)
Dept: HEPATOLOGY | Facility: CLINIC | Age: 74
End: 2024-01-09
Payer: MEDICARE

## 2024-01-09 ENCOUNTER — PROCEDURE VISIT (OUTPATIENT)
Dept: HEPATOLOGY | Facility: CLINIC | Age: 74
End: 2024-01-09
Payer: MEDICARE

## 2024-01-09 ENCOUNTER — LAB VISIT (OUTPATIENT)
Dept: LAB | Facility: HOSPITAL | Age: 74
End: 2024-01-09
Payer: MEDICARE

## 2024-01-09 VITALS — WEIGHT: 179 LBS | HEIGHT: 68 IN | BODY MASS INDEX: 27.13 KG/M2

## 2024-01-09 DIAGNOSIS — E78.5 HYPERLIPIDEMIA, UNSPECIFIED HYPERLIPIDEMIA TYPE: Chronic | ICD-10-CM

## 2024-01-09 DIAGNOSIS — I10 ESSENTIAL HYPERTENSION: ICD-10-CM

## 2024-01-09 DIAGNOSIS — K76.0 FATTY LIVER: Primary | Chronic | ICD-10-CM

## 2024-01-09 DIAGNOSIS — I10 PRIMARY HYPERTENSION: Chronic | ICD-10-CM

## 2024-01-09 DIAGNOSIS — I10 PRIMARY HYPERTENSION: ICD-10-CM

## 2024-01-09 DIAGNOSIS — R16.0 HEPATOMEGALY: ICD-10-CM

## 2024-01-09 DIAGNOSIS — E66.9 CLASS 1 OBESITY WITH BODY MASS INDEX (BMI) OF 31.0 TO 31.9 IN ADULT, UNSPECIFIED OBESITY TYPE, UNSPECIFIED WHETHER SERIOUS COMORBIDITY PRESENT: ICD-10-CM

## 2024-01-09 DIAGNOSIS — E66.3 OVERWEIGHT (BMI 25.0-29.9): ICD-10-CM

## 2024-01-09 DIAGNOSIS — K76.0 FATTY LIVER: Chronic | ICD-10-CM

## 2024-01-09 LAB
ALBUMIN SERPL BCP-MCNC: 3.9 G/DL (ref 3.5–5.2)
ALP SERPL-CCNC: 80 U/L (ref 55–135)
ALT SERPL W/O P-5'-P-CCNC: 17 U/L (ref 10–44)
ANION GAP SERPL CALC-SCNC: 8 MMOL/L (ref 8–16)
AST SERPL-CCNC: 22 U/L (ref 10–40)
BILIRUB DIRECT SERPL-MCNC: 0.4 MG/DL (ref 0.1–0.3)
BILIRUB SERPL-MCNC: 1.2 MG/DL (ref 0.1–1)
BUN SERPL-MCNC: 12 MG/DL (ref 8–23)
CALCIUM SERPL-MCNC: 9.9 MG/DL (ref 8.7–10.5)
CHLORIDE SERPL-SCNC: 107 MMOL/L (ref 95–110)
CO2 SERPL-SCNC: 26 MMOL/L (ref 23–29)
CREAT SERPL-MCNC: 1 MG/DL (ref 0.5–1.4)
EST. GFR  (NO RACE VARIABLE): >60 ML/MIN/1.73 M^2
GLUCOSE SERPL-MCNC: 111 MG/DL (ref 70–110)
POTASSIUM SERPL-SCNC: 5.1 MMOL/L (ref 3.5–5.1)
PROT SERPL-MCNC: 7.2 G/DL (ref 6–8.4)
SODIUM SERPL-SCNC: 141 MMOL/L (ref 136–145)

## 2024-01-09 PROCEDURE — 80076 HEPATIC FUNCTION PANEL: CPT | Performed by: NURSE PRACTITIONER

## 2024-01-09 PROCEDURE — 99214 OFFICE O/P EST MOD 30 MIN: CPT | Mod: S$GLB,,, | Performed by: NURSE PRACTITIONER

## 2024-01-09 PROCEDURE — 36415 COLL VENOUS BLD VENIPUNCTURE: CPT | Performed by: NURSE PRACTITIONER

## 2024-01-09 PROCEDURE — 99999 PR PBB SHADOW E&M-EST. PATIENT-LVL III: CPT | Mod: PBBFAC,,, | Performed by: NURSE PRACTITIONER

## 2024-01-09 PROCEDURE — 80048 BASIC METABOLIC PNL TOTAL CA: CPT | Performed by: INTERNAL MEDICINE

## 2024-01-09 PROCEDURE — 76981 USE PARENCHYMA: CPT | Mod: S$GLB,,, | Performed by: NURSE PRACTITIONER

## 2024-01-09 NOTE — PATIENT INSTRUCTIONS
- Fibroscan today to non-invasively re-stage liver disease.  - Repeat liver function tests annually through PCP.   - Avoid alcohol and herbal supplements/alternative remedies.  - Recommend additional weight loss goal of 10-15 lbs, through diet and exercise.  - Recommend good control of cholesterol, blood pressure, & blood sugar levels.  - Return to clinic PRN.

## 2024-01-09 NOTE — PROGRESS NOTES
[facility] Established Patient Visit    Reason for Visit: Fatty Liver    PCP: Chaitanya Plasencia    HPI:  This is a 73 y.o. male with PMH noted below, here for follow up for fatty liver. He was last seen in clinic by myself for a virtual visit in 1/2023.    The patient's risk factors for fatty liver disease include:     Obesity                                        Yes; BMI 27.22 - Net weight loss of 44 lbs since 6/2022.  Dyslipidemia                                Yes; Well controlled on Atorvastatin 40 mg daily.   Latest Reference Range & Units 06/02/23 10:53   Cholesterol Total 120 - 199 mg/dL 125   HDL 40 - 75 mg/dL 39 (L)   HDL/Cholesterol Ratio 20.0 - 50.0 % 31.2   Non-HDL Cholesterol mg/dL 86   Total Cholesterol/HDL Ratio 2.0 - 5.0  3.2   Triglycerides 30 - 150 mg/dL 49   LDL Cholesterol 63.0 - 159.0 mg/dL 76.2     Insulin resistance/Diabetes         No; Last HgbA1c was 5.3% (11/2023).  Family history of diabetes           No    He has had intermittently elevated liver enzymes in a hepatocellular pattern since at least 5/2004. He had also gained 25-30 lbs during the pandemic. He has made significant dietary changes, and is engaging in routine aerobic exercise on a daily basis (walking). He has lost 44 pounds since 6/2022. Abdominal Ultrasound in 10/2022 showed mild hepatomegaly (17.7 cm), due to fatty infiltration of the liver; spleen size was normal (9.62 cm). He has never undergone a liver biopsy.    FIB-4 Calculation: 1.48 at 1/9/2024 11:07 AM   Calculated from:  Last SGOT/AST : 22 at 1/9/2024 11:07 AM  Last SGPT/ALT: 17 at 1/9/2024 11:07 AM   Platelets: 253 at 1/9/2024 11:07 AM   Age: 73 y.o.     FIB-4 below 1.30 is considered as low-risk for advanced fibrosis  FIB-4 over 2.67 is considered as high-risk for advanced fibrosis  FIB-4 values between 1.30 and 2.67 are considered as intermediate-risk of advanced fibrosis for ages 36-64.     For ages > 64 the cut-off for low-risk goes to <  2.  This is a screening tool and clinical judgement should be used in the interpretation of these results.    He has no known family history of liver disease. He denies any history of heavy alcohol use. He smokes marijuana occasionally, but does not use IV or intranasal drugs. He is immune to Hepatitis A. HBV and HCV negative on prior labs. He is S/P prophylactic vaccination for Hepatitis B.    Initial Fibroscan to stage his liver disease was suggestive of moderate fatty infiltration of the liver (S2), with F0-F1 fibrosis and a low likelihood of cirrhosis. Follow up Fibroscan today is improved due to weight loss and dietary changes, and is suggestive of mild fatty infiltration of the liver (S1), with F0-F1 fibrosis. He is well appearing, and has no signs or symptoms of hepatic decompensation including jaundice, dark urine, pruritus, abdominal distention, lower extremity edema, hematemesis, melena, or periods of confusion suggestive of hepatic encephalopathy.      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    PSHX:  has a past surgical history that includes Bone Resection, Rib; Hand surgery; Scrotal surgery; Appendectomy; Colonoscopy (N/A, 7/21/2017); Left heart catheterization (Left, 3/2/2021); Ultrasound guidance (3/2/2021); and Colonoscopy (N/A, 11/22/2022).    The patient's social and family histories were reviewed by me and updated in the appropriate section of the electronic medical record.    Review of patient's allergies indicates:   Allergen Reactions    Codeine Itching    Ibuprofen Itching    Remeron [mirtazapine] Anxiety     Did not  help the patient sleep and created anxiety.     Current Outpatient Medications on File Prior to Visit   Medication Sig Dispense Refill    amLODIPine (NORVASC) 10 MG tablet TAKE 1 TABLET BY MOUTH EVERY DAY 90 tablet 2    aspirin (ECOTRIN) 81 MG EC tablet Take 1 tablet (81 mg total) by mouth once daily.  0    atorvastatin (LIPITOR) 40 MG tablet Take 1 tablet (40 mg total) by mouth once daily. 90 tablet 3    FLUAD QUAD 2023-24,65Y UP,,PF, 60 mcg (15 mcg x 4)/0.5 mL Syrg       hydrOXYzine (ATARAX) 50 MG tablet Take 1 tablet (50 mg total) by mouth 3 (three) times daily as needed for Anxiety. 90 tablet 1    losartan (COZAAR) 50 MG tablet Take 1 tablet (50 mg total) by mouth once daily. 90 tablet 0    mirtazapine (REMERON) 15 MG tablet Take 0.5-1 tablets (7.5-15 mg total) by mouth every evening. 30 tablet 2    multivitamin capsule Take 1 capsule by mouth once daily.      omeprazole (PRILOSEC) 40 MG capsule TAKE 1 CAPSULE BY MOUTH TWICE  DAILY 180 capsule 3    tadalafiL (CIALIS) 5 MG tablet Take 1 tablet (5 mg total) by mouth once daily. 90 tablet 1    tamsulosin (FLOMAX) 0.4 mg Cap Take 1 capsule (0.4 mg total) by mouth once daily. 30 capsule 11    [DISCONTINUED] BOOSTRIX TDAP 2.5-8-5 Lf-mcg-Lf/0.5mL Syrg injection       [DISCONTINUED] omega-3 fatty acids/fish oil (FISH OIL-OMEGA-3 FATTY ACIDS) 300-1,000 mg capsule Take by mouth once daily.      [DISCONTINUED] ondansetron (ZOFRAN-ODT) 4 MG TbDL Take 1 tablet (4 mg total) by mouth every 8 (eight) hours as needed (nausea). 30 tablet 1    [DISCONTINUED] propranoloL (INDERAL) 10 MG tablet TAKE 1 TABLET (10 MG TOTAL) BY MOUTH 3 (THREE) TIMES DAILY AS NEEDED (TREMORS OR ANXIETY). 270 tablet 1    [DISCONTINUED] psyllium 0.52 gram capsule Take 0.52 g by mouth once daily.       No current facility-administered medications on file prior to visit.     SOCIAL HISTORY:   Social History     Tobacco Use   Smoking Status Every Day    Current packs/day: 0.50    Average packs/day: 0.5  "packs/day for 60.3 years (30.2 ttl pk-yrs)    Types: Cigarettes    Start date: 1967    Passive exposure: Current   Smokeless Tobacco Never   Tobacco Comments    0.5 ppd or less      Social History     Substance and Sexual Activity   Alcohol Use Never     Social History     Substance and Sexual Activity   Drug Use No     ROS:   GENERAL: Denies fever, chills, weight loss/gain, fatigue  HEENT: Denies headaches, dizziness, vision/hearing changes  CARDIOVASCULAR: Denies chest pain, palpitations, or edema  RESPIRATORY: Denies dyspnea, cough  GI: Denies abdominal pain, rectal bleeding, nausea, vomiting. No change in bowel pattern or color  : Denies dysuria, hematuria   SKIN: Denies rash, itching   NEURO: Denies confusion, memory loss, or mood changes  PSYCH: Denies depression or anxiety  HEME/LYMPH: Denies easy bruising or bleeding    Objective Findings:    PHYSICAL EXAM:   Friendly White male, in no acute distress; alert and oriented to person, place and time.  VITALS: Ht 5' 8" (1.727 m)   Wt 81.2 kg (179 lb)   BMI 27.22 kg/m²    HENT: Normocephalic, without obvious abnormality.   EYES: Sclerae anicteric.  NECK: No obvious masses.  CARDIOVASCULAR: No peripheral edema.  RESPIRATORY: Normal respiratory effort.  GI: Non-distended abdomen.   EXTREMITIES:  No clubbing, cyanosis or edema.  SKIN: Warm and dry. No jaundice. No rashes noted to exposed skin.   NEURO:  Normal gait. No asterixis.  PSYCH:  Memory intact. Thought and speech pattern appropriate. Behavior normal. No depression or anxiety noted.    DIAGNOSTIC STUDIES:    US ABDOMEN LIMITED 10/20/2022:    FINDINGS:    Liver: Normal in size, measuring 17.7 cm. Fatty liver infiltration. No focal hepatic lesions.     Gallbladder: No calculi, wall thickening, or pericholecystic fluid.  No sonographic Sparks's sign.     Biliary system: The common duct is not dilated, measuring 3.3 mm.  No intrahepatic ductal dilatation.     Spleen: The spleen measures 9.62 with multiple " calcifications.     Miscellaneous: No upper abdominal ascites.     Right kidney:     There is a simple cyst in the interpolar regions of the right kidney measuring 2.9 x 3.3 x 3.0 cm.     Impression:     1.  Hepatomegaly with hepatic steatosis.     2.  Multiple splenic calcifications.  Suggest correlation with prior granulomatous disease exposure.     3.  Simple cyst in the interpolar region of the right kidney.    FIBROSCAN 1/9/2023:    Findings  Median liver stiffness score:  3.7  CAP Reading: dB/m:  270     IQR/med %:  19  Interpretation  Fibrosis interpretation is based on medial liver stiffness - Kilopascal (kPa).     Fibrosis Stage:  F 0-1  Steatosis interpretation is based on controlled attenuation parameter - (dB/m).     Steatosis Grade:  S2    FIBROSCAN 1/9/2024:    Findings  Median liver stiffness score:  4.8  CAP Reading: dB/m:  233     IQR/med %:  10  Interpretation  Fibrosis interpretation is based on medial liver stiffness - Kilopascal (kPa).     Fibrosis Stage:  F 0-1  Steatosis interpretation is based on controlled attenuation parameter - (dB/m).     Steatosis Grade:  S1    EDUCATION:  Per AVS.    ASSESSMENT & PLAN:  73 y.o. White male with:    1. Fatty liver        2. Hepatomegaly        3. Hyperlipidemia, unspecified hyperlipidemia type        4. Primary hypertension        5. Overweight (BMI 25.0-29.9)          - Fibroscan today to non-invasively re-stage liver disease.  - Repeat liver function tests annually through PCP.   - Avoid alcohol and herbal supplements/alternative remedies.  - Recommend additional weight loss goal of 10-15 lbs, through diet and exercise.  - Recommend good control of cholesterol, blood pressure, & blood sugar levels.  - Return to clinic PRN.    Thank you for allowing me to participate in the care of Yair Owens       Hepatology Nurse Practitioner  [facility]    CC'ed note to:   No ref. provider found  Chaitanya Plasencia,  MD

## 2024-01-09 NOTE — PROCEDURES
FibroScan [place] (Vibration Controlled Transient Elastography)    Date/Time: 1/9/2024 9:30 AM    Performed by: Deysi Rader NP  Authorized by: Deysi Rader NP    Diagnosis:  NAFLD    Probe:  M    Universal Protocol: Patient's identity, procedure and site were verified, confirmatory pause was performed.  Discussed procedure including risks and potential complications.  Questions answered.  Patient verbalizes understanding and wishes to proceed with VCTE.     Procedure: After providing explanations of the procedure, patient was placed in the supine position with right arm in maximum abduction to allow optimal exposure of right lateral abdomen.  Patient was briefly assessed, Testing was performed in the mid-axillary location, 50Hz Shear Wave pulses were applied and the resulting Shear Wave and Propagation Speed detected with a 3.5 MHz ultrasonic signal, using the FibroScan probe, Skin to liver capsule distance and liver parenchyma were accessed during the entire examination with the FibroScan probe, Patient was instructed to breathe normally and to abstain from sudden movements during the procedure, allowing for random measurements of liver stiffness. At least 10 Shear Waves were produced, Individual measurements of each Shear Wave were calculated.  Patient tolerated the procedure well with no complications.  Meets discharge criteria as was dismissed.  Rates pain 0 out of 10.  Patient will follow up with ordering provider to review results.    Findings  Median liver stiffness score:  4.8  CAP Reading: dB/m:  233    IQR/med %:  10  Interpretation  Fibrosis interpretation is based on medial liver stiffness - Kilopascal (kPa).    Fibrosis Stage:  F 0-1  Steatosis interpretation is based on controlled attenuation parameter - (dB/m).    Steatosis Grade:  S1

## 2024-01-22 ENCOUNTER — OFFICE VISIT (OUTPATIENT)
Dept: PSYCHIATRY | Facility: CLINIC | Age: 74
End: 2024-01-22
Payer: MEDICARE

## 2024-01-22 DIAGNOSIS — Z86.59 HISTORY OF DEPRESSION: ICD-10-CM

## 2024-01-22 DIAGNOSIS — F41.1 GENERALIZED ANXIETY DISORDER WITH PANIC ATTACKS: Primary | ICD-10-CM

## 2024-01-22 DIAGNOSIS — F41.0 GENERALIZED ANXIETY DISORDER WITH PANIC ATTACKS: Primary | ICD-10-CM

## 2024-01-22 PROCEDURE — 99214 OFFICE O/P EST MOD 30 MIN: CPT | Mod: 95,,, | Performed by: PHYSICIAN ASSISTANT

## 2024-01-22 NOTE — PROGRESS NOTES
Outpatient Psychiatry Follow up Visit (PA-DINESH)    1/22/2024    Yair Owens, a 74 y.o. male, presenting for initial evaluation visit. Met with patient.    Reason for Encounter: Referral from ED . Patient complains of No chief complaint on file.    The patient location is: At a [facility] in [place]  The chief complaint leading to consultation is:  Anxiety    Visit type: audiovisual    Face to Face time with patient:  4 min  35 minutes of total time spent on the encounter, which includes face to face time and non-face to face time preparing to see the patient (eg, review of tests), Obtaining and/or reviewing separately obtained history, Documenting clinical information in the electronic or other health record, Independently interpreting results (not separately reported) and communicating results to the patient/family/caregiver, or Care coordination (not separately reported).         Each patient to whom he or she provides medical services by telemedicine is:  (1) informed of the relationship between the physician and patient and the respective role of any other health care provider with respect to management of the patient; and (2) notified that he or she may decline to receive medical services by telemedicine and may withdraw from such care at any time.    Notes:       History of Present Illness:  Pt presents for follow up of anxiety and related sleep difficulties.  He is currently taking hydroxyzine 50 mg BID and mirtazapine 15 mg nightly, started last visit.  He reports his anxiety is improved significantly and he is sleeping better, but is still waking up during the night a few times.  He reports the mirtazapine does make him a little hungry but that it doesn't really bother him.  Pt is at a [facility] today celebrating his birthday.  Review Of Systems:     GENERAL:  No weight gain or loss  SKIN:  No rashes or lacerations  HEAD:  No headaches  CHEST:  No shortness of breath, hyperventilation or cough  CARDIOVASCULAR:   No tachycardia or chest pain  ABDOMEN:  No nausea, vomiting, pain, constipation or diarrhea  MUSCULOSKELETAL:  No pain or stiffness of the joints  NEUROLOGIC:  No weakness, sensory changes, seizures, confusion, memory loss, tremor or other abnormal movements      Current Evaluation:     Nutritional Screening: Considering the patient's height and weight, medications, medical history and preferences, should a referral be made to the dietitian? no    Constitutional  Vitals:  Most recent vital signs, dated less than 90 days prior to this appointment, were reviewed.    There were no vitals filed for this visit.     General:  unremarkable, age appropriate     Musculoskeletal  Muscle Strength/Tone:  not examined   Gait & Station:  Not observed     Psychiatric  Speech:  no latency; no press   Mood & Affect:  steady, happy  congruent and appropriate, full, bright   Thought Process:  normal and logical   Associations:  intact   Thought Content:  normal, no suicidality, no homicidality, delusions, or paranoia   Insight:  has awareness of illness   Judgement: behavior is adequate to circumstances   Orientation:  grossly intact   Memory: intact for content of interview   Language: grossly intact   Attention Span & Concentration:  able to focus   Fund of Knowledge:  intact and appropriate to age and level of education       Relevant Elements of Neurological Exam:  not observed    Functioning in Relationships:  Spouse/partner: Good  Peers: Good  Employers: NA    Laboratory Data  Lab Visit on 01/09/2024   Component Date Value Ref Range Status    Total Protein 01/09/2024 7.2  6.0 - 8.4 g/dL Final    Albumin 01/09/2024 3.9  3.5 - 5.2 g/dL Final    Total Bilirubin 01/09/2024 1.2 (H)  0.1 - 1.0 mg/dL Final    Bilirubin, Direct 01/09/2024 0.4 (H)  0.1 - 0.3 mg/dL Final    AST 01/09/2024 22  10 - 40 U/L Final    ALT 01/09/2024 17  10 - 44 U/L Final    Alkaline Phosphatase 01/09/2024 80  55 - 135 U/L Final    Sodium 01/09/2024 141  136 -  145 mmol/L Final    Potassium 01/09/2024 5.1  3.5 - 5.1 mmol/L Final    Chloride 01/09/2024 107  95 - 110 mmol/L Final    CO2 01/09/2024 26  23 - 29 mmol/L Final    Glucose 01/09/2024 111 (H)  70 - 110 mg/dL Final    BUN 01/09/2024 12  8 - 23 mg/dL Final    Creatinine 01/09/2024 1.0  0.5 - 1.4 mg/dL Final    Calcium 01/09/2024 9.9  8.7 - 10.5 mg/dL Final    Anion Gap 01/09/2024 8  8 - 16 mmol/L Final    eGFR 01/09/2024 >60.0  >60 mL/min/1.73 m^2 Final         Medications  Outpatient Encounter Medications as of 1/22/2024   Medication Sig Dispense Refill    amLODIPine (NORVASC) 10 MG tablet TAKE 1 TABLET BY MOUTH EVERY DAY 90 tablet 2    aspirin (ECOTRIN) 81 MG EC tablet Take 1 tablet (81 mg total) by mouth once daily.  0    atorvastatin (LIPITOR) 40 MG tablet Take 1 tablet (40 mg total) by mouth once daily. 90 tablet 3    FLUAD QUAD 2023-24,65Y UP,,PF, 60 mcg (15 mcg x 4)/0.5 mL Syrg       hydrOXYzine (ATARAX) 50 MG tablet Take 1 tablet (50 mg total) by mouth 3 (three) times daily as needed for Anxiety. 90 tablet 1    losartan (COZAAR) 50 MG tablet Take 1 tablet (50 mg total) by mouth once daily. 90 tablet 0    mirtazapine (REMERON) 15 MG tablet Take 0.5-1 tablets (7.5-15 mg total) by mouth every evening. 30 tablet 2    multivitamin capsule Take 1 capsule by mouth once daily.      omeprazole (PRILOSEC) 40 MG capsule TAKE 1 CAPSULE BY MOUTH TWICE  DAILY 180 capsule 3    tadalafiL (CIALIS) 5 MG tablet Take 1 tablet (5 mg total) by mouth once daily. 90 tablet 1    tamsulosin (FLOMAX) 0.4 mg Cap Take 1 capsule (0.4 mg total) by mouth once daily. 30 capsule 11    [DISCONTINUED] amLODIPine (NORVASC) 10 MG tablet Take 10 mg by mouth once daily.      [DISCONTINUED] BOOSTRIX TDAP 2.5-8-5 Lf-mcg-Lf/0.5mL Syrg injection       [DISCONTINUED] omega-3 fatty acids/fish oil (FISH OIL-OMEGA-3 FATTY ACIDS) 300-1,000 mg capsule Take by mouth once daily.      [DISCONTINUED] ondansetron (ZOFRAN-ODT) 4 MG TbDL Take 1 tablet (4 mg total)  by mouth every 8 (eight) hours as needed (nausea). 30 tablet 1    [DISCONTINUED] propranoloL (INDERAL) 10 MG tablet TAKE 1 TABLET (10 MG TOTAL) BY MOUTH 3 (THREE) TIMES DAILY AS NEEDED (TREMORS OR ANXIETY). 270 tablet 1    [DISCONTINUED] psyllium 0.52 gram capsule Take 0.52 g by mouth once daily.       No facility-administered encounter medications on file as of 1/22/2024.           Assessment - Diagnosis - Goals:     Impression: NINA and panic attacks with a hx of depression, anxiety significantly improved with addition of mirtazapine and sleep improved but not optimal.      ICD-10-CM ICD-9-CM   1. Generalized anxiety disorder with panic attacks  F41.1 300.02    F41.0 300.01   2. History of depression  Z86.59 V11.8       Increasing mirtazapine to 30 mg qHS.  Risks/bebefits/side effects discussed.  Continue hydroxyzine 50 mg BID   F/u 1 month    Strengths and Liabilities: Strength: Patient is intelligent., Strength: Patient has positive support network.      Treatment Plan/Recommendations:   Medication Management: Continue current medications.      Return to Clinic: 1 month, as needed

## 2024-01-29 RX ORDER — MIRTAZAPINE 15 MG/1
7.5-15 TABLET, FILM COATED ORAL NIGHTLY
Qty: 90 TABLET | Refills: 1 | Status: SHIPPED | OUTPATIENT
Start: 2024-01-29 | End: 2024-03-19

## 2024-02-02 DIAGNOSIS — F51.04 PSYCHOPHYSIOLOGICAL INSOMNIA: Chronic | ICD-10-CM

## 2024-02-02 RX ORDER — HYDROXYZINE PAMOATE 50 MG/1
CAPSULE ORAL
Qty: 180 CAPSULE | Refills: 1 | Status: SHIPPED | OUTPATIENT
Start: 2024-02-02 | End: 2024-03-19 | Stop reason: SDUPTHER

## 2024-02-21 ENCOUNTER — OFFICE VISIT (OUTPATIENT)
Dept: ORTHOPEDICS | Facility: CLINIC | Age: 74
End: 2024-02-21
Attending: ORTHOPAEDIC SURGERY
Payer: MEDICARE

## 2024-02-21 DIAGNOSIS — M25.551 PAIN OF RIGHT HIP: Primary | ICD-10-CM

## 2024-02-21 DIAGNOSIS — M65.341 TRIGGER FINGER, RIGHT RING FINGER: Primary | ICD-10-CM

## 2024-02-21 PROCEDURE — 99213 OFFICE O/P EST LOW 20 MIN: CPT | Mod: 25,S$GLB,, | Performed by: ORTHOPAEDIC SURGERY

## 2024-02-21 PROCEDURE — 99999 PR PBB SHADOW E&M-EST. PATIENT-LVL III: CPT | Mod: PBBFAC,,, | Performed by: ORTHOPAEDIC SURGERY

## 2024-02-21 PROCEDURE — 20550 NJX 1 TENDON SHEATH/LIGAMENT: CPT | Mod: RT,S$GLB,, | Performed by: ORTHOPAEDIC SURGERY

## 2024-02-21 RX ORDER — TRIAMCINOLONE ACETONIDE 40 MG/ML
20 INJECTION, SUSPENSION INTRA-ARTICULAR; INTRAMUSCULAR
Status: DISCONTINUED | OUTPATIENT
Start: 2024-02-21 | End: 2024-02-21 | Stop reason: HOSPADM

## 2024-02-21 RX ORDER — MELOXICAM 7.5 MG/1
7.5 TABLET ORAL DAILY
Qty: 30 TABLET | Refills: 0 | Status: SHIPPED | OUTPATIENT
Start: 2024-02-21 | End: 2024-03-19

## 2024-02-21 RX ADMIN — TRIAMCINOLONE ACETONIDE 20 MG: 40 INJECTION, SUSPENSION INTRA-ARTICULAR; INTRAMUSCULAR at 09:02

## 2024-02-21 NOTE — PROGRESS NOTES
Follow up visit    History of Present Illness:   Yair comes to the office for  evaluation of right ring finger trigger finger. Started a few weeks ago. No injury. Worse with motion or use of the hand. No numbness or tingling. Had similar symptoms in the past, resolved with use of a wrist brace.  This brace did not help this time.    R hip   Pain in greater troch when he lays on the R side  Started about 6 months ago  No trauma  Does not happen consistently   No groin pain    ROS: unremarkable and no change since last visit    Physical Examination:    NAD  Right hand  Triggering of the right index finger, this is nonpainful   Locking of the right.  This is his complaint today.  There is a palpable nodule at the A1 pulley but this is not tender  Negative Tinel's at the carpal tunnel  LTSI m/u/r  2+ RP  + EPL, IO, FDS, FDP    Hip   Tender over ASIS  Non tender over GT   Neg stinchfield         Radiographic imaging:  Two views of the right hip show mild degenerative changes, bony growth to R iliac wing - enthesophyte vs osteochondroma. No aggressive features. No comparative films available for review    I personally reviewed and interpreted the patient's imaging obtained today in clinic     Assessment/Plan:  74 y.o. male  with Right ring finger trigger finger, R ilium bony growth      We discussed the etiology of persistent pain and further treatment options.  Injection of the right trigger finger - ring  performed, please see procedure note for more details.  Prior to the injection risks and benefits of corticosteroid injection were discussed with the patient including pain, infection, bleeding, skin color changes, swelling, steroid flare. We discussed that over time injections can result in chondral damage, acceleration of arthritis formation, damage to tendons and damage to joints.  The patient consented for the procedure.  Post-injection instructions were given to the patient in writing.  Mobic 7.5 mg PO QD x 2  weeks then PRN. The patient was advised that NSAID-type medications have important potential side effects: gastrointestinal irritation, GI bleeding, cardiac effects and renal injuries. Take the medication with food and to stop and call the office for any GI upset, vomiting, abdominal pain or black/bloody stools. The patient expresses understanding of these issues and questions were answered.  Return for follow up visit:  6 months with repeat 2 view pelvis for monitoring of growth. Can consider MRI if pain becomes more constant or if any change on next radiograph    All questions were answered in detail. The patient  verbalized the understanding of the treatment plan and is in full agreement with the treatment plan.

## 2024-02-21 NOTE — PROCEDURES
Tendon Sheath    Date/Time: 2/21/2024 9:15 AM    Performed by: Annabel Siu MD  Authorized by: Annabel Siu MD    Consent Done?:  Yes (Verbal)  Indications:  Pain  Timeout: prior to procedure the correct patient, procedure, and site was verified    Prep: patient was prepped and draped in usual sterile fashion      Local anesthesia used?: Yes    Local anesthetic:  Topical anesthetic  Location:  Ring finger  Site:  R ring flexor tendon sheath  Needle size:  25 G  Approach:  Volar  Medications:  20 mg triamcinolone acetonide 40 mg/mL  Patient tolerance:  Patient tolerated the procedure well with no immediate complications

## 2024-02-22 ENCOUNTER — OFFICE VISIT (OUTPATIENT)
Dept: PSYCHIATRY | Facility: CLINIC | Age: 74
End: 2024-02-22
Payer: COMMERCIAL

## 2024-02-22 DIAGNOSIS — F41.0 GENERALIZED ANXIETY DISORDER WITH PANIC ATTACKS: Primary | ICD-10-CM

## 2024-02-22 DIAGNOSIS — F41.1 GENERALIZED ANXIETY DISORDER WITH PANIC ATTACKS: Primary | ICD-10-CM

## 2024-02-22 DIAGNOSIS — Z86.59 HISTORY OF DEPRESSION: ICD-10-CM

## 2024-02-22 PROCEDURE — 99214 OFFICE O/P EST MOD 30 MIN: CPT | Mod: 95,,, | Performed by: PHYSICIAN ASSISTANT

## 2024-02-22 PROCEDURE — 90833 PSYTX W PT W E/M 30 MIN: CPT | Mod: 95,,, | Performed by: PHYSICIAN ASSISTANT

## 2024-02-22 RX ORDER — BUSPIRONE HYDROCHLORIDE 10 MG/1
10 TABLET ORAL 3 TIMES DAILY
Qty: 90 TABLET | Refills: 2 | Status: SHIPPED | OUTPATIENT
Start: 2024-02-22 | End: 2024-05-20

## 2024-02-22 NOTE — PROGRESS NOTES
"Outpatient Psychiatry Follow up Visit (PA-C)    2/22/2024    Yair Owens, a 74 y.o. male, presenting for initial evaluation visit. Met with patient.    Reason for Encounter: Referral from ED . Patient complains of No chief complaint on file.    The patient location is: At a [facility] in [place]  The chief complaint leading to consultation is:  Anxiety    Visit type: audiovisual    Face to Face time with patient:  27 min  45 minutes of total time spent on the encounter, which includes face to face time and non-face to face time preparing to see the patient (eg, review of tests), Obtaining and/or reviewing separately obtained history, Documenting clinical information in the electronic or other health record, Independently interpreting results (not separately reported) and communicating results to the patient/family/caregiver, or Care coordination (not separately reported).         Each patient to whom he or she provides medical services by telemedicine is:  (1) informed of the relationship between the physician and patient and the respective role of any other health care provider with respect to management of the patient; and (2) notified that he or she may decline to receive medical services by telemedicine and may withdraw from such care at any time.    Notes:       History of Present Illness:  Pt presents for follow up of anxiety and related sleep difficulties.  He is currently taking hydroxyzine 50 mg BID.  He states that he stopped taking mirtazapine due to feeling "off."  States he feels he needs a "nerve pill" like the Ativan he used to get.  Is amenable to non-controlled substance options after being explained the risks of benzos.  He states he worries a lot about his health, including his hip pain and not really understanding the results of his recent x-ray.  States he has a new PCP whom he likes, so this will hopefully help with the illness anxiety.    Psychotherapy:  Target symptoms: anxiety   Why chosen " therapy is appropriate versus another modality: relevant to diagnosis  Outcome monitoring methods: self-report, observation  Therapeutic intervention type: behavior modifying psychotherapy, supportive psychotherapy  Topics discussed/themes: stress related to medical comorbidities  The patient's response to the intervention is accepting. The patient's progress toward treatment goals is good.   Duration of intervention: 20 minutes.    Review Of Systems:     GENERAL:  No weight gain or loss  SKIN:  No rashes or lacerations  HEAD:  No headaches  CHEST:  No shortness of breath, hyperventilation or cough  CARDIOVASCULAR:  No tachycardia or chest pain  ABDOMEN:  No nausea, vomiting, pain, constipation or diarrhea  MUSCULOSKELETAL:  No pain or stiffness of the joints  NEUROLOGIC:  No weakness, sensory changes, seizures, confusion, memory loss, tremor or other abnormal movements      Current Evaluation:     Nutritional Screening: Considering the patient's height and weight, medications, medical history and preferences, should a referral be made to the dietitian? no    Constitutional  Vitals:  Most recent vital signs, dated less than 90 days prior to this appointment, were reviewed.    There were no vitals filed for this visit.     General:  unremarkable, age appropriate     Musculoskeletal  Muscle Strength/Tone:  not examined   Gait & Station:  Not observed     Psychiatric  Speech:  no latency; no press   Mood & Affect:  steady, dysthymic  congruent and appropriate, full, bright   Thought Process:  normal and logical   Associations:  intact   Thought Content:  normal, no suicidality, no homicidality, delusions, or paranoia   Insight:  has awareness of illness   Judgement: behavior is adequate to circumstances   Orientation:  grossly intact   Memory: intact for content of interview   Language: grossly intact   Attention Span & Concentration:  able to focus   Fund of Knowledge:  intact and appropriate to age and level of  education       Relevant Elements of Neurological Exam:  not observed    Functioning in Relationships:  Spouse/partner: Good  Peers: Good  Employers: NA    Laboratory Data  No visits with results within 1 Month(s) from this visit.   Latest known visit with results is:   Lab Visit on 01/09/2024   Component Date Value Ref Range Status    Total Protein 01/09/2024 7.2  6.0 - 8.4 g/dL Final    Albumin 01/09/2024 3.9  3.5 - 5.2 g/dL Final    Total Bilirubin 01/09/2024 1.2 (H)  0.1 - 1.0 mg/dL Final    Bilirubin, Direct 01/09/2024 0.4 (H)  0.1 - 0.3 mg/dL Final    AST 01/09/2024 22  10 - 40 U/L Final    ALT 01/09/2024 17  10 - 44 U/L Final    Alkaline Phosphatase 01/09/2024 80  55 - 135 U/L Final    Sodium 01/09/2024 141  136 - 145 mmol/L Final    Potassium 01/09/2024 5.1  3.5 - 5.1 mmol/L Final    Chloride 01/09/2024 107  95 - 110 mmol/L Final    CO2 01/09/2024 26  23 - 29 mmol/L Final    Glucose 01/09/2024 111 (H)  70 - 110 mg/dL Final    BUN 01/09/2024 12  8 - 23 mg/dL Final    Creatinine 01/09/2024 1.0  0.5 - 1.4 mg/dL Final    Calcium 01/09/2024 9.9  8.7 - 10.5 mg/dL Final    Anion Gap 01/09/2024 8  8 - 16 mmol/L Final    eGFR 01/09/2024 >60.0  >60 mL/min/1.73 m^2 Final         Medications  Outpatient Encounter Medications as of 2/22/2024   Medication Sig Dispense Refill    amLODIPine (NORVASC) 10 MG tablet TAKE 1 TABLET BY MOUTH EVERY DAY 90 tablet 2    aspirin (ECOTRIN) 81 MG EC tablet Take 1 tablet (81 mg total) by mouth once daily.  0    atorvastatin (LIPITOR) 40 MG tablet Take 1 tablet (40 mg total) by mouth once daily. 90 tablet 3    busPIRone (BUSPAR) 10 MG tablet Take 1 tablet (10 mg total) by mouth 3 (three) times daily. 90 tablet 2    FLUAD QUAD 2023-24,65Y UP,,PF, 60 mcg (15 mcg x 4)/0.5 mL Syrg       hydrOXYzine (ATARAX) 50 MG tablet Take 1 tablet (50 mg total) by mouth 3 (three) times daily as needed for Anxiety. 90 tablet 1    hydrOXYzine pamoate (VISTARIL) 50 MG Cap TAKE 2 CAPSULES BY MOUTH AT BEDTIME  AS NEEDED FOR INSOMNIA 180 capsule 1    losartan (COZAAR) 50 MG tablet TAKE 1 TABLET BY MOUTH EVERY DAY 90 tablet 3    meloxicam (MOBIC) 7.5 MG tablet Take 1 tablet (7.5 mg total) by mouth once daily. Take once a day for two weeks then as needed. Take with food 30 tablet 0    mirtazapine (REMERON) 15 MG tablet Take 0.5-1 tablets (7.5-15 mg total) by mouth every evening. 90 tablet 1    multivitamin capsule Take 1 capsule by mouth once daily.      omeprazole (PRILOSEC) 40 MG capsule TAKE 1 CAPSULE BY MOUTH TWICE  DAILY 180 capsule 3    tadalafiL (CIALIS) 5 MG tablet Take 1 tablet (5 mg total) by mouth once daily. 90 tablet 1    tamsulosin (FLOMAX) 0.4 mg Cap Take 1 capsule (0.4 mg total) by mouth once daily. 30 capsule 11    [DISCONTINUED] losartan (COZAAR) 50 MG tablet Take 1 tablet (50 mg total) by mouth once daily. 90 tablet 0    [DISCONTINUED] mirtazapine (REMERON) 15 MG tablet Take 0.5-1 tablets (7.5-15 mg total) by mouth every evening. 30 tablet 2    [DISCONTINUED] propranoloL (INDERAL) 10 MG tablet TAKE 1 TABLET (10 MG TOTAL) BY MOUTH 3 (THREE) TIMES DAILY AS NEEDED (TREMORS OR ANXIETY). 270 tablet 1    [DISCONTINUED] triamcinolone acetonide injection 20 mg        No facility-administered encounter medications on file as of 2/22/2024.           Assessment - Diagnosis - Goals:     Impression: NINA and panic attacks with a hx of depression, anxiety worsened since stopping mirtazapine.  Will try buspirone.  Pt declines psychotherapy at this time.      ICD-10-CM ICD-9-CM   1. Generalized anxiety disorder with panic attacks  F41.1 300.02    F41.0 300.01   2. History of depression  Z86.59 V11.8       Start buspirone 10 mg TID.  Risks/bebefits/side effects discussed.  Continue hydroxyzine 50 mg BID   F/u 1 month    Strengths and Liabilities: Strength: Patient is intelligent., Strength: Patient has positive support network.      Treatment Plan/Recommendations:   Medication Management: Continue current  medications.      Return to Clinic: 1 month, as needed

## 2024-02-27 DIAGNOSIS — Z00.00 ENCOUNTER FOR MEDICARE ANNUAL WELLNESS EXAM: ICD-10-CM

## 2024-02-28 ENCOUNTER — PATIENT MESSAGE (OUTPATIENT)
Dept: PSYCHIATRY | Facility: CLINIC | Age: 74
End: 2024-02-28
Payer: MEDICARE

## 2024-03-01 DIAGNOSIS — N52.9 ERECTILE DYSFUNCTION, UNSPECIFIED ERECTILE DYSFUNCTION TYPE: Chronic | ICD-10-CM

## 2024-03-01 DIAGNOSIS — R39.9 LOWER URINARY TRACT SYMPTOMS (LUTS): Chronic | ICD-10-CM

## 2024-03-01 NOTE — TELEPHONE ENCOUNTER
Refill Routing Note   Medication(s) are not appropriate for processing by [facility] for the following reason(s):        No active prescription written by provider    [facility] action(s):  Defer             Appointments  past 12m or future 3m with PCP    Date Provider   Last Visit   11/16/2023 Chaitanya Plasencia MD   Next Visit   3/19/2024 Chaitanya Plasencia MD   ED visits in past 90 days: 0        Note composed:9:40 AM 03/01/2024

## 2024-03-04 RX ORDER — TADALAFIL 5 MG/1
5 TABLET ORAL DAILY
Qty: 90 TABLET | Refills: 3 | Status: SHIPPED | OUTPATIENT
Start: 2024-03-04

## 2024-03-19 ENCOUNTER — OFFICE VISIT (OUTPATIENT)
Dept: FAMILY MEDICINE | Facility: CLINIC | Age: 74
End: 2024-03-19
Payer: MEDICARE

## 2024-03-19 VITALS
TEMPERATURE: 98 F | HEART RATE: 66 BPM | BODY MASS INDEX: 26.4 KG/M2 | HEIGHT: 68 IN | SYSTOLIC BLOOD PRESSURE: 138 MMHG | OXYGEN SATURATION: 98 % | DIASTOLIC BLOOD PRESSURE: 62 MMHG | WEIGHT: 174.19 LBS

## 2024-03-19 DIAGNOSIS — J84.10 CALCIFIED GRANULOMA OF LUNG: ICD-10-CM

## 2024-03-19 DIAGNOSIS — F41.1 GENERALIZED ANXIETY DISORDER: ICD-10-CM

## 2024-03-19 DIAGNOSIS — Z86.010 HISTORY OF COLONIC POLYPS: ICD-10-CM

## 2024-03-19 DIAGNOSIS — Z12.5 SCREENING FOR PROSTATE CANCER: ICD-10-CM

## 2024-03-19 DIAGNOSIS — E78.5 HYPERLIPIDEMIA, UNSPECIFIED HYPERLIPIDEMIA TYPE: ICD-10-CM

## 2024-03-19 DIAGNOSIS — D69.2 PURPURA: ICD-10-CM

## 2024-03-19 DIAGNOSIS — K21.9 GASTROESOPHAGEAL REFLUX DISEASE, UNSPECIFIED WHETHER ESOPHAGITIS PRESENT: ICD-10-CM

## 2024-03-19 DIAGNOSIS — R74.8 ELEVATED LIVER ENZYMES: ICD-10-CM

## 2024-03-19 DIAGNOSIS — I70.0 ATHEROSCLEROSIS OF AORTA: ICD-10-CM

## 2024-03-19 DIAGNOSIS — R73.9 HYPERGLYCEMIA: ICD-10-CM

## 2024-03-19 DIAGNOSIS — I77.9 BILATERAL CAROTID ARTERY DISEASE, UNSPECIFIED TYPE: ICD-10-CM

## 2024-03-19 DIAGNOSIS — I10 PRIMARY HYPERTENSION: ICD-10-CM

## 2024-03-19 DIAGNOSIS — Z00.00 ROUTINE MEDICAL EXAM: Primary | ICD-10-CM

## 2024-03-19 DIAGNOSIS — N52.9 ERECTILE DYSFUNCTION, UNSPECIFIED ERECTILE DYSFUNCTION TYPE: ICD-10-CM

## 2024-03-19 DIAGNOSIS — F17.200 TOBACCO DEPENDENCE: ICD-10-CM

## 2024-03-19 DIAGNOSIS — F51.04 PSYCHOPHYSIOLOGICAL INSOMNIA: ICD-10-CM

## 2024-03-19 DIAGNOSIS — I25.10 CORONARY ARTERY DISEASE INVOLVING NATIVE HEART WITHOUT ANGINA PECTORIS, UNSPECIFIED VESSEL OR LESION TYPE: ICD-10-CM

## 2024-03-19 DIAGNOSIS — F17.210 NICOTINE DEPENDENCE, CIGARETTES, UNCOMPLICATED: ICD-10-CM

## 2024-03-19 PROCEDURE — 99214 OFFICE O/P EST MOD 30 MIN: CPT | Mod: 25,S$GLB,, | Performed by: INTERNAL MEDICINE

## 2024-03-19 PROCEDURE — 99397 PER PM REEVAL EST PAT 65+ YR: CPT | Mod: S$GLB,,, | Performed by: INTERNAL MEDICINE

## 2024-03-19 PROCEDURE — 99999 PR PBB SHADOW E&M-EST. PATIENT-LVL III: CPT | Mod: PBBFAC,,, | Performed by: INTERNAL MEDICINE

## 2024-03-19 RX ORDER — MELOXICAM 7.5 MG/1
TABLET ORAL
Qty: 30 TABLET | Refills: 0 | Status: SHIPPED | OUTPATIENT
Start: 2024-03-19

## 2024-03-19 NOTE — PROGRESS NOTES
Chief complaint: physical      Seen once by me 5/23        Patient is a 74-year-old white male new to me 5/23 .  Regarding health maintenance he quit smoking but wife started back again so he started back and he knows he needs to quit.  He is down to eight cigarettes a day  He is up-to-date on his colon and prostate cancer screening.    Colon 11/22 -5 yrs  PSA 9/23 per           ROS:   CONST: weight stable. EYES: no vision change. ENT: no sore throat. CV: no chest pain w/ exertion. RESP: no shortness of breath. GI: no nausea, vomiting, diarrhea. No dysphagia. : no urinary issues. MUSCULOSKELETAL: no new myalgias or arthralgias. SKIN: no new changes. NEURO: no focal deficits. PSYCH: no new issues. ENDOCRINE: no polyuria. HEME: no lymph nodes. ALLERGY: no general pruritis.        Past Medical History:   Diagnosis Date    CAD (coronary artery disease) 02/22/2021    Cath 2021  There was non-obstructive coronary artery disease..   Left main:  Distal 10% stenosis  Lad:  Luminal irregularities.  Mid 10-20% stenosis   Circumflex:  Luminal irregularities   RCA:  Luminal irregularities    Calcified granuloma of lung 02/03/2023    LLL calcified granuloma seen on CT Chest Lung Screening Low Dose 01/31/2018    Erectile dysfunction 02/14/2020    Fatty liver     Generalized anxiety disorder 01/11/2017    GERD (gastroesophageal reflux disease)     Gout, unspecified     History of colonic polyps 11/16/2023    2 polyps 11/22 -5 yrs    Hyperlipidemia     Hypertension     Psychophysiological insomnia 07/24/2017    Tobacco dependence 07/24/2017   1-19 % carotid dis 2022    Past Surgical History:   Procedure Laterality Date    APPENDECTOMY      BONE RESECTION, RIB      for thoracic outlet syndrome    COLONOSCOPY N/A 7/21/2017    Procedure: COLONOSCOPY;  Surgeon: Deepak Servin MD;  Location: [facility];  Service: Endoscopy;  Laterality: N/A;    COLONOSCOPY N/A 11/22/2022    Procedure: COLONOSCOPY;  Surgeon: Sam Obrien MD;  Location:  [facility] ENDO;  Service: Endoscopy;  Laterality: N/A;  vacc-sutab-inst portal-tb    HAND SURGERY      LEFT HEART CATHETERIZATION Left 3/2/2021    Procedure: Left heart cath, radial, 730 am;  Surgeon: Vladimir Avalos MD;  Location: [facility] CATH LAB;  Service: Cardiology;  Laterality: Left;  RN PREOP 2/25/2021--COVID NEGATIVE ON 3/1  H/P INCOMPLETE    SCROTAL SURGERY      mass    ULTRASOUND GUIDANCE  3/2/2021    Procedure: Ultrasound Guidance;  Surgeon: Vladimir Avalos MD;  Location: [facility] CATH LAB;  Service: Cardiology;;     Social History     Socioeconomic History    Marital status:    Occupational History     Employer: [employer]   Tobacco Use    Smoking status: Every Day     Current packs/day: 0.50     Average packs/day: 0.5 packs/day for 60.5 years (30.3 ttl pk-yrs)     Types: Cigarettes     Start date: 1967     Passive exposure: Current    Smokeless tobacco: Never    Tobacco comments:     0.5 ppd or less    Substance and Sexual Activity    Alcohol use: Never    Drug use: No    Sexual activity: Yes     Partners: Female     Social Determinants of Health     Financial Resource Strain: Low Risk  (11/14/2023)    Overall Financial Resource Strain (CARDIA)     Difficulty of Paying Living Expenses: Not hard at all   Food Insecurity: No Food Insecurity (11/14/2023)    Hunger Vital Sign     Worried About Running Out of Food in the Last Year: Never true     Ran Out of Food in the Last Year: Never true   Transportation Needs: No Transportation Needs (11/14/2023)    PRAPARE - Transportation     Lack of Transportation (Medical): No     Lack of Transportation (Non-Medical): No   Physical Activity: Sufficiently Active (11/14/2023)    Exercise Vital Sign     Days of Exercise per Week: 7 days     Minutes of Exercise per Session: 60 min   Stress: Stress Concern Present (11/14/2023)    [place] [employer] - Occupational Stress Questionnaire     Feeling of Stress : To some extent   Social Connections: Unknown  (11/14/2023)    Social Connection and Isolation Panel [NHANES]     Frequency of Communication with Friends and Family: Three times a week     Frequency of Social Gatherings with Friends and Family: Once a week     Active Member of Clubs or Organizations: No     Attends Club or Organization Meetings: Never     Marital Status:    Recent Concern: Social Connections - Moderately Isolated (11/14/2023)    Social Connection and Isolation Panel [NHANES]     Frequency of Communication with Friends and Family: Three times a week     Frequency of Social Gatherings with Friends and Family: Once a week     Attends Pentec[employer]al Services: Never     Active Member of Clubs or Organizations: No     Attends Club or Organization Meetings: Never     Marital Status:    Housing Stability: Low Risk  (11/14/2023)    Housing Stability Vital Sign     Unable to Pay for Housing in the Last Year: No     Number of Places Lived in the Last Year: 1     Unstable Housing in the Last Year: No     family history includes Asthma in his sister; Cancer in his father; Cancer (age of onset: 78) in his sister; Heart disease in his father; Lupus in his daughter; No Known Problems in his mother and son; Stroke in his brother.    Gen: no distress  EYES: conjunctiva clear, non-icteric, PERRL  ENT: nose clear, nasal mucosa normal, oropharynx clear and moist, teeth good  NECK:supple, thyroid non-palpable  RESP: effort is good, lungs clear  CV: heart RRR w/slight systolic murmur which does radiate to the left carotid.  No bruit on the right  no edema  GI: abdomen soft, non-distended, non-tender, no hepatosplenomegaly  MS: gait normal, no clubbing or cyanosis of the digits  SKIN: no rashes, warm to touch    Yair was seen today for follow-up.    Diagnoses and all orders for this visit:    Routine medical exam, up-to-date on cancer screening      Screening for prostate cancer    History of colonic  polyps                                              Additional evaluation and management issues:    Additionally patient has numerous other medical issues to address separate from his physical.  Labs 11/23 all pretty good.  He is walking daily in his lost a lot of weight.  He would like to maintain his weight and we discussed adjusting diet in the amount of exercise.  Discussed he is overall in good health but his most apparent health detriment would be his continued smoking and he is working on that.  We discussed updating his screening lung CT for which it has been a couple of years    We reviewed that his anemia is new and he sees no blood in his urine and it was checked by Urology.  No GI blood loss that is obvious.  No melena.  He only takes an aspirin.  He has  thyroid checked 11/23.  He did lose about 42 lb in the last year with walking daily.  Sometimes over the past two weeks he will feel hot but no sweating.  It lasts about an hour.  Will rule out thyroid issues.  No tachycardia.    Anemia is new but just slight     glucose sl up,  A1c normal 11/23.  No blood loss.     Previously had a trip to the ER for what turned out to be a panic attack and now he is had hydroxyzine added.  He has followed up with Psychiatry.  Reviewed interval cardiac testing and carotid ultrasound    Cath 2021  There was non-obstructive coronary artery disease..   Left main:  Distal 10% stenosis  Lad:  Luminal irregularities.  Mid 10-20% stenosis   Circumflex:  Luminal irregularities   RCA:  Luminal irregularities      2022  There is 0-19% right Internal Carotid Stenosis.  There is 0-19% left Internal Carotid Stenosis.    Assessment and plan:           Primary hypertension ,chronic condition and stable.    Hyperlipidemia, unspecified hyperlipidemia type ,chronic condition and stable.    Coronary artery disease involving native heart without angina pectoris, unspecified vessel or lesion type ,chronic condition and stable.    Erectile  dysfunction, unspecified erectile dysfunction type ,chronic condition and stable.    Psychophysiological insomnia    Generalized anxiety disorder ,chronic condition and stable.    Elevated liver enzymes, better    Calcified granuloma of lung ,chronic condition and stable.  Update lung CT    Purpura ,chronic condition and stable.    Atherosclerosis of aorta ,chronic condition and stable.    Gastroesophageal reflux disease, unspecified whether esophagitis present ,chronic condition and stable.    Hyperglycemia, normal sugar    Tobacco dependence, down to 8 a day    Nicotine dependence, cigarettes, uncomplicated, update screening CT of the lungs    Bilateral carotid artery disease, unspecified type ,chronic condition and stable.

## 2024-03-22 ENCOUNTER — OFFICE VISIT (OUTPATIENT)
Dept: PSYCHIATRY | Facility: CLINIC | Age: 74
End: 2024-03-22
Payer: MEDICARE

## 2024-03-22 DIAGNOSIS — Z86.59 HISTORY OF DEPRESSION: ICD-10-CM

## 2024-03-22 DIAGNOSIS — F41.1 GENERALIZED ANXIETY DISORDER WITH PANIC ATTACKS: Primary | ICD-10-CM

## 2024-03-22 DIAGNOSIS — F41.0 GENERALIZED ANXIETY DISORDER WITH PANIC ATTACKS: Primary | ICD-10-CM

## 2024-03-22 PROCEDURE — G2211 COMPLEX E/M VISIT ADD ON: HCPCS | Mod: 95,,, | Performed by: PHYSICIAN ASSISTANT

## 2024-03-22 PROCEDURE — 99214 OFFICE O/P EST MOD 30 MIN: CPT | Mod: 95,,, | Performed by: PHYSICIAN ASSISTANT

## 2024-03-22 NOTE — PROGRESS NOTES
Outpatient Psychiatry Follow up Visit (PA-DINESH)    3/22/2024    Yair Owens, a 74 y.o. male, presenting for initial evaluation visit. Met with patient.    Reason for Encounter: Referral from ED . Patient complains of No chief complaint on file.    The patient location is: At home at address on record in [place]  The chief complaint leading to consultation is:  Anxiety    Visit type: audiovisual    Face to Face time with patient:  9 min  18 minutes of total time spent on the encounter, which includes face to face time and non-face to face time preparing to see the patient (eg, review of tests), Obtaining and/or reviewing separately obtained history, Documenting clinical information in the electronic or other health record, Independently interpreting results (not separately reported) and communicating results to the patient/family/caregiver, or Care coordination (not separately reported).         Each patient to whom he or she provides medical services by telemedicine is:  (1) informed of the relationship between the physician and patient and the respective role of any other health care provider with respect to management of the patient; and (2) notified that he or she may decline to receive medical services by telemedicine and may withdraw from such care at any time.    Notes:       History of Present Illness:  Pt presents for follow up of anxiety and related sleep difficulties.  He is currently taking hydroxyzine 50 mg BID PRN and buspirone 10 mg TID, started last visit.  He reports his anxiety is a lot better, has not had a panic attack since last visit.  Needing hydroxyzine less.  Denies side effects of buspirone.  Is also less anxious about health related concerns.    Review Of Systems:     GENERAL:  No weight gain or loss  SKIN:  No rashes or lacerations  HEAD:  No headaches  CHEST:  No shortness of breath, hyperventilation or cough  CARDIOVASCULAR:  No tachycardia or chest pain  ABDOMEN:  No nausea, vomiting,  pain, constipation or diarrhea  MUSCULOSKELETAL:  No pain or stiffness of the joints  NEUROLOGIC:  No weakness, sensory changes, seizures, confusion, memory loss, tremor or other abnormal movements      Current Evaluation:     Nutritional Screening: Considering the patient's height and weight, medications, medical history and preferences, should a referral be made to the dietitian? no    Constitutional  Vitals:  Most recent vital signs, dated less than 90 days prior to this appointment, were reviewed.    There were no vitals filed for this visit.     General:  unremarkable, age appropriate     Musculoskeletal  Muscle Strength/Tone:  not examined   Gait & Station:  Not observed     Psychiatric  Speech:  no latency; no press   Mood & Affect:  steady, happy  congruent and appropriate, full, bright   Thought Process:  normal and logical   Associations:  intact   Thought Content:  normal, no suicidality, no homicidality, delusions, or paranoia   Insight:  has awareness of illness   Judgement: behavior is adequate to circumstances   Orientation:  grossly intact   Memory: intact for content of interview   Language: grossly intact   Attention Span & Concentration:  able to focus   Fund of Knowledge:  intact and appropriate to age and level of education       Relevant Elements of Neurological Exam:  not observed    Functioning in Relationships:  Spouse/partner: Good  Peers: Good  Employers: NA    Laboratory Data  No visits with results within 1 Month(s) from this visit.   Latest known visit with results is:   Lab Visit on 01/09/2024   Component Date Value Ref Range Status    Total Protein 01/09/2024 7.2  6.0 - 8.4 g/dL Final    Albumin 01/09/2024 3.9  3.5 - 5.2 g/dL Final    Total Bilirubin 01/09/2024 1.2 (H)  0.1 - 1.0 mg/dL Final    Bilirubin, Direct 01/09/2024 0.4 (H)  0.1 - 0.3 mg/dL Final    AST 01/09/2024 22  10 - 40 U/L Final    ALT 01/09/2024 17  10 - 44 U/L Final    Alkaline Phosphatase 01/09/2024 80  55 - 135 U/L  Final    Sodium 01/09/2024 141  136 - 145 mmol/L Final    Potassium 01/09/2024 5.1  3.5 - 5.1 mmol/L Final    Chloride 01/09/2024 107  95 - 110 mmol/L Final    CO2 01/09/2024 26  23 - 29 mmol/L Final    Glucose 01/09/2024 111 (H)  70 - 110 mg/dL Final    BUN 01/09/2024 12  8 - 23 mg/dL Final    Creatinine 01/09/2024 1.0  0.5 - 1.4 mg/dL Final    Calcium 01/09/2024 9.9  8.7 - 10.5 mg/dL Final    Anion Gap 01/09/2024 8  8 - 16 mmol/L Final    eGFR 01/09/2024 >60.0  >60 mL/min/1.73 m^2 Final         Medications  Outpatient Encounter Medications as of 3/22/2024   Medication Sig Dispense Refill    amLODIPine (NORVASC) 10 MG tablet TAKE 1 TABLET BY MOUTH EVERY DAY 90 tablet 2    aspirin (ECOTRIN) 81 MG EC tablet Take 1 tablet (81 mg total) by mouth once daily.  0    atorvastatin (LIPITOR) 40 MG tablet Take 1 tablet (40 mg total) by mouth once daily. 90 tablet 3    busPIRone (BUSPAR) 10 MG tablet Take 1 tablet (10 mg total) by mouth 3 (three) times daily. 90 tablet 2    hydrOXYzine (ATARAX) 50 MG tablet Take 1 tablet (50 mg total) by mouth 3 (three) times daily as needed for Anxiety. 90 tablet 1    losartan (COZAAR) 50 MG tablet TAKE 1 TABLET BY MOUTH EVERY DAY 90 tablet 3    meloxicam (MOBIC) 7.5 MG tablet TAKE 1 TABLET BY MOUTH ONCE A DAY FOR 2 WEEKS THEN TAKE AS NEEDED. TAKE WITH FOOD 30 tablet 0    multivitamin capsule Take 1 capsule by mouth once daily.      omeprazole (PRILOSEC) 40 MG capsule TAKE 1 CAPSULE BY MOUTH TWICE  DAILY 180 capsule 3    tadalafiL (CIALIS) 5 MG tablet Take 1 tablet (5 mg total) by mouth once daily. 90 tablet 3    tamsulosin (FLOMAX) 0.4 mg Cap Take 1 capsule (0.4 mg total) by mouth once daily. 30 capsule 11    [DISCONTINUED] FLUAD QUAD 2023-24,65Y UP,,PF, 60 mcg (15 mcg x 4)/0.5 mL Syrg       [DISCONTINUED] hydrOXYzine pamoate (VISTARIL) 50 MG Cap TAKE 2 CAPSULES BY MOUTH AT BEDTIME AS NEEDED FOR INSOMNIA 180 capsule 1    [DISCONTINUED] meloxicam (MOBIC) 7.5 MG tablet Take 1 tablet (7.5 mg  total) by mouth once daily. Take once a day for two weeks then as needed. Take with food 30 tablet 0    [DISCONTINUED] mirtazapine (REMERON) 15 MG tablet Take 0.5-1 tablets (7.5-15 mg total) by mouth every evening. 90 tablet 1    [DISCONTINUED] propranoloL (INDERAL) 10 MG tablet TAKE 1 TABLET (10 MG TOTAL) BY MOUTH 3 (THREE) TIMES DAILY AS NEEDED (TREMORS OR ANXIETY). 270 tablet 1    [DISCONTINUED] tadalafiL (CIALIS) 5 MG tablet Take 1 tablet (5 mg total) by mouth once daily. 90 tablet 1     No facility-administered encounter medications on file as of 3/22/2024.           Assessment - Diagnosis - Goals:     Impression: NINA and panic attacks with a hx of depression, anxiety worsened since stopping mirtazapine.  Will try buspirone.  Pt declines psychotherapy at this time.      ICD-10-CM ICD-9-CM   1. Generalized anxiety disorder with panic attacks  F41.1 300.02    F41.0 300.01   2. History of depression  Z86.59 V11.8       Continue buspirone 10 mg TID.  Risks/bebefits/side effects discussed.  Continue hydroxyzine 50 mg BID PRN   F/u 3 months    Strengths and Liabilities: Strength: Patient is intelligent., Strength: Patient has positive support network.      Treatment Plan/Recommendations:   Medication Management: Continue current medications.      Return to Clinic: 3 months, as needed    Visit today included increased complexity associated with the care of the episodic problem anxiety addressed and managing the longitudinal care of the patient due to the serious and/or complex managed problem(s) NINA, panic attacks, hx ofMDD.

## 2024-03-26 ENCOUNTER — HOSPITAL ENCOUNTER (OUTPATIENT)
Dept: RADIOLOGY | Facility: HOSPITAL | Age: 74
Discharge: HOME | End: 2024-03-26
Attending: INTERNAL MEDICINE
Payer: MEDICARE

## 2024-03-26 DIAGNOSIS — F17.210 NICOTINE DEPENDENCE, CIGARETTES, UNCOMPLICATED: ICD-10-CM

## 2024-03-26 PROCEDURE — 71271 CT THORAX LUNG CANCER SCR C-: CPT | Mod: TC

## 2024-03-26 PROCEDURE — 71271 CT THORAX LUNG CANCER SCR C-: CPT | Mod: 26,,, | Performed by: RADIOLOGY

## 2024-04-11 ENCOUNTER — OFFICE VISIT (OUTPATIENT)
Dept: HOME HEALTH SERVICES | Facility: CLINIC | Age: 74
End: 2024-04-11
Payer: MEDICARE

## 2024-04-11 VITALS
SYSTOLIC BLOOD PRESSURE: 136 MMHG | HEIGHT: 68 IN | BODY MASS INDEX: 26.07 KG/M2 | DIASTOLIC BLOOD PRESSURE: 74 MMHG | HEART RATE: 73 BPM | WEIGHT: 172 LBS

## 2024-04-11 DIAGNOSIS — F41.1 GENERALIZED ANXIETY DISORDER: Chronic | ICD-10-CM

## 2024-04-11 DIAGNOSIS — E66.3 OVERWEIGHT (BMI 25.0-29.9): ICD-10-CM

## 2024-04-11 DIAGNOSIS — J43.8 OTHER EMPHYSEMA: ICD-10-CM

## 2024-04-11 DIAGNOSIS — F17.200 TOBACCO DEPENDENCE: Chronic | ICD-10-CM

## 2024-04-11 DIAGNOSIS — K76.0 FATTY LIVER: Chronic | ICD-10-CM

## 2024-04-11 DIAGNOSIS — I10 PRIMARY HYPERTENSION: Chronic | ICD-10-CM

## 2024-04-11 DIAGNOSIS — J84.10 CALCIFIED GRANULOMA OF LUNG: ICD-10-CM

## 2024-04-11 DIAGNOSIS — Z00.00 ENCOUNTER FOR PREVENTIVE HEALTH EXAMINATION: Primary | ICD-10-CM

## 2024-04-11 DIAGNOSIS — K21.9 GASTROESOPHAGEAL REFLUX DISEASE, UNSPECIFIED WHETHER ESOPHAGITIS PRESENT: Chronic | ICD-10-CM

## 2024-04-11 DIAGNOSIS — D69.2 PURPURA: ICD-10-CM

## 2024-04-11 DIAGNOSIS — I70.0 ATHEROSCLEROSIS OF AORTA: Chronic | ICD-10-CM

## 2024-04-11 PROCEDURE — 3288F FALL RISK ASSESSMENT DOCD: CPT | Mod: CPTII,S$GLB,, | Performed by: NURSE PRACTITIONER

## 2024-04-11 PROCEDURE — 1126F AMNT PAIN NOTED NONE PRSNT: CPT | Mod: CPTII,S$GLB,, | Performed by: NURSE PRACTITIONER

## 2024-04-11 PROCEDURE — 3078F DIAST BP <80 MM HG: CPT | Mod: CPTII,S$GLB,, | Performed by: NURSE PRACTITIONER

## 2024-04-11 PROCEDURE — 1160F RVW MEDS BY RX/DR IN RCRD: CPT | Mod: CPTII,S$GLB,, | Performed by: NURSE PRACTITIONER

## 2024-04-11 PROCEDURE — 1158F ADVNC CARE PLAN TLK DOCD: CPT | Mod: CPTII,S$GLB,, | Performed by: NURSE PRACTITIONER

## 2024-04-11 PROCEDURE — 1101F PT FALLS ASSESS-DOCD LE1/YR: CPT | Mod: CPTII,S$GLB,, | Performed by: NURSE PRACTITIONER

## 2024-04-11 PROCEDURE — 4010F ACE/ARB THERAPY RXD/TAKEN: CPT | Mod: CPTII,S$GLB,, | Performed by: NURSE PRACTITIONER

## 2024-04-11 PROCEDURE — 1159F MED LIST DOCD IN RCRD: CPT | Mod: CPTII,S$GLB,, | Performed by: NURSE PRACTITIONER

## 2024-04-11 PROCEDURE — G0439 PPPS, SUBSEQ VISIT: HCPCS | Mod: S$GLB,,, | Performed by: NURSE PRACTITIONER

## 2024-04-11 PROCEDURE — 3075F SYST BP GE 130 - 139MM HG: CPT | Mod: CPTII,S$GLB,, | Performed by: NURSE PRACTITIONER

## 2024-04-11 NOTE — PATIENT INSTRUCTIONS
Counseling and Referral of Other Preventative  (Italic type indicates deductible and co-insurance are waived)    Patient Name: Yair Owens  Today's Date: 4/11/2024    Health Maintenance       Date Due Completion Date    High Dose Statin 02/21/2025 2/21/2024    Aspirin/Antiplatelet Therapy 02/21/2025 2/21/2024    LDCT Lung Screen 03/26/2025 3/26/2024    Colorectal Cancer Screening 11/22/2027 11/22/2022    Lipid Panel 06/02/2028 6/2/2023    TETANUS VACCINE 07/13/2033 7/13/2023        No orders of the defined types were placed in this encounter.      The following information is provided to all patients.  This information is to help you find resources for any of the problems found today that may be affecting your health:                  Living healthy guide: www.[facility].[place].gov      Understanding Diabetes: www.diabetes.org      Eating healthy: www.cdc.gov/healthyweight      CDC home safety checklist: www.cdc.gov/steadi/patient.html      Agency on Aging: www.goea.[place].[facility]      Alcoholics anonymous (AA): www.aa.org      Physical Activity: www.brina.nih.gov/ao0pauz      Tobacco use: www.quitwithusla.org

## 2024-04-11 NOTE — PROGRESS NOTES
"  Yair Owens presented for a follow-up Medicare AWV today. The following components were reviewed and updated:    Medical history  Family History  Social history  Allergies and Current Medications  Health Risk Assessment  Health Maintenance  Care Team    **See Completed Assessments for Annual Wellness visit with in the encounter summary    The following assessments were completed:  Depression Screening  Cognitive function Screening    Timed Get Up Test  Whisper Test      Opioid documentation:      Patient does not have a current opioid prescription.          Vitals:    04/11/24 1010   BP: 136/74   Pulse: 73   Weight: 78 kg (172 lb)   Height: 5' 8" (1.727 m)     Body mass index is 26.15 kg/m².       Physical Exam  Constitutional:       Appearance: Normal appearance.   HENT:      Head: Normocephalic and atraumatic.      Nose: Nose normal.      Mouth/Throat:      Mouth: Mucous membranes are moist.   Eyes:      Extraocular Movements: Extraocular movements intact.   Cardiovascular:      Rate and Rhythm: Normal rate and regular rhythm.      Heart sounds: Normal heart sounds.   Pulmonary:      Effort: Pulmonary effort is normal. No respiratory distress.      Breath sounds: Normal breath sounds.   Abdominal:      General: Bowel sounds are normal. There is no distension.      Palpations: Abdomen is soft.   Musculoskeletal:         General: No swelling. Normal range of motion.      Cervical back: Normal range of motion.   Skin:     General: Skin is warm and dry.      Findings: Bruising (right lower forearm) present.   Neurological:      General: No focal deficit present.      Mental Status: He is alert and oriented to person, place, and time.   Psychiatric:         Mood and Affect: Mood normal.         Behavior: Behavior normal.           Diagnoses and health risks identified today and associated recommendations/orders:  1. Encounter for preventive health examination  Assessments completed. Preventive measures and health " maintenance reviewed with patient.    2. Atherosclerosis of aorta  Stable, patient on Aspirin. Followed by PCP.    3. Calcified granuloma of lung  Stable, followed by PCP.    4. Other emphysema  Stable, followed by PCP.    5. Purpura  Stable, followed by PCP.      6. Generalized anxiety disorder  Stable, patient on Buspar and Atarax. Followed by Psychiatry.    7. Primary hypertension  Stable, patient on Amlodipine and Losartan. Followed by PCP.    8. Fatty liver  Stable, followed by Hepatology.     9. Gastroesophageal reflux disease, unspecified whether esophagitis present  Stable, patient on Prilosec. Followed by PCP.    10. Overweight (BMI 25.0-29.9)  Stable, followed by PCP. Healthy diet and exercise encouraged.    11. Tobacco dependence  Stable, followed by PCP. Smoking cessation discussed and encouraged. Patient decline program at this time.      Provided Yair with a 5-10 year written screening schedule and personal prevention plan. Recommendations were developed using the USPSTF age appropriate recommendations. Education, counseling, and referrals were provided as needed.  After Visit Summary printed and given to patient which includes a list of additional screenings\tests needed.    Follow up in about 1 year (around 4/11/2025) for your next annual wellness visit.      Shirley Kenney, GLORIA  I offered to discuss advanced care planning, including how to pick a person who would make decisions for you if you were unable to make them for yourself, called a health care power of , and what kind of decisions you might make such as use of life sustaining treatments such as ventilators and tube feeding when faced with a life limiting illness recorded on a living will that they will need to know. (How you want to be cared for as you near the end of your natural life)     X Patient is interested in learning more about how to make advanced directives.  I provided them paperwork and offered to discuss this with  them.

## 2024-04-14 PROBLEM — E66.811 CLASS 1 OBESITY WITH BODY MASS INDEX (BMI) OF 30.0 TO 30.9 IN ADULT: Status: RESOLVED | Noted: 2022-12-14 | Resolved: 2024-04-14

## 2024-04-14 PROBLEM — E66.9 CLASS 1 OBESITY WITH BODY MASS INDEX (BMI) OF 30.0 TO 30.9 IN ADULT: Status: RESOLVED | Noted: 2022-12-14 | Resolved: 2024-04-14

## 2024-04-14 PROBLEM — J43.8 OTHER EMPHYSEMA: Status: ACTIVE | Noted: 2024-04-14

## 2024-04-29 RX ORDER — HYDROXYZINE HYDROCHLORIDE 50 MG/1
50 TABLET, FILM COATED ORAL 3 TIMES DAILY PRN
Qty: 90 TABLET | Refills: 1 | Status: SHIPPED | OUTPATIENT
Start: 2024-04-29 | End: 2024-10-26

## 2024-05-03 ENCOUNTER — PATIENT MESSAGE (OUTPATIENT)
Dept: PSYCHIATRY | Facility: CLINIC | Age: 74
End: 2024-05-03
Payer: MEDICARE

## 2024-05-17 DIAGNOSIS — E78.5 HYPERLIPIDEMIA, UNSPECIFIED HYPERLIPIDEMIA TYPE: Chronic | ICD-10-CM

## 2024-05-17 NOTE — TELEPHONE ENCOUNTER
Care Due:                  Date            Visit Type   Department     Provider  --------------------------------------------------------------------------------                                MYCHART                              ANNUAL       [facility]                              CHECKUP/PHY  MED/ INTERNAL  Chaitanya Johns  Last Visit: 03-      S            MED/ PEDS      Ehrensing  Next Visit: None Scheduled  None         None Found                                                            Last  Test          Frequency    Reason                     Performed    Due Date  --------------------------------------------------------------------------------    Lipid Panel.  12 months..  atorvastatin.............  06- 05-    [facility] Embedded Care Due Messages. Reference number: [id number].   5/17/2024 12:47:10 AM CDT

## 2024-05-20 RX ORDER — BUSPIRONE HYDROCHLORIDE 10 MG/1
10 TABLET ORAL 3 TIMES DAILY
Qty: 270 TABLET | Refills: 1 | Status: SHIPPED | OUTPATIENT
Start: 2024-05-20

## 2024-05-20 NOTE — TELEPHONE ENCOUNTER
Patient had a recent CT during previous admission which showed abdominal aortic aneurysm 3 mm  Today she denies any chest pain, but admits to abdominal pain  Right upper extremity blood pressure under 112/56 in the left forearm and 105/55 in right forearm  Chest x-ray stable cardiopulmonary status   Unlikely to be acute aortic dissection     Please see the attached refill request.

## 2024-05-21 RX ORDER — ATORVASTATIN CALCIUM 40 MG/1
40 TABLET, FILM COATED ORAL DAILY
Qty: 90 TABLET | Refills: 0 | Status: SHIPPED | OUTPATIENT
Start: 2024-05-21

## 2024-05-28 ENCOUNTER — PATIENT MESSAGE (OUTPATIENT)
Dept: ADMINISTRATIVE | Facility: OTHER | Age: 74
End: 2024-05-28
Payer: MEDICARE

## 2024-07-11 ENCOUNTER — OFFICE VISIT (OUTPATIENT)
Dept: ORTHOPEDICS | Facility: CLINIC | Age: 74
End: 2024-07-11
Payer: MEDICARE

## 2024-07-11 DIAGNOSIS — M25.551 PAIN OF RIGHT HIP: Primary | ICD-10-CM

## 2024-07-11 DIAGNOSIS — M89.8X9 BONY GROWTH: Primary | ICD-10-CM

## 2024-07-11 PROCEDURE — 99212 OFFICE O/P EST SF 10 MIN: CPT | Mod: S$GLB,,, | Performed by: ORTHOPAEDIC SURGERY

## 2024-07-11 PROCEDURE — 3288F FALL RISK ASSESSMENT DOCD: CPT | Mod: CPTII,S$GLB,, | Performed by: ORTHOPAEDIC SURGERY

## 2024-07-11 PROCEDURE — 1160F RVW MEDS BY RX/DR IN RCRD: CPT | Mod: CPTII,S$GLB,, | Performed by: ORTHOPAEDIC SURGERY

## 2024-07-11 PROCEDURE — 1126F AMNT PAIN NOTED NONE PRSNT: CPT | Mod: CPTII,S$GLB,, | Performed by: ORTHOPAEDIC SURGERY

## 2024-07-11 PROCEDURE — 99999 PR PBB SHADOW E&M-EST. PATIENT-LVL III: CPT | Mod: PBBFAC,,, | Performed by: ORTHOPAEDIC SURGERY

## 2024-07-11 PROCEDURE — 1159F MED LIST DOCD IN RCRD: CPT | Mod: CPTII,S$GLB,, | Performed by: ORTHOPAEDIC SURGERY

## 2024-07-11 PROCEDURE — 1101F PT FALLS ASSESS-DOCD LE1/YR: CPT | Mod: CPTII,S$GLB,, | Performed by: ORTHOPAEDIC SURGERY

## 2024-07-11 PROCEDURE — 4010F ACE/ARB THERAPY RXD/TAKEN: CPT | Mod: CPTII,S$GLB,, | Performed by: ORTHOPAEDIC SURGERY

## 2024-07-11 NOTE — PROGRESS NOTES
Follow up visit    History of Present Illness:     R hip   No pain   Walking a lot for exercise without pain     ROS: unremarkable and no change since last visit    Physical Examination:    NAD  No pain to hip     Radiographic imaging:  Two views of the right hip show mild degenerative changes, bony growth to R iliac wing - enthesophyte vs osteochondroma. No aggressive features. No change from previous films    I personally reviewed and interpreted the patient's imaging obtained today in clinic     Assessment/Plan:  74 y.o. male  with  R ilium bony growth      We discussed the etiology of persistent pain and further treatment options.  No concern today on follow up films. Recommend additional pelvis XR in 1 year   Return for follow up visit:  6 months with repeat 2 view pelvis for monitoring of growth. Can consider MRI if pain becomes more constant or if any change on next radiograph    All questions were answered in detail. The patient  verbalized the understanding of the treatment plan and is in full agreement with the treatment plan.

## 2024-08-13 ENCOUNTER — OFFICE VISIT (OUTPATIENT)
Dept: CARDIOLOGY | Facility: CLINIC | Age: 74
End: 2024-08-13
Payer: MEDICARE

## 2024-08-13 VITALS
OXYGEN SATURATION: 98 % | SYSTOLIC BLOOD PRESSURE: 142 MMHG | RESPIRATION RATE: 18 BRPM | HEIGHT: 68 IN | BODY MASS INDEX: 27.78 KG/M2 | DIASTOLIC BLOOD PRESSURE: 62 MMHG | HEART RATE: 77 BPM | WEIGHT: 183.31 LBS

## 2024-08-13 DIAGNOSIS — R01.1 HEART MURMUR: ICD-10-CM

## 2024-08-13 DIAGNOSIS — I70.0 ATHEROSCLEROSIS OF AORTA: ICD-10-CM

## 2024-08-13 DIAGNOSIS — I10 ESSENTIAL (PRIMARY) HYPERTENSION: ICD-10-CM

## 2024-08-13 DIAGNOSIS — I10 PRIMARY HYPERTENSION: Primary | ICD-10-CM

## 2024-08-13 DIAGNOSIS — K76.0 FATTY LIVER: ICD-10-CM

## 2024-08-13 DIAGNOSIS — E78.5 HYPERLIPIDEMIA, UNSPECIFIED HYPERLIPIDEMIA TYPE: ICD-10-CM

## 2024-08-13 DIAGNOSIS — R09.89 RIGHT CAROTID BRUIT: ICD-10-CM

## 2024-08-13 DIAGNOSIS — I25.10 CORONARY ARTERY DISEASE INVOLVING NATIVE HEART WITHOUT ANGINA PECTORIS, UNSPECIFIED VESSEL OR LESION TYPE: ICD-10-CM

## 2024-08-13 DIAGNOSIS — I10 ESSENTIAL HYPERTENSION: ICD-10-CM

## 2024-08-13 LAB
OHS QRS DURATION: 84 MS
OHS QTC CALCULATION: 416 MS

## 2024-08-13 PROCEDURE — 93000 ELECTROCARDIOGRAM COMPLETE: CPT | Mod: S$GLB,,, | Performed by: INTERNAL MEDICINE

## 2024-08-13 PROCEDURE — 3077F SYST BP >= 140 MM HG: CPT | Mod: CPTII,S$GLB,, | Performed by: INTERNAL MEDICINE

## 2024-08-13 PROCEDURE — 3288F FALL RISK ASSESSMENT DOCD: CPT | Mod: CPTII,S$GLB,, | Performed by: INTERNAL MEDICINE

## 2024-08-13 PROCEDURE — 3008F BODY MASS INDEX DOCD: CPT | Mod: CPTII,S$GLB,, | Performed by: INTERNAL MEDICINE

## 2024-08-13 PROCEDURE — 99214 OFFICE O/P EST MOD 30 MIN: CPT | Mod: S$GLB,,, | Performed by: INTERNAL MEDICINE

## 2024-08-13 PROCEDURE — 4010F ACE/ARB THERAPY RXD/TAKEN: CPT | Mod: CPTII,S$GLB,, | Performed by: INTERNAL MEDICINE

## 2024-08-13 PROCEDURE — 1159F MED LIST DOCD IN RCRD: CPT | Mod: CPTII,S$GLB,, | Performed by: INTERNAL MEDICINE

## 2024-08-13 PROCEDURE — 1126F AMNT PAIN NOTED NONE PRSNT: CPT | Mod: CPTII,S$GLB,, | Performed by: INTERNAL MEDICINE

## 2024-08-13 PROCEDURE — 1101F PT FALLS ASSESS-DOCD LE1/YR: CPT | Mod: CPTII,S$GLB,, | Performed by: INTERNAL MEDICINE

## 2024-08-13 PROCEDURE — 3078F DIAST BP <80 MM HG: CPT | Mod: CPTII,S$GLB,, | Performed by: INTERNAL MEDICINE

## 2024-08-13 PROCEDURE — 99999 PR PBB SHADOW E&M-EST. PATIENT-LVL IV: CPT | Mod: PBBFAC,,, | Performed by: INTERNAL MEDICINE

## 2024-08-13 NOTE — PROGRESS NOTES
CARDIOVASCULAR CONSULTATION    REASON FOR CONSULT:   Yair Owens is a 74 y.o. male who presents for evaluation of chest pressure.      HISTORY OF PRESENT ILLNESS:     Patient is a pleasant 61-year-old man.  Came here because evaluation of chest pressure to the emergency room.  Was ruled out.  EKG was done which showed normal sinus rhythm.  States the pressure was substernal, and was much worse than the usual pressure which he feels.  States that he usually feels chest pain and tightness.  Unrelated to activity.  Sometimes can come with activity other times at rest.  Denies orthopnea, PND.  Does have mild dyspnea on exertion.  Used to smoke, but cutting down.    Notes from January 2020:    Patient doing fine.  Denies any further episodes of chest pains, orthopnea, PND.  States that he thinks it is his anxiety.  Stress testing did not reveal any significant issues apart from mild aortic valve stenosis.      The perfusion scan is free of evidence from myocardial ischemia or injury.    There is a  mild intensity fixed defect in the inferior wall of the left ventricle secondary to diaphragm attenuation.    Gated perfusion images showed an ejection fraction of 60% post stress.    The EKG portion of this study is negative for ischemia.    The patient reported no chest pain during the stress test.    There were no arrhythmias during stress.      Normal left ventricular systolic function. The estimated ejection fraction is 60%.  Concentric left ventricular hypertrophy.  Normal LV diastolic function.  Normal right ventricular systolic function.  Mild-to-moderate aortic valve stenosis.  Aortic valve area is 1.80 cm2; peak velocity is 2.60 m/s; mean gradient is 18 mmHg.  Mild aortic regurgitation.    Notes from may 2020:    The patient location is: his home   The chief complaint leading to consultation is: chest pain    Visit type: audiovisual    Face to Face time with patient: 15 mins   25 minutes of total time spent on the  encounter, which includes face to face time and non-face to face time preparing to see the patient (eg, review of tests), Obtaining and/or reviewing separately obtained history, Documenting clinical information in the electronic or other health record, Independently interpreting results (not separately reported) and communicating results to the patient/family/caregiver, or Care coordination (not separately reported).         Each patient to whom he or she provides medical services by telemedicine is:  (1) informed of the relationship between the physician and patient and the respective role of any other health care provider with respect to management of the patient; and (2) notified that he or she may decline to receive medical services by telemedicine and may withdraw from such care at any time.    Notes:  Patient here for follow-up.  Denies any chest pain at rest on exertion, orthopnea, PND.  States that he has been feeling great.  However he has started smoking more again because of the pandemic and the stress related with the pandemic.        Jan 2021:      The patient location is: his home  The chief complaint leading to consultation is: chest pain    Visit type: audiovisual    Face to Face time with patient: 15 mins  25 minutes of total time spent on the encounter, which includes face to face time and non-face to face time preparing to see the patient (eg, review of tests), Obtaining and/or reviewing separately obtained history, Documenting clinical information in the electronic or other health record, Independently interpreting results (not separately reported) and communicating results to the patient/family/caregiver, or Care coordination (not separately reported).         Each patient to whom he or she provides medical services by telemedicine is:  (1) informed of the relationship between the physician and patient and the respective role of any other health care provider with respect to management of the  patient; and (2) notified that he or she may decline to receive medical services by telemedicine and may withdraw from such care at any time.    Notes:  Patient here via audiovisual visit.  Had an episode of chest pain which brought him to the ER.  Describes it as left-sided.  At rest.  EKG was done which showed normal sinus rhythm with nonspecific ST changes and some mild ST depressions.  Since then has not had any further chest pains.  States that that day his blood pressure was high around 170 mm of mercury.  Doing fine.      Notes from feb 2021    The patient location is:  his home  The chief complaint leading to consultation is:  Chest pain    Visit type: audiovisual    Face to Face time with patient: 20 mins  30  minutes of total time spent on the encounter, which includes face to face time and non-face to face time preparing to see the patient (eg, review of tests), Obtaining and/or reviewing separately obtained history, Documenting clinical information in the electronic or other health record, Independently interpreting results (not separately reported) and communicating results to the patient/family/caregiver, or Care coordination (not separately reported).         Each patient to whom he or she provides medical services by telemedicine is:  (1) informed of the relationship between the physician and patient and the respective role of any other health care provider with respect to management of the patient; and (2) notified that he or she may decline to receive medical services by telemedicine and may withdraw from such care at any time.    Notes:       Patient here follow-up via audiovisual visit.  Occasional chest pain.  Really in the center of the chest and feels like tightness.  Sometimes in the left side and sometimes in the left shoulder.  Feels the left-sided pain goes to his left shoulder.  No relation with activity.  Can occur at rest or on exertion.  Stress test showed normal myocardial perfusion.   Although the EKG portion was positive for ischemia.    Normal myocardial perfusion scan. There is no evidence of myocardial ischemia or infarction.    There is a  mild intensity fixed defect in the inferobasilar wall of the left ventricle secondary to diaphragm attenuation.    The gated perfusion images showed an ejection fraction of 68% post stress.    There is normal wall motion post stress.    The EKG portion of this study is positive for ischemia.      The left ventricle is normal in size with mild concentric hypertrophy and normal systolic function. The estimated ejection fraction is 60%  Normal right ventricular size with normal right ventricular systolic function.  There is mild aortic valve stenosis.  Aortic valve area is 1.87 cm2; peak velocity is 2.19 m/s; mean gradient is 10 mmHg.    Notes from March 2021:  Patient here for follow-up after angiogram.  Mild nonobstructive CAD on angiogram.  No complications from angiogram.  Doing fine.  Denies any chest pains at rest on exertion.  States he thinks it is his anxiety which is bothering him.    The estimated blood loss was <50 mL.  There was non-obstructive coronary artery disease..        Left main:  Distal 10% stenosis     Lad:  Luminal irregularities.  Mid 10-20% stenosis     Circumflex:  Luminal irregularities     RCA:  Luminal irregularities     Access: We accessed the right radial artery using ultrasound guidance. The vessel appeared widely patent, suitable for endovascular access. The images were interpreted by me and saved.  Access was closed with radial band.           June 21:    The patient location is: his home  The chief complaint leading to consultation is: fu    Visit type: audiovisual    Face to Face time with patient: 15 min  25  minutes of total time spent on the encounter, which includes face to face time and non-face to face time preparing to see the patient (eg, review of tests), Obtaining and/or reviewing separately obtained history,  Documenting clinical information in the electronic or other health record, Independently interpreting results (not separately reported) and communicating results to the patient/family/caregiver, or Care coordination (not separately reported).         Each patient to whom he or she provides medical services by telemedicine is:  (1) informed of the relationship between the physician and patient and the respective role of any other health care provider with respect to management of the patient; and (2) notified that he or she may decline to receive medical services by telemedicine and may withdraw from such care at any time.    Notes:   Has been having left sided chest pain, not related to exertion, pin point in the left of the chest. Does not occur when he is cutting his lawn.  Denies orthopnea, PND, swelling of feet.  States blood pressure is usually well controlled at home.      September 2022: Patient here for follow-up.  Denies any chest pains at rest on exertion, orthopnea, PND.  Has been doing fine.    MARCH 23:  Follow-up.  Denies any chest pains at rest on exertion, orthopnea, PND, swelling of feet.      Notes from October 2023: Patient here for follow-up.  Doing fine.  Walks about 30 miles a week.  Denies chest pains at rest on exertion, orthopnea, PND.    Notes from August 24: Patient here for follow-up.  Denies any anginal sounding chest pains.  Occasional burping.  On omeprazole.  Which helps.  Denies orthopnea PND swelling of feet.  No chest pains on exertion      PAST MEDICAL HISTORY:     Past Medical History:   Diagnosis Date    CAD (coronary artery disease) 02/22/2021    Cath 2021  There was non-obstructive coronary artery disease..   Left main:  Distal 10% stenosis  Lad:  Luminal irregularities.  Mid 10-20% stenosis   Circumflex:  Luminal irregularities   RCA:  Luminal irregularities    Calcified granuloma of lung 02/03/2023    LLL calcified granuloma seen on CT Chest Lung Screening Low Dose  01/31/2018    Erectile dysfunction 02/14/2020    Fatty liver     Generalized anxiety disorder 01/11/2017    GERD (gastroesophageal reflux disease)     Gout, unspecified     History of colonic polyps 11/16/2023    2 polyps 11/22 -5 yrs    Hyperlipidemia     Hypertension     Psychophysiological insomnia 07/24/2017    Tobacco dependence 07/24/2017       PAST SURGICAL HISTORY:     Past Surgical History:   Procedure Laterality Date    APPENDECTOMY      BONE RESECTION, RIB      for thoracic outlet syndrome    COLONOSCOPY N/A 7/21/2017    Procedure: COLONOSCOPY;  Surgeon: Deepak Servin MD;  Location: [facility] ENDO;  Service: Endoscopy;  Laterality: N/A;    COLONOSCOPY N/A 11/22/2022    Procedure: COLONOSCOPY;  Surgeon: Sam Obrien MD;  Location: [facility] ENDO;  Service: Endoscopy;  Laterality: N/A;  vacc-sutab-inst portal-tb    HAND SURGERY      LEFT HEART CATHETERIZATION Left 3/2/2021    Procedure: Left heart cath, radial, 730 am;  Surgeon: Vladimir Avalos MD;  Location: [facility] CATH LAB;  Service: Cardiology;  Laterality: Left;  RN PREOP 2/25/2021--COVID NEGATIVE ON 3/1  H/P INCOMPLETE    SCROTAL SURGERY      mass    ULTRASOUND GUIDANCE  3/2/2021    Procedure: Ultrasound Guidance;  Surgeon: Vladimir Avalos MD;  Location: [facility] CATH LAB;  Service: Cardiology;;       ALLERGIES AND MEDICATION:     Review of patient's allergies indicates:   Allergen Reactions    Codeine Itching    Ibuprofen Itching    Remeron [mirtazapine] Anxiety     Did not help the patient sleep and created anxiety.        Medication List            Accurate as of August 13, 2024 11:19 AM. If you have any questions, ask your nurse or doctor.                CONTINUE taking these medications      amLODIPine 10 MG tablet  Commonly known as: NORVASC  TAKE 1 TABLET BY MOUTH EVERY DAY     aspirin 81 MG EC tablet  Commonly known as: ECOTRIN  Take 1 tablet (81 mg total) by mouth once daily.     atorvastatin 40 MG tablet  Commonly known as: LIPITOR  Take 1 tablet (40 mg  total) by mouth once daily.     busPIRone 10 MG tablet  Commonly known as: BUSPAR  TAKE 1 TABLET BY MOUTH THREE TIMES A DAY     hydrOXYzine 50 MG tablet  Commonly known as: ATARAX  Take 1 tablet (50 mg total) by mouth 3 (three) times daily as needed for Anxiety.     losartan 50 MG tablet  Commonly known as: COZAAR  TAKE 1 TABLET BY MOUTH EVERY DAY     meloxicam 7.5 MG tablet  Commonly known as: MOBIC  TAKE 1 TABLET BY MOUTH ONCE A DAY FOR 2 WEEKS THEN TAKE AS NEEDED. TAKE WITH FOOD     multivitamin capsule     omeprazole 40 MG capsule  Commonly known as: PRILOSEC  TAKE 1 CAPSULE BY MOUTH TWICE  DAILY     tadalafiL 5 MG tablet  Commonly known as: CIALIS  Take 1 tablet (5 mg total) by mouth once daily.     tamsulosin 0.4 mg Cap  Commonly known as: FLOMAX  Take 1 capsule (0.4 mg total) by mouth once daily.              SOCIAL HISTORY:     Social History     Socioeconomic History    Marital status:    Occupational History     Employer: [employer]   Tobacco Use    Smoking status: Every Day     Current packs/day: 0.50     Average packs/day: 0.5 packs/day for 60.9 years (30.5 ttl pk-yrs)     Types: Cigarettes     Start date: 1967     Passive exposure: Current    Smokeless tobacco: Never    Tobacco comments:     0.5 ppd or less    Substance and Sexual Activity    Alcohol use: Never    Drug use: No    Sexual activity: Yes     Partners: Female     Social Determinants of Health     Financial Resource Strain: Low Risk  (4/11/2024)    Overall Financial Resource Strain (CARDIA)     Difficulty of Paying Living Expenses: Not hard at all   Food Insecurity: No Food Insecurity (4/11/2024)    Hunger Vital Sign     Worried About Running Out of Food in the Last Year: Never true     Ran Out of Food in the Last Year: Never true   Transportation Needs: No Transportation Needs (4/11/2024)    PRAPARE - Transportation     Lack of Transportation (Medical): No     Lack of Transportation (Non-Medical): No   Physical Activity: Sufficiently  "Active (4/11/2024)    Exercise Vital Sign     Days of Exercise per Week: 7 days     Minutes of Exercise per Session: 60 min   Stress: No Stress Concern Present (4/11/2024)    [employer] [place] [employer] - Occupational Stress Questionnaire     Feeling of Stress : Only a little   Housing Stability: Low Risk  (4/11/2024)    Housing Stability Vital Sign     Unable to Pay for Housing in the Last Year: No     Number of Places Lived in the Last Year: 1     Unstable Housing in the Last Year: No       FAMILY HISTORY:     Family History   Problem Relation Name Age of Onset    No Known Problems Mother      Cancer Father      Heart disease Father      Asthma Sister Paula     Cancer Sister Paula 78        Rectal cancer    Stroke Brother Gaurav     Lupus Daughter x1     No Known Problems Son x3        REVIEW OF SYSTEMS:   Review of Systems   Constitutional: Negative.   HENT: Negative.     Eyes: Negative.    Respiratory: Negative.     Endocrine: Negative.    Hematologic/Lymphatic: Negative.    Skin: Negative.    Musculoskeletal: Negative.    Gastrointestinal: Negative.    Genitourinary: Negative.    Neurological: Negative.    Psychiatric/Behavioral: Negative.     Allergic/Immunologic: Negative.        A 10 point review of systems was performed and all the pertinent positives have been mentioned. Rest of review of systems was negative.        PHYSICAL EXAM:     Vitals:    08/13/24 0846   BP: (!) 142/62   Pulse: 77   Resp: 18    Body mass index is 27.87 kg/m².  Weight: 83.2 kg (183 lb 5 oz)   Height: 5' 8" (172.7 cm)      Physical Exam  Vitals and nursing note reviewed.   Constitutional:       Appearance: Normal appearance. He is well-developed.   HENT:      Head: Normocephalic and atraumatic.      Right Ear: Hearing normal.      Left Ear: Hearing normal.      Nose: Nose normal.   Eyes:      General: Lids are normal.      Conjunctiva/sclera: Conjunctivae normal.      Pupils: Pupils are equal, round, and reactive to " light.   Cardiovascular:      Rate and Rhythm: Normal rate and regular rhythm.      Pulses: Normal pulses.      Heart sounds: Murmur heard.   Pulmonary:      Effort: Pulmonary effort is normal.      Breath sounds: Normal breath sounds.   Abdominal:      Palpations: Abdomen is soft.      Tenderness: There is no abdominal tenderness.   Musculoskeletal:         General: No deformity.      Cervical back: Normal range of motion and neck supple.   Skin:     General: Skin is warm and dry.   Neurological:      Mental Status: He is alert and oriented to person, place, and time.   Psychiatric:         Speech: Speech normal.              DATA:     Laboratory:  CBC:  Recent Labs   Lab 06/11/22  0800 11/03/23  1647 11/16/23  1335   WBC 6.32 6.02 6.34   Hemoglobin 13.8 L 12.9 L 13.8 L   Hematocrit 42.7 39.3 L 42.5   Platelets 257 218 253       CHEMISTRIES:  Recent Labs   Lab 06/11/22  0800 06/02/23  1053 11/03/23  1647 01/09/24  0835   Glucose 109 95 144 H 111 H   Sodium 139 140 139 141   Potassium 4.7 4.6 4.2 5.1   BUN 19 20 13 12   Creatinine 1.3 1.2 1.0 1.0   eGFR if African American >60.0  --   --   --    eGFR if non  54.9 A  --   --   --    Calcium 9.5 9.8 9.3 9.9       CARDIAC BIOMARKERS:  Recent Labs   Lab 11/03/23 1647   Troponin I <0.006       COAGS:        LIPIDS/LFTS:  Recent Labs   Lab 06/11/22  0800 10/07/22  0748 06/02/23  1053 11/03/23  1647 01/09/24  0835   Cholesterol 154  --  125  --   --    Triglycerides 95  --  49  --   --    HDL 41  --  39 L  --   --    LDL Cholesterol 94.0  --  76.2  --   --    Non-HDL Cholesterol 113  --  86  --   --    AST 44 H   < > 22 25 22   ALT 57 H   < > 21 23 17    < > = values in this interval not displayed.       Hemoglobin A1C   Date Value Ref Range Status   11/16/2023 5.3 4.0 - 5.6 % Final     Comment:     ADA Screening Guidelines:  5.7-6.4%  Consistent with prediabetes  >or=6.5%  Consistent with diabetes    High levels of fetal hemoglobin interfere with the  HbA1C  assay. Heterozygous hemoglobin variants (HbS, HgC, etc)do  not significantly interfere with this assay.   However, presence of multiple variants may affect accuracy.         TSH  Recent Labs   Lab 11/16/23  1335   TSH 0.725       The ASCVD Risk score (Francis METCALF, et al., 2019) failed to calculate for the following reasons:    The valid total cholesterol range is 130 to 320 mg/dL             ASSESSMENT AND PLAN     Patient Active Problem List   Diagnosis    Hyperlipidemia    Hypertension    GERD (gastroesophageal reflux disease)    Generalized anxiety disorder    Tobacco dependence    Psychophysiological insomnia    Atherosclerosis of aorta    Erectile dysfunction    CAD (coronary artery disease)    Fatty liver    Hepatomegaly    Calcified granuloma of lung    Purpura    Lower urinary tract symptoms (LUTS)    History of colonic polyps    Overweight (BMI 25.0-29.9)    Other emphysema       1.  Chest pressure:  Resolved Coronary angiogram showed nonobstructive CAD.      2. Hypertension:  Blood pressure  controlled.  I have asked him to maintain a blood pressure diary, low-salt diet, and start regular exercise.     3. Mild aortic valve stenosis.  Follow-up in 3 months with echo prior    4.  Smoking cessation has been highly encouraged.      Follow-up in 3 months    Thank you very much for involving me in the care of your patient.  Please do not hesitate to contact me if there are any questions.      Vladimir Avalos MD, FACC, [facility]  Interventional Cardiologist, [facility].           This note was dictated with the help of speech recognition software.  There might be un-intended errors and/or substitutions.

## 2024-08-15 ENCOUNTER — HOSPITAL ENCOUNTER (EMERGENCY)
Facility: HOSPITAL | Age: 74
Discharge: HOME | End: 2024-08-15
Attending: STUDENT IN AN ORGANIZED HEALTH CARE EDUCATION/TRAINING PROGRAM
Payer: MEDICARE

## 2024-08-15 VITALS
WEIGHT: 176 LBS | RESPIRATION RATE: 21 BRPM | SYSTOLIC BLOOD PRESSURE: 147 MMHG | TEMPERATURE: 98 F | HEIGHT: 68 IN | DIASTOLIC BLOOD PRESSURE: 67 MMHG | BODY MASS INDEX: 26.67 KG/M2 | OXYGEN SATURATION: 95 % | HEART RATE: 54 BPM

## 2024-08-15 DIAGNOSIS — R07.9 CHEST PAIN: Primary | ICD-10-CM

## 2024-08-15 DIAGNOSIS — E78.5 HYPERLIPIDEMIA, UNSPECIFIED HYPERLIPIDEMIA TYPE: Chronic | ICD-10-CM

## 2024-08-15 LAB
ALBUMIN SERPL BCP-MCNC: 4.5 G/DL (ref 3.5–5.2)
ALP SERPL-CCNC: 62 U/L (ref 55–135)
ALT SERPL W/O P-5'-P-CCNC: 23 U/L (ref 10–44)
ANION GAP SERPL CALC-SCNC: 12 MMOL/L (ref 8–16)
AST SERPL-CCNC: 33 U/L (ref 10–40)
BASOPHILS # BLD AUTO: 0.03 K/UL (ref 0–0.2)
BASOPHILS NFR BLD: 0.4 % (ref 0–1.9)
BILIRUB SERPL-MCNC: 1.3 MG/DL (ref 0.1–1)
BNP SERPL-MCNC: 34 PG/ML (ref 0–99)
BUN SERPL-MCNC: 18 MG/DL (ref 8–23)
CALCIUM SERPL-MCNC: 10 MG/DL (ref 8.7–10.5)
CHLORIDE SERPL-SCNC: 105 MMOL/L (ref 95–110)
CO2 SERPL-SCNC: 22 MMOL/L (ref 23–29)
CREAT SERPL-MCNC: 1.2 MG/DL (ref 0.5–1.4)
DIFFERENTIAL METHOD BLD: ABNORMAL
EOSINOPHIL # BLD AUTO: 0 K/UL (ref 0–0.5)
EOSINOPHIL NFR BLD: 0.6 % (ref 0–8)
ERYTHROCYTE [DISTWIDTH] IN BLOOD BY AUTOMATED COUNT: 13.5 % (ref 11.5–14.5)
EST. GFR  (NO RACE VARIABLE): >60 ML/MIN/1.73 M^2
GLUCOSE SERPL-MCNC: 104 MG/DL (ref 70–110)
HCT VFR BLD AUTO: 42.1 % (ref 40–54)
HGB BLD-MCNC: 13.7 G/DL (ref 14–18)
IMM GRANULOCYTES # BLD AUTO: 0.03 K/UL (ref 0–0.04)
IMM GRANULOCYTES NFR BLD AUTO: 0.4 % (ref 0–0.5)
LYMPHOCYTES # BLD AUTO: 1.4 K/UL (ref 1–4.8)
LYMPHOCYTES NFR BLD: 19 % (ref 18–48)
MCH RBC QN AUTO: 29.7 PG (ref 27–31)
MCHC RBC AUTO-ENTMCNC: 32.5 G/DL (ref 32–36)
MCV RBC AUTO: 91 FL (ref 82–98)
MONOCYTES # BLD AUTO: 0.7 K/UL (ref 0.3–1)
MONOCYTES NFR BLD: 9.3 % (ref 4–15)
NEUTROPHILS # BLD AUTO: 5.1 K/UL (ref 1.8–7.7)
NEUTROPHILS NFR BLD: 70.3 % (ref 38–73)
NRBC BLD-RTO: 0 /100 WBC
OHS QRS DURATION: 74 MS
OHS QTC CALCULATION: 392 MS
PLATELET # BLD AUTO: 228 K/UL (ref 150–450)
PMV BLD AUTO: 10.3 FL (ref 9.2–12.9)
POTASSIUM SERPL-SCNC: 4.5 MMOL/L (ref 3.5–5.1)
PROT SERPL-MCNC: 7.6 G/DL (ref 6–8.4)
RBC # BLD AUTO: 4.61 M/UL (ref 4.6–6.2)
SODIUM SERPL-SCNC: 139 MMOL/L (ref 136–145)
TROPONIN I SERPL DL<=0.01 NG/ML-MCNC: <0.006 NG/ML (ref 0–0.03)
TROPONIN I SERPL DL<=0.01 NG/ML-MCNC: <0.006 NG/ML (ref 0–0.03)
WBC # BLD AUTO: 7.21 K/UL (ref 3.9–12.7)

## 2024-08-15 PROCEDURE — 85025 COMPLETE CBC W/AUTO DIFF WBC: CPT | Performed by: NURSE PRACTITIONER

## 2024-08-15 PROCEDURE — 93010 ELECTROCARDIOGRAM REPORT: CPT | Mod: 76,,, | Performed by: INTERNAL MEDICINE

## 2024-08-15 PROCEDURE — 93005 ELECTROCARDIOGRAM TRACING: CPT

## 2024-08-15 PROCEDURE — 80053 COMPREHEN METABOLIC PANEL: CPT | Performed by: NURSE PRACTITIONER

## 2024-08-15 PROCEDURE — 83880 ASSAY OF NATRIURETIC PEPTIDE: CPT | Performed by: NURSE PRACTITIONER

## 2024-08-15 PROCEDURE — 99285 EMERGENCY DEPT VISIT HI MDM: CPT | Mod: 25

## 2024-08-15 PROCEDURE — 84484 ASSAY OF TROPONIN QUANT: CPT | Performed by: NURSE PRACTITIONER

## 2024-08-15 PROCEDURE — 93010 ELECTROCARDIOGRAM REPORT: CPT | Mod: ,,, | Performed by: INTERNAL MEDICINE

## 2024-08-15 PROCEDURE — 25000003 PHARM REV CODE 250: Performed by: NURSE PRACTITIONER

## 2024-08-15 RX ORDER — ASPIRIN 325 MG
325 TABLET ORAL
Status: COMPLETED | OUTPATIENT
Start: 2024-08-15 | End: 2024-08-15

## 2024-08-15 RX ADMIN — ASPIRIN 325 MG ORAL TABLET 325 MG: 325 PILL ORAL at 04:08

## 2024-08-15 NOTE — ED PROVIDER NOTES
Encounter Date: 8/15/2024    SCRIBE #1 NOTE: I, Marbella Cesar, am scribing for, and in the presence of,  Agatha Nova MD. I have scribed the following portions of the note - Other sections scribed: HPI,ROS,PE.       History     Chief Complaint   Patient presents with    Chest Pain     Pt presents to ER with complaints of chest heaviness times 3 hours. Pt denies any pain. Pt states he is a little SOB. Pt denies any cardiac issues, blood thinners, or hx of PE. Pt states he is on anxiety meds and he is not feeling anxious at this time.      Yair Owens is a 74 y.o. male, with a PMHx of CAD, anxiety, GERD, fatty liver, granuloma of lung, HTN and HLD, who presents to the ED with chest tightness onset 8 hours ago. Patient reports intermittent chest tightness that he rates to be a 1/10 with a similar sensation to past episodes. Patient reports that when the symptom occurs he feels slightly SOB. Patient states that he has washed clothes, cut grass and went for a walk today before his symptoms started. He reports that it started while he sitting. Patient states that nothing exacerbates or alleviates his symptom. Patient reports that he saw his cardiologist 1 day ago where he was diagnosed with a heart murmer(echocardiogram scheduled for 8/27/24) and reports that he may be anxious due to the diagnosis. Patient reports a family history of angina and daily compliance with his maintenance medications. Patient reports that he normally walks about 30 minutes a day(overall 30 miles a week).  Patient has no chest pain with his daily walks.  No shortness of breath or difficulty with exercise.  Patient endorses daily compliance with omeprazole. No other exacerbating or alleviating factors. Denies fever, chills, nausea, vomiting or other associated symptoms.      The history is provided by the patient. No  was used.     Review of patient's allergies indicates:   Allergen Reactions    Codeine Itching     Ibuprofen Itching    Remeron [mirtazapine] Anxiety     Did not help the patient sleep and created anxiety.     Past Medical History:   Diagnosis Date    CAD (coronary artery disease) 02/22/2021    Cath 2021  There was non-obstructive coronary artery disease..   Left main:  Distal 10% stenosis  Lad:  Luminal irregularities.  Mid 10-20% stenosis   Circumflex:  Luminal irregularities   RCA:  Luminal irregularities    Calcified granuloma of lung 02/03/2023    LLL calcified granuloma seen on CT Chest Lung Screening Low Dose 01/31/2018    Erectile dysfunction 02/14/2020    Fatty liver     Generalized anxiety disorder 01/11/2017    GERD (gastroesophageal reflux disease)     Gout, unspecified     History of colonic polyps 11/16/2023    2 polyps 11/22 -5 yrs    Hyperlipidemia     Hypertension     Psychophysiological insomnia 07/24/2017    Tobacco dependence 07/24/2017     Past Surgical History:   Procedure Laterality Date    APPENDECTOMY      BONE RESECTION, RIB      for thoracic outlet syndrome    COLONOSCOPY N/A 7/21/2017    Procedure: COLONOSCOPY;  Surgeon: Deepak Servin MD;  Location: [facility] ENDO;  Service: Endoscopy;  Laterality: N/A;    COLONOSCOPY N/A 11/22/2022    Procedure: COLONOSCOPY;  Surgeon: Sam Obrien MD;  Location: [facility] ENDO;  Service: Endoscopy;  Laterality: N/A;  vacc-sutab-inst portal-tb    HAND SURGERY      LEFT HEART CATHETERIZATION Left 3/2/2021    Procedure: Left heart cath, radial, 730 am;  Surgeon: Vladimir Avalos MD;  Location: [facility] CATH LAB;  Service: Cardiology;  Laterality: Left;  RN PREOP 2/25/2021--COVID NEGATIVE ON 3/1  H/P INCOMPLETE    SCROTAL SURGERY      mass    ULTRASOUND GUIDANCE  3/2/2021    Procedure: Ultrasound Guidance;  Surgeon: Vladimir Avalos MD;  Location: [facility] CATH LAB;  Service: Cardiology;;     Family History   Problem Relation Name Age of Onset    No Known Problems Mother      Cancer Father      Heart disease Father      Asthma Sister Paula     Cancer Sister Paula 78         Rectal cancer    Stroke Brother Gaurav     Lupus Daughter x1     No Known Problems Son x3      Social History     Tobacco Use    Smoking status: Every Day     Current packs/day: 0.50     Average packs/day: 0.5 packs/day for 60.9 years (30.5 ttl pk-yrs)     Types: Cigarettes     Start date: 1967     Passive exposure: Current    Smokeless tobacco: Never    Tobacco comments:     0.5 ppd or less    Substance Use Topics    Alcohol use: Never    Drug use: No     Review of Systems   Constitutional:  Negative for chills and fever.   HENT:  Negative for congestion and sore throat.    Eyes:  Negative for visual disturbance.   Respiratory:  Positive for chest tightness. Negative for cough and shortness of breath.    Cardiovascular:  Negative for chest pain.   Gastrointestinal:  Negative for abdominal pain, nausea and vomiting.   Genitourinary:  Negative for dysuria.   Skin:  Negative for rash.   Neurological:  Negative for headaches.   Psychiatric/Behavioral:  Negative for confusion.        Physical Exam     Initial Vitals [08/15/24 1359]   BP Pulse Resp Temp SpO2   139/65 64 20 97.9 °F (36.6 °C) 97 %      MAP       --         Physical Exam    Nursing note and vitals reviewed.  Constitutional: He appears well-developed and well-nourished. He is not diaphoretic. No distress.   HENT:   Head: Normocephalic and atraumatic.   Mouth/Throat: Oropharynx is clear and moist.   Eyes: EOM are normal. Pupils are equal, round, and reactive to light.   Neck: Neck supple.   Cardiovascular:  Normal rate and regular rhythm.           Pulmonary/Chest: Breath sounds normal. No respiratory distress.   Abdominal: Abdomen is soft. Bowel sounds are normal. There is no abdominal tenderness.   Musculoskeletal:         General: No edema.      Cervical back: Neck supple.      Right lower leg: Normal. No edema.      Left lower leg: Normal. No edema.     Neurological: He is alert and oriented to person, place, and time.   Skin: Skin is warm and dry.    Psychiatric: He has a normal mood and affect.         ED Course   Procedures  Labs Reviewed   CBC W/ AUTO DIFFERENTIAL - Abnormal       Result Value    WBC 7.21      RBC 4.61      Hemoglobin 13.7 (*)     Hematocrit 42.1      MCV 91      MCH 29.7      MCHC 32.5      RDW 13.5      Platelets 228      MPV 10.3      Immature Granulocytes 0.4      Gran # (ANC) 5.1      Immature Grans (Abs) 0.03      Lymph # 1.4      Mono # 0.7      Eos # 0.0      Baso # 0.03      nRBC 0      Gran % 70.3      Lymph % 19.0      Mono % 9.3      Eosinophil % 0.6      Basophil % 0.4      Differential Method Automated     COMPREHENSIVE METABOLIC PANEL - Abnormal    Sodium 139      Potassium 4.5      Chloride 105      CO2 22 (*)     Glucose 104      BUN 18      Creatinine 1.2      Calcium 10.0      Total Protein 7.6      Albumin 4.5      Total Bilirubin 1.3 (*)     Alkaline Phosphatase 62      AST 33      ALT 23      eGFR >60      Anion Gap 12     TROPONIN I    Troponin I <0.006     B-TYPE NATRIURETIC PEPTIDE    BNP 34     TROPONIN I    Troponin I <0.006       EKG Readings: (Independently Interpreted)   Rhythm: Normal Sinus Rhythm. Heart Rate: 63. Ectopy: No Ectopy. Conduction: Normal. ST Segments: Normal ST Segments. T Waves: Normal. Clinical Impression: Normal Sinus Rhythm     ECG Results              EKG 12-lead (Final result)        Collection Time Result Time QRS Duration OHS QTC Calculation    08/15/24 13:50:35 08/15/24 16:17:21 74 392                     Final result by Interface, Lab In [facility] (08/15/24 16:17:24)                   Narrative:    Test Reason : R07.9,    Vent. Rate : 063 BPM     Atrial Rate : 063 BPM     P-R Int : 150 ms          QRS Dur : 074 ms      QT Int : 384 ms       P-R-T Axes : 061 070 067 degrees     QTc Int : 392 ms    Normal sinus rhythm  Normal ECG  When compared with ECG of 13-AUG-2024 08:40,  No significant change was found  Confirmed by Yunier Kimbrough MD ([id number]) on 8/15/2024 4:17:19 PM    Referred By:  "[email or web address]   SELF           Confirmed By:Yunier Kimbrough MD                                  Imaging Results              X-Ray Chest PA And Lateral (Final result)  Result time 08/15/24 14:38:20      Final result by Yannick Cooper DO (08/15/24 14:38:20)                   Impression:      See above      Electronically signed by: Yannick Cooper DO  Date:    08/15/2024  Time:    14:38               Narrative:    EXAMINATION:  XR CHEST PA AND LATERAL    CLINICAL HISTORY:  Chest Pain;    TECHNIQUE:  PA and lateral views of the chest were performed.    COMPARISON:  11/03/2023    FINDINGS:  Vague subcentimeter nodular opacity right upper lobe which may be artifactual from superimposed structures versus small lung nodule.  There is no large lung consolidation.  There is no pleural effusion or pneumothorax.  Heart size within normal limits.  Continued atherosclerotic aorta.  Surgical clips left supraclavicular soft tissues again seen.  Visualized osseous structures grossly intact.                                       Medications   aspirin tablet 325 mg (325 mg Oral Given 8/15/24 1619)     Medical Decision Making  Yair Owens is a 74 y.o. male with h/o CAD, anxiety, GERD, lung granuloma, HTN who presents to the ED with chest pain.    Initial vitals notable for hypertension. Physical exam reveals a well-appearing gentleman with a reassuring physical exam.     Patient chest pain description is atypical and does not sound anginal in origin. Concern at this time for ACS possible given history of CAD, HEART score 4.  Also considering GERD and anxiety given history and description of chest pain.  PE less likely given no hypoxia, sats > 95%,  no tachypnea or tachycardia, lack of risk factors, Wells score 0.  Less likely dissection given  no "tearing" sensation or radiation to back, stable vitals, no focal neuro deficits, 2+ pulses in all 4 extremities. PNA less likely given lack of fever, cough, focal findings no lung " ausculation. Equal bilateral breath sounds do not suggest PTX.  Pain is not reproducible on exam to suggest costochondritis or alternative MSK etiology    I will obtain the following to better assess: CBC , CMP, Trop, BNP, EKG, CXR. Immediate interventions include aspirin.     Dispo pending workup.  Patient is followed closely with Cardiology and saw his cardiologist yesterday.  He was scheduled to have an echo in under 2 weeks and has cardiology follow-up in less than a month.    DISCLAIMER: This note was prepared with [employer] voice recognition transcription software. Garbled syntax, mangled pronouns, and other bizarre constructions may be attributed to that software system.      Amount and/or Complexity of Data Reviewed  External Data Reviewed: notes.  Labs: ordered. Decision-making details documented in ED Course.  Radiology: ordered.  ECG/medicine tests: ordered and independent interpretation performed. Decision-making details documented in ED Course.    Risk  Decision regarding hospitalization.            Scribe Attestation:   Scribe #1: I performed the above scribed service and the documentation accurately describes the services I performed. I attest to the accuracy of the note.        ED Course as of 08/15/24 1841   Thu Aug 15, 2024   1718 Repeat EKG without any new signs of ischemia.  Patient workup pending repeat troponin.  Blood work otherwise unremarkable [KI]   1720 Troponin I: <0.006 [KI]   1740 Patient workup reassuring.  Patient feels well.  Patient is followed closely with cardiology and has 2 appointments in the next 3 weeks.  His chest pain does not appear anginal and it has resolved.  Will discharge patient home with return precautions and cardiology follow-up.  Patient in agreement with the plan.  All questions answered.  [KI]      ED Course User Index  [KI] Agatha Nova MD          I, Julianne Nova MD , personally performed the services described in this documentation. All medical  record entries made by the scribe were at my direction and in my presence. I have reviewed the chart and agree that the record reflects my personal performance and is accurate and complete.      DISCLAIMER: This note was prepared with [employer] voice recognition transcription software. Garbled syntax, mangled pronouns, and other bizarre constructions may be attributed to that software system.                   Clinical Impression:  Final diagnoses:  [R07.9] Chest pain (Primary)          ED Disposition Condition    Discharge Stable          ED Prescriptions    None       Follow-up Information       Follow up With Specialties Details Why Contact Info    Chaitanya Plasencia MD Internal Medicine Schedule an appointment as soon as possible for a visit   [address] [facility] [address]  [phone]      Vladimir Avalos MD Cardiology, Interventional Cardiology Go on 9/6/2024  [address]  [address]  [facility] [address]  [phone]               Agatha Nova MD  08/15/24 [id number]

## 2024-08-15 NOTE — ED NOTES
"Pt reports a "heaviness" in chest that started around noon today. Pt reports that he did his normal activities this morning ( walk/cut grass). Pt reports that he was seen by his cardiologist yesterday and has a scheduled echo on 8/27 due to a heart murmur that was heard. Pt denies cp, sob, dizziness. Pt aaox4, connected to continuous pulse ox, cardiac and bp monitor.    "

## 2024-08-15 NOTE — TELEPHONE ENCOUNTER
Refill Routing Note   Medication(s) are not appropriate for processing by [facility] for the following reason(s):        Non-participating provider    [facility] action(s):  Route     Requires labs : Yes      Medication Therapy Plan: Patient is a participant in digital medicine program      Appointments  past 12m or future 3m with PCP    Date Provider   Last Visit   3/19/2024 Chaitanya Plasencia MD   Next Visit   Visit date not found Chaitanya Plasencia MD   ED visits in past 90 days: 1        Note composed:4:42 PM 08/15/2024

## 2024-08-15 NOTE — DISCHARGE INSTRUCTIONS
You were seen today in the ER for chest pain. Your blood work, EKG and xrays were all reassuring. Please return to the Emergency Department for any new or worsening symptoms including: fever, worsening chest pain, shortness of breath, loss of consciousness, dizziness, weakness, or any other concerns.     Please follow up with your Primary Care Provider within in the week. If you do not have one, you may contact the one listed on your discharge paperwork or you may also call the [facility] Appointment Desk at [phone] to schedule an appointment with one.     Please take all medication as prescribed.

## 2024-08-15 NOTE — TELEPHONE ENCOUNTER
Care Due:                  Date            Visit Type   Department     Provider  --------------------------------------------------------------------------------                                MYCHART                              ANNUAL       [facility]                              CHECKUP/PHY  MED/ INTERNAL  Chaitanya Johns  Last Visit: 03-      S            MED/ PEDS      [place]  Next Visit: None Scheduled  None         None Found                                                            Last  Test          Frequency    Reason                     Performed    Due Date  --------------------------------------------------------------------------------    Lipid Panel.  12 months..  atorvastatin.............  06- 05-    [facility] Embedded Care Due Messages. Reference number: [id number].   8/15/2024 9:49:58 AM CDT

## 2024-08-16 LAB
OHS QRS DURATION: 78 MS
OHS QTC CALCULATION: 394 MS

## 2024-08-16 RX ORDER — ATORVASTATIN CALCIUM 40 MG/1
40 TABLET, FILM COATED ORAL DAILY
Qty: 90 TABLET | Refills: 0 | Status: SHIPPED | OUTPATIENT
Start: 2024-08-16

## 2024-08-23 DIAGNOSIS — I10 ESSENTIAL HYPERTENSION: Chronic | ICD-10-CM

## 2024-08-23 NOTE — TELEPHONE ENCOUNTER
No care due was identified.  [facility] Embedded Care Due Messages. Reference number: [id number].   8/23/2024 12:49:17 AM CDT

## 2024-08-26 RX ORDER — LOSARTAN POTASSIUM 25 MG/1
TABLET ORAL
Qty: 90 TABLET | Refills: 2 | Status: SHIPPED | OUTPATIENT
Start: 2024-08-26

## 2024-08-27 ENCOUNTER — HOSPITAL ENCOUNTER (OUTPATIENT)
Dept: CARDIOLOGY | Facility: HOSPITAL | Age: 74
Discharge: HOME | End: 2024-08-27
Attending: INTERNAL MEDICINE
Payer: MEDICARE

## 2024-08-27 DIAGNOSIS — R01.1 HEART MURMUR: ICD-10-CM

## 2024-08-27 LAB
ASCENDING AORTA: 3.61 CM
AV INDEX (PROSTH): 0.37
AV MEAN GRADIENT: 19 MMHG
AV PEAK GRADIENT: 31 MMHG
AV REGURGITATION PRESSURE HALF TIME: 533 MS
AV VALVE AREA BY VELOCITY RATIO: 1.4 CM²
AV VALVE AREA: 1.54 CM²
AV VELOCITY RATIO: 0.34
CV ECHO LV RWT: 0.52 CM
DOP CALC AO PEAK VEL: 2.77 M/S
DOP CALC AO VTI: 69.7 CM
DOP CALC LVOT AREA: 4.1 CM2
DOP CALC LVOT DIAMETER: 2.29 CM
DOP CALC LVOT PEAK VEL: 0.94 M/S
DOP CALC LVOT STROKE VOLUME: 107.44 CM3
DOP CALCLVOT PEAK VEL VTI: 26.1 CM
E WAVE DECELERATION TIME: 227.39 MSEC
E/A RATIO: 1.13
E/E' RATIO: 8.78 M/S
ECHO LV POSTERIOR WALL: 1.3 CM (ref 0.6–1.1)
FRACTIONAL SHORTENING: 34 % (ref 28–44)
INTERVENTRICULAR SEPTUM: 1.36 CM (ref 0.6–1.1)
IVC DIAMETER: 1.04 CM
LA MAJOR: 3.62 CM
LA MINOR: 4.64 CM
LA WIDTH: 4.3 CM
LEFT ATRIUM SIZE: 4.11 CM
LEFT ATRIUM VOLUME: 61.1 CM3
LEFT INTERNAL DIMENSION IN SYSTOLE: 3.29 CM (ref 2.1–4)
LEFT VENTRICLE DIASTOLIC VOLUME: 116.75 ML
LEFT VENTRICLE SYSTOLIC VOLUME: 43.69 ML
LEFT VENTRICULAR INTERNAL DIMENSION IN DIASTOLE: 4.97 CM (ref 3.5–6)
LEFT VENTRICULAR MASS: 268.03 G
LV LATERAL E/E' RATIO: 7.9 M/S
LV SEPTAL E/E' RATIO: 9.88 M/S
LVED V (TEICH): 116.75 ML
LVES V (TEICH): 43.69 ML
LVOT MG: 2.24 MMHG
LVOT MV: 0.73 CM/S
MV PEAK A VEL: 0.7 M/S
MV PEAK E VEL: 0.79 M/S
MV STENOSIS PRESSURE HALF TIME: 65.94 MS
MV VALVE AREA P 1/2 METHOD: 3.34 CM2
OHS CV RV/LV RATIO: 0.72 CM
PISA AR MAX VEL: 4.08 M/S
PISA TR MAX VEL: 2.29 M/S
PULM VEIN S/D RATIO: 1.41
PV PEAK D VEL: 0.56 M/S
PV PEAK GRADIENT: 4 MMHG
PV PEAK S VEL: 0.79 M/S
PV PEAK VELOCITY: 1.05 M/S
RA MAJOR: 3.55 CM
RA PRESSURE ESTIMATED: 3 MMHG
RA WIDTH: 3.7 CM
RIGHT VENTRICLE DIASTOLIC BASEL DIMENSION: 3.6 CM
RIGHT VENTRICULAR END-DIASTOLIC DIMENSION: 3.57 CM
RV TB RVSP: 5 MMHG
RV TISSUE DOPPLER FREE WALL SYSTOLIC VELOCITY 1 (APICAL 4 CHAMBER VIEW): 20.55 CM/S
SINUS: 2.97 CM
STJ: 3.23 CM
TDI LATERAL: 0.1 M/S
TDI SEPTAL: 0.08 M/S
TDI: 0.09 M/S
TR MAX PG: 21 MMHG
TRICUSPID ANNULAR PLANE SYSTOLIC EXCURSION: 2.24 CM
TV REST PULMONARY ARTERY PRESSURE: 24 MMHG

## 2024-08-27 PROCEDURE — 93306 TTE W/DOPPLER COMPLETE: CPT | Mod: 26,,, | Performed by: INTERNAL MEDICINE

## 2024-08-27 PROCEDURE — 93306 TTE W/DOPPLER COMPLETE: CPT

## 2024-09-06 ENCOUNTER — OFFICE VISIT (OUTPATIENT)
Dept: CARDIOLOGY | Facility: CLINIC | Age: 74
End: 2024-09-06
Payer: MEDICARE

## 2024-09-06 VITALS
HEART RATE: 85 BPM | DIASTOLIC BLOOD PRESSURE: 62 MMHG | HEIGHT: 68 IN | BODY MASS INDEX: 26.9 KG/M2 | WEIGHT: 177.5 LBS | SYSTOLIC BLOOD PRESSURE: 124 MMHG | RESPIRATION RATE: 18 BRPM | OXYGEN SATURATION: 97 %

## 2024-09-06 DIAGNOSIS — I10 ESSENTIAL (PRIMARY) HYPERTENSION: ICD-10-CM

## 2024-09-06 DIAGNOSIS — I25.10 CORONARY ARTERY DISEASE INVOLVING NATIVE HEART WITHOUT ANGINA PECTORIS, UNSPECIFIED VESSEL OR LESION TYPE: ICD-10-CM

## 2024-09-06 DIAGNOSIS — E78.5 HYPERLIPIDEMIA, UNSPECIFIED HYPERLIPIDEMIA TYPE: ICD-10-CM

## 2024-09-06 DIAGNOSIS — I10 PRIMARY HYPERTENSION: ICD-10-CM

## 2024-09-06 DIAGNOSIS — I70.0 ATHEROSCLEROSIS OF AORTA: ICD-10-CM

## 2024-09-06 DIAGNOSIS — R01.1 HEART MURMUR: Primary | ICD-10-CM

## 2024-09-06 PROCEDURE — 99999 PR PBB SHADOW E&M-EST. PATIENT-LVL IV: CPT | Mod: PBBFAC,,, | Performed by: INTERNAL MEDICINE

## 2024-09-06 NOTE — TELEPHONE ENCOUNTER
Pt notified via portal.     Refill Routing Note   Medication(s) are not appropriate for processing by [facility] for the following reason(s):        No active prescription written by provider    [facility] action(s):  Defer     Requires labs : Yes      Medication Therapy Plan: Last ordered: 6/6/23 by Derik Ewing MD. Recent OV but no current order under PCP.      Appointments  past 12m or future 3m with PCP    Date Provider   Last Visit   8/30/2023 Derik Ewing MD   Next Visit   Visit date not found Derik Ewing MD   ED visits in past 90 days: 0        Note composed:7:16 PM 05/17/2024

## 2024-09-06 NOTE — PROGRESS NOTES
CARDIOVASCULAR CONSULTATION    REASON FOR CONSULT:   Yair Owens is a 74 y.o. male who presents for evaluation of chest pressure.      HISTORY OF PRESENT ILLNESS:     Patient is a pleasant 61-year-old man.  Came here because evaluation of chest pressure to the emergency room.  Was ruled out.  EKG was done which showed normal sinus rhythm.  States the pressure was substernal, and was much worse than the usual pressure which he feels.  States that he usually feels chest pain and tightness.  Unrelated to activity.  Sometimes can come with activity other times at rest.  Denies orthopnea, PND.  Does have mild dyspnea on exertion.  Used to smoke, but cutting down.    Notes from January 2020:    Patient doing fine.  Denies any further episodes of chest pains, orthopnea, PND.  States that he thinks it is his anxiety.  Stress testing did not reveal any significant issues apart from mild aortic valve stenosis.      The perfusion scan is free of evidence from myocardial ischemia or injury.    There is a  mild intensity fixed defect in the inferior wall of the left ventricle secondary to diaphragm attenuation.    Gated perfusion images showed an ejection fraction of 60% post stress.    The EKG portion of this study is negative for ischemia.    The patient reported no chest pain during the stress test.    There were no arrhythmias during stress.      Normal left ventricular systolic function. The estimated ejection fraction is 60%.  Concentric left ventricular hypertrophy.  Normal LV diastolic function.  Normal right ventricular systolic function.  Mild-to-moderate aortic valve stenosis.  Aortic valve area is 1.80 cm2; peak velocity is 2.60 m/s; mean gradient is 18 mmHg.  Mild aortic regurgitation.    Notes from may 2020:    The patient location is: his home   The chief complaint leading to consultation is: chest pain    Visit type: audiovisual    Face to Face time with patient: 15 mins   25 minutes of total time spent on the  encounter, which includes face to face time and non-face to face time preparing to see the patient (eg, review of tests), Obtaining and/or reviewing separately obtained history, Documenting clinical information in the electronic or other health record, Independently interpreting results (not separately reported) and communicating results to the patient/family/caregiver, or Care coordination (not separately reported).         Each patient to whom he or she provides medical services by telemedicine is:  (1) informed of the relationship between the physician and patient and the respective role of any other health care provider with respect to management of the patient; and (2) notified that he or she may decline to receive medical services by telemedicine and may withdraw from such care at any time.    Notes:  Patient here for follow-up.  Denies any chest pain at rest on exertion, orthopnea, PND.  States that he has been feeling great.  However he has started smoking more again because of the pandemic and the stress related with the pandemic.        Jan 2021:      The patient location is: his home  The chief complaint leading to consultation is: chest pain    Visit type: audiovisual    Face to Face time with patient: 15 mins  25 minutes of total time spent on the encounter, which includes face to face time and non-face to face time preparing to see the patient (eg, review of tests), Obtaining and/or reviewing separately obtained history, Documenting clinical information in the electronic or other health record, Independently interpreting results (not separately reported) and communicating results to the patient/family/caregiver, or Care coordination (not separately reported).         Each patient to whom he or she provides medical services by telemedicine is:  (1) informed of the relationship between the physician and patient and the respective role of any other health care provider with respect to management of the  patient; and (2) notified that he or she may decline to receive medical services by telemedicine and may withdraw from such care at any time.    Notes:  Patient here via audiovisual visit.  Had an episode of chest pain which brought him to the ER.  Describes it as left-sided.  At rest.  EKG was done which showed normal sinus rhythm with nonspecific ST changes and some mild ST depressions.  Since then has not had any further chest pains.  States that that day his blood pressure was high around 170 mm of mercury.  Doing fine.      Notes from feb 2021    The patient location is:  his home  The chief complaint leading to consultation is:  Chest pain    Visit type: audiovisual    Face to Face time with patient: 20 mins  30  minutes of total time spent on the encounter, which includes face to face time and non-face to face time preparing to see the patient (eg, review of tests), Obtaining and/or reviewing separately obtained history, Documenting clinical information in the electronic or other health record, Independently interpreting results (not separately reported) and communicating results to the patient/family/caregiver, or Care coordination (not separately reported).         Each patient to whom he or she provides medical services by telemedicine is:  (1) informed of the relationship between the physician and patient and the respective role of any other health care provider with respect to management of the patient; and (2) notified that he or she may decline to receive medical services by telemedicine and may withdraw from such care at any time.    Notes:       Patient here follow-up via audiovisual visit.  Occasional chest pain.  Really in the center of the chest and feels like tightness.  Sometimes in the left side and sometimes in the left shoulder.  Feels the left-sided pain goes to his left shoulder.  No relation with activity.  Can occur at rest or on exertion.  Stress test showed normal myocardial perfusion.   Although the EKG portion was positive for ischemia.    Normal myocardial perfusion scan. There is no evidence of myocardial ischemia or infarction.    There is a  mild intensity fixed defect in the inferobasilar wall of the left ventricle secondary to diaphragm attenuation.    The gated perfusion images showed an ejection fraction of 68% post stress.    There is normal wall motion post stress.    The EKG portion of this study is positive for ischemia.      The left ventricle is normal in size with mild concentric hypertrophy and normal systolic function. The estimated ejection fraction is 60%  Normal right ventricular size with normal right ventricular systolic function.  There is mild aortic valve stenosis.  Aortic valve area is 1.87 cm2; peak velocity is 2.19 m/s; mean gradient is 10 mmHg.    Notes from March 2021:  Patient here for follow-up after angiogram.  Mild nonobstructive CAD on angiogram.  No complications from angiogram.  Doing fine.  Denies any chest pains at rest on exertion.  States he thinks it is his anxiety which is bothering him.    The estimated blood loss was <50 mL.  There was non-obstructive coronary artery disease..        Left main:  Distal 10% stenosis     Lad:  Luminal irregularities.  Mid 10-20% stenosis     Circumflex:  Luminal irregularities     RCA:  Luminal irregularities     Access: We accessed the right radial artery using ultrasound guidance. The vessel appeared widely patent, suitable for endovascular access. The images were interpreted by me and saved.  Access was closed with radial band.           June 21:    The patient location is: his home  The chief complaint leading to consultation is: fu    Visit type: audiovisual    Face to Face time with patient: 15 min  25  minutes of total time spent on the encounter, which includes face to face time and non-face to face time preparing to see the patient (eg, review of tests), Obtaining and/or reviewing separately obtained history,  Documenting clinical information in the electronic or other health record, Independently interpreting results (not separately reported) and communicating results to the patient/family/caregiver, or Care coordination (not separately reported).         Each patient to whom he or she provides medical services by telemedicine is:  (1) informed of the relationship between the physician and patient and the respective role of any other health care provider with respect to management of the patient; and (2) notified that he or she may decline to receive medical services by telemedicine and may withdraw from such care at any time.    Notes:   Has been having left sided chest pain, not related to exertion, pin point in the left of the chest. Does not occur when he is cutting his lawn.  Denies orthopnea, PND, swelling of feet.  States blood pressure is usually well controlled at home.      September 2022: Patient here for follow-up.  Denies any chest pains at rest on exertion, orthopnea, PND.  Has been doing fine.    MARCH 23:  Follow-up.  Denies any chest pains at rest on exertion, orthopnea, PND, swelling of feet.      Notes from October 2023: Patient here for follow-up.  Doing fine.  Walks about 30 miles a week.  Denies chest pains at rest on exertion, orthopnea, PND.    Notes from August 24: Patient here for follow-up.  Denies any anginal sounding chest pains.  Occasional burping.  On omeprazole.  Which helps.  Denies orthopnea PND swelling of feet.  No chest pains on exertion      Sept 24:  Patient here for follow-up.  Denies chest pains at rest on exertion orthopnea or PND.  Walks 35 miles a week.  Without any issues.    Results for orders placed during the hospital encounter of 08/27/24    Echo    Interpretation Summary    Left Ventricle: The left ventricle is normal in size. There is mild concentric hypertrophy. There is normal systolic function with a visually estimated ejection fraction of 65 - 70%.    Right  Ventricle: Normal right ventricular cavity size. Systolic function is normal.    Aortic Valve: There is mild to moderate stenosis. Aortic valve area by VTI is 1.54 cm². Aortic valve peak velocity is 2.77 m/s. Mean gradient is 19 mmHg. The dimensionless index is 0.37. There is mild aortic regurgitation.    Pulmonary Artery: The estimated pulmonary artery systolic pressure is 24 mmHg.    IVC/SVC: Normal venous pressure at 3 mmHg.      PAST MEDICAL HISTORY:     Past Medical History:   Diagnosis Date    CAD (coronary artery disease) 02/22/2021    Cath 2021  There was non-obstructive coronary artery disease..   Left main:  Distal 10% stenosis  Lad:  Luminal irregularities.  Mid 10-20% stenosis   Circumflex:  Luminal irregularities   RCA:  Luminal irregularities    Calcified granuloma of lung 02/03/2023    LLL calcified granuloma seen on CT Chest Lung Screening Low Dose 01/31/2018    Erectile dysfunction 02/14/2020    Fatty liver     Generalized anxiety disorder 01/11/2017    GERD (gastroesophageal reflux disease)     Gout, unspecified     History of colonic polyps 11/16/2023    2 polyps 11/22 -5 yrs    Hyperlipidemia     Hypertension     Psychophysiological insomnia 07/24/2017    Tobacco dependence 07/24/2017       PAST SURGICAL HISTORY:     Past Surgical History:   Procedure Laterality Date    APPENDECTOMY      BONE RESECTION, RIB      for thoracic outlet syndrome    COLONOSCOPY N/A 7/21/2017    Procedure: COLONOSCOPY;  Surgeon: Deepak Servin MD;  Location: [facility] ENDO;  Service: Endoscopy;  Laterality: N/A;    COLONOSCOPY N/A 11/22/2022    Procedure: COLONOSCOPY;  Surgeon: Sam Obrien MD;  Location: [facility] ENDO;  Service: Endoscopy;  Laterality: N/A;  vacc-sutab-inst portal-tb    HAND SURGERY      LEFT HEART CATHETERIZATION Left 3/2/2021    Procedure: Left heart cath, radial, 730 am;  Surgeon: Vladimir Avalos MD;  Location: [facility] CATH LAB;  Service: Cardiology;  Laterality: Left;  RN PREOP 2/25/2021--COVID NEGATIVE ON  3/1  H/P INCOMPLETE    SCROTAL SURGERY      mass    ULTRASOUND GUIDANCE  3/2/2021    Procedure: Ultrasound Guidance;  Surgeon: Vladimir Avalos MD;  Location: [facility] CATH LAB;  Service: Cardiology;;       ALLERGIES AND MEDICATION:     Review of patient's allergies indicates:   Allergen Reactions    Codeine Itching    Ibuprofen Itching    Remeron [mirtazapine] Anxiety     Did not help the patient sleep and created anxiety.        Medication List            Accurate as of September 6, 2024  9:41 AM. If you have any questions, ask your nurse or doctor.                CONTINUE taking these medications      amLODIPine 10 MG tablet  Commonly known as: NORVASC  TAKE 1 TABLET BY MOUTH EVERY DAY     aspirin 81 MG EC tablet  Commonly known as: ECOTRIN  Take 1 tablet (81 mg total) by mouth once daily.     atorvastatin 40 MG tablet  Commonly known as: LIPITOR  Take 1 tablet (40 mg total) by mouth once daily.     busPIRone 10 MG tablet  Commonly known as: BUSPAR  TAKE 1 TABLET BY MOUTH THREE TIMES A DAY     hydrOXYzine 50 MG tablet  Commonly known as: ATARAX  Take 1 tablet (50 mg total) by mouth 3 (three) times daily as needed for Anxiety.     * losartan 50 MG tablet  Commonly known as: COZAAR  TAKE 1 TABLET BY MOUTH EVERY DAY     * losartan 25 MG tablet  Commonly known as: COZAAR  TAKE 1 TABLET BY MOUTH EVERY DAY     meloxicam 7.5 MG tablet  Commonly known as: MOBIC  TAKE 1 TABLET BY MOUTH ONCE A DAY FOR 2 WEEKS THEN TAKE AS NEEDED. TAKE WITH FOOD     multivitamin capsule     omeprazole 40 MG capsule  Commonly known as: PRILOSEC  TAKE 1 CAPSULE BY MOUTH TWICE  DAILY     tadalafiL 5 MG tablet  Commonly known as: CIALIS  Take 1 tablet (5 mg total) by mouth once daily.     tamsulosin 0.4 mg Cap  Commonly known as: FLOMAX  Take 1 capsule (0.4 mg total) by mouth once daily.           * This list has 2 medication(s) that are the same as other medications prescribed for you. Read the directions carefully, and ask your doctor or other  care provider to review them with you.                  SOCIAL HISTORY:     Social History     Socioeconomic History    Marital status:    Occupational History     Employer:  [employer]   Tobacco Use    Smoking status: Every Day     Current packs/day: 0.50     Average packs/day: 0.5 packs/day for 61.0 years (30.5 ttl pk-yrs)     Types: Cigarettes     Start date: 1967     Passive exposure: Current    Smokeless tobacco: Never    Tobacco comments:     0.5 ppd or less    Substance and Sexual Activity    Alcohol use: Never    Drug use: No    Sexual activity: Yes     Partners: Female     Social Determinants of Health     Financial Resource Strain: Low Risk  (4/11/2024)    Overall Financial Resource Strain (CARDIA)     Difficulty of Paying Living Expenses: Not hard at all   Food Insecurity: No Food Insecurity (4/11/2024)    Hunger Vital Sign     Worried About Running Out of Food in the Last Year: Never true     Ran Out of Food in the Last Year: Never true   Transportation Needs: No Transportation Needs (4/11/2024)    PRAPARE - Transportation     Lack of Transportation (Medical): No     Lack of Transportation (Non-Medical): No   Physical Activity: Sufficiently Active (4/11/2024)    Exercise Vital Sign     Days of Exercise per Week: 7 days     Minutes of Exercise per Session: 60 min   Stress: No Stress Concern Present (4/11/2024)    [place] [employer] - Occupational Stress Questionnaire     Feeling of Stress : Only a little   Housing Stability: Low Risk  (4/11/2024)    Housing Stability Vital Sign     Unable to Pay for Housing in the Last Year: No     Number of Places Lived in the Last Year: 1     Unstable Housing in the Last Year: No       FAMILY HISTORY:     Family History   Problem Relation Name Age of Onset    No Known Problems Mother      Cancer Father      Heart disease Father      Asthma Sister Paula     Cancer Sister Paula 78        Rectal cancer    Stroke Brother Gaurav     Lupus Daughter  "x1     No Known Problems Son x3        REVIEW OF SYSTEMS:   Review of Systems   Constitutional: Negative.   HENT: Negative.     Eyes: Negative.    Respiratory: Negative.     Endocrine: Negative.    Hematologic/Lymphatic: Negative.    Skin: Negative.    Musculoskeletal: Negative.    Gastrointestinal: Negative.    Genitourinary: Negative.    Neurological: Negative.    Psychiatric/Behavioral: Negative.     Allergic/Immunologic: Negative.        A 10 point review of systems was performed and all the pertinent positives have been mentioned. Rest of review of systems was negative.        PHYSICAL EXAM:     Vitals:    09/06/24 0925   BP: 124/62   Pulse: 85   Resp: 18    Body mass index is 26.98 kg/m².  Weight: 80.5 kg (177 lb 7.5 oz)   Height: 5' 8" (172.7 cm)      Physical Exam  Vitals and nursing note reviewed.   Constitutional:       Appearance: Normal appearance. He is well-developed.   HENT:      Head: Normocephalic and atraumatic.      Right Ear: Hearing normal.      Left Ear: Hearing normal.      Nose: Nose normal.   Eyes:      General: Lids are normal.      Conjunctiva/sclera: Conjunctivae normal.      Pupils: Pupils are equal, round, and reactive to light.   Cardiovascular:      Rate and Rhythm: Normal rate and regular rhythm.      Pulses: Normal pulses.      Heart sounds: Murmur heard.   Pulmonary:      Effort: Pulmonary effort is normal.      Breath sounds: Normal breath sounds.   Abdominal:      Palpations: Abdomen is soft.      Tenderness: There is no abdominal tenderness.   Musculoskeletal:         General: No deformity.      Cervical back: Normal range of motion and neck supple.   Skin:     General: Skin is warm and dry.   Neurological:      Mental Status: He is alert and oriented to person, place, and time.   Psychiatric:         Speech: Speech normal.              DATA:     Laboratory:  CBC:  Recent Labs   Lab 11/03/23  1647 11/16/23  1335 08/15/24  1444   WBC 6.02 6.34 7.21   Hemoglobin 12.9 L 13.8 L 13.7 " L   Hematocrit 39.3 L 42.5 42.1   Platelets 218 253 228       CHEMISTRIES:  Recent Labs   Lab 06/11/22  0800 06/02/23  1053 11/03/23  1647 01/09/24  0835 08/15/24  1444   Glucose 109   < > 144 H 111 H 104   Sodium 139   < > 139 141 139   Potassium 4.7   < > 4.2 5.1 4.5   BUN 19   < > 13 12 18   Creatinine 1.3   < > 1.0 1.0 1.2   eGFR if  >60.0  --   --   --   --    eGFR if non  54.9 A  --   --   --   --    Calcium 9.5   < > 9.3 9.9 10.0    < > = values in this interval not displayed.       CARDIAC BIOMARKERS:  Recent Labs   Lab 11/03/23  1647 08/15/24  1444 08/15/24  1648   Troponin I <0.006 <0.006 <0.006       COAGS:        LIPIDS/LFTS:  Recent Labs   Lab 06/11/22  0800 10/07/22  0748 06/02/23  1053 11/03/23  1647 01/09/24  0835 08/15/24  1444   Cholesterol 154  --  125  --   --   --    Triglycerides 95  --  49  --   --   --    HDL 41  --  39 L  --   --   --    LDL Cholesterol 94.0  --  76.2  --   --   --    Non-HDL Cholesterol 113  --  86  --   --   --    AST 44 H   < > 22 25 22 33   ALT 57 H   < > 21 23 17 23    < > = values in this interval not displayed.       Hemoglobin A1C   Date Value Ref Range Status   11/16/2023 5.3 4.0 - 5.6 % Final     Comment:     ADA Screening Guidelines:  5.7-6.4%  Consistent with prediabetes  >or=6.5%  Consistent with diabetes    High levels of fetal hemoglobin interfere with the HbA1C  assay. Heterozygous hemoglobin variants (HbS, HgC, etc)do  not significantly interfere with this assay.   However, presence of multiple variants may affect accuracy.         TSH  Recent Labs   Lab 11/16/23  1335   TSH 0.725       The ASCVD Risk score (Francis DK, et al., 2019) failed to calculate for the following reasons:    The valid total cholesterol range is 130 to 320 mg/dL             ASSESSMENT AND PLAN     Patient Active Problem List   Diagnosis    Hyperlipidemia    Hypertension    GERD (gastroesophageal reflux disease)    Generalized anxiety disorder    Tobacco  dependence    Psychophysiological insomnia    Atherosclerosis of aorta    Erectile dysfunction    CAD (coronary artery disease)    Fatty liver    Hepatomegaly    Calcified granuloma of lung    Purpura    Lower urinary tract symptoms (LUTS)    History of colonic polyps    Overweight (BMI 25.0-29.9)    Other emphysema       1.  Chest pressure:  Resolved Coronary angiogram showed nonobstructive CAD.      2. Hypertension:  Blood pressure  controlled.  I have asked him to maintain a blood pressure diary, low-salt diet, and start regular exercise.     3. Mild to moderate aortic valve stenosis.  Follow-up in 6 m.  Annual echocardiogram for follow-up    4.  Smoking cessation has been highly encouraged.        Thank you very much for involving me in the care of your patient.  Please do not hesitate to contact me if there are any questions.      Vladimir Avalos MD, FACC, [facility]  Interventional Cardiologist, [facility].           This note was dictated with the help of speech recognition software.  There might be un-intended errors and/or substitutions.

## 2024-09-07 DIAGNOSIS — N13.8 BPH WITH URINARY OBSTRUCTION: ICD-10-CM

## 2024-09-07 DIAGNOSIS — N40.1 BPH WITH URINARY OBSTRUCTION: ICD-10-CM

## 2024-09-07 RX ORDER — TAMSULOSIN HYDROCHLORIDE 0.4 MG/1
1 CAPSULE ORAL
Qty: 90 CAPSULE | Refills: 3 | Status: SHIPPED | OUTPATIENT
Start: 2024-09-07

## 2024-09-24 DIAGNOSIS — I10 ESSENTIAL (PRIMARY) HYPERTENSION: ICD-10-CM

## 2024-09-26 DIAGNOSIS — I10 ESSENTIAL (PRIMARY) HYPERTENSION: ICD-10-CM

## 2024-09-26 NOTE — TELEPHONE ENCOUNTER
----- Message from Glenis Crowley sent at 9/26/2024 12:18 PM CDT -----  Regarding: Self   Type: RX Refill Request    Who Called: Self     Have you contacted your pharmacy: yes ( pharmacy states no refills )     Refill    RX Name and Strength:amLODIPine (NORVASC) 10 MG tablet     Preferred Pharmacy with phone number: .      [id number] [address] [facility] [facility]  [address]  [place] [address]  Phone: [phone] Fax: [phone]          Local or Mail Order: local     Would the patient rather a call back or a response via [facility]? Call back     Best Call Back Number:.[phone]      Additional Information:  pharmacy requested refills for pt     Thank you.

## 2024-09-27 RX ORDER — AMLODIPINE BESYLATE 10 MG/1
10 TABLET ORAL
Qty: 90 TABLET | Refills: 3 | Status: SHIPPED | OUTPATIENT
Start: 2024-09-27

## 2024-09-27 RX ORDER — AMLODIPINE BESYLATE 10 MG/1
10 TABLET ORAL DAILY
Qty: 90 TABLET | Refills: 3 | OUTPATIENT
Start: 2024-09-27

## 2024-09-27 NOTE — TELEPHONE ENCOUNTER
----- Message from Ariela Cosby sent at 9/27/2024  9:40 AM CDT -----  Type: RX Refill Request     Who Called:   pt  Have you contacted your pharmacy:   yes  Refill     RX Name and Strength:   amLODIPine (NORVASC) 10 MG tablet  Preferred Pharmacy with phone number:       [id number] [address]  [address]  [place] [address]  Phone: [phone] Fax: [phone]      Local or Mail Order:local     Would the patient rather a call back or a response via [facility]?     Best Call Back Number:   Telephone Information:  Mobile          [phone]      Additional Information:     Thank you.

## 2024-10-16 ENCOUNTER — PATIENT MESSAGE (OUTPATIENT)
Dept: FAMILY MEDICINE | Facility: CLINIC | Age: 74
End: 2024-10-16

## 2024-10-16 ENCOUNTER — OFFICE VISIT (OUTPATIENT)
Dept: FAMILY MEDICINE | Facility: CLINIC | Age: 74
End: 2024-10-16
Payer: MEDICARE

## 2024-10-16 DIAGNOSIS — K59.00 CONSTIPATION, UNSPECIFIED CONSTIPATION TYPE: Primary | ICD-10-CM

## 2024-10-16 DIAGNOSIS — I10 PRIMARY HYPERTENSION: Chronic | ICD-10-CM

## 2024-10-16 DIAGNOSIS — E78.5 HYPERLIPIDEMIA, UNSPECIFIED HYPERLIPIDEMIA TYPE: Chronic | ICD-10-CM

## 2024-10-16 DIAGNOSIS — F17.200 TOBACCO DEPENDENCE: Chronic | ICD-10-CM

## 2024-10-16 PROCEDURE — 99406 BEHAV CHNG SMOKING 3-10 MIN: CPT | Mod: 95,,, | Performed by: NURSE PRACTITIONER

## 2024-10-16 PROCEDURE — 99214 OFFICE O/P EST MOD 30 MIN: CPT | Mod: 95,25,, | Performed by: NURSE PRACTITIONER

## 2024-10-16 PROCEDURE — 4010F ACE/ARB THERAPY RXD/TAKEN: CPT | Mod: CPTII,95,, | Performed by: NURSE PRACTITIONER

## 2024-10-16 RX ORDER — BUPROPION HYDROCHLORIDE 150 MG/1
150 TABLET, EXTENDED RELEASE ORAL 2 TIMES DAILY
Qty: 60 TABLET | Refills: 11 | Status: SHIPPED | OUTPATIENT
Start: 2024-10-16 | End: 2024-10-16

## 2024-10-16 NOTE — PROGRESS NOTES
The patient location is: Patient Home  The chief complaint leading to consultation is: as below  Visit type:   Virtual visit with synchronous audio and video    Total time spent with patient: 15 minutes  Each patient to whom he or she provides medical services by telemedicine is:  (1) informed of the relationship between the physician and patient and the respective role of any other health care provider with respect to management of the patient; and (2) notified that he may decline to receive medical services by telemedicine and may withdraw from such care at any time.      TATYANA Owens is a 74 y.o. male with multiple medical diagnoses as listed in the medical history and problem list that presents for constipation.      Constipation  This is a new problem. The current episode started in the past 7 days. The problem is unchanged. The stool is described as pellet like. The patient is not on a high fiber diet. He Exercises regularly. There has Not been adequate water intake. Pertinent negatives include no abdominal pain, back pain, diarrhea, difficulty urinating, fever, hemorrhoids, melena, nausea or vomiting. He has tried stool softeners and fiber for the symptoms. The treatment provided mild relief.     Reports stool is the consistency of pellets.  He has taken stool softeners and dulcolax.       Assessment & Plan     Problem List Items Addressed This Visit          Cardiac/Vascular    Hyperlipidemia (Chronic)    discussed ways to lower triglycerides such as cutting simple sugars out of diet (white breads, candies, cookies, cakes, etc.) and reducing/eliminating intake of highly processed trans fatty acids.   Exercise 30 minutes a day for 4-5 days a week.   Eat more fiber.      Hypertension (Chronic)    BP Readings from Last 3 Encounters:   09/06/24 124/62   08/15/24 (!) 147/67   08/13/24 (!) 142/62       -continue current medication regimen  -DASH diet, regular cardiovascular exercises, portion  control  -weight loss  -f/u with BP logs in 2 weeks      Overview     Followed by digital team            Other    Tobacco dependence (Chronic)    -Counseled patient to quit tobacco usage, and advised on several options to quit and the benefits of quitting, which include but are not limited to: decreasing the risk of cancer, HTN, CVD, respiratory complications, among other diseases.  -5 minutes spent discussing cessation.   -pt is interested in quitting.     Smoking cessation     Other Visit Diagnoses       Constipation, unspecified constipation type    -  Primary    -advised adequate hydration, high fiber diet.    Start daily fiber. Take 1 tsp of fiber powder (psyllium or other sugar-free powder). Mix in 8 oz of water. Take x 3-5 days. Then, increase fiber by 1 tsp every 3-5 days until stool is easy to pass. Stop and continue at that dose. Do not exceed 6 tsps/day. May also use over the counter stool softener 1-2 x/day. AVOID laxatives. Start Miralax every others day, ok to increase to daily.               --------------------------------------------      Health Maintenance:  Health Maintenance         Date Due Completion Date    Aspirin/Antiplatelet Therapy Never done ---    LDCT Lung Screen 03/26/2025 3/26/2024    High Dose Statin 09/06/2025 9/6/2024    Colorectal Cancer Screening 11/22/2027 11/22/2022    Lipid Panel 06/02/2028 6/2/2023    TETANUS VACCINE 07/13/2033 7/13/2023            Advised patient on the importance of completing overdue health maintenance items    Follow Up:  Follow up in about 2 weeks (around 10/30/2024), or if symptoms worsen or fail to improve.    Exam     Review of Systems:  (as noted above)  Review of Systems   Constitutional:  Negative for activity change, fever and unexpected weight change.   HENT:  Negative for hearing loss, rhinorrhea and trouble swallowing.    Eyes:  Negative for discharge and visual disturbance.   Respiratory:  Negative for chest tightness, shortness of breath and  wheezing.    Cardiovascular:  Negative for chest pain and palpitations.   Gastrointestinal:  Positive for constipation. Negative for abdominal pain, blood in stool, diarrhea, hemorrhoids, melena, nausea and vomiting.   Endocrine: Negative for polydipsia and polyuria.   Genitourinary:  Negative for difficulty urinating, hematuria and urgency.   Musculoskeletal:  Negative for arthralgias, back pain, joint swelling and neck pain.   Neurological:  Negative for weakness and headaches.   Psychiatric/Behavioral:  Negative for confusion and dysphoric mood.        Physical Exam:   Physical Exam  Constitutional:       General: He is not in acute distress.     Appearance: He is not toxic-appearing or diaphoretic.   Pulmonary:      Effort: Pulmonary effort is normal.   Neurological:      Mental Status: He is alert.   Psychiatric:         Mood and Affect: Mood normal.       There were no vitals filed for this visit.   There is no height or weight on file to calculate BMI.        History     Past Medical History:  Past Medical History:   Diagnosis Date    CAD (coronary artery disease) 02/22/2021    Cath 2021  There was non-obstructive coronary artery disease..   Left main:  Distal 10% stenosis  Lad:  Luminal irregularities.  Mid 10-20% stenosis   Circumflex:  Luminal irregularities   RCA:  Luminal irregularities    Calcified granuloma of lung 02/03/2023    LLL calcified granuloma seen on CT Chest Lung Screening Low Dose 01/31/2018    Erectile dysfunction 02/14/2020    Fatty liver     Generalized anxiety disorder 01/11/2017    GERD (gastroesophageal reflux disease)     Gout, unspecified     History of colonic polyps 11/16/2023    2 polyps 11/22 -5 yrs    Hyperlipidemia     Hypertension     Psychophysiological insomnia 07/24/2017    Tobacco dependence 07/24/2017       Past Surgical History:  Past Surgical History:   Procedure Laterality Date    APPENDECTOMY      BONE RESECTION, RIB      for thoracic outlet syndrome    COLONOSCOPY  N/A 7/21/2017    Procedure: COLONOSCOPY;  Surgeon: Deepak Servin MD;  Location: [facility] ENDO;  Service: Endoscopy;  Laterality: N/A;    COLONOSCOPY N/A 11/22/2022    Procedure: COLONOSCOPY;  Surgeon: Sam Obrien MD;  Location: [facility] ENDO;  Service: Endoscopy;  Laterality: N/A;  vacc-sutab-inst portal-tb    HAND SURGERY      LEFT HEART CATHETERIZATION Left 3/2/2021    Procedure: Left heart cath, radial, 730 am;  Surgeon: Vladimir Avalos MD;  Location: [facility] CATH LAB;  Service: Cardiology;  Laterality: Left;  RN PREOP 2/25/2021--COVID NEGATIVE ON 3/1  H/P INCOMPLETE    SCROTAL SURGERY      mass    ULTRASOUND GUIDANCE  3/2/2021    Procedure: Ultrasound Guidance;  Surgeon: Vladimir Avalos MD;  Location: [facility] CATH LAB;  Service: Cardiology;;       Social History:  Social History     Socioeconomic History    Marital status:    Occupational History     Employer: [employer]   Tobacco Use    Smoking status: Every Day     Current packs/day: 0.50     Average packs/day: 0.5 packs/day for 61.1 years (30.5 ttl pk-yrs)     Types: Cigarettes     Start date: 1967     Passive exposure: Current    Smokeless tobacco: Never    Tobacco comments:     0.5 ppd or less    Substance and Sexual Activity    Alcohol use: Never    Drug use: No    Sexual activity: Yes     Partners: Female     Social Drivers of Health     Financial Resource Strain: Low Risk  (4/11/2024)    Overall Financial Resource Strain (CARDIA)     Difficulty of Paying Living Expenses: Not hard at all   Food Insecurity: No Food Insecurity (4/11/2024)    Hunger Vital Sign     Worried About Running Out of Food in the Last Year: Never true     Ran Out of Food in the Last Year: Never true   Transportation Needs: No Transportation Needs (4/11/2024)    PRAPARE - Transportation     Lack of Transportation (Medical): No     Lack of Transportation (Non-Medical): No   Physical Activity: Sufficiently Active (4/11/2024)    Exercise Vital Sign     Days of Exercise per Week: 7 days      Minutes of Exercise per Session: 60 min   Stress: No Stress Concern Present (4/11/2024)    [employer] - Occupational Stress Questionnaire     Feeling of Stress : Only a little   Housing Stability: Low Risk  (4/11/2024)    Housing Stability Vital Sign     Unable to Pay for Housing in the Last Year: No     Number of Places Lived in the Last Year: 1     Unstable Housing in the Last Year: No       Family History:  Family History   Problem Relation Name Age of Onset    No Known Problems Mother      Cancer Father      Heart disease Father      Asthma Sister Paula     Cancer Sister Paula 78        Rectal cancer    Stroke Brother Gaurav     Lupus Daughter x1     No Known Problems Son x3        Allergies and Medications: (updated and reviewed)  Review of patient's allergies indicates:   Allergen Reactions    Codeine Itching    Ibuprofen Itching    Remeron [mirtazapine] Anxiety     Did not help the patient sleep and created anxiety.     Current Outpatient Medications   Medication Sig Dispense Refill    amLODIPine (NORVASC) 10 MG tablet TAKE 1 TABLET BY MOUTH EVERY DAY 90 tablet 3    aspirin (ECOTRIN) 81 MG EC tablet Take 1 tablet (81 mg total) by mouth once daily.  0    atorvastatin (LIPITOR) 40 MG tablet Take 1 tablet (40 mg total) by mouth once daily. 90 tablet 0    busPIRone (BUSPAR) 10 MG tablet TAKE 1 TABLET BY MOUTH THREE TIMES A  tablet 1    hydrOXYzine (ATARAX) 50 MG tablet Take 1 tablet (50 mg total) by mouth 3 (three) times daily as needed for Anxiety. 90 tablet 1    losartan (COZAAR) 25 MG tablet TAKE 1 TABLET BY MOUTH EVERY DAY (Patient not taking: Reported on 9/6/2024) 90 tablet 2    losartan (COZAAR) 50 MG tablet TAKE 1 TABLET BY MOUTH EVERY DAY 90 tablet 3    meloxicam (MOBIC) 7.5 MG tablet TAKE 1 TABLET BY MOUTH ONCE A DAY FOR 2 WEEKS THEN TAKE AS NEEDED. TAKE WITH FOOD (Patient not taking: Reported on 4/11/2024) 30 tablet 0    multivitamin capsule Take 1 capsule by mouth  once daily.      omeprazole (PRILOSEC) 40 MG capsule TAKE 1 CAPSULE BY MOUTH TWICE  DAILY 180 capsule 3    tadalafiL (CIALIS) 5 MG tablet Take 1 tablet (5 mg total) by mouth once daily. 90 tablet 3    tamsulosin (FLOMAX) 0.4 mg Cap TAKE 1 CAPSULE BY MOUTH EVERY DAY 90 capsule 3     No current facility-administered medications for this visit.       Patient Care Team:  Chaitanya Plasencia MD as PCP - General (Internal Medicine)  Tiffany Yu LPN as Licensed Practical Nurse  Vladimir Avalos MD as Consulting Physician (Cardiology)  Caryl Vila, PharmD as Hypertension Digital Medicine Clinician (Pharmacist)  Carly Vila PharmD as Hyperlipidemia Digital Medicine Clinician  Deysi Rader NP as Nurse Practitioner (Hepatology)  Africa Gonzales as Digital Medicine Health   Marilu Chavez DMSc, PA-C as Physician Assistant (Psychiatry)  Chaitanya Plasencia MD as Hypertension Digital Medicine Responsible Provider (Internal Medicine)  Chaitanya Plasencia MD as Hyperlipidemia Digital Medicine Responsible Provider (Internal Medicine)  Gamaliel Quinn MD as 1st Call (Urology)  Annabel Siu MD as Consulting Physician (Orthopedic Surgery)  Marilu Chavez DMSc, PA-C as Physician Assistant (Psychiatry)       - The patient was sent an After Visit Summary virtually that lists all medications with directions, allergies, education, orders placed during this encounter and follow-up instructions.      - I have reviewed the patient's medical information including past medical, family, and social history sections including the medications and allergies.      - We discussed the patient's current medications.     This note was created by combination of typed  and MModal dictation.  Transcription errors may be present.  If there are any questions, please contact me.       Jt Knight NP

## 2024-10-16 NOTE — PATIENT INSTRUCTIONS
[facility]--[phone]  Imaging, Xray, CT, MRI, Ultrasound---[phone]  Bariatrics---[phone]  Breast Surgery---[phone]  Case Management---[phone]  Colonoscopy---[phone]  DME---[phone]  Infectious Disease---[phone]  Interventional Radiology---[phone]  Medical Records---[phone]  [facility] On Call---[phone]  Optometry/Ophthalmology---[phone]  [facility]---[phone]  Physical Therapy---[phone]  Psychiatry---[phone] or [phone]  Plastic Surgery---[phone]  Recovery--[phone] option 2, or [phone].  Sleep Study---[phone]  Smoking Cessation---[phone]  Wound Care---[phone]  Referral Desk---[phone]

## 2024-10-29 ENCOUNTER — PATIENT MESSAGE (OUTPATIENT)
Dept: FAMILY MEDICINE | Facility: CLINIC | Age: 74
End: 2024-10-29
Payer: MEDICARE

## 2024-11-05 ENCOUNTER — OFFICE VISIT (OUTPATIENT)
Dept: PSYCHIATRY | Facility: CLINIC | Age: 74
End: 2024-11-05
Payer: COMMERCIAL

## 2024-11-05 ENCOUNTER — OFFICE VISIT (OUTPATIENT)
Dept: UROLOGY | Facility: CLINIC | Age: 74
End: 2024-11-05
Payer: MEDICARE

## 2024-11-05 ENCOUNTER — LAB VISIT (OUTPATIENT)
Dept: LAB | Facility: HOSPITAL | Age: 74
End: 2024-11-05
Attending: UROLOGY
Payer: MEDICARE

## 2024-11-05 VITALS — WEIGHT: 178 LBS | BODY MASS INDEX: 27.07 KG/M2

## 2024-11-05 DIAGNOSIS — N52.8 OTHER MALE ERECTILE DYSFUNCTION: ICD-10-CM

## 2024-11-05 DIAGNOSIS — Z86.59 HISTORY OF DEPRESSION: ICD-10-CM

## 2024-11-05 DIAGNOSIS — F41.0 GENERALIZED ANXIETY DISORDER WITH PANIC ATTACKS: Primary | ICD-10-CM

## 2024-11-05 DIAGNOSIS — N40.1 BPH WITH URINARY OBSTRUCTION: Primary | ICD-10-CM

## 2024-11-05 DIAGNOSIS — F41.1 GENERALIZED ANXIETY DISORDER WITH PANIC ATTACKS: Primary | ICD-10-CM

## 2024-11-05 DIAGNOSIS — R33.9 INCOMPLETE BLADDER EMPTYING: ICD-10-CM

## 2024-11-05 DIAGNOSIS — N13.8 BPH WITH URINARY OBSTRUCTION: ICD-10-CM

## 2024-11-05 DIAGNOSIS — R35.1 NOCTURIA: ICD-10-CM

## 2024-11-05 DIAGNOSIS — N13.8 BPH WITH URINARY OBSTRUCTION: Primary | ICD-10-CM

## 2024-11-05 DIAGNOSIS — N40.1 BPH WITH URINARY OBSTRUCTION: ICD-10-CM

## 2024-11-05 LAB — COMPLEXED PSA SERPL-MCNC: 2.7 NG/ML (ref 0–4)

## 2024-11-05 PROCEDURE — 99214 OFFICE O/P EST MOD 30 MIN: CPT | Mod: 95,,, | Performed by: PHYSICIAN ASSISTANT

## 2024-11-05 PROCEDURE — 99214 OFFICE O/P EST MOD 30 MIN: CPT | Mod: S$GLB,,, | Performed by: UROLOGY

## 2024-11-05 PROCEDURE — 4010F ACE/ARB THERAPY RXD/TAKEN: CPT | Mod: CPTII,95,, | Performed by: PHYSICIAN ASSISTANT

## 2024-11-05 PROCEDURE — 84153 ASSAY OF PSA TOTAL: CPT | Performed by: UROLOGY

## 2024-11-05 PROCEDURE — 3008F BODY MASS INDEX DOCD: CPT | Mod: CPTII,S$GLB,, | Performed by: UROLOGY

## 2024-11-05 PROCEDURE — 1160F RVW MEDS BY RX/DR IN RCRD: CPT | Mod: CPTII,S$GLB,, | Performed by: UROLOGY

## 2024-11-05 PROCEDURE — 1126F AMNT PAIN NOTED NONE PRSNT: CPT | Mod: CPTII,S$GLB,, | Performed by: UROLOGY

## 2024-11-05 PROCEDURE — 99999 PR PBB SHADOW E&M-EST. PATIENT-LVL III: CPT | Mod: PBBFAC,,, | Performed by: UROLOGY

## 2024-11-05 PROCEDURE — 87086 URINE CULTURE/COLONY COUNT: CPT | Performed by: UROLOGY

## 2024-11-05 PROCEDURE — G2211 COMPLEX E/M VISIT ADD ON: HCPCS | Mod: 95,,, | Performed by: PHYSICIAN ASSISTANT

## 2024-11-05 PROCEDURE — 1159F MED LIST DOCD IN RCRD: CPT | Mod: CPTII,95,, | Performed by: PHYSICIAN ASSISTANT

## 2024-11-05 PROCEDURE — 1101F PT FALLS ASSESS-DOCD LE1/YR: CPT | Mod: CPTII,S$GLB,, | Performed by: UROLOGY

## 2024-11-05 PROCEDURE — 1160F RVW MEDS BY RX/DR IN RCRD: CPT | Mod: CPTII,95,, | Performed by: PHYSICIAN ASSISTANT

## 2024-11-05 PROCEDURE — 1159F MED LIST DOCD IN RCRD: CPT | Mod: CPTII,S$GLB,, | Performed by: UROLOGY

## 2024-11-05 PROCEDURE — 3288F FALL RISK ASSESSMENT DOCD: CPT | Mod: CPTII,S$GLB,, | Performed by: UROLOGY

## 2024-11-05 PROCEDURE — 36415 COLL VENOUS BLD VENIPUNCTURE: CPT | Performed by: UROLOGY

## 2024-11-05 PROCEDURE — 4010F ACE/ARB THERAPY RXD/TAKEN: CPT | Mod: CPTII,S$GLB,, | Performed by: UROLOGY

## 2024-11-05 RX ORDER — HYDROXYZINE HYDROCHLORIDE 50 MG/1
50-100 TABLET, FILM COATED ORAL NIGHTLY
Qty: 180 TABLET | Refills: 1 | Status: SHIPPED | OUTPATIENT
Start: 2024-11-05 | End: 2025-05-04

## 2024-11-05 RX ORDER — BUSPIRONE HYDROCHLORIDE 10 MG/1
10 TABLET ORAL 3 TIMES DAILY
Qty: 270 TABLET | Refills: 1 | Status: SHIPPED | OUTPATIENT
Start: 2024-11-05

## 2024-11-05 RX ORDER — HYDROXYZINE HYDROCHLORIDE 50 MG/1
50 TABLET, FILM COATED ORAL 3 TIMES DAILY PRN
Qty: 90 TABLET | Refills: 1 | OUTPATIENT
Start: 2024-11-05 | End: 2025-05-04

## 2024-11-05 RX ORDER — TAMSULOSIN HYDROCHLORIDE 0.4 MG/1
1 CAPSULE ORAL DAILY
Qty: 90 CAPSULE | Refills: 3 | Status: SHIPPED | OUTPATIENT
Start: 2024-11-05

## 2024-11-05 NOTE — PROGRESS NOTES
Outpatient Psychiatry Follow up Visit (CARLIE)    11/5/2024    Yair Owens, a 74 y.o. male, presenting for initial evaluation visit. Met with patient.    Reason for Encounter: Referral from ED . Patient complains of No chief complaint on file.    The patient location is: At home at address on record in [place]  The chief complaint leading to consultation is:  Anxiety    Visit type: audiovisual    Face to Face time with patient:  9 min  18 minutes of total time spent on the encounter, which includes face to face time and non-face to face time preparing to see the patient (eg, review of tests), Obtaining and/or reviewing separately obtained history, Documenting clinical information in the electronic or other health record, Independently interpreting results (not separately reported) and communicating results to the patient/family/caregiver, or Care coordination (not separately reported).         Each patient to whom he or she provides medical services by telemedicine is:  (1) informed of the relationship between the physician and patient and the respective role of any other health care provider with respect to management of the patient; and (2) notified that he or she may decline to receive medical services by telemedicine and may withdraw from such care at any time.    Notes:       History of Present Illness:  Pt presents for follow up of anxiety and related sleep difficulties.  He is currently taking hydroxyzine 100 mg qHS and buspirone 10 mg TID.  States his anxiety is well controlled and sleeping fair with the hydroxyzine.  Would like to keep his medications the same.  Reviewed the risk or dementia with long term use to hydroxyzine.  Pt states his memory is excellent and that he does word searches and crosswords almost daily.  Is exercising regularly.    Review Of Systems:     As above.      Current Evaluation:     Nutritional Screening: Considering the patient's height and weight, medications, medical history  and preferences, should a referral be made to the dietitian? no    Constitutional  Vitals:  Most recent vital signs, dated less than 90 days prior to this appointment, were reviewed.    There were no vitals filed for this visit.     General:  unremarkable, age appropriate     Musculoskeletal  Muscle Strength/Tone:  not examined   Gait & Station:  Not observed     Psychiatric  Speech:  no latency; no press   Mood & Affect:  steady, happy  congruent and appropriate, full, bright   Thought Process:  normal and logical   Associations:  intact   Thought Content:  normal, no suicidality, no homicidality, delusions, or paranoia   Insight:  has awareness of illness   Judgement: behavior is adequate to circumstances   Orientation:  grossly intact   Memory: intact for content of interview   Language: grossly intact   Attention Span & Concentration:  able to focus   Fund of Knowledge:  intact and appropriate to age and level of education       Relevant Elements of Neurological Exam:  not observed    Functioning in Relationships:  Spouse/partner: Good  Peers: Good  Employers: NA    Laboratory Data  Lab Visit on 11/05/2024   Component Date Value Ref Range Status    PSA Diagnostic 11/05/2024 2.7  0.00 - 4.00 ng/mL Final         Medications  Outpatient Encounter Medications as of 11/5/2024   Medication Sig Dispense Refill    amLODIPine (NORVASC) 10 MG tablet TAKE 1 TABLET BY MOUTH EVERY DAY 90 tablet 3    aspirin (ECOTRIN) 81 MG EC tablet Take 1 tablet (81 mg total) by mouth once daily.  0    atorvastatin (LIPITOR) 40 MG tablet Take 1 tablet (40 mg total) by mouth once daily. 90 tablet 0    busPIRone (BUSPAR) 10 MG tablet Take 1 tablet (10 mg total) by mouth 3 (three) times daily. 270 tablet 1    hydrOXYzine (ATARAX) 50 MG tablet Take 1-2 tablets ( mg total) by mouth every evening. 180 tablet 1    losartan (COZAAR) 25 MG tablet TAKE 1 TABLET BY MOUTH EVERY DAY (Patient not taking: Reported on 11/5/2024) 90 tablet 2     losartan (COZAAR) 50 MG tablet TAKE 1 TABLET BY MOUTH EVERY DAY 90 tablet 3    meloxicam (MOBIC) 7.5 MG tablet TAKE 1 TABLET BY MOUTH ONCE A DAY FOR 2 WEEKS THEN TAKE AS NEEDED. TAKE WITH FOOD (Patient not taking: Reported on 4/11/2024) 30 tablet 0    multivitamin capsule Take 1 capsule by mouth once daily.      omeprazole (PRILOSEC) 40 MG capsule TAKE 1 CAPSULE BY MOUTH TWICE  DAILY 180 capsule 3    tadalafiL (CIALIS) 5 MG tablet Take 1 tablet (5 mg total) by mouth once daily. 90 tablet 3    tamsulosin (FLOMAX) 0.4 mg Cap Take 1 capsule (0.4 mg total) by mouth once daily. 90 capsule 3    [DISCONTINUED] busPIRone (BUSPAR) 10 MG tablet TAKE 1 TABLET BY MOUTH THREE TIMES A  tablet 1    [DISCONTINUED] propranoloL (INDERAL) 10 MG tablet TAKE 1 TABLET (10 MG TOTAL) BY MOUTH 3 (THREE) TIMES DAILY AS NEEDED (TREMORS OR ANXIETY). 270 tablet 1    [DISCONTINUED] tamsulosin (FLOMAX) 0.4 mg Cap TAKE 1 CAPSULE BY MOUTH EVERY DAY 90 capsule 3     No facility-administered encounter medications on file as of 11/5/2024.           Assessment - Diagnosis - Goals:     Impression: NINA and panic attacks with a hx of depression, doing well.      ICD-10-CM ICD-9-CM   1. Generalized anxiety disorder with panic attacks  F41.1 300.02    F41.0 300.01   2. History of depression  Z86.59 V11.8         Continue buspirone 10 mg TID.  Risks/bebefits/side effects discussed.  Continue hydroxyzine 100 mg qHS.  Risks reviewed in detail.  F/u 6 months    Strengths and Liabilities: Strength: Patient is intelligent., Strength: Patient has positive support network.      Treatment Plan/Recommendations:   Medication Management: Continue current medications.      Return to Clinic: 6 months, as needed    Visit today included increased complexity associated with the care of the episodic problem anxiety addressed and managing the longitudinal care of the patient due to the serious and/or complex managed problem(s) NINA, panic attacks, hx ofMDD.

## 2024-11-05 NOTE — PROGRESS NOTES
Subjective:       Patient ID: Yair Owens is a 74 y.o. male The patient's last visit with me was on 10/31/2023.     Chief Complaint:   Chief Complaint   Patient presents with    Follow-up       Benign Prostatic Hypertrophy  Patient complains of lower urinary tract symptoms. He reports nocturia two times a night and weak stream. He denies urgency. Patient states symptoms are of moderate severity. Onset of symptoms was several months ago and was gradual in onset.  He has no personal history and no family history of prostate cancer. He reports a history of no complicating symptoms. He denies flank pain, gross hematuria, kidney stones, and recurrent UTI.    10/31/2023  He is curious about supplements.    11/05/2024  He is doing well with Flomax and Cialis.    ACTIVE MEDICAL ISSUES:  Patient Active Problem List   Diagnosis    Hyperlipidemia    Hypertension    GERD (gastroesophageal reflux disease)    Generalized anxiety disorder    Tobacco dependence    Psychophysiological insomnia    Atherosclerosis of aorta    Erectile dysfunction    CAD (coronary artery disease)    Fatty liver    Hepatomegaly    Calcified granuloma of lung    Purpura    Lower urinary tract symptoms (LUTS)    History of colonic polyps    Overweight (BMI 25.0-29.9)    Other emphysema       PAST MEDICAL HISTORY  Past Medical History:   Diagnosis Date    CAD (coronary artery disease) 02/22/2021    Cath 2021  There was non-obstructive coronary artery disease..   Left main:  Distal 10% stenosis  Lad:  Luminal irregularities.  Mid 10-20% stenosis   Circumflex:  Luminal irregularities   RCA:  Luminal irregularities    Calcified granuloma of lung 02/03/2023    LLL calcified granuloma seen on CT Chest Lung Screening Low Dose 01/31/2018    Erectile dysfunction 02/14/2020    Fatty liver     Generalized anxiety disorder 01/11/2017    GERD (gastroesophageal reflux disease)     Gout, unspecified     History of colonic polyps 11/16/2023    2 polyps 11/22 -5 yrs     Hyperlipidemia     Hypertension     Psychophysiological insomnia 07/24/2017    Tobacco dependence 07/24/2017       PAST SURGICAL HISTORY:  Past Surgical History:   Procedure Laterality Date    APPENDECTOMY      BONE RESECTION, RIB      for thoracic outlet syndrome    COLONOSCOPY N/A 7/21/2017    Procedure: COLONOSCOPY;  Surgeon: Deepak Servin MD;  Location: [facility] ENDO;  Service: Endoscopy;  Laterality: N/A;    COLONOSCOPY N/A 11/22/2022    Procedure: COLONOSCOPY;  Surgeon: Sam Obrien MD;  Location: [facility] ENDO;  Service: Endoscopy;  Laterality: N/A;  vacc-sutab-inst portal-tb    HAND SURGERY      LEFT HEART CATHETERIZATION Left 3/2/2021    Procedure: Left heart cath, radial, 730 am;  Surgeon: Vladimir Avalos MD;  Location: [facility] CATH LAB;  Service: Cardiology;  Laterality: Left;  RN PREOP 2/25/2021--COVID NEGATIVE ON 3/1  H/P INCOMPLETE    SCROTAL SURGERY      mass    ULTRASOUND GUIDANCE  3/2/2021    Procedure: Ultrasound Guidance;  Surgeon: Vladimir Avalos MD;  Location: [facility] CATH LAB;  Service: Cardiology;;       SOCIAL HISTORY:  Social History     Tobacco Use    Smoking status: Every Day     Current packs/day: 0.50     Average packs/day: 0.5 packs/day for 61.1 years (30.6 ttl pk-yrs)     Types: Cigarettes     Start date: 1967     Passive exposure: Current    Smokeless tobacco: Never    Tobacco comments:     0.5 ppd or less    Substance Use Topics    Alcohol use: Never    Drug use: No       FAMILY HISTORY:  Family History   Problem Relation Name Age of Onset    No Known Problems Mother      Cancer Father      Heart disease Father      Asthma Sister Paula     Cancer Sister Paula 78        Rectal cancer    Stroke Brother Gaurav     Lupus Daughter x1     No Known Problems Son x3        ALLERGIES AND MEDICATIONS: updated and reviewed.  Review of patient's allergies indicates:   Allergen Reactions    Codeine Itching    Ibuprofen Itching    Remeron [mirtazapine] Anxiety     Did not help the patient sleep and created  anxiety.     Current Outpatient Medications   Medication Sig    amLODIPine (NORVASC) 10 MG tablet TAKE 1 TABLET BY MOUTH EVERY DAY    atorvastatin (LIPITOR) 40 MG tablet Take 1 tablet (40 mg total) by mouth once daily.    busPIRone (BUSPAR) 10 MG tablet TAKE 1 TABLET BY MOUTH THREE TIMES A DAY    losartan (COZAAR) 50 MG tablet TAKE 1 TABLET BY MOUTH EVERY DAY    multivitamin capsule Take 1 capsule by mouth once daily.    omeprazole (PRILOSEC) 40 MG capsule TAKE 1 CAPSULE BY MOUTH TWICE  DAILY    tadalafiL (CIALIS) 5 MG tablet Take 1 tablet (5 mg total) by mouth once daily.    aspirin (ECOTRIN) 81 MG EC tablet Take 1 tablet (81 mg total) by mouth once daily.    losartan (COZAAR) 25 MG tablet TAKE 1 TABLET BY MOUTH EVERY DAY (Patient not taking: Reported on 11/5/2024)    meloxicam (MOBIC) 7.5 MG tablet TAKE 1 TABLET BY MOUTH ONCE A DAY FOR 2 WEEKS THEN TAKE AS NEEDED. TAKE WITH FOOD (Patient not taking: Reported on 4/11/2024)    tamsulosin (FLOMAX) 0.4 mg Cap Take 1 capsule (0.4 mg total) by mouth once daily.     No current facility-administered medications for this visit.       Review of Systems   Constitutional:  Negative for chills and fever.   HENT:  Negative for congestion.    Respiratory:  Negative for chest tightness and shortness of breath.    Cardiovascular:  Negative for chest pain and palpitations.   Gastrointestinal:  Negative for abdominal pain, constipation, diarrhea, nausea and vomiting.   Genitourinary:  Negative for difficulty urinating, dysuria, flank pain, hematuria and urgency.   Musculoskeletal:  Negative for arthralgias.   Neurological:  Negative for dizziness.   Psychiatric/Behavioral:  Negative for confusion.        Objective:      Vitals:    11/05/24 1029   Weight: 80.7 kg (178 lb 0.3 oz)         Physical Exam  Vitals and nursing note reviewed.   Constitutional:       Appearance: He is well-developed.   HENT:      Head: Normocephalic.   Eyes:      Conjunctiva/sclera: Conjunctivae normal.    Neck:      Thyroid: No thyromegaly.      Trachea: No tracheal deviation.   Cardiovascular:      Rate and Rhythm: Normal rate.      Heart sounds: Normal heart sounds.   Pulmonary:      Effort: Pulmonary effort is normal. No respiratory distress.      Breath sounds: Normal breath sounds. No wheezing.   Abdominal:      General: Bowel sounds are normal. There is no distension.      Palpations: Abdomen is soft. There is no mass.      Tenderness: There is no abdominal tenderness. There is no guarding or rebound.      Hernia: No hernia is present. There is no hernia in the right inguinal area or left inguinal area.   Genitourinary:     Penis: Normal.       Testes: Normal.         Right: Mass or tenderness not present.         Left: Mass or tenderness not present.      Prostate: Enlarged. Not tender.      Rectum: Normal. No mass, tenderness or external hemorrhoid.      Comments: 40 gm smooth  Musculoskeletal:         General: No tenderness. Normal range of motion.      Cervical back: Normal range of motion and neck supple.   Lymphadenopathy:      Cervical: No cervical adenopathy.      Lower Body: No right inguinal adenopathy. No left inguinal adenopathy.   Skin:     General: Skin is warm and dry.      Findings: No erythema or rash.   Neurological:      Mental Status: He is alert and oriented to person, place, and time.   Psychiatric:         Behavior: Behavior normal.         Thought Content: Thought content normal.         Judgment: Judgment normal.         Urine dipstick shows negative for all components.  Micro exam: not done.            Assessment:       1. BPH with urinary obstruction    2. Nocturia    3. Incomplete bladder emptying    4. Other male erectile dysfunction              Plan:       1. BPH with urinary obstruction (Primary)  DIMAS and PSA in a year  - Prostate Specific Antigen, Diagnostic; Future  - Prostate Specific Antigen, Diagnostic; Future  - tamsulosin (FLOMAX) 0.4 mg Cap; Take 1 capsule (0.4 mg  total) by mouth once daily.  Dispense: 90 capsule; Refill: 3    2. Nocturia    - Urine culture    3. Incomplete bladder emptying  stable    4. Other male erectile dysfunction  Cialis             Follow up in about 1 year (around 11/5/2025) for Follow up Established, Review PSA.

## 2024-11-07 LAB — BACTERIA UR CULT: NO GROWTH

## 2024-11-13 ENCOUNTER — OFFICE VISIT (OUTPATIENT)
Dept: FAMILY MEDICINE | Facility: CLINIC | Age: 74
End: 2024-11-13
Payer: MEDICARE

## 2024-11-13 VITALS
BODY MASS INDEX: 27.23 KG/M2 | HEART RATE: 74 BPM | OXYGEN SATURATION: 98 % | HEIGHT: 68 IN | SYSTOLIC BLOOD PRESSURE: 120 MMHG | WEIGHT: 179.69 LBS | DIASTOLIC BLOOD PRESSURE: 62 MMHG | TEMPERATURE: 98 F

## 2024-11-13 DIAGNOSIS — E78.5 HYPERLIPIDEMIA, UNSPECIFIED HYPERLIPIDEMIA TYPE: ICD-10-CM

## 2024-11-13 DIAGNOSIS — I10 ESSENTIAL HYPERTENSION: ICD-10-CM

## 2024-11-13 DIAGNOSIS — M25.551 RIGHT HIP PAIN: Primary | ICD-10-CM

## 2024-11-13 DIAGNOSIS — E78.5 HYPERLIPIDEMIA, UNSPECIFIED HYPERLIPIDEMIA TYPE: Chronic | ICD-10-CM

## 2024-11-13 PROCEDURE — 3074F SYST BP LT 130 MM HG: CPT | Mod: CPTII,S$GLB,,

## 2024-11-13 PROCEDURE — 1160F RVW MEDS BY RX/DR IN RCRD: CPT | Mod: CPTII,S$GLB,,

## 2024-11-13 PROCEDURE — 3008F BODY MASS INDEX DOCD: CPT | Mod: CPTII,S$GLB,,

## 2024-11-13 PROCEDURE — 4010F ACE/ARB THERAPY RXD/TAKEN: CPT | Mod: CPTII,S$GLB,,

## 2024-11-13 PROCEDURE — 3288F FALL RISK ASSESSMENT DOCD: CPT | Mod: CPTII,S$GLB,,

## 2024-11-13 PROCEDURE — 3078F DIAST BP <80 MM HG: CPT | Mod: CPTII,S$GLB,,

## 2024-11-13 PROCEDURE — 99999 PR PBB SHADOW E&M-EST. PATIENT-LVL III: CPT | Mod: PBBFAC,,,

## 2024-11-13 PROCEDURE — 1159F MED LIST DOCD IN RCRD: CPT | Mod: CPTII,S$GLB,,

## 2024-11-13 PROCEDURE — 1101F PT FALLS ASSESS-DOCD LE1/YR: CPT | Mod: CPTII,S$GLB,,

## 2024-11-13 PROCEDURE — 99213 OFFICE O/P EST LOW 20 MIN: CPT | Mod: S$GLB,,,

## 2024-11-13 PROCEDURE — 1125F AMNT PAIN NOTED PAIN PRSNT: CPT | Mod: CPTII,S$GLB,,

## 2024-11-13 RX ORDER — ATORVASTATIN CALCIUM 40 MG/1
40 TABLET, FILM COATED ORAL
Qty: 90 TABLET | Refills: 0 | Status: SHIPPED | OUTPATIENT
Start: 2024-11-13

## 2024-11-13 NOTE — PROGRESS NOTES
HPI     Yair Owens is a 74 y.o. male with multiple medical diagnoses as listed in the medical history and problem list that presents for   Chief Complaint   Patient presents with    right hip pain       HPI  Patient had recently seen Dr. Siu around 7/2024 and had gotten a x-ray of his right hip, which revealed mild degenerative changes and bony growth (enthesophyte/osteochondroma). He reports no intractable pain, mainly bothersome when he is sleeping on his right side and is laying on it. He tried the Meloxicam for a few days but stopped taking because he doesn't feel that his pain is to the point where he required any management. He walks about 25-30 miles a week and reports no issues. He feels that it's been feeling the same since his last evaluation with Dr. Siu. He denies any swelling, redness, fevers, chest pain, chest tightness, fatigue.     He has also noticed the same mole on his RLQ abdomen for the last 20 years. The size, shape, and quality have remained the same since then. It feels soft and doesn't bother him.    He was experiencing some constipation but has increased his fiber intake and has noted that it's been improving and having daily bowel movements now. Denies abdominal pain, diarrhea, blood in stool, nausea.     Assessment & Plan     Problem List Items Addressed This Visit          Cardiac/Vascular    Hyperlipidemia (Chronic)    - Compliant on statin therapy, continue with current plan and regimen.  - Continue with daily aerobic exercises, heart healthy diet.     Other Visit Diagnoses       Essential hypertension      - patient is compliant with current medication regimen with good BP control at home - no side effects. Denies CP/SOB/leg swelling    Wt Readings from Last 4 Encounters:   11/13/24 81.5 kg (179 lb 10.8 oz)   11/05/24 80.7 kg (178 lb 0.3 oz)   09/06/24 80.5 kg (177 lb 7.5 oz)   08/15/24 79.8 kg (176 lb)           Right hip pain    -  Primary    - No red flag sxs to warrant  additional imaging/emergent evaluation.  - Offered NSAIDs for pain control, patient declined stating that he is okay with managing at home. Did let him know that MRI can be done for persistent bothersome sxs. Can also lay on soft pad/pillow when sleeping for support.  - Patient stated that he will self-schedule himself back on Dr. Siu's schedule for repeat evaluation.     --------------------------------------------    Health Maintenance         Date Due Completion Date    Aspirin/Antiplatelet Therapy Never done ---    LDCT Lung Screen 03/26/2025 3/26/2024    High Dose Statin 11/13/2025 11/13/2024    Colorectal Cancer Screening 11/22/2027 11/22/2022    Lipid Panel 06/02/2028 6/2/2023    TETANUS VACCINE 07/13/2033 7/13/2023            Health maintenance reviewed    Follow Up:  Follow up if symptoms worsen or fail to improve.    Exam     Review of Systems:  (as noted above)  Review of Systems   Constitutional:  Negative for chills, fatigue and fever.   HENT:  Negative for trouble swallowing.    Eyes:  Negative for visual disturbance.   Respiratory:  Negative for chest tightness, shortness of breath and wheezing.    Cardiovascular:  Negative for chest pain, palpitations and leg swelling.   Gastrointestinal:  Negative for abdominal distention, abdominal pain, blood in stool, constipation, diarrhea and nausea.   Musculoskeletal:  Negative for arthralgias, back pain, gait problem and myalgias.   Skin:  Negative for rash.   Neurological:  Negative for dizziness, light-headedness and headaches.       Physical Exam  Constitutional:       General: He is not in acute distress.     Appearance: He is not ill-appearing.   HENT:      Head: Normocephalic and atraumatic.   Cardiovascular:      Rate and Rhythm: Normal rate and regular rhythm.      Pulses: Normal pulses.      Heart sounds: Normal heart sounds. No murmur heard.     No friction rub. No gallop.   Pulmonary:      Effort: Pulmonary effort is normal.      Breath sounds:  "Normal breath sounds.   Musculoskeletal:      Comments: Full ROM to bilateral LE to abduction, adduction, extension, flexion. Equal strength bilaterally.  No TTP to right hip, no swelling, no redness, no open wounds.   Skin:     General: Skin is warm.      Capillary Refill: Capillary refill takes less than 2 seconds.   Neurological:      General: No focal deficit present.      Mental Status: He is alert and oriented to person, place, and time.       Vitals:    11/13/24 0952   BP: 120/62   BP Location: Left arm   Patient Position: Sitting   Pulse: 74   Temp: 97.7 °F (36.5 °C)   TempSrc: Oral   SpO2: 98%   Weight: 81.5 kg (179 lb 10.8 oz)   Height: 5' 8" (1.727 m)      Body mass index is 27.32 kg/m².        History     Past Medical History:   Diagnosis Date    CAD (coronary artery disease) 02/22/2021    Cath 2021  There was non-obstructive coronary artery disease..   Left main:  Distal 10% stenosis  Lad:  Luminal irregularities.  Mid 10-20% stenosis   Circumflex:  Luminal irregularities   RCA:  Luminal irregularities    Calcified granuloma of lung 02/03/2023    LLL calcified granuloma seen on CT Chest Lung Screening Low Dose 01/31/2018    Erectile dysfunction 02/14/2020    Fatty liver     Generalized anxiety disorder 01/11/2017    GERD (gastroesophageal reflux disease)     Gout, unspecified     History of colonic polyps 11/16/2023    2 polyps 11/22 -5 yrs    Hyperlipidemia     Hypertension     Psychophysiological insomnia 07/24/2017    Tobacco dependence 07/24/2017       Family History   Problem Relation Name Age of Onset    No Known Problems Mother      Cancer Father      Heart disease Father      Asthma Sister Paula     Cancer Sister Paula 78        Rectal cancer    Stroke Brother Gaurav     Lupus Daughter x1     No Known Problems Son x3        Allergies and Medications: (updated and reviewed)  Review of patient's allergies indicates:   Allergen Reactions    Codeine Itching    Ibuprofen Itching    Remeron " [mirtazapine] Anxiety     Did not help the patient sleep and created anxiety.     Current Outpatient Medications   Medication Sig Dispense Refill    amLODIPine (NORVASC) 10 MG tablet TAKE 1 TABLET BY MOUTH EVERY DAY 90 tablet 3    atorvastatin (LIPITOR) 40 MG tablet Take 1 tablet (40 mg total) by mouth once daily. 90 tablet 0    busPIRone (BUSPAR) 10 MG tablet Take 1 tablet (10 mg total) by mouth 3 (three) times daily. 270 tablet 1    hydrOXYzine (ATARAX) 50 MG tablet Take 1-2 tablets ( mg total) by mouth every evening. 180 tablet 1    losartan (COZAAR) 50 MG tablet TAKE 1 TABLET BY MOUTH EVERY DAY 90 tablet 3    multivitamin capsule Take 1 capsule by mouth once daily.      omeprazole (PRILOSEC) 40 MG capsule TAKE 1 CAPSULE BY MOUTH TWICE  DAILY 180 capsule 3    tadalafiL (CIALIS) 5 MG tablet Take 1 tablet (5 mg total) by mouth once daily. 90 tablet 3    tamsulosin (FLOMAX) 0.4 mg Cap Take 1 capsule (0.4 mg total) by mouth once daily. 90 capsule 3    aspirin (ECOTRIN) 81 MG EC tablet Take 1 tablet (81 mg total) by mouth once daily.  0     No current facility-administered medications for this visit.       Patient Care Team:  Chaitanya Plasencia MD as PCP - General (Internal Medicine)  Tiffany Yu LPN as Licensed Practical Nurse  Vladimir Avalos MD as Consulting Physician (Cardiology)  Caryl Vila, PharmD as Hypertension Digital Medicine Clinician (Pharmacist)  Caryl Vila, PharmD as Hyperlipidemia Digital Medicine Clinician  Deysi Rader NP as Nurse Practitioner (Hepatology)  Africa Gonzales as Digital Medicine Health   Marilu Chavez DMSc, PA-C as Physician Assistant (Psychiatry)  Chaitanya Plasencia MD as Hypertension Digital Medicine Responsible Provider (Internal Medicine)  Chaitanya Plasencia MD as Hyperlipidemia Digital Medicine Responsible Provider (Internal Medicine)  Gamaliel Quinn MD as 1st Call (Urology)  Annabel Siu MD as Consulting  Physician (Orthopedic Surgery)  Marilu Chavez, [facility] [place] [facility], CARLIE as Physician Assistant (Psychiatry)  Medicare, Digital Medicine as Hypertension Digital Medicine Contract         - The patient is given an After Visit Summary that lists all medications with directions, allergies, education, orders placed during this encounter and follow-up instructions.      - I have reviewed the patient's medical information including past medical and family history sections including the medications and allergies.      - We discussed the patient's current medications.          Austin Cantu NP

## 2024-11-13 NOTE — TELEPHONE ENCOUNTER
Care Due:                  Date            Visit Type   Department     Provider  --------------------------------------------------------------------------------                                MYCHART                              ANNUAL       [facility]                              CHECKUP/PHY  MED/ INTERNAL  Chaitanya Johns  Last Visit: 03-      S            MED/ PEDS      [place]  Next Visit: None Scheduled  None         None Found                                                            Last  Test          Frequency    Reason                     Performed    Due Date  --------------------------------------------------------------------------------    Lipid Panel.  12 months..  atorvastatin.............  06- 05-    [facility] Embedded Care Due Messages. Reference number: [id number].   11/13/2024 12:40:19 AM CST

## 2024-11-14 ENCOUNTER — PATIENT MESSAGE (OUTPATIENT)
Dept: ORTHOPEDICS | Facility: CLINIC | Age: 74
End: 2024-11-14
Payer: MEDICARE

## 2024-11-14 DIAGNOSIS — M89.8X9 BONY GROWTH: ICD-10-CM

## 2024-11-14 DIAGNOSIS — M25.551 PAIN OF RIGHT HIP: Primary | ICD-10-CM

## 2024-11-14 NOTE — PROGRESS NOTES
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 No aggressive features. No change from previous films    I personally reviewed and interpreted the patient's imaging obtained today in clinic     Assessment/Plan:  74 y.o. male  with  R ilium bony growth       We discussed the etiology of persistent pain and further treatment options.  No concern today on follow up films.  Follow up if he starts having changes in pain     All questions were answered in detail. The patient  verbalized the understanding of the treatment plan and is in full agreement with the treatment plan.

## 2024-11-22 ENCOUNTER — OFFICE VISIT (OUTPATIENT)
Dept: ORTHOPEDICS | Facility: CLINIC | Age: 74
End: 2024-11-22
Attending: ORTHOPAEDIC SURGERY
Payer: MEDICARE

## 2024-11-22 DIAGNOSIS — M89.8X9 BONY GROWTH: Primary | ICD-10-CM

## 2024-11-22 PROCEDURE — 99999 PR PBB SHADOW E&M-EST. PATIENT-LVL III: CPT | Mod: PBBFAC,,, | Performed by: ORTHOPAEDIC SURGERY

## 2024-11-24 ENCOUNTER — PATIENT MESSAGE (OUTPATIENT)
Dept: ADMINISTRATIVE | Facility: OTHER | Age: 74
End: 2024-11-24
Payer: MEDICARE

## 2025-01-29 ENCOUNTER — ON-DEMAND VIRTUAL (OUTPATIENT)
Dept: URGENT CARE | Facility: CLINIC | Age: 75
End: 2025-01-29
Payer: MEDICARE

## 2025-01-29 ENCOUNTER — TELEPHONE (OUTPATIENT)
Dept: FAMILY MEDICINE | Facility: CLINIC | Age: 75
End: 2025-01-29
Payer: MEDICARE

## 2025-01-29 DIAGNOSIS — K59.00 CONSTIPATION, UNSPECIFIED CONSTIPATION TYPE: Primary | ICD-10-CM

## 2025-01-29 RX ORDER — POLYETHYLENE GLYCOL 3350 17 G/17G
17 POWDER, FOR SOLUTION ORAL DAILY
Qty: 510 G | Refills: 0 | Status: SHIPPED | OUTPATIENT
Start: 2025-01-29 | End: 2025-02-28

## 2025-01-29 RX ORDER — AMOXICILLIN 250 MG
1 CAPSULE ORAL DAILY
Qty: 30 TABLET | Refills: 0 | Status: SHIPPED | OUTPATIENT
Start: 2025-01-29 | End: 2025-02-28

## 2025-01-29 NOTE — Clinical Note
Please schedule f/u visit with PCP for Chronic Constipation in senior within 2 weeks.  Thanks, GLORIA Lea

## 2025-01-29 NOTE — PATIENT INSTRUCTIONS
Discussed with pt to eat foods high in fiber (apple sauce, dates, raisins, salads, oranges, prunes)    Take miralax 1 scoop in liquid 2 x day, after soft bowel movement, decrease to 1xday    If worsening constiptation , can drink 1 bottle of mag citrate    Follow up with PCP in 1-2 weeks, sooner if needed    Go to Urgent Care for worsening symptoms or new symptoms.

## 2025-01-29 NOTE — PROGRESS NOTES
Subjective:      Patient ID: Yair Owens is a 75 y.o. male.    Vitals:  vitals were not taken for this visit.     Chief Complaint: chronic constipation      Visit Type: TELE AUDIOVISUAL    Patient Location: [place]     Present with the patient at the time of consultation: TELEMED PRESENT WITH PATIENT: None    Past Medical History:   Diagnosis Date    CAD (coronary artery disease) 02/22/2021    Cath 2021  There was non-obstructive coronary artery disease..   Left main:  Distal 10% stenosis  Lad:  Luminal irregularities.  Mid 10-20% stenosis   Circumflex:  Luminal irregularities   RCA:  Luminal irregularities    Calcified granuloma of lung 02/03/2023    LLL calcified granuloma seen on CT Chest Lung Screening Low Dose 01/31/2018    Erectile dysfunction 02/14/2020    Fatty liver     Generalized anxiety disorder 01/11/2017    GERD (gastroesophageal reflux disease)     Gout, unspecified     History of colonic polyps 11/16/2023    2 polyps 11/22 -5 yrs    Hyperlipidemia     Hypertension     Psychophysiological insomnia 07/24/2017    Tobacco dependence 07/24/2017     Past Surgical History:   Procedure Laterality Date    APPENDECTOMY      BONE RESECTION, RIB      for thoracic outlet syndrome    COLONOSCOPY N/A 7/21/2017    Procedure: COLONOSCOPY;  Surgeon: Deepak Servin MD;  Location: [facility] ENDO;  Service: Endoscopy;  Laterality: N/A;    COLONOSCOPY N/A 11/22/2022    Procedure: COLONOSCOPY;  Surgeon: Sam Obrien MD;  Location: [facility] ENDO;  Service: Endoscopy;  Laterality: N/A;  vacc-sutab-inst portal-tb    HAND SURGERY      LEFT HEART CATHETERIZATION Left 3/2/2021    Procedure: Left heart cath, radial, 730 am;  Surgeon: Vladimir Avalos MD;  Location: [facility] CATH LAB;  Service: Cardiology;  Laterality: Left;  RN PREOP 2/25/2021--COVID NEGATIVE ON 3/1  H/P INCOMPLETE    SCROTAL SURGERY      mass    ULTRASOUND GUIDANCE  3/2/2021    Procedure: Ultrasound Guidance;  Surgeon: Vladimir Avalos MD;  Location: [facility] CATH LAB;   Service: Cardiology;;     Review of patient's allergies indicates:   Allergen Reactions    Codeine Itching    Ibuprofen Itching    Remeron [mirtazapine] Anxiety     Did not help the patient sleep and created anxiety.     Current Outpatient Medications on File Prior to Visit   Medication Sig Dispense Refill    amLODIPine (NORVASC) 10 MG tablet TAKE 1 TABLET BY MOUTH EVERY DAY 90 tablet 3    aspirin (ECOTRIN) 81 MG EC tablet Take 1 tablet (81 mg total) by mouth once daily.  0    atorvastatin (LIPITOR) 40 MG tablet TAKE 1 TABLET BY MOUTH EVERY DAY 90 tablet 0    busPIRone (BUSPAR) 10 MG tablet Take 1 tablet (10 mg total) by mouth 3 (three) times daily. 270 tablet 1    hydrOXYzine (ATARAX) 50 MG tablet Take 1-2 tablets ( mg total) by mouth every evening. 180 tablet 1    losartan (COZAAR) 50 MG tablet TAKE 1 TABLET BY MOUTH EVERY DAY 90 tablet 3    multivitamin capsule Take 1 capsule by mouth once daily.      omeprazole (PRILOSEC) 40 MG capsule TAKE 1 CAPSULE BY MOUTH TWICE  DAILY 180 capsule 3    tadalafiL (CIALIS) 5 MG tablet Take 1 tablet (5 mg total) by mouth once daily. 90 tablet 3    tamsulosin (FLOMAX) 0.4 mg Cap Take 1 capsule (0.4 mg total) by mouth once daily. 90 capsule 3    [DISCONTINUED] propranoloL (INDERAL) 10 MG tablet TAKE 1 TABLET (10 MG TOTAL) BY MOUTH 3 (THREE) TIMES DAILY AS NEEDED (TREMORS OR ANXIETY). 270 tablet 1     No current facility-administered medications on file prior to visit.     Family History   Problem Relation Name Age of Onset    No Known Problems Mother      Cancer Father      Heart disease Father      Asthma Sister Paula     Cancer Sister Paula 78        Rectal cancer    Stroke Brother Gaurav     Lupus Daughter x1     No Known Problems Son x3        Medications Ordered                [facility] [employer] [facility] [place] [address] [facility]   [address] [place] [address]    Telephone: [phone]   Fax: [phone]   Hours: Not open 24 hours                          "E-Prescribed (2 of 2)              polyethylene glycol (GLYCOLAX) 17 gram/dose powder    Sig: Take 17 g by mouth once daily.       Start: 1/29/25     Quantity: 510 g Refills: 0                         senna-docusate 8.6-50 mg (PERICOLACE) 8.6-50 mg per tablet    Sig: Take 1 tablet by mouth once daily.       Start: 1/29/25     Quantity: 30 tablet Refills: 0                           Ohs Peq Odvv Intake    1/29/2025  6:02 AM CST - Filed by Patient   What is your current physical address in the event of a medical emergency? [address], [place]   Are you able to take your vital signs? Yes   Systolic Blood Pressure: 132   Diastolic Blood Pressure: 67   Weight: 179   Height: 68   Pulse: 64   Temperature: 98.7   Respiration rate:    Pulse Oxygen: 97   Please attach any relevant images or files    Is your employer contracted with [facility]? No         Pt presents with c/o chronic constipation x 2 weeks, reports having BM, "stools are normal looking, not pellets, but hard". Took 2 dulcolax last night and 3 stool softeners 1 day prior, drinking  prune juice , and coffee, and lots of water, still very hard.. Denies any blood in blood in stool, abdominal pain, nvd, CP, fever, ha or bloating.     -reports he walks over 30 miles a week,eats healthy        Constitution: Negative for fever.   Respiratory:  Negative for shortness of breath.    Gastrointestinal:  Positive for constipation, bright red blood in stool and dark colored stools. Negative for abdominal pain, nausea, vomiting, diarrhea, rectal bleeding, rectal pain, hemorrhoids and bowel incontinence.        Objective:   The physical exam was conducted virtually.  Physical Exam   Constitutional: He is oriented to person, place, and time.   HENT:   Head: Normocephalic and atraumatic.   Ears:   Right Ear: External ear normal.   Left Ear: External ear normal.   Eyes: Conjunctivae are normal.   Pulmonary/Chest: Effort normal. No respiratory distress. "   Neurological: He is alert and oriented to person, place, and time.       Assessment:     1. Constipation, unspecified constipation type        Plan:   Discussed with pt to eat foods high in fiber (apple sauce, dates, raisins, salads, oranges, prunes)    Take miralax 1 scoop in liquid 2 x day, after soft bowel movement, decrease to 1xday    If worsening constiptation , can drink 1 bottle of mag citrate    Follow up with PCP in 1-2 weeks, sooner if needed    Go to Urgent Care for worsening symptoms or new symptoms.      Constipation, unspecified constipation type  -     senna-docusate 8.6-50 mg (PERICOLACE) 8.6-50 mg per tablet; Take 1 tablet by mouth once daily.  Dispense: 30 tablet; Refill: 0  -     polyethylene glycol (GLYCOLAX) 17 gram/dose powder; Take 17 g by mouth once daily.  Dispense: 510 g; Refill: 0      We appreciate you trusting us with your medical care. We hope you feel better soon. We will be happy to take care of you for all of your future medical needs.     You must understand that you've received Virtual treatment only and that you may be released before all your medical problems are known or treated. You, the patient, will arrange for follow up care as instructed.     Follow up with your PCP or specialty clinic as directed in the next 1-2 weeks if not improved or as needed. You can call [phone] to schedule an appointment with the appropriate provider.     If your condition worsens we recommend that you receive another evaluation in person, with your primary care provider, urgent care or at the emergency room immediately or contact your primary medical clinics after hours call service to discuss your concerns.

## 2025-02-03 ENCOUNTER — TELEPHONE (OUTPATIENT)
Dept: FAMILY MEDICINE | Facility: CLINIC | Age: 75
End: 2025-02-03
Payer: MEDICARE

## 2025-02-03 NOTE — TELEPHONE ENCOUNTER
The patient was been having constipation; however, the patient is taking Rx Senexon along with a stool softener and is now having bowel movements. I instructed the patient to continue to take the Rx and discuss the issue at his next appointment. He agreed.

## 2025-02-08 DIAGNOSIS — E78.5 HYPERLIPIDEMIA, UNSPECIFIED HYPERLIPIDEMIA TYPE: Chronic | ICD-10-CM

## 2025-02-08 NOTE — TELEPHONE ENCOUNTER
Care Due:                  Date            Visit Type   Department     Provider  --------------------------------------------------------------------------------                                MYCHART                              ANNUAL       [facility]                              CHECKUP/PHY  MED/ INTERNAL  Chaitanya Johns  Last Visit: 03-      S            MED/ PEDS      [place]  Next Visit: None Scheduled  None         None Found                                                            Last  Test          Frequency    Reason                     Performed    Due Date  --------------------------------------------------------------------------------    Lipid Panel.  12 months..  atorvastatin.............  06- 05-    [facility] Embedded Care Due Messages. Reference number: [id number].   2/08/2025 9:27:38 AM CST

## 2025-02-11 RX ORDER — ATORVASTATIN CALCIUM 40 MG/1
40 TABLET, FILM COATED ORAL
Qty: 30 TABLET | Refills: 0 | Status: SHIPPED | OUTPATIENT
Start: 2025-02-11

## 2025-02-14 ENCOUNTER — TELEPHONE (OUTPATIENT)
Dept: FAMILY MEDICINE | Facility: CLINIC | Age: 75
End: 2025-02-14
Payer: MEDICARE

## 2025-02-14 NOTE — TELEPHONE ENCOUNTER
Wrong PCP.      ----- Message from Nurse Rakel sent at 2/14/2025  9:06 AM CST -----  Regarding: Yair Owens - chronic constipation    ----- Message -----  From: Daniella Shipley  Sent: 2/14/2025   7:50 AM CST  To: Donal Russo Staff    .Type: Patient Call Back    Who called: Self     What is the request in detail: Stated he has chronic constipation. Asking that the nurse give him a call     Can the clinic reply by [facility]? No     Would the patient rather a call back or a response via [facility]? Call Back    Best call back number: .[phone] (home)       Additional Information:

## 2025-02-14 NOTE — TELEPHONE ENCOUNTER
Returned call to Yair who c/o constipation. Patient explained that he has a bowel movement daily sometimes more than once a day. He stated initially it is hard to come out but once he get going it ease up. Patient informed that he just took the Miralax twice, but he has been consistently taking senna 8.6/50 mg with equate colace along with 50 oz of water a day. Nurse stated okay but if began to have diarrhea hold until diarrhea stop then resume. Patient also c/o dizziness. Nurse offered patient an appointment with NP and patient declined. Nurse gave patient friendly reminder of upcoming appointment with Dr. Mansfield on 03/11/2025.

## 2025-02-14 NOTE — TELEPHONE ENCOUNTER
----- Message from Glenis sent at 2/14/2025  9:35 AM CST -----  Regarding: Self  Type: Patient Call Back     What is the request in detail: Pt would like a call back from nurse     Can the clinic reply by [facility]? No     Would the patient rather a call back or a response via [facility]? Call back    Best call back number: .[phone]      Additional Information:    Thank you.

## 2025-02-17 ENCOUNTER — OFFICE VISIT (OUTPATIENT)
Dept: FAMILY MEDICINE | Facility: CLINIC | Age: 75
End: 2025-02-17
Payer: MEDICARE

## 2025-02-17 VITALS
WEIGHT: 180.56 LBS | OXYGEN SATURATION: 99 % | TEMPERATURE: 98 F | HEART RATE: 67 BPM | BODY MASS INDEX: 27.36 KG/M2 | SYSTOLIC BLOOD PRESSURE: 122 MMHG | DIASTOLIC BLOOD PRESSURE: 60 MMHG | HEIGHT: 68 IN

## 2025-02-17 DIAGNOSIS — J43.8 OTHER EMPHYSEMA: ICD-10-CM

## 2025-02-17 DIAGNOSIS — F17.200 TOBACCO DEPENDENCE: Chronic | ICD-10-CM

## 2025-02-17 DIAGNOSIS — K59.00 CONSTIPATION, UNSPECIFIED CONSTIPATION TYPE: ICD-10-CM

## 2025-02-17 DIAGNOSIS — E78.5 HYPERLIPIDEMIA, UNSPECIFIED HYPERLIPIDEMIA TYPE: Chronic | ICD-10-CM

## 2025-02-17 DIAGNOSIS — D64.9 ANEMIA, UNSPECIFIED TYPE: ICD-10-CM

## 2025-02-17 DIAGNOSIS — I25.10 CORONARY ARTERY DISEASE INVOLVING NATIVE HEART WITHOUT ANGINA PECTORIS, UNSPECIFIED VESSEL OR LESION TYPE: Chronic | ICD-10-CM

## 2025-02-17 DIAGNOSIS — F41.1 GENERALIZED ANXIETY DISORDER: Chronic | ICD-10-CM

## 2025-02-17 DIAGNOSIS — R79.9 ABNORMAL FINDING OF BLOOD CHEMISTRY, UNSPECIFIED: ICD-10-CM

## 2025-02-17 DIAGNOSIS — Z76.89 ESTABLISHING CARE WITH NEW DOCTOR, ENCOUNTER FOR: Primary | ICD-10-CM

## 2025-02-17 DIAGNOSIS — D53.9 NUTRITIONAL ANEMIA, UNSPECIFIED: ICD-10-CM

## 2025-02-17 RX ORDER — BUSPIRONE HYDROCHLORIDE 10 MG/1
10 TABLET ORAL 2 TIMES DAILY
Start: 2025-02-17

## 2025-02-17 RX ORDER — ASPIRIN 81 MG/1
81 TABLET ORAL DAILY
COMMUNITY
Start: 2025-02-17 | End: 2026-02-17

## 2025-02-17 NOTE — PROGRESS NOTES
HISTORY OF PRESENT ILLNESS:  Yair Owens is a 75 y.o. male who presents to the clinic today for Establish Care and Constipation (X 1 month)    This is my first encounter with patient.    Anxiety, Insomnia  Taking hydroxyzine 100 mg qHS and buspirone 10 mg TID.  Seen by psych.    BPH, ED  On Flomax, Cialis.  Seen by neurology 11/2024.    Tobacco  Previously referred to smoking cessation clinic.    HTN, HLP  Seen by cardiology 8/2024.  Prior angio with nonobstructive CAD.    Yair presents today for changes in bowel movements    He reports changes in bowel movements for over a month. He has daily bowel movements, with the first one being very hard to pass and subsequent movements becoming easier. He experiences mild right lower quadrant abdominal discomfort (1/10 severity). He denies nausea, vomiting, or blood in stool. Colonoscopy in November 2022 showed diverticulosis, hemorrhoids, and polyps.    He currently smokes more than half a pack of cigarettes daily. He denies alcohol consumption but drinks Coke Zero and coffee.      Colonoscopy (11/2022)  Findings:       Many small and large-mouthed diverticula were found in the entire        colon.        Internal hemorrhoids were found during retroflexion. The hemorrhoids        were Grade II (internal hemorrhoids that prolapse but reduce        spontaneously).        An 8 mm polyp was found in the sigmoid colon. The polyp was sessile.        The polyp was removed with a cold snare. Resection and retrieval        were complete. To prevent bleeding after the polypectomy, one        hemostatic clip was successfully placed. There was no bleeding at        the end of the procedure.        A 7 mm polyp was found in the sigmoid colon. The polyp was sessile.        The polyp was removed with a cold snare. Resection and retrieval        were complete. To prevent bleeding after the polypectomy, one        hemostatic clip was successfully placed. There was no bleeding at         the end of the procedure.   Impression:            - Diverticulosis in the entire examined colon.                          - Internal hemorrhoids.                          - One 8 mm polyp in the sigmoid colon, removed                          with a cold snare. Resected and retrieved. Clip                          was placed.                          - One 7 mm polyp in the sigmoid colon, removed                          with a cold snare. Resected and retrieved. Clip                          was placed.   Recommendation:        - Discharge patient to home.                          - High fiber diet.                          - Continue present medications.                          - Await pathology results.                          - Repeat colonoscopy in 5 years for surveillance.     ROS:  General: -fever, -chills, -fatigue, -weight gain, -weight loss  Eyes: -vision changes, -redness, -discharge  ENT: -ear pain, -nasal congestion, -sore throat  Cardiovascular: -chest pain, -palpitations, -lower extremity edema  Respiratory: -cough, -shortness of breath  Gastrointestinal: -abdominal pain, -nausea, -vomiting, -diarrhea, -constipation, -blood in stool, +change in bowel habits  Genitourinary: -dysuria, -hematuria, -frequency  Musculoskeletal: -joint pain, -muscle pain  Skin: -rash, -lesion  Neurological: -headache, -dizziness, -numbness, -tingling  Psychiatric: -anxiety, -depression, -sleep difficulty             PAST MEDICAL HISTORY:  Past Medical History:   Diagnosis Date    CAD (coronary artery disease) 02/22/2021    Cath 2021  There was non-obstructive coronary artery disease..   Left main:  Distal 10% stenosis  Lad:  Luminal irregularities.  Mid 10-20% stenosis   Circumflex:  Luminal irregularities   RCA:  Luminal irregularities    Calcified granuloma of lung 02/03/2023    LLL calcified granuloma seen on CT Chest Lung Screening Low Dose 01/31/2018    Colon polyp     Erectile dysfunction 02/14/2020    Fatty liver      Generalized anxiety disorder 01/11/2017    GERD (gastroesophageal reflux disease)     Gout, unspecified     History of colonic polyps 11/16/2023    2 polyps 11/22 -5 yrs    Hyperlipidemia     Hypertension     Psychophysiological insomnia 07/24/2017    Tobacco dependence 07/24/2017       PAST SURGICAL HISTORY:  Past Surgical History:   Procedure Laterality Date    APPENDECTOMY      BONE RESECTION, RIB      for thoracic outlet syndrome    COLONOSCOPY N/A 07/21/2017    Procedure: COLONOSCOPY;  Surgeon: Deepak Servin MD;  Location: [facility] ENDO;  Service: Endoscopy;  Laterality: N/A;    COLONOSCOPY N/A 11/22/2022    Procedure: COLONOSCOPY;  Surgeon: Sam Obrien MD;  Location: [facility] ENDO;  Service: Endoscopy;  Laterality: N/A;  vacc-sutab-inst portal-tb    HAND SURGERY      LEFT HEART CATHETERIZATION Left 03/02/2021    Procedure: Left heart cath, radial, 730 am;  Surgeon: Vladimir Avalos MD;  Location: [facility] CATH LAB;  Service: Cardiology;  Laterality: Left;  RN PREOP 2/25/2021--COVID NEGATIVE ON 3/1  H/P INCOMPLETE    SCROTAL SURGERY      mass    ULTRASOUND GUIDANCE  03/02/2021    Procedure: Ultrasound Guidance;  Surgeon: Vladimir Avalos MD;  Location: [facility] CATH LAB;  Service: Cardiology;;    VASECTOMY  40 years       SOCIAL HISTORY:  Social History[1]    FAMILY HISTORY:  Family History   Problem Relation Name Age of Onset    No Known Problems Mother      Cancer Father GAURAV GLYNN     Heart disease Father GAURAV GLYNN     Asthma Sister REGINA ANNA     Cancer Sister REGINA ANNA 78        Rectal cancer    Stroke Brother Gaurav     Lupus Daughter x1     No Known Problems Son x3        ALLERGIES AND MEDICATIONS: updated and reviewed.  Review of patient's allergies indicates:   Allergen Reactions    Codeine Itching    Ibuprofen Itching    Remeron [mirtazapine] Anxiety     Did not help the patient sleep and created anxiety.     Medication List with Changes/Refills   Current Medications    AMLODIPINE (NORVASC) 10 MG  TABLET    TAKE 1 TABLET BY MOUTH EVERY DAY    ATORVASTATIN (LIPITOR) 40 MG TABLET    TAKE 1 TABLET BY MOUTH EVERY DAY    HYDROXYZINE (ATARAX) 50 MG TABLET    Take 1-2 tablets ( mg total) by mouth every evening.    LOSARTAN (COZAAR) 50 MG TABLET    TAKE 1 TABLET BY MOUTH EVERY DAY    MULTIVITAMIN CAPSULE    Take 1 capsule by mouth once daily.    OMEPRAZOLE (PRILOSEC) 40 MG CAPSULE    TAKE 1 CAPSULE BY MOUTH TWICE  DAILY    POLYETHYLENE GLYCOL (GLYCOLAX) 17 GRAM/DOSE POWDER    Take 17 g by mouth once daily.    SENNA-DOCUSATE 8.6-50 MG (PERICOLACE) 8.6-50 MG PER TABLET    Take 1 tablet by mouth once daily.    TADALAFIL (CIALIS) 5 MG TABLET    Take 1 tablet (5 mg total) by mouth once daily.    TAMSULOSIN (FLOMAX) 0.4 MG CAP    Take 1 capsule (0.4 mg total) by mouth once daily.   Changed and/or Refilled Medications    Modified Medication Previous Medication    ASPIRIN (ECOTRIN) 81 MG EC TABLET aspirin (ECOTRIN) 81 MG EC tablet       Take 1 tablet (81 mg total) by mouth once daily.    Take 1 tablet (81 mg total) by mouth once daily.    BUSPIRONE (BUSPAR) 10 MG TABLET busPIRone (BUSPAR) 10 MG tablet       Take 1 tablet (10 mg total) by mouth 2 (two) times daily.    Take 1 tablet (10 mg total) by mouth 3 (three) times daily.          CARE TEAM:  Patient Care Team:  Vick Mansfield MD as PCP - General (Internal Medicine)  EastanolleeTiffany LPN as Licensed Practical Nurse  Vladimir Avalos MD as Consulting Physician (Cardiology)  Caryl Vila, PharmD as Hypertension Digital Medicine Clinician (Pharmacist)  Caryl Vila, PharmD as Hyperlipidemia Digital Medicine Clinician  Deysi Rader NP as Nurse Practitioner (Hepatology)  Marilu Chavez DMSc, PA-C as Physician Assistant (Psychiatry)  Chaitanya Plasencia MD as Hypertension Digital Medicine Responsible Provider (Internal Medicine)  Chaitanya Plasencia MD as Hyperlipidemia Digital Medicine Responsible Provider (Internal Medicine)  Kai  "Gamaliel Cotton MD as 1st Call (Urology)  Annabel Siu MD as Consulting Physician (Orthopedic Surgery)  [facility]u Chavez [facility] as Physician Assistant (Psychiatry)  Medicare, Digital Medicine as Hypertension Digital Medicine Contract         PHYSICAL EXAM:   Vitals:    02/17/25 1458   BP: 122/60   Pulse: 67   Temp: 98.1 °F (36.7 °C)     Weight: 81.9 kg (180 lb 8.9 oz)   Height: 5' 8" (172.7 cm)   Body mass index is 27.45 kg/m².    Physical Exam    Vitals: Blood pressure: 122/60.  General: No acute distress. Well-developed. Well-nourished.  Eyes: EOMI. Sclerae anicteric.  HENT: Normocephalic. Atraumatic. Nares patent. Moist oral mucosa.  Ears: Bilateral TMs clear. Bilateral EACs clear.  Cardiovascular: Regular rate. Regular rhythm. No murmurs. No rubs. No gallops. Normal S1, S2.  Respiratory: Normal respiratory effort. Clear to auscultation bilaterally. No rales. No rhonchi. No wheezing.  Abdomen: Soft. Non-tender. Non-distended. Normoactive bowel sounds.  Musculoskeletal: No  obvious deformity.  Extremities: No lower extremity edema.  Neurological: Alert & oriented x3. No slurred speech. Normal gait.  Psychiatric: Normal mood. Normal affect. Good insight. Good judgment.  Skin: Warm. Dry. No rash.             ASSESSMENT AND PLAN:  Assessment & Plan    IMPRESSION:  - Evaluated constipation, ruling out more serious conditions given recent colonoscopy findings (diverticulosis, hemorrhoids, polyps removed)  - Deferred abdominal x-ray due to active bowel sounds on physical exam  - Assessed current management strategies including Miralax, Senakot, fiber supplements, and dietary modifications  - Recognized potential mind-gut connection influencing symptoms  - Recommend GI referral for further evaluation and possible earlier repeat colonoscopy due to history of polyps    Establishing care with new doctor, encounter for  -     Ambulatory referral/consult to Smoking Cessation Program; Future; Expected date: " 02/24/2025  -     Hemoglobin A1C; Future; Expected date: 02/17/2025  -     Comprehensive Metabolic Panel; Future; Expected date: 02/17/2025  -     Lipid Panel; Future; Expected date: 02/17/2025  -     CBC Auto Differential; Future; Expected date: 02/17/2025  -     TSH; Future; Expected date: 02/17/2025  -     Vitamin D; Future; Expected date: 02/17/2025    Coronary artery disease involving native heart without angina pectoris, unspecified vessel or lesion type  Hyperlipidemia, unspecified hyperlipidemia type  -     Hemoglobin A1C; Future; Expected date: 02/17/2025  -     Comprehensive Metabolic Panel; Future; Expected date: 02/17/2025  -     Lipid Panel; Future; Expected date: 02/17/2025  -     aspirin (ECOTRIN) 81 MG EC tablet; Take 1 tablet (81 mg total) by mouth once daily.  - Stable on current medical management.    Generalized anxiety disorder        - Stable on current medical management.    Other emphysema  Tobacco dependence  -     Ambulatory referral/consult to Smoking Cessation Program; Future; Expected date: 02/24/2025  - Asymptomatic.  Will be due for LDCT after 3/26/25.    Constipation, unspecified constipation type  -     Cancel: X-Ray Abdomen AP 1 View; Future; Expected date: 02/17/2025  -     Ambulatory referral/consult to Gastroenterology; Future; Expected date: 02/24/2025    Abnormal finding of blood chemistry, unspecified  -     Hemoglobin A1C; Future; Expected date: 02/17/2025  -     CBC Auto Differential; Future; Expected date: 02/17/2025  -     TSH; Future; Expected date: 02/17/2025  -     Vitamin D; Future; Expected date: 02/17/2025    Anemia, unspecified type  -     Iron and TIBC; Future; Expected date: 02/17/2025  -     Ferritin; Future; Expected date: 02/17/2025  -     Vitamin B12; Future; Expected date: 02/17/2025  -     Folate; Future; Expected date: 02/17/2025    Nutritional anemia, unspecified  -     Vitamin B12; Future; Expected date: 02/17/2025  -     Folate; Future; Expected date:  02/17/2025    Other orders  -     busPIRone (BUSPAR) 10 MG tablet; Take 1 tablet (10 mg total) by mouth 2 (two) times daily.    - Educated patient on increased lung disease risks associated with smoking, noting patient reports smoking more than half a pack daily and wife recently restarted.  - Recommend patient visit the Smoking Cessation Program and maintain an open mind about quitting.    - Continue Miralax and Senakot S as currently prescribed.  - Advise daily use of stool softener (e.g., Colace) as needed.  - Refer to gastroenterology for further evaluation.  - Yair reports daily bowel movements, with the first being very hard and subsequent ones easier, ongoing for over a month.  - Mild abdominal pain (1/10) in the right lower quadrant, not constant, with no nausea, vomiting, or hematochezia.  No red flag symptoms are noted today.  - Abdominal exam revealed active bowel sounds with no tenderness on palpation.  - Recommend high-fiber diet and incorporating probiotic foods (yogurt with live active cultures, sauerkraut, kimchi) or supplements (Culturelle, Align) into diet.  - Discuss mind-gut connection and its impact on digestive health.  - Hold off on abdominal imaging given no red flags and benign exam, but he was advised that if anything changes then we can revisit this evaluation.    - Colonoscopy in November 2022 showed evidence of diverticulosis.  - Follow up with GI in case repeat colonoscopy advised due to change in bowel habits.    - History of non-obstructive and stable coronary artery disease diagnosed by catheterization.  - Continue Atorvastatin 40 mg daily and low-dose aspirin for cardiovascular health.    - Continue Buspirone 10 mg twice daily and Hydroxyzine as needed at night for anxiety management.    - Yair sees urologist, Dr. Quinn, for prostate issues, with last visit in November.  - Continue Tamsulosin (one capsule daily) and Tadalafil for prostate-related issues.    - Yair reports  smoking more than half a pack a day, increased due to wife restarting smoking.  - Recommend visiting the Smoking Cessation Program and maintaining an open mind about quitting.    - Blood pressure monitored at 122/60 today, within normal range.  - Continue Amlodipine 10 mg daily and Losartan 50 mg daily for blood pressure management.    - Continue Omeprazole 40 mg daily for GERD management.    - Fasting labs ordered.    - Follow up for fasting labs.  - Contact office if symptoms worsen or change.                    Follow up 3 months or sooner as needed.    This note was generated with the assistance of ambient listening technology. Verbal consent was obtained by the patient and accompanying visitor(s) for the recording of patient appointment to facilitate this note. I attest to having reviewed and edited the generated note for accuracy, though some syntax or spelling errors may persist. Please contact the author of this note for any clarification.         [1]   Social History  Socioeconomic History    Marital status:    Occupational History     Employer: [employer]   Tobacco Use    Smoking status: Some Days     Current packs/day: 0.50     Average packs/day: 0.5 packs/day for 61.4 years (30.7 ttl pk-yrs)     Types: Cigarettes     Start date: 1967     Passive exposure: Current    Smokeless tobacco: Never    Tobacco comments:     0.5 ppd or less    Substance and Sexual Activity    Alcohol use: Not Currently    Drug use: No    Sexual activity: Yes     Partners: Female     Social Drivers of Health     Financial Resource Strain: Low Risk  (11/15/2024)    Overall Financial Resource Strain (CARDIA)     Difficulty of Paying Living Expenses: Not hard at all   Food Insecurity: No Food Insecurity (11/15/2024)    Hunger Vital Sign     Worried About Running Out of Food in the Last Year: Never true     Ran Out of Food in the Last Year: Never true   Transportation Needs: No Transportation Needs (4/11/2024)    PRAPARE -  Transportation     Lack of Transportation (Medical): No     Lack of Transportation (Non-Medical): No   Physical Activity: Sufficiently Active (11/15/2024)    Exercise Vital Sign     Days of Exercise per Week: 7 days     Minutes of Exercise per Session: 60 min   Stress: No Stress Concern Present (11/15/2024)    [employer] - Occupational Stress Questionnaire     Feeling of Stress : Only a little   Housing Stability: Low Risk  (4/11/2024)    Housing Stability Vital Sign     Unable to Pay for Housing in the Last Year: No     Number of Places Lived in the Last Year: 1     Unstable Housing in the Last Year: No

## 2025-02-18 ENCOUNTER — LAB VISIT (OUTPATIENT)
Dept: LAB | Facility: HOSPITAL | Age: 75
End: 2025-02-18
Attending: INTERNAL MEDICINE
Payer: MEDICARE

## 2025-02-18 DIAGNOSIS — D53.9 NUTRITIONAL ANEMIA, UNSPECIFIED: ICD-10-CM

## 2025-02-18 DIAGNOSIS — R79.9 ABNORMAL FINDING OF BLOOD CHEMISTRY, UNSPECIFIED: ICD-10-CM

## 2025-02-18 DIAGNOSIS — Z76.89 ESTABLISHING CARE WITH NEW DOCTOR, ENCOUNTER FOR: ICD-10-CM

## 2025-02-18 DIAGNOSIS — I25.10 CORONARY ARTERY DISEASE INVOLVING NATIVE HEART WITHOUT ANGINA PECTORIS, UNSPECIFIED VESSEL OR LESION TYPE: Chronic | ICD-10-CM

## 2025-02-18 DIAGNOSIS — D64.9 ANEMIA, UNSPECIFIED TYPE: ICD-10-CM

## 2025-02-18 LAB
25(OH)D3+25(OH)D2 SERPL-MCNC: 40 NG/ML (ref 30–96)
ALBUMIN SERPL BCP-MCNC: 4.2 G/DL (ref 3.5–5.2)
ALP SERPL-CCNC: 65 U/L (ref 40–150)
ALT SERPL W/O P-5'-P-CCNC: 16 U/L (ref 10–44)
ANION GAP SERPL CALC-SCNC: 7 MMOL/L (ref 8–16)
AST SERPL-CCNC: 20 U/L (ref 10–40)
BASOPHILS # BLD AUTO: 0.07 K/UL (ref 0–0.2)
BASOPHILS NFR BLD: 1.2 % (ref 0–1.9)
BILIRUB SERPL-MCNC: 0.7 MG/DL (ref 0.1–1)
BUN SERPL-MCNC: 14 MG/DL (ref 8–23)
CALCIUM SERPL-MCNC: 9.3 MG/DL (ref 8.7–10.5)
CHLORIDE SERPL-SCNC: 107 MMOL/L (ref 95–110)
CHOLEST SERPL-MCNC: 136 MG/DL (ref 120–199)
CHOLEST/HDLC SERPL: 2.8 {RATIO} (ref 2–5)
CO2 SERPL-SCNC: 25 MMOL/L (ref 23–29)
CREAT SERPL-MCNC: 1.1 MG/DL (ref 0.5–1.4)
DIFFERENTIAL METHOD BLD: ABNORMAL
EOSINOPHIL # BLD AUTO: 0.1 K/UL (ref 0–0.5)
EOSINOPHIL NFR BLD: 2.2 % (ref 0–8)
ERYTHROCYTE [DISTWIDTH] IN BLOOD BY AUTOMATED COUNT: 13.4 % (ref 11.5–14.5)
EST. GFR  (NO RACE VARIABLE): >60 ML/MIN/1.73 M^2
ESTIMATED AVG GLUCOSE: 103 MG/DL (ref 68–131)
FERRITIN SERPL-MCNC: 10 NG/ML (ref 20–300)
FOLATE SERPL-MCNC: 14.4 NG/ML (ref 4–24)
GLUCOSE SERPL-MCNC: 94 MG/DL (ref 70–110)
HBA1C MFR BLD: 5.2 % (ref 4–5.6)
HCT VFR BLD AUTO: 39.5 % (ref 40–54)
HDLC SERPL-MCNC: 48 MG/DL (ref 40–75)
HDLC SERPL: 35.3 % (ref 20–50)
HGB BLD-MCNC: 12.6 G/DL (ref 14–18)
IMM GRANULOCYTES # BLD AUTO: 0.01 K/UL (ref 0–0.04)
IMM GRANULOCYTES NFR BLD AUTO: 0.2 % (ref 0–0.5)
IRON SERPL-MCNC: 42 UG/DL (ref 45–160)
LDLC SERPL CALC-MCNC: 74.8 MG/DL (ref 63–159)
LYMPHOCYTES # BLD AUTO: 1.5 K/UL (ref 1–4.8)
LYMPHOCYTES NFR BLD: 25.5 % (ref 18–48)
MCH RBC QN AUTO: 29.2 PG (ref 27–31)
MCHC RBC AUTO-ENTMCNC: 31.9 G/DL (ref 32–36)
MCV RBC AUTO: 91 FL (ref 82–98)
MONOCYTES # BLD AUTO: 0.7 K/UL (ref 0.3–1)
MONOCYTES NFR BLD: 11.5 % (ref 4–15)
NEUTROPHILS # BLD AUTO: 3.5 K/UL (ref 1.8–7.7)
NEUTROPHILS NFR BLD: 59.4 % (ref 38–73)
NONHDLC SERPL-MCNC: 88 MG/DL
NRBC BLD-RTO: 0 /100 WBC
PLATELET # BLD AUTO: 293 K/UL (ref 150–450)
PMV BLD AUTO: 10.9 FL (ref 9.2–12.9)
POTASSIUM SERPL-SCNC: 4.5 MMOL/L (ref 3.5–5.1)
PROT SERPL-MCNC: 7.5 G/DL (ref 6–8.4)
RBC # BLD AUTO: 4.32 M/UL (ref 4.6–6.2)
SATURATED IRON: 9 % (ref 20–50)
SODIUM SERPL-SCNC: 139 MMOL/L (ref 136–145)
TOTAL IRON BINDING CAPACITY: 469 UG/DL (ref 250–450)
TRANSFERRIN SERPL-MCNC: 317 MG/DL (ref 200–375)
TRIGL SERPL-MCNC: 66 MG/DL (ref 30–150)
TSH SERPL DL<=0.005 MIU/L-ACNC: 1.55 UIU/ML (ref 0.4–4)
VIT B12 SERPL-MCNC: 918 PG/ML (ref 210–950)
WBC # BLD AUTO: 5.93 K/UL (ref 3.9–12.7)

## 2025-02-18 PROCEDURE — 82607 VITAMIN B-12: CPT | Performed by: INTERNAL MEDICINE

## 2025-02-18 PROCEDURE — 83036 HEMOGLOBIN GLYCOSYLATED A1C: CPT | Performed by: INTERNAL MEDICINE

## 2025-02-18 PROCEDURE — 82306 VITAMIN D 25 HYDROXY: CPT | Mod: GA | Performed by: INTERNAL MEDICINE

## 2025-02-18 PROCEDURE — 36415 COLL VENOUS BLD VENIPUNCTURE: CPT | Mod: PN | Performed by: INTERNAL MEDICINE

## 2025-02-18 PROCEDURE — 84443 ASSAY THYROID STIM HORMONE: CPT | Mod: GA | Performed by: INTERNAL MEDICINE

## 2025-02-18 PROCEDURE — 82746 ASSAY OF FOLIC ACID SERUM: CPT | Performed by: INTERNAL MEDICINE

## 2025-02-18 PROCEDURE — 80053 COMPREHEN METABOLIC PANEL: CPT | Performed by: INTERNAL MEDICINE

## 2025-02-18 PROCEDURE — 84466 ASSAY OF TRANSFERRIN: CPT | Performed by: INTERNAL MEDICINE

## 2025-02-18 PROCEDURE — 85025 COMPLETE CBC W/AUTO DIFF WBC: CPT | Performed by: INTERNAL MEDICINE

## 2025-02-18 PROCEDURE — 80061 LIPID PANEL: CPT | Performed by: INTERNAL MEDICINE

## 2025-02-18 PROCEDURE — 82728 ASSAY OF FERRITIN: CPT | Performed by: INTERNAL MEDICINE

## 2025-02-19 DIAGNOSIS — I10 ESSENTIAL (PRIMARY) HYPERTENSION: ICD-10-CM

## 2025-02-19 RX ORDER — AMLODIPINE BESYLATE 10 MG/1
10 TABLET ORAL DAILY
Qty: 90 TABLET | Refills: 3 | Status: SHIPPED | OUTPATIENT
Start: 2025-02-19

## 2025-02-21 ENCOUNTER — RESULTS FOLLOW-UP (OUTPATIENT)
Dept: FAMILY MEDICINE | Facility: CLINIC | Age: 75
End: 2025-02-21
Payer: MEDICARE

## 2025-02-21 DIAGNOSIS — Z12.11 COLON CANCER SCREENING: ICD-10-CM

## 2025-02-21 DIAGNOSIS — D50.9 IRON DEFICIENCY ANEMIA, UNSPECIFIED IRON DEFICIENCY ANEMIA TYPE: Primary | ICD-10-CM

## 2025-02-21 DIAGNOSIS — R19.8 CHANGE IN BOWEL MOVEMENT: ICD-10-CM

## 2025-02-24 ENCOUNTER — TELEPHONE (OUTPATIENT)
Dept: ENDOSCOPY | Facility: HOSPITAL | Age: 75
End: 2025-02-24
Payer: MEDICARE

## 2025-02-24 ENCOUNTER — TELEPHONE (OUTPATIENT)
Dept: FAMILY MEDICINE | Facility: CLINIC | Age: 75
End: 2025-02-24
Payer: MEDICARE

## 2025-02-24 ENCOUNTER — PATIENT MESSAGE (OUTPATIENT)
Dept: ENDOSCOPY | Facility: HOSPITAL | Age: 75
End: 2025-02-24
Payer: MEDICARE

## 2025-02-24 DIAGNOSIS — Z12.11 SPECIAL SCREENING FOR MALIGNANT NEOPLASMS, COLON: Primary | ICD-10-CM

## 2025-02-24 DIAGNOSIS — D50.9 IRON DEFICIENCY ANEMIA, UNSPECIFIED IRON DEFICIENCY ANEMIA TYPE: ICD-10-CM

## 2025-02-24 DIAGNOSIS — Z12.11 COLON CANCER SCREENING: ICD-10-CM

## 2025-02-24 DIAGNOSIS — R19.4 CHANGE IN BOWEL HABITS: ICD-10-CM

## 2025-02-24 DIAGNOSIS — R19.8 CHANGE IN BOWEL MOVEMENT: Primary | ICD-10-CM

## 2025-02-24 RX ORDER — SODIUM, POTASSIUM,MAG SULFATES 17.5-3.13G
1 SOLUTION, RECONSTITUTED, ORAL ORAL DAILY
Qty: 1 KIT | Refills: 0 | Status: SHIPPED | OUTPATIENT
Start: 2025-02-24 | End: 2025-02-26

## 2025-02-24 NOTE — TELEPHONE ENCOUNTER
Referral for procedure from Telephone call - direct access patient      Spoke to pt to schedule procedure(s) Colonoscopy       Physician to perform procedure(s) Dr. DEBBY Martinez  Date of Procedure (s) 3/18/25  Arrival Time 7:45 AM  Time of Procedure(s) 8:45 AM   Location of Procedure(s) [address]   Type of Rx Prep sent to patient: Suprep  Instructions provided to patient via [facility]    Patient was informed on the following information and verbalized understanding. Screening questionnaire reviewed with patient and complete. If procedure requires anesthesia, a responsible adult needs to be present to accompany the patient home, patient cannot drive after receiving anesthesia. Appointment details are tentative, especially check-in time. Patient will receive a prep-op call 7 days prior to confirm check-in time for procedure. If applicable the patient should contact their pharmacy to verify Rx for procedure prep is ready for pick-up. Patient was advised to call the scheduling department at [phone] if pharmacy states no Rx is available. Patient was advised to call the endoscopy scheduling department if any questions or concerns arise.      [facility] Scheduling Department

## 2025-02-24 NOTE — TELEPHONE ENCOUNTER
----- Message from Daniella sent at 2/24/2025  1:15 PM CST -----  .Type:  Patient Returning CallWho Called: Self Who Left Message for Patient: Vick Mansfield, MDDoes the patient know what this is regarding?: No Would the patient rather a call back or a response via [facility]? Call [facility] Call Back Number:.[phone] (home) Additional Information:

## 2025-02-24 NOTE — TELEPHONE ENCOUNTER
Spoke with patient wanted to know if he can take a stool softer everyday. Spoke with Dr. Mansfield advise to take once daily as needed.

## 2025-02-25 ENCOUNTER — LAB VISIT (OUTPATIENT)
Dept: LAB | Facility: HOSPITAL | Age: 75
End: 2025-02-25
Attending: INTERNAL MEDICINE
Payer: MEDICARE

## 2025-02-25 ENCOUNTER — TELEPHONE (OUTPATIENT)
Dept: ENDOSCOPY | Facility: HOSPITAL | Age: 75
End: 2025-02-25
Payer: MEDICARE

## 2025-02-25 DIAGNOSIS — R19.8 CHANGE IN BOWEL MOVEMENT: ICD-10-CM

## 2025-02-25 LAB — OB PNL STL: POSITIVE

## 2025-02-25 PROCEDURE — 82272 OCCULT BLD FECES 1-3 TESTS: CPT | Performed by: INTERNAL MEDICINE

## 2025-02-25 NOTE — TELEPHONE ENCOUNTER
Referral for procedure from  Workqueue referral (see Appts tab)      Spoke to pt to reschedule procedure(s) Colonoscopy       Physician to perform procedure(s) Dr. DEBBY Martinez  Date of Procedure (s) 2/28/25  Arrival Time 7:15 AM  Time of Procedure(s) 8:15 AM   Location of Procedure(s) [address]   Type of Rx Prep sent to patient: Suprep  Instructions provided to patient via [facility]    Patient was informed on the following information and verbalized understanding. Screening questionnaire reviewed with patient and complete. If procedure requires anesthesia, a responsible adult needs to be present to accompany the patient home, patient cannot drive after receiving anesthesia. Appointment details are tentative, especially check-in time. Patient will receive a prep-op call 7 days prior to confirm check-in time for procedure. If applicable the patient should contact their pharmacy to verify Rx for procedure prep is ready for pick-up. Patient was advised to call the scheduling department at [phone] if pharmacy states no Rx is available. Patient was advised to call the endoscopy scheduling department if any questions or concerns arise.      [facility] Scheduling Department

## 2025-02-27 ENCOUNTER — ANESTHESIA EVENT (OUTPATIENT)
Dept: ENDOSCOPY | Facility: HOSPITAL | Age: 75
End: 2025-02-27
Payer: MEDICARE

## 2025-02-27 ENCOUNTER — RESULTS FOLLOW-UP (OUTPATIENT)
Dept: FAMILY MEDICINE | Facility: CLINIC | Age: 75
End: 2025-02-27

## 2025-02-28 ENCOUNTER — ANESTHESIA (OUTPATIENT)
Dept: ENDOSCOPY | Facility: HOSPITAL | Age: 75
End: 2025-02-28
Payer: MEDICARE

## 2025-02-28 ENCOUNTER — HOSPITAL ENCOUNTER (OUTPATIENT)
Facility: HOSPITAL | Age: 75
Discharge: HOME | End: 2025-02-28
Attending: STUDENT IN AN ORGANIZED HEALTH CARE EDUCATION/TRAINING PROGRAM | Admitting: STUDENT IN AN ORGANIZED HEALTH CARE EDUCATION/TRAINING PROGRAM
Payer: MEDICARE

## 2025-02-28 VITALS
HEART RATE: 68 BPM | RESPIRATION RATE: 18 BRPM | DIASTOLIC BLOOD PRESSURE: 63 MMHG | OXYGEN SATURATION: 100 % | SYSTOLIC BLOOD PRESSURE: 141 MMHG | TEMPERATURE: 98 F

## 2025-02-28 DIAGNOSIS — D50.9 IRON DEFICIENCY ANEMIA: ICD-10-CM

## 2025-02-28 PROCEDURE — 37000009 HC ANESTHESIA EA ADD 15 MINS: Performed by: STUDENT IN AN ORGANIZED HEALTH CARE EDUCATION/TRAINING PROGRAM

## 2025-02-28 PROCEDURE — 63600175 PHARM REV CODE 636 W HCPCS

## 2025-02-28 PROCEDURE — 88305 TISSUE EXAM BY PATHOLOGIST: CPT | Performed by: PATHOLOGY

## 2025-02-28 PROCEDURE — 37000008 HC ANESTHESIA 1ST 15 MINUTES: Performed by: STUDENT IN AN ORGANIZED HEALTH CARE EDUCATION/TRAINING PROGRAM

## 2025-02-28 PROCEDURE — 88305 TISSUE EXAM BY PATHOLOGIST: CPT | Mod: 26,,, | Performed by: PATHOLOGY

## 2025-02-28 PROCEDURE — 25000003 PHARM REV CODE 250

## 2025-02-28 PROCEDURE — 27201089 HC SNARE, DISP (ANY): Performed by: STUDENT IN AN ORGANIZED HEALTH CARE EDUCATION/TRAINING PROGRAM

## 2025-02-28 PROCEDURE — 45385 COLONOSCOPY W/LESION REMOVAL: CPT | Mod: ,,, | Performed by: STUDENT IN AN ORGANIZED HEALTH CARE EDUCATION/TRAINING PROGRAM

## 2025-02-28 PROCEDURE — 45385 COLONOSCOPY W/LESION REMOVAL: CPT | Performed by: STUDENT IN AN ORGANIZED HEALTH CARE EDUCATION/TRAINING PROGRAM

## 2025-02-28 RX ORDER — LIDOCAINE HYDROCHLORIDE 20 MG/ML
INJECTION INTRAVENOUS
Status: DISCONTINUED | OUTPATIENT
Start: 2025-02-28 | End: 2025-02-28

## 2025-02-28 RX ORDER — LIDOCAINE HYDROCHLORIDE 20 MG/ML
INJECTION, SOLUTION EPIDURAL; INFILTRATION; INTRACAUDAL; PERINEURAL
Status: DISCONTINUED
Start: 2025-02-28 | End: 2025-02-28 | Stop reason: HOSPADM

## 2025-02-28 RX ORDER — SODIUM CHLORIDE 9 MG/ML
INJECTION, SOLUTION INTRAVENOUS CONTINUOUS
Status: DISCONTINUED | OUTPATIENT
Start: 2025-02-28 | End: 2025-02-28 | Stop reason: HOSPADM

## 2025-02-28 RX ORDER — PROPOFOL 10 MG/ML
VIAL (ML) INTRAVENOUS
Status: DISCONTINUED | OUTPATIENT
Start: 2025-02-28 | End: 2025-02-28

## 2025-02-28 RX ORDER — PHENYLEPHRINE HYDROCHLORIDE 10 MG/ML
INJECTION INTRAVENOUS
Status: DISCONTINUED | OUTPATIENT
Start: 2025-02-28 | End: 2025-02-28

## 2025-02-28 RX ORDER — PHENYLEPHRINE HCL IN 0.9% NACL 1 MG/10 ML
SYRINGE (ML) INTRAVENOUS
Status: DISCONTINUED
Start: 2025-02-28 | End: 2025-02-28 | Stop reason: HOSPADM

## 2025-02-28 RX ORDER — LIDOCAINE HYDROCHLORIDE 10 MG/ML
1 INJECTION, SOLUTION EPIDURAL; INFILTRATION; INTRACAUDAL; PERINEURAL ONCE
Status: DISCONTINUED | OUTPATIENT
Start: 2025-02-28 | End: 2025-02-28 | Stop reason: HOSPADM

## 2025-02-28 RX ORDER — PROPOFOL 10 MG/ML
VIAL (ML) INTRAVENOUS
Status: DISCONTINUED
Start: 2025-02-28 | End: 2025-02-28 | Stop reason: HOSPADM

## 2025-02-28 RX ADMIN — PROPOFOL 20 MG: 10 INJECTION, EMULSION INTRAVENOUS at 08:02

## 2025-02-28 RX ADMIN — GLYCOPYRROLATE 0.2 MG: 0.2 INJECTION, SOLUTION INTRAMUSCULAR; INTRAVITREAL at 08:02

## 2025-02-28 RX ADMIN — PHENYLEPHRINE HYDROCHLORIDE 100 MCG: 10 INJECTION INTRAVENOUS at 08:02

## 2025-02-28 RX ADMIN — PROPOFOL 70 MG: 10 INJECTION, EMULSION INTRAVENOUS at 08:02

## 2025-02-28 RX ADMIN — PROPOFOL 40 MG: 10 INJECTION, EMULSION INTRAVENOUS at 08:02

## 2025-02-28 RX ADMIN — PROPOFOL 30 MG: 10 INJECTION, EMULSION INTRAVENOUS at 08:02

## 2025-02-28 RX ADMIN — PROPOFOL 20 MG: 10 INJECTION, EMULSION INTRAVENOUS at 09:02

## 2025-02-28 RX ADMIN — PHENYLEPHRINE HYDROCHLORIDE 50 MCG: 10 INJECTION INTRAVENOUS at 08:02

## 2025-02-28 RX ADMIN — LIDOCAINE HYDROCHLORIDE 100 MG: 20 INJECTION, SOLUTION INTRAVENOUS at 08:02

## 2025-02-28 RX ADMIN — PROPOFOL 50 MG: 10 INJECTION, EMULSION INTRAVENOUS at 08:02

## 2025-02-28 RX ADMIN — SODIUM CHLORIDE: 0.9 INJECTION, SOLUTION INTRAVENOUS at 08:02

## 2025-02-28 NOTE — ANESTHESIA POSTPROCEDURE EVALUATION
Anesthesia Post Evaluation    Patient: Yair Owens    Procedure(s) Performed: Procedure(s) (LRB):  COLONOSCOPY (N/A)    Final Anesthesia Type: general      Patient location during evaluation: GI PACU  Patient participation: Yes- Able to Participate  Level of consciousness: awake and alert  Post-procedure vital signs: reviewed and stable  Airway patency: patent    PONV status at discharge: No PONV  Anesthetic complications: no      Cardiovascular status: blood pressure returned to baseline and hemodynamically stable  Respiratory status: unassisted, spontaneous ventilation and room air  Hydration status: euvolemic  Follow-up not needed.              Vitals Value Taken Time   /63 02/28/25 09:38   Temp 36.5 °C (97.7 °F) 02/28/25 09:08   Pulse 68 02/28/25 09:38   Resp 18 02/28/25 09:38   SpO2 100 % 02/28/25 09:38         No case tracking events are documented in the log.      Pain/Sonny Score: Sonny Score: 10 (2/28/2025  9:38 AM)

## 2025-02-28 NOTE — PROVATION PATIENT INSTRUCTIONS
Discharge Summary/Instructions after an Endoscopic Procedure  Patient Name: Yair Owens  Patient MRN: [id number]  Patient YOB: 1950 Friday, February 28, 2025  Juanjose Martinez MD  Dear patient,  As a result of recent federal legislation (The Federal Cures Act), you may   receive lab or pathology results from your procedure in your [facility]   account before your physician is able to contact you. Your physician or   their representative will relay the results to you with their   recommendations at their soonest availability.  Thank you,  RESTRICTIONS:  During your procedure today, you received medications for sedation.  These   medications may affect your judgment, balance and coordination.  Therefore,   for 24 hours, you have the following restrictions:   - DO NOT drive a car, operate machinery, make legal/financial decisions,   sign important papers or drink alcohol.    ACTIVITY:  Today: no heavy lifting, straining or running due to procedural   sedation/anesthesia.  The following day: return to full activity including work.  DIET:  Eat and drink normally unless instructed otherwise.     TREATMENT FOR COMMON SIDE EFFECTS:  - Mild abdominal pain, nausea, belching, bloating or excessive gas:  rest,   eat lightly and use a heating pad.  - Sore Throat: treat with throat lozenges and/or gargle with warm salt   water.  - Because air was used during the procedure, expelling large amounts of air   from your rectum or belching is normal.  - If a bowel prep was taken, you may not have a bowel movement for 1-3 days.    This is normal.  SYMPTOMS TO WATCH FOR AND REPORT TO YOUR PHYSICIAN:  1. Abdominal pain or bloating, other than gas cramps.  2. Chest pain.  3. Back pain.  4. Signs of infection such as: chills or fever occurring within 24 hours   after the procedure.  5. Rectal bleeding, which would show as bright red, maroon, or black stools.   (A tablespoon of blood from the rectum is not serious, especially if    hemorrhoids are present.)  6. Vomiting.  7. Weakness or dizziness.  GO DIRECTLY TO THE NEAREST EMERGENCY ROOM IF YOU HAVE ANY OF THE FOLLOWING:      Difficulty breathing              Chills and/or fever over 101 F   Persistent vomiting and/or vomiting blood   Severe abdominal pain   Severe chest pain   Black, tarry stools   Bleeding- more than one tablespoon   Any other symptom or condition that you feel may need urgent attention  Your doctor recommends these additional instructions:  If any biopsies were taken, your doctors clinic will contact you in 1 to 2   weeks with any results.  - Discharge patient to home (ambulatory).   - Patient has a contact number available for emergencies.  The signs and   symptoms of potential delayed complications were discussed with the   patient.  Return to normal activities tomorrow.  Written discharge   instructions were provided to the patient.   - Resume previous diet.   - Continue present medications.   - Return to primary care physician as previously scheduled.   - Repeat colonoscopy for surveillance based on pathology results.  For questions, problems or results please call your physician - Juanjose Martinez MD at Work:  [phone].  [facility] can be reached at [phone]     IF A COMPLICATION OR EMERGENCY SITUATION ARISES AND YOU ARE UNABLE TO REACH   YOUR PHYSICIAN - GO DIRECTLY TO THE EMERGENCY ROOM.  MD Juanjose Howell MD  2/28/2025 9:17:43 AM  This report has been verified and signed electronically.  Dear patient,  As a result of recent federal legislation (The Federal Cures Act), you may   receive lab or pathology results from your procedure in your [facility]   account before your physician is able to contact you. Your physician or   their representative will relay the results to you with their   recommendations at their soonest availability.  Thank you,  PROVATION

## 2025-02-28 NOTE — H&P
Short Stay Endoscopy History and Physical    PCP - Vick Mansfield MD    Procedure - Colonoscopy  ASA - per anesthesia  Mallampati - per anesthesia  History of Anesthesia problems - no  Family history Anesthesia problems -  no   Plan of anesthesia - General    HPI:  This is a 75 y.o. male here for evaluation of : iron def anemia      Medical History:  has a past medical history of CAD (coronary artery disease) (02/22/2021), Calcified granuloma of lung (02/03/2023), Colon polyp, Erectile dysfunction (02/14/2020), Fatty liver, Generalized anxiety disorder (01/11/2017), GERD (gastroesophageal reflux disease), Gout, unspecified, History of colonic polyps (11/16/2023), Hyperlipidemia, Hypertension, Psychophysiological insomnia (07/24/2017), and Tobacco dependence (07/24/2017).    Surgical History:  has a past surgical history that includes Bone Resection, Rib; Hand surgery; Scrotal surgery; Appendectomy; Colonoscopy (N/A, 07/21/2017); Left heart catheterization (Left, 03/02/2021); Ultrasound guidance (03/02/2021); Colonoscopy (N/A, 11/22/2022); and Vasectomy (40 years).    Family History: family history includes Asthma in his sister; Cancer in his father; Cancer (age of onset: 78) in his sister; Heart disease in his father; Lupus in his daughter; No Known Problems in his mother and son; Stroke in his brother.. Otherwise no colon cancer, inflammatory bowel disease, or GI malignancies.    Social History:  reports that he has been smoking cigarettes. He started smoking about 58 years ago. He has a 30.7 pack-year smoking history. He has been exposed to tobacco smoke. He has never used smokeless tobacco. He reports that he does not currently use alcohol. He reports that he does not use drugs.    Review of patient's allergies indicates:   Allergen Reactions    Codeine Itching    Ibuprofen Itching    Remeron [mirtazapine] Anxiety     Did not help the patient sleep and created anxiety.       Medications:   Prescriptions  Prior to Admission[1]      Physical Exam:    Vital Signs:   Vitals:    02/28/25 0731   BP: (!) 155/67   Pulse: 60   Resp: 17   Temp: 97.9 °F (36.6 °C)       General Appearance: Well appearing in no acute distress  Head: Normocephalic, without obvious abnormality   Lungs: Non-labored breathing  Abdomen: Soft, non tender, non distended     Labs:  Lab Results   Component Value Date    WBC 5.93 02/18/2025    HGB 12.6 (L) 02/18/2025    HCT 39.5 (L) 02/18/2025     02/18/2025    CHOL 136 02/18/2025    TRIG 66 02/18/2025    HDL 48 02/18/2025    ALT 16 02/18/2025    AST 20 02/18/2025     02/18/2025    K 4.5 02/18/2025     02/18/2025    CREATININE 1.1 02/18/2025    BUN 14 02/18/2025    CO2 25 02/18/2025    TSH 1.549 02/18/2025    PSA 1.6 03/26/2021    HGBA1C 5.2 02/18/2025       I have explained the risks and benefits of endoscopy procedures to the patient including but not limited to bleeding, perforation, infection, and death.  The patient was asked if they understand and allowed to ask any further questions to their satisfaction.    Juanjose Martinez MD        [1]   Medications Prior to Admission   Medication Sig Dispense Refill Last Dose/Taking    amLODIPine (NORVASC) 10 MG tablet Take 1 tablet (10 mg total) by mouth once daily. 90 tablet 3 2/27/2025    aspirin (ECOTRIN) 81 MG EC tablet Take 1 tablet (81 mg total) by mouth once daily.   2/27/2025    atorvastatin (LIPITOR) 40 MG tablet TAKE 1 TABLET BY MOUTH EVERY DAY 30 tablet 0 2/27/2025    busPIRone (BUSPAR) 10 MG tablet Take 1 tablet (10 mg total) by mouth 2 (two) times daily.   2/27/2025    hydrOXYzine (ATARAX) 50 MG tablet Take 1-2 tablets ( mg total) by mouth every evening. 180 tablet 1 2/27/2025    losartan (COZAAR) 50 MG tablet TAKE 1 TABLET BY MOUTH EVERY DAY 90 tablet 3 2/27/2025    omeprazole (PRILOSEC) 40 MG capsule TAKE 1 CAPSULE BY MOUTH TWICE  DAILY 180 capsule 3 2/27/2025    tamsulosin (FLOMAX) 0.4 mg Cap Take 1 capsule (0.4 mg total) by  mouth once daily. 90 capsule 3 2/27/2025    multivitamin capsule Take 1 capsule by mouth once daily.       polyethylene glycol (GLYCOLAX) 17 gram/dose powder Take 17 g by mouth once daily. 510 g 0     senna-docusate 8.6-50 mg (PERICOLACE) 8.6-50 mg per tablet Take 1 tablet by mouth once daily. 30 tablet 0     tadalafiL (CIALIS) 5 MG tablet Take 1 tablet (5 mg total) by mouth once daily. 90 tablet 3

## 2025-02-28 NOTE — PLAN OF CARE
Procedure and Recovery complete. Patient alert and oriented, no pain reported, and no signs or symptoms of distress noted at this moment. Discharge instructions discussed and given to patient and wife.

## 2025-02-28 NOTE — TRANSFER OF CARE
Anesthesia Transfer of Care Note    Patient: Yair Owens    Procedure(s) Performed: Procedure(s) (LRB):  COLONOSCOPY (N/A)    Patient location: GI    Anesthesia Type: general    Transport from OR: Transported from OR on room air with adequate spontaneous ventilation    Post pain: adequate analgesia    Post assessment: no apparent anesthetic complications and tolerated procedure well    Post vital signs: stable    Level of consciousness: responds to stimulation and lethargic    Nausea/Vomiting: no nausea/vomiting    Complications: none    Transfer of care protocol was followed      Last vitals: Visit Vitals  BP (!) 94/54 (BP Location: Left arm, Patient Position: Lying)   Pulse 70   Temp 36.5 °C (97.7 °F) (Axillary)   Resp 18   SpO2 95%

## 2025-02-28 NOTE — ANESTHESIA PREPROCEDURE EVALUATION
02/28/2025  Yair Owens is a 75 y.o., male.      Pre-op Assessment    I have reviewed the Patient Summary Reports.     I have reviewed the Nursing Notes. I have reviewed the NPO Status.   I have reviewed the Medications.     Review of Systems  Anesthesia Hx:  No problems with previous Anesthesia             Denies Family Hx of Anesthesia complications.    Denies Personal Hx of Anesthesia complications.                    Social:  Smoker, No Alcohol Use       Hematology/Oncology:  Hematology Normal   Oncology Normal                                   EENT/Dental:  EENT/Dental Normal           Cardiovascular:     Hypertension   CAD           hyperlipidemia    No CP                           Pulmonary:   COPD                     Hepatic/GI:     GERD Liver Disease,  Fatty Liver             Musculoskeletal:  Musculoskeletal Normal                Neurological:  Neurology Normal                                      Endocrine:  Endocrine Normal            Psych:   anxiety                 Physical Exam  General: Cooperative, Alert and Oriented    Airway:  Mallampati: I   Mouth Opening: Normal  TM Distance: Normal  Tongue: Normal  Neck ROM: Normal ROM    Dental:  Intact        Anesthesia Plan  Type of Anesthesia, risks & benefits discussed:    Anesthesia Type: Gen Natural Airway  Intra-op Monitoring Plan: Standard ASA Monitors  Induction:  IV  Informed Consent: Informed consent signed with the Patient and all parties understand the risks and agree with anesthesia plan.  All questions answered. Patient consented to blood products? No  ASA Score: 3    Ready For Surgery From Anesthesia Perspective.     .

## 2025-03-03 LAB
FINAL PATHOLOGIC DIAGNOSIS: NORMAL
GROSS: NORMAL
Lab: NORMAL

## 2025-03-05 ENCOUNTER — OFFICE VISIT (OUTPATIENT)
Dept: GASTROENTEROLOGY | Facility: CLINIC | Age: 75
End: 2025-03-05
Payer: MEDICARE

## 2025-03-05 VITALS
SYSTOLIC BLOOD PRESSURE: 127 MMHG | HEART RATE: 82 BPM | DIASTOLIC BLOOD PRESSURE: 71 MMHG | WEIGHT: 177.69 LBS | HEIGHT: 68 IN | BODY MASS INDEX: 26.93 KG/M2

## 2025-03-05 DIAGNOSIS — K59.00 CONSTIPATION, UNSPECIFIED CONSTIPATION TYPE: Primary | ICD-10-CM

## 2025-03-05 DIAGNOSIS — D50.9 IRON DEFICIENCY ANEMIA, UNSPECIFIED IRON DEFICIENCY ANEMIA TYPE: ICD-10-CM

## 2025-03-05 PROCEDURE — 3288F FALL RISK ASSESSMENT DOCD: CPT | Mod: CPTII,S$GLB,,

## 2025-03-05 PROCEDURE — 3074F SYST BP LT 130 MM HG: CPT | Mod: CPTII,S$GLB,,

## 2025-03-05 PROCEDURE — 1159F MED LIST DOCD IN RCRD: CPT | Mod: CPTII,S$GLB,,

## 2025-03-05 PROCEDURE — 1101F PT FALLS ASSESS-DOCD LE1/YR: CPT | Mod: CPTII,S$GLB,,

## 2025-03-05 PROCEDURE — 1126F AMNT PAIN NOTED NONE PRSNT: CPT | Mod: CPTII,S$GLB,,

## 2025-03-05 PROCEDURE — 3008F BODY MASS INDEX DOCD: CPT | Mod: CPTII,S$GLB,,

## 2025-03-05 PROCEDURE — 4010F ACE/ARB THERAPY RXD/TAKEN: CPT | Mod: CPTII,S$GLB,,

## 2025-03-05 PROCEDURE — 99999 PR PBB SHADOW E&M-EST. PATIENT-LVL III: CPT | Mod: PBBFAC,,,

## 2025-03-05 PROCEDURE — 99204 OFFICE O/P NEW MOD 45 MIN: CPT | Mod: S$GLB,,,

## 2025-03-05 PROCEDURE — 3044F HG A1C LEVEL LT 7.0%: CPT | Mod: CPTII,S$GLB,,

## 2025-03-05 PROCEDURE — 3078F DIAST BP <80 MM HG: CPT | Mod: CPTII,S$GLB,,

## 2025-03-05 NOTE — PROGRESS NOTES
"    [facility] Consultation Note    Reason for Consult:  The primary encounter diagnosis was Constipation, unspecified constipation type. A diagnosis of Iron deficiency anemia, unspecified iron deficiency anemia type was also pertinent to this visit.    PCP:   Vick Mansfield   [address] / [place] [address]    Referring MD:  Vick Mansfield Md  [id number] [id number]    HPI:  This is a 75 y.o. male here for evaluation of constipation x 1 month. Reports taking two stool softeners daily with daily BM. Denies straining. Feels like he empties completely with stool softener. Endorses occasional generalized abdominal cramping that isn't severe and doesn't last long. Pt walks 30 miles weekly. He reports good diet and at least 40 ounces of water daily. Pt denies dysphagia, reflux, regurgitation, bloating, belching, early satiety, N/V, diarrhea, or unintentional weight loss.      Pt also with chronic MIRNA. States this has been an intermittent issue over the past few years. Recent colonoscopy with few polyps and diverticulosis. Denies persistent nosebleeds, hematemesis, hematuria, rectal bleeding, or black stools. Takes daily baby aspirin, otherwise no NSAID use. Taking PO iron.     Objective Findings:    Vital Signs:  /71   Pulse 82   Ht 5' 8" (1.727 m)   Wt 80.6 kg (177 lb 11.1 oz)   BMI 27.02 kg/m²   Body mass index is 27.02 kg/m².    Physical Exam:  General Appearance: Well appearing in no acute distress  Abdomen: Soft, non tender, non distended in all four quadrants. No hepatosplenomegaly, ascites, or mass.    I have personally reviewed labs and imaging results.   CBC: Hgb 12.6; Hct 39.5  Ferritin:10  TIBC: 469  Stool occult blood: positive    Assessment:  1. Constipation, unspecified constipation type    2. Iron deficiency anemia, unspecified iron deficiency anemia type      This is a 75 y.o M here for eval of 1 month of new constipation, improved with daily " stool softener. Occasional abdominal cramping. Recommending bowel regimen with miralax and fiber.     Pt reports MIRNA has been intermittent for years. He denies any weakness, fatigue, BRBPR, melena, or any other bleeds. Recent colonoscopy with few polyps, otherwise normal. Given chronicity and stability of MIRNA, I don't think any further workup is needed at this time. Monitor labs for now. However, if H/H drops any further, can consider getting an upper endoscopy.      - Take a Fiber supplement daily (Metamucil, Benefiber, Citrucell, etc)  - Take Miralax (once, twice, or three times a day)  - Drink at least 8 cups of water a day  - Get regular physical activity  - Use a stool or Squatty Potty  - Avoid sitting on the toilet >5 minutes to prevent hemorrhoids   - Continue PO iron supplement (discussed with patient this may worsen constipation)    RTC as needed    Thank you so much for allowing me to participate in the care of Yair A Romano Sarah Abukhader, PA-C [facility]

## 2025-03-07 ENCOUNTER — TELEPHONE (OUTPATIENT)
Dept: CARDIOLOGY | Facility: CLINIC | Age: 75
End: 2025-03-07
Payer: MEDICARE

## 2025-03-09 DIAGNOSIS — I70.0 ATHEROSCLEROSIS OF AORTA: Primary | Chronic | ICD-10-CM

## 2025-03-09 DIAGNOSIS — I25.10 CORONARY ARTERY DISEASE INVOLVING NATIVE HEART WITHOUT ANGINA PECTORIS, UNSPECIFIED VESSEL OR LESION TYPE: Chronic | ICD-10-CM

## 2025-03-09 DIAGNOSIS — E78.5 HYPERLIPIDEMIA, UNSPECIFIED HYPERLIPIDEMIA TYPE: Chronic | ICD-10-CM

## 2025-03-09 NOTE — TELEPHONE ENCOUNTER
No care due was identified.  [facility] Embedded Care Due Messages. Reference number: [id number].   3/09/2025 11:31:40 AM CDT

## 2025-03-10 ENCOUNTER — OFFICE VISIT (OUTPATIENT)
Dept: CARDIOLOGY | Facility: CLINIC | Age: 75
End: 2025-03-10
Payer: MEDICARE

## 2025-03-10 VITALS
HEART RATE: 69 BPM | BODY MASS INDEX: 26.86 KG/M2 | RESPIRATION RATE: 18 BRPM | HEIGHT: 68 IN | WEIGHT: 177.25 LBS | SYSTOLIC BLOOD PRESSURE: 136 MMHG | OXYGEN SATURATION: 97 % | DIASTOLIC BLOOD PRESSURE: 60 MMHG

## 2025-03-10 DIAGNOSIS — I10 ESSENTIAL (PRIMARY) HYPERTENSION: Primary | ICD-10-CM

## 2025-03-10 DIAGNOSIS — R01.1 HEART MURMUR: ICD-10-CM

## 2025-03-10 DIAGNOSIS — E78.5 HYPERLIPIDEMIA, UNSPECIFIED HYPERLIPIDEMIA TYPE: ICD-10-CM

## 2025-03-10 DIAGNOSIS — I10 PRIMARY HYPERTENSION: ICD-10-CM

## 2025-03-10 DIAGNOSIS — I25.10 CORONARY ARTERY DISEASE INVOLVING NATIVE HEART WITHOUT ANGINA PECTORIS, UNSPECIFIED VESSEL OR LESION TYPE: ICD-10-CM

## 2025-03-10 PROCEDURE — 99214 OFFICE O/P EST MOD 30 MIN: CPT | Mod: S$GLB,,, | Performed by: INTERNAL MEDICINE

## 2025-03-10 PROCEDURE — 3075F SYST BP GE 130 - 139MM HG: CPT | Mod: CPTII,S$GLB,, | Performed by: INTERNAL MEDICINE

## 2025-03-10 PROCEDURE — 3288F FALL RISK ASSESSMENT DOCD: CPT | Mod: CPTII,S$GLB,, | Performed by: INTERNAL MEDICINE

## 2025-03-10 PROCEDURE — 1126F AMNT PAIN NOTED NONE PRSNT: CPT | Mod: CPTII,S$GLB,, | Performed by: INTERNAL MEDICINE

## 2025-03-10 PROCEDURE — 99999 PR PBB SHADOW E&M-EST. PATIENT-LVL IV: CPT | Mod: PBBFAC,,, | Performed by: INTERNAL MEDICINE

## 2025-03-10 PROCEDURE — 93000 ELECTROCARDIOGRAM COMPLETE: CPT | Mod: S$GLB,,, | Performed by: INTERNAL MEDICINE

## 2025-03-10 PROCEDURE — 3044F HG A1C LEVEL LT 7.0%: CPT | Mod: CPTII,S$GLB,, | Performed by: INTERNAL MEDICINE

## 2025-03-10 PROCEDURE — 1159F MED LIST DOCD IN RCRD: CPT | Mod: CPTII,S$GLB,, | Performed by: INTERNAL MEDICINE

## 2025-03-10 PROCEDURE — G2211 COMPLEX E/M VISIT ADD ON: HCPCS | Mod: S$GLB,,, | Performed by: INTERNAL MEDICINE

## 2025-03-10 PROCEDURE — 3008F BODY MASS INDEX DOCD: CPT | Mod: CPTII,S$GLB,, | Performed by: INTERNAL MEDICINE

## 2025-03-10 PROCEDURE — 3078F DIAST BP <80 MM HG: CPT | Mod: CPTII,S$GLB,, | Performed by: INTERNAL MEDICINE

## 2025-03-10 PROCEDURE — 4010F ACE/ARB THERAPY RXD/TAKEN: CPT | Mod: CPTII,S$GLB,, | Performed by: INTERNAL MEDICINE

## 2025-03-10 PROCEDURE — 1101F PT FALLS ASSESS-DOCD LE1/YR: CPT | Mod: CPTII,S$GLB,, | Performed by: INTERNAL MEDICINE

## 2025-03-10 NOTE — TELEPHONE ENCOUNTER
Refill Routing Note   Medication(s) are not appropriate for processing by [facility] for the following reason(s):        No active prescription written by provider    [facility] action(s):  Defer               Appointments  past 12m or future 3m with PCP    Date Provider   Last Visit   2/17/2025 Vick Mansfield MD   Next Visit   5/26/2025 Vick Mansfield MD   ED visits in past 90 days: 0        Note composed:7:36 AM 03/10/2025

## 2025-03-10 NOTE — PROGRESS NOTES
CARDIOVASCULAR CONSULTATION    REASON FOR CONSULT:   Yair Owens is a 75 y.o. male who presents for evaluation of chest pressure.      HISTORY OF PRESENT ILLNESS:     Patient is a pleasant 61-year-old man.  Came here because evaluation of chest pressure to the emergency room.  Was ruled out.  EKG was done which showed normal sinus rhythm.  States the pressure was substernal, and was much worse than the usual pressure which he feels.  States that he usually feels chest pain and tightness.  Unrelated to activity.  Sometimes can come with activity other times at rest.  Denies orthopnea, PND.  Does have mild dyspnea on exertion.  Used to smoke, but cutting down.    Notes from January 2020:    Patient doing fine.  Denies any further episodes of chest pains, orthopnea, PND.  States that he thinks it is his anxiety.  Stress testing did not reveal any significant issues apart from mild aortic valve stenosis.      The perfusion scan is free of evidence from myocardial ischemia or injury.    There is a  mild intensity fixed defect in the inferior wall of the left ventricle secondary to diaphragm attenuation.    Gated perfusion images showed an ejection fraction of 60% post stress.    The EKG portion of this study is negative for ischemia.    The patient reported no chest pain during the stress test.    There were no arrhythmias during stress.      Normal left ventricular systolic function. The estimated ejection fraction is 60%.  Concentric left ventricular hypertrophy.  Normal LV diastolic function.  Normal right ventricular systolic function.  Mild-to-moderate aortic valve stenosis.  Aortic valve area is 1.80 cm2; peak velocity is 2.60 m/s; mean gradient is 18 mmHg.  Mild aortic regurgitation.    Notes from may 2020:    The patient location is: his home   The chief complaint leading to consultation is: chest pain    Visit type: audiovisual    Face to Face time with patient: 15 mins   25 minutes of total time spent on the  encounter, which includes face to face time and non-face to face time preparing to see the patient (eg, review of tests), Obtaining and/or reviewing separately obtained history, Documenting clinical information in the electronic or other health record, Independently interpreting results (not separately reported) and communicating results to the patient/family/caregiver, or Care coordination (not separately reported).         Each patient to whom he or she provides medical services by telemedicine is:  (1) informed of the relationship between the physician and patient and the respective role of any other health care provider with respect to management of the patient; and (2) notified that he or she may decline to receive medical services by telemedicine and may withdraw from such care at any time.    Notes:  Patient here for follow-up.  Denies any chest pain at rest on exertion, orthopnea, PND.  States that he has been feeling great.  However he has started smoking more again because of the pandemic and the stress related with the pandemic.        Jan 2021:      The patient location is: his home  The chief complaint leading to consultation is: chest pain    Visit type: audiovisual    Face to Face time with patient: 15 mins  25 minutes of total time spent on the encounter, which includes face to face time and non-face to face time preparing to see the patient (eg, review of tests), Obtaining and/or reviewing separately obtained history, Documenting clinical information in the electronic or other health record, Independently interpreting results (not separately reported) and communicating results to the patient/family/caregiver, or Care coordination (not separately reported).         Each patient to whom he or she provides medical services by telemedicine is:  (1) informed of the relationship between the physician and patient and the respective role of any other health care provider with respect to management of the  patient; and (2) notified that he or she may decline to receive medical services by telemedicine and may withdraw from such care at any time.    Notes:  Patient here via audiovisual visit.  Had an episode of chest pain which brought him to the ER.  Describes it as left-sided.  At rest.  EKG was done which showed normal sinus rhythm with nonspecific ST changes and some mild ST depressions.  Since then has not had any further chest pains.  States that that day his blood pressure was high around 170 mm of mercury.  Doing fine.      Notes from feb 2021    The patient location is:  his home  The chief complaint leading to consultation is:  Chest pain    Visit type: audiovisual    Face to Face time with patient: 20 mins  30  minutes of total time spent on the encounter, which includes face to face time and non-face to face time preparing to see the patient (eg, review of tests), Obtaining and/or reviewing separately obtained history, Documenting clinical information in the electronic or other health record, Independently interpreting results (not separately reported) and communicating results to the patient/family/caregiver, or Care coordination (not separately reported).         Each patient to whom he or she provides medical services by telemedicine is:  (1) informed of the relationship between the physician and patient and the respective role of any other health care provider with respect to management of the patient; and (2) notified that he or she may decline to receive medical services by telemedicine and may withdraw from such care at any time.    Notes:       Patient here follow-up via audiovisual visit.  Occasional chest pain.  Really in the center of the chest and feels like tightness.  Sometimes in the left side and sometimes in the left shoulder.  Feels the left-sided pain goes to his left shoulder.  No relation with activity.  Can occur at rest or on exertion.  Stress test showed normal myocardial perfusion.   Although the EKG portion was positive for ischemia.    Normal myocardial perfusion scan. There is no evidence of myocardial ischemia or infarction.    There is a  mild intensity fixed defect in the inferobasilar wall of the left ventricle secondary to diaphragm attenuation.    The gated perfusion images showed an ejection fraction of 68% post stress.    There is normal wall motion post stress.    The EKG portion of this study is positive for ischemia.      The left ventricle is normal in size with mild concentric hypertrophy and normal systolic function. The estimated ejection fraction is 60%  Normal right ventricular size with normal right ventricular systolic function.  There is mild aortic valve stenosis.  Aortic valve area is 1.87 cm2; peak velocity is 2.19 m/s; mean gradient is 10 mmHg.    Notes from March 2021:  Patient here for follow-up after angiogram.  Mild nonobstructive CAD on angiogram.  No complications from angiogram.  Doing fine.  Denies any chest pains at rest on exertion.  States he thinks it is his anxiety which is bothering him.    The estimated blood loss was <50 mL.  There was non-obstructive coronary artery disease..        Left main:  Distal 10% stenosis     Lad:  Luminal irregularities.  Mid 10-20% stenosis     Circumflex:  Luminal irregularities     RCA:  Luminal irregularities     Access: We accessed the right radial artery using ultrasound guidance. The vessel appeared widely patent, suitable for endovascular access. The images were interpreted by me and saved.  Access was closed with radial band.           June 21:    The patient location is: his home  The chief complaint leading to consultation is: fu    Visit type: audiovisual    Face to Face time with patient: 15 min  25  minutes of total time spent on the encounter, which includes face to face time and non-face to face time preparing to see the patient (eg, review of tests), Obtaining and/or reviewing separately obtained history,  Documenting clinical information in the electronic or other health record, Independently interpreting results (not separately reported) and communicating results to the patient/family/caregiver, or Care coordination (not separately reported).         Each patient to whom he or she provides medical services by telemedicine is:  (1) informed of the relationship between the physician and patient and the respective role of any other health care provider with respect to management of the patient; and (2) notified that he or she may decline to receive medical services by telemedicine and may withdraw from such care at any time.    Notes:   Has been having left sided chest pain, not related to exertion, pin point in the left of the chest. Does not occur when he is cutting his lawn.  Denies orthopnea, PND, swelling of feet.  States blood pressure is usually well controlled at home.      September 2022: Patient here for follow-up.  Denies any chest pains at rest on exertion, orthopnea, PND.  Has been doing fine.    MARCH 23:  Follow-up.  Denies any chest pains at rest on exertion, orthopnea, PND, swelling of feet.      Notes from October 2023: Patient here for follow-up.  Doing fine.  Walks about 30 miles a week.  Denies chest pains at rest on exertion, orthopnea, PND.    Notes from August 24: Patient here for follow-up.  Denies any anginal sounding chest pains.  Occasional burping.  On omeprazole.  Which helps.  Denies orthopnea PND swelling of feet.  No chest pains on exertion      Sept 24:  Patient here for follow-up.  Denies chest pains at rest on exertion orthopnea or PND.  Walks 35 miles a week.  Without any issues.    Results for orders placed during the hospital encounter of 08/27/24    Echo    Interpretation Summary    Left Ventricle: The left ventricle is normal in size. There is mild concentric hypertrophy. There is normal systolic function with a visually estimated ejection fraction of 65 - 70%.    Right  Ventricle: Normal right ventricular cavity size. Systolic function is normal.    Aortic Valve: There is mild to moderate stenosis. Aortic valve area by VTI is 1.54 cm². Aortic valve peak velocity is 2.77 m/s. Mean gradient is 19 mmHg. The dimensionless index is 0.37. There is mild aortic regurgitation.    Pulmonary Artery: The estimated pulmonary artery systolic pressure is 24 mmHg.    IVC/SVC: Normal venous pressure at 3 mmHg.      March 25:    History of Present Illness    CHIEF COMPLAINT:  Yair presents today for cardiac follow up    CARDIOVASCULAR:  Heart rate occasionally drops into the 50s, which he wonders if could be attributed to his regular exercise routine. He denies chest pain, tightness, shortness of breath, or palpitations. Echo from August showed mild to moderate aortic valve stenosis.    EXERCISE:  He walks 30-35 miles weekly without any problems.    SLEEP:  He reports difficulty sleeping at night, which he has not yet discussed with his primary care physician.    MEDICATIONS:  His cardiac medications include amlodipine 10 mg daily, aspirin, atorvastatin, and losartan.         PAST MEDICAL HISTORY:     Past Medical History:   Diagnosis Date    CAD (coronary artery disease) 02/22/2021    Cath 2021  There was non-obstructive coronary artery disease..   Left main:  Distal 10% stenosis  Lad:  Luminal irregularities.  Mid 10-20% stenosis   Circumflex:  Luminal irregularities   RCA:  Luminal irregularities    Calcified granuloma of lung 02/03/2023    LLL calcified granuloma seen on CT Chest Lung Screening Low Dose 01/31/2018    Colon polyp     Erectile dysfunction 02/14/2020    Fatty liver     Generalized anxiety disorder 01/11/2017    GERD (gastroesophageal reflux disease)     Gout, unspecified     History of colonic polyps 11/16/2023    2 polyps 11/22 -5 yrs    Hyperlipidemia     Hypertension     Psychophysiological insomnia 07/24/2017    Tobacco dependence 07/24/2017       PAST SURGICAL HISTORY:      Past Surgical History:   Procedure Laterality Date    APPENDECTOMY      BONE RESECTION, RIB      for thoracic outlet syndrome    COLONOSCOPY N/A 07/21/2017    Procedure: COLONOSCOPY;  Surgeon: Deepak Servin MD;  Location: [facility] ENDO;  Service: Endoscopy;  Laterality: N/A;    COLONOSCOPY N/A 11/22/2022    Procedure: COLONOSCOPY;  Surgeon: Sam Obrien MD;  Location: [facility] ENDO;  Service: Endoscopy;  Laterality: N/A;  vacc-sutab-inst portal-tb    COLONOSCOPY N/A 2/28/2025    Procedure: COLONOSCOPY;  Surgeon: Juanjose Martinez MD;  Location: [facility] ENDO;  Service: Gastroenterology;  Laterality: N/A;  2/24-marcus-suprep-portal-tb  2/25 - R/S inst portal - LW    HAND SURGERY      LEFT HEART CATHETERIZATION Left 03/02/2021    Procedure: Left heart cath, radial, 730 am;  Surgeon: Vladimir Avalos MD;  Location: [facility] CATH LAB;  Service: Cardiology;  Laterality: Left;  RN PREOP 2/25/2021--COVID NEGATIVE ON 3/1  H/P INCOMPLETE    SCROTAL SURGERY      mass    ULTRASOUND GUIDANCE  03/02/2021    Procedure: Ultrasound Guidance;  Surgeon: Vladimir Avalos MD;  Location: [facility] CATH LAB;  Service: Cardiology;;    VASECTOMY  40 years       ALLERGIES AND MEDICATION:     Review of patient's allergies indicates:   Allergen Reactions    Codeine Itching    Ibuprofen Itching    Remeron [mirtazapine] Anxiety     Did not help the patient sleep and created anxiety.        Medication List            Accurate as of March 10, 2025  9:44 AM. If you have any questions, ask your nurse or doctor.                CONTINUE taking these medications      amLODIPine 10 MG tablet  Commonly known as: NORVASC  Take 1 tablet (10 mg total) by mouth once daily.     aspirin 81 MG EC tablet  Commonly known as: ECOTRIN  Take 1 tablet (81 mg total) by mouth once daily.     atorvastatin 40 MG tablet  Commonly known as: LIPITOR  TAKE 1 TABLET BY MOUTH EVERY DAY     busPIRone 10 MG tablet  Commonly known as: BUSPAR  Take 1 tablet (10 mg total) by mouth 2 (two) times daily.      hydrOXYzine 50 MG tablet  Commonly known as: ATARAX  Take 1-2 tablets ( mg total) by mouth every evening.     losartan 50 MG tablet  Commonly known as: COZAAR  TAKE 1 TABLET BY MOUTH EVERY DAY     multivitamin capsule     omeprazole 40 MG capsule  Commonly known as: PRILOSEC  TAKE 1 CAPSULE BY MOUTH TWICE  DAILY     tadalafiL 5 MG tablet  Commonly known as: CIALIS  Take 1 tablet (5 mg total) by mouth once daily.     tamsulosin 0.4 mg Cap  Commonly known as: FLOMAX  Take 1 capsule (0.4 mg total) by mouth once daily.     UNABLE TO FIND              SOCIAL HISTORY:     Social History     Socioeconomic History    Marital status:    Occupational History     Employer: [employer]   Tobacco Use    Smoking status: Some Days     Current packs/day: 0.50     Average packs/day: 0.5 packs/day for 61.5 years (30.7 ttl pk-yrs)     Types: Cigarettes     Start date: 1967     Passive exposure: Current    Smokeless tobacco: Never    Tobacco comments:     0.5 ppd or less    Substance and Sexual Activity    Alcohol use: Not Currently    Drug use: No    Sexual activity: Yes     Partners: Female     Social Drivers of Health     Financial Resource Strain: Low Risk  (11/15/2024)    Overall Financial Resource Strain (CARDIA)     Difficulty of Paying Living Expenses: Not hard at all   Food Insecurity: No Food Insecurity (11/15/2024)    Hunger Vital Sign     Worried About Running Out of Food in the Last Year: Never true     Ran Out of Food in the Last Year: Never true   Transportation Needs: No Transportation Needs (4/11/2024)    PRAPARE - Transportation     Lack of Transportation (Medical): No     Lack of Transportation (Non-Medical): No   Physical Activity: Sufficiently Active (11/15/2024)    Exercise Vital Sign     Days of Exercise per Week: 7 days     Minutes of Exercise per Session: 60 min   Stress: No Stress Concern Present (11/15/2024)    [employer] - Occupational Stress Questionnaire      "Feeling of Stress : Only a little   Housing Stability: Low Risk  (4/11/2024)    Housing Stability Vital Sign     Unable to Pay for Housing in the Last Year: No     Number of Places Lived in the Last Year: 1     Unstable Housing in the Last Year: No       FAMILY HISTORY:     Family History   Problem Relation Name Age of Onset    No Known Problems Mother      Cancer Father DARYL GLYNN     Heart disease Father DARYL GLYNN     Asthma Sister REGINA ANNA     Cancer Sister REGINA ANNA 78        Rectal cancer    Stroke Brother Daryl     Lupus Daughter x1     No Known Problems Son x3        REVIEW OF SYSTEMS:   Review of Systems   Constitutional: Negative.   HENT: Negative.     Eyes: Negative.    Respiratory: Negative.     Endocrine: Negative.    Hematologic/Lymphatic: Negative.    Skin: Negative.    Musculoskeletal: Negative.    Gastrointestinal: Negative.    Genitourinary: Negative.    Neurological: Negative.    Psychiatric/Behavioral: Negative.     Allergic/Immunologic: Negative.        A 10 point review of systems was performed and all the pertinent positives have been mentioned. Rest of review of systems was negative.        PHYSICAL EXAM:     Vitals:    03/10/25 0921   BP: 136/60   Pulse: 69   Resp: 18    Body mass index is 26.95 kg/m².  Weight: 80.4 kg (177 lb 4 oz)   Height: 5' 8" (172.7 cm)      Physical Exam  Vitals and nursing note reviewed.   Constitutional:       Appearance: Normal appearance. He is well-developed.   HENT:      Head: Normocephalic and atraumatic.      Right Ear: Hearing normal.      Left Ear: Hearing normal.      Nose: Nose normal.   Eyes:      General: Lids are normal.      Conjunctiva/sclera: Conjunctivae normal.      Pupils: Pupils are equal, round, and reactive to light.   Cardiovascular:      Rate and Rhythm: Normal rate and regular rhythm.      Pulses: Normal pulses.      Heart sounds: Murmur heard.   Pulmonary:      Effort: Pulmonary effort is normal.      Breath sounds: Normal " breath sounds.   Abdominal:      Palpations: Abdomen is soft.      Tenderness: There is no abdominal tenderness.   Musculoskeletal:         General: No deformity.      Cervical back: Normal range of motion and neck supple.   Skin:     General: Skin is warm and dry.   Neurological:      Mental Status: He is alert and oriented to person, place, and time.   Psychiatric:         Speech: Speech normal.              DATA:     Laboratory:  CBC:  Recent Labs   Lab 11/16/23  1335 08/15/24  1444 02/18/25  0741   WBC 6.34 7.21 5.93   Hemoglobin 13.8 L 13.7 L 12.6 L   Hematocrit 42.5 42.1 39.5 L   Platelets 253 228 293       CHEMISTRIES:  Recent Labs   Lab 06/11/22  0800 06/02/23  1053 01/09/24  0835 08/15/24  1444 02/18/25  0741   Glucose 109   < > 111 H 104 94   Sodium 139   < > 141 139 139   Potassium 4.7   < > 5.1 4.5 4.5   BUN 19   < > 12 18 14   Creatinine 1.3   < > 1.0 1.2 1.1   eGFR if  >60.0  --   --   --   --    eGFR if non  54.9 A  --   --   --   --    Calcium 9.5   < > 9.9 10.0 9.3    < > = values in this interval not displayed.       CARDIAC BIOMARKERS:  Recent Labs   Lab 11/03/23  1647 08/15/24  1444 08/15/24  1648   Troponin I <0.006 <0.006 <0.006       COAGS:        LIPIDS/LFTS:  Recent Labs   Lab 06/11/22  0800 10/07/22  0748 06/02/23  1053 11/03/23  1647 01/09/24  0835 08/15/24  1444 02/18/25  0741   Cholesterol 154  --  125  --   --   --  136   Triglycerides 95  --  49  --   --   --  66   HDL 41  --  39 L  --   --   --  48   LDL Cholesterol 94.0  --  76.2  --   --   --  74.8   Non-HDL Cholesterol 113  --  86  --   --   --  88   AST 44 H   < > 22   < > 22 33 20   ALT 57 H   < > 21   < > 17 23 16    < > = values in this interval not displayed.       Hemoglobin A1C   Date Value Ref Range Status   02/18/2025 5.2 4.0 - 5.6 % Final     Comment:     ADA Screening Guidelines:  5.7-6.4%  Consistent with prediabetes  >or=6.5%  Consistent with diabetes    High levels of fetal hemoglobin  interfere with the HbA1C  assay. Heterozygous hemoglobin variants (HbS, HgC, etc)do  not significantly interfere with this assay.   However, presence of multiple variants may affect accuracy.     11/16/2023 5.3 4.0 - 5.6 % Final     Comment:     ADA Screening Guidelines:  5.7-6.4%  Consistent with prediabetes  >or=6.5%  Consistent with diabetes    High levels of fetal hemoglobin interfere with the HbA1C  assay. Heterozygous hemoglobin variants (HbS, HgC, etc)do  not significantly interfere with this assay.   However, presence of multiple variants may affect accuracy.         TSH  Recent Labs   Lab 11/16/23  1335 02/18/25  0741   TSH 0.725 1.549       The 10-year ASCVD risk score (Francis METCALF, et al., 2019) is: 28.8%    Values used to calculate the score:      Age: 75 years      Sex: Male      Is Non- : No      Diabetic: No      Tobacco smoker: Yes      Systolic Blood Pressure: 136 mmHg      Is BP treated: Yes      HDL Cholesterol: 48 mg/dL      Total Cholesterol: 136 mg/dL             ASSESSMENT AND PLAN     Patient Active Problem List   Diagnosis    Hyperlipidemia    Hypertension    GERD (gastroesophageal reflux disease)    Generalized anxiety disorder    Tobacco dependence    Psychophysiological insomnia    Atherosclerosis of aorta    Erectile dysfunction    CAD (coronary artery disease)    Fatty liver    Hepatomegaly    Calcified granuloma of lung    Purpura    Lower urinary tract symptoms (LUTS)    History of colonic polyps    Overweight (BMI 25.0-29.9)    Other emphysema       1.  Chest pressure:  Resolved Coronary angiogram showed nonobstructive CAD.      2. Hypertension:  Blood pressure  controlled.  I have asked him to maintain a blood pressure diary, low-salt diet, and start regular exercise.     3. Mild to moderate aortic valve stenosis.  Follow-up in 6 m.  With echocardiogram for follow-up    4.  Smoking cessation has been highly encouraged.        Lab Results   Component Value Date     LDLCALC 74.8 02/18/2025       Thank you very much for involving me in the care of your patient.  Please do not hesitate to contact me if there are any questions.      Vladimir Avalos MD, FAC, [facility]  Interventional Cardiologist, [facility].       Visit today included increased complexity associated with the care of the episodic problem dyslipidemia, hypertension, aortic valve stenosis addressed and managing the longitudinal care of the patient due to the serious and/or complex managed problem(s) Problem List[1]  .    This note was dictated with the help of speech recognition software.  There might be un-intended errors and/or substitutions.                                     [1]   Patient Active Problem List  Diagnosis    Hyperlipidemia    Hypertension    GERD (gastroesophageal reflux disease)    Generalized anxiety disorder    Tobacco dependence    Psychophysiological insomnia    Atherosclerosis of aorta    Erectile dysfunction    CAD (coronary artery disease)    Fatty liver    Hepatomegaly    Calcified granuloma of lung    Purpura    Lower urinary tract symptoms (LUTS)    History of colonic polyps    Overweight (BMI 25.0-29.9)    Other emphysema

## 2025-03-11 ENCOUNTER — PATIENT MESSAGE (OUTPATIENT)
Dept: FAMILY MEDICINE | Facility: CLINIC | Age: 75
End: 2025-03-11
Payer: MEDICARE

## 2025-03-11 DIAGNOSIS — F17.210 CIGARETTE NICOTINE DEPENDENCE WITHOUT COMPLICATION: Primary | ICD-10-CM

## 2025-03-11 LAB
OHS QRS DURATION: 82 MS
OHS QTC CALCULATION: 418 MS

## 2025-03-11 RX ORDER — ATORVASTATIN CALCIUM 40 MG/1
40 TABLET, FILM COATED ORAL
Qty: 90 TABLET | Refills: 3 | Status: SHIPPED | OUTPATIENT
Start: 2025-03-11

## 2025-03-14 ENCOUNTER — RESULTS FOLLOW-UP (OUTPATIENT)
Dept: GASTROENTEROLOGY | Facility: HOSPITAL | Age: 75
End: 2025-03-14

## 2025-03-20 ENCOUNTER — OFFICE VISIT (OUTPATIENT)
Dept: ORTHOPEDICS | Facility: CLINIC | Age: 75
End: 2025-03-20
Payer: MEDICARE

## 2025-03-20 ENCOUNTER — APPOINTMENT (OUTPATIENT)
Dept: RADIOLOGY | Facility: HOSPITAL | Age: 75
End: 2025-03-20
Payer: MEDICARE

## 2025-03-20 DIAGNOSIS — M25.512 LEFT SHOULDER PAIN, UNSPECIFIED CHRONICITY: Primary | ICD-10-CM

## 2025-03-20 DIAGNOSIS — M25.512 ACUTE PAIN OF LEFT SHOULDER: Primary | ICD-10-CM

## 2025-03-20 DIAGNOSIS — M25.512 LEFT SHOULDER PAIN, UNSPECIFIED CHRONICITY: ICD-10-CM

## 2025-03-20 PROCEDURE — 99999 PR PBB SHADOW E&M-EST. PATIENT-LVL III: CPT | Mod: PBBFAC,,,

## 2025-03-20 PROCEDURE — 73030 X-RAY EXAM OF SHOULDER: CPT | Mod: 26,LT,, | Performed by: RADIOLOGY

## 2025-03-20 PROCEDURE — 73030 X-RAY EXAM OF SHOULDER: CPT | Mod: TC,FY,PN,LT

## 2025-03-20 RX ORDER — TRIAMCINOLONE ACETONIDE 40 MG/ML
40 INJECTION, SUSPENSION INTRA-ARTICULAR; INTRAMUSCULAR
Status: DISCONTINUED | OUTPATIENT
Start: 2025-03-20 | End: 2025-03-20 | Stop reason: HOSPADM

## 2025-03-20 RX ADMIN — TRIAMCINOLONE ACETONIDE 40 MG: 40 INJECTION, SUSPENSION INTRA-ARTICULAR; INTRAMUSCULAR at 11:03

## 2025-03-20 NOTE — PROCEDURES
Large Joint Aspiration/Injection: L subacromial bursa    Date/Time: 3/20/2025 11:30 AM    Performed by: Daysi Weston PA-C  Authorized by: Daysi Weston PA-C    Consent Done?:  Yes (Verbal)  Indications:  Pain  Prep: patient was prepped and draped in usual sterile fashion    Local anesthesia used?: No    Anesthesia:  Local infiltration  Local anesthetic:  Lidocaine 2% without epinephrine and topical anesthetic    Details:  Needle Size:  22 G  Ultrasonic Guidance for needle placement?: No    Approach:  Posterior  Location:  Shoulder  Site:  L subacromial bursa  Medications:  40 mg triamcinolone acetonide 40 mg/mL  Patient tolerance:  Patient tolerated the procedure well with no immediate complications

## 2025-03-20 NOTE — PROGRESS NOTES
EST PATIENT ORTHOPAEDIC, NEW PROBLEM VISIT: Shoulder     PRIMARY CARE PHYSICIAN: Vick Mansfield MD   REFERRING PROVIDER: Daysi Weston PA-C  [address] [place] [address]  JONATHAN Morales [id number]     ASSESSMENT & PLAN:    Impression:  Left rotator cuff tendinitis      Non operative care:    Yair Owens has physical exam evidence of above and wishes to pursue an non-operative care. The patient and I had a thorough discussion today. We discussed the working diagnosis as well as several other potential alternative diagnoses. Treatment options were discussed, both conservative and surgical. Conservative treatment options would include things such as activity modifications, a period of rest, oral vs topical OTC and prescription anti-inflammatory medications, physical therapy versus HEP, corticosteroid injections, and others. I am recommending the following:   Injection of the left subacromial space  performed, please see procedure note for more details.  Prior to the injection risks and benefits of corticosteroid injection were discussed with the patient including pain, infection, bleeding, skin color changes, swelling, steroid flare. We discussed that over time injections can result in chondral damage, acceleration of arthritis formation, damage to tendons and damage to joints.  The patient consented for the procedure.  Post-injection instructions were given to the patient in writing.  Offered formal PT versus HEP, he defers at this time  Return to clinic as needed if symptoms worsen or fail to improve.  Call with any questions in the interim     All questions were addressed and patient verbalized understanding of the plan.    RENEE Weston PA-C  [facility]       The patient has been ordered:  Subacromial steroid injection     CONSULTS:   None    ACTIVE PROBLEM LIST  Problem List[1]        SUBJECTIVE    CHIEF COMPLAINT: Shoulder Pain    HPI:   Yair Owens is a 75 y.o. left hand dominant male who presents to  clinic for intermittent left shoulder pain. The patient denies known GORDY.  The pain started a few months   ago and is becoming progressively worse.  Pain is located over (points to) lateral shoulder , localized to subdeltoid fossa. He reports that the pain is a 3 /10 sore, aching, nonradiating, intermittent, and pain with movement pain today. The pain is aggravated by repetitive activity, reaching overhead, reaching behind back, sleeping on left side.  Denies numbness, tingling, radiation. The pain is affecting ADLs and limiting desired level of activity. There is not a history of previous surgery to the shoulder.      He has not tried any oral medications for this, he does not like taking NSAIDs/tylenol. Reports distant history of steroid injection for this shoulder - does not recall when but this was many years ago.     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      REVIEW OF SYSTEMS:  PAIN ASSESSMENT:  See HPI.  MUSCULOSKELETAL: See HPI.      PAST MEDICAL HISTORY   has a past medical history of CAD (coronary artery disease) (02/22/2021), Calcified granuloma of lung (02/03/2023), Colon polyp, Erectile dysfunction (02/14/2020), Fatty liver, Generalized anxiety disorder (01/11/2017), GERD (gastroesophageal reflux disease), Gout, unspecified, History of colonic polyps (11/16/2023), Hyperlipidemia, Hypertension, Psychophysiological insomnia (07/24/2017), and Tobacco dependence (07/24/2017).     PAST SURGICAL HISTORY   has a past surgical history that includes Bone Resection, Rib; Hand surgery; Scrotal surgery; Appendectomy; Colonoscopy (N/A, 07/21/2017); Left heart catheterization (Left, 03/02/2021); Ultrasound guidance (03/02/2021); Colonoscopy (N/A, 11/22/2022); Vasectomy (40 years); and Colonoscopy (N/A, 2/28/2025).     FAMILY HISTORY  family history includes Asthma in his sister; Cancer in his father; Cancer (age of onset: 78) in his sister; Heart disease in his  father; Lupus in his daughter; No Known Problems in his mother and son; Stroke in his brother.     SOCIAL HISTORY   reports that he has been smoking cigarettes. He started smoking about 58 years ago. He has a 30.8 pack-year smoking history. He has been exposed to tobacco smoke. He has never used smokeless tobacco. He reports that he does not currently use alcohol. He reports that he does not use drugs.     ALLERGIES:   Review of patient's allergies indicates:   Allergen Reactions    Codeine Itching    Ibuprofen Itching    Remeron [mirtazapine] Anxiety     Did not help the patient sleep and created anxiety.        MEDICATIONS:   Medications Ordered Prior to Encounter[2]       PHYSICAL EXAM   vitals were not taken for this visit.     All other systems deferred.  GENERAL:  No acute distress  HABITUS: Normal  GAIT: Coordinated  SKIN:  No erythema, warmth, fluctuance.     SHOULDER EXAM:    Shoulder Range of Motion    Right     Left   (Active/Passive)       Forward Elevation     165/165             165/165   External rotation (arm at side)  45/45             45/45    Internal rotation behind the back  L5             L5     Range of motion is mildly painful     Acromioclavicular joint is not tender  Crossbody test: negative    Neer's positive  Hawkin's positive    Fahad's positive  Drop arm negative  Belly press negative      Cuff Strength     Right     Left   Supraspinatus        5/5    5/5  Infraspinatus     5/5    5/5  Subscapularis     5/5    5/5    Deltoid testing            5/5    5/5    Birmingham's test negative  Speeds negative  Yergasons negative      Elbow examination demonstrates no tenderness to palpation and has normal range of motion.     ltsi C5-T1  + epl, io, fds, fdp   2+ RP         DATA:  Diagnostic tests reviewed for today's visit:       3 views of the left shoulder:  positive for degenerative changes of the AC joint. The humeral head is well centered on the AP and axillary views. There is mild degenerative  change of the glenohumeral joint. No posterior subluxation of the humeral head. No acute changes or fracture.     I personally reviewed and interpreted the patient's imaging obtained today in clinic         [1]   Patient Active Problem List  Diagnosis    Hyperlipidemia    Hypertension    GERD (gastroesophageal reflux disease)    Generalized anxiety disorder    Tobacco dependence    Psychophysiological insomnia    Atherosclerosis of aorta    Erectile dysfunction    CAD (coronary artery disease)    Fatty liver    Hepatomegaly    Calcified granuloma of lung    Purpura    Lower urinary tract symptoms (LUTS)    History of colonic polyps    Overweight (BMI 25.0-29.9)    Other emphysema   [2]   Current Outpatient Medications on File Prior to Visit   Medication Sig Dispense Refill    amLODIPine (NORVASC) 10 MG tablet Take 1 tablet (10 mg total) by mouth once daily. 90 tablet 3    aspirin (ECOTRIN) 81 MG EC tablet Take 1 tablet (81 mg total) by mouth once daily.      atorvastatin (LIPITOR) 40 MG tablet TAKE 1 TABLET BY MOUTH EVERY DAY 90 tablet 3    busPIRone (BUSPAR) 10 MG tablet Take 1 tablet (10 mg total) by mouth 2 (two) times daily.      hydrOXYzine (ATARAX) 50 MG tablet Take 1-2 tablets ( mg total) by mouth every evening. 180 tablet 1    losartan (COZAAR) 50 MG tablet TAKE 1 TABLET BY MOUTH EVERY DAY 90 tablet 3    multivitamin capsule Take 1 capsule by mouth once daily.      omeprazole (PRILOSEC) 40 MG capsule TAKE 1 CAPSULE BY MOUTH TWICE  DAILY 180 capsule 3    tadalafiL (CIALIS) 5 MG tablet Take 1 tablet (5 mg total) by mouth once daily. 90 tablet 3    tamsulosin (FLOMAX) 0.4 mg Cap Take 1 capsule (0.4 mg total) by mouth once daily. 90 capsule 3    UNABLE TO FIND OTC Daily multivitamin  OTC Probiotic      [DISCONTINUED] propranoloL (INDERAL) 10 MG tablet TAKE 1 TABLET (10 MG TOTAL) BY MOUTH 3 (THREE) TIMES DAILY AS NEEDED (TREMORS OR ANXIETY). 270 tablet 1     No current facility-administered medications on  file prior to visit.

## 2025-03-24 DIAGNOSIS — Z00.00 ENCOUNTER FOR MEDICARE ANNUAL WELLNESS EXAM: ICD-10-CM

## 2025-03-27 ENCOUNTER — HOSPITAL ENCOUNTER (OUTPATIENT)
Dept: RADIOLOGY | Facility: HOSPITAL | Age: 75
Discharge: HOME | End: 2025-03-27
Attending: INTERNAL MEDICINE
Payer: MEDICARE

## 2025-03-27 DIAGNOSIS — F17.210 CIGARETTE NICOTINE DEPENDENCE WITHOUT COMPLICATION: ICD-10-CM

## 2025-03-27 PROCEDURE — 71271 CT THORAX LUNG CANCER SCR C-: CPT | Mod: TC

## 2025-03-27 PROCEDURE — 71271 CT THORAX LUNG CANCER SCR C-: CPT | Mod: 26,,, | Performed by: RADIOLOGY

## 2025-04-01 ENCOUNTER — TELEPHONE (OUTPATIENT)
Dept: SMOKING CESSATION | Facility: CLINIC | Age: 75
End: 2025-04-01
Payer: MEDICARE

## 2025-04-01 NOTE — TELEPHONE ENCOUNTER
Smoking Cessation counselor attempted to contact patient regarding canceled intake appointment.  Counselor left a message with rescheduling information.     Kiki Landeros RRT,MSW,LMSW, CPAHA   Certified Professional [employer]- Tobacco Treatment  [phone]

## 2025-04-10 ENCOUNTER — PATIENT MESSAGE (OUTPATIENT)
Dept: CARDIOLOGY | Facility: CLINIC | Age: 75
End: 2025-04-10
Payer: MEDICARE

## 2025-04-15 ENCOUNTER — RESULTS FOLLOW-UP (OUTPATIENT)
Dept: FAMILY MEDICINE | Facility: CLINIC | Age: 75
End: 2025-04-15

## 2025-04-15 DIAGNOSIS — J92.9 PLEURAL PLAQUE: Primary | ICD-10-CM

## 2025-04-21 NOTE — PROGRESS NOTES
HPI     Yair Owens is a 75 y.o. male with multiple medical diagnoses as listed in the medical history and problem list that presents for   Chief Complaint   Patient presents with    Flank Pain     Pt c/o left side discomfort x 1 month.       Back Pain  This is a recurrent problem. The current episode started 1 to 4 weeks ago. The problem occurs daily. The problem is unchanged. The pain is present in the lumbar spine. The quality of the pain is described as aching. The pain does not radiate. The pain is at a severity of 1/10. The pain is mild. The pain is The same all the time. The symptoms are aggravated by position. Stiffness is present All day. Pertinent negatives include no abdominal pain, bladder incontinence, bowel incontinence, chest pain, dysuria, fever, headaches, leg pain, numbness, paresis, paresthesias, pelvic pain, perianal numbness, tingling, weakness or weight loss. The treatment provided no relief.     Pt has hx of BPH w/urinary manifestations. Pain started about a month ago, was possibly cutting grass. Pain is intermittent, worsens when he's doing lateral twists leaning to the left, sometimes, though not all the time. He has full ROM to his back. He's been using heating pads and does not take medications often for the pain. He states that pain has actually been improving since initial onset.  Denies hx of kidney stones, hematuria, dysuria, dribbling, fevers, abdominal pain, changes in bowel movement, pain w/BM      Assessment & Plan     1. Left flank pain  - No CVA tenderness and no reported urinary symptoms, however, given hx of urinary obstruction w/BPH, will send out urine to reassess.   - Reviewed last US retroperitoneal 10/14/23 which showed no stones, masses, or hydronephrosis, however, notified patient we can recheck if any hematuria in UA.   - Advised to treat pain w/heating pads and Tylenol PRN. RTC if develops any fevers, chills, worsening flank pain      - Urinalysis, Reflex to Urine  Culture    2. Benign prostatic hyperplasia without lower urinary tract symptoms  - Urinary frequency at baseline w/BPH. Stable while on Flomax, continue with current regimen and f/u with urology for any new or worsening sxs    3. Primary hypertension  - Blood pressure today well controlled in clinic. stable, continue with Amlodipine  - Recommend low-sodium diet and compliance with blood pressure medication.  - Keep blood pressure goal <140/90 and f/u with clinic if persistently elevated      4. Tobacco dependence  -Counseled patient to quit tobacco usage, and advised on several options to quit and the benefits of quitting, which include but are not limited to: decreasing the risk of cancer, HTN, CVD, respiratory complications, among other diseases.  -5 minutes spent discussing cessation.   -pt is not interested in quitting.     --------------------------------------------    Health Maintenance         Date Due Completion Date    High Dose Statin 03/20/2026 3/20/2025    Aspirin/Antiplatelet Therapy 03/20/2026 3/20/2025    LDCT Lung Screen 03/27/2026 3/27/2025    Colorectal Cancer Screening 02/28/2028 2/28/2025    Lipid Panel 02/18/2030 2/18/2025    TETANUS VACCINE 07/13/2033 7/13/2023            Health maintenance reviewed    Follow Up:  Follow up if symptoms worsen or fail to improve.    Exam     Review of Systems:  (as noted above)  Review of Systems   Constitutional:  Negative for chills, fatigue, fever and weight loss.   Eyes:  Negative for visual disturbance.   Respiratory:  Negative for cough, chest tightness, shortness of breath and wheezing.    Cardiovascular:  Negative for chest pain, palpitations and leg swelling.   Gastrointestinal:  Negative for abdominal pain and bowel incontinence.   Genitourinary:  Positive for flank pain (resolved) and frequency (baseline). Negative for bladder incontinence, dysuria, hematuria, pelvic pain, testicular pain and urgency.   Musculoskeletal:  Positive for back pain  "(resolved). Negative for arthralgias, gait problem and myalgias.   Skin:  Negative for rash.   Neurological:  Negative for tingling, weakness, numbness, headaches and paresthesias.   Psychiatric/Behavioral:  The patient is not nervous/anxious.        Physical Exam  Constitutional:       General: He is not in acute distress.     Appearance: Normal appearance. He is not ill-appearing.   Cardiovascular:      Rate and Rhythm: Normal rate and regular rhythm.      Pulses: Normal pulses.      Heart sounds: Murmur heard.      No friction rub. No gallop.   Pulmonary:      Effort: Pulmonary effort is normal.      Breath sounds: Normal breath sounds.   Abdominal:      Tenderness: There is no right CVA tenderness or left CVA tenderness.   Musculoskeletal:      Cervical back: No tenderness or bony tenderness. Normal range of motion.      Thoracic back: No spasms or tenderness. Normal range of motion.      Lumbar back: No spasms, tenderness or bony tenderness. Normal range of motion.      Right lower leg: No edema.      Left lower leg: No edema.   Skin:     Capillary Refill: Capillary refill takes less than 2 seconds.   Neurological:      General: No focal deficit present.      Mental Status: He is alert and oriented to person, place, and time.   Psychiatric:         Mood and Affect: Mood normal.         Behavior: Behavior normal.       Vitals:    04/22/25 0821   BP: 114/60   Pulse: 60   Temp: 97.8 °F (36.6 °C)   TempSrc: Oral   SpO2: 98%   Weight: 82.5 kg (181 lb 14.1 oz)   Height: 5' 8" (1.727 m)      Body mass index is 27.65 kg/m².        History     Past Medical History:   Diagnosis Date    CAD (coronary artery disease) 02/22/2021    Cath 2021  There was non-obstructive coronary artery disease..   Left main:  Distal 10% stenosis  Lad:  Luminal irregularities.  Mid 10-20% stenosis   Circumflex:  Luminal irregularities   RCA:  Luminal irregularities    Calcified granuloma of lung 02/03/2023    LLL calcified granuloma seen on CT " Chest Lung Screening Low Dose 01/31/2018    Colon polyp     Erectile dysfunction 02/14/2020    Fatty liver     Generalized anxiety disorder 01/11/2017    GERD (gastroesophageal reflux disease)     Gout, unspecified     History of colonic polyps 11/16/2023    2 polyps 11/22 -5 yrs    Hyperlipidemia     Hypertension     Psychophysiological insomnia 07/24/2017    Tobacco dependence 07/24/2017       Family History   Problem Relation Name Age of Onset    No Known Problems Mother      Cancer Father GAURAV GLYNN     Heart disease Father GAURAV GLYNN     Asthma Sister REGINA ANNA     Cancer Sister REGINA ANNA 78        Rectal cancer    Stroke Brother Gaurav     Lupus Daughter x1     No Known Problems Son x3        Allergies and Medications: (updated and reviewed)  Review of patient's allergies indicates:   Allergen Reactions    Codeine Itching    Ibuprofen Itching    Remeron [mirtazapine] Anxiety     Did not help the patient sleep and created anxiety.     Current Medications[1]    Patient Care Team:  Vick Mansfield MD as PCP - General (Internal Medicine)  Tiffany Yu LPN as Licensed Practical Nurse  Vladimir Avalos MD as Consulting Physician (Cardiology)  Caryl Vila, PharmD as Hypertension Digital Medicine Clinician (Pharmacist)  Caryl Vila, PharmD as Hyperlipidemia Digital Medicine Clinician  Deysi Rader NP as Nurse Practitioner (Hepatology)  Marilu Chavez DMSc, PA-C as Physician Assistant (Psychiatry)  Chaitanya Plasencia MD as Hypertension Digital Medicine Responsible Provider (Internal Medicine)  Chaitanya Plasencia MD as Hyperlipidemia Digital Medicine Responsible Provider (Internal Medicine)  Gamaliel Quinn MD as 1st Call (Urology)  Annabel Siu MD as Consulting Physician (Orthopedic Surgery)  Marilu Chavez DMSc, PA-C as Physician Assistant (Psychiatry)  Medicare, Digital Medicine as Hypertension Digital Medicine Contract         - The patient is  given an After Visit Summary that lists all medications with directions, allergies, education, orders placed during this encounter and follow-up instructions.      - I have reviewed the patient's medical information including past medical and family history sections including the medications and allergies.      - We discussed the patient's current medications.   This note was generated with the assistance of ambient listening technology. Verbal consent was obtained by the patient and accompanying visitor(s) for the recording of patient appointment to facilitate this note. I attest to having reviewed and edited the generated note for accuracy, though some syntax or spelling errors may persist. Please contact the author of this note for any clarification.          Austin Cantu NP                          [1]   Current Outpatient Medications   Medication Sig Dispense Refill    amLODIPine (NORVASC) 10 MG tablet Take 1 tablet (10 mg total) by mouth once daily. 90 tablet 3    aspirin (ECOTRIN) 81 MG EC tablet Take 1 tablet (81 mg total) by mouth once daily.      atorvastatin (LIPITOR) 40 MG tablet TAKE 1 TABLET BY MOUTH EVERY DAY 90 tablet 3    busPIRone (BUSPAR) 10 MG tablet Take 1 tablet (10 mg total) by mouth 2 (two) times daily.      hydrOXYzine (ATARAX) 50 MG tablet Take 1-2 tablets ( mg total) by mouth every evening. 180 tablet 1    losartan (COZAAR) 50 MG tablet TAKE 1 TABLET BY MOUTH EVERY DAY 90 tablet 3    multivitamin capsule Take 1 capsule by mouth once daily.      omeprazole (PRILOSEC) 40 MG capsule TAKE 1 CAPSULE BY MOUTH TWICE  DAILY 180 capsule 3    tadalafiL (CIALIS) 5 MG tablet Take 1 tablet (5 mg total) by mouth once daily. 90 tablet 3    tamsulosin (FLOMAX) 0.4 mg Cap Take 1 capsule (0.4 mg total) by mouth once daily. 90 capsule 3    UNABLE TO FIND OTC Daily multivitamin  OTC Probiotic       No current facility-administered medications for this visit.

## 2025-04-22 ENCOUNTER — TELEPHONE (OUTPATIENT)
Dept: FAMILY MEDICINE | Facility: CLINIC | Age: 75
End: 2025-04-22

## 2025-04-22 ENCOUNTER — OFFICE VISIT (OUTPATIENT)
Dept: FAMILY MEDICINE | Facility: CLINIC | Age: 75
End: 2025-04-22
Payer: MEDICARE

## 2025-04-22 ENCOUNTER — RESULTS FOLLOW-UP (OUTPATIENT)
Dept: FAMILY MEDICINE | Facility: CLINIC | Age: 75
End: 2025-04-22

## 2025-04-22 VITALS
OXYGEN SATURATION: 98 % | HEART RATE: 60 BPM | BODY MASS INDEX: 27.57 KG/M2 | TEMPERATURE: 98 F | HEIGHT: 68 IN | WEIGHT: 181.88 LBS | SYSTOLIC BLOOD PRESSURE: 114 MMHG | DIASTOLIC BLOOD PRESSURE: 60 MMHG

## 2025-04-22 DIAGNOSIS — F17.200 TOBACCO DEPENDENCE: Chronic | ICD-10-CM

## 2025-04-22 DIAGNOSIS — I10 PRIMARY HYPERTENSION: Chronic | ICD-10-CM

## 2025-04-22 DIAGNOSIS — R10.9 LEFT FLANK PAIN: Primary | ICD-10-CM

## 2025-04-22 DIAGNOSIS — N40.0 BENIGN PROSTATIC HYPERPLASIA WITHOUT LOWER URINARY TRACT SYMPTOMS: ICD-10-CM

## 2025-04-22 LAB
BILIRUB UR QL STRIP.AUTO: NEGATIVE
CLARITY UR: CLEAR
COLOR UR AUTO: YELLOW
GLUCOSE UR QL STRIP: NEGATIVE
HGB UR QL STRIP: NEGATIVE
KETONES UR QL STRIP: NEGATIVE
LEUKOCYTE ESTERASE UR QL STRIP: NEGATIVE
NITRITE UR QL STRIP: NEGATIVE
PH UR STRIP: 7 [PH]
PROT UR QL STRIP: NEGATIVE
SP GR UR STRIP: 1.01
UROBILINOGEN UR STRIP-ACNC: NEGATIVE EU/DL

## 2025-04-22 PROCEDURE — 99999 PR PBB SHADOW E&M-EST. PATIENT-LVL IV: CPT | Mod: PBBFAC,,,

## 2025-04-22 PROCEDURE — 81003 URINALYSIS AUTO W/O SCOPE: CPT

## 2025-04-23 ENCOUNTER — PATIENT MESSAGE (OUTPATIENT)
Dept: PSYCHIATRY | Facility: CLINIC | Age: 75
End: 2025-04-23
Payer: MEDICARE

## 2025-04-23 ENCOUNTER — OFFICE VISIT (OUTPATIENT)
Dept: PULMONOLOGY | Facility: CLINIC | Age: 75
End: 2025-04-23
Payer: MEDICARE

## 2025-04-23 ENCOUNTER — OFFICE VISIT (OUTPATIENT)
Dept: PSYCHIATRY | Facility: CLINIC | Age: 75
End: 2025-04-23
Payer: COMMERCIAL

## 2025-04-23 DIAGNOSIS — F41.0 GENERALIZED ANXIETY DISORDER WITH PANIC ATTACKS: ICD-10-CM

## 2025-04-23 DIAGNOSIS — F41.1 GENERALIZED ANXIETY DISORDER WITH PANIC ATTACKS: ICD-10-CM

## 2025-04-23 DIAGNOSIS — F17.200 TOBACCO DEPENDENCE: Chronic | ICD-10-CM

## 2025-04-23 DIAGNOSIS — R59.0 LAD (LYMPHADENOPATHY), HILAR: Primary | ICD-10-CM

## 2025-04-23 DIAGNOSIS — F99 INSOMNIA DUE TO OTHER MENTAL DISORDER: ICD-10-CM

## 2025-04-23 DIAGNOSIS — Z86.59 HISTORY OF DEPRESSION: Primary | ICD-10-CM

## 2025-04-23 DIAGNOSIS — J43.8 OTHER EMPHYSEMA: ICD-10-CM

## 2025-04-23 DIAGNOSIS — J92.9 PLEURAL PLAQUE: ICD-10-CM

## 2025-04-23 DIAGNOSIS — F51.05 INSOMNIA DUE TO OTHER MENTAL DISORDER: ICD-10-CM

## 2025-04-23 NOTE — PROGRESS NOTES
Yair Owens  was seen as a new patient at the request  Vick Mansfield MD for the evaluation of  abnormal ct scan.    CHIEF COMPLAINT:  Pleural Plaque      HISTORY OF PRESENT ILLNESS: Yair Owens is a 75 y.o. male  has a past medical history of CAD (coronary artery disease) (02/22/2021), Calcified granuloma of lung (02/03/2023), Colon polyp, Erectile dysfunction (02/14/2020), Fatty liver, Generalized anxiety disorder (01/11/2017), GERD (gastroesophageal reflux disease), Gout, unspecified, History of colonic polyps (11/16/2023), Hyperlipidemia, Hypertension, Psychophysiological insomnia (07/24/2017), and Tobacco dependence (07/24/2017).  Patient underwent LDCT 3/27/25 and was noted to have pleural plaque on left lung.  Patient was referred to pulmonary for further inputs.      Doing well in regard in wob.  No coughing.  No evans with adl.  Walking daily 25-30 miles per week.  No weight loss.  No h/o pneumonia.  No h/o pleural effusion.  No h/o asbestos exposure.      Additional Pulmonary History:   Occupational/Environmental Exposures:  retire  for [employer].  No asbestos exposure  Exposure to Animals/Pets:  no birds.  Dog at home   Foreign Travel History:  none  History of exposures to TB:  denied   Family History of Lung Cancer:  denied   Tobacco:  1/2 ppd since age 17; used to smoke 1 ppd  Childhood history of Lung Disease:  denied  Chest surgery or trauma:  denied      PAST MEDICAL HISTORY:    Active Ambulatory Problems     Diagnosis Date Noted    Hyperlipidemia 10/29/2012    Hypertension 10/29/2012    GERD (gastroesophageal reflux disease) 10/29/2012    Generalized anxiety disorder 01/11/2017    Tobacco dependence 07/24/2017    Psychophysiological insomnia 07/24/2017    Atherosclerosis of aorta 01/23/2018    Erectile dysfunction 02/14/2020    CAD (coronary artery disease) 02/22/2021    Fatty liver 11/03/2022    Hepatomegaly 12/14/2022    Calcified granuloma of lung 02/03/2023    Purpura 02/03/2023     Lower urinary tract symptoms (LUTS) 08/30/2023    History of colonic polyps 11/16/2023    Overweight (BMI 25.0-29.9) 01/09/2024    Other emphysema 04/14/2024    Pleural plaque 04/24/2025    LAD (lymphadenopathy), hilar 04/24/2025     Resolved Ambulatory Problems     Diagnosis Date Noted    Class 1 obesity with body mass index (BMI) of 30.0 to 30.9 in adult 12/14/2022     Past Medical History:   Diagnosis Date    Colon polyp     Gout, unspecified                 PAST SURGICAL HISTORY:    Past Surgical History:   Procedure Laterality Date    APPENDECTOMY      BONE RESECTION, RIB      for thoracic outlet syndrome    COLONOSCOPY N/A 07/21/2017    Procedure: COLONOSCOPY;  Surgeon: Deepak Servin MD;  Location: [facility];  Service: Endoscopy;  Laterality: N/A;    COLONOSCOPY N/A 11/22/2022    Procedure: COLONOSCOPY;  Surgeon: Sam Obrien MD;  Location: [facility];  Service: Endoscopy;  Laterality: N/A;  vacc-sutab-inst portal-tb    COLONOSCOPY N/A 2/28/2025    Procedure: COLONOSCOPY;  Surgeon: Juanjose Martinez MD;  Location: [facility];  Service: Gastroenterology;  Laterality: N/A;  2/24-marcus-suprep-portal-tb  2/25 - R/S inst portal - LW    HAND SURGERY      LEFT HEART CATHETERIZATION Left 03/02/2021    Procedure: Left heart cath, radial, 730 am;  Surgeon: Vladimir Avalos MD;  Location: [facility]ATH [facility];  Service: Cardiology;  Laterality: Left;  RN PREOP 2/25/2021--COVID NEGATIVE ON 3/1  H/P INCOMPLETE    SCROTAL SURGERY      mass    ULTRASOUND GUIDANCE  03/02/2021    Procedure: Ultrasound Guidance;  Surgeon: Vladimir Avalos MD;  Location: [facility]ATH [facility];  Service: Cardiology;;    VASECTOMY  40 years         FAMILY HISTORY:                Family History   Problem Relation Name Age of Onset    No Known Problems Mother      Cancer Father GAURAV GLYNN     Heart disease Father GAURAV GLYNN     Asthma Sister REGINA ANNA     Cancer Sister REGINA ANNA 78        Rectal cancer    Stroke Brother Gaurav     Lupus Daughter x1     No  Known Problems Son x3        SOCIAL HISTORY:          Tobacco: Tobacco Use History[1]  alcohol use:    Social History     Substance and Sexual Activity   Alcohol Use Not Currently                   ALLERGIES:    Review of patient's allergies indicates:   Allergen Reactions    Codeine Itching    Ibuprofen Itching    Remeron [mirtazapine] Anxiety     Did not help the patient sleep and created anxiety.       CURRENT MEDICATIONS:  Current Medications[2]               REVIEW OF SYSTEMS:     Pulmonary related symptoms as per HPI.  Gen:  no weight loss, no fever, no night sweat  HEENT:  no visual changes, no sore throat, no hearing loss  CV:  No chest pain, no orthopnea, no PND  GI:  no melena, no hematochezia, no diarhea, no constipation.  :  no dysuria, no hematuria, no hesistancy, no dribbling  Neuro:  no syncope, no vertigo, no tinitus  Psych:  No homocide or suicide ideation; no depression.  Endocrine:  No heat or cold intolerance.  Sleep:  No snoring; no witnessed apnea.  Otherwise, a balance of systems reviewed is negative.          PHYSICAL EXAM:  There were no vitals filed for this visit.  There is no height or weight on file to calculate BMI.       GENERAL: Normal development, well groomed.  No apparent distress.    HEENT:   extra oculomotor is intact  NECK: N/A.  SKIN: On face and neck: No abrasions, no rashes, no lesions.    RESPIRATORY:   Normal chest expansion and non-labored breathing at rest.  CARDIOVASCULAR: n/a    EXTREMITIES: n/a  NEURO/PSYCH: Oriented to time, place and person. Normal attention span and concentration. Affect is full. Mood is normal.               LABS  Pulmonary Functions Testing Results(personally reviewed):  none  ABG (personally reviewed):  none  CXR (personally reviewed):  8/15/24 no consolidation or effusion  CT CHEST(personally reviewed):  3/27/25 stable calcified pleural plaque anterior left lung stable tracing back to 2018 Ct images.  +calcification in left hilar region and  spleen.  Mild paraseptal emphysema.      ASSESSMENT/PLAN  Problem List Items Addressed This Visit       Tobacco dependence (Chronic)    Overview   no ready to quit smoking.  Medicare guidelines recommend ldct until age of 78         LAD (lymphadenopathy), hilar - Primary    Overview   left hilar calcified lad along with calcified nodules in spleen.  Stable from 2018 to 2015.  Most likely residual granulomas prior infection such as afb or fungal.  Clinically asymptomatic with excellent exercising capacity.  Given radiographic and clinical stability, further work up is not recommended.          Other emphysema    Overview   Emphysematous changes noted on ct scan.  Deferred pft citing good exertional capacity.  Encourage smoking cessation.         Pleural plaque    Overview   calcified pleural plaque left anterior lung.  Present and unchanged from [id number].    Unclear of etiology.    Size and stability c/w benign etiology.                      Patient will No follow-ups on file.     CC: Send copy of this note to Vick Mansfield MD          [1]   Social History  Tobacco Use   Smoking Status Some Days    Current packs/day: 0.50    Average packs/day: 0.5 packs/day for 61.6 years (30.8 ttl pk-yrs)    Types: Cigarettes    Start date: 1967    Passive exposure: Current   Smokeless Tobacco Never   Tobacco Comments    0.5 ppd or less    [2]   Current Outpatient Medications   Medication Sig Dispense Refill    amLODIPine (NORVASC) 10 MG tablet Take 1 tablet (10 mg total) by mouth once daily. 90 tablet 3    aspirin (ECOTRIN) 81 MG EC tablet Take 1 tablet (81 mg total) by mouth once daily.      atorvastatin (LIPITOR) 40 MG tablet TAKE 1 TABLET BY MOUTH EVERY DAY 90 tablet 3    busPIRone (BUSPAR) 10 MG tablet Take 1 tablet (10 mg total) by mouth 2 (two) times daily.      hydrOXYzine (ATARAX) 50 MG tablet Take 1-2 tablets ( mg total) by mouth every evening. 180 tablet 1    losartan (COZAAR) 50 MG tablet TAKE 1 TABLET BY MOUTH  EVERY DAY 90 tablet 3    multivitamin capsule Take 1 capsule by mouth once daily.      omeprazole (PRILOSEC) 40 MG capsule TAKE 1 CAPSULE BY MOUTH TWICE  DAILY 180 capsule 3    suvorexant (BELSOMRA) 10 mg Tab Take 10 mg by mouth every evening. 90 tablet 1    tadalafiL (CIALIS) 5 MG tablet Take 1 tablet (5 mg total) by mouth once daily. 90 tablet 3    tamsulosin (FLOMAX) 0.4 mg Cap Take 1 capsule (0.4 mg total) by mouth once daily. 90 capsule 3    UNABLE TO FIND OTC Daily multivitamin  OTC Probiotic       No current facility-administered medications for this visit.

## 2025-04-23 NOTE — PROGRESS NOTES
Outpatient Psychiatry Follow up Visit (PA-[facility]IN[facility])    4/23/2025    Yair Owens, a 75 y.o. male, presenting for initial evaluation visit. Met with patient.    Reason for Encounter: Referral from ED . Patient complains of No chief complaint on file.    The patient location is: At home at address on record in [place]  The chief complaint leading to consultation is:  Anxiety    Visit type: audiovisual    Face to Face time with patient:  9 min  18 minutes of total time spent on the encounter, which includes face to face time and non-face to face time preparing to see the patient (eg, review of tests), Obtaining and/or reviewing separately obtained history, Documenting clinical information in the electronic or other health record, Independently interpreting results (not separately reported) and communicating results to the patient/family/caregiver, or Care coordination (not separately reported).         Each patient to whom he or she provides medical services by telemedicine is:  (1) informed of the relationship between the physician and patient and the respective role of any other health care provider with respect to management of the patient; and (2) notified that he or she may decline to receive medical services by telemedicine and may withdraw from such care at any time.    Notes:       History of Present Illness:  Pt presents for follow up of anxiety and related sleep difficulties.  He is currently taking hydroxyzine 100 mg qHS and buspirone 10 mg TID.  States his anxiety is well controlled but he feels the hydroxyzine is not working as well for sleep as it used to.  Would like to consider something else.  Denies any worsening of anxiety or any other problems.  Continues to smoke, states he wants to just live his life how he wants to until he dies.  Exercising regularly and keeping his mind active.    Review Of Systems:     As above.      Current Evaluation:     Nutritional Screening: Considering the patient's height  and weight, medications, medical history and preferences, should a referral be made to the dietitian? no    Constitutional  Vitals:  Most recent vital signs, dated less than 90 days prior to this appointment, were reviewed.    There were no vitals filed for this visit.     General:  unremarkable, age appropriate     Musculoskeletal  Muscle Strength/Tone:  not examined   Gait & Station:  Not observed     Psychiatric  Speech:  no latency; no press   Mood & Affect:  steady, happy  congruent and appropriate, full, bright   Thought Process:  normal and logical   Associations:  intact   Thought Content:  normal, no suicidality, no homicidality, delusions, or paranoia   Insight:  has awareness of illness   Judgement: behavior is adequate to circumstances   Orientation:  grossly intact   Memory: intact for content of interview   Language: grossly intact   Attention Span & Concentration:  able to focus   Fund of Knowledge:  intact and appropriate to age and level of education       Relevant Elements of Neurological Exam:  not observed    Functioning in Relationships:  Spouse/partner: Good  Peers: Good  Employers: NA    Laboratory Data  Office Visit on 04/22/2025   Component Date Value Ref Range Status    Color, UA 04/22/2025 Yellow  Straw, Radha, Yellow, Light-Orange Final    Appearance, UA 04/22/2025 Clear  Clear Final    pH, UA 04/22/2025 7.0  5.0 - 8.0 Final    Spec Grav UA 04/22/2025 1.010  1.005 - 1.030 Final    Protein, UA 04/22/2025 Negative  Negative Final    Glucose, UA 04/22/2025 Negative  Negative Final    Ketones, UA 04/22/2025 Negative  Negative Final    Bilirubin, UA 04/22/2025 Negative  Negative Final    Blood, UA 04/22/2025 Negative  Negative Final    Nitrites, UA 04/22/2025 Negative  Negative Final    Urobilinogen, UA 04/22/2025 Negative  <2.0 EU/dL Final    Leukocyte Esterase, UA 04/22/2025 Negative  Negative Final         Medications  Outpatient Encounter Medications as of 4/23/2025   Medication Sig  Dispense Refill    amLODIPine (NORVASC) 10 MG tablet Take 1 tablet (10 mg total) by mouth once daily. 90 tablet 3    aspirin (ECOTRIN) 81 MG EC tablet Take 1 tablet (81 mg total) by mouth once daily.      atorvastatin (LIPITOR) 40 MG tablet TAKE 1 TABLET BY MOUTH EVERY DAY 90 tablet 3    busPIRone (BUSPAR) 10 MG tablet Take 1 tablet (10 mg total) by mouth 2 (two) times daily.      hydrOXYzine (ATARAX) 50 MG tablet Take 1-2 tablets ( mg total) by mouth every evening. 180 tablet 1    losartan (COZAAR) 50 MG tablet TAKE 1 TABLET BY MOUTH EVERY DAY 90 tablet 3    multivitamin capsule Take 1 capsule by mouth once daily.      omeprazole (PRILOSEC) 40 MG capsule TAKE 1 CAPSULE BY MOUTH TWICE  DAILY 180 capsule 3    suvorexant (BELSOMRA) 10 mg Tab Take 10 mg by mouth every evening. 90 tablet 1    tadalafiL (CIALIS) 5 MG tablet Take 1 tablet (5 mg total) by mouth once daily. 90 tablet 3    tamsulosin (FLOMAX) 0.4 mg Cap Take 1 capsule (0.4 mg total) by mouth once daily. 90 capsule 3    UNABLE TO FIND OTC Daily multivitamin  OTC Probiotic      [DISCONTINUED] propranoloL (INDERAL) 10 MG tablet TAKE 1 TABLET (10 MG TOTAL) BY MOUTH 3 (THREE) TIMES DAILY AS NEEDED (TREMORS OR ANXIETY). 270 tablet 1     No facility-administered encounter medications on file as of 4/23/2025.           Assessment - Diagnosis - Goals:     Impression: NINA and panic attacks with a hx of depression, hydroxyzine losing efficacy for sleep.  Will try suvorexant.      ICD-10-CM ICD-9-CM   1. History of depression  Z86.59 V11.8   2. Generalized anxiety disorder with panic attacks  F41.1 300.02    F41.0 300.01   3. Insomnia due to other mental disorder  F51.05 300.9    F99 327.02       Start suvorexant 10 mg nightly, can increase to 20 mg nightly as desired in 2 weeks.  Risks/bebefits/side effects discussed.  Continue buspirone 10 mg TID.  Risks/bebefits/side effects discussed.  Stop hydroxyzine 100 mg qHS.  F/u 3 months    Strengths and Liabilities:  Strength: Patient is intelligent., Strength: Patient has positive support network.      Treatment Plan/Recommendations:   Medication Management: Continue current medications.      Return to Clinic: 3 months    Visit today included increased complexity associated with the care of the episodic problem anxiety addressed and managing the longitudinal care of the patient due to the serious and/or complex managed problem(s) NINA, panic attacks, hx ofMDD.

## 2025-04-24 PROBLEM — J92.9 PLEURAL PLAQUE: Status: ACTIVE | Noted: 2025-04-24

## 2025-04-24 PROBLEM — R59.0 LAD (LYMPHADENOPATHY), HILAR: Status: ACTIVE | Noted: 2025-04-24

## 2025-04-25 RX ORDER — DOXEPIN 3 MG/1
3-6 TABLET, FILM COATED ORAL NIGHTLY
Qty: 180 TABLET | Refills: 1 | Status: SHIPPED | OUTPATIENT
Start: 2025-04-25 | End: 2025-07-24

## 2025-05-19 ENCOUNTER — OFFICE VISIT (OUTPATIENT)
Dept: ORTHOPEDICS | Facility: CLINIC | Age: 75
End: 2025-05-19
Payer: MEDICARE

## 2025-05-19 DIAGNOSIS — M25.512 LEFT SHOULDER PAIN, UNSPECIFIED CHRONICITY: Primary | ICD-10-CM

## 2025-05-19 PROCEDURE — 3288F FALL RISK ASSESSMENT DOCD: CPT | Mod: CPTII,S$GLB,, | Performed by: ORTHOPAEDIC SURGERY

## 2025-05-19 PROCEDURE — 4010F ACE/ARB THERAPY RXD/TAKEN: CPT | Mod: CPTII,S$GLB,, | Performed by: ORTHOPAEDIC SURGERY

## 2025-05-19 PROCEDURE — 20610 DRAIN/INJ JOINT/BURSA W/O US: CPT | Mod: LT,S$GLB,, | Performed by: ORTHOPAEDIC SURGERY

## 2025-05-19 PROCEDURE — 1101F PT FALLS ASSESS-DOCD LE1/YR: CPT | Mod: CPTII,S$GLB,, | Performed by: ORTHOPAEDIC SURGERY

## 2025-05-19 PROCEDURE — 1160F RVW MEDS BY RX/DR IN RCRD: CPT | Mod: CPTII,S$GLB,, | Performed by: ORTHOPAEDIC SURGERY

## 2025-05-19 PROCEDURE — 1159F MED LIST DOCD IN RCRD: CPT | Mod: CPTII,S$GLB,, | Performed by: ORTHOPAEDIC SURGERY

## 2025-05-19 PROCEDURE — 1125F AMNT PAIN NOTED PAIN PRSNT: CPT | Mod: CPTII,S$GLB,, | Performed by: ORTHOPAEDIC SURGERY

## 2025-05-19 PROCEDURE — 99213 OFFICE O/P EST LOW 20 MIN: CPT | Mod: 25,S$GLB,, | Performed by: ORTHOPAEDIC SURGERY

## 2025-05-19 PROCEDURE — 99999 PR PBB SHADOW E&M-EST. PATIENT-LVL III: CPT | Mod: PBBFAC,,, | Performed by: ORTHOPAEDIC SURGERY

## 2025-05-19 PROCEDURE — 3044F HG A1C LEVEL LT 7.0%: CPT | Mod: CPTII,S$GLB,, | Performed by: ORTHOPAEDIC SURGERY

## 2025-05-19 RX ADMIN — TRIAMCINOLONE ACETONIDE 40 MG: 40 INJECTION, SUSPENSION INTRA-ARTICULAR; INTRAMUSCULAR at 11:05

## 2025-05-19 NOTE — PROGRESS NOTES
Assessment: 75 y.o. male with left cuff arthropathy     I explained my diagnostic impression and the reasoning behind it in detail, using layman's terms.      Plan:    Assessment & Plan    Injection of the left glenohumeral  joint performed, please see procedure note for more details.  Prior to the injection risks and benefits of corticosteroid injection were discussed with the patient including pain, infection, bleeding, skin color changes, swelling, steroid flare. We discussed that over time injections can result in chondral damage, acceleration of arthritis formation, damage to tendons and damage to joints.  The patient consented for the procedure.  Post-injection instructions were given to the patient in writing.  - Activity modification  - Ger and berad videos reviewed  - RTC 12 weeks     This note was generated with the assistance of ambient listening technology. Verbal consent was obtained by the patient and accompanying visitor(s) for the recording of patient appointment to facilitate this note. I attest to having reviewed and edited the generated note for accuracy, though some syntax or spelling errors may persist. Please contact the author of this note for any clarification.      All questions were answered in detail. The patient is in full agreement with the treatment plan and will proceed accordingly.    Chief Complaint   Patient presents with    Left Shoulder - Pain       Initial visit (5/19/25): Yair Owens is a 75 y.o. male who presents today complaining of left shoulder pain     Yair presents with ongoing left shoulder pain stemming from a partial rotator cuff tear approximately 30 years ago. Pain is localized to a specific area of the shoulder and sometimes radiates down the whole arm. He experiences pain with abduction and forward elevation - this limits motion. Pain wakes him up at night and is disruptive to ADLs.    He received a subacromial injection in March, which provided relief for about  a month. He uses 10-pound dumbbells at home for exercises but finds certain motions painful.      He also reports wrist pain that occurs when sleeping on it, though it is not constant.        Previously attempted treatments:   Activity modification:  not helpful   Ice:  not attempted   Heat:  not attempted   NSAIDs: not attempted   Tylenol: not helpful   PT:  not attempted   Injections: RICKY helpful  for limited time     This is the extent of the patient's complaints at this time.         Review of patient's allergies indicates:   Allergen Reactions    Codeine Itching    Ibuprofen Itching    Remeron [mirtazapine] Anxiety     Did not help the patient sleep and created anxiety.     Physical Exam:   Vitals:    05/19/25 1058   PainSc:   3   PainLoc: Shoulder     General: Patient is alert, awake and oriented to time, place and person. Mood and affect are appropriate.  Patient does not appear to be in any distress, denies any constitutional symptoms and appears stated age.   HEENT: Pupils are equal and round, sclera are not injected. External examination of ears and nose reveals no abnormalities. Cranial nerves II-X are grossly intact  Neck: examination demonstrates painless  active range of motion. Spurling's sign is negative  Skin: no rashes, abrasions or open wounds on the affected extremity   Resp: No respiratory distress or audible wheezing   CV: 2+  pulses, all extremities warm and well perfused   Left Shoulder    Shoulder Range of Motion    Right     Left   (Active/Passive)       Forward Elevation     165/165            110/130  External rotation (arm at side)  50/50             50/50   Internal rotation behind the back  L5             L5     Range of motion is painful   Acromioclavicular joint is not tender  Crossbody test: negative    Hawkin's positive    Fahad's positive  Drop arm negative  Belly press negative       Cuff Strength     Right     Left   Supraspinatus         5/5    4/5  Infraspinatus     5/5    5/5  Subscapularis     5/5    5/5    Deltoid testing            5/5    5/5    Speeds negative  Yergasons negative       Elbow examination demonstrates no tenderness to palpation and has normal range of motion.     ltsi C5-T1  + epl, io, fds, fdp   2+ RP      Imaging: 3 views of the left shoulder:  decreased glenohumeral joint space, humeral head osteophytes. The acromiohumeral interval is normal. The humeral head is centered on the AP view and centered on the axillary view     I personally reviewed and interpreted the patient's imaging obtained prior to visit        This note was created by combination of typed  and M-Modal dictation. Transcription and phonetic errors may be present.  If there are any questions, please contact me.    Current Medications[1]    Past Medical History:   Diagnosis Date    CAD (coronary artery disease) 02/22/2021    Cath 2021  There was non-obstructive coronary artery disease..   Left main:  Distal 10% stenosis  Lad:  Luminal irregularities.  Mid 10-20% stenosis   Circumflex:  Luminal irregularities   RCA:  Luminal irregularities    Calcified granuloma of lung 02/03/2023    LLL calcified granuloma seen on CT Chest Lung Screening Low Dose 01/31/2018    Colon polyp     Erectile dysfunction 02/14/2020    Fatty liver     Generalized anxiety disorder 01/11/2017    GERD (gastroesophageal reflux disease)     Gout, unspecified     History of colonic polyps 11/16/2023    2 polyps 11/22 -5 yrs    Hyperlipidemia     Hypertension     Psychophysiological insomnia 07/24/2017    Tobacco dependence 07/24/2017       Active Problem List with Overview Notes    Diagnosis Date Noted    Pleural plaque 04/24/2025     calcified pleural plaque left anterior lung.  Present and unchanged from [id number].    Unclear of etiology.    Size and stability c/w benign etiology.        LAD (lymphadenopathy), hilar 04/24/2025     left hilar calcified lad along with calcified  nodules in spleen.  Stable from 2018 to 2015.  Most likely residual granulomas prior infection such as afb or fungal.  Clinically asymptomatic with excellent exercising capacity.  Given radiographic and clinical stability, further work up is not recommended.       Other emphysema 04/14/2024     Emphysematous changes noted on ct scan.  Deferred pft citing good exertional capacity.  Encourage smoking cessation.      Overweight (BMI 25.0-29.9) 01/09/2024    History of colonic polyps 11/16/2023     2 polyps 11/22 -5 yrs      Lower urinary tract symptoms (LUTS) 08/30/2023    Calcified granuloma of lung 02/03/2023     LLL calcified granuloma seen on CT Chest Lung Screening Low Dose 01/31/2018      Purpura 02/03/2023    Hepatomegaly 12/14/2022    Fatty liver 11/03/2022    CAD (coronary artery disease) 02/22/2021     Cath 2021  There was non-obstructive coronary artery disease..   Left main:  Distal 10% stenosis  Lad:  Luminal irregularities.  Mid 10-20% stenosis   Circumflex:  Luminal irregularities   RCA:  Luminal irregularities      Erectile dysfunction 02/14/2020    Atherosclerosis of aorta 01/23/2018     US 08/2017.  Stable, asymptomatic chronic condition.  Will continue to maximize risk factor reduction and adjust medication as needed.       Tobacco dependence 07/24/2017     no ready to quit smoking.  Medicare guidelines recommend ldct until age of 78      Psychophysiological insomnia 07/24/2017    Generalized anxiety disorder 01/11/2017    Hyperlipidemia 10/29/2012    Hypertension 10/29/2012     Followed by digital team      GERD (gastroesophageal reflux disease) 10/29/2012       Past Surgical History:   Procedure Laterality Date    APPENDECTOMY      BONE RESECTION, RIB      for thoracic outlet syndrome    COLONOSCOPY N/A 07/21/2017    Procedure: COLONOSCOPY;  Surgeon: Deepak Servin MD;  Location: [facility];  Service: Endoscopy;  Laterality: N/A;    COLONOSCOPY N/A 11/22/2022    Procedure: COLONOSCOPY;  Surgeon: Sam  EVELIN Obrien MD;  Location: [facility] ENDO;  Service: Endoscopy;  Laterality: N/A;  vacc-sutab-inst portal-tb    COLONOSCOPY N/A 2/28/2025    Procedure: COLONOSCOPY;  Surgeon: Juanjose Martinez MD;  Location: [facility] ENDO;  Service: Gastroenterology;  Laterality: N/A;  2/24-marcus-suprep-portal-tb  2/25 - R/S inst portal - LW    HAND SURGERY      LEFT HEART CATHETERIZATION Left 03/02/2021    Procedure: Left heart cath, radial, 730 am;  Surgeon: Vladimir Avalos MD;  Location: [facility] CATH LAB;  Service: Cardiology;  Laterality: Left;  RN PREOP 2/25/2021--COVID NEGATIVE ON 3/1  H/P INCOMPLETE    SCROTAL SURGERY      mass    ULTRASOUND GUIDANCE  03/02/2021    Procedure: Ultrasound Guidance;  Surgeon: Vladimir Avalos MD;  Location: [facility] CATH LAB;  Service: Cardiology;;    VASECTOMY  40 years       Social History[2]              [1]   Current Outpatient Medications:     amLODIPine (NORVASC) 10 MG tablet, Take 1 tablet (10 mg total) by mouth once daily., Disp: 90 tablet, Rfl: 3    aspirin (ECOTRIN) 81 MG EC tablet, Take 1 tablet (81 mg total) by mouth once daily., Disp: , Rfl:     atorvastatin (LIPITOR) 40 MG tablet, TAKE 1 TABLET BY MOUTH EVERY DAY, Disp: 90 tablet, Rfl: 3    busPIRone (BUSPAR) 10 MG tablet, Take 1 tablet (10 mg total) by mouth 2 (two) times daily., Disp: , Rfl:     doxepin (SINEQUAN) 10 MG capsule, Take 1 capsule (10 mg total) by mouth every evening., Disp: 30 capsule, Rfl: 0    hydrOXYzine (ATARAX) 50 MG tablet, Take 1-2 tablets ( mg total) by mouth every evening., Disp: 180 tablet, Rfl: 1    losartan (COZAAR) 50 MG tablet, TAKE 1 TABLET BY MOUTH EVERY DAY, Disp: 90 tablet, Rfl: 3    multivitamin capsule, Take 1 capsule by mouth once daily., Disp: , Rfl:     omeprazole (PRILOSEC) 40 MG capsule, TAKE 1 CAPSULE BY MOUTH TWICE  DAILY, Disp: 180 capsule, Rfl: 3    tadalafiL (CIALIS) 5 MG tablet, Take 1 tablet (5 mg total) by mouth once daily., Disp: 90 tablet, Rfl: 3    tamsulosin (FLOMAX) 0.4 mg Cap, Take 1 capsule (0.4  mg total) by mouth once daily., Disp: 90 capsule, Rfl: 3    UNABLE TO FIND, OTC Daily multivitamin OTC Probiotic, Disp: , Rfl:   [2]   Social History  Socioeconomic History    Marital status:    Occupational History     Employer: [employer]   Tobacco Use    Smoking status: Some Days     Current packs/day: 0.50     Average packs/day: 0.5 packs/day for 61.7 years (30.8 ttl pk-yrs)     Types: Cigarettes     Start date: 1967     Passive exposure: Current    Smokeless tobacco: Never    Tobacco comments:     0.5 ppd or less    Substance and Sexual Activity    Alcohol use: Not Currently    Drug use: No    Sexual activity: Yes     Partners: Female     Social Drivers of Health     Financial Resource Strain: Low Risk  (11/15/2024)    Overall Financial Resource Strain (CARDIA)     Difficulty of Paying Living Expenses: Not hard at all   Food Insecurity: No Food Insecurity (11/15/2024)    Hunger Vital Sign     Worried About Running Out of Food in the Last Year: Never true     Ran Out of Food in the Last Year: Never true   Transportation Needs: No Transportation Needs (4/11/2024)    PRAPARE - Transportation     Lack of Transportation (Medical): No     Lack of Transportation (Non-Medical): No   Physical Activity: Sufficiently Active (11/15/2024)    Exercise Vital Sign     Days of Exercise per Week: 7 days     Minutes of Exercise per Session: 60 min   Stress: No Stress Concern Present (11/15/2024)    [employer] - Occupational Stress Questionnaire     Feeling of Stress : Only a little   Housing Stability: Low Risk  (4/11/2024)    Housing Stability Vital Sign     Unable to Pay for Housing in the Last Year: No     Number of Places Lived in the Last Year: 1     Unstable Housing in the Last Year: No

## 2025-05-20 RX ORDER — TRIAMCINOLONE ACETONIDE 40 MG/ML
40 INJECTION, SUSPENSION INTRA-ARTICULAR; INTRAMUSCULAR
Status: DISCONTINUED | OUTPATIENT
Start: 2025-05-19 | End: 2025-05-20 | Stop reason: HOSPADM

## 2025-05-20 NOTE — PROCEDURES
Large Joint Aspiration/Injection: L glenohumeral    Date/Time: 5/19/2025 11:00 AM    Performed by: Annabel Siu MD  Authorized by: Annabel Siu MD    Consent Done?:  Yes (Verbal)  Indications:  Arthritis  Timeout: prior to procedure the correct patient, procedure, and site was verified    Prep: patient was prepped and draped in usual sterile fashion      Local anesthesia used?: Yes    Local anesthetic:  Topical anesthetic    Details:  Needle Size:  22 G  Approach:  Posterior  Location:  Shoulder  Site:  L glenohumeral  Medications:  40 mg triamcinolone acetonide 40 mg/mL  Patient tolerance:  Patient tolerated the procedure well with no immediate complications

## 2025-05-21 DIAGNOSIS — N52.9 ERECTILE DYSFUNCTION, UNSPECIFIED ERECTILE DYSFUNCTION TYPE: Chronic | ICD-10-CM

## 2025-05-21 DIAGNOSIS — R39.9 LOWER URINARY TRACT SYMPTOMS (LUTS): Chronic | ICD-10-CM

## 2025-05-21 RX ORDER — TADALAFIL 5 MG/1
5 TABLET ORAL
Qty: 30 TABLET | Refills: 8 | Status: SHIPPED | OUTPATIENT
Start: 2025-05-21

## 2025-05-21 NOTE — TELEPHONE ENCOUNTER
Refill Routing Note   Medication(s) are not appropriate for processing by [facility] for the following reason(s):        No active prescription written by provider    [facility] action(s):  Defer        Medication Therapy Plan: Last ordered: 1 year ago (3/4/2024) by Chaitanya Plasencia MD      Appointments  past 12m or future 3m with PCP    Date Provider   Last Visit   2/17/2025 Vick Mansfield MD   Next Visit   5/26/2025 Vick Mansfield MD   ED visits in past 90 days: 0        Note composed:10:11 AM 05/21/2025

## 2025-05-21 NOTE — TELEPHONE ENCOUNTER
No care due was identified.  [facility] Embedded Care Due Messages. Reference number: [id number].   5/21/2025 7:15:01 AM CDT

## 2025-05-23 ENCOUNTER — PATIENT MESSAGE (OUTPATIENT)
Dept: ADMINISTRATIVE | Facility: OTHER | Age: 75
End: 2025-05-23
Payer: MEDICARE

## 2025-05-23 ENCOUNTER — PATIENT MESSAGE (OUTPATIENT)
Dept: PSYCHIATRY | Facility: CLINIC | Age: 75
End: 2025-05-23
Payer: MEDICARE

## 2025-05-23 DIAGNOSIS — F51.05 INSOMNIA DUE TO OTHER MENTAL DISORDER: Primary | ICD-10-CM

## 2025-05-23 DIAGNOSIS — F99 INSOMNIA DUE TO OTHER MENTAL DISORDER: Primary | ICD-10-CM

## 2025-05-26 ENCOUNTER — OFFICE VISIT (OUTPATIENT)
Dept: FAMILY MEDICINE | Facility: CLINIC | Age: 75
End: 2025-05-26
Payer: MEDICARE

## 2025-05-26 ENCOUNTER — PATIENT MESSAGE (OUTPATIENT)
Dept: FAMILY MEDICINE | Facility: CLINIC | Age: 75
End: 2025-05-26
Payer: MEDICARE

## 2025-05-26 ENCOUNTER — LAB VISIT (OUTPATIENT)
Dept: LAB | Facility: HOSPITAL | Age: 75
End: 2025-05-26
Attending: INTERNAL MEDICINE
Payer: MEDICARE

## 2025-05-26 VITALS
HEIGHT: 68 IN | HEART RATE: 66 BPM | RESPIRATION RATE: 18 BRPM | SYSTOLIC BLOOD PRESSURE: 118 MMHG | DIASTOLIC BLOOD PRESSURE: 66 MMHG | OXYGEN SATURATION: 97 % | BODY MASS INDEX: 27.54 KG/M2 | WEIGHT: 181.69 LBS

## 2025-05-26 DIAGNOSIS — R23.3 SPONTANEOUS ECCHYMOSES: ICD-10-CM

## 2025-05-26 DIAGNOSIS — D50.9 IRON DEFICIENCY ANEMIA, UNSPECIFIED IRON DEFICIENCY ANEMIA TYPE: ICD-10-CM

## 2025-05-26 DIAGNOSIS — I25.10 CORONARY ARTERY DISEASE INVOLVING NATIVE HEART WITHOUT ANGINA PECTORIS, UNSPECIFIED VESSEL OR LESION TYPE: Chronic | ICD-10-CM

## 2025-05-26 DIAGNOSIS — N40.0 BENIGN PROSTATIC HYPERPLASIA WITHOUT LOWER URINARY TRACT SYMPTOMS: ICD-10-CM

## 2025-05-26 DIAGNOSIS — N52.9 ERECTILE DYSFUNCTION, UNSPECIFIED ERECTILE DYSFUNCTION TYPE: Chronic | ICD-10-CM

## 2025-05-26 DIAGNOSIS — R39.9 LOWER URINARY TRACT SYMPTOMS (LUTS): Chronic | ICD-10-CM

## 2025-05-26 DIAGNOSIS — I10 PRIMARY HYPERTENSION: Primary | Chronic | ICD-10-CM

## 2025-05-26 DIAGNOSIS — K63.5 POLYP OF COLON, UNSPECIFIED PART OF COLON, UNSPECIFIED TYPE: ICD-10-CM

## 2025-05-26 DIAGNOSIS — Z79.82 ENCOUNTER FOR LONG-TERM (CURRENT) USE OF ASPIRIN: ICD-10-CM

## 2025-05-26 DIAGNOSIS — F41.1 GENERALIZED ANXIETY DISORDER: ICD-10-CM

## 2025-05-26 LAB
ABSOLUTE EOSINOPHIL (OHS): 0.08 K/UL
ABSOLUTE MONOCYTE (OHS): 0.86 K/UL (ref 0.3–1)
ABSOLUTE NEUTROPHIL COUNT (OHS): 4.44 K/UL (ref 1.8–7.7)
BASOPHILS # BLD AUTO: 0.04 K/UL
BASOPHILS NFR BLD AUTO: 0.6 %
ERYTHROCYTE [DISTWIDTH] IN BLOOD BY AUTOMATED COUNT: 15.4 % (ref 11.5–14.5)
FERRITIN SERPL-MCNC: 7.2 NG/ML (ref 20–300)
HCT VFR BLD AUTO: 34.9 % (ref 40–54)
HGB BLD-MCNC: 10.8 GM/DL (ref 14–18)
IMM GRANULOCYTES # BLD AUTO: 0.03 K/UL (ref 0–0.04)
IMM GRANULOCYTES NFR BLD AUTO: 0.4 % (ref 0–0.5)
IRON SATN MFR SERPL: 6 % (ref 20–50)
IRON SERPL-MCNC: 32 UG/DL (ref 45–160)
LYMPHOCYTES # BLD AUTO: 1.72 K/UL (ref 1–4.8)
MCH RBC QN AUTO: 27.8 PG (ref 27–31)
MCHC RBC AUTO-ENTMCNC: 30.9 G/DL (ref 32–36)
MCV RBC AUTO: 90 FL (ref 82–98)
NUCLEATED RBC (/100WBC) (OHS): 0 /100 WBC
PLATELET # BLD AUTO: 235 K/UL (ref 150–450)
PMV BLD AUTO: 10 FL (ref 9.2–12.9)
RBC # BLD AUTO: 3.89 M/UL (ref 4.6–6.2)
RELATIVE EOSINOPHIL (OHS): 1.1 %
RELATIVE LYMPHOCYTE (OHS): 24 % (ref 18–48)
RELATIVE MONOCYTE (OHS): 12 % (ref 4–15)
RELATIVE NEUTROPHIL (OHS): 61.9 % (ref 38–73)
TIBC SERPL-MCNC: 512 UG/DL (ref 250–450)
TRANSFERRIN SERPL-MCNC: 346 MG/DL (ref 200–375)
WBC # BLD AUTO: 7.17 K/UL (ref 3.9–12.7)

## 2025-05-26 PROCEDURE — 3008F BODY MASS INDEX DOCD: CPT | Mod: CPTII,S$GLB,, | Performed by: INTERNAL MEDICINE

## 2025-05-26 PROCEDURE — 3044F HG A1C LEVEL LT 7.0%: CPT | Mod: CPTII,S$GLB,, | Performed by: INTERNAL MEDICINE

## 2025-05-26 PROCEDURE — 1126F AMNT PAIN NOTED NONE PRSNT: CPT | Mod: CPTII,S$GLB,, | Performed by: INTERNAL MEDICINE

## 2025-05-26 PROCEDURE — 1101F PT FALLS ASSESS-DOCD LE1/YR: CPT | Mod: CPTII,S$GLB,, | Performed by: INTERNAL MEDICINE

## 2025-05-26 PROCEDURE — 36415 COLL VENOUS BLD VENIPUNCTURE: CPT | Mod: PN

## 2025-05-26 PROCEDURE — 82728 ASSAY OF FERRITIN: CPT

## 2025-05-26 PROCEDURE — 4010F ACE/ARB THERAPY RXD/TAKEN: CPT | Mod: CPTII,S$GLB,, | Performed by: INTERNAL MEDICINE

## 2025-05-26 PROCEDURE — 99214 OFFICE O/P EST MOD 30 MIN: CPT | Mod: S$GLB,,, | Performed by: INTERNAL MEDICINE

## 2025-05-26 PROCEDURE — 1160F RVW MEDS BY RX/DR IN RCRD: CPT | Mod: CPTII,S$GLB,, | Performed by: INTERNAL MEDICINE

## 2025-05-26 PROCEDURE — 84466 ASSAY OF TRANSFERRIN: CPT

## 2025-05-26 PROCEDURE — 3288F FALL RISK ASSESSMENT DOCD: CPT | Mod: CPTII,S$GLB,, | Performed by: INTERNAL MEDICINE

## 2025-05-26 PROCEDURE — 99999 PR PBB SHADOW E&M-EST. PATIENT-LVL IV: CPT | Mod: PBBFAC,,, | Performed by: INTERNAL MEDICINE

## 2025-05-26 PROCEDURE — 3078F DIAST BP <80 MM HG: CPT | Mod: CPTII,S$GLB,, | Performed by: INTERNAL MEDICINE

## 2025-05-26 PROCEDURE — 85025 COMPLETE CBC W/AUTO DIFF WBC: CPT

## 2025-05-26 PROCEDURE — 3074F SYST BP LT 130 MM HG: CPT | Mod: CPTII,S$GLB,, | Performed by: INTERNAL MEDICINE

## 2025-05-26 PROCEDURE — 1159F MED LIST DOCD IN RCRD: CPT | Mod: CPTII,S$GLB,, | Performed by: INTERNAL MEDICINE

## 2025-05-26 RX ORDER — TADALAFIL 5 MG/1
5 TABLET ORAL DAILY
Qty: 90 TABLET | Refills: 1 | Status: SHIPPED | OUTPATIENT
Start: 2025-05-26 | End: 2025-05-26

## 2025-05-26 RX ORDER — LOSARTAN POTASSIUM 25 MG/1
25 TABLET ORAL DAILY
Qty: 90 TABLET | Refills: 3 | Status: SHIPPED | OUTPATIENT
Start: 2025-05-26 | End: 2025-05-26

## 2025-05-26 RX ORDER — TADALAFIL 5 MG/1
5 TABLET ORAL DAILY
Qty: 90 TABLET | Refills: 1 | Status: SHIPPED | OUTPATIENT
Start: 2025-05-26

## 2025-05-26 RX ORDER — AMLODIPINE BESYLATE 5 MG/1
5 TABLET ORAL DAILY
Qty: 90 TABLET | Refills: 3 | Status: SHIPPED | OUTPATIENT
Start: 2025-05-26 | End: 2025-05-26

## 2025-05-26 RX ORDER — AMLODIPINE BESYLATE 5 MG/1
5 TABLET ORAL DAILY
Qty: 90 TABLET | Refills: 3 | Status: SHIPPED | OUTPATIENT
Start: 2025-05-26 | End: 2026-05-26

## 2025-05-26 RX ORDER — LOSARTAN POTASSIUM 25 MG/1
25 TABLET ORAL DAILY
Qty: 90 TABLET | Refills: 3 | Status: SHIPPED | OUTPATIENT
Start: 2025-05-26 | End: 2026-05-26

## 2025-05-26 NOTE — PROGRESS NOTES
HISTORY OF PRESENT ILLNESS:  Yair Owens is a 75 y.o. male who presents to the clinic today for Follow-up    Last seen by me 2/2025.    Yair presents today for follow up.    He has non-obstructive coronary artery disease diagnosed via cardiac catheterization in March 2021, showing 10% stenosis in left main, 10-20% stenosis in LAD, and minor irregularities in other major coronary vessels. He has follow-up with cardiology scheduled in 3 months. Blood pressure has been well controlled, staying below 130/70 majority of the time. He self-discontinued Losartan 4 days ago due to good blood pressure control.    Recent colonoscopy revealed seven polyps, which were negative for cancer. Follow-up colonoscopy recommended in 3 years.     He has history of iron deficiency anemia with positive blood in stool in February. He reports increased occurrence of easy bruising.    He has been prescribed Amlodipine and Losartan for blood pressure, Buspirone for anxiety with good response and no panic attacks since initiation, Tadalafil for prostate issues (needs refill), baby aspirin daily, and iron supplements every other day. He has discontinued Hydroxyzine, Doxepin, Propranolol, and omeprazole.    He maintains an active lifestyle, walking 25-30 miles weekly with detailed tracking of steps, calories burned, and exercise duration. His diet includes daily Cheerios and vegetables, with preference for chicken over other meats and minimal pork consumption. Weight is currently 181.2 lbs, having remained stable between 172-178 lbs for the past two years.      Anxiety, Insomnia  Seen by psych.  Prescribed buspirone.    BPH, ED  On Flomax, Cialis.       Tobacco  Previously referred to smoking cessation clinic.  He was seen by pulmonology for concern of pleural plaque left anterior lung which remained unchanged and suspected to be benign.    HTN, HLP  Seen by cardiology 3/2025.  Prior angio with nonobstructive CAD.    Seen by ortho for left  shoulder pain due to tendinitis for which he was given steroid injection.    ROS:  General: -fever, -chills, -fatigue, -weight gain, -weight loss  Eyes: -vision changes, -redness, -discharge  ENT: -ear pain, -nasal congestion, -sore throat  Cardiovascular: -chest pain, -palpitations, -lower extremity edema  Respiratory: -cough, -shortness of breath  Gastrointestinal: -abdominal pain, -nausea, -vomiting, -diarrhea, -constipation, -blood in stool  Genitourinary: -dysuria, -hematuria, -frequency  Musculoskeletal: -joint pain, -muscle pain  Skin: -rash, -lesion, +easy bruising  Neurological: -headache, -dizziness, -numbness, -tingling  Psychiatric: -anxiety, -depression, -sleep difficulty             PAST MEDICAL HISTORY:  Past Medical History:   Diagnosis Date    CAD (coronary artery disease) 02/22/2021    Cath 2021  There was non-obstructive coronary artery disease..   Left main:  Distal 10% stenosis  Lad:  Luminal irregularities.  Mid 10-20% stenosis   Circumflex:  Luminal irregularities   RCA:  Luminal irregularities    Calcified granuloma of lung 02/03/2023    LLL calcified granuloma seen on CT Chest Lung Screening Low Dose 01/31/2018    Colon polyp     Erectile dysfunction 02/14/2020    Fatty liver     Generalized anxiety disorder 01/11/2017    GERD (gastroesophageal reflux disease)     Gout, unspecified     History of colonic polyps 11/16/2023    2 polyps 11/22 -5 yrs    Hyperlipidemia     Hypertension     Psychophysiological insomnia 07/24/2017    Tobacco dependence 07/24/2017       PAST SURGICAL HISTORY:  Past Surgical History:   Procedure Laterality Date    APPENDECTOMY      BONE RESECTION, RIB      for thoracic outlet syndrome    COLONOSCOPY N/A 07/21/2017    Procedure: COLONOSCOPY;  Surgeon: Deepak Servin MD;  Location: [facility] ENDO;  Service: Endoscopy;  Laterality: N/A;    COLONOSCOPY N/A 11/22/2022    Procedure: COLONOSCOPY;  Surgeon: Sam Obrien MD;  Location: [facility] ENDO;  Service: Endoscopy;  Laterality:  N/A;  vacc-sutab-inst portal-tb    COLONOSCOPY N/A 2/28/2025    Procedure: COLONOSCOPY;  Surgeon: Juanjose Martinez MD;  Location: [facility] ENDO;  Service: Gastroenterology;  Laterality: N/A;  2/24-marcus-suprep-portal-tb  2/25 - R/S inst portal - LW    HAND SURGERY      LEFT HEART CATHETERIZATION Left 03/02/2021    Procedure: Left heart cath, radial, 730 am;  Surgeon: Vladimir Avalos MD;  Location: [facility] CATH LAB;  Service: Cardiology;  Laterality: Left;  RN PREOP 2/25/2021--COVID NEGATIVE ON 3/1  H/P INCOMPLETE    SCROTAL SURGERY      mass    ULTRASOUND GUIDANCE  03/02/2021    Procedure: Ultrasound Guidance;  Surgeon: Vladimir Avalos MD;  Location: [facility] CATH LAB;  Service: Cardiology;;    VASECTOMY  40 years       SOCIAL HISTORY:  Social History[1]    FAMILY HISTORY:  Family History   Problem Relation Name Age of Onset    No Known Problems Mother      Cancer Father GAURAV GLYNN     Heart disease Father GAURAV GLYNN     Asthma Sister REGINA ANNA     Cancer Sister REGINA ANNA 78        Rectal cancer    Stroke Brother Gaurav     Lupus Daughter x1     No Known Problems Son x3        ALLERGIES AND MEDICATIONS: updated and reviewed.  Review of patient's allergies indicates:   Allergen Reactions    Codeine Itching    Ibuprofen Itching    Remeron [mirtazapine] Anxiety     Did not help the patient sleep and created anxiety.     Medication List with Changes/Refills   New Medications    AMLODIPINE (NORVASC) 5 MG TABLET    Take 1 tablet (5 mg total) by mouth once daily.    LOSARTAN (COZAAR) 25 MG TABLET    Take 1 tablet (25 mg total) by mouth once daily.   Current Medications    ASPIRIN (ECOTRIN) 81 MG EC TABLET    Take 1 tablet (81 mg total) by mouth once daily.    ATORVASTATIN (LIPITOR) 40 MG TABLET    TAKE 1 TABLET BY MOUTH EVERY DAY    BUSPIRONE (BUSPAR) 10 MG TABLET    Take 1 tablet (10 mg total) by mouth 2 (two) times daily.    MULTIVITAMIN CAPSULE    Take 1 capsule by mouth once daily.    OMEPRAZOLE (PRILOSEC) 40 MG CAPSULE  "   TAKE 1 CAPSULE BY MOUTH TWICE  DAILY    TAMSULOSIN (FLOMAX) 0.4 MG CAP    Take 1 capsule (0.4 mg total) by mouth once daily.    UNABLE TO FIND    OTC Daily multivitamin  OTC Probiotic   Changed and/or Refilled Medications    Modified Medication Previous Medication    TADALAFIL (CIALIS) 5 MG TABLET tadalafiL (CIALIS) 5 MG tablet       Take 1 tablet (5 mg total) by mouth once daily.    Take 1 tablet by mouth once daily   Discontinued Medications    AMLODIPINE (NORVASC) 10 MG TABLET    Take 1 tablet (10 mg total) by mouth once daily.    DOXEPIN (SINEQUAN) 10 MG CAPSULE    Take 1 capsule (10 mg total) by mouth every evening.    HYDROXYZINE (ATARAX) 50 MG TABLET    Take 1-2 tablets ( mg total) by mouth every evening.    LOSARTAN (COZAAR) 50 MG TABLET    TAKE 1 TABLET BY MOUTH EVERY DAY          CARE TEAM:  Patient Care Team:  Vick Mansfield MD as PCP - General (Internal Medicine)  Sells, Tiffany M, LPN as Licensed Practical Nurse  Vladimir Avalos MD as Consulting Physician (Cardiology)  Caryl Vila, PharmD as Hypertension Digital Medicine Clinician (Pharmacist)  Caryl Vila PharmD as Hyperlipidemia Digital Medicine Clinician  Deysi Rader NP as Nurse Practitioner (Hepatology)  Marilu Chavez DMSc, PAAlekseyC as Physician Assistant (Psychiatry)  Chaitanya Plasencia MD as Hypertension Digital Medicine Responsible Provider (Internal Medicine)  Chaitanya Plasencia MD as Hyperlipidemia Digital Medicine Responsible Provider (Internal Medicine)  Gamaliel Quinn MD as 1st Call (Urology)  nAnabel Siu MD as Consulting Physician (Orthopedic Surgery)  Marilu Chavez DMSc, PA-C as Physician Assistant (Psychiatry)  Medicare, Digital Medicine as Hypertension Digital Medicine Contract         PHYSICAL EXAM:   Vitals:    05/26/25 1445   BP: 118/66   Pulse: 66   Resp: 18     Weight: 82.4 kg (181 lb 10.5 oz)   Height: 5' 8" (172.7 cm)   Body mass index is 27.62 kg/m².    Physical " Exam    Vitals: Weight: 176 lbs.  General: No acute distress. Well-developed. Well-nourished.  Eyes: EOMI. Sclerae anicteric.  HENT: Normocephalic. Atraumatic. Nares patent. Moist oral mucosa.  Ears: Bilateral TMs clear. Bilateral EACs clear.  Cardiovascular: Regular rate. Regular rhythm. No murmurs. No rubs. No gallops. Normal S1, S2.  Respiratory: Normal respiratory effort. Clear to auscultation bilaterally. No rales. No rhonchi. No wheezing.  Abdomen: Soft. Non-tender. Non-distended. Normoactive bowel sounds.  Musculoskeletal: No  obvious deformity.  Extremities: No lower extremity edema.  Neurological: Alert & oriented x3. No slurred speech. Normal gait.  Psychiatric: Normal mood. Normal affect. Good insight. Good judgment.  Skin: Warm. Dry. No rash.             ASSESSMENT AND PLAN:  Primary hypertension  -     Discontinue: losartan (COZAAR) 25 MG tablet; Take 1 tablet (25 mg total) by mouth once daily.  Dispense: 90 tablet; Refill: 3  -     Discontinue: amLODIPine (NORVASC) 5 MG tablet; Take 1 tablet (5 mg total) by mouth once daily.  Dispense: 90 tablet; Refill: 3  -     amLODIPine (NORVASC) 5 MG tablet; Take 1 tablet (5 mg total) by mouth once daily.  Dispense: 90 tablet; Refill: 3  -     losartan (COZAAR) 25 MG tablet; Take 1 tablet (25 mg total) by mouth once daily.  Dispense: 90 tablet; Refill: 3    Iron deficiency anemia, unspecified iron deficiency anemia type  Spontaneous ecchymoses  -     Iron and TIBC; Future; Expected date: 05/26/2025  -     Ferritin; Future; Expected date: 05/26/2025  -     CBC Auto Differential; Future; Expected date: 05/26/2025    Coronary artery disease involving native heart without angina pectoris, unspecified vessel or lesion type  Encounter for long-term (current) use of aspirin        - Continue current medical mgmt and follow up with cardiology.    Benign prostatic hyperplasia without lower urinary tract symptoms  Erectile dysfunction, unspecified erectile dysfunction  type  Lower urinary tract symptoms (LUTS)  -     tadalafiL (CIALIS) 5 MG tablet; Take 1 tablet (5 mg total) by mouth once daily.  Dispense: 90 tablet; Refill: 1    Polyp of colon, unspecified part of colon, unspecified type       - Repeat colonoscopy 3 years.    Generalized anxiety disorder       - Stable on current medical management.      - Blood pressure has been excellent.  - Yair stopped taking Losartan 4 days ago due to controlled readings.  - Decreased Losartan to 25 mg daily (take half of current dose until next refill) and reduced Amlodipine to 5 mg daily.  - Instructed patient to continue monitoring BP at home, watch salt intake, and may increase one of the medications if blood pressure runs above 135/80.    - Yair had anemia and iron deficiency with positive fecal occult blood test in February.  - Hemoglobin remains low despite iron supplementation.  - Potential causes include recent colonoscopy findings of polyps.  - Advised to continue iron supplementation every other day.  - Ordered CBC and iron levels.  - May refer to hematologist or gastroenterologist if blood count remains low despite supplementation.    - Yair experiences easy bruising, possibly due to long-term aspirin use.  - Explained rationale for continuing aspirin therapy despite increased bruising risk, given cardiac history.  To be discussed with cardiology at follow up.    - Yair reports improvement with current medication regimen and no panic attacks since starting Buspirone.  - Advised to continue Buspirone at current dose.    - Refilled Tadalafil for BPH management.  - Yair also takes Tamsulosin (Flomax) for BPH management.    - Yair had colonoscopy with 7 polyps found, with pathology results negative for cancer.  - Advised to follow up in 3 years for repeat colonoscopy due to history of polyps.    - Discussed importance of balanced nutrition, including protein intake and variety of fruits, vegetables, and whole grains, for  overall health and potential impact on skin integrity.  - Yair to maintain current diet and exercise routine.                Follow up 6 months or sooner as needed.    This note was generated with the assistance of ambient listening technology. Verbal consent was obtained by the patient and accompanying visitor(s) for the recording of patient appointment to facilitate this note. I attest to having reviewed and edited the generated note for accuracy, though some syntax or spelling errors may persist. Please contact the author of this note for any clarification.         [1]   Social History  Socioeconomic History    Marital status:    Occupational History     Employer: [employer]   Tobacco Use    Smoking status: Some Days     Current packs/day: 0.50     Average packs/day: 0.5 packs/day for 61.7 years (30.9 ttl pk-yrs)     Types: Cigarettes     Start date: 1967     Passive exposure: Current    Smokeless tobacco: Never    Tobacco comments:     0.5 ppd or less    Substance and Sexual Activity    Alcohol use: Not Currently    Drug use: No    Sexual activity: Yes     Partners: Female     Social Drivers of Health     Financial Resource Strain: Low Risk  (11/15/2024)    Overall Financial Resource Strain (CARDIA)     Difficulty of Paying Living Expenses: Not hard at all   Food Insecurity: No Food Insecurity (11/15/2024)    Hunger Vital Sign     Worried About Running Out of Food in the Last Year: Never true     Ran Out of Food in the Last Year: Never true   Transportation Needs: No Transportation Needs (4/11/2024)    PRAPARE - Transportation     Lack of Transportation (Medical): No     Lack of Transportation (Non-Medical): No   Physical Activity: Sufficiently Active (11/15/2024)    Exercise Vital Sign     Days of Exercise per Week: 7 days     Minutes of Exercise per Session: 60 min   Stress: No Stress Concern Present (11/15/2024)    [employer] [employer] - Occupational Stress Questionnaire     Feeling  of Stress : Only a little   Housing Stability: Low Risk  (4/11/2024)    Housing Stability Vital Sign     Unable to Pay for Housing in the Last Year: No     Number of Places Lived in the Last Year: 1     Unstable Housing in the Last Year: No

## 2025-05-27 ENCOUNTER — OFFICE VISIT (OUTPATIENT)
Dept: PSYCHIATRY | Facility: CLINIC | Age: 75
End: 2025-05-27
Payer: MEDICARE

## 2025-05-27 DIAGNOSIS — K21.9 GASTROESOPHAGEAL REFLUX DISEASE WITHOUT ESOPHAGITIS: Chronic | ICD-10-CM

## 2025-05-27 DIAGNOSIS — Z86.59 HISTORY OF DEPRESSION: ICD-10-CM

## 2025-05-27 DIAGNOSIS — F41.0 GENERALIZED ANXIETY DISORDER WITH PANIC ATTACKS: ICD-10-CM

## 2025-05-27 DIAGNOSIS — D50.9 IRON DEFICIENCY ANEMIA, UNSPECIFIED IRON DEFICIENCY ANEMIA TYPE: Primary | ICD-10-CM

## 2025-05-27 DIAGNOSIS — F51.05 INSOMNIA DUE TO OTHER MENTAL DISORDER: Primary | ICD-10-CM

## 2025-05-27 DIAGNOSIS — F41.1 GENERALIZED ANXIETY DISORDER WITH PANIC ATTACKS: ICD-10-CM

## 2025-05-27 DIAGNOSIS — F99 INSOMNIA DUE TO OTHER MENTAL DISORDER: Primary | ICD-10-CM

## 2025-05-27 PROBLEM — Z79.82 ENCOUNTER FOR LONG-TERM (CURRENT) USE OF ASPIRIN: Status: ACTIVE | Noted: 2025-05-27

## 2025-05-27 PROBLEM — R23.3 SPONTANEOUS ECCHYMOSES: Status: ACTIVE | Noted: 2025-05-27

## 2025-05-27 PROBLEM — K63.5 POLYP OF COLON: Status: ACTIVE | Noted: 2025-05-27

## 2025-05-27 RX ORDER — OMEPRAZOLE 40 MG/1
40 CAPSULE, DELAYED RELEASE ORAL 2 TIMES DAILY
Qty: 180 CAPSULE | Refills: 3 | Status: SHIPPED | OUTPATIENT
Start: 2025-05-27

## 2025-05-27 NOTE — TELEPHONE ENCOUNTER
No care due was identified.  Health [facility] Embedded Care Due Messages. Reference number: [id number].   5/27/2025 9:03:52 AM CDT

## 2025-05-27 NOTE — TELEPHONE ENCOUNTER
Refill Routing Note   Medication(s) are not appropriate for processing by [facility] for the following reason(s):        Outside of protocol    [facility] action(s):  Route      Medication Therapy Plan: OVER 40MG OF THIS MEDICATION IS OOP PER [facility] PROTOCOL      Appointments  past 12m or future 3m with PCP    Date Provider   Last Visit   5/26/2025 Vick Mansfield MD   Next Visit   Visit date not found Vick Mansfield MD   ED visits in past 90 days: 0        Note composed:10:14 AM 05/27/2025

## 2025-05-27 NOTE — PROGRESS NOTES
The patient location is: Home in [place]    The chief complaint leading to consultation is: F/u doxepin    Visit type: audiovisual    Face to Face time with patient: 22 min  30 minutes of total time spent on the encounter, which includes face to face time and non-face to face time preparing to see the patient (eg, review of tests), Obtaining and/or reviewing separately obtained history, Documenting clinical information in the electronic or other health record, Independently interpreting results (not separately reported) and communicating results to the patient/family/caregiver, or Care coordination (not separately reported).         Each patient to whom he or she provides medical services by telemedicine is:  (1) informed of the relationship between the physician and patient and the respective role of any other health care provider with respect to management of the patient; and (2) notified that he or she may decline to receive medical services by telemedicine and may withdraw from such care at any time.    Notes:      Psychotherapy:  Target symptoms: anxiety   Why chosen therapy is appropriate versus another modality: relevant to diagnosis  Outcome monitoring methods: self-report  Therapeutic intervention type: insight oriented psychotherapy  Topics discussed/themes: stress related to medical comorbidities  The patient's response to the intervention is accepting. The patient's progress toward treatment goals is good.   Duration of intervention: 18 minutes.    1. Insomnia due to other mental disorder        2. History of depression        3. Generalized anxiety disorder with panic attacks          Patient ID: Yair Owens is a 75 y.o. male.    Chief Complaint: No chief complaint on file.    History of Present Illness    MEDICATIONS:  Mr. Owens is on Bupropion for depression. He started taking an iron supplement last week for anemia.    HPI:  Mr. Owens has been diagnosed with anemia, likely iron deficiency.  Recent lab results show low RBC count, low ferritin levels, and high total iron binding capacity. Mr. Owens started taking iron supplements last week but denies feeling significantly different or experiencing typical anemia symptoms.    Mr. Owens has undergone a colonoscopy, which revealed polyps, and had a positive fecal occult blood test. However, colon cancer has been ruled out. The exact cause of the anemia is still under investigation.    Mr. Owens has a history of major depressive disorder, currently in remission, and generalized anxiety disorder. He is considering starting bupropion for ongoing management of his mental health.    Mr. Owens is scheduled to see a hematology PA the following day for further evaluation of his anemia. A previous issue with an itchy scalp has resolved. Mr. Owens's blood pressure is stable.      ROS:  Neurological: +difficulty falling asleep, +sleep disturbances, +difficulty staying asleep  Psychiatric: +anxiety, +depression, +sleep difficulty    All other review of systems negative unless otherwise noted in the HPI.         Physical Exam    Appearance: Appears stated age. Well-groomed. Well-nourished.  Speech: Normal rate. Normal volume. Spontaneous and fluid.  Affect: Appropriate.  Thought Content: No evidence of aggression. No evidence of homicidal ideation. No evidence of homicidal plan. No evidence of homicidal intent. No evidence of suicidal ideation. No evidence of suicidal plan. No evidence of suicidal intent. No evidence of delusions.  Memory: Recent memory intact. Remote memory intact.  Behavior: Cooperative. Good eye contact. Engaged. Pleasant.  Mood: Euthymic.  Thought Form: Linear thinking. Goal oriented and directed.  Perception: No perceptual abnormalities noted.  Judgement: Intact as evidenced by decision making in the recent past.  Insight: Good insight into symptoms. Good insight into treatment options.  Cognition: A&Ox3. Normal attention span. Average fund of  knowledge.  Motor: No gross motor abnormalities.  LABS:  Mr. Owens's recent lab results from yesterday show a low Red Blood Cell Count, though not critically low, and a pretty low Ferritin level. The Total Iron Binding Capacity was high, but not as high as expected. A year ago, his Red Blood Cell Count was slightly low. Between the test a year ago and yesterday's test, his Red Blood Cell Count has fluctuated but remained approximately the same.  TESTS:  Mr. Owens underwent a Fecal Occult Blood Test, which returned positive results. He also had a colonoscopy where polyps were found. The dates for these tests were not specified.    Examination findings limited by telemedicine platform.       1. Insomnia due to other mental disorder        2. History of depression        3. Generalized anxiety disorder with panic attacks            Assessment & Plan    IMPRESSION:  Reviewed lab results, noting low RBC count and ferritin levels, but not critically low.  Evaluated iron deficiency anemia as primary concern, given recent colonoscopy ruled out colon cancer.  Current anemia levels, while concerning, not significant enough to cause noticeable symptoms yet.  Deferred changes to depression management until anemia is addressed.    IRON DEFICIENCY ANEMIA:  1. Explained that iron deficiency anemia can affect mental and emotional well-being and resolution may lead to improved overall mental state.  2. Recommend switching to Slow Fe (iron supplement) for gentler GI absorption and less constipation, pending hematology PA consultation.  3. Supported referral to hematology for further investigation into cause of anemia.    FOLLOW-UP CARE:  1. Follow up in 3 months on August 27th at 11:30 AM to reassess iron levels and overall progress.  2. Message with updates after hematology PA appointment.          Visit today included increased complexity associated with the care of the episodic problem sleep addressed and managing the  longitudinal care of the patient due to the serious and/or complex managed problem(s) insomnia, anxiety.      This note was generated with the assistance of ambient listening technology. Verbal consent was obtained by the patient and accompanying visitor(s) for the recording of patient appointment to facilitate this note. I attest to having reviewed and edited the generated note for accuracy, though some syntax or spelling errors may persist. Please contact the author of this note for any clarification.

## 2025-05-28 ENCOUNTER — OFFICE VISIT (OUTPATIENT)
Dept: HEMATOLOGY/ONCOLOGY | Facility: CLINIC | Age: 75
End: 2025-05-28
Payer: MEDICARE

## 2025-05-28 VITALS
TEMPERATURE: 98 F | HEART RATE: 66 BPM | HEIGHT: 68 IN | SYSTOLIC BLOOD PRESSURE: 137 MMHG | BODY MASS INDEX: 27.03 KG/M2 | RESPIRATION RATE: 20 BRPM | DIASTOLIC BLOOD PRESSURE: 74 MMHG | WEIGHT: 178.38 LBS | OXYGEN SATURATION: 97 %

## 2025-05-28 DIAGNOSIS — D50.9 IRON DEFICIENCY ANEMIA, UNSPECIFIED IRON DEFICIENCY ANEMIA TYPE: Primary | ICD-10-CM

## 2025-05-28 DIAGNOSIS — F17.200 TOBACCO DEPENDENCE: Chronic | ICD-10-CM

## 2025-05-28 PROCEDURE — 99999 PR PBB SHADOW E&M-EST. PATIENT-LVL III: CPT | Mod: PBBFAC,,, | Performed by: PHYSICIAN ASSISTANT

## 2025-05-28 NOTE — PROGRESS NOTES
Hematology/Oncology Clinic New Patient Note    Patient ID: Yair Owens is a 75 y.o. male.    Chief Complaint: Iron Deficiency     History     Mr. Owens is a 75 y.o. male referred to hematology for the evaluation and management of iron deficiency anemia. Co morbidities include anxiety, HLD, and HTN.     He presents with his wife today. Patient reports he has been on aspirin daily x 40 years. Experiencing mild fatigue, but denies chest pain, SOB, dizziness, or headaches. He feels like his normal self. No reports bleeding, diarrhea, or vomiting. Recent colonoscopy done February 2025 with benign polyps.     He has ordered SloFe to start taking today.    Social: smokes 3/4-1 ppd x 60+ years, trying to cut back;  does not drink alcohol      Review of patient's allergies indicates:   Allergen Reactions    Codeine Itching    Ibuprofen Itching    Remeron [mirtazapine] Anxiety     Did not help the patient sleep and created anxiety.         Past medical, surgical, and medication history, past family history reviewed and updated with patient today as noted.      Past Medical History:   Diagnosis Date    CAD (coronary artery disease) 02/22/2021    Cath 2021  There was non-obstructive coronary artery disease..   Left main:  Distal 10% stenosis  Lad:  Luminal irregularities.  Mid 10-20% stenosis   Circumflex:  Luminal irregularities   RCA:  Luminal irregularities    Calcified granuloma of lung 02/03/2023    LLL calcified granuloma seen on CT Chest Lung Screening Low Dose 01/31/2018    Colon polyp     Erectile dysfunction 02/14/2020    Fatty liver     Generalized anxiety disorder 01/11/2017    GERD (gastroesophageal reflux disease)     Gout, unspecified     History of colonic polyps 11/16/2023    2 polyps 11/22 -5 yrs    Hyperlipidemia     Hypertension     Psychophysiological insomnia 07/24/2017    Tobacco dependence 07/24/2017       Past Surgical History:   Procedure Laterality Date    APPENDECTOMY      BONE RESECTION, RIB    "   for thoracic outlet syndrome    COLONOSCOPY N/A 07/21/2017    Procedure: COLONOSCOPY;  Surgeon: Deepak Servin MD;  Location: [facility] ENDO;  Service: Endoscopy;  Laterality: N/A;    COLONOSCOPY N/A 11/22/2022    Procedure: COLONOSCOPY;  Surgeon: Sam Obrien MD;  Location: [facility] ENDO;  Service: Endoscopy;  Laterality: N/A;  vacc-sutab-inst portal-tb    COLONOSCOPY N/A 2/28/2025    Procedure: COLONOSCOPY;  Surgeon: Juanjose Martinez MD;  Location: [facility] ENDO;  Service: Gastroenterology;  Laterality: N/A;  2/24-marcus-suprep-portal-tb  2/25 - R/S inst portal - LW    HAND SURGERY      LEFT HEART CATHETERIZATION Left 03/02/2021    Procedure: Left heart cath, radial, 730 am;  Surgeon: Vladimir Avalos MD;  Location: [facility] CATH LAB;  Service: Cardiology;  Laterality: Left;  RN PREOP 2/25/2021--COVID NEGATIVE ON 3/1  H/P INCOMPLETE    SCROTAL SURGERY      mass    ULTRASOUND GUIDANCE  03/02/2021    Procedure: Ultrasound Guidance;  Surgeon: Vladimir Avalos MD;  Location: [facility] CATH LAB;  Service: Cardiology;;    VASECTOMY  40 years       Review of Systems:  Review of Systems   Constitutional:  Positive for malaise/fatigue.   Respiratory:  Negative for shortness of breath.    Cardiovascular:  Negative for chest pain.   Gastrointestinal:  Negative for abdominal pain, blood in stool, diarrhea and vomiting.   Genitourinary:  Negative for hematuria.   Neurological:  Negative for dizziness and headaches.   Endo/Heme/Allergies:  Does not bruise/bleed easily.      Physical Exam   Vitals:  /74 (BP Location: Right arm, Patient Position: Sitting)   Pulse 66   Temp 98 °F (36.7 °C) (Oral)   Resp 20   Ht 5' 8" (1.727 m)   Wt 80.9 kg (178 lb 5.6 oz)   SpO2 97%   BMI 27.12 kg/m²     Labs:  No visits with results within 2 Day(s) from this visit.   Latest known visit with results is:   Lab Visit on 05/26/2025   Component Date Value Ref Range Status    Iron Level 05/26/2025 32 (L)  45 - 160 ug/dL Final    Transferrin 05/26/2025 346  200 - 375 " mg/dL Final    Iron Binding Capacity Total 05/26/2025 512 (H)  250 - 450 ug/dL Final    Iron Saturation 05/26/2025 6 (L)  20 - 50 % Final    Ferritin 05/26/2025 7.2 (L)  20.0 - 300.0 ng/mL Final    WBC 05/26/2025 7.17  3.90 - 12.70 K/uL Final    RBC 05/26/2025 3.89 (L)  4.60 - 6.20 M/uL Final    HGB 05/26/2025 10.8 (L)  14.0 - 18.0 gm/dL Final    HCT 05/26/2025 34.9 (L)  40.0 - 54.0 % Final    MCV 05/26/2025 90  82 - 98 fL Final    MCH 05/26/2025 27.8  27.0 - 31.0 pg Final    MCHC 05/26/2025 30.9 (L)  32.0 - 36.0 g/dL Final    RDW 05/26/2025 15.4 (H)  11.5 - 14.5 % Final    Platelet Count 05/26/2025 235  150 - 450 K/uL Final    MPV 05/26/2025 10.0  9.2 - 12.9 fL Final    Nucleated RBC 05/26/2025 0  <=0 /100 WBC Final    Neut % 05/26/2025 61.9  38 - 73 % Final    Lymph % 05/26/2025 24.0  18 - 48 % Final    Mono % 05/26/2025 12.0  4 - 15 % Final    Eos % 05/26/2025 1.1  <=8 % Final    Basophil % 05/26/2025 0.6  <=1.9 % Final    Imm Grans % 05/26/2025 0.4  0.0 - 0.5 % Final    Neut # 05/26/2025 4.44  1.8 - 7.7 K/uL Final    Lymph # 05/26/2025 1.72  1 - 4.8 K/uL Final    Mono # 05/26/2025 0.86  0.3 - 1 K/uL Final    Eos # 05/26/2025 0.08  <=0.5 K/uL Final    Baso # 05/26/2025 0.04  <=0.2 K/uL Final    Imm Grans # 05/26/2025 0.03  0.00 - 0.04 K/uL Final    Mild elevation in immature granulocytes is non specific and can be seen in a variety of conditions including stress response, acute inflammation, trauma and pregnancy. Correlation with other laboratory and clinical findings is essential.        Physical Exam:  Physical Exam  Constitutional:       General: He is not in acute distress.  Eyes:      General: No scleral icterus.  Pulmonary:      Effort: Pulmonary effort is normal. No respiratory distress.   Musculoskeletal:      Right lower leg: No edema.      Left lower leg: No edema.   Skin:     Coloration: Skin is not pale.   Neurological:      Mental Status: He is alert.          ECOG:   ECOG SCORE    0 - Fully  active-able to carry on all pre-disease performance without restriction          PROCEDURES/IMAGING  CT Chest Lung Screening Low Dose  Narrative: EXAMINATION:  CT CHEST LUNG SCREENING LOW DOSE    CLINICAL HISTORY:  Lung cancer screening, >= 30 pk-yr current smoker (Age 55-80y); Nicotine dependence, cigarettes, uncomplicated    TECHNIQUE:  CT of the thorax was performed with low dose, lung screening protocol.  No contrast was administered.  Sagittal and coronal reconstructions were obtained.    COMPARISON:  CT chest from 03/26/2024, 02/13/2019.    FINDINGS:  Lungs: There are no abnormal opacities that require further evaluation.  A few stable Jennifer fissural nodules.  The lungs show findings consistent with paraseptal emphysema.    Redemonstration of opacity within a subsegmental bronchus to the right lower lobe measuring 0.7 cm (series 4, image 336), unchanged.    Pleura: Calcified pleural plaque of the left hemithorax anteriorly similar to prior exam.  No effusion..    Heart and pericardium: Normal size without effusion.    Lymph nodes: A few calcified left hilar lymph nodes consistent with prior granulomatous disease.    Aorta and vasculature: Atherosclerosis including coronary arteries.    Chest wall and skeletal structures: Unremarkable except age-appropriate degenerative changes.    Upper abdomen: Multiple calcifications within the spleen consistent with prior granulomatous disease.  Impression: Lung-RADS Category:  2 - Benign Appearance or Behavior - continue annual screening with LDCT in 12 months.    Clinically or potentially clinically significant non lung cancer finding:  None.    Prior Lung Cancer Modifier:  No history of prior lung cancer.    Electronically signed by: Sven Balderrama MD  Date:    03/27/2025  Time:    14:58         Discussion     Problem List:  Problem List Items Addressed This Visit       Tobacco dependence (Chronic)    Overview   no ready to quit smoking.  Medicare guidelines recommend  ldct until age of 78         Iron deficiency anemia - Primary    Relevant Orders    CBC Auto Differential    Ferritin    Iron and TIBC        Iron Deficiency Anemia   - Iron rich foods and vit C rich foods reviewed with patient.   - Will take oral iron, advised on prevention/management of gi side effects including nausea, constipation  - We discussed option of IV iron, using shared decision making we have decided to hold off at this time in favor of oral supplementation since he is asymptomatic.   - Repeat labs in 3 months   - Consider referral for EGD    Tobacco Dependence  - Counseled extensively today  - He has tried cessation program in the past and it did not work for him  - He is slowly cutting back  - LDCT done this year was negative. Repeat annually      Cristina Cintron PA-C  5/27/2025 9:10 PM   Hematology/Oncology  [facility] - [address], [address]  [place] [address]  Phone: [phone]  Fax: [phone]

## 2025-06-12 ENCOUNTER — PATIENT MESSAGE (OUTPATIENT)
Dept: CARDIOLOGY | Facility: CLINIC | Age: 75
End: 2025-06-12

## 2025-06-12 ENCOUNTER — HOSPITAL ENCOUNTER (EMERGENCY)
Facility: HOSPITAL | Age: 75
Discharge: HOME | End: 2025-06-12
Attending: EMERGENCY MEDICINE
Payer: MEDICARE

## 2025-06-12 VITALS
WEIGHT: 178 LBS | TEMPERATURE: 98 F | BODY MASS INDEX: 27.06 KG/M2 | RESPIRATION RATE: 18 BRPM | HEART RATE: 50 BPM | DIASTOLIC BLOOD PRESSURE: 65 MMHG | OXYGEN SATURATION: 98 % | SYSTOLIC BLOOD PRESSURE: 152 MMHG

## 2025-06-12 DIAGNOSIS — R00.1 BRADYCARDIA: Primary | ICD-10-CM

## 2025-06-12 DIAGNOSIS — R07.9 CHEST PAIN: ICD-10-CM

## 2025-06-12 DIAGNOSIS — R07.9 CHEST PAIN, UNSPECIFIED TYPE: ICD-10-CM

## 2025-06-12 LAB
ABSOLUTE EOSINOPHIL (OHS): 0.06 K/UL
ABSOLUTE MONOCYTE (OHS): 0.69 K/UL (ref 0.3–1)
ABSOLUTE NEUTROPHIL COUNT (OHS): 3.96 K/UL (ref 1.8–7.7)
ALBUMIN SERPL BCP-MCNC: 4 G/DL (ref 3.5–5.2)
ALP SERPL-CCNC: 61 UNIT/L (ref 40–150)
ALT SERPL W/O P-5'-P-CCNC: 16 UNIT/L (ref 10–44)
ANION GAP (OHS): 8 MMOL/L (ref 8–16)
AST SERPL-CCNC: 21 UNIT/L (ref 11–45)
BASOPHILS # BLD AUTO: 0.03 K/UL
BASOPHILS NFR BLD AUTO: 0.5 %
BILIRUB SERPL-MCNC: 0.8 MG/DL (ref 0.1–1)
BNP SERPL-MCNC: 79 PG/ML (ref 0–99)
BUN SERPL-MCNC: 14 MG/DL (ref 8–23)
CALCIUM SERPL-MCNC: 9.5 MG/DL (ref 8.7–10.5)
CHLORIDE SERPL-SCNC: 109 MMOL/L (ref 95–110)
CO2 SERPL-SCNC: 25 MMOL/L (ref 23–29)
CREAT SERPL-MCNC: 1.1 MG/DL (ref 0.5–1.4)
ERYTHROCYTE [DISTWIDTH] IN BLOOD BY AUTOMATED COUNT: 15.3 % (ref 11.5–14.5)
GFR SERPLBLD CREATININE-BSD FMLA CKD-EPI: >60 ML/MIN/1.73/M2
GLUCOSE SERPL-MCNC: 110 MG/DL (ref 70–110)
HCT VFR BLD AUTO: 36.1 % (ref 40–54)
HGB BLD-MCNC: 11.2 GM/DL (ref 14–18)
IMM GRANULOCYTES # BLD AUTO: 0.02 K/UL (ref 0–0.04)
IMM GRANULOCYTES NFR BLD AUTO: 0.3 % (ref 0–0.5)
LYMPHOCYTES # BLD AUTO: 1.31 K/UL (ref 1–4.8)
MCH RBC QN AUTO: 28 PG (ref 27–31)
MCHC RBC AUTO-ENTMCNC: 31 G/DL (ref 32–36)
MCV RBC AUTO: 90 FL (ref 82–98)
NUCLEATED RBC (/100WBC) (OHS): 0 /100 WBC
PLATELET # BLD AUTO: 228 K/UL (ref 150–450)
PMV BLD AUTO: 10.3 FL (ref 9.2–12.9)
POTASSIUM SERPL-SCNC: 4.4 MMOL/L (ref 3.5–5.1)
PROT SERPL-MCNC: 7.5 GM/DL (ref 6–8.4)
RBC # BLD AUTO: 4 M/UL (ref 4.6–6.2)
RELATIVE EOSINOPHIL (OHS): 1 %
RELATIVE LYMPHOCYTE (OHS): 21.6 % (ref 18–48)
RELATIVE MONOCYTE (OHS): 11.4 % (ref 4–15)
RELATIVE NEUTROPHIL (OHS): 65.2 % (ref 38–73)
SODIUM SERPL-SCNC: 142 MMOL/L (ref 136–145)
TROPONIN I SERPL DL<=0.01 NG/ML-MCNC: 0.02 NG/ML
WBC # BLD AUTO: 6.07 K/UL (ref 3.9–12.7)

## 2025-06-12 PROCEDURE — 84484 ASSAY OF TROPONIN QUANT: CPT | Performed by: EMERGENCY MEDICINE

## 2025-06-12 PROCEDURE — 63600175 PHARM REV CODE 636 W HCPCS: Performed by: EMERGENCY MEDICINE

## 2025-06-12 PROCEDURE — 85025 COMPLETE CBC W/AUTO DIFF WBC: CPT | Performed by: EMERGENCY MEDICINE

## 2025-06-12 PROCEDURE — 99285 EMERGENCY DEPT VISIT HI MDM: CPT | Mod: 25

## 2025-06-12 PROCEDURE — 93010 ELECTROCARDIOGRAM REPORT: CPT | Mod: ,,, | Performed by: INTERNAL MEDICINE

## 2025-06-12 PROCEDURE — 93005 ELECTROCARDIOGRAM TRACING: CPT

## 2025-06-12 PROCEDURE — 96374 THER/PROPH/DIAG INJ IV PUSH: CPT

## 2025-06-12 PROCEDURE — 82247 BILIRUBIN TOTAL: CPT | Performed by: EMERGENCY MEDICINE

## 2025-06-12 PROCEDURE — 83880 ASSAY OF NATRIURETIC PEPTIDE: CPT | Performed by: EMERGENCY MEDICINE

## 2025-06-12 RX ORDER — AMLODIPINE BESYLATE 10 MG/1
10 TABLET ORAL DAILY
COMMUNITY
End: 2025-06-20

## 2025-06-12 RX ORDER — ONDANSETRON HYDROCHLORIDE 2 MG/ML
4 INJECTION, SOLUTION INTRAVENOUS
Status: COMPLETED | OUTPATIENT
Start: 2025-06-12 | End: 2025-06-12

## 2025-06-12 RX ORDER — FERROUS SULFATE 325(65) MG
325 TABLET, DELAYED RELEASE (ENTERIC COATED) ORAL DAILY
COMMUNITY

## 2025-06-12 RX ADMIN — ONDANSETRON 4 MG: 2 INJECTION INTRAMUSCULAR; INTRAVENOUS at 04:06

## 2025-06-12 NOTE — ED PROVIDER NOTES
Encounter Date: 6/12/2025       History     Chief Complaint   Patient presents with    Chest Pain     Pt reports waking up this morning with intermittent, nonradiating, left sided chest pain accompanied by nausea and fatigue. Pt reports checking his HR and it was 48bpm.     This patient presents the emergency department with complaints of known coronary artery disease presented and dairy to left-sided pressure-like sensation in his chest that lasted for maximum of 15 minutes.  He has had several episodes of this currently it is resolved.  Patient denies any other symptomatology especially shortness of breath nausea vomiting diaphoresis and shoulder arm pain.  Patient states it feels like an anxiety attack he had not too long ago.    The history is provided by the patient.     Review of patient's allergies indicates:   Allergen Reactions    Codeine Itching    Ibuprofen Itching    Remeron [mirtazapine] Anxiety     Did not help the patient sleep and created anxiety.     Past Medical History:   Diagnosis Date    CAD (coronary artery disease) 02/22/2021    Cath 2021  There was non-obstructive coronary artery disease..   Left main:  Distal 10% stenosis  Lad:  Luminal irregularities.  Mid 10-20% stenosis   Circumflex:  Luminal irregularities   RCA:  Luminal irregularities    Calcified granuloma of lung 02/03/2023    LLL calcified granuloma seen on CT Chest Lung Screening Low Dose 01/31/2018    Colon polyp     Erectile dysfunction 02/14/2020    Fatty liver     Generalized anxiety disorder 01/11/2017    GERD (gastroesophageal reflux disease)     Gout, unspecified     History of colonic polyps 11/16/2023    2 polyps 11/22 -5 yrs    Hyperlipidemia     Hypertension     Psychophysiological insomnia 07/24/2017    Tobacco dependence 07/24/2017     Past Surgical History:   Procedure Laterality Date    APPENDECTOMY      BONE RESECTION, RIB      for thoracic outlet syndrome    COLONOSCOPY N/A 07/21/2017    Procedure: COLONOSCOPY;   Surgeon: Deepak Servin MD;  Location: [facility] ENDO;  Service: Endoscopy;  Laterality: N/A;    COLONOSCOPY N/A 11/22/2022    Procedure: COLONOSCOPY;  Surgeon: Sam Obrien MD;  Location: [facility] ENDO;  Service: Endoscopy;  Laterality: N/A;  vacc-sutab-inst portal-tb    COLONOSCOPY N/A 2/28/2025    Procedure: COLONOSCOPY;  Surgeon: Juanjose Martinez MD;  Location: [facility] ENDO;  Service: Gastroenterology;  Laterality: N/A;  2/24-marcus-suprep-portal-tb  2/25 - R/S inst portal - LW    HAND SURGERY      LEFT HEART CATHETERIZATION Left 03/02/2021    Procedure: Left heart cath, radial, 730 am;  Surgeon: Vladimir Avalos MD;  Location: [facility] CATH LAB;  Service: Cardiology;  Laterality: Left;  RN PREOP 2/25/2021--COVID NEGATIVE ON 3/1  H/P INCOMPLETE    SCROTAL SURGERY      mass    ULTRASOUND GUIDANCE  03/02/2021    Procedure: Ultrasound Guidance;  Surgeon: Vladimir Avalos MD;  Location: [facility] CATH LAB;  Service: Cardiology;;    VASECTOMY  40 years     Family History   Problem Relation Name Age of Onset    No Known Problems Mother      Cancer Father GAURAV GLYNN     Heart disease Father GAURAV GLYNN     Asthma Sister REGINA ANNA     Cancer Sister REGINA ANNA 78        Rectal cancer    Stroke Brother Gaurav     Lupus Daughter x1     No Known Problems Son x3      Social History[1]  Review of Systems   Constitutional: Negative.    HENT: Negative.     Eyes: Negative.    Respiratory: Negative.  Negative for cough, choking, chest tightness, shortness of breath, wheezing and stridor.    Cardiovascular:  Positive for chest pain. Negative for palpitations and leg swelling.   Gastrointestinal: Negative.    Endocrine: Negative.    Genitourinary: Negative.    Musculoskeletal: Negative.    Skin: Negative.    Allergic/Immunologic: Negative.    Neurological: Negative.    Hematological: Negative.    Psychiatric/Behavioral: Negative.     All other systems reviewed and are negative.      Physical Exam     Initial Vitals [06/12/25 1404]   BP Pulse Resp  Temp SpO2   (!) 168/73 61 18 98 °F (36.7 °C) 99 %      MAP       --         Physical Exam    Nursing note and vitals reviewed.  Constitutional: Vital signs are normal. He appears well-developed. He is active and cooperative.   HENT:   Head: Normocephalic and atraumatic.   Eyes: Conjunctivae, EOM and lids are normal. Pupils are equal, round, and reactive to light.   Neck: Trachea normal and phonation normal. Neck supple. No thyroid mass present. No stridor present. No tracheal tenderness present. No tracheal deviation present. No Brudzinski's sign and no Kernig's sign noted. Carotid bruit is not present. Normal carotid pulses, no hepatojugular reflux and no JVD present.   Normal range of motion.   Full passive range of motion without pain.     Cardiovascular:  Normal rate, regular rhythm, S1 normal, S2 normal, normal heart sounds, intact distal pulses and normal pulses.           Pulmonary/Chest: Effort normal and breath sounds normal.   Abdominal: Abdomen is soft and flat. Bowel sounds are normal. There is no abdominal tenderness.   Musculoskeletal:         General: Normal range of motion.      Cervical back: Full passive range of motion without pain, normal range of motion and neck supple. No edema, erythema or rigidity. No spinous process tenderness or muscular tenderness. Normal range of motion.     Lymphadenopathy:     He has no axillary adenopathy.   Neurological: He is alert and oriented to person, place, and time. He has normal strength. No cranial nerve deficit or sensory deficit. GCS eye subscore is 4. GCS verbal subscore is 5. GCS motor subscore is 6.   Skin: Skin is warm, dry and intact.   Psychiatric: He has a normal mood and affect. His speech is normal and behavior is normal. Judgment and thought content normal. Cognition and memory are normal.         ED Course   Procedures  Labs Reviewed   CBC WITH DIFFERENTIAL - Abnormal       Result Value    WBC 6.07      RBC 4.00 (*)     HGB 11.2 (*)     HCT 36.1  (*)     MCV 90      MCH 28.0      MCHC 31.0 (*)     RDW 15.3 (*)     Platelet Count 228      MPV 10.3      Nucleated RBC 0      Neut % 65.2      Lymph % 21.6      Mono % 11.4      Eos % 1.0      Basophil % 0.5      Imm Grans % 0.3      Neut # 3.96      Lymph # 1.31      Mono # 0.69      Eos # 0.06      Baso # 0.03      Imm Grans # 0.02     COMPREHENSIVE METABOLIC PANEL - Normal    Sodium 142      Potassium 4.4      Chloride 109      CO2 25      Glucose 110      BUN 14      Creatinine 1.1      Calcium 9.5      Protein Total 7.5      Albumin 4.0      Bilirubin Total 0.8      ALP 61      AST 21      ALT 16      Anion Gap 8      eGFR >60     TROPONIN I - Normal    Troponin-I 0.019     B-TYPE NATRIURETIC PEPTIDE - Normal    BNP 79     CBC W/ AUTO DIFFERENTIAL    Narrative:     The following orders were created for panel order CBC auto differential.  Procedure                               Abnormality         Status                     ---------                               -----------         ------                     CBC with Differential[[id number]]       Abnormal            Final result                 Please view results for these tests on the individual orders.   TROPONIN I          Imaging Results              X-Ray Chest AP Portable (In process)                      Medications   ondansetron injection 4 mg (4 mg Intravenous Given 6/12/25 1614)     Medical Decision Making  This patient presented to the emergency department chest pain.  Patient that is some of the symptomatology to noticing that he had a low heart rate in the 40s.  He has came concerned about then came to the emergency department for this he denies any other symptomatology at the present time.  Patient notices that the pulse drops down into the 40s when he is relaxing but when he is up and moving about it is in the 50s.  The diagnostic workup here in the emergency department include cardiac marker that is unremarkable.  Patient can be safely  discharged home to it.  It is speak with Dr. Tovar and he would like to have the patient follow up with Cardiology either his group or his own the patient's cardiologist for likely Holter monitor.    Amount and/or Complexity of Data Reviewed  Labs: ordered. Decision-making details documented in ED Course.  Radiology: ordered.    Risk  Prescription drug management.               ED Course as of 06/12/25 1629   Thu Jun 12, 2025   1539 CBC auto differential(!) [MI]   1539 Hemoglobin(!): 11.2 [MI]   1539 Hematocrit(!): 36.1 [MI]   1540 Chest x-ray read by me mild cardiomegaly otherwise unremarkable. [MI]      ED Course User Index  [MI] Yunier Sevilla MD                           Clinical Impression:  Final diagnoses:  [R07.9] Chest pain  [R07.9] Chest pain, unspecified type  [R00.1] Bradycardia (Primary)          ED Disposition Condition    Discharge Stable          ED Prescriptions    None       Follow-up Information    None                [1]   Social History  Tobacco Use    Smoking status: Some Days     Current packs/day: 0.50     Average packs/day: 0.5 packs/day for 61.7 years (30.9 ttl pk-yrs)     Types: Cigarettes     Start date: 1967     Passive exposure: Current    Smokeless tobacco: Never    Tobacco comments:     0.5 ppd or less    Substance Use Topics    Alcohol use: Not Currently    Drug use: No        Yunier Sevilla MD  06/12/25 2368

## 2025-06-13 LAB
OHS QRS DURATION: 82 MS
OHS QTC CALCULATION: 362 MS

## 2025-06-16 ENCOUNTER — HOSPITAL ENCOUNTER (OUTPATIENT)
Facility: HOSPITAL | Age: 75
Discharge: HOME | End: 2025-06-17
Attending: EMERGENCY MEDICINE | Admitting: HOSPITALIST
Payer: MEDICARE

## 2025-06-16 ENCOUNTER — OFFICE VISIT (OUTPATIENT)
Dept: CARDIOLOGY | Facility: CLINIC | Age: 75
End: 2025-06-16
Payer: MEDICARE

## 2025-06-16 VITALS — BODY MASS INDEX: 27.06 KG/M2 | RESPIRATION RATE: 18 BRPM | HEIGHT: 68 IN

## 2025-06-16 DIAGNOSIS — R07.9 ACUTE CHEST PAIN: Primary | ICD-10-CM

## 2025-06-16 DIAGNOSIS — I10 ESSENTIAL (PRIMARY) HYPERTENSION: Primary | ICD-10-CM

## 2025-06-16 DIAGNOSIS — I10 PRIMARY HYPERTENSION: ICD-10-CM

## 2025-06-16 DIAGNOSIS — R00.1 BRADYCARDIA: ICD-10-CM

## 2025-06-16 DIAGNOSIS — I35.0 AORTIC VALVE STENOSIS, ETIOLOGY OF CARDIAC VALVE DISEASE UNSPECIFIED: ICD-10-CM

## 2025-06-16 DIAGNOSIS — R07.9 CHEST PAIN: ICD-10-CM

## 2025-06-16 DIAGNOSIS — R07.9 CHEST PAIN, UNSPECIFIED TYPE: ICD-10-CM

## 2025-06-16 PROBLEM — R07.2 PRECORDIAL PAIN: Status: ACTIVE | Noted: 2025-06-16

## 2025-06-16 LAB
ABSOLUTE EOSINOPHIL (OHS): 0.05 K/UL
ABSOLUTE MONOCYTE (OHS): 0.74 K/UL (ref 0.3–1)
ABSOLUTE NEUTROPHIL COUNT (OHS): 4.66 K/UL (ref 1.8–7.7)
ALBUMIN SERPL BCP-MCNC: 3.9 G/DL (ref 3.5–5.2)
ALP SERPL-CCNC: 64 UNIT/L (ref 40–150)
ALT SERPL W/O P-5'-P-CCNC: 17 UNIT/L (ref 10–44)
ANION GAP (OHS): 8 MMOL/L (ref 8–16)
AORTIC SIZE INDEX (SOV): 1.8 CM/M2
AORTIC SIZE INDEX: 1.9 CM/M2
ASCENDING AORTA: 3.7 CM
AST SERPL-CCNC: 16 UNIT/L (ref 11–45)
AV INDEX (PROSTH): 0.35
AV MEAN GRADIENT: 22 MMHG
AV PEAK GRADIENT: 36 MMHG
AV REGURGITATION PRESSURE HALF TIME: 634 MS
AV VALVE AREA BY VELOCITY RATIO: 1 CM²
AV VALVE AREA: 1.2 CM²
AV VELOCITY RATIO: 0.3
BASOPHILS # BLD AUTO: 0.03 K/UL
BASOPHILS NFR BLD AUTO: 0.4 %
BILIRUB SERPL-MCNC: 0.7 MG/DL (ref 0.1–1)
BNP SERPL-MCNC: 90 PG/ML (ref 0–99)
BSA FOR ECHO PROCEDURE: 1.97 M2
BUN SERPL-MCNC: 16 MG/DL (ref 8–23)
CALCIUM SERPL-MCNC: 9.1 MG/DL (ref 8.7–10.5)
CHLORIDE SERPL-SCNC: 109 MMOL/L (ref 95–110)
CO2 SERPL-SCNC: 24 MMOL/L (ref 23–29)
CREAT SERPL-MCNC: 1.1 MG/DL (ref 0.5–1.4)
CV ECHO LV RWT: 0.55 CM
DOP CALC AO PEAK VEL: 3 M/S
DOP CALC AO VTI: 71.2 CM
DOP CALC LVOT AREA: 3.5 CM2
DOP CALC LVOT DIAMETER: 2.1 CM
DOP CALC LVOT PEAK VEL: 0.9 M/S
DOP CALC LVOT STROKE VOLUME: 85.5 CM3
DOP CALC MV VTI: 25.8 CM
DOP CALCLVOT PEAK VEL VTI: 24.7 CM
E WAVE DECELERATION TIME: 222 MSEC
E/A RATIO: 1.22
E/E' RATIO: 9 M/S
ECHO LV POSTERIOR WALL: 1.3 CM (ref 0.6–1.1)
ERYTHROCYTE [DISTWIDTH] IN BLOOD BY AUTOMATED COUNT: 14.9 % (ref 11.5–14.5)
FRACTIONAL SHORTENING: 29.8 % (ref 28–44)
GFR SERPLBLD CREATININE-BSD FMLA CKD-EPI: >60 ML/MIN/1.73/M2
GLUCOSE SERPL-MCNC: 137 MG/DL (ref 70–110)
HCT VFR BLD AUTO: 36.1 % (ref 40–54)
HGB BLD-MCNC: 11 GM/DL (ref 14–18)
IMM GRANULOCYTES # BLD AUTO: 0.02 K/UL (ref 0–0.04)
IMM GRANULOCYTES NFR BLD AUTO: 0.3 % (ref 0–0.5)
INTERVENTRICULAR SEPTUM: 1.2 CM (ref 0.6–1.1)
IVC DIAMETER: 1.67 CM
LA MAJOR: 5.7 CM
LA MINOR: 5.5 CM
LA WIDTH: 4.3 CM
LEFT ATRIUM SIZE: 4.3 CM
LEFT ATRIUM VOLUME INDEX: 45 ML/M2
LEFT ATRIUM VOLUME: 88 CM3
LEFT INTERNAL DIMENSION IN SYSTOLE: 3.3 CM (ref 2.1–4)
LEFT VENTRICLE DIASTOLIC VOLUME INDEX: 52.31 ML/M2
LEFT VENTRICLE DIASTOLIC VOLUME: 102 ML
LEFT VENTRICLE MASS INDEX: 115.3 G/M2
LEFT VENTRICLE SYSTOLIC VOLUME INDEX: 22.1 ML/M2
LEFT VENTRICLE SYSTOLIC VOLUME: 43 ML
LEFT VENTRICULAR INTERNAL DIMENSION IN DIASTOLE: 4.7 CM (ref 3.5–6)
LEFT VENTRICULAR MASS: 224.8 G
LV LATERAL E/E' RATIO: 8.9 M/S
LV SEPTAL E/E' RATIO: 8.9 M/S
LVED V (TEICH): 102.07 ML
LVES V (TEICH): 42.99 ML
LVOT MG: 1.97 MMHG
LVOT MV: 0.68 CM/S
LYMPHOCYTES # BLD AUTO: 1.18 K/UL (ref 1–4.8)
MCH RBC QN AUTO: 27.6 PG (ref 27–31)
MCHC RBC AUTO-ENTMCNC: 30.5 G/DL (ref 32–36)
MCV RBC AUTO: 91 FL (ref 82–98)
MV MEAN GRADIENT: 1 MMHG
MV PEAK A VEL: 0.73 M/S
MV PEAK E VEL: 0.89 M/S
MV PEAK GRADIENT: 4 MMHG
MV STENOSIS PRESSURE HALF TIME: 64.25 MS
MV VALVE AREA BY CONTINUITY EQUATION: 3.31 CM2
MV VALVE AREA P 1/2 METHOD: 3.42 CM2
NUCLEATED RBC (/100WBC) (OHS): 0 /100 WBC
OHS CV RV/LV RATIO: 0.79 CM
PISA AR MAX VEL: 3.81 M/S
PISA TR MAX VEL: 2.4 M/S
PLATELET # BLD AUTO: 231 K/UL (ref 150–450)
PMV BLD AUTO: 10 FL (ref 9.2–12.9)
POTASSIUM SERPL-SCNC: 4.4 MMOL/L (ref 3.5–5.1)
PROT SERPL-MCNC: 7.1 GM/DL (ref 6–8.4)
PV PEAK GRADIENT: 3 MMHG
PV PEAK VELOCITY: 0.91 M/S
RA MAJOR: 5.64 CM
RA PRESSURE ESTIMATED: 8 MMHG
RA WIDTH: 3.5 CM
RBC # BLD AUTO: 3.99 M/UL (ref 4.6–6.2)
RELATIVE EOSINOPHIL (OHS): 0.7 %
RELATIVE LYMPHOCYTE (OHS): 17.7 % (ref 18–48)
RELATIVE MONOCYTE (OHS): 11.1 % (ref 4–15)
RELATIVE NEUTROPHIL (OHS): 69.8 % (ref 38–73)
RIGHT VENTRICLE DIASTOLIC BASEL DIMENSION: 3.7 CM
RIGHT VENTRICULAR END-DIASTOLIC DIMENSION: 3.72 CM
RV TB RVSP: 10 MMHG
RV TISSUE DOPPLER FREE WALL SYSTOLIC VELOCITY 1 (APICAL 4 CHAMBER VIEW): 19.8 CM/S
SINUS: 3.52 CM
SODIUM SERPL-SCNC: 141 MMOL/L (ref 136–145)
STJ: 2.5 CM
TDI LATERAL: 0.1 M/S
TDI SEPTAL: 0.1 M/S
TDI: 0.1 M/S
TR MAX PG: 23 MMHG
TRICUSPID ANNULAR PLANE SYSTOLIC EXCURSION: 2.4 CM
TROPONIN I SERPL DL<=0.01 NG/ML-MCNC: <0.006 NG/ML
TROPONIN I SERPL DL<=0.01 NG/ML-MCNC: <0.006 NG/ML
TV REST PULMONARY ARTERY PRESSURE: 31 MMHG
WBC # BLD AUTO: 6.68 K/UL (ref 3.9–12.7)
Z-SCORE OF LEFT VENTRICULAR DIMENSION IN END DIASTOLE: -1.68
Z-SCORE OF LEFT VENTRICULAR DIMENSION IN END SYSTOLE: -0.28

## 2025-06-16 PROCEDURE — 93005 ELECTROCARDIOGRAM TRACING: CPT

## 2025-06-16 PROCEDURE — 85025 COMPLETE CBC W/AUTO DIFF WBC: CPT

## 2025-06-16 PROCEDURE — 80053 COMPREHEN METABOLIC PANEL: CPT

## 2025-06-16 PROCEDURE — 83880 ASSAY OF NATRIURETIC PEPTIDE: CPT

## 2025-06-16 PROCEDURE — 93010 ELECTROCARDIOGRAM REPORT: CPT | Mod: ,,, | Performed by: INTERNAL MEDICINE

## 2025-06-16 PROCEDURE — G0378 HOSPITAL OBSERVATION PER HR: HCPCS

## 2025-06-16 PROCEDURE — 99214 OFFICE O/P EST MOD 30 MIN: CPT | Mod: 25,,, | Performed by: INTERNAL MEDICINE

## 2025-06-16 PROCEDURE — 84484 ASSAY OF TROPONIN QUANT: CPT

## 2025-06-16 RX ORDER — BUSPIRONE HYDROCHLORIDE 10 MG/1
10 TABLET ORAL 2 TIMES DAILY
Status: DISCONTINUED | OUTPATIENT
Start: 2025-06-16 | End: 2025-06-18 | Stop reason: HOSPADM

## 2025-06-16 RX ORDER — HEPARIN SODIUM 5000 [USP'U]/ML
5000 INJECTION, SOLUTION INTRAVENOUS; SUBCUTANEOUS EVERY 8 HOURS
Status: DISCONTINUED | OUTPATIENT
Start: 2025-06-16 | End: 2025-06-17

## 2025-06-16 RX ORDER — ACETAMINOPHEN 325 MG/1
650 TABLET ORAL EVERY 6 HOURS PRN
Status: DISCONTINUED | OUTPATIENT
Start: 2025-06-16 | End: 2025-06-18 | Stop reason: HOSPADM

## 2025-06-16 RX ORDER — NALOXONE HCL 0.4 MG/ML
0.02 VIAL (ML) INJECTION
Status: DISCONTINUED | OUTPATIENT
Start: 2025-06-16 | End: 2025-06-18 | Stop reason: HOSPADM

## 2025-06-16 RX ORDER — GLUCAGON 1 MG
1 KIT INJECTION
Status: DISCONTINUED | OUTPATIENT
Start: 2025-06-16 | End: 2025-06-18 | Stop reason: HOSPADM

## 2025-06-16 RX ORDER — TALC
6 POWDER (GRAM) TOPICAL NIGHTLY PRN
Status: DISCONTINUED | OUTPATIENT
Start: 2025-06-16 | End: 2025-06-18 | Stop reason: HOSPADM

## 2025-06-16 RX ORDER — IBUPROFEN 200 MG
16 TABLET ORAL
Status: DISCONTINUED | OUTPATIENT
Start: 2025-06-16 | End: 2025-06-18 | Stop reason: HOSPADM

## 2025-06-16 RX ORDER — LOSARTAN POTASSIUM 25 MG/1
25 TABLET ORAL DAILY
Status: DISCONTINUED | OUTPATIENT
Start: 2025-06-17 | End: 2025-06-17

## 2025-06-16 RX ORDER — ASPIRIN 81 MG/1
81 TABLET ORAL DAILY
Status: DISCONTINUED | OUTPATIENT
Start: 2025-06-17 | End: 2025-06-18 | Stop reason: HOSPADM

## 2025-06-16 RX ORDER — IBUPROFEN 200 MG
24 TABLET ORAL
Status: DISCONTINUED | OUTPATIENT
Start: 2025-06-16 | End: 2025-06-18 | Stop reason: HOSPADM

## 2025-06-16 RX ORDER — TAMSULOSIN HYDROCHLORIDE 0.4 MG/1
1 CAPSULE ORAL DAILY
Status: DISCONTINUED | OUTPATIENT
Start: 2025-06-17 | End: 2025-06-18 | Stop reason: HOSPADM

## 2025-06-16 RX ORDER — SODIUM CHLORIDE 0.9 % (FLUSH) 0.9 %
10 SYRINGE (ML) INJECTION EVERY 8 HOURS
Status: DISCONTINUED | OUTPATIENT
Start: 2025-06-16 | End: 2025-06-18 | Stop reason: HOSPADM

## 2025-06-16 RX ORDER — AMLODIPINE BESYLATE 5 MG/1
10 TABLET ORAL DAILY
Status: DISCONTINUED | OUTPATIENT
Start: 2025-06-17 | End: 2025-06-18 | Stop reason: HOSPADM

## 2025-06-16 RX ORDER — OXYCODONE HYDROCHLORIDE 5 MG/1
5 TABLET ORAL
Refills: 0 | Status: DISPENSED | OUTPATIENT
Start: 2025-06-16 | End: 2025-06-17

## 2025-06-16 RX ORDER — AMOXICILLIN 250 MG
1 CAPSULE ORAL 2 TIMES DAILY PRN
Status: DISCONTINUED | OUTPATIENT
Start: 2025-06-16 | End: 2025-06-18 | Stop reason: HOSPADM

## 2025-06-16 RX ORDER — ATORVASTATIN CALCIUM 40 MG/1
40 TABLET, FILM COATED ORAL DAILY
Status: DISCONTINUED | OUTPATIENT
Start: 2025-06-17 | End: 2025-06-18 | Stop reason: HOSPADM

## 2025-06-16 RX ORDER — KETOROLAC TROMETHAMINE 30 MG/ML
15 INJECTION, SOLUTION INTRAMUSCULAR; INTRAVENOUS
Status: DISPENSED | OUTPATIENT
Start: 2025-06-16 | End: 2025-06-17

## 2025-06-16 NOTE — PHARMACY MED REC
"Admission Medication History     The home medication history was taken by Radha Jasmine.    You may go to "Admission" then "Reconcile Home Medications" tabs to review and/or act upon these items.     The home medication list has been updated by the Pharmacy department.   Please read ALL comments highlighted in yellow.   Please address this information as you see fit.    Feel free to contact us if you have any questions or require assistance.      Medications listed below were obtained from: [employer] software- [employer]  (Not in a hospital admission)          Radha Jasmine  [phone]                 .          "

## 2025-06-16 NOTE — SUBJECTIVE & OBJECTIVE
Past Medical History:   Diagnosis Date    CAD (coronary artery disease) 02/22/2021    Cath 2021  There was non-obstructive coronary artery disease..   Left main:  Distal 10% stenosis  Lad:  Luminal irregularities.  Mid 10-20% stenosis   Circumflex:  Luminal irregularities   RCA:  Luminal irregularities    Calcified granuloma of lung 02/03/2023    LLL calcified granuloma seen on CT Chest Lung Screening Low Dose 01/31/2018    Colon polyp     Erectile dysfunction 02/14/2020    Fatty liver     Generalized anxiety disorder 01/11/2017    GERD (gastroesophageal reflux disease)     Gout, unspecified     History of colonic polyps 11/16/2023    2 polyps 11/22 -5 yrs    Hyperlipidemia     Hypertension     Psychophysiological insomnia 07/24/2017    Tobacco dependence 07/24/2017       Past Surgical History:   Procedure Laterality Date    APPENDECTOMY      BONE RESECTION, RIB      for thoracic outlet syndrome    COLONOSCOPY N/A 07/21/2017    Procedure: COLONOSCOPY;  Surgeon: Deepak Servin MD;  Location: [facility] ENDO;  Service: Endoscopy;  Laterality: N/A;    COLONOSCOPY N/A 11/22/2022    Procedure: COLONOSCOPY;  Surgeon: Sam Obrien MD;  Location: [facility] ENDO;  Service: Endoscopy;  Laterality: N/A;  vacc-sutab-inst portal-tb    COLONOSCOPY N/A 2/28/2025    Procedure: COLONOSCOPY;  Surgeon: Juanjose Martinez MD;  Location: [facility] ENDO;  Service: Gastroenterology;  Laterality: N/A;  2/24-marcus-suprep-portal-tb  2/25 - R/S inst portal - LW    HAND SURGERY      LEFT HEART CATHETERIZATION Left 03/02/2021    Procedure: Left heart cath, radial, 730 am;  Surgeon: Vladimir Avalos MD;  Location: [facility] CATH LAB;  Service: Cardiology;  Laterality: Left;  RN PREOP 2/25/2021--COVID NEGATIVE ON 3/1  H/P INCOMPLETE    SCROTAL SURGERY      mass    ULTRASOUND GUIDANCE  03/02/2021    Procedure: Ultrasound Guidance;  Surgeon: Vladimir Avalos MD;  Location: [facility] CATH LAB;  Service: Cardiology;;    VASECTOMY  40 years       Review of patient's allergies indicates:    Allergen Reactions    Codeine Itching    Ibuprofen Itching    Remeron [mirtazapine] Anxiety     Did not help the patient sleep and created anxiety.       No current facility-administered medications on file prior to encounter.     Current Outpatient Medications on File Prior to Encounter   Medication Sig    amLODIPine (NORVASC) 10 MG tablet Take 10 mg by mouth once daily.    aspirin (ECOTRIN) 81 MG EC tablet Take 1 tablet (81 mg total) by mouth once daily.    atorvastatin (LIPITOR) 40 MG tablet TAKE 1 TABLET BY MOUTH EVERY DAY    busPIRone (BUSPAR) 10 MG tablet Take 1 tablet (10 mg total) by mouth 2 (two) times daily.    ferrous sulfate 325 (65 FE) MG EC tablet Take 325 mg by mouth 3 (three) times daily with meals.    losartan (COZAAR) 25 MG tablet Take 1 tablet (25 mg total) by mouth once daily.    multivitamin capsule Take 1 capsule by mouth once daily.    omeprazole (PRILOSEC) 40 MG capsule TAKE 1 CAPSULE BY MOUTH TWICE  DAILY    tadalafiL (CIALIS) 5 MG tablet Take 1 tablet (5 mg total) by mouth once daily.    tamsulosin (FLOMAX) 0.4 mg Cap Take 1 capsule (0.4 mg total) by mouth once daily.    UNABLE TO FIND OTC Daily multivitamin  OTC Probiotic    [DISCONTINUED] propranoloL (INDERAL) 10 MG tablet TAKE 1 TABLET (10 MG TOTAL) BY MOUTH 3 (THREE) TIMES DAILY AS NEEDED (TREMORS OR ANXIETY).     Family History       Problem Relation (Age of Onset)    Asthma Sister    Cancer Father, Sister (78)    Heart disease Father    Lupus Daughter    No Known Problems Mother, Son    Stroke Brother          Tobacco Use    Smoking status: Some Days     Current packs/day: 0.50     Average packs/day: 0.5 packs/day for 61.8 years (30.9 ttl pk-yrs)     Types: Cigarettes     Start date: 1967     Passive exposure: Current    Smokeless tobacco: Never    Tobacco comments:     0.5 ppd or less    Substance and Sexual Activity    Alcohol use: Not Currently    Drug use: No    Sexual activity: Yes     Partners: Female     Review of Systems    Constitutional: Negative for chills, diaphoresis, fever and malaise/fatigue.   HENT:  Negative for nosebleeds.    Eyes:  Negative for blurred vision and double vision.   Cardiovascular:  Positive for chest pain. Negative for claudication, cyanosis, dyspnea on exertion, leg swelling, orthopnea, palpitations, paroxysmal nocturnal dyspnea and syncope.   Respiratory:  Negative for cough, shortness of breath and wheezing.    Skin:  Negative for dry skin and poor wound healing.   Musculoskeletal:  Negative for back pain, joint swelling and myalgias.   Gastrointestinal:  Negative for abdominal pain, nausea and vomiting.   Genitourinary:  Negative for hematuria.   Neurological:  Negative for dizziness, headaches, numbness, seizures and weakness.   Psychiatric/Behavioral:  Negative for altered mental status and depression.      Objective:     Vital Signs (Most Recent):  Temp: 97.8 °F (36.6 °C) (06/16/25 1513)  Pulse: (!) 56 (06/16/25 1600)  Resp: 18 (06/16/25 1513)  BP: (!) 141/63 (06/16/25 1600)  SpO2: 100 % (06/16/25 1600) Vital Signs (24h Range):  Temp:  [97.8 °F (36.6 °C)] 97.8 °F (36.6 °C)  Pulse:  [56-63] 56  Resp:  [18] 18  SpO2:  [99 %-100 %] 100 %  BP: (141-158)/(63-71) 141/63     Weight: 80.7 kg (178 lb)  Body mass index is 27.06 kg/m².    SpO2: 100 %       No intake or output data in the 24 hours ending 06/16/25 1630    Lines/Drains/Airways       Peripheral Intravenous Line  Duration                  Peripheral IV - Single Lumen 06/16/25 1529 20 G Anterior;Right Forearm <1 day                     Physical Exam  Constitutional:       General: He is not in acute distress.     Appearance: Normal appearance. He is well-developed and normal weight. He is not ill-appearing, toxic-appearing or diaphoretic.   HENT:      Head: Normocephalic and atraumatic.   Eyes:      General: No scleral icterus.     Extraocular Movements: Extraocular movements intact.      Conjunctiva/sclera: Conjunctivae normal.      Pupils: Pupils  are equal, round, and reactive to light.   Neck:      Thyroid: No thyromegaly.      Vascular: No JVD.      Trachea: No tracheal deviation.   Cardiovascular:      Rate and Rhythm: Normal rate and regular rhythm.      Heart sounds: S1 normal and S2 normal. Murmur heard.      Systolic murmur is present with a grade of 2/6.      No friction rub. No gallop.   Pulmonary:      Effort: Pulmonary effort is normal. No respiratory distress.      Breath sounds: Normal breath sounds. No stridor. No wheezing, rhonchi or rales.   Chest:      Chest wall: No tenderness.   Abdominal:      General: There is no distension.      Palpations: Abdomen is soft.   Musculoskeletal:         General: No swelling or tenderness. Normal range of motion.      Cervical back: Normal range of motion and neck supple. No rigidity.      Right lower leg: No edema.      Left lower leg: No edema.   Skin:     General: Skin is warm and dry.      Coloration: Skin is not jaundiced.   Neurological:      General: No focal deficit present.      Mental Status: He is alert and oriented to person, place, and time.      Cranial Nerves: No cranial nerve deficit.   Psychiatric:         Mood and Affect: Mood normal.         Behavior: Behavior normal.          Current Medications:   ketorolac  15 mg Intravenous ED 1 Time    oxyCODONE  5 mg Oral ED 1 Time           Laboratory (all labs reviewed):  CBC:  Recent Labs   Lab 08/15/24  1444 02/18/25  0741 05/26/25  1515 06/12/25  1427 06/16/25  1528   WBC 7.21 5.93 7.17 6.07 6.68   Hemoglobin 13.7 L 12.6 L  --   --   --    HGB  --   --  10.8 L 11.2 L 11.0 L   Hematocrit 42.1 39.5 L  --   --   --    HCT  --   --  34.9 L 36.1 L 36.1 L   Platelet Count  --   --  235 228 231   Platelets 228 293  --   --   --        CHEMISTRIES:  Recent Labs   Lab 01/09/24  0835 08/15/24  1444 02/18/25  0741 06/12/25  1427 06/16/25  1528   Glucose 111 H 104 94 110 137 H   Sodium 141 139 139 142 141   Potassium 5.1 4.5 4.5 4.4 4.4   BUN 12 18 14 14  16   Creatinine 1.0 1.2 1.1 1.1 1.1   eGFR >60.0 >60 >60 >60 >60   Calcium 9.9 10.0 9.3 9.5 9.1       CARDIAC BIOMARKERS:  Recent Labs   Lab 11/03/23  1647 08/15/24  1444 08/15/24  1648 06/12/25  1427 06/16/25  1528   Troponin I <0.006 <0.006 <0.006  --   --    Troponin-I  --   --   --  0.019 <0.006       COAGS:        LIPIDS/LFTS:  Recent Labs   Lab 06/02/23  1053 11/03/23  1647 01/09/24  0835 08/15/24  1444 02/18/25  0741 06/12/25  1427 06/16/25  1528   Cholesterol 125  --   --   --  136  --   --    Triglycerides 49  --   --   --  66  --   --    HDL 39 L  --   --   --  48  --   --    LDL Cholesterol 76.2  --   --   --  74.8  --   --    Non-HDL Cholesterol 86  --   --   --  88  --   --    AST 22   < > 22 33 20 21 16   ALT 21   < > 17 23 16 16 17    < > = values in this interval not displayed.       BNP:  Recent Labs   Lab 11/03/23  1647 08/15/24  1444 06/12/25  1427 06/16/25  1528   BNP 60 34 79 90       TSH:  Recent Labs   Lab 11/16/23  1335 02/18/25  0741   TSH 0.725 1.549       Free T4:        Diagnostic Results:  ECG (personally reviewed and interpreted tracing(s)):  6/16/25 1507 SR 59    Chest X-Ray (personally reviewed and interpreted image(s)): 6/16/25 NAD    Echo: 8/27/24 (repeat ordered)    Left Ventricle: The left ventricle is normal in size. There is mild concentric hypertrophy. There is normal systolic function with a visually estimated ejection fraction of 65 - 70%.    Right Ventricle: Normal right ventricular cavity size. Systolic function is normal.    Aortic Valve: There is mild to moderate stenosis. Aortic valve area by VTI is 1.54 cm². Aortic valve peak velocity is 2.77 m/s. Mean gradient is 19 mmHg. The dimensionless index is 0.37. There is mild aortic regurgitation.    Pulmonary Artery: The estimated pulmonary artery systolic pressure is 24 mmHg.    IVC/SVC: Normal venous pressure at 3 mmHg.    Carotid US 10/7/22  There is 0-19% right Internal Carotid Stenosis.  There is 0-19% left Internal  Carotid Stenosis.    Cath: 3/2/21  Left main:  Distal 10% stenosis  Lad:  Luminal irregularities.  Mid 10-20% stenosis  Circumflex:  Luminal irregularities   RCA:  Luminal irregularities    Stress Test: Ex MPI 1/29/21 (repeat planned 6/17/25)    Normal myocardial perfusion scan. There is no evidence of myocardial ischemia or infarction.    There is a  mild intensity fixed defect in the inferobasilar wall of the left ventricle secondary to diaphragm attenuation.    The gated perfusion images showed an ejection fraction of 68% post stress.    There is normal wall motion post stress.    The EKG portion of this study is positive for ischemia.  There was 1.5 mm of horizontal ST segment depression noted during stress. There were no arrhythmias during stress. There was hypertensive blood pressure response with stress.   The patient exercised for 8 minutes 32 seconds on a Aric protocol, corresponding to a functional capacity of 10 METS, achieving a peak heart rate of 127 bpm, which is 85 % of the age predicted maximum heart rate. The patient reported shortness of breath during the stress test. The test was stopped because the patient requested it and they experienced fatigue and leg fatigue. Additionally, the end of the protocol was reached.

## 2025-06-16 NOTE — ED PROVIDER NOTES
"Encounter Date: 6/16/2025       History     Chief Complaint   Patient presents with    Chest Pain     Pt arrived in ED, c/o intermittent midsternal CP, SOB and nausea since this morning. Pt also c/o "waves of dizziness" since last Thursday. Pt denies any HA, unilateral weakness, or vision changes. Pt reports having a virtual visit with his PCP this morning and was referred to ED. Pt denies any abd pain, vomiting or diarrhea.     Nausea     75-year-old male with past medical history of anemia, hypertension, medical history of CAD, aortic stenosis (diameter 1.8cm) now presents with a chief complaint of lightheadedness and chest pain.  Patient reports that 4 days prior he had chest pain with grossly unremarkable workup but endorses feeling well since then.  Patient tells me he woke up and felt lightheaded with associated chest pain, nausea, shortness of breath.  Patient reports he is not currently lightheaded and denies any room spinning or vertiginous symptoms.  Patient reports scant chest pain currently present which he endorses is a pressure which is substernal but does not radiate.  Patient denies any other associated symptoms including fevers, cough, leg swelling, unilateral weakness.    The history is provided by the patient.     Review of patient's allergies indicates:   Allergen Reactions    Codeine Itching    Ibuprofen Itching    Remeron [mirtazapine] Anxiety     Did not help the patient sleep and created anxiety.     Past Medical History:   Diagnosis Date    CAD (coronary artery disease) 02/22/2021    Cath 2021  There was non-obstructive coronary artery disease..   Left main:  Distal 10% stenosis  Lad:  Luminal irregularities.  Mid 10-20% stenosis   Circumflex:  Luminal irregularities   RCA:  Luminal irregularities    Calcified granuloma of lung 02/03/2023    LLL calcified granuloma seen on CT Chest Lung Screening Low Dose 01/31/2018    Colon polyp     Erectile dysfunction 02/14/2020    Fatty liver     " Generalized anxiety disorder 01/11/2017    GERD (gastroesophageal reflux disease)     Gout, unspecified     History of colonic polyps 11/16/2023    2 polyps 11/22 -5 yrs    Hyperlipidemia     Hypertension     Psychophysiological insomnia 07/24/2017    Tobacco dependence 07/24/2017     Past Surgical History:   Procedure Laterality Date    APPENDECTOMY      BONE RESECTION, RIB      for thoracic outlet syndrome    COLONOSCOPY N/A 07/21/2017    Procedure: COLONOSCOPY;  Surgeon: Deepak Servin MD;  Location: [facility] ENDO;  Service: Endoscopy;  Laterality: N/A;    COLONOSCOPY N/A 11/22/2022    Procedure: COLONOSCOPY;  Surgeon: Sam Obrien MD;  Location: [facility] ENDO;  Service: Endoscopy;  Laterality: N/A;  vacc-sutab-inst portal-tb    COLONOSCOPY N/A 2/28/2025    Procedure: COLONOSCOPY;  Surgeon: Juanjose Martinez MD;  Location: [facility] ENDO;  Service: Gastroenterology;  Laterality: N/A;  2/24-marcus-suprep-portal-tb  2/25 - R/S inst portal - LW    HAND SURGERY      LEFT HEART CATHETERIZATION Left 03/02/2021    Procedure: Left heart cath, radial, 730 am;  Surgeon: Vladimir Avalos MD;  Location: [facility] CATH LAB;  Service: Cardiology;  Laterality: Left;  RN PREOP 2/25/2021--COVID NEGATIVE ON 3/1  H/P INCOMPLETE    SCROTAL SURGERY      mass    ULTRASOUND GUIDANCE  03/02/2021    Procedure: Ultrasound Guidance;  Surgeon: Vladimir Avalos MD;  Location: [facility] CATH LAB;  Service: Cardiology;;    VASECTOMY  40 years     Family History   Problem Relation Name Age of Onset    No Known Problems Mother      Cancer Father GAURAV GLYNN     Heart disease Father GAURAV GLYNN     Asthma Sister REGINA ANNA     Cancer Sister REGINA ANNA 78        Rectal cancer    Stroke Brother Gaurav     Lupus Daughter x1     No Known Problems Son x3      Social History[1]  Review of Systems  See HPI     Physical Exam     Initial Vitals [06/16/25 1513]   BP Pulse Resp Temp SpO2   (!) 158/71 63 18 97.8 °F (36.6 °C) 99 %      MAP       --         Physical Exam    Nursing  note and vitals reviewed.      Gen: AxOx3, well nourished, appears stated age, no pallor, no jaundice, appears well hydrated  Eye: EOMI, no scleral icterus, no periorbital edema or ecchymosis  Head: Normocephalic, atraumatic, no lesions, scalp appears normal  ENT: Neck supple, no stridor, no masses, no drooling or voice changes  CVS: All distal pulses intact with normal rate and rhythm, no JVD, normal S1/S2, harsh ES murmur  Pulm: Normal breath sounds, no wheezes, rales or rhonchi, no increased work of breathing  Abd:  Nondistended, soft, nontender, no organomegaly, no CVAT  Ext: No edema, no lesions, rashes, or deformity  Neuro: GCS15  Cranial nerves intact  Pupils equal and reactive to light  RUE  - power/tone/sensation intact   RLE  - power/tone/sensation intact   LUE  - power/tone/sensation intact   LLE  - power/tone/sensation intact   no DDK.   Psych: normal affect, cooperative, well groomed, makes good eye contact      ED Course   Procedures  Labs Reviewed   COMPREHENSIVE METABOLIC PANEL - Abnormal       Result Value    Sodium 141      Potassium 4.4      Chloride 109      CO2 24      Glucose 137 (*)     BUN 16      Creatinine 1.1      Calcium 9.1      Protein Total 7.1      Albumin 3.9      Bilirubin Total 0.7      ALP 64      AST 16      ALT 17      Anion Gap 8      eGFR >60     CBC WITH DIFFERENTIAL - Abnormal    WBC 6.68      RBC 3.99 (*)     HGB 11.0 (*)     HCT 36.1 (*)     MCV 91      MCH 27.6      MCHC 30.5 (*)     RDW 14.9 (*)     Platelet Count 231      MPV 10.0      Nucleated RBC 0      Neut % 69.8      Lymph % 17.7 (*)     Mono % 11.1      Eos % 0.7      Basophil % 0.4      Imm Grans % 0.3      Neut # 4.66      Lymph # 1.18      Mono # 0.74      Eos # 0.05      Baso # 0.03      Imm Grans # 0.02     TROPONIN I - Normal    Troponin-I <0.006     B-TYPE NATRIURETIC PEPTIDE - Normal    BNP 90     CBC W/ AUTO DIFFERENTIAL    Narrative:     The following orders were created for panel order CBC auto  differential.  Procedure                               Abnormality         Status                     ---------                               -----------         ------                     CBC with Differential[[id number]]       Abnormal            Final result                 Please view results for these tests on the individual orders.   TROPONIN I     EKG Readings: (Independently Interpreted)   As per my independent interpretation borderline sinus bradycardia with a rate 59.  No ST depression or elevation.       Imaging Results              X-Ray Chest AP Portable (Final result)  Result time 06/16/25 15:37:12      Final result by Brenden Rock MD (06/16/25 15:37:12)                   Impression:      As above.      Electronically signed by: Brenden Rock MD  Date:    06/16/2025  Time:    15:37               Narrative:    EXAMINATION:  XR CHEST AP PORTABLE    CLINICAL HISTORY:  Chest Pain;    TECHNIQUE:  Single frontal view of the chest was performed.    FINDINGS:  The lungs are clear.  There is no pneumothorax or pleural fluid.  The cardiac silhouette is not enlarged.  There is calcification of the aorta.  The osseous structures demonstrate degenerative change.                                       Medications   ketorolac injection 15 mg (has no administration in time range)   oxyCODONE immediate release tablet 5 mg (has no administration in time range)     Medical Decision Making  Initial assessment  75-year-old male with past medical history of anemia, hypertension, medical history of CAD, aortic stenosis now presents with a chief complaint of lightheadedness and chest pain. Patient is able to vocalise, breathing spontaneously, hemodynamically stable, oriented, moving all 4 limbs spontaneously.  Examination grossly unremarkable.      Differential diagnosis  Symptomatic aortic stenosis  Symptomatic anemia  ACS  Considered life-threatening emergencies including PE and aortic dissection but lower suspicion      ED  management  Patient was stable and well-appearing on arrival however symptoms were concerning given history of CAD and workup was commenced.  I have concern for aortic stenosis given his aortic root diameter of 1.8 cm.  CBC consistent with stable chronic anemia.  CMP unremarkable negative troponin and BNP.  Chest x-ray without signs concerning for fluid overload or infection.  I suspect patient's symptoms are likely secondary to symptomatic aortic stenosis.  Patient will require echo and Cardiology evaluation.  Given patient's heart score patient was discussed with Hospital Medicine and admitted to Hospital Medicine for ACS rule out in addition to Cardiology and echo.    Amount and/or Complexity of Data Reviewed  Labs:  Decision-making details documented in ED Course.  Radiology:  Decision-making details documented in ED Course.    Risk  Prescription drug management.      Additional MDM:   Heart Score:    History:          Slightly suspicious.  ECG:             Nonspecific repolarisation disturbance  Age:               >65 years  Risk factors: 1-2 risk factors  Troponin:       Less than or equal to normal limit  Heart Score = 4                ED Course as of 06/16/25 1712   Mon Jun 16, 2025   1518 BP(!): 158/71 [PM]   1518 Temp: 97.8 °F (36.6 °C) [PM]   1518 Pulse: 63 [PM]   1518 Resp: 18 [PM]   1518 SpO2: 99 % [PM]   1554 WBC: 6.68 [PM]   1554 Hemoglobin(!): 11.0 [PM]   1554 Platelet Count: 231 [PM]   1612 Sodium: 141 [PM]   1612 Potassium: 4.4 [PM]   1612 BUN: 16 [PM]   1612 Creatinine: 1.1 [PM]   1612 Troponin I: <0.006 [PM]   1612 BNP: 90 [PM]   1612 X-Ray Chest AP Portable  As per my interpretation, the chest x-ray is grossly normal with no acute findings concerning for new infiltrates, new edema, new effusions, changes in cardiac silhouette.    [PM]      ED Course User Index  [PM] Rosanna Arriola MD          Resident supervising staff physician attestation note:  This patient presents emergency with a  history of aortic stenosis and nonocclusive coronary artery disease.  He complains of left-sided chest pain off and on over the course of the last 2 days the patient was in the ER 4 days ago with similar complaints.  He was sent to the emergency room to be evaluated by Cardiology.  I agree with the resident documentation, EKG interpretation, diagnostic and treatment plan.  I assume responsibility for the management of this patient.  This is doctor Juma dictating.                 Clinical Impression:  Final diagnoses:  [R07.9] Chest pain  [R07.9] Acute chest pain  [I35.0] Aortic valve stenosis, etiology of cardiac valve disease unspecified (Primary)          ED Disposition Condition    Observation                           [1]   Social History  Tobacco Use    Smoking status: Some Days     Current packs/day: 0.50     Average packs/day: 0.5 packs/day for 61.8 years (30.9 ttl pk-yrs)     Types: Cigarettes     Start date: 1967     Passive exposure: Current    Smokeless tobacco: Never    Tobacco comments:     0.5 ppd or less    Substance Use Topics    Alcohol use: Not Currently    Drug use: No        Rosanna Arriola MD  Resident  06/16/25 0109

## 2025-06-16 NOTE — ED TRIAGE NOTES
"Pt to the ED with complaints of "very minimal" midsternal to left anterior chest pain, intermittent shortness of breath, lightheadedness and nausea without vomiting since this morning. Pt reports being seen here Thursday for the same symptoms but has felt fine since being discharged. Pt states his symptoms returned "out of nowhere". Pt AAOx4, connected to cardiac monitoring, continuous pulse ox and automated BP cuff. Call light within reach, wife at bedside. Bed locked in lowest position.  "

## 2025-06-16 NOTE — CONSULTS
[facility] Emergency Dept  Cardiology  Consult Note    Patient Name: Yair Owens  MRN: [id number]  Admission Date: 6/16/2025  Hospital Length of Stay: 0 days  Code Status: No Order   Attending Provider: Ruben Dennison MD   Consulting Provider: Yunier Kimbrough MD  Primary Care Physician: Vick Mansfield MD  Principal Problem:Precordial pain    Patient information was obtained from patient and ER records.     Inpatient consult to Cardiology  Consult performed by: Yunier Kimbrough MD  Consult ordered by: Kamari Sood PA-C  Reason for consult: CP        Subjective:     Chief Complaint:  CP     HPI:   75-year-old male with past medical history of anemia, hypertension, medical history of CAD, aortic stenosis (diameter 1.8cm) now presents with a chief complaint of lightheadedness and chest pain. Patient reports that 4 days prior he had chest pain with grossly unremarkable workup but endorses feeling well since then. Patient tells me he woke up and felt lightheaded with associated chest pain, nausea, shortness of breath. Patient reports he is not currently lightheaded and denies any room spinning or vertiginous symptoms. Patient reports scant chest pain currently present which he endorses is a pressure which is substernal but does not radiate. Patient denies any other associated symptoms including fevers, cough, leg swelling, unilateral weakness.     Follows with Dr. Avalos.    Cardiology consulted for CP.    The patient is a very pleasant 75-year-old man with a history of mild aortic stenosis and nonobstructive CAD by catheterization in 2021 presenting to the emergency room with some waxing and waning chest discomfort of a nonexertional in nature.  His EKGs normal in his initial troponin is negative appears seen in the ER for similar symptoms in the last several days.  We will perform an echocardiogram, and assuming no significant rise in troponin, a nuclear stress test in the morning.    Past Medical  History:   Diagnosis Date    CAD (coronary artery disease) 02/22/2021    Cath 2021  There was non-obstructive coronary artery disease..   Left main:  Distal 10% stenosis  Lad:  Luminal irregularities.  Mid 10-20% stenosis   Circumflex:  Luminal irregularities   RCA:  Luminal irregularities    Calcified granuloma of lung 02/03/2023    LLL calcified granuloma seen on CT Chest Lung Screening Low Dose 01/31/2018    Colon polyp     Erectile dysfunction 02/14/2020    Fatty liver     Generalized anxiety disorder 01/11/2017    GERD (gastroesophageal reflux disease)     Gout, unspecified     History of colonic polyps 11/16/2023    2 polyps 11/22 -5 yrs    Hyperlipidemia     Hypertension     Psychophysiological insomnia 07/24/2017    Tobacco dependence 07/24/2017       Past Surgical History:   Procedure Laterality Date    APPENDECTOMY      BONE RESECTION, RIB      for thoracic outlet syndrome    COLONOSCOPY N/A 07/21/2017    Procedure: COLONOSCOPY;  Surgeon: Deepak Servin MD;  Location: [facility] ENDO;  Service: Endoscopy;  Laterality: N/A;    COLONOSCOPY N/A 11/22/2022    Procedure: COLONOSCOPY;  Surgeon: Sam Obrien MD;  Location: [facility] ENDO;  Service: Endoscopy;  Laterality: N/A;  vacc-sutab-inst portal-tb    COLONOSCOPY N/A 2/28/2025    Procedure: COLONOSCOPY;  Surgeon: Juanjose Martinez MD;  Location: [facility] ENDO;  Service: Gastroenterology;  Laterality: N/A;  2/24-marcus-suprep-portal-tb  2/25 - R/S inst portal - LW    HAND SURGERY      LEFT HEART CATHETERIZATION Left 03/02/2021    Procedure: Left heart cath, radial, 730 am;  Surgeon: Vladimir Avalos MD;  Location: [facility] CATH LAB;  Service: Cardiology;  Laterality: Left;  RN PREOP 2/25/2021--COVID NEGATIVE ON 3/1  H/P INCOMPLETE    SCROTAL SURGERY      mass    ULTRASOUND GUIDANCE  03/02/2021    Procedure: Ultrasound Guidance;  Surgeon: Vladimir Avalos MD;  Location: [facility] CATH LAB;  Service: Cardiology;;    VASECTOMY  40 years       Review of patient's allergies indicates:   Allergen  Reactions    Codeine Itching    Ibuprofen Itching    Remeron [mirtazapine] Anxiety     Did not help the patient sleep and created anxiety.       No current facility-administered medications on file prior to encounter.     Current Outpatient Medications on File Prior to Encounter   Medication Sig    amLODIPine (NORVASC) 10 MG tablet Take 10 mg by mouth once daily.    aspirin (ECOTRIN) 81 MG EC tablet Take 1 tablet (81 mg total) by mouth once daily.    atorvastatin (LIPITOR) 40 MG tablet TAKE 1 TABLET BY MOUTH EVERY DAY    busPIRone (BUSPAR) 10 MG tablet Take 1 tablet (10 mg total) by mouth 2 (two) times daily.    ferrous sulfate 325 (65 FE) MG EC tablet Take 325 mg by mouth 3 (three) times daily with meals.    losartan (COZAAR) 25 MG tablet Take 1 tablet (25 mg total) by mouth once daily.    multivitamin capsule Take 1 capsule by mouth once daily.    omeprazole (PRILOSEC) 40 MG capsule TAKE 1 CAPSULE BY MOUTH TWICE  DAILY    tadalafiL (CIALIS) 5 MG tablet Take 1 tablet (5 mg total) by mouth once daily.    tamsulosin (FLOMAX) 0.4 mg Cap Take 1 capsule (0.4 mg total) by mouth once daily.    UNABLE TO FIND OTC Daily multivitamin  OTC Probiotic    [DISCONTINUED] propranoloL (INDERAL) 10 MG tablet TAKE 1 TABLET (10 MG TOTAL) BY MOUTH 3 (THREE) TIMES DAILY AS NEEDED (TREMORS OR ANXIETY).     Family History       Problem Relation (Age of Onset)    Asthma Sister    Cancer Father, Sister (78)    Heart disease Father    Lupus Daughter    No Known Problems Mother, Son    Stroke Brother          Tobacco Use    Smoking status: Some Days     Current packs/day: 0.50     Average packs/day: 0.5 packs/day for 61.8 years (30.9 ttl pk-yrs)     Types: Cigarettes     Start date: 1967     Passive exposure: Current    Smokeless tobacco: Never    Tobacco comments:     0.5 ppd or less    Substance and Sexual Activity    Alcohol use: Not Currently    Drug use: No    Sexual activity: Yes     Partners: Female     Review of Systems    Constitutional: Negative for chills, diaphoresis, fever and malaise/fatigue.   HENT:  Negative for nosebleeds.    Eyes:  Negative for blurred vision and double vision.   Cardiovascular:  Positive for chest pain. Negative for claudication, cyanosis, dyspnea on exertion, leg swelling, orthopnea, palpitations, paroxysmal nocturnal dyspnea and syncope.   Respiratory:  Negative for cough, shortness of breath and wheezing.    Skin:  Negative for dry skin and poor wound healing.   Musculoskeletal:  Negative for back pain, joint swelling and myalgias.   Gastrointestinal:  Negative for abdominal pain, nausea and vomiting.   Genitourinary:  Negative for hematuria.   Neurological:  Negative for dizziness, headaches, numbness, seizures and weakness.   Psychiatric/Behavioral:  Negative for altered mental status and depression.      Objective:     Vital Signs (Most Recent):  Temp: 97.8 °F (36.6 °C) (06/16/25 1513)  Pulse: (!) 56 (06/16/25 1600)  Resp: 18 (06/16/25 1513)  BP: (!) 141/63 (06/16/25 1600)  SpO2: 100 % (06/16/25 1600) Vital Signs (24h Range):  Temp:  [97.8 °F (36.6 °C)] 97.8 °F (36.6 °C)  Pulse:  [56-63] 56  Resp:  [18] 18  SpO2:  [99 %-100 %] 100 %  BP: (141-158)/(63-71) 141/63     Weight: 80.7 kg (178 lb)  Body mass index is 27.06 kg/m².    SpO2: 100 %       No intake or output data in the 24 hours ending 06/16/25 1630    Lines/Drains/Airways       Peripheral Intravenous Line  Duration                  Peripheral IV - Single Lumen 06/16/25 1529 20 G Anterior;Right Forearm <1 day                     Physical Exam  Constitutional:       General: He is not in acute distress.     Appearance: Normal appearance. He is well-developed and normal weight. He is not ill-appearing, toxic-appearing or diaphoretic.   HENT:      Head: Normocephalic and atraumatic.   Eyes:      General: No scleral icterus.     Extraocular Movements: Extraocular movements intact.      Conjunctiva/sclera: Conjunctivae normal.      Pupils: Pupils  are equal, round, and reactive to light.   Neck:      Thyroid: No thyromegaly.      Vascular: No JVD.      Trachea: No tracheal deviation.   Cardiovascular:      Rate and Rhythm: Normal rate and regular rhythm.      Heart sounds: S1 normal and S2 normal. Murmur heard.      Systolic murmur is present with a grade of 2/6.      No friction rub. No gallop.   Pulmonary:      Effort: Pulmonary effort is normal. No respiratory distress.      Breath sounds: Normal breath sounds. No stridor. No wheezing, rhonchi or rales.   Chest:      Chest wall: No tenderness.   Abdominal:      General: There is no distension.      Palpations: Abdomen is soft.   Musculoskeletal:         General: No swelling or tenderness. Normal range of motion.      Cervical back: Normal range of motion and neck supple. No rigidity.      Right lower leg: No edema.      Left lower leg: No edema.   Skin:     General: Skin is warm and dry.      Coloration: Skin is not jaundiced.   Neurological:      General: No focal deficit present.      Mental Status: He is alert and oriented to person, place, and time.      Cranial Nerves: No cranial nerve deficit.   Psychiatric:         Mood and Affect: Mood normal.         Behavior: Behavior normal.          Current Medications:   ketorolac  15 mg Intravenous ED 1 Time    oxyCODONE  5 mg Oral ED 1 Time           Laboratory (all labs reviewed):  CBC:  Recent Labs   Lab 08/15/24  1444 02/18/25  0741 05/26/25  1515 06/12/25  1427 06/16/25  1528   WBC 7.21 5.93 7.17 6.07 6.68   Hemoglobin 13.7 L 12.6 L  --   --   --    HGB  --   --  10.8 L 11.2 L 11.0 L   Hematocrit 42.1 39.5 L  --   --   --    HCT  --   --  34.9 L 36.1 L 36.1 L   Platelet Count  --   --  235 228 231   Platelets 228 293  --   --   --        CHEMISTRIES:  Recent Labs   Lab 01/09/24  0835 08/15/24  1444 02/18/25  0741 06/12/25  1427 06/16/25  1528   Glucose 111 H 104 94 110 137 H   Sodium 141 139 139 142 141   Potassium 5.1 4.5 4.5 4.4 4.4   BUN 12 18 14 14  16   Creatinine 1.0 1.2 1.1 1.1 1.1   eGFR >60.0 >60 >60 >60 >60   Calcium 9.9 10.0 9.3 9.5 9.1       CARDIAC BIOMARKERS:  Recent Labs   Lab 11/03/23  1647 08/15/24  1444 08/15/24  1648 06/12/25  1427 06/16/25  1528   Troponin I <0.006 <0.006 <0.006  --   --    Troponin-I  --   --   --  0.019 <0.006       COAGS:        LIPIDS/LFTS:  Recent Labs   Lab 06/02/23  1053 11/03/23  1647 01/09/24  0835 08/15/24  1444 02/18/25  0741 06/12/25  1427 06/16/25  1528   Cholesterol 125  --   --   --  136  --   --    Triglycerides 49  --   --   --  66  --   --    HDL 39 L  --   --   --  48  --   --    LDL Cholesterol 76.2  --   --   --  74.8  --   --    Non-HDL Cholesterol 86  --   --   --  88  --   --    AST 22   < > 22 33 20 21 16   ALT 21   < > 17 23 16 16 17    < > = values in this interval not displayed.       BNP:  Recent Labs   Lab 11/03/23  1647 08/15/24  1444 06/12/25  1427 06/16/25  1528   BNP 60 34 79 90       TSH:  Recent Labs   Lab 11/16/23  1335 02/18/25  0741   TSH 0.725 1.549       Free T4:        Diagnostic Results:  ECG (personally reviewed and interpreted tracing(s)):  6/16/25 1507 SR 59    Chest X-Ray (personally reviewed and interpreted image(s)): 6/16/25 NAD    Echo: 8/27/24 (repeat ordered)    Left Ventricle: The left ventricle is normal in size. There is mild concentric hypertrophy. There is normal systolic function with a visually estimated ejection fraction of 65 - 70%.    Right Ventricle: Normal right ventricular cavity size. Systolic function is normal.    Aortic Valve: There is mild to moderate stenosis. Aortic valve area by VTI is 1.54 cm². Aortic valve peak velocity is 2.77 m/s. Mean gradient is 19 mmHg. The dimensionless index is 0.37. There is mild aortic regurgitation.    Pulmonary Artery: The estimated pulmonary artery systolic pressure is 24 mmHg.    IVC/SVC: Normal venous pressure at 3 mmHg.    Carotid US 10/7/22  There is 0-19% right Internal Carotid Stenosis.  There is 0-19% left Internal  Carotid Stenosis.    Cath: 3/2/21  Left main:  Distal 10% stenosis  Lad:  Luminal irregularities.  Mid 10-20% stenosis  Circumflex:  Luminal irregularities   RCA:  Luminal irregularities    Stress Test: Ex MPI 1/29/21 (repeat planned 6/17/25)    Normal myocardial perfusion scan. There is no evidence of myocardial ischemia or infarction.    There is a  mild intensity fixed defect in the inferobasilar wall of the left ventricle secondary to diaphragm attenuation.    The gated perfusion images showed an ejection fraction of 68% post stress.    There is normal wall motion post stress.    The EKG portion of this study is positive for ischemia.  There was 1.5 mm of horizontal ST segment depression noted during stress. There were no arrhythmias during stress. There was hypertensive blood pressure response with stress.   The patient exercised for 8 minutes 32 seconds on a Aric protocol, corresponding to a functional capacity of 10 METS, achieving a peak heart rate of 127 bpm, which is 85 % of the age predicted maximum heart rate. The patient reported shortness of breath during the stress test. The test was stopped because the patient requested it and they experienced fatigue and leg fatigue. Additionally, the end of the protocol was reached.         Assessment and Plan:     * Precordial pain  Somewhat atypical chest pain, although waxing and waning over the past several days.    EKG is nonischemic and initial troponin negative.    We will repeat troponin assuming no significant rise, we will plan nuclear stress test in a.m..    Check echocardiogram.    Nonrheumatic aortic valve stenosis  Mild by echo, repeat ordered    Hypertension  Cont med rx    Hyperlipidemia  Cont statin          VTE Risk Mitigation (From admission, onward)      None            Thank you for your consult. I will follow-up with patient. Please contact us if you have any additional questions.    Yunier Kimbrough MD  Cardiology   [facility] [place] - Emergency  Dept

## 2025-06-16 NOTE — ASSESSMENT & PLAN NOTE
Somewhat atypical chest pain, although waxing and waning over the past several days.    EKG is nonischemic and initial troponin negative.    We will repeat troponin assuming no significant rise, we will plan nuclear stress test in a.m..    Check echocardiogram.

## 2025-06-16 NOTE — PROGRESS NOTES
CARDIOVASCULAR CONSULTATION    REASON FOR CONSULT:   Yair Owens is a 75 y.o. male who presents for evaluation of chest pressure.      HISTORY OF PRESENT ILLNESS:     Patient is a pleasant 61-year-old man.  Came here because evaluation of chest pressure to the emergency room.  Was ruled out.  EKG was done which showed normal sinus rhythm.  States the pressure was substernal, and was much worse than the usual pressure which he feels.  States that he usually feels chest pain and tightness.  Unrelated to activity.  Sometimes can come with activity other times at rest.  Denies orthopnea, PND.  Does have mild dyspnea on exertion.  Used to smoke, but cutting down.    Notes from January 2020:    Patient doing fine.  Denies any further episodes of chest pains, orthopnea, PND.  States that he thinks it is his anxiety.  Stress testing did not reveal any significant issues apart from mild aortic valve stenosis.      The perfusion scan is free of evidence from myocardial ischemia or injury.    There is a  mild intensity fixed defect in the inferior wall of the left ventricle secondary to diaphragm attenuation.    Gated perfusion images showed an ejection fraction of 60% post stress.    The EKG portion of this study is negative for ischemia.    The patient reported no chest pain during the stress test.    There were no arrhythmias during stress.      Normal left ventricular systolic function. The estimated ejection fraction is 60%.  Concentric left ventricular hypertrophy.  Normal LV diastolic function.  Normal right ventricular systolic function.  Mild-to-moderate aortic valve stenosis.  Aortic valve area is 1.80 cm2; peak velocity is 2.60 m/s; mean gradient is 18 mmHg.  Mild aortic regurgitation.    Notes from may 2020:    The patient location is: his home   The chief complaint leading to consultation is: chest pain    Visit type: audiovisual    Face to Face time with patient: 15 mins   25 minutes of total time spent on  the encounter, which includes face to face time and non-face to face time preparing to see the patient (eg, review of tests), Obtaining and/or reviewing separately obtained history, Documenting clinical information in the electronic or other health record, Independently interpreting results (not separately reported) and communicating results to the patient/family/caregiver, or Care coordination (not separately reported).         Each patient to whom he or she provides medical services by telemedicine is:  (1) informed of the relationship between the physician and patient and the respective role of any other health care provider with respect to management of the patient; and (2) notified that he or she may decline to receive medical services by telemedicine and may withdraw from such care at any time.    Notes:  Patient here for follow-up.  Denies any chest pain at rest on exertion, orthopnea, PND.  States that he has been feeling great.  However he has started smoking more again because of the pandemic and the stress related with the pandemic.        Jan 2021:      The patient location is: his home  The chief complaint leading to consultation is: chest pain    Visit type: audiovisual    Face to Face time with patient: 15 mins  25 minutes of total time spent on the encounter, which includes face to face time and non-face to face time preparing to see the patient (eg, review of tests), Obtaining and/or reviewing separately obtained history, Documenting clinical information in the electronic or other health record, Independently interpreting results (not separately reported) and communicating results to the patient/family/caregiver, or Care coordination (not separately reported).         Each patient to whom he or she provides medical services by telemedicine is:  (1) informed of the relationship between the physician and patient and the respective role of any other health care provider with respect to management of  the patient; and (2) notified that he or she may decline to receive medical services by telemedicine and may withdraw from such care at any time.    Notes:  Patient here via audiovisual visit.  Had an episode of chest pain which brought him to the ER.  Describes it as left-sided.  At rest.  EKG was done which showed normal sinus rhythm with nonspecific ST changes and some mild ST depressions.  Since then has not had any further chest pains.  States that that day his blood pressure was high around 170 mm of mercury.  Doing fine.      Notes from feb 2021    The patient location is:  his home  The chief complaint leading to consultation is:  Chest pain    Visit type: audiovisual    Face to Face time with patient: 20 mins  30  minutes of total time spent on the encounter, which includes face to face time and non-face to face time preparing to see the patient (eg, review of tests), Obtaining and/or reviewing separately obtained history, Documenting clinical information in the electronic or other health record, Independently interpreting results (not separately reported) and communicating results to the patient/family/caregiver, or Care coordination (not separately reported).         Each patient to whom he or she provides medical services by telemedicine is:  (1) informed of the relationship between the physician and patient and the respective role of any other health care provider with respect to management of the patient; and (2) notified that he or she may decline to receive medical services by telemedicine and may withdraw from such care at any time.    Notes:       Patient here follow-up via audiovisual visit.  Occasional chest pain.  Really in the center of the chest and feels like tightness.  Sometimes in the left side and sometimes in the left shoulder.  Feels the left-sided pain goes to his left shoulder.  No relation with activity.  Can occur at rest or on exertion.  Stress test showed normal myocardial  perfusion.  Although the EKG portion was positive for ischemia.    Normal myocardial perfusion scan. There is no evidence of myocardial ischemia or infarction.    There is a  mild intensity fixed defect in the inferobasilar wall of the left ventricle secondary to diaphragm attenuation.    The gated perfusion images showed an ejection fraction of 68% post stress.    There is normal wall motion post stress.    The EKG portion of this study is positive for ischemia.      The left ventricle is normal in size with mild concentric hypertrophy and normal systolic function. The estimated ejection fraction is 60%  Normal right ventricular size with normal right ventricular systolic function.  There is mild aortic valve stenosis.  Aortic valve area is 1.87 cm2; peak velocity is 2.19 m/s; mean gradient is 10 mmHg.    Notes from March 2021:  Patient here for follow-up after angiogram.  Mild nonobstructive CAD on angiogram.  No complications from angiogram.  Doing fine.  Denies any chest pains at rest on exertion.  States he thinks it is his anxiety which is bothering him.    The estimated blood loss was <50 mL.  There was non-obstructive coronary artery disease..        Left main:  Distal 10% stenosis     Lad:  Luminal irregularities.  Mid 10-20% stenosis     Circumflex:  Luminal irregularities     RCA:  Luminal irregularities     Access: We accessed the right radial artery using ultrasound guidance. The vessel appeared widely patent, suitable for endovascular access. The images were interpreted by me and saved.  Access was closed with radial band.           June 21:    The patient location is: his home  The chief complaint leading to consultation is: fu    Visit type: audiovisual    Face to Face time with patient: 15 min  25  minutes of total time spent on the encounter, which includes face to face time and non-face to face time preparing to see the patient (eg, review of tests), Obtaining and/or reviewing separately obtained  history, Documenting clinical information in the electronic or other health record, Independently interpreting results (not separately reported) and communicating results to the patient/family/caregiver, or Care coordination (not separately reported).         Each patient to whom he or she provides medical services by telemedicine is:  (1) informed of the relationship between the physician and patient and the respective role of any other health care provider with respect to management of the patient; and (2) notified that he or she may decline to receive medical services by telemedicine and may withdraw from such care at any time.    Notes:   Has been having left sided chest pain, not related to exertion, pin point in the left of the chest. Does not occur when he is cutting his lawn.  Denies orthopnea, PND, swelling of feet.  States blood pressure is usually well controlled at home.      September 2022: Patient here for follow-up.  Denies any chest pains at rest on exertion, orthopnea, PND.  Has been doing fine.    MARCH 23:  Follow-up.  Denies any chest pains at rest on exertion, orthopnea, PND, swelling of feet.      Notes from October 2023: Patient here for follow-up.  Doing fine.  Walks about 30 miles a week.  Denies chest pains at rest on exertion, orthopnea, PND.    Notes from August 24: Patient here for follow-up.  Denies any anginal sounding chest pains.  Occasional burping.  On omeprazole.  Which helps.  Denies orthopnea PND swelling of feet.  No chest pains on exertion      Sept 24:  Patient here for follow-up.  Denies chest pains at rest on exertion orthopnea or PND.  Walks 35 miles a week.  Without any issues.    Results for orders placed during the hospital encounter of 08/27/24    Echo    Interpretation Summary    Left Ventricle: The left ventricle is normal in size. There is mild concentric hypertrophy. There is normal systolic function with a visually estimated ejection fraction of 65 - 70%.    Right  Ventricle: Normal right ventricular cavity size. Systolic function is normal.    Aortic Valve: There is mild to moderate stenosis. Aortic valve area by VTI is 1.54 cm². Aortic valve peak velocity is 2.77 m/s. Mean gradient is 19 mmHg. The dimensionless index is 0.37. There is mild aortic regurgitation.    Pulmonary Artery: The estimated pulmonary artery systolic pressure is 24 mmHg.    IVC/SVC: Normal venous pressure at 3 mmHg.      March 25:    History of Present Illness    CHIEF COMPLAINT:  Yair presents today for cardiac follow up    CARDIOVASCULAR:  Heart rate occasionally drops into the 50s, which he wonders if could be attributed to his regular exercise routine. He denies chest pain, tightness, shortness of breath, or palpitations. Echo from August showed mild to moderate aortic valve stenosis.    EXERCISE:  He walks 30-35 miles weekly without any problems.    SLEEP:  He reports difficulty sleeping at night, which he has not yet discussed with his primary care physician.    MEDICATIONS:  His cardiac medications include amlodipine 10 mg daily, aspirin, atorvastatin, and losartan.         Notes from June 25    The patient location is: [place]  The chief complaint leading to consultation is:  Chest pains    Visit type: audiovisual    Face to Face time with patient: 10 min  25 minutes of total time spent on the encounter, which includes face to face time and non-face to face time preparing to see the patient (eg, review of tests), Obtaining and/or reviewing separately obtained history, Documenting clinical information in the electronic or other health record, Independently interpreting results (not separately reported) and communicating results to the patient/family/caregiver, or Care coordination (not separately reported).         Each patient to whom he or she provides medical services by telemedicine is:  (1) informed of the relationship between the physician and patient and the respective role of any other  health care provider with respect to management of the patient; and (2) notified that he or she may decline to receive medical services by telemedicine and may withdraw from such care at any time.    Notes:  Patient complaining of on and off chest pains all morning.  Also complains of occasional bradycardia.  Recently came to the ER for chest pain also.  States today chest pain has been going on and off and he feels nauseated also.            PAST MEDICAL HISTORY:     Past Medical History:   Diagnosis Date    CAD (coronary artery disease) 02/22/2021    Cath 2021  There was non-obstructive coronary artery disease..   Left main:  Distal 10% stenosis  Lad:  Luminal irregularities.  Mid 10-20% stenosis   Circumflex:  Luminal irregularities   RCA:  Luminal irregularities    Calcified granuloma of lung 02/03/2023    LLL calcified granuloma seen on CT Chest Lung Screening Low Dose 01/31/2018    Colon polyp     Erectile dysfunction 02/14/2020    Fatty liver     Generalized anxiety disorder 01/11/2017    GERD (gastroesophageal reflux disease)     Gout, unspecified     History of colonic polyps 11/16/2023    2 polyps 11/22 -5 yrs    Hyperlipidemia     Hypertension     Psychophysiological insomnia 07/24/2017    Tobacco dependence 07/24/2017       PAST SURGICAL HISTORY:     Past Surgical History:   Procedure Laterality Date    APPENDECTOMY      BONE RESECTION, RIB      for thoracic outlet syndrome    COLONOSCOPY N/A 07/21/2017    Procedure: COLONOSCOPY;  Surgeon: Deepak Servin MD;  Location: [facility] ENDO;  Service: Endoscopy;  Laterality: N/A;    COLONOSCOPY N/A 11/22/2022    Procedure: COLONOSCOPY;  Surgeon: Sam Obrien MD;  Location: [facility] ENDO;  Service: Endoscopy;  Laterality: N/A;  vacc-sutab-inst portal-tb    COLONOSCOPY N/A 2/28/2025    Procedure: COLONOSCOPY;  Surgeon: Juanojse Martinez MD;  Location: [facility] ENDO;  Service: Gastroenterology;  Laterality: N/A;  2/24-marcus-suprep-portal-tb  2/25 - R/S inst portal - LW    HAND  SURGERY      LEFT HEART CATHETERIZATION Left 03/02/2021    Procedure: Left heart cath, radial, 730 am;  Surgeon: Vladimir Avalos MD;  Location: [facility] CATH LAB;  Service: Cardiology;  Laterality: Left;  RN PREOP 2/25/2021--COVID NEGATIVE ON 3/1  H/P INCOMPLETE    SCROTAL SURGERY      mass    ULTRASOUND GUIDANCE  03/02/2021    Procedure: Ultrasound Guidance;  Surgeon: Vladimir Avalos MD;  Location: [facility] CATH LAB;  Service: Cardiology;;    VASECTOMY  40 years       ALLERGIES AND MEDICATION:     Review of patient's allergies indicates:   Allergen Reactions    Codeine Itching    Ibuprofen Itching    Remeron [mirtazapine] Anxiety     Did not help the patient sleep and created anxiety.        Medication List            Accurate as of June 16, 2025  2:47 PM. If you have any questions, ask your nurse or doctor.                CONTINUE taking these medications      amLODIPine 10 MG tablet  Commonly known as: NORVASC     aspirin 81 MG EC tablet  Commonly known as: ECOTRIN  Take 1 tablet (81 mg total) by mouth once daily.     atorvastatin 40 MG tablet  Commonly known as: LIPITOR  TAKE 1 TABLET BY MOUTH EVERY DAY     busPIRone 10 MG tablet  Commonly known as: BUSPAR  Take 1 tablet (10 mg total) by mouth 2 (two) times daily.     ferrous sulfate 325 (65 FE) MG EC tablet     losartan 25 MG tablet  Commonly known as: COZAAR  Take 1 tablet (25 mg total) by mouth once daily.     multivitamin capsule     omeprazole 40 MG capsule  Commonly known as: PRILOSEC  TAKE 1 CAPSULE BY MOUTH TWICE  DAILY     tadalafiL 5 MG tablet  Commonly known as: CIALIS  Take 1 tablet (5 mg total) by mouth once daily.     tamsulosin 0.4 mg Cap  Commonly known as: FLOMAX  Take 1 capsule (0.4 mg total) by mouth once daily.     UNABLE TO FIND              SOCIAL HISTORY:     Social History     Socioeconomic History    Marital status:    Occupational History     Employer: [employer]   Tobacco Use    Smoking status: Some Days     Current packs/day: 0.50      Average packs/day: 0.5 packs/day for 61.8 years (30.9 ttl pk-yrs)     Types: Cigarettes     Start date: 1967     Passive exposure: Current    Smokeless tobacco: Never    Tobacco comments:     0.5 ppd or less    Substance and Sexual Activity    Alcohol use: Not Currently    Drug use: No    Sexual activity: Yes     Partners: Female     Social Drivers of Health     Financial Resource Strain: Low Risk  (11/15/2024)    Overall Financial Resource Strain (CARDIA)     Difficulty of Paying Living Expenses: Not hard at all   Food Insecurity: No Food Insecurity (11/15/2024)    Hunger Vital Sign     Worried About Running Out of Food in the Last Year: Never true     Ran Out of Food in the Last Year: Never true   Transportation Needs: No Transportation Needs (4/11/2024)    PRAPARE - Transportation     Lack of Transportation (Medical): No     Lack of Transportation (Non-Medical): No   Physical Activity: Sufficiently Active (11/15/2024)    Exercise Vital Sign     Days of Exercise per Week: 7 days     Minutes of Exercise per Session: 60 min   Stress: No Stress Concern Present (11/15/2024)    [employer] - Occupational Stress Questionnaire     Feeling of Stress : Only a little   Housing Stability: Low Risk  (4/11/2024)    Housing Stability Vital Sign     Unable to Pay for Housing in the Last Year: No     Number of Places Lived in the Last Year: 1     Unstable Housing in the Last Year: No       FAMILY HISTORY:     Family History   Problem Relation Name Age of Onset    No Known Problems Mother      Cancer Father DARYL GLYNN     Heart disease Father DARYL GLYNN     Asthma Sister REGINA ANNA     Cancer Sister REGINA ANNA 78        Rectal cancer    Stroke Brother Daryl     Lupus Daughter x1     No Known Problems Son x3        REVIEW OF SYSTEMS:   Review of Systems   Constitutional: Negative.   HENT: Negative.     Eyes: Negative.    Cardiovascular:  Positive for chest pain.   Respiratory: Negative.    "  Endocrine: Negative.    Hematologic/Lymphatic: Negative.    Skin: Negative.    Musculoskeletal: Negative.    Gastrointestinal: Negative.    Genitourinary: Negative.    Neurological: Negative.    Psychiatric/Behavioral: Negative.     Allergic/Immunologic: Negative.        A 10 point review of systems was performed and all the pertinent positives have been mentioned. Rest of review of systems was negative.        PHYSICAL EXAM:     Vitals:    06/16/25 1434   Resp: 18    Body mass index is 27.06 kg/m².      Height: 5' 8" (172.7 cm)      Physical Exam  Vitals and nursing note reviewed.   Constitutional:       Appearance: Normal appearance. He is well-developed.   HENT:      Head: Normocephalic and atraumatic.      Right Ear: Hearing normal.      Left Ear: Hearing normal.      Nose: Nose normal.   Eyes:      General: Lids are normal.      Conjunctiva/sclera: Conjunctivae normal.      Pupils: Pupils are equal, round, and reactive to light.   Cardiovascular:      Rate and Rhythm: Normal rate and regular rhythm.      Pulses: Normal pulses.      Heart sounds: Murmur heard.   Pulmonary:      Effort: Pulmonary effort is normal.      Breath sounds: Normal breath sounds.   Abdominal:      Palpations: Abdomen is soft.      Tenderness: There is no abdominal tenderness.   Musculoskeletal:         General: No deformity.      Cervical back: Normal range of motion and neck supple.   Skin:     General: Skin is warm and dry.   Neurological:      Mental Status: He is alert and oriented to person, place, and time.   Psychiatric:         Speech: Speech normal.              DATA:     Laboratory:  CBC:  Recent Labs   Lab 02/18/25  0741 05/26/25  1515 06/12/25  1427   WBC 5.93 7.17 6.07   Hemoglobin 12.6 L  --   --    HGB  --  10.8 L 11.2 L   Hematocrit 39.5 L  --   --    HCT  --  34.9 L 36.1 L   Platelet Count  --  235 228   Platelets 293  --   --        CHEMISTRIES:  Recent Labs   Lab 08/15/24  1444 02/18/25  0741 06/12/25  1427   Glucose " 104 94 110   Sodium 139 139 142   Potassium 4.5 4.5 4.4   BUN 18 14 14   Creatinine 1.2 1.1 1.1   Calcium 10.0 9.3 9.5       CARDIAC BIOMARKERS:  Recent Labs   Lab 08/15/24  1444 08/15/24  1648 06/12/25  1427   Troponin I <0.006 <0.006  --    Troponin-I  --   --  0.019       COAGS:        LIPIDS/LFTS:  Recent Labs   Lab 06/02/23  1053 11/03/23  1647 08/15/24  1444 02/18/25  0741 06/12/25  1427   Cholesterol 125  --   --  136  --    Triglycerides 49  --   --  66  --    HDL 39 L  --   --  48  --    LDL Cholesterol 76.2  --   --  74.8  --    Non-HDL Cholesterol 86  --   --  88  --    AST 22   < > 33 20 21   ALT 21   < > 23 16 16    < > = values in this interval not displayed.       Hemoglobin A1C   Date Value Ref Range Status   02/18/2025 5.2 4.0 - 5.6 % Final     Comment:     ADA Screening Guidelines:  5.7-6.4%  Consistent with prediabetes  >or=6.5%  Consistent with diabetes    High levels of fetal hemoglobin interfere with the HbA1C  assay. Heterozygous hemoglobin variants (HbS, HgC, etc)do  not significantly interfere with this assay.   However, presence of multiple variants may affect accuracy.     11/16/2023 5.3 4.0 - 5.6 % Final     Comment:     ADA Screening Guidelines:  5.7-6.4%  Consistent with prediabetes  >or=6.5%  Consistent with diabetes    High levels of fetal hemoglobin interfere with the HbA1C  assay. Heterozygous hemoglobin variants (HbS, HgC, etc)do  not significantly interfere with this assay.   However, presence of multiple variants may affect accuracy.         TSH  Recent Labs   Lab 11/16/23  1335 02/18/25  0741   TSH 0.725 1.549       The 10-year ASCVD risk score (Francis METCALF, et al., 2019) is: 33.9%    Values used to calculate the score:      Age: 75 years      Sex: Male      Is Non- : No      Diabetic: No      Tobacco smoker: Yes      Systolic Blood Pressure: 152 mmHg      Is BP treated: Yes      HDL Cholesterol: 48 mg/dL      Total Cholesterol: 136 mg/dL              ASSESSMENT AND PLAN     Patient Active Problem List   Diagnosis    Hyperlipidemia    Hypertension    GERD (gastroesophageal reflux disease)    Generalized anxiety disorder    Tobacco dependence    Psychophysiological insomnia    Atherosclerosis of aorta    Erectile dysfunction    CAD (coronary artery disease)    Fatty liver    Hepatomegaly    Calcified granuloma of lung    Purpura    Lower urinary tract symptoms (LUTS)    History of colonic polyps    Overweight (BMI 25.0-29.9)    Other emphysema    Pleural plaque    LAD (lymphadenopathy), hilar    Polyp of colon    Iron deficiency anemia    Spontaneous ecchymoses    Encounter for long-term (current) use of aspirin     No orders of the defined types were placed in this encounter.    Chest pain: On and off all morning.  I have asked the patient to come urgently to the ER or call 911 to come to the ER.  Currently chest pain-free but it has been going on and off all day.  Will send a message to ER physician to consult Cardiology when he arrives to the ER.    Hypertension:  Continue current therapy    Mild to moderate aortic valve stenosis.  Follow-up with regular echocardiograms.     Smoking cessation has been highly encouraged.        Lab Results   Component Value Date    LDLCALC 74.8 02/18/2025       Thank you very much for involving me in the care of your patient.  Please do not hesitate to contact me if there are any questions.      Vladimir Avalos MD, FACC, [facility]  Interventional Cardiologist, [facility].       Visit today included increased complexity associated with the care of the episodic problem dyslipidemia, hypertension, aortic valve stenosis addressed and managing the longitudinal care of the patient due to the serious and/or complex managed problem(s) Problem List[1]  .    This note was dictated with the help of speech recognition software.  There might be un-intended errors and/or substitutions.                                                  [1]   Patient Active Problem List  Diagnosis    Hyperlipidemia    Hypertension    GERD (gastroesophageal reflux disease)    Generalized anxiety disorder    Tobacco dependence    Psychophysiological insomnia    Atherosclerosis of aorta    Erectile dysfunction    CAD (coronary artery disease)    Fatty liver    Hepatomegaly    Calcified granuloma of lung    Purpura    Lower urinary tract symptoms (LUTS)    History of colonic polyps    Overweight (BMI 25.0-29.9)    Other emphysema    Pleural plaque    LAD (lymphadenopathy), hilar    Polyp of colon    Iron deficiency anemia    Spontaneous ecchymoses    Encounter for long-term (current) use of aspirin

## 2025-06-17 ENCOUNTER — TELEPHONE (OUTPATIENT)
Dept: FAMILY MEDICINE | Facility: CLINIC | Age: 75
End: 2025-06-17
Payer: MEDICARE

## 2025-06-17 VITALS
DIASTOLIC BLOOD PRESSURE: 65 MMHG | SYSTOLIC BLOOD PRESSURE: 145 MMHG | WEIGHT: 178 LBS | HEART RATE: 58 BPM | BODY MASS INDEX: 26.98 KG/M2 | RESPIRATION RATE: 18 BRPM | TEMPERATURE: 98 F | HEIGHT: 68 IN | OXYGEN SATURATION: 99 %

## 2025-06-17 LAB
ABSOLUTE EOSINOPHIL (OHS): 0.11 K/UL
ABSOLUTE MONOCYTE (OHS): 0.68 K/UL (ref 0.3–1)
ABSOLUTE NEUTROPHIL COUNT (OHS): 2.94 K/UL (ref 1.8–7.7)
ANION GAP (OHS): 8 MMOL/L (ref 8–16)
BASOPHILS # BLD AUTO: 0.04 K/UL
BASOPHILS NFR BLD AUTO: 0.8 %
BUN SERPL-MCNC: 16 MG/DL (ref 8–23)
CALCIUM SERPL-MCNC: 8.8 MG/DL (ref 8.7–10.5)
CHLORIDE SERPL-SCNC: 108 MMOL/L (ref 95–110)
CO2 SERPL-SCNC: 24 MMOL/L (ref 23–29)
CREAT SERPL-MCNC: 1 MG/DL (ref 0.5–1.4)
CV STRESS BASE HR: 55 BPM
DIASTOLIC BLOOD PRESSURE: 74 MMHG
ERYTHROCYTE [DISTWIDTH] IN BLOOD BY AUTOMATED COUNT: 15 % (ref 11.5–14.5)
GFR SERPLBLD CREATININE-BSD FMLA CKD-EPI: >60 ML/MIN/1.73/M2
GLUCOSE SERPL-MCNC: 97 MG/DL (ref 70–110)
HCT VFR BLD AUTO: 32.4 % (ref 40–54)
HGB BLD-MCNC: 10.2 GM/DL (ref 14–18)
IMM GRANULOCYTES # BLD AUTO: 0.02 K/UL (ref 0–0.04)
IMM GRANULOCYTES NFR BLD AUTO: 0.4 % (ref 0–0.5)
LYMPHOCYTES # BLD AUTO: 1.34 K/UL (ref 1–4.8)
MAGNESIUM SERPL-MCNC: 2 MG/DL (ref 1.6–2.6)
MCH RBC QN AUTO: 28.1 PG (ref 27–31)
MCHC RBC AUTO-ENTMCNC: 31.5 G/DL (ref 32–36)
MCV RBC AUTO: 89 FL (ref 82–98)
NUC STRESS DIASTOLIC VOLUME INDEX: 95
NUC STRESS EJECTION FRACTION: 59 %
NUC STRESS SYSTOLIC VOLUME INDEX: 39
NUCLEATED RBC (/100WBC) (OHS): 0 /100 WBC
OHS CV CPX 1 MINUTE RECOVERY HEART RATE: 144 BPM
OHS CV CPX 85 PERCENT MAX PREDICTED HEART RATE MALE: 124
OHS CV CPX ESTIMATED METS: 9
OHS CV CPX MAX PREDICTED HEART RATE: 146
OHS CV CPX PATIENT IS FEMALE: 0
OHS CV CPX PATIENT IS MALE: 1
OHS CV CPX PEAK DIASTOLIC BLOOD PRESSURE: 84 MMHG
OHS CV CPX PEAK HEAR RATE: 144 BPM
OHS CV CPX PEAK RATE PRESSURE PRODUCT: NORMAL
OHS CV CPX PEAK SYSTOLIC BLOOD PRESSURE: 170 MMHG
OHS CV CPX PERCENT MAX PREDICTED HEART RATE ACHIEVED: 99
OHS CV CPX RATE PRESSURE PRODUCT PRESENTING: 7975
OHS CV INITIAL DOSE: 10.5 MCG/KG/MIN
OHS CV PEAK DOSE: 30.2 MCG/KG/MIN
OHS QRS DURATION: 78 MS
OHS QTC CALCULATION: 376 MS
PLATELET # BLD AUTO: 197 K/UL (ref 150–450)
PMV BLD AUTO: 11.1 FL (ref 9.2–12.9)
POTASSIUM SERPL-SCNC: 4.4 MMOL/L (ref 3.5–5.1)
RBC # BLD AUTO: 3.63 M/UL (ref 4.6–6.2)
RELATIVE EOSINOPHIL (OHS): 2.1 %
RELATIVE LYMPHOCYTE (OHS): 26.1 % (ref 18–48)
RELATIVE MONOCYTE (OHS): 13.3 % (ref 4–15)
RELATIVE NEUTROPHIL (OHS): 57.3 % (ref 38–73)
SODIUM SERPL-SCNC: 140 MMOL/L (ref 136–145)
STRESS ECHO POST EXERCISE DUR MIN: 7 MINUTES
STRESS ECHO POST EXERCISE DUR SEC: 26 SECONDS
STRESS ST DEPRESSION: 1 MM
SYSTOLIC BLOOD PRESSURE: 145 MMHG
TROPONIN I SERPL DL<=0.01 NG/ML-MCNC: 0.01 NG/ML
TROPONIN I SERPL DL<=0.01 NG/ML-MCNC: <0.006 NG/ML
WBC # BLD AUTO: 5.13 K/UL (ref 3.9–12.7)

## 2025-06-17 PROCEDURE — A9502 TC99M TETROFOSMIN: HCPCS | Performed by: HOSPITALIST

## 2025-06-17 PROCEDURE — 85025 COMPLETE CBC W/AUTO DIFF WBC: CPT | Performed by: PHYSICIAN ASSISTANT

## 2025-06-17 PROCEDURE — 63600175 PHARM REV CODE 636 W HCPCS: Performed by: PHYSICIAN ASSISTANT

## 2025-06-17 PROCEDURE — 84484 ASSAY OF TROPONIN QUANT: CPT | Performed by: PHYSICIAN ASSISTANT

## 2025-06-17 PROCEDURE — 25000003 PHARM REV CODE 250: Performed by: INTERNAL MEDICINE

## 2025-06-17 PROCEDURE — 96372 THER/PROPH/DIAG INJ SC/IM: CPT | Performed by: PHYSICIAN ASSISTANT

## 2025-06-17 PROCEDURE — G0378 HOSPITAL OBSERVATION PER HR: HCPCS

## 2025-06-17 PROCEDURE — 99499 UNLISTED E&M SERVICE: CPT | Mod: ,,, | Performed by: INTERNAL MEDICINE

## 2025-06-17 PROCEDURE — 84484 ASSAY OF TROPONIN QUANT: CPT | Mod: 91 | Performed by: PHYSICIAN ASSISTANT

## 2025-06-17 PROCEDURE — 83735 ASSAY OF MAGNESIUM: CPT | Performed by: PHYSICIAN ASSISTANT

## 2025-06-17 PROCEDURE — 82310 ASSAY OF CALCIUM: CPT | Performed by: PHYSICIAN ASSISTANT

## 2025-06-17 PROCEDURE — A4216 STERILE WATER/SALINE, 10 ML: HCPCS | Performed by: PHYSICIAN ASSISTANT

## 2025-06-17 PROCEDURE — 25000003 PHARM REV CODE 250: Performed by: PHYSICIAN ASSISTANT

## 2025-06-17 RX ORDER — ISOSORBIDE MONONITRATE 30 MG/1
30 TABLET, EXTENDED RELEASE ORAL DAILY
Qty: 30 TABLET | Refills: 11 | Status: SHIPPED | OUTPATIENT
Start: 2025-06-17 | End: 2026-06-17

## 2025-06-17 RX ORDER — ISOSORBIDE MONONITRATE 30 MG/1
30 TABLET, EXTENDED RELEASE ORAL DAILY
Status: DISCONTINUED | OUTPATIENT
Start: 2025-06-17 | End: 2025-06-18 | Stop reason: HOSPADM

## 2025-06-17 RX ORDER — HEPARIN SODIUM 5000 [USP'U]/ML
5000 INJECTION, SOLUTION INTRAVENOUS; SUBCUTANEOUS EVERY 8 HOURS
Status: DISCONTINUED | OUTPATIENT
Start: 2025-06-17 | End: 2025-06-18 | Stop reason: HOSPADM

## 2025-06-17 RX ADMIN — TETROFOSMIN 10.5 MILLICURIE: 1.38 INJECTION, POWDER, LYOPHILIZED, FOR SOLUTION INTRAVENOUS at 07:06

## 2025-06-17 RX ADMIN — HEPARIN SODIUM 5000 UNITS: 5000 INJECTION INTRAVENOUS; SUBCUTANEOUS at 12:06

## 2025-06-17 RX ADMIN — Medication 10 ML: at 12:06

## 2025-06-17 RX ADMIN — Medication 10 ML: at 05:06

## 2025-06-17 RX ADMIN — ASPIRIN 81 MG: 81 TABLET, COATED ORAL at 11:06

## 2025-06-17 RX ADMIN — ATORVASTATIN CALCIUM 40 MG: 40 TABLET, FILM COATED ORAL at 11:06

## 2025-06-17 RX ADMIN — ISOSORBIDE MONONITRATE 30 MG: 30 TABLET, EXTENDED RELEASE ORAL at 11:06

## 2025-06-17 RX ADMIN — TAMSULOSIN HYDROCHLORIDE 0.4 MG: 0.4 CAPSULE ORAL at 09:06

## 2025-06-17 RX ADMIN — TETROFOSMIN 30.2 MILLICURIE: 1.38 INJECTION, POWDER, LYOPHILIZED, FOR SOLUTION INTRAVENOUS at 08:06

## 2025-06-17 RX ADMIN — BUSPIRONE HYDROCHLORIDE 10 MG: 10 TABLET ORAL at 12:06

## 2025-06-17 NOTE — HOSPITAL COURSE
"Mr. Owens was placed in observation for ACS rule out after presenting with chest pain. He was seen and evaluated by Cardiology.    Per Dr. Kimbrough:  "Somewhat atypical chest pain, although waxing and waning over the past several days.    EKG is nonischemic and initial troponin negative.    Trops neg  Mild chest discomfort during ETT  MPI 6/17 with mild inferior ischemia (low risk findings).  I discussed options of intensification of medical therapy versus diagnostic angiography.  We have decided to initiate Imdur as a 2nd antianginal (resting HR too low for BBL).  The patient will follow up with Dr. Avalos.  If he continues to have symptoms, he can consider angiography at that point."    All findings and plan discussed with patient, all questions answered,he verbalizes understanding and agreement.  Discharged home in stable condition.   "

## 2025-06-17 NOTE — ASSESSMENT & PLAN NOTE
Somewhat atypical chest pain, although waxing and waning over the past several days.    EKG is nonischemic and initial troponin negative.    Trops neg  Mild chest discomfort during ETT  MPI 6/17 with mild inferior ischemia (low risk findings).  I discussed options of intensification of medical therapy versus diagnostic angiography.  We have decided to initiate Imdur as a 2nd antianginal.  The patient will follow up with Dr. Avalos.  If he continues to have symptoms, he can consider angiography at that point.

## 2025-06-17 NOTE — PLAN OF CARE
Case Management Assessment     PCP: Vick Mansfield  Pharmacy: [facility]    Patient Arrived From: home  Existing Help at Home: spouse    Barriers to Discharge: none    Discharge Plan:    A. Home with family   B. Home with family     06/17/25 1143   Discharge Planning   Assessment Type Discharge Planning Brief Assessment   Resource/Environmental Concerns none   Support Systems Spouse/significant other   Equipment Currently Used at Home none   Current Living Arrangements home   Patient/Family Anticipates Transition to home with family   Patient/Family Anticipated Services at Transition none   DME Needed Upon Discharge  none   Discharge Plan A Home with family   Discharge Plan B Home with family

## 2025-06-17 NOTE — ASSESSMENT & PLAN NOTE
Patient's blood pressure range in the last 24 hours was: BP  Min: 128/58  Max: 159/72.The patient's inpatient anti-hypertensive regimen is listed below:  Current Antihypertensives  amLODIPine tablet 10 mg, Daily, Oral  losartan tablet 25 mg, Daily, Oral    Plan  - BP is controlled, no changes needed to their regimen

## 2025-06-17 NOTE — SUBJECTIVE & OBJECTIVE
Past Medical History:   Diagnosis Date    CAD (coronary artery disease) 02/22/2021    Cath 2021  There was non-obstructive coronary artery disease..   Left main:  Distal 10% stenosis  Lad:  Luminal irregularities.  Mid 10-20% stenosis   Circumflex:  Luminal irregularities   RCA:  Luminal irregularities    Calcified granuloma of lung 02/03/2023    LLL calcified granuloma seen on CT Chest Lung Screening Low Dose 01/31/2018    Colon polyp     Erectile dysfunction 02/14/2020    Fatty liver     Generalized anxiety disorder 01/11/2017    GERD (gastroesophageal reflux disease)     Gout, unspecified     History of colonic polyps 11/16/2023    2 polyps 11/22 -5 yrs    Hyperlipidemia     Hypertension     Psychophysiological insomnia 07/24/2017    Tobacco dependence 07/24/2017       Past Surgical History:   Procedure Laterality Date    APPENDECTOMY      BONE RESECTION, RIB      for thoracic outlet syndrome    COLONOSCOPY N/A 07/21/2017    Procedure: COLONOSCOPY;  Surgeon: Deepak Servin MD;  Location: [facility] ENDO;  Service: Endoscopy;  Laterality: N/A;    COLONOSCOPY N/A 11/22/2022    Procedure: COLONOSCOPY;  Surgeon: Sam Obrien MD;  Location: [facility] ENDO;  Service: Endoscopy;  Laterality: N/A;  vacc-sutab-inst portal-tb    COLONOSCOPY N/A 2/28/2025    Procedure: COLONOSCOPY;  Surgeon: Juanjose Martinez MD;  Location: [facility] ENDO;  Service: Gastroenterology;  Laterality: N/A;  2/24-marcus-suprep-portal-tb  2/25 - R/S inst portal - LW    HAND SURGERY      LEFT HEART CATHETERIZATION Left 03/02/2021    Procedure: Left heart cath, radial, 730 am;  Surgeon: Vladimir Avalos MD;  Location: [facility] CATH LAB;  Service: Cardiology;  Laterality: Left;  RN PREOP 2/25/2021--COVID NEGATIVE ON 3/1  H/P INCOMPLETE    SCROTAL SURGERY      mass    ULTRASOUND GUIDANCE  03/02/2021    Procedure: Ultrasound Guidance;  Surgeon: Vladimir Avalos MD;  Location: [facility] CATH LAB;  Service: Cardiology;;    VASECTOMY  40 years       Review of patient's allergies indicates:    Allergen Reactions    Codeine Itching    Ibuprofen Itching    Remeron [mirtazapine] Anxiety     Did not help the patient sleep and created anxiety.       No current facility-administered medications on file prior to encounter.     Current Outpatient Medications on File Prior to Encounter   Medication Sig    amLODIPine (NORVASC) 10 MG tablet Take 10 mg by mouth once daily.    aspirin (ECOTRIN) 81 MG EC tablet Take 1 tablet (81 mg total) by mouth once daily.    atorvastatin (LIPITOR) 40 MG tablet TAKE 1 TABLET BY MOUTH EVERY DAY    busPIRone (BUSPAR) 10 MG tablet Take 1 tablet (10 mg total) by mouth 2 (two) times daily.    ferrous sulfate 325 (65 FE) MG EC tablet Take 325 mg by mouth 3 (three) times daily with meals.    losartan (COZAAR) 25 MG tablet Take 1 tablet (25 mg total) by mouth once daily.    multivitamin capsule Take 1 capsule by mouth once daily.    omeprazole (PRILOSEC) 40 MG capsule TAKE 1 CAPSULE BY MOUTH TWICE  DAILY    tadalafiL (CIALIS) 5 MG tablet Take 1 tablet (5 mg total) by mouth once daily.    tamsulosin (FLOMAX) 0.4 mg Cap Take 1 capsule (0.4 mg total) by mouth once daily.    UNABLE TO FIND OTC Daily multivitamin  OTC Probiotic    [DISCONTINUED] propranoloL (INDERAL) 10 MG tablet TAKE 1 TABLET (10 MG TOTAL) BY MOUTH 3 (THREE) TIMES DAILY AS NEEDED (TREMORS OR ANXIETY).     Family History       Problem Relation (Age of Onset)    Asthma Sister    Cancer Father, Sister (78)    Heart disease Father    Lupus Daughter    No Known Problems Mother, Son    Stroke Brother          Tobacco Use    Smoking status: Some Days     Current packs/day: 0.50     Average packs/day: 0.5 packs/day for 61.8 years (30.9 ttl pk-yrs)     Types: Cigarettes     Start date: 1967     Passive exposure: Current    Smokeless tobacco: Never    Tobacco comments:     0.5 ppd or less    Substance and Sexual Activity    Alcohol use: Not Currently    Drug use: No    Sexual activity: Yes     Partners: Female     Review of Systems    Constitutional:  Negative for chills, diaphoresis and fever.   Respiratory:  Negative for cough, chest tightness, shortness of breath and wheezing.    Cardiovascular:  Positive for chest pain. Negative for palpitations and leg swelling.   Gastrointestinal:  Positive for nausea. Negative for abdominal pain, constipation, diarrhea and vomiting.   Genitourinary:  Negative for difficulty urinating, dysuria, frequency, hematuria and urgency.   Musculoskeletal:  Negative for back pain, joint swelling, myalgias, neck pain and neck stiffness.   Skin:  Negative for rash and wound.   Neurological:  Positive for light-headedness. Negative for dizziness, syncope, weakness and headaches.   Hematological:  Does not bruise/bleed easily.   Psychiatric/Behavioral:  Negative for agitation, confusion and decreased concentration.      Objective:     Vital Signs (Most Recent):  Temp: 98.1 °F (36.7 °C) (06/16/25 2230)  Pulse: (!) 57 (06/16/25 2100)  Resp: 20 (06/16/25 2100)  BP: (!) 128/58 (06/16/25 2100)  SpO2: 99 % (06/16/25 2100) Vital Signs (24h Range):  Temp:  [97.8 °F (36.6 °C)-98.1 °F (36.7 °C)] 98.1 °F (36.7 °C)  Pulse:  [48-63] 57  Resp:  [18-21] 20  SpO2:  [98 %-100 %] 99 %  BP: (128-159)/(58-88) 128/58     Weight: 80.7 kg (178 lb)  Body mass index is 27.06 kg/m².     Physical Exam  Vitals and nursing note reviewed.   Constitutional:       General: He is not in acute distress.     Appearance: Normal appearance. He is well-developed and normal weight. He is not ill-appearing, toxic-appearing or diaphoretic.   HENT:      Head: Normocephalic and atraumatic.      Right Ear: External ear normal.      Left Ear: External ear normal.   Eyes:      General: No scleral icterus.        Right eye: No discharge.         Left eye: No discharge.      Conjunctiva/sclera: Conjunctivae normal.   Neck:      Vascular: No JVD.      Trachea: No tracheal deviation.   Cardiovascular:      Rate and Rhythm: Normal rate and regular rhythm.      Heart  "sounds: Normal heart sounds. No murmur heard.     No gallop.   Pulmonary:      Effort: Pulmonary effort is normal. No respiratory distress.      Breath sounds: Normal breath sounds. No stridor. No wheezing or rales.   Abdominal:      General: Bowel sounds are normal. There is no distension.      Palpations: Abdomen is soft. There is no mass.      Tenderness: There is no abdominal tenderness. There is no guarding.   Musculoskeletal:         General: No deformity. Normal range of motion.      Cervical back: Normal range of motion and neck supple.   Skin:     General: Skin is warm and dry.   Neurological:      General: No focal deficit present.      Mental Status: He is alert and oriented to person, place, and time.      Cranial Nerves: No cranial nerve deficit.      Motor: No abnormal muscle tone.      Coordination: Coordination normal.   Psychiatric:         Mood and Affect: Mood normal.         Behavior: Behavior normal.         Thought Content: Thought content normal.         Judgment: Judgment normal.                Significant Labs: All pertinent labs within the past 24 hours have been reviewed.  BMP:   Recent Labs   Lab 06/16/25  1528   *      K 4.4      CO2 24   BUN 16   CREATININE 1.1   CALCIUM 9.1     CBC:   Recent Labs   Lab 06/16/25  1528   WBC 6.68   HGB 11.0*   HCT 36.1*        CMP:   Recent Labs   Lab 06/16/25  1528      K 4.4      CO2 24   *   BUN 16   CREATININE 1.1   CALCIUM 9.1   PROT 7.1   ALBUMIN 3.9   BILITOT 0.7   ALKPHOS 64   AST 16   ALT 17   ANIONGAP 8     Cardiac Markers:   Recent Labs   Lab 06/16/25  1528   BNP 90     Troponin:   Recent Labs   Lab 06/16/25  1528 06/16/25  1828   TROPONINI <0.006 <0.006     Urine Culture: No results for input(s): "LABURIN" in the last 48 hours.  Urine Studies: No results for input(s): "COLORU", "APPEARANCEUA", "PHUR", "SPECGRAV", "PROTEINUA", "GLUCUA", "KETONESU", "BILIRUBINUA", "OCCULTUA", "NITRITE", " ""UROBILINOGEN", "LEUKOCYTESUR", "RBCUA", "WBCUA", "BACTERIA", "SQUAMEPITHEL", "HYALINECASTS" in the last 48 hours.    Invalid input(s): "WRIGHTSUR"    Significant Imaging: I have reviewed all pertinent imaging results/findings within the past 24 hours.  Imaging Results              X-Ray Chest AP Portable (Final result)  Result time 06/16/25 15:37:12      Final result by Brenden Rock MD (06/16/25 15:37:12)                   Impression:      As above.      Electronically signed by: Brenden Rock MD  Date:    06/16/2025  Time:    15:37               Narrative:    EXAMINATION:  XR CHEST AP PORTABLE    CLINICAL HISTORY:  Chest Pain;    TECHNIQUE:  Single frontal view of the chest was performed.    FINDINGS:  The lungs are clear.  There is no pneumothorax or pleural fluid.  The cardiac silhouette is not enlarged.  There is calcification of the aorta.  The osseous structures demonstrate degenerative change.                                     "

## 2025-06-17 NOTE — PLAN OF CARE
Case Management Final Discharge Note    Discharge Disposition: home     New DME ordered / company name: none    Relevant SDOH / Transition of Care Barriers:  none    Person available to provide assistance at home when needed and their contact information: Leigha Johnson (Spouse)  [phone] (Mobile)     Scheduled followup appointment: Cardiology on 6/19/25 @11:20am    Referrals placed: none    Transportation: family    Patient and family educated on discharge services and updated on DC plan. Bedside RN notified, patient clear to discharge from Case Management Perspective.      06/17/25 1205   Final Note   Assessment Type Final Discharge Note   Anticipated Discharge Disposition Home   What phone number can be called within the next 1-3 days to see how you are doing after discharge? [phone]   Hospital Resources/Appts/Education Provided Appointments scheduled and added to AVS   Post-Acute Status   Discharge Delays None known at this time

## 2025-06-17 NOTE — ASSESSMENT & PLAN NOTE
- Mr. Yair Owens presents with intermittent substernal chest pain/pressure  - EKG without ischemic changes  - troponin not elevated  - BNP not elevated  - chest x-ray without acute process, no pulmonary edema  - seen by cardiology in ED   - recommended echo   - trend troponin    - if no rise plan for nuclear stress in a.m.

## 2025-06-17 NOTE — H&P
"  [facility] Emergency [facility] Medicine  History & Physical    Patient Name: Yair Owens  MRN: [id number]  Patient Class: OP- Observation  Admission Date: 6/16/2025  Attending Physician: Jose A Jauregui MD   Primary Care Provider: Vick Mansfield MD         Patient information was obtained from patient, past medical records, and ER records.     Subjective:     Principal Problem:Precordial pain    Chief Complaint:   Chief Complaint   Patient presents with    Chest Pain     Pt arrived in ED, c/o intermittent midsternal CP, SOB and nausea since this morning. Pt also c/o "waves of dizziness" since last Thursday. Pt denies any HA, unilateral weakness, or vision changes. Pt reports having a virtual visit with his PCP this morning and was referred to ED. Pt denies any abd pain, vomiting or diarrhea.     Nausea        HPI: Mr. Yair Owens is a 75 y.o. male, with PMH of CAD, aortic stenosis (diameter 1.8 cm), anemia, HTN, who presented to [facility] ED on 6/16/25 due to chest pain with light headedness. He had substernal chest pain/pressure beginning 2 days ago, and had a non-ischemic/unremarkable workup. He states since that time he has felt well, but continues to have intermittent pressure. He notes he does have pressure somewhat regularly, but this pressure was different. Today he awoke feeling light headed, noted the chest pain had returned, reported nausea and shortness of breath. He followed up in clinic with Dr. Avalos, and   By the time of presentation to the ED the light headedness and the chest pain had improved significantly. He denied jaw/neck/arm pain, nausea, vomiting, radiation of the pain, cough, leg swelling. He was evaluated in the ED with labs showing H&H of 11.0/36.1.  There was no leukocytosis or left shift.  A metabolic panel was overall normal.  A BNP was not elevated, a troponin was not elevated.  A chest x-ray showed no acute cardiac or pulmonary process.  An echo today showed mild concentric " hypertrophy and while increased wall thickness of the left ventricle with EF of 55-60% and normal diastolic function.  The aortic valve showed moderate sclerosis, mild motion restriction, and moderate stenosis, with mild aortic regurg.  The ascending aorta was mildly dilated at 3.7 cm.  He was placed on observation.    Past Medical History:   Diagnosis Date    CAD (coronary artery disease) 02/22/2021    Cath 2021  There was non-obstructive coronary artery disease..   Left main:  Distal 10% stenosis  Lad:  Luminal irregularities.  Mid 10-20% stenosis   Circumflex:  Luminal irregularities   RCA:  Luminal irregularities    Calcified granuloma of lung 02/03/2023    LLL calcified granuloma seen on CT Chest Lung Screening Low Dose 01/31/2018    Colon polyp     Erectile dysfunction 02/14/2020    Fatty liver     Generalized anxiety disorder 01/11/2017    GERD (gastroesophageal reflux disease)     Gout, unspecified     History of colonic polyps 11/16/2023    2 polyps 11/22 -5 yrs    Hyperlipidemia     Hypertension     Psychophysiological insomnia 07/24/2017    Tobacco dependence 07/24/2017       Past Surgical History:   Procedure Laterality Date    APPENDECTOMY      BONE RESECTION, RIB      for thoracic outlet syndrome    COLONOSCOPY N/A 07/21/2017    Procedure: COLONOSCOPY;  Surgeon: Deepak Servin MD;  Location: [facility] ENDO;  Service: Endoscopy;  Laterality: N/A;    COLONOSCOPY N/A 11/22/2022    Procedure: COLONOSCOPY;  Surgeon: Sam Obrien MD;  Location: [facility] ENDO;  Service: Endoscopy;  Laterality: N/A;  vacc-sutab-inst portal-tb    COLONOSCOPY N/A 2/28/2025    Procedure: COLONOSCOPY;  Surgeon: Juanjose Martinez MD;  Location: [facility] ENDO;  Service: Gastroenterology;  Laterality: N/A;  2/24-marcus-suprep-portal-tb  2/25 - R/S inst portal - LW    HAND SURGERY      LEFT HEART CATHETERIZATION Left 03/02/2021    Procedure: Left heart cath, radial, 730 am;  Surgeon: Vladimir Avalos MD;  Location: [facility] CATH LAB;  Service: Cardiology;   Laterality: Left;  RN PREOP 2/25/2021--COVID NEGATIVE ON 3/1  H/P INCOMPLETE    SCROTAL SURGERY      mass    ULTRASOUND GUIDANCE  03/02/2021    Procedure: Ultrasound Guidance;  Surgeon: Vladimir Avalos MD;  Location: [facility] CATH LAB;  Service: Cardiology;;    VASECTOMY  40 years       Review of patient's allergies indicates:   Allergen Reactions    Codeine Itching    Ibuprofen Itching    Remeron [mirtazapine] Anxiety     Did not help the patient sleep and created anxiety.       No current facility-administered medications on file prior to encounter.     Current Outpatient Medications on File Prior to Encounter   Medication Sig    amLODIPine (NORVASC) 10 MG tablet Take 10 mg by mouth once daily.    aspirin (ECOTRIN) 81 MG EC tablet Take 1 tablet (81 mg total) by mouth once daily.    atorvastatin (LIPITOR) 40 MG tablet TAKE 1 TABLET BY MOUTH EVERY DAY    busPIRone (BUSPAR) 10 MG tablet Take 1 tablet (10 mg total) by mouth 2 (two) times daily.    ferrous sulfate 325 (65 FE) MG EC tablet Take 325 mg by mouth 3 (three) times daily with meals.    losartan (COZAAR) 25 MG tablet Take 1 tablet (25 mg total) by mouth once daily.    multivitamin capsule Take 1 capsule by mouth once daily.    omeprazole (PRILOSEC) 40 MG capsule TAKE 1 CAPSULE BY MOUTH TWICE  DAILY    tadalafiL (CIALIS) 5 MG tablet Take 1 tablet (5 mg total) by mouth once daily.    tamsulosin (FLOMAX) 0.4 mg Cap Take 1 capsule (0.4 mg total) by mouth once daily.    UNABLE TO FIND OTC Daily multivitamin  OTC Probiotic    [DISCONTINUED] propranoloL (INDERAL) 10 MG tablet TAKE 1 TABLET (10 MG TOTAL) BY MOUTH 3 (THREE) TIMES DAILY AS NEEDED (TREMORS OR ANXIETY).     Family History       Problem Relation (Age of Onset)    Asthma Sister    Cancer Father, Sister (78)    Heart disease Father    Lupus Daughter    No Known Problems Mother, Son    Stroke Brother          Tobacco Use    Smoking status: Some Days     Current packs/day: 0.50     Average packs/day: 0.5 packs/day  for 61.8 years (30.9 ttl pk-yrs)     Types: Cigarettes     Start date: 1967     Passive exposure: Current    Smokeless tobacco: Never    Tobacco comments:     0.5 ppd or less    Substance and Sexual Activity    Alcohol use: Not Currently    Drug use: No    Sexual activity: Yes     Partners: Female     Review of Systems   Constitutional:  Negative for chills, diaphoresis and fever.   Respiratory:  Negative for cough, chest tightness, shortness of breath and wheezing.    Cardiovascular:  Positive for chest pain. Negative for palpitations and leg swelling.   Gastrointestinal:  Positive for nausea. Negative for abdominal pain, constipation, diarrhea and vomiting.   Genitourinary:  Negative for difficulty urinating, dysuria, frequency, hematuria and urgency.   Musculoskeletal:  Negative for back pain, joint swelling, myalgias, neck pain and neck stiffness.   Skin:  Negative for rash and wound.   Neurological:  Positive for light-headedness. Negative for dizziness, syncope, weakness and headaches.   Hematological:  Does not bruise/bleed easily.   Psychiatric/Behavioral:  Negative for agitation, confusion and decreased concentration.      Objective:     Vital Signs (Most Recent):  Temp: 98.1 °F (36.7 °C) (06/16/25 2230)  Pulse: (!) 57 (06/16/25 2100)  Resp: 20 (06/16/25 2100)  BP: (!) 128/58 (06/16/25 2100)  SpO2: 99 % (06/16/25 2100) Vital Signs (24h Range):  Temp:  [97.8 °F (36.6 °C)-98.1 °F (36.7 °C)] 98.1 °F (36.7 °C)  Pulse:  [48-63] 57  Resp:  [18-21] 20  SpO2:  [98 %-100 %] 99 %  BP: (128-159)/(58-88) 128/58     Weight: 80.7 kg (178 lb)  Body mass index is 27.06 kg/m².     Physical Exam  Vitals and nursing note reviewed.   Constitutional:       General: He is not in acute distress.     Appearance: Normal appearance. He is well-developed and normal weight. He is not ill-appearing, toxic-appearing or diaphoretic.   HENT:      Head: Normocephalic and atraumatic.      Right Ear: External ear normal.      Left Ear:  External ear normal.   Eyes:      General: No scleral icterus.        Right eye: No discharge.         Left eye: No discharge.      Conjunctiva/sclera: Conjunctivae normal.   Neck:      Vascular: No JVD.      Trachea: No tracheal deviation.   Cardiovascular:      Rate and Rhythm: Normal rate and regular rhythm.      Heart sounds: Normal heart sounds. No murmur heard.     No gallop.   Pulmonary:      Effort: Pulmonary effort is normal. No respiratory distress.      Breath sounds: Normal breath sounds. No stridor. No wheezing or rales.   Abdominal:      General: Bowel sounds are normal. There is no distension.      Palpations: Abdomen is soft. There is no mass.      Tenderness: There is no abdominal tenderness. There is no guarding.   Musculoskeletal:         General: No deformity. Normal range of motion.      Cervical back: Normal range of motion and neck supple.   Skin:     General: Skin is warm and dry.   Neurological:      General: No focal deficit present.      Mental Status: He is alert and oriented to person, place, and time.      Cranial Nerves: No cranial nerve deficit.      Motor: No abnormal muscle tone.      Coordination: Coordination normal.   Psychiatric:         Mood and Affect: Mood normal.         Behavior: Behavior normal.         Thought Content: Thought content normal.         Judgment: Judgment normal.                Significant Labs: All pertinent labs within the past 24 hours have been reviewed.  BMP:   Recent Labs   Lab 06/16/25  1528   *      K 4.4      CO2 24   BUN 16   CREATININE 1.1   CALCIUM 9.1     CBC:   Recent Labs   Lab 06/16/25  1528   WBC 6.68   HGB 11.0*   HCT 36.1*        CMP:   Recent Labs   Lab 06/16/25  1528      K 4.4      CO2 24   *   BUN 16   CREATININE 1.1   CALCIUM 9.1   PROT 7.1   ALBUMIN 3.9   BILITOT 0.7   ALKPHOS 64   AST 16   ALT 17   ANIONGAP 8     Cardiac Markers:   Recent Labs   Lab 06/16/25  1528   BNP 90     Troponin:  "  Recent Labs   Lab 06/16/25  1528 06/16/25  1828   TROPONINI <0.006 <0.006     Urine Culture: No results for input(s): "LABURIN" in the last 48 hours.  Urine Studies: No results for input(s): "COLORU", "APPEARANCEUA", "PHUR", "SPECGRAV", "PROTEINUA", "GLUCUA", "KETONESU", "BILIRUBINUA", "OCCULTUA", "NITRITE", "UROBILINOGEN", "LEUKOCYTESUR", "RBCUA", "WBCUA", "BACTERIA", "SQUAMEPITHEL", "HYALINECASTS" in the last 48 hours.    Invalid input(s): "WRIGHTSUR"    Significant Imaging: I have reviewed all pertinent imaging results/findings within the past 24 hours.  Imaging Results              X-Ray Chest AP Portable (Final result)  Result time 06/16/25 15:37:12      Final result by Brenden Rock MD (06/16/25 15:37:12)                   Impression:      As above.      Electronically signed by: Brenden Rock MD  Date:    06/16/2025  Time:    15:37               Narrative:    EXAMINATION:  XR CHEST AP PORTABLE    CLINICAL HISTORY:  Chest Pain;    TECHNIQUE:  Single frontal view of the chest was performed.    FINDINGS:  The lungs are clear.  There is no pneumothorax or pleural fluid.  The cardiac silhouette is not enlarged.  There is calcification of the aorta.  The osseous structures demonstrate degenerative change.                                       Assessment/Plan:     Assessment & Plan  Precordial pain  - Mr. Yair Owens presents with intermittent substernal chest pain/pressure  - EKG without ischemic changes  - troponin not elevated  - BNP not elevated  - chest x-ray without acute process, no pulmonary edema  - seen by cardiology in ED   - recommended echo   - trend troponin    - if no rise plan for nuclear stress in a.m.    Nonrheumatic aortic valve stenosis   - history noted   - today's echo shows:  Summary  Show Result Comparison     Left Ventricle: The left ventricle is normal in size. Mildly increased wall thickness. There is mild concentric hypertrophy. There is normal systolic function with a visually " estimated ejection fraction of 55 - 60%. There is normal diastolic function.    Right Ventricle: The right ventricle is normal in size Systolic function is normal.    Aortic Valve: The aortic valve is a trileaflet valve. There is moderate aortic valve sclerosis. Mildly restricted motion. There is moderate stenosis. Aortic valve area by VTI is 1.2 cm². Aortic valve peak velocity is 3.0 m/s. Mean gradient is 22 mmHg. The dimensionless index is 0.35. There is mild aortic regurgitation.    Aorta: The ascending aorta is mildly dilated measuring 3.7 cm.  Hypertension  Patient's blood pressure range in the last 24 hours was: BP  Min: 128/58  Max: 159/72.The patient's inpatient anti-hypertensive regimen is listed below:  Current Antihypertensives  amLODIPine tablet 10 mg, Daily, Oral  losartan tablet 25 mg, Daily, Oral    Plan  - BP is controlled, no changes needed to their regimen  Hyperlipidemia  - continue statin     Tobacco dependence  Assistance with smoking cessation was offered, including:  [x]  Medications  [x]  Counseling  []  Printed Information on Smoking Cessation  []  Referral to a Smoking Cessation Program    Patient was counseled regarding smoking for 3-10 minutes.    CAD (coronary artery disease)  - non-obstructive CAD per 3/2021 cardiac cath  - 10% stenosis in left main, 10-20% stenosis in LAD, and minor irregularities in other major coronary vessels   - continue ASA, statin     VTE Risk Mitigation (From admission, onward)           Ordered     heparin (porcine) injection 5,000 Units  Every 8 hours         06/16/25 2008     IP VTE HIGH RISK PATIENT  Once         06/16/25 2008     Place sequential compression device  Until discontinued         06/16/25 2008                         On 06/16/2025, patient should be placed in hospital observation services under the care of Dr. Jose A Jauregui MD.           Ragini Bacon PA-C  Department of Hospital Medicine  [facility] [place] - Emergency Dept

## 2025-06-17 NOTE — ED NOTES
Report received from MITRA Davis. Pt resting calmly in stretcher, connected to all monitoring. Updated on plan of care. Verbalized understanding. Denies any complaints. JAEL

## 2025-06-17 NOTE — ASSESSMENT & PLAN NOTE
Patient's blood pressure range in the last 24 hours was: BP  Min: 105/51  Max: 159/72.The patient's inpatient anti-hypertensive regimen is listed below:  Current Antihypertensives  amLODIPine tablet 10 mg, Daily, Oral  isosorbide mononitrate 24 hr tablet 30 mg, Daily, Oral  isosorbide mononitrate (IMDUR) 24 hr tablet, Daily, Oral    Plan  - BP is controlled, no changes needed to their regimen

## 2025-06-17 NOTE — ASSESSMENT & PLAN NOTE
- history noted   - today's echo shows:  Summary  Show Result Comparison     Left Ventricle: The left ventricle is normal in size. Mildly increased wall thickness. There is mild concentric hypertrophy. There is normal systolic function with a visually estimated ejection fraction of 55 - 60%. There is normal diastolic function.    Right Ventricle: The right ventricle is normal in size Systolic function is normal.    Aortic Valve: The aortic valve is a trileaflet valve. There is moderate aortic valve sclerosis. Mildly restricted motion. There is moderate stenosis. Aortic valve area by VTI is 1.2 cm². Aortic valve peak velocity is 3.0 m/s. Mean gradient is 22 mmHg. The dimensionless index is 0.35. There is mild aortic regurgitation.    Aorta: The ascending aorta is mildly dilated measuring 3.7 cm.

## 2025-06-17 NOTE — SUBJECTIVE & OBJECTIVE
Past Medical History:   Diagnosis Date    CAD (coronary artery disease) 02/22/2021    Cath 2021  There was non-obstructive coronary artery disease..   Left main:  Distal 10% stenosis  Lad:  Luminal irregularities.  Mid 10-20% stenosis   Circumflex:  Luminal irregularities   RCA:  Luminal irregularities    Calcified granuloma of lung 02/03/2023    LLL calcified granuloma seen on CT Chest Lung Screening Low Dose 01/31/2018    Colon polyp     Erectile dysfunction 02/14/2020    Fatty liver     Generalized anxiety disorder 01/11/2017    GERD (gastroesophageal reflux disease)     Gout, unspecified     History of colonic polyps 11/16/2023    2 polyps 11/22 -5 yrs    Hyperlipidemia     Hypertension     Psychophysiological insomnia 07/24/2017    Tobacco dependence 07/24/2017       Past Surgical History:   Procedure Laterality Date    APPENDECTOMY      BONE RESECTION, RIB      for thoracic outlet syndrome    COLONOSCOPY N/A 07/21/2017    Procedure: COLONOSCOPY;  Surgeon: Deepak Servin MD;  Location: [facility] ENDO;  Service: Endoscopy;  Laterality: N/A;    COLONOSCOPY N/A 11/22/2022    Procedure: COLONOSCOPY;  Surgeon: Sam Obrien MD;  Location: [facility] ENDO;  Service: Endoscopy;  Laterality: N/A;  vacc-sutab-inst portal-tb    COLONOSCOPY N/A 2/28/2025    Procedure: COLONOSCOPY;  Surgeon: Juanjose Martinez MD;  Location: [facility] ENDO;  Service: Gastroenterology;  Laterality: N/A;  2/24-marcus-suprep-portal-tb  2/25 - R/S inst portal - LW    HAND SURGERY      LEFT HEART CATHETERIZATION Left 03/02/2021    Procedure: Left heart cath, radial, 730 am;  Surgeon: Vladimir Avalos MD;  Location: [facility] CATH LAB;  Service: Cardiology;  Laterality: Left;  RN PREOP 2/25/2021--COVID NEGATIVE ON 3/1  H/P INCOMPLETE    SCROTAL SURGERY      mass    ULTRASOUND GUIDANCE  03/02/2021    Procedure: Ultrasound Guidance;  Surgeon: Vladimir Avalos MD;  Location: [facility] CATH LAB;  Service: Cardiology;;    VASECTOMY  40 years       Review of patient's allergies indicates:    Allergen Reactions    Codeine Itching    Ibuprofen Itching    Remeron [mirtazapine] Anxiety     Did not help the patient sleep and created anxiety.       No current facility-administered medications on file prior to encounter.     Current Outpatient Medications on File Prior to Encounter   Medication Sig    amLODIPine (NORVASC) 10 MG tablet Take 10 mg by mouth once daily.    aspirin (ECOTRIN) 81 MG EC tablet Take 1 tablet (81 mg total) by mouth once daily.    atorvastatin (LIPITOR) 40 MG tablet TAKE 1 TABLET BY MOUTH EVERY DAY    busPIRone (BUSPAR) 10 MG tablet Take 1 tablet (10 mg total) by mouth 2 (two) times daily.    ferrous sulfate 325 (65 FE) MG EC tablet Take 325 mg by mouth 3 (three) times daily with meals.    losartan (COZAAR) 25 MG tablet Take 1 tablet (25 mg total) by mouth once daily.    multivitamin capsule Take 1 capsule by mouth once daily.    omeprazole (PRILOSEC) 40 MG capsule TAKE 1 CAPSULE BY MOUTH TWICE  DAILY    tadalafiL (CIALIS) 5 MG tablet Take 1 tablet (5 mg total) by mouth once daily.    tamsulosin (FLOMAX) 0.4 mg Cap Take 1 capsule (0.4 mg total) by mouth once daily.    UNABLE TO FIND OTC Daily multivitamin  OTC Probiotic    [DISCONTINUED] propranoloL (INDERAL) 10 MG tablet TAKE 1 TABLET (10 MG TOTAL) BY MOUTH 3 (THREE) TIMES DAILY AS NEEDED (TREMORS OR ANXIETY).     Family History       Problem Relation (Age of Onset)    Asthma Sister    Cancer Father, Sister (78)    Heart disease Father    Lupus Daughter    No Known Problems Mother, Son    Stroke Brother          Tobacco Use    Smoking status: Some Days     Current packs/day: 0.50     Average packs/day: 0.5 packs/day for 61.8 years (30.9 ttl pk-yrs)     Types: Cigarettes     Start date: 1967     Passive exposure: Current    Smokeless tobacco: Never    Tobacco comments:     0.5 ppd or less    Substance and Sexual Activity    Alcohol use: Not Currently    Drug use: No    Sexual activity: Yes     Partners: Female     Review of Systems    Gastrointestinal:  Negative for melena.   Genitourinary:  Negative for hematuria.     Objective:     Vital Signs (Most Recent):  Temp: 98.3 °F (36.8 °C) (06/17/25 0742)  Pulse: (!) 53 (06/17/25 0800)  Resp: 18 (06/17/25 0800)  BP: (!) 145/65 (06/17/25 0800)  SpO2: 99 % (06/17/25 0800) Vital Signs (24h Range):  Temp:  [97.8 °F (36.6 °C)-98.4 °F (36.9 °C)] 98.3 °F (36.8 °C)  Pulse:  [44-63] 53  Resp:  [16-21] 18  SpO2:  [97 %-100 %] 99 %  BP: (105-159)/(51-88) 145/65     Weight: 80.7 kg (178 lb)  Body mass index is 27.06 kg/m².    SpO2: 99 %       No intake or output data in the 24 hours ending 06/17/25 1041    Lines/Drains/Airways       Peripheral Intravenous Line  Duration                  Peripheral IV - Single Lumen 06/16/25 1529 20 G Anterior;Right Forearm <1 day                   Exam unchanged vs 6/16/25  Physical Exam  Constitutional:       General: He is not in acute distress.     Appearance: Normal appearance. He is well-developed and normal weight. He is not ill-appearing, toxic-appearing or diaphoretic.   HENT:      Head: Normocephalic and atraumatic.   Eyes:      General: No scleral icterus.     Extraocular Movements: Extraocular movements intact.      Conjunctiva/sclera: Conjunctivae normal.      Pupils: Pupils are equal, round, and reactive to light.   Neck:      Thyroid: No thyromegaly.      Vascular: No JVD.      Trachea: No tracheal deviation.   Cardiovascular:      Rate and Rhythm: Normal rate and regular rhythm.      Heart sounds: S1 normal and S2 normal. Murmur heard.      Systolic murmur is present with a grade of 2/6.      No friction rub. No gallop.   Pulmonary:      Effort: Pulmonary effort is normal. No respiratory distress.      Breath sounds: Normal breath sounds. No stridor. No wheezing, rhonchi or rales.   Chest:      Chest wall: No tenderness.   Abdominal:      General: There is no distension.      Palpations: Abdomen is soft.   Musculoskeletal:         General: No swelling or  tenderness. Normal range of motion.      Cervical back: Normal range of motion and neck supple. No rigidity.      Right lower leg: No edema.      Left lower leg: No edema.   Skin:     General: Skin is warm and dry.      Coloration: Skin is not jaundiced.   Neurological:      General: No focal deficit present.      Mental Status: He is alert and oriented to person, place, and time.      Cranial Nerves: No cranial nerve deficit.   Psychiatric:         Mood and Affect: Mood normal.         Behavior: Behavior normal.          Current Medications:   amLODIPine  10 mg Oral Daily    aspirin  81 mg Oral Daily    atorvastatin  40 mg Oral Daily    busPIRone  10 mg Oral BID    heparin (porcine)  5,000 Units Subcutaneous Q8H    losartan  25 mg Oral Daily    sodium chloride 0.9%  10 mL Intravenous Q8H    tamsulosin  1 capsule Oral Daily         Current Facility-Administered Medications:     acetaminophen, 650 mg, Oral, Q6H PRN    dextrose 50%, 12.5 g, Intravenous, PRN    dextrose 50%, 25 g, Intravenous, PRN    glucagon (human recombinant), 1 mg, Intramuscular, PRN    glucose, 16 g, Oral, PRN    glucose, 24 g, Oral, PRN    melatonin, 6 mg, Oral, Nightly PRN    naloxone, 0.02 mg, Intravenous, PRN    senna-docusate, 1 tablet, Oral, BID PRN    Laboratory (all labs reviewed):  CBC:  Recent Labs   Lab 02/18/25  0741 05/26/25  1515 06/12/25  1427 06/16/25  1528 06/17/25  0551   WBC 5.93 7.17 6.07 6.68 5.13   Hemoglobin 12.6 L  --   --   --   --    HGB  --  10.8 L 11.2 L 11.0 L 10.2 L   Hematocrit 39.5 L  --   --   --   --    HCT  --  34.9 L 36.1 L 36.1 L 32.4 L   Platelet Count  --  235 228 231 197   Platelets 293  --   --   --   --        CHEMISTRIES:  Recent Labs   Lab 08/15/24  1444 02/18/25  0741 06/12/25  1427 06/16/25  1528 06/17/25  0551   Glucose 104 94 110 137 H 97   Sodium 139 139 142 141 140   Potassium 4.5 4.5 4.4 4.4 4.4   BUN 18 14 14 16 16   Creatinine 1.2 1.1 1.1 1.1 1.0   eGFR >60 >60 >60 >60 >60   Calcium 10.0 9.3 9.5  9.1 8.8   Magnesium   --   --   --   --  2.0       CARDIAC BIOMARKERS:  Recent Labs   Lab 06/12/25  1427 06/16/25  1528 06/16/25  1828 06/17/25  0056 06/17/25  0551   Troponin-I 0.019 <0.006 <0.006 <0.006 0.007       COAGS:        LIPIDS/LFTS:  Recent Labs   Lab 06/02/23  1053 11/03/23  1647 01/09/24  0835 08/15/24  1444 02/18/25  0741 06/12/25  1427 06/16/25  1528   Cholesterol 125  --   --   --  136  --   --    Triglycerides 49  --   --   --  66  --   --    HDL 39 L  --   --   --  48  --   --    LDL Cholesterol 76.2  --   --   --  74.8  --   --    Non-HDL Cholesterol 86  --   --   --  88  --   --    AST 22   < > 22 33 20 21 16   ALT 21   < > 17 23 16 16 17    < > = values in this interval not displayed.       BNP:  Recent Labs   Lab 11/03/23  1647 08/15/24  1444 06/12/25  1427 06/16/25  1528   BNP 60 34 79 90       TSH:  Recent Labs   Lab 11/16/23  1335 02/18/25  0741   TSH 0.725 1.549       Free T4:        Diagnostic Results:  ECG (personally reviewed and interpreted tracing(s)):  6/16/25 1507 SR 59    Chest X-Ray (personally reviewed and interpreted image(s)): 6/16/25 NAD    Ex MPI 6/17/25 (images personally reviewed and interpreted)    The patient exercised for 7 minutes 26 seconds on a Aric protocol, achieving a peak heart rate of 144 bpm, which is 99% of the age predicted maximum heart rate.    Abnormal myocardial perfusion scan.    There is a mild intensity, small sized, reversible perfusion abnormality that is consistent with ischemia in the basal to mid inferior wall(s) in the typical distribution of the RCA territory.    There are no other significant perfusion abnormalities.    The gated perfusion images showed an ejection fraction of 59% post stress.    There is normal wall motion at post-stress.    The ECG portion of the study is suspicious for ischemia.    The patient reported no chest pain during the stress test.    Echo: 6/16/25 (images personally reviewed and interpreted)    Left Ventricle: The  left ventricle is normal in size. Mildly increased wall thickness. There is mild concentric hypertrophy. There is normal systolic function with a visually estimated ejection fraction of 55 - 60%. There is normal diastolic function.    Right Ventricle: The right ventricle is normal in size Systolic function is normal.    Aortic Valve: The aortic valve is a trileaflet valve. There is moderate aortic valve sclerosis. Mildly restricted motion. There is moderate stenosis. Aortic valve area by VTI is 1.2 cm². Aortic valve peak velocity is 3.0 m/s. Mean gradient is 22 mmHg. The dimensionless index is 0.35. There is mild aortic regurgitation.    Aorta: The ascending aorta is mildly dilated measuring 3.7 cm.    Carotid US 10/7/22  There is 0-19% right Internal Carotid Stenosis.  There is 0-19% left Internal Carotid Stenosis.    Cath: 3/2/21  Left main:  Distal 10% stenosis  Lad:  Luminal irregularities.  Mid 10-20% stenosis  Circumflex:  Luminal irregularities   RCA:  Luminal irregularities

## 2025-06-17 NOTE — TELEPHONE ENCOUNTER
----- Message from  [place] sent at 6/17/2025 12:02 PM CDT -----  Regarding: schedule appointment  Please contact patient to schedule hospital follow-up.

## 2025-06-17 NOTE — ASSESSMENT & PLAN NOTE
Somewhat atypical chest pain, although waxing and waning over the past several days.    EKG is nonischemic and initial troponin negative.    Trops neg  Mild chest discomfort during ETT  MPI 6/17 with mild inferior ischemia (low risk findings).  I discussed options of intensification of medical therapy versus diagnostic angiography.  We have decided to initiate Imdur as a 2nd antianginal (resting HR too low for BBL).  The patient will follow up with Dr. Avalos.  If he continues to have symptoms, he can consider angiography at that point.

## 2025-06-17 NOTE — DISCHARGE SUMMARY
[facility] [place] [facility]  [facility] Medicine  Discharge Summary      Patient Name: Yair Owens  MRN: [id number]  HANG: [id number]  Patient Class: OP- Observation  Admission Date: 6/16/2025  Hospital Length of Stay: 0 days  Discharge Date and Time: 06/17/2025 11:13 AM  Attending Physician: Jose A Jauregui MD   Discharging Provider: Juanjose Huerta Jr, NP  Primary Care Provider: Vick Mansfield MD    Primary Care Team: JUANJOSE HUERTA    HPI:   Mr. Yair Owens is a 75 y.o. male, with PMH of CAD, aortic stenosis (diameter 1.8 cm), anemia, HTN, who presented to [facility] ED on 6/16/25 due to chest pain with light headedness. He had substernal chest pain/pressure beginning 2 days ago, and had a non-ischemic/unremarkable workup. He states since that time he has felt well, but continues to have intermittent pressure. He notes he does have pressure somewhat regularly, but this pressure was different. Today he awoke feeling light headed, noted the chest pain had returned, reported nausea and shortness of breath. He followed up in clinic with Dr. Avalos, and   By the time of presentation to the ED the light headedness and the chest pain had improved significantly. He denied jaw/neck/arm pain, nausea, vomiting, radiation of the pain, cough, leg swelling. He was evaluated in the ED with labs showing H&H of 11.0/36.1.  There was no leukocytosis or left shift.  A metabolic panel was overall normal.  A BNP was not elevated, a troponin was not elevated.  A chest x-ray showed no acute cardiac or pulmonary process.  An echo today showed mild concentric hypertrophy and while increased wall thickness of the left ventricle with EF of 55-60% and normal diastolic function.  The aortic valve showed moderate sclerosis, mild motion restriction, and moderate stenosis, with mild aortic regurg.  The ascending aorta was mildly dilated at 3.7 cm.  He was placed on observation.    * No surgery found *      Hospital Course:   Mr. Owens was placed in  "observation for ACS rule out after presenting with chest pain. He was seen and evaluated by Cardiology.    Per Dr. Kimbrough:  "Somewhat atypical chest pain, although waxing and waning over the past several days.    EKG is nonischemic and initial troponin negative.    Trops neg  Mild chest discomfort during ETT  MPI 6/17 with mild inferior ischemia (low risk findings).  I discussed options of intensification of medical therapy versus diagnostic angiography.  We have decided to initiate Imdur as a 2nd antianginal (resting HR too low for BBL).  The patient will follow up with Dr. Avalos.  If he continues to have symptoms, he can consider angiography at that point."    All findings and plan discussed with patient, all questions answered,he verbalizes understanding and agreement.  Discharged home in stable condition.      Goals of Care Treatment Preferences:  Code Status: Full Code         Consults:   Consults (From admission, onward)          Status Ordering Provider     Inpatient consult to Cardiology  Once        Provider:  Vladimir Avalos MD    Completed CHERELLE MARTI            Assessment & Plan  Precordial pain  - Mr. Yair Owens presents with intermittent substernal chest pain/pressure  - EKG without ischemic changes  - troponin not elevated  - BNP not elevated  - chest x-ray without acute process, no pulmonary edema  - seen by cardiology in ED   - recommended echo   - trend troponin    - if no rise plan for nuclear stress in a.m.    Nonrheumatic aortic valve stenosis   - history noted   - today's echo shows:  Summary  Show Result Comparison     Left Ventricle: The left ventricle is normal in size. Mildly increased wall thickness. There is mild concentric hypertrophy. There is normal systolic function with a visually estimated ejection fraction of 55 - 60%. There is normal diastolic function.    Right Ventricle: The right ventricle is normal in size Systolic function is normal.    Aortic Valve: The aortic valve " is a trileaflet valve. There is moderate aortic valve sclerosis. Mildly restricted motion. There is moderate stenosis. Aortic valve area by VTI is 1.2 cm². Aortic valve peak velocity is 3.0 m/s. Mean gradient is 22 mmHg. The dimensionless index is 0.35. There is mild aortic regurgitation.    Aorta: The ascending aorta is mildly dilated measuring 3.7 cm.  Hypertension  Patient's blood pressure range in the last 24 hours was: BP  Min: 105/51  Max: 159/72.The patient's inpatient anti-hypertensive regimen is listed below:  Current Antihypertensives  amLODIPine tablet 10 mg, Daily, Oral  isosorbide mononitrate 24 hr tablet 30 mg, Daily, Oral  isosorbide mononitrate (IMDUR) 24 hr tablet, Daily, Oral    Plan  - BP is controlled, no changes needed to their regimen  Hyperlipidemia  - continue statin     Tobacco dependence  Assistance with smoking cessation was offered, including:  [x]  Medications  [x]  Counseling  []  Printed Information on Smoking Cessation  []  Referral to a Smoking Cessation Program    Patient was counseled regarding smoking for 3-10 minutes.    CAD (coronary artery disease)  - non-obstructive CAD per 3/2021 cardiac cath  - 10% stenosis in left main, 10-20% stenosis in LAD, and minor irregularities in other major coronary vessels   - continue ASA, statin   Final Active Diagnoses:    Diagnosis Date Noted POA    PRINCIPAL PROBLEM:  Precordial pain [R07.2] 06/16/2025 Yes    Nonrheumatic aortic valve stenosis [I35.0] 06/16/2025 Yes    CAD (coronary artery disease) [I25.10] 02/22/2021 Yes     Chronic    Tobacco dependence [F17.200] 07/24/2017 Yes     Chronic    Hyperlipidemia [E78.5] 10/29/2012 Yes     Chronic    Hypertension [I10] 10/29/2012 Yes     Chronic      Problems Resolved During this Admission:       Discharged Condition: stable    Disposition: Home or Self Care    Follow Up:   Follow-up Information       Vladimir Avalos MD Follow up.    Specialties: Interventional Cardiology, Cardiology  Contact  information:  [address]  [address]  [place] [address]  [phone]                           Patient Instructions:      Diet Cardiac     Activity as tolerated       Significant Diagnostic Studies: Labs: CMP   Recent Labs   Lab 06/16/25  1528 06/17/25  0551    140   K 4.4 4.4    108   CO2 24 24   * 97   BUN 16 16   CREATININE 1.1 1.0   CALCIUM 9.1 8.8   PROT 7.1  --    ALBUMIN 3.9  --    BILITOT 0.7  --    ALKPHOS 64  --    AST 16  --    ALT 17  --    ANIONGAP 8 8   , CBC   Recent Labs   Lab 06/16/25  1528 06/17/25  0551   WBC 6.68 5.13   HGB 11.0* 10.2*   HCT 36.1* 32.4*    197   , and Troponin   Recent Labs   Lab 06/16/25  1828 06/17/25  0056 06/17/25  0551   TROPONINI <0.006 <0.006 0.007     Cardiac Graphics: Echocardiogram: Transthoracic echo (TTE) complete (Cupid Only):   Results for orders placed or performed during the hospital encounter of 06/16/25   Echo   Result Value Ref Range    BSA 1.97 m2    LVOT stroke volume 85.5 cm3    LVIDd 4.7 3.5 - 6.0 cm    LV Systolic Volume 43 mL    LV Systolic Volume Index 22.1 mL/m2    LVIDs 3.3 2.1 - 4.0 cm    LV Diastolic Volume 102 mL    LV Diastolic Volume Index 52.31 mL/m2    Left Ventricular End Systolic Volume by Teichholz Method 42.99 mL    Left Ventricular End Diastolic Volume by Teichholz Method 102.07 mL    IVS 1.2 (A) 0.6 - 1.1 cm    LVOT diameter 2.1 cm    LVOT area 3.5 cm2    FS 29.8 28 - 44 %    Left Ventricle Relative Wall Thickness 0.55 cm    PW 1.3 (A) 0.6 - 1.1 cm    LV mass 224.8 g    LV Mass Index 115.3 g/m2    MV Peak E Pietro 0.89 m/s    TDI LATERAL 0.10 m/s    TDI SEPTAL 0.10 m/s    E/E' ratio 9 m/s    MV Peak A Pietro 0.73 m/s    TR Max Pietro 2.4 m/s    E/A ratio 1.22     E wave deceleration time 222 msec    LV SEPTAL E/E' RATIO 8.9 m/s    LV LATERAL E/E' RATIO 8.9 m/s    LVOT peak pietro 0.9 m/s    Left Ventricular Outflow Tract Mean Velocity 0.68 cm/s    Left Ventricular Outflow Tract Mean Gradient 1.97 mmHg    RV- nicole basal  diam 3.7 cm    RV S' 19.80 cm/s    TAPSE 2.4 cm    RV/LV Ratio 0.79 cm    LA size 4.3 cm    Left Atrium Minor Axis 5.5 cm    Left Atrium Major Axis 5.7 cm    RA Major Axis 5.64 cm    AV regurgitation pressure 1/2 time 634 ms    AR Max Pietro 3.81 m/s    AV mean gradient 22 mmHg    AV peak gradient 36 mmHg    Ao peak pietro 3.0 m/s    Ao VTI 71.2 cm    LVOT peak VTI 24.7 cm    AV valve area 1.2 cm²    AV Velocity Ratio 0.30     AV index (prosthetic) 0.35     ETIENNE by Velocity Ratio 1.0 cm²    MV mean gradient 1 mmHg    MV peak gradient 4 mmHg    MV stenosis pressure 1/2 time 64.25 ms    MV valve area p 1/2 method 3.42 cm2    MV valve area by continuity eq 3.31 cm2    MV VTI 25.8 cm    Triscuspid Valve Regurgitation Peak Gradient 23 mmHg    PV PEAK VELOCITY 0.91 m/s    PV peak gradient 3 mmHg    Sinus 3.52 cm    ASI 1.8 cm/m2    STJ 2.5 cm    Ascending aorta 3.7 cm    ASI 1.9 cm/m2    IVC diameter 1.67 cm    Mean e' 0.10 m/s    ZLVIDS -0.28     ZLVIDD -1.68     RVDD 3.72 cm    KIRIT 45 mL/m2    LA Vol 88 cm3    LA WIDTH 4.3 cm    RA Width 3.5 cm    TV resting pulmonary artery pressure 31 mmHg    RV TB RVSP 10 mmHg    Est. RA pres 8 mmHg    Narrative      Left Ventricle: The left ventricle is normal in size. Mildly increased   wall thickness. There is mild concentric hypertrophy. There is normal   systolic function with a visually estimated ejection fraction of 55 - 60%.   There is normal diastolic function.    Right Ventricle: The right ventricle is normal in size Systolic   function is normal.    Aortic Valve: The aortic valve is a trileaflet valve. There is moderate   aortic valve sclerosis. Mildly restricted motion. There is moderate   stenosis. Aortic valve area by VTI is 1.2 cm². Aortic valve peak velocity   is 3.0 m/s. Mean gradient is 22 mmHg. The dimensionless index is 0.35.   There is mild aortic regurgitation.    Aorta: The ascending aorta is mildly dilated measuring 3.7 cm.      and Stress Test:     The patient  exercised for 7 minutes 26 seconds on a Aric protocol, achieving a peak heart rate of 144 bpm, which is 99% of the age predicted maximum heart rate.    Abnormal myocardial perfusion scan.    There is a mild intensity, small sized, reversible perfusion abnormality that is consistent with ischemia in the basal to mid inferior wall(s) in the typical distribution of the RCA territory.    There are no other significant perfusion abnormalities.    The gated perfusion images showed an ejection fraction of 59% post stress.    There is normal wall motion at post-stress.    The ECG portion of the study is suspicious for ischemia.    The patient reported no chest pain during the stress test.    Pending Diagnostic Studies:       Procedure Component Value Units Date/Time    Echo [[id number]]     Order Status: Sent Lab Status: No result            Medications:  Reconciled Home Medications:      Medication List        START taking these medications      isosorbide mononitrate 30 MG 24 hr tablet  Commonly known as: IMDUR  Take 1 tablet (30 mg total) by mouth once daily.            CONTINUE taking these medications      amLODIPine 10 MG tablet  Commonly known as: NORVASC  Take 10 mg by mouth once daily.     aspirin 81 MG EC tablet  Commonly known as: ECOTRIN  Take 1 tablet (81 mg total) by mouth once daily.     atorvastatin 40 MG tablet  Commonly known as: LIPITOR  TAKE 1 TABLET BY MOUTH EVERY DAY     busPIRone 10 MG tablet  Commonly known as: BUSPAR  Take 1 tablet (10 mg total) by mouth 2 (two) times daily.     ferrous sulfate 325 (65 FE) MG EC tablet  Take 325 mg by mouth 3 (three) times daily with meals.     multivitamin capsule  Take 1 capsule by mouth once daily.     omeprazole 40 MG capsule  Commonly known as: PRILOSEC  TAKE 1 CAPSULE BY MOUTH TWICE  DAILY     tadalafiL 5 MG tablet  Commonly known as: CIALIS  Take 1 tablet (5 mg total) by mouth once daily.     tamsulosin 0.4 mg Cap  Commonly known as: FLOMAX  Take 1 capsule  (0.4 mg total) by mouth once daily.     UNABLE TO FIND  OTC Daily multivitamin  OTC Probiotic            STOP taking these medications      losartan 25 MG tablet  Commonly known as: COZAAR              Indwelling Lines/Drains at time of discharge:   Lines/Drains/Airways       None                   Time spent on the discharge of patient: 30 minutes         Juanjose Huerta Jr, NP  Department of Hospital Medicine  [facility] [place] - Emergency Dept

## 2025-06-17 NOTE — ASSESSMENT & PLAN NOTE
- non-obstructive CAD per 3/2021 cardiac cath  - 10% stenosis in left main, 10-20% stenosis in LAD, and minor irregularities in other major coronary vessels   - continue ASA, statin

## 2025-06-17 NOTE — HPI
Mr. Yair Owens is a 75 y.o. male, with PMH of CAD, aortic stenosis (diameter 1.8 cm), anemia, HTN, who presented to [facility] ED on 6/16/25 due to chest pain with light headedness. He had substernal chest pain/pressure beginning 2 days ago, and had a non-ischemic/unremarkable workup. He states since that time he has felt well, but continues to have intermittent pressure. He notes he does have pressure somewhat regularly, but this pressure was different. Today he awoke feeling light headed, noted the chest pain had returned, reported nausea and shortness of breath. He followed up in clinic with Dr. Avalos, and   By the time of presentation to the ED the light headedness and the chest pain had improved significantly. He denied jaw/neck/arm pain, nausea, vomiting, radiation of the pain, cough, leg swelling. He was evaluated in the ED with labs showing H&H of 11.0/36.1.  There was no leukocytosis or left shift.  A metabolic panel was overall normal.  A BNP was not elevated, a troponin was not elevated.  A chest x-ray showed no acute cardiac or pulmonary process.  An echo today showed mild concentric hypertrophy and while increased wall thickness of the left ventricle with EF of 55-60% and normal diastolic function.  The aortic valve showed moderate sclerosis, mild motion restriction, and moderate stenosis, with mild aortic regurg.  The ascending aorta was mildly dilated at 3.7 cm.  He was placed on observation.

## 2025-06-17 NOTE — HOSPITAL COURSE
Interval Hx: no ongoing CP, no SOB.  Very mild CP (nonlimiting) during ETT today.  Mildly abnl MPI (basal inferior defect).

## 2025-06-17 NOTE — PROGRESS NOTES
[facility] Emergency Dept  Cardiology  Progress Note    Patient Name: Yair Owens  MRN: [id number]  Admission Date: 6/16/2025  Hospital Length of Stay: 0 days  Code Status: Full Code   Attending Physician: Jose A Jauregui MD   Primary Care Physician: Vick Mansfield MD  Expected Discharge Date:   Principal Problem:Precordial pain    Subjective:     Interval Hx: no ongoing CP, no SOB.  Very mild CP (nonlimiting) during ETT today.  Mildly abnl MPI (basal inferior defect).          Past Medical History:   Diagnosis Date    CAD (coronary artery disease) 02/22/2021    Cath 2021  There was non-obstructive coronary artery disease..   Left main:  Distal 10% stenosis  Lad:  Luminal irregularities.  Mid 10-20% stenosis   Circumflex:  Luminal irregularities   RCA:  Luminal irregularities    Calcified granuloma of lung 02/03/2023    LLL calcified granuloma seen on CT Chest Lung Screening Low Dose 01/31/2018    Colon polyp     Erectile dysfunction 02/14/2020    Fatty liver     Generalized anxiety disorder 01/11/2017    GERD (gastroesophageal reflux disease)     Gout, unspecified     History of colonic polyps 11/16/2023    2 polyps 11/22 -5 yrs    Hyperlipidemia     Hypertension     Psychophysiological insomnia 07/24/2017    Tobacco dependence 07/24/2017       Past Surgical History:   Procedure Laterality Date    APPENDECTOMY      BONE RESECTION, RIB      for thoracic outlet syndrome    COLONOSCOPY N/A 07/21/2017    Procedure: COLONOSCOPY;  Surgeon: Deepak Servin MD;  Location: [facility] ENDO;  Service: Endoscopy;  Laterality: N/A;    COLONOSCOPY N/A 11/22/2022    Procedure: COLONOSCOPY;  Surgeon: Sam Obrien MD;  Location: [facility] ENDO;  Service: Endoscopy;  Laterality: N/A;  vacc-sutab-inst portal-tb    COLONOSCOPY N/A 2/28/2025    Procedure: COLONOSCOPY;  Surgeon: Juanjose Martinez MD;  Location: [facility] ENDO;  Service: Gastroenterology;  Laterality: N/A;  2/24-marcus-suprep-portal-tb  2/25 - R/S inst portal - LW    HAND SURGERY      LEFT  HEART CATHETERIZATION Left 03/02/2021    Procedure: Left heart cath, radial, 730 am;  Surgeon: Vladimir Avalos MD;  Location: [facility] CATH LAB;  Service: Cardiology;  Laterality: Left;  RN PREOP 2/25/2021--COVID NEGATIVE ON 3/1  H/P INCOMPLETE    SCROTAL SURGERY      mass    ULTRASOUND GUIDANCE  03/02/2021    Procedure: Ultrasound Guidance;  Surgeon: Vladimir Avalos MD;  Location: [facility] CATH LAB;  Service: Cardiology;;    VASECTOMY  40 years       Review of patient's allergies indicates:   Allergen Reactions    Codeine Itching    Ibuprofen Itching    Remeron [mirtazapine] Anxiety     Did not help the patient sleep and created anxiety.       No current facility-administered medications on file prior to encounter.     Current Outpatient Medications on File Prior to Encounter   Medication Sig    amLODIPine (NORVASC) 10 MG tablet Take 10 mg by mouth once daily.    aspirin (ECOTRIN) 81 MG EC tablet Take 1 tablet (81 mg total) by mouth once daily.    atorvastatin (LIPITOR) 40 MG tablet TAKE 1 TABLET BY MOUTH EVERY DAY    busPIRone (BUSPAR) 10 MG tablet Take 1 tablet (10 mg total) by mouth 2 (two) times daily.    ferrous sulfate 325 (65 FE) MG EC tablet Take 325 mg by mouth 3 (three) times daily with meals.    losartan (COZAAR) 25 MG tablet Take 1 tablet (25 mg total) by mouth once daily.    multivitamin capsule Take 1 capsule by mouth once daily.    omeprazole (PRILOSEC) 40 MG capsule TAKE 1 CAPSULE BY MOUTH TWICE  DAILY    tadalafiL (CIALIS) 5 MG tablet Take 1 tablet (5 mg total) by mouth once daily.    tamsulosin (FLOMAX) 0.4 mg Cap Take 1 capsule (0.4 mg total) by mouth once daily.    UNABLE TO FIND OTC Daily multivitamin  OTC Probiotic    [DISCONTINUED] propranoloL (INDERAL) 10 MG tablet TAKE 1 TABLET (10 MG TOTAL) BY MOUTH 3 (THREE) TIMES DAILY AS NEEDED (TREMORS OR ANXIETY).     Family History       Problem Relation (Age of Onset)    Asthma Sister    Cancer Father, Sister (78)    Heart disease Father    Lupus Daughter     No Known Problems Mother, Son    Stroke Brother          Tobacco Use    Smoking status: Some Days     Current packs/day: 0.50     Average packs/day: 0.5 packs/day for 61.8 years (30.9 ttl pk-yrs)     Types: Cigarettes     Start date: 1967     Passive exposure: Current    Smokeless tobacco: Never    Tobacco comments:     0.5 ppd or less    Substance and Sexual Activity    Alcohol use: Not Currently    Drug use: No    Sexual activity: Yes     Partners: Female     Review of Systems   Gastrointestinal:  Negative for melena.   Genitourinary:  Negative for hematuria.     Objective:     Vital Signs (Most Recent):  Temp: 98.3 °F (36.8 °C) (06/17/25 0742)  Pulse: (!) 53 (06/17/25 0800)  Resp: 18 (06/17/25 0800)  BP: (!) 145/65 (06/17/25 0800)  SpO2: 99 % (06/17/25 0800) Vital Signs (24h Range):  Temp:  [97.8 °F (36.6 °C)-98.4 °F (36.9 °C)] 98.3 °F (36.8 °C)  Pulse:  [44-63] 53  Resp:  [16-21] 18  SpO2:  [97 %-100 %] 99 %  BP: (105-159)/(51-88) 145/65     Weight: 80.7 kg (178 lb)  Body mass index is 27.06 kg/m².    SpO2: 99 %       No intake or output data in the 24 hours ending 06/17/25 1041    Lines/Drains/Airways       Peripheral Intravenous Line  Duration                  Peripheral IV - Single Lumen 06/16/25 1529 20 G Anterior;Right Forearm <1 day                   Exam unchanged vs 6/16/25  Physical Exam  Constitutional:       General: He is not in acute distress.     Appearance: Normal appearance. He is well-developed and normal weight. He is not ill-appearing, toxic-appearing or diaphoretic.   HENT:      Head: Normocephalic and atraumatic.   Eyes:      General: No scleral icterus.     Extraocular Movements: Extraocular movements intact.      Conjunctiva/sclera: Conjunctivae normal.      Pupils: Pupils are equal, round, and reactive to light.   Neck:      Thyroid: No thyromegaly.      Vascular: No JVD.      Trachea: No tracheal deviation.   Cardiovascular:      Rate and Rhythm: Normal rate and regular rhythm.       Heart sounds: S1 normal and S2 normal. Murmur heard.      Systolic murmur is present with a grade of 2/6.      No friction rub. No gallop.   Pulmonary:      Effort: Pulmonary effort is normal. No respiratory distress.      Breath sounds: Normal breath sounds. No stridor. No wheezing, rhonchi or rales.   Chest:      Chest wall: No tenderness.   Abdominal:      General: There is no distension.      Palpations: Abdomen is soft.   Musculoskeletal:         General: No swelling or tenderness. Normal range of motion.      Cervical back: Normal range of motion and neck supple. No rigidity.      Right lower leg: No edema.      Left lower leg: No edema.   Skin:     General: Skin is warm and dry.      Coloration: Skin is not jaundiced.   Neurological:      General: No focal deficit present.      Mental Status: He is alert and oriented to person, place, and time.      Cranial Nerves: No cranial nerve deficit.   Psychiatric:         Mood and Affect: Mood normal.         Behavior: Behavior normal.          Current Medications:   amLODIPine  10 mg Oral Daily    aspirin  81 mg Oral Daily    atorvastatin  40 mg Oral Daily    busPIRone  10 mg Oral BID    heparin (porcine)  5,000 Units Subcutaneous Q8H    losartan  25 mg Oral Daily    sodium chloride 0.9%  10 mL Intravenous Q8H    tamsulosin  1 capsule Oral Daily         Current Facility-Administered Medications:     acetaminophen, 650 mg, Oral, Q6H PRN    dextrose 50%, 12.5 g, Intravenous, PRN    dextrose 50%, 25 g, Intravenous, PRN    glucagon (human recombinant), 1 mg, Intramuscular, PRN    glucose, 16 g, Oral, PRN    glucose, 24 g, Oral, PRN    melatonin, 6 mg, Oral, Nightly PRN    naloxone, 0.02 mg, Intravenous, PRN    senna-docusate, 1 tablet, Oral, BID PRN    Laboratory (all labs reviewed):  CBC:  Recent Labs   Lab 02/18/25  0741 05/26/25  1515 06/12/25  1427 06/16/25  1528 06/17/25  0551   WBC 5.93 7.17 6.07 6.68 5.13   Hemoglobin 12.6 L  --   --   --   --    HGB  --  10.8 L  11.2 L 11.0 L 10.2 L   Hematocrit 39.5 L  --   --   --   --    HCT  --  34.9 L 36.1 L 36.1 L 32.4 L   Platelet Count  --  235 228 231 197   Platelets 293  --   --   --   --        CHEMISTRIES:  Recent Labs   Lab 08/15/24  1444 02/18/25  0741 06/12/25  1427 06/16/25  1528 06/17/25  0551   Glucose 104 94 110 137 H 97   Sodium 139 139 142 141 140   Potassium 4.5 4.5 4.4 4.4 4.4   BUN 18 14 14 16 16   Creatinine 1.2 1.1 1.1 1.1 1.0   eGFR >60 >60 >60 >60 >60   Calcium 10.0 9.3 9.5 9.1 8.8   Magnesium   --   --   --   --  2.0       CARDIAC BIOMARKERS:  Recent Labs   Lab 06/12/25  1427 06/16/25  1528 06/16/25  1828 06/17/25  0056 06/17/25  0551   Troponin-I 0.019 <0.006 <0.006 <0.006 0.007       COAGS:        LIPIDS/LFTS:  Recent Labs   Lab 06/02/23  1053 11/03/23  1647 01/09/24  0835 08/15/24  1444 02/18/25  0741 06/12/25  1427 06/16/25  1528   Cholesterol 125  --   --   --  136  --   --    Triglycerides 49  --   --   --  66  --   --    HDL 39 L  --   --   --  48  --   --    LDL Cholesterol 76.2  --   --   --  74.8  --   --    Non-HDL Cholesterol 86  --   --   --  88  --   --    AST 22   < > 22 33 20 21 16   ALT 21   < > 17 23 16 16 17    < > = values in this interval not displayed.       BNP:  Recent Labs   Lab 11/03/23  1647 08/15/24  1444 06/12/25  1427 06/16/25  1528   BNP 60 34 79 90       TSH:  Recent Labs   Lab 11/16/23  1335 02/18/25  0741   TSH 0.725 1.549       Free T4:        Diagnostic Results:  ECG (personally reviewed and interpreted tracing(s)):  6/16/25 1507 SR 59    Chest X-Ray (personally reviewed and interpreted image(s)): 6/16/25 NAD    Ex MPI 6/17/25 (images personally reviewed and interpreted)    The patient exercised for 7 minutes 26 seconds on a Aric protocol, achieving a peak heart rate of 144 bpm, which is 99% of the age predicted maximum heart rate.    Abnormal myocardial perfusion scan.    There is a mild intensity, small sized, reversible perfusion abnormality that is consistent with  ischemia in the basal to mid inferior wall(s) in the typical distribution of the RCA territory.    There are no other significant perfusion abnormalities.    The gated perfusion images showed an ejection fraction of 59% post stress.    There is normal wall motion at post-stress.    The ECG portion of the study is suspicious for ischemia.    The patient reported no chest pain during the stress test.    Echo: 6/16/25 (images personally reviewed and interpreted)    Left Ventricle: The left ventricle is normal in size. Mildly increased wall thickness. There is mild concentric hypertrophy. There is normal systolic function with a visually estimated ejection fraction of 55 - 60%. There is normal diastolic function.    Right Ventricle: The right ventricle is normal in size Systolic function is normal.    Aortic Valve: The aortic valve is a trileaflet valve. There is moderate aortic valve sclerosis. Mildly restricted motion. There is moderate stenosis. Aortic valve area by VTI is 1.2 cm². Aortic valve peak velocity is 3.0 m/s. Mean gradient is 22 mmHg. The dimensionless index is 0.35. There is mild aortic regurgitation.    Aorta: The ascending aorta is mildly dilated measuring 3.7 cm.    Carotid US 10/7/22  There is 0-19% right Internal Carotid Stenosis.  There is 0-19% left Internal Carotid Stenosis.    Cath: 3/2/21  Left main:  Distal 10% stenosis  Lad:  Luminal irregularities.  Mid 10-20% stenosis  Circumflex:  Luminal irregularities   RCA:  Luminal irregularities            Assessment and Plan:     * Precordial pain  Somewhat atypical chest pain, although waxing and waning over the past several days.    EKG is nonischemic and initial troponin negative.    Trops neg  Mild chest discomfort during ETT  MPI 6/17 with mild inferior ischemia (low risk findings).  I discussed options of intensification of medical therapy versus diagnostic angiography.  We have decided to initiate Imdur as a 2nd antianginal (resting HR too low  for BBL).  The patient will follow up with Dr. Avalos.  If he continues to have symptoms, he can consider angiography at that point.    Nonrheumatic aortic valve stenosis  Mod by echo, no plan for valve intervention    Tobacco dependence  Smoking cessation strongly advised.    Hypertension  Cont med rx    Hyperlipidemia  Cont statin          VTE Risk Mitigation (From admission, onward)           Ordered     heparin (porcine) injection 5,000 Units  Every 8 hours         06/17/25 0540     IP VTE HIGH RISK PATIENT  Once         06/16/25 2008     Place sequential compression device  Until discontinued         06/16/25 2008                  Dispo planning appropriate.  Cardiology will sign off, pls call with questions.  Pt to follow up with Dr. Avalos after discharge.    Yunier Kimbrough MD  Cardiology  [facility] - Emergency Dept

## 2025-06-18 ENCOUNTER — TELEPHONE (OUTPATIENT)
Dept: FAMILY MEDICINE | Facility: CLINIC | Age: 75
End: 2025-06-18
Payer: MEDICARE

## 2025-06-18 NOTE — TELEPHONE ENCOUNTER
Copied from CRM #[id number]. Topic: Appointments - Appointment Access  >> Jun 17, 2025  5:21 PM Suzie wrote:  Type:  Sooner Apoointment Request    Caller is requesting a sooner appointment.  Caller declined first available appointment listed below.  Caller will not accept being placed on the waitlist and is requesting a message be sent to doctor.  Name of Caller: [facility] nurse  When is the first available appointment? 7/28  Symptoms: Hospital f/u  Would the patient rather a call back or a response via [facility]? Call  Best Call Back Number: [phone]  Additional Information: Pt will need a hospital f/u appt soon.

## 2025-06-19 ENCOUNTER — OFFICE VISIT (OUTPATIENT)
Dept: CARDIOLOGY | Facility: CLINIC | Age: 75
End: 2025-06-19
Payer: MEDICARE

## 2025-06-19 ENCOUNTER — PATIENT OUTREACH (OUTPATIENT)
Dept: ADMINISTRATIVE | Facility: CLINIC | Age: 75
End: 2025-06-19
Payer: MEDICARE

## 2025-06-19 VITALS
OXYGEN SATURATION: 97 % | WEIGHT: 181.19 LBS | SYSTOLIC BLOOD PRESSURE: 128 MMHG | RESPIRATION RATE: 18 BRPM | HEIGHT: 68 IN | BODY MASS INDEX: 27.46 KG/M2 | DIASTOLIC BLOOD PRESSURE: 69 MMHG | HEART RATE: 65 BPM

## 2025-06-19 DIAGNOSIS — I25.10 CORONARY ARTERY DISEASE INVOLVING NATIVE HEART WITHOUT ANGINA PECTORIS, UNSPECIFIED VESSEL OR LESION TYPE: ICD-10-CM

## 2025-06-19 DIAGNOSIS — I10 PRIMARY HYPERTENSION: ICD-10-CM

## 2025-06-19 DIAGNOSIS — I70.0 ATHEROSCLEROSIS OF AORTA: Primary | Chronic | ICD-10-CM

## 2025-06-19 DIAGNOSIS — R00.1 BRADYCARDIA: ICD-10-CM

## 2025-06-19 DIAGNOSIS — E78.5 HYPERLIPIDEMIA, UNSPECIFIED HYPERLIPIDEMIA TYPE: ICD-10-CM

## 2025-06-19 DIAGNOSIS — I10 ESSENTIAL (PRIMARY) HYPERTENSION: ICD-10-CM

## 2025-06-19 PROCEDURE — 1126F AMNT PAIN NOTED NONE PRSNT: CPT | Mod: CPTII,S$GLB,, | Performed by: INTERNAL MEDICINE

## 2025-06-19 PROCEDURE — 3078F DIAST BP <80 MM HG: CPT | Mod: CPTII,S$GLB,, | Performed by: INTERNAL MEDICINE

## 2025-06-19 PROCEDURE — 3074F SYST BP LT 130 MM HG: CPT | Mod: CPTII,S$GLB,, | Performed by: INTERNAL MEDICINE

## 2025-06-19 PROCEDURE — G2211 COMPLEX E/M VISIT ADD ON: HCPCS | Mod: S$GLB,,, | Performed by: INTERNAL MEDICINE

## 2025-06-19 PROCEDURE — 3008F BODY MASS INDEX DOCD: CPT | Mod: CPTII,S$GLB,, | Performed by: INTERNAL MEDICINE

## 2025-06-19 PROCEDURE — 3044F HG A1C LEVEL LT 7.0%: CPT | Mod: CPTII,S$GLB,, | Performed by: INTERNAL MEDICINE

## 2025-06-19 PROCEDURE — 99999 PR PBB SHADOW E&M-EST. PATIENT-LVL IV: CPT | Mod: PBBFAC,,, | Performed by: INTERNAL MEDICINE

## 2025-06-19 PROCEDURE — 1159F MED LIST DOCD IN RCRD: CPT | Mod: CPTII,S$GLB,, | Performed by: INTERNAL MEDICINE

## 2025-06-19 PROCEDURE — 1101F PT FALLS ASSESS-DOCD LE1/YR: CPT | Mod: CPTII,S$GLB,, | Performed by: INTERNAL MEDICINE

## 2025-06-19 PROCEDURE — 3288F FALL RISK ASSESSMENT DOCD: CPT | Mod: CPTII,S$GLB,, | Performed by: INTERNAL MEDICINE

## 2025-06-19 PROCEDURE — 99214 OFFICE O/P EST MOD 30 MIN: CPT | Mod: S$GLB,,, | Performed by: INTERNAL MEDICINE

## 2025-06-19 PROCEDURE — 4010F ACE/ARB THERAPY RXD/TAKEN: CPT | Mod: CPTII,S$GLB,, | Performed by: INTERNAL MEDICINE

## 2025-06-19 RX ORDER — NITROGLYCERIN 0.4 MG/1
0.4 TABLET SUBLINGUAL EVERY 5 MIN PRN
Qty: 30 TABLET | Refills: 12 | Status: SHIPPED | OUTPATIENT
Start: 2025-06-19 | End: 2025-07-19

## 2025-06-19 NOTE — PROGRESS NOTES
CARDIOVASCULAR CONSULTATION    REASON FOR CONSULT:   Yair Owens is a 75 y.o. male who presents for evaluation of chest pressure.      HISTORY OF PRESENT ILLNESS:     Patient is a pleasant 61-year-old man.  Came here because evaluation of chest pressure to the emergency room.  Was ruled out.  EKG was done which showed normal sinus rhythm.  States the pressure was substernal, and was much worse than the usual pressure which he feels.  States that he usually feels chest pain and tightness.  Unrelated to activity.  Sometimes can come with activity other times at rest.  Denies orthopnea, PND.  Does have mild dyspnea on exertion.  Used to smoke, but cutting down.    Notes from January 2020:    Patient doing fine.  Denies any further episodes of chest pains, orthopnea, PND.  States that he thinks it is his anxiety.  Stress testing did not reveal any significant issues apart from mild aortic valve stenosis.      The perfusion scan is free of evidence from myocardial ischemia or injury.    There is a  mild intensity fixed defect in the inferior wall of the left ventricle secondary to diaphragm attenuation.    Gated perfusion images showed an ejection fraction of 60% post stress.    The EKG portion of this study is negative for ischemia.    The patient reported no chest pain during the stress test.    There were no arrhythmias during stress.      Normal left ventricular systolic function. The estimated ejection fraction is 60%.  Concentric left ventricular hypertrophy.  Normal LV diastolic function.  Normal right ventricular systolic function.  Mild-to-moderate aortic valve stenosis.  Aortic valve area is 1.80 cm2; peak velocity is 2.60 m/s; mean gradient is 18 mmHg.  Mild aortic regurgitation.    Notes from may 2020:    The patient location is: his home   The chief complaint leading to consultation is: chest pain    Visit type: audiovisual    Face to Face time with patient: 15 mins   25 minutes of total time spent on  the encounter, which includes face to face time and non-face to face time preparing to see the patient (eg, review of tests), Obtaining and/or reviewing separately obtained history, Documenting clinical information in the electronic or other health record, Independently interpreting results (not separately reported) and communicating results to the patient/family/caregiver, or Care coordination (not separately reported).         Each patient to whom he or she provides medical services by telemedicine is:  (1) informed of the relationship between the physician and patient and the respective role of any other health care provider with respect to management of the patient; and (2) notified that he or she may decline to receive medical services by telemedicine and may withdraw from such care at any time.    Notes:  Patient here for follow-up.  Denies any chest pain at rest on exertion, orthopnea, PND.  States that he has been feeling great.  However he has started smoking more again because of the pandemic and the stress related with the pandemic.        Jan 2021:      The patient location is: his home  The chief complaint leading to consultation is: chest pain    Visit type: audiovisual    Face to Face time with patient: 15 mins  25 minutes of total time spent on the encounter, which includes face to face time and non-face to face time preparing to see the patient (eg, review of tests), Obtaining and/or reviewing separately obtained history, Documenting clinical information in the electronic or other health record, Independently interpreting results (not separately reported) and communicating results to the patient/family/caregiver, or Care coordination (not separately reported).         Each patient to whom he or she provides medical services by telemedicine is:  (1) informed of the relationship between the physician and patient and the respective role of any other health care provider with respect to management of  the patient; and (2) notified that he or she may decline to receive medical services by telemedicine and may withdraw from such care at any time.    Notes:  Patient here via audiovisual visit.  Had an episode of chest pain which brought him to the ER.  Describes it as left-sided.  At rest.  EKG was done which showed normal sinus rhythm with nonspecific ST changes and some mild ST depressions.  Since then has not had any further chest pains.  States that that day his blood pressure was high around 170 mm of mercury.  Doing fine.      Notes from feb 2021    The patient location is:  his home  The chief complaint leading to consultation is:  Chest pain    Visit type: audiovisual    Face to Face time with patient: 20 mins  30  minutes of total time spent on the encounter, which includes face to face time and non-face to face time preparing to see the patient (eg, review of tests), Obtaining and/or reviewing separately obtained history, Documenting clinical information in the electronic or other health record, Independently interpreting results (not separately reported) and communicating results to the patient/family/caregiver, or Care coordination (not separately reported).         Each patient to whom he or she provides medical services by telemedicine is:  (1) informed of the relationship between the physician and patient and the respective role of any other health care provider with respect to management of the patient; and (2) notified that he or she may decline to receive medical services by telemedicine and may withdraw from such care at any time.    Notes:       Patient here follow-up via audiovisual visit.  Occasional chest pain.  Really in the center of the chest and feels like tightness.  Sometimes in the left side and sometimes in the left shoulder.  Feels the left-sided pain goes to his left shoulder.  No relation with activity.  Can occur at rest or on exertion.  Stress test showed normal myocardial  perfusion.  Although the EKG portion was positive for ischemia.    Normal myocardial perfusion scan. There is no evidence of myocardial ischemia or infarction.    There is a  mild intensity fixed defect in the inferobasilar wall of the left ventricle secondary to diaphragm attenuation.    The gated perfusion images showed an ejection fraction of 68% post stress.    There is normal wall motion post stress.    The EKG portion of this study is positive for ischemia.      The left ventricle is normal in size with mild concentric hypertrophy and normal systolic function. The estimated ejection fraction is 60%  Normal right ventricular size with normal right ventricular systolic function.  There is mild aortic valve stenosis.  Aortic valve area is 1.87 cm2; peak velocity is 2.19 m/s; mean gradient is 10 mmHg.    Notes from March 2021:  Patient here for follow-up after angiogram.  Mild nonobstructive CAD on angiogram.  No complications from angiogram.  Doing fine.  Denies any chest pains at rest on exertion.  States he thinks it is his anxiety which is bothering him.    The estimated blood loss was <50 mL.  There was non-obstructive coronary artery disease..        Left main:  Distal 10% stenosis     Lad:  Luminal irregularities.  Mid 10-20% stenosis     Circumflex:  Luminal irregularities     RCA:  Luminal irregularities     Access: We accessed the right radial artery using ultrasound guidance. The vessel appeared widely patent, suitable for endovascular access. The images were interpreted by me and saved.  Access was closed with radial band.           June 21:    The patient location is: his home  The chief complaint leading to consultation is: fu    Visit type: audiovisual    Face to Face time with patient: 15 min  25  minutes of total time spent on the encounter, which includes face to face time and non-face to face time preparing to see the patient (eg, review of tests), Obtaining and/or reviewing separately obtained  history, Documenting clinical information in the electronic or other health record, Independently interpreting results (not separately reported) and communicating results to the patient/family/caregiver, or Care coordination (not separately reported).         Each patient to whom he or she provides medical services by telemedicine is:  (1) informed of the relationship between the physician and patient and the respective role of any other health care provider with respect to management of the patient; and (2) notified that he or she may decline to receive medical services by telemedicine and may withdraw from such care at any time.    Notes:   Has been having left sided chest pain, not related to exertion, pin point in the left of the chest. Does not occur when he is cutting his lawn.  Denies orthopnea, PND, swelling of feet.  States blood pressure is usually well controlled at home.      September 2022: Patient here for follow-up.  Denies any chest pains at rest on exertion, orthopnea, PND.  Has been doing fine.    MARCH 23:  Follow-up.  Denies any chest pains at rest on exertion, orthopnea, PND, swelling of feet.      Notes from October 2023: Patient here for follow-up.  Doing fine.  Walks about 30 miles a week.  Denies chest pains at rest on exertion, orthopnea, PND.    Notes from August 24: Patient here for follow-up.  Denies any anginal sounding chest pains.  Occasional burping.  On omeprazole.  Which helps.  Denies orthopnea PND swelling of feet.  No chest pains on exertion      Sept 24:  Patient here for follow-up.  Denies chest pains at rest on exertion orthopnea or PND.  Walks 35 miles a week.  Without any issues.    Results for orders placed during the hospital encounter of 08/27/24    Echo    Interpretation Summary    Left Ventricle: The left ventricle is normal in size. There is mild concentric hypertrophy. There is normal systolic function with a visually estimated ejection fraction of 65 - 70%.    Right  Ventricle: Normal right ventricular cavity size. Systolic function is normal.    Aortic Valve: There is mild to moderate stenosis. Aortic valve area by VTI is 1.54 cm². Aortic valve peak velocity is 2.77 m/s. Mean gradient is 19 mmHg. The dimensionless index is 0.37. There is mild aortic regurgitation.    Pulmonary Artery: The estimated pulmonary artery systolic pressure is 24 mmHg.    IVC/SVC: Normal venous pressure at 3 mmHg.      March 25:    History of Present Illness    CHIEF COMPLAINT:  Yair presents today for cardiac follow up    CARDIOVASCULAR:  Heart rate occasionally drops into the 50s, which he wonders if could be attributed to his regular exercise routine. He denies chest pain, tightness, shortness of breath, or palpitations. Echo from August showed mild to moderate aortic valve stenosis.    EXERCISE:  He walks 30-35 miles weekly without any problems.    SLEEP:  He reports difficulty sleeping at night, which he has not yet discussed with his primary care physician.    MEDICATIONS:  His cardiac medications include amlodipine 10 mg daily, aspirin, atorvastatin, and losartan.         Notes from June 25    The patient location is: [place]  The chief complaint leading to consultation is:  Chest pains    Visit type: audiovisual    Face to Face time with patient: 10 min  25 minutes of total time spent on the encounter, which includes face to face time and non-face to face time preparing to see the patient (eg, review of tests), Obtaining and/or reviewing separately obtained history, Documenting clinical information in the electronic or other health record, Independently interpreting results (not separately reported) and communicating results to the patient/family/caregiver, or Care coordination (not separately reported).         Each patient to whom he or she provides medical services by telemedicine is:  (1) informed of the relationship between the physician and patient and the respective role of any other  health care provider with respect to management of the patient; and (2) notified that he or she may decline to receive medical services by telemedicine and may withdraw from such care at any time.    Notes:  Patient complaining of on and off chest pains all morning.  Also complains of occasional bradycardia.  Recently came to the ER for chest pain also.  States today chest pain has been going on and off and he feels nauseated also.        Notes from June 19, 2025:  Patient here for follow-up after recent hospitalization for chest pain.    CHIEF COMPLAINT:  Yair presents today for follow up after recent hospitalization with medication changes    CARDIOVASCULAR:  He reports a 20-year history of chest pain with family history of angina in his father. He currently experiences bradycardia with heart rate between 40-60s without associated dizziness. Stress test was mildly abnormal showing a small area of ischemia. He is aware of potential cardiac artery stenosis and is scheduled for a Holter monitor to investigate heart rate variability.    PHYSICAL ACTIVITY:  He walked 2 miles today without chest pain. Yesterday, he used a push mower and became slightly short of breath, which he attributes to heat. He demonstrates improved functional capacity and tolerates moderate physical exertion without cardiac symptoms.    MEDICATIONS:  He discontinued amlodipine due to well-controlled BP. He continues Imdur for well-controlled chest pain, aspirin. He discontinued Cialis. He has discussed medication changes with digital medicine pharmacist regarding BP management.    SOCIAL HISTORY:  He has history of heavy smoking but has recently reduced consumption to 6 cigarettes over the past 3-4 days following two medical episodes. He expresses intention to quit smoking.    HEMATOLOGIC:  He reports concerns about anemia with extremely low ferritin levels and questions if this could be contributing to his current health issues.         Results  for orders placed or performed during the hospital encounter of 06/16/25   EKG 12-lead    Collection Time: 06/16/25  3:07 PM   Result Value Ref Range    QRS Duration 78 ms    OHS QTC Calculation 376 ms    Narrative    Test Reason : R07.9,    Vent. Rate :  59 BPM     Atrial Rate :  59 BPM     P-R Int : 152 ms          QRS Dur :  78 ms      QT Int : 380 ms       P-R-T Axes :  68  39  59 degrees    QTcB Int : 376 ms    Sinus bradycardia  Otherwise normal ECG  When compared with ECG of 12-Jun-2025 14:01,  No significant change was found  Confirmed by Yunier Kimbrough ([id number]) on 6/17/2025 3:15:54 PM    Referred By: AAAREFERRAL SELF           Confirmed By: Yunier Kimbrough       Results for orders placed during the hospital encounter of 06/16/25    Echo    Interpretation Summary    Left Ventricle: The left ventricle is normal in size. Mildly increased wall thickness. There is mild concentric hypertrophy. There is normal systolic function with a visually estimated ejection fraction of 55 - 60%. There is normal diastolic function.    Right Ventricle: The right ventricle is normal in size Systolic function is normal.    Aortic Valve: The aortic valve is a trileaflet valve. There is moderate aortic valve sclerosis. Mildly restricted motion. There is moderate stenosis. Aortic valve area by VTI is 1.2 cm². Aortic valve peak velocity is 3.0 m/s. Mean gradient is 22 mmHg. The dimensionless index is 0.35. There is mild aortic regurgitation.    Aorta: The ascending aorta is mildly dilated measuring 3.7 cm.      Results for orders placed during the hospital encounter of 06/16/25    Nuclear Stress - Cardiology Interpreted    Interpretation Summary    The patient exercised for 7 minutes 26 seconds on a Aric protocol, achieving a peak heart rate of 144 bpm, which is 99% of the age predicted maximum heart rate.    Abnormal myocardial perfusion scan.    There is a mild intensity, small sized, reversible perfusion abnormality that is  consistent with ischemia in the basal to mid inferior wall(s) in the typical distribution of the RCA territory.    There are no other significant perfusion abnormalities.    The gated perfusion images showed an ejection fraction of 59% post stress.    There is normal wall motion at post-stress.    The ECG portion of the study is suspicious for ischemia.    The patient reported no chest pain during the stress test.      Results for orders placed during the hospital encounter of 03/02/21    Cardiac catheterization    Conclusion  · The estimated blood loss was <50 mL.  · There was non-obstructive coronary artery disease..      Left main:  Distal 10% stenosis    Lad:  Luminal irregularities.  Mid 10-20% stenosis    Circumflex:  Luminal irregularities    RCA:  Luminal irregularities    Access: We accessed the right radial artery using ultrasound guidance. The vessel appeared widely patent, suitable for endovascular access. The images were interpreted by me and saved.  Access was closed with radial band.          The procedure log was documented by Documenter: Suzie Weiner RN and verified by Vladimir Avalos MD.    Date: 3/2/2021  Time: 8:07 AM        PAST MEDICAL HISTORY:     Past Medical History:   Diagnosis Date    CAD (coronary artery disease) 02/22/2021    Cath 2021  There was non-obstructive coronary artery disease..   Left main:  Distal 10% stenosis  Lad:  Luminal irregularities.  Mid 10-20% stenosis   Circumflex:  Luminal irregularities   RCA:  Luminal irregularities    Calcified granuloma of lung 02/03/2023    LLL calcified granuloma seen on CT Chest Lung Screening Low Dose 01/31/2018    Colon polyp     Erectile dysfunction 02/14/2020    Fatty liver     Generalized anxiety disorder 01/11/2017    GERD (gastroesophageal reflux disease)     Gout, unspecified     History of colonic polyps 11/16/2023    2 polyps 11/22 -5 yrs    Hyperlipidemia     Hypertension     Psychophysiological insomnia 07/24/2017    Tobacco  dependence 07/24/2017       PAST SURGICAL HISTORY:     Past Surgical History:   Procedure Laterality Date    APPENDECTOMY      BONE RESECTION, RIB      for thoracic outlet syndrome    COLONOSCOPY N/A 07/21/2017    Procedure: COLONOSCOPY;  Surgeon: Deepak Servin MD;  Location: [facility] ENDO;  Service: Endoscopy;  Laterality: N/A;    COLONOSCOPY N/A 11/22/2022    Procedure: COLONOSCOPY;  Surgeon: Sam Obrien MD;  Location: [facility] ENDO;  Service: Endoscopy;  Laterality: N/A;  vacc-sutab-inst portal-tb    COLONOSCOPY N/A 2/28/2025    Procedure: COLONOSCOPY;  Surgeon: Juanjose Martinez MD;  Location: [facility] ENDO;  Service: Gastroenterology;  Laterality: N/A;  2/24-marcus-suprep-portal-tb  2/25 - R/S inst portal - LW    HAND SURGERY      LEFT HEART CATHETERIZATION Left 03/02/2021    Procedure: Left heart cath, radial, 730 am;  Surgeon: Vladimir Avalos MD;  Location: [facility] CATH LAB;  Service: Cardiology;  Laterality: Left;  RN PREOP 2/25/2021--COVID NEGATIVE ON 3/1  H/P INCOMPLETE    SCROTAL SURGERY      mass    ULTRASOUND GUIDANCE  03/02/2021    Procedure: Ultrasound Guidance;  Surgeon: Vladimir Avalos MD;  Location: [facility] CATH LAB;  Service: Cardiology;;    VASECTOMY  40 years       ALLERGIES AND MEDICATION:     Review of patient's allergies indicates:   Allergen Reactions    Codeine Itching    Ibuprofen Itching    Remeron [mirtazapine] Anxiety     Did not help the patient sleep and created anxiety.        Medication List            Accurate as of June 19, 2025 11:36 AM. If you have any questions, ask your nurse or doctor.                START taking these medications      nitroGLYCERIN 0.4 MG SL tablet  Commonly known as: NITROSTAT  Place 1 tablet (0.4 mg total) under the tongue every 5 (five) minutes as needed for Chest pain.  Started by: Vladimir Avalos MD            CONTINUE taking these medications      aspirin 81 MG EC tablet  Commonly known as: ECOTRIN  Take 1 tablet (81 mg total) by mouth once daily.     atorvastatin 40 MG  tablet  Commonly known as: LIPITOR  TAKE 1 TABLET BY MOUTH EVERY DAY     busPIRone 10 MG tablet  Commonly known as: BUSPAR  Take 1 tablet (10 mg total) by mouth 2 (two) times daily.     ferrous sulfate 325 (65 FE) MG EC tablet     isosorbide mononitrate 30 MG 24 hr tablet  Commonly known as: IMDUR  Take 1 tablet (30 mg total) by mouth once daily.     multivitamin capsule     omeprazole 40 MG capsule  Commonly known as: PRILOSEC  TAKE 1 CAPSULE BY MOUTH TWICE  DAILY     tamsulosin 0.4 mg Cap  Commonly known as: FLOMAX  Take 1 capsule (0.4 mg total) by mouth once daily.     UNABLE TO FIND            STOP taking these medications      tadalafiL 5 MG tablet  Commonly known as: CIALIS  Stopped by: Vladimir Avalos MD               Where to Get Your Medications        These medications were sent to [facility] [address] [facility] [employer] - [place] - [address]  [address] [place] [address]      Phone: [phone]   nitroGLYCERIN 0.4 MG SL tablet         SOCIAL HISTORY:     Social History     Socioeconomic History    Marital status:    Occupational History     Employer:  [employer]   Tobacco Use    Smoking status: Some Days     Current packs/day: 0.50     Average packs/day: 0.5 packs/day for 61.8 years (30.9 ttl pk-yrs)     Types: Cigarettes     Start date: 1967     Passive exposure: Current    Smokeless tobacco: Never    Tobacco comments:     0.5 ppd or less    Substance and Sexual Activity    Alcohol use: Not Currently    Drug use: No    Sexual activity: Yes     Partners: Female     Social Drivers of Health     Financial Resource Strain: Low Risk  (11/15/2024)    Overall Financial Resource Strain (CARDI)     Difficulty of Paying Living Expenses: Not hard at all   Food Insecurity: No Food Insecurity (11/15/2024)    Hunger Vital Sign     Worried About Running Out of Food in the Last Year: Never true     Ran Out of Food in the Last Year: Never true   Transportation Needs: No Transportation Needs (4/11/2024)    PRAPARE -  "Transportation     Lack of Transportation (Medical): No     Lack of Transportation (Non-Medical): No   Physical Activity: Sufficiently Active (11/15/2024)    Exercise Vital Sign     Days of Exercise per Week: 7 days     Minutes of Exercise per Session: 60 min   Stress: No Stress Concern Present (11/15/2024)    [employer] - Occupational Stress Questionnaire     Feeling of Stress : Only a little   Housing Stability: Low Risk  (4/11/2024)    Housing Stability Vital Sign     Unable to Pay for Housing in the Last Year: No     Number of Places Lived in the Last Year: 1     Unstable Housing in the Last Year: No       FAMILY HISTORY:     Family History   Problem Relation Name Age of Onset    No Known Problems Mother      Cancer Father DARYL GLYNN     Heart disease Father DARYL GLYNN     Asthma Sister REGINA ANNA     Cancer Sister REGINA ANNA 78        Rectal cancer    Stroke Brother Daryl     Lupus Daughter x1     No Known Problems Son x3        REVIEW OF SYSTEMS:   Review of Systems   Constitutional: Negative.   HENT: Negative.     Eyes: Negative.    Respiratory: Negative.     Endocrine: Negative.    Hematologic/Lymphatic: Negative.    Skin: Negative.    Musculoskeletal: Negative.    Gastrointestinal: Negative.    Genitourinary: Negative.    Neurological: Negative.    Psychiatric/Behavioral: Negative.     Allergic/Immunologic: Negative.        A 10 point review of systems was performed and all the pertinent positives have been mentioned. Rest of review of systems was negative.        PHYSICAL EXAM:     Vitals:    06/19/25 1110   BP: 128/69   Pulse: 65   Resp: 18    Body mass index is 27.55 kg/m².  Weight: 82.2 kg (181 lb 3.5 oz)   Height: 5' 8" (172.7 cm)      Physical Exam  Vitals and nursing note reviewed.   Constitutional:       Appearance: Normal appearance. He is well-developed.   HENT:      Head: Normocephalic and atraumatic.      Right Ear: Hearing normal.      Left Ear: " Hearing normal.      Nose: Nose normal.   Eyes:      General: Lids are normal.      Conjunctiva/sclera: Conjunctivae normal.      Pupils: Pupils are equal, round, and reactive to light.   Cardiovascular:      Rate and Rhythm: Normal rate and regular rhythm.      Pulses: Normal pulses.      Heart sounds: Murmur heard.   Pulmonary:      Effort: Pulmonary effort is normal.      Breath sounds: Normal breath sounds.   Abdominal:      Palpations: Abdomen is soft.      Tenderness: There is no abdominal tenderness.   Musculoskeletal:         General: No deformity.      Cervical back: Normal range of motion and neck supple.   Skin:     General: Skin is warm and dry.   Neurological:      Mental Status: He is alert and oriented to person, place, and time.   Psychiatric:         Speech: Speech normal.              DATA:     Laboratory:  CBC:  Recent Labs   Lab 06/12/25  1427 06/16/25  1528 06/17/25  0551   WBC 6.07 6.68 5.13   HGB 11.2 L 11.0 L 10.2 L   HCT 36.1 L 36.1 L 32.4 L   Platelet Count 228 231 197       CHEMISTRIES:  Recent Labs   Lab 06/12/25  1427 06/16/25  1528 06/17/25  0551   Glucose 110 137 H 97   Sodium 142 141 140   Potassium 4.4 4.4 4.4   BUN 14 16 16   Creatinine 1.1 1.1 1.0   Calcium 9.5 9.1 8.8   Magnesium   --   --  2.0       CARDIAC BIOMARKERS:  Recent Labs   Lab 06/16/25  1828 06/17/25  0056 06/17/25  0551   Troponin-I <0.006 <0.006 0.007       COAGS:        LIPIDS/LFTS:  Recent Labs   Lab 06/02/23  1053 11/03/23  1647 02/18/25  0741 06/12/25  1427 06/16/25  1528   Cholesterol 125  --  136  --   --    Triglycerides 49  --  66  --   --    HDL 39 L  --  48  --   --    LDL Cholesterol 76.2  --  74.8  --   --    Non-HDL Cholesterol 86  --  88  --   --    AST 22   < > 20 21 16   ALT 21   < > 16 16 17    < > = values in this interval not displayed.       Hemoglobin A1C   Date Value Ref Range Status   02/18/2025 5.2 4.0 - 5.6 % Final     Comment:     ADA Screening Guidelines:  5.7-6.4%  Consistent with  prediabetes  >or=6.5%  Consistent with diabetes    High levels of fetal hemoglobin interfere with the HbA1C  assay. Heterozygous hemoglobin variants (HbS, HgC, etc)do  not significantly interfere with this assay.   However, presence of multiple variants may affect accuracy.     11/16/2023 5.3 4.0 - 5.6 % Final     Comment:     ADA Screening Guidelines:  5.7-6.4%  Consistent with prediabetes  >or=6.5%  Consistent with diabetes    High levels of fetal hemoglobin interfere with the HbA1C  assay. Heterozygous hemoglobin variants (HbS, HgC, etc)do  not significantly interfere with this assay.   However, presence of multiple variants may affect accuracy.         TSH  Recent Labs   Lab 11/16/23  1335 02/18/25  0741   TSH 0.725 1.549       The 10-year ASCVD risk score (Francis METCALF, et al., 2019) is: 26.3%    Values used to calculate the score:      Age: 75 years      Sex: Male      Is Non- : No      Diabetic: No      Tobacco smoker: Yes      Systolic Blood Pressure: 128 mmHg      Is BP treated: Yes      HDL Cholesterol: 48 mg/dL      Total Cholesterol: 136 mg/dL             ASSESSMENT AND PLAN     Patient Active Problem List   Diagnosis    Hyperlipidemia    Hypertension    GERD (gastroesophageal reflux disease)    Generalized anxiety disorder    Tobacco dependence    Psychophysiological insomnia    Atherosclerosis of aorta    Erectile dysfunction    CAD (coronary artery disease)    Fatty liver    Hepatomegaly    Calcified granuloma of lung    Purpura    Lower urinary tract symptoms (LUTS)    History of colonic polyps    Overweight (BMI 25.0-29.9)    Other emphysema    Pleural plaque    LAD (lymphadenopathy), hilar    Polyp of colon    Iron deficiency anemia    Spontaneous ecchymoses    Encounter for long-term (current) use of aspirin    Nonrheumatic aortic valve stenosis    Precordial pain     Orders Placed This Encounter   Procedures    Holter monitor - 48 hour     Assessment & Plan     IMPRESSION:  Stress test results show mild ischemia.  Initiated medical management vs. angiogram.  Moderate aortic stenosis noted on echocardiogram, requiring monitoring.    PLAN SUMMARY:  - Ordered Holter monitor for 2-day heart rate monitoring  - Started nitroglycerin pills for severe chest pain as needed  - Continued aspirin and Imdur for chest pain  - Discontinued amlodipine due to well-controlled blood pressure  - Discontinued Cialis due to interaction with Imdur  - Recommend smoking cessation  - Follow up after Holter monitor results  - Follow up in 6 months to a year for aortic valve monitoring    CORONARY ARTERY DISEASE AND ANGINA:  - Continued Imdur for chest pain.  - Started nitroglycerin pills for use as needed for severe chest pain.  - Explained interaction risk between Cialis and Imdur, emphasizing potential fatal consequences.  - Discontinued Cialis due to interaction with Imdur.  - Continued aspirin.  -patient's chest pain has gone away after initiation of Imdur.  We discussed in detail risks benefits of doing an angiogram versus continued medical management.  After detailed discussion decided to proceed with medical management.  Consider angiogram patient has lifestyle limiting angina despite maximum medical management    BRADYCARDIA:  - Ordered Holter monitor for 2-day heart rate monitoring to investigate bradycardia.    HYPERTENSION:  Patient has not been taking his amlodipine because his blood pressure has been well controlled.    AORTIC VALVE DISEASE:  Moderate aortic valve stenosis.  Continue to monitor with regular echocardiograms    PHYSICAL ACTIVITY AND CHEST PAIN MANAGEMENT:  - Continue walking and physical activity as tolerated, stop if chest pain occurs.    SMOKING CESSATION:  - Recommend smoking cessation.    FOLLOW-UP AND MONITORING:  - Follow up after Holter monitor results.  - Contact the office through the portal or call for prompt appointments if needed.         Lab Results    Component Value Date    LDLCALC 74.8 02/18/2025       Thank you very much for involving me in the care of your patient.  Please do not hesitate to contact me if there are any questions.      Vladimir Avalos MD, [facility], [facility]  Interventional Cardiologist, [facility].       Visit today included increased complexity associated with the care of the episodic problem dyslipidemia, hypertension, aortic valve stenosis addressed and managing the longitudinal care of the patient due to the serious and/or complex managed problem(s) Problem List[1]  .    This note was dictated with the help of speech recognition software.  There might be un-intended errors and/or substitutions.                                                   [1]   Patient Active Problem List  Diagnosis    Hyperlipidemia    Hypertension    GERD (gastroesophageal reflux disease)    Generalized anxiety disorder    Tobacco dependence    Psychophysiological insomnia    Atherosclerosis of aorta    Erectile dysfunction    CAD (coronary artery disease)    Fatty liver    Hepatomegaly    Calcified granuloma of lung    Purpura    Lower urinary tract symptoms (LUTS)    History of colonic polyps    Overweight (BMI 25.0-29.9)    Other emphysema    Pleural plaque    LAD (lymphadenopathy), hilar    Polyp of colon    Iron deficiency anemia    Spontaneous ecchymoses    Encounter for long-term (current) use of aspirin    Nonrheumatic aortic valve stenosis    Precordial pain

## 2025-06-19 NOTE — H&P (VIEW-ONLY)
CARDIOVASCULAR CONSULTATION    REASON FOR CONSULT:   Yair Owens is a 75 y.o. male who presents for evaluation of chest pressure.      HISTORY OF PRESENT ILLNESS:     Patient is a pleasant 61-year-old man.  Came here because evaluation of chest pressure to the emergency room.  Was ruled out.  EKG was done which showed normal sinus rhythm.  States the pressure was substernal, and was much worse than the usual pressure which he feels.  States that he usually feels chest pain and tightness.  Unrelated to activity.  Sometimes can come with activity other times at rest.  Denies orthopnea, PND.  Does have mild dyspnea on exertion.  Used to smoke, but cutting down.    Notes from January 2020:    Patient doing fine.  Denies any further episodes of chest pains, orthopnea, PND.  States that he thinks it is his anxiety.  Stress testing did not reveal any significant issues apart from mild aortic valve stenosis.      The perfusion scan is free of evidence from myocardial ischemia or injury.    There is a  mild intensity fixed defect in the inferior wall of the left ventricle secondary to diaphragm attenuation.    Gated perfusion images showed an ejection fraction of 60% post stress.    The EKG portion of this study is negative for ischemia.    The patient reported no chest pain during the stress test.    There were no arrhythmias during stress.      Normal left ventricular systolic function. The estimated ejection fraction is 60%.  Concentric left ventricular hypertrophy.  Normal LV diastolic function.  Normal right ventricular systolic function.  Mild-to-moderate aortic valve stenosis.  Aortic valve area is 1.80 cm2; peak velocity is 2.60 m/s; mean gradient is 18 mmHg.  Mild aortic regurgitation.    Notes from may 2020:    The patient location is: his home   The chief complaint leading to consultation is: chest pain    Visit type: audiovisual    Face to Face time with patient: 15 mins   25 minutes of total time spent on  the encounter, which includes face to face time and non-face to face time preparing to see the patient (eg, review of tests), Obtaining and/or reviewing separately obtained history, Documenting clinical information in the electronic or other health record, Independently interpreting results (not separately reported) and communicating results to the patient/family/caregiver, or Care coordination (not separately reported).         Each patient to whom he or she provides medical services by telemedicine is:  (1) informed of the relationship between the physician and patient and the respective role of any other health care provider with respect to management of the patient; and (2) notified that he or she may decline to receive medical services by telemedicine and may withdraw from such care at any time.    Notes:  Patient here for follow-up.  Denies any chest pain at rest on exertion, orthopnea, PND.  States that he has been feeling great.  However he has started smoking more again because of the pandemic and the stress related with the pandemic.        Jan 2021:      The patient location is: his home  The chief complaint leading to consultation is: chest pain    Visit type: audiovisual    Face to Face time with patient: 15 mins  25 minutes of total time spent on the encounter, which includes face to face time and non-face to face time preparing to see the patient (eg, review of tests), Obtaining and/or reviewing separately obtained history, Documenting clinical information in the electronic or other health record, Independently interpreting results (not separately reported) and communicating results to the patient/family/caregiver, or Care coordination (not separately reported).         Each patient to whom he or she provides medical services by telemedicine is:  (1) informed of the relationship between the physician and patient and the respective role of any other health care provider with respect to management of  the patient; and (2) notified that he or she may decline to receive medical services by telemedicine and may withdraw from such care at any time.    Notes:  Patient here via audiovisual visit.  Had an episode of chest pain which brought him to the ER.  Describes it as left-sided.  At rest.  EKG was done which showed normal sinus rhythm with nonspecific ST changes and some mild ST depressions.  Since then has not had any further chest pains.  States that that day his blood pressure was high around 170 mm of mercury.  Doing fine.      Notes from feb 2021    The patient location is:  his home  The chief complaint leading to consultation is:  Chest pain    Visit type: audiovisual    Face to Face time with patient: 20 mins  30  minutes of total time spent on the encounter, which includes face to face time and non-face to face time preparing to see the patient (eg, review of tests), Obtaining and/or reviewing separately obtained history, Documenting clinical information in the electronic or other health record, Independently interpreting results (not separately reported) and communicating results to the patient/family/caregiver, or Care coordination (not separately reported).         Each patient to whom he or she provides medical services by telemedicine is:  (1) informed of the relationship between the physician and patient and the respective role of any other health care provider with respect to management of the patient; and (2) notified that he or she may decline to receive medical services by telemedicine and may withdraw from such care at any time.    Notes:       Patient here follow-up via audiovisual visit.  Occasional chest pain.  Really in the center of the chest and feels like tightness.  Sometimes in the left side and sometimes in the left shoulder.  Feels the left-sided pain goes to his left shoulder.  No relation with activity.  Can occur at rest or on exertion.  Stress test showed normal myocardial  perfusion.  Although the EKG portion was positive for ischemia.    Normal myocardial perfusion scan. There is no evidence of myocardial ischemia or infarction.    There is a  mild intensity fixed defect in the inferobasilar wall of the left ventricle secondary to diaphragm attenuation.    The gated perfusion images showed an ejection fraction of 68% post stress.    There is normal wall motion post stress.    The EKG portion of this study is positive for ischemia.      The left ventricle is normal in size with mild concentric hypertrophy and normal systolic function. The estimated ejection fraction is 60%  Normal right ventricular size with normal right ventricular systolic function.  There is mild aortic valve stenosis.  Aortic valve area is 1.87 cm2; peak velocity is 2.19 m/s; mean gradient is 10 mmHg.    Notes from March 2021:  Patient here for follow-up after angiogram.  Mild nonobstructive CAD on angiogram.  No complications from angiogram.  Doing fine.  Denies any chest pains at rest on exertion.  States he thinks it is his anxiety which is bothering him.    The estimated blood loss was <50 mL.  There was non-obstructive coronary artery disease..        Left main:  Distal 10% stenosis     Lad:  Luminal irregularities.  Mid 10-20% stenosis     Circumflex:  Luminal irregularities     RCA:  Luminal irregularities     Access: We accessed the right radial artery using ultrasound guidance. The vessel appeared widely patent, suitable for endovascular access. The images were interpreted by me and saved.  Access was closed with radial band.           June 21:    The patient location is: his home  The chief complaint leading to consultation is: fu    Visit type: audiovisual    Face to Face time with patient: 15 min  25  minutes of total time spent on the encounter, which includes face to face time and non-face to face time preparing to see the patient (eg, review of tests), Obtaining and/or reviewing separately obtained  history, Documenting clinical information in the electronic or other health record, Independently interpreting results (not separately reported) and communicating results to the patient/family/caregiver, or Care coordination (not separately reported).         Each patient to whom he or she provides medical services by telemedicine is:  (1) informed of the relationship between the physician and patient and the respective role of any other health care provider with respect to management of the patient; and (2) notified that he or she may decline to receive medical services by telemedicine and may withdraw from such care at any time.    Notes:   Has been having left sided chest pain, not related to exertion, pin point in the left of the chest. Does not occur when he is cutting his lawn.  Denies orthopnea, PND, swelling of feet.  States blood pressure is usually well controlled at home.      September 2022: Patient here for follow-up.  Denies any chest pains at rest on exertion, orthopnea, PND.  Has been doing fine.    MARCH 23:  Follow-up.  Denies any chest pains at rest on exertion, orthopnea, PND, swelling of feet.      Notes from October 2023: Patient here for follow-up.  Doing fine.  Walks about 30 miles a week.  Denies chest pains at rest on exertion, orthopnea, PND.    Notes from August 24: Patient here for follow-up.  Denies any anginal sounding chest pains.  Occasional burping.  On omeprazole.  Which helps.  Denies orthopnea PND swelling of feet.  No chest pains on exertion      Sept 24:  Patient here for follow-up.  Denies chest pains at rest on exertion orthopnea or PND.  Walks 35 miles a week.  Without any issues.    Results for orders placed during the hospital encounter of 08/27/24    Echo    Interpretation Summary    Left Ventricle: The left ventricle is normal in size. There is mild concentric hypertrophy. There is normal systolic function with a visually estimated ejection fraction of 65 - 70%.    Right  Ventricle: Normal right ventricular cavity size. Systolic function is normal.    Aortic Valve: There is mild to moderate stenosis. Aortic valve area by VTI is 1.54 cm². Aortic valve peak velocity is 2.77 m/s. Mean gradient is 19 mmHg. The dimensionless index is 0.37. There is mild aortic regurgitation.    Pulmonary Artery: The estimated pulmonary artery systolic pressure is 24 mmHg.    IVC/SVC: Normal venous pressure at 3 mmHg.      March 25:    History of Present Illness    CHIEF COMPLAINT:  Yair presents today for cardiac follow up    CARDIOVASCULAR:  Heart rate occasionally drops into the 50s, which he wonders if could be attributed to his regular exercise routine. He denies chest pain, tightness, shortness of breath, or palpitations. Echo from August showed mild to moderate aortic valve stenosis.    EXERCISE:  He walks 30-35 miles weekly without any problems.    SLEEP:  He reports difficulty sleeping at night, which he has not yet discussed with his primary care physician.    MEDICATIONS:  His cardiac medications include amlodipine 10 mg daily, aspirin, atorvastatin, and losartan.         Notes from June 25    The patient location is: [place]  The chief complaint leading to consultation is:  Chest pains    Visit type: audiovisual    Face to Face time with patient: 10 min  25 minutes of total time spent on the encounter, which includes face to face time and non-face to face time preparing to see the patient (eg, review of tests), Obtaining and/or reviewing separately obtained history, Documenting clinical information in the electronic or other health record, Independently interpreting results (not separately reported) and communicating results to the patient/family/caregiver, or Care coordination (not separately reported).         Each patient to whom he or she provides medical services by telemedicine is:  (1) informed of the relationship between the physician and patient and the respective role of any other  health care provider with respect to management of the patient; and (2) notified that he or she may decline to receive medical services by telemedicine and may withdraw from such care at any time.    Notes:  Patient complaining of on and off chest pains all morning.  Also complains of occasional bradycardia.  Recently came to the ER for chest pain also.  States today chest pain has been going on and off and he feels nauseated also.        Notes from June 19, 2025:  Patient here for follow-up after recent hospitalization for chest pain.    CHIEF COMPLAINT:  Yair presents today for follow up after recent hospitalization with medication changes    CARDIOVASCULAR:  He reports a 20-year history of chest pain with family history of angina in his father. He currently experiences bradycardia with heart rate between 40-60s without associated dizziness. Stress test was mildly abnormal showing a small area of ischemia. He is aware of potential cardiac artery stenosis and is scheduled for a Holter monitor to investigate heart rate variability.    PHYSICAL ACTIVITY:  He walked 2 miles today without chest pain. Yesterday, he used a push mower and became slightly short of breath, which he attributes to heat. He demonstrates improved functional capacity and tolerates moderate physical exertion without cardiac symptoms.    MEDICATIONS:  He discontinued amlodipine due to well-controlled BP. He continues Imdur for well-controlled chest pain, aspirin. He discontinued Cialis. He has discussed medication changes with digital medicine pharmacist regarding BP management.    SOCIAL HISTORY:  He has history of heavy smoking but has recently reduced consumption to 6 cigarettes over the past 3-4 days following two medical episodes. He expresses intention to quit smoking.    HEMATOLOGIC:  He reports concerns about anemia with extremely low ferritin levels and questions if this could be contributing to his current health issues.         Results  for orders placed or performed during the hospital encounter of 06/16/25   EKG 12-lead    Collection Time: 06/16/25  3:07 PM   Result Value Ref Range    QRS Duration 78 ms    [facility] QTC Calculation 376 ms    Narrative    Test Reason : R07.9,    Vent. Rate :  59 BPM     Atrial Rate :  59 BPM     P-R Int : 152 ms          QRS Dur :  78 ms      QT Int : 380 ms       P-R-T Axes :  68  39  59 degrees    QTcB Int : 376 ms    Sinus bradycardia  Otherwise normal ECG  When compared with ECG of 12-Jun-2025 14:01,  No significant change was found  Confirmed by Yunier Kimbrough ([id number]) on 6/17/2025 3:15:54 PM    Referred By: AAAREFERRAL SELF           Confirmed By: Yunier Kimbrough       Results for orders placed during the hospital encounter of 06/16/25    Echo    Interpretation Summary    Left Ventricle: The left ventricle is normal in size. Mildly increased wall thickness. There is mild concentric hypertrophy. There is normal systolic function with a visually estimated ejection fraction of 55 - 60%. There is normal diastolic function.    Right Ventricle: The right ventricle is normal in size Systolic function is normal.    Aortic Valve: The aortic valve is a trileaflet valve. There is moderate aortic valve sclerosis. Mildly restricted motion. There is moderate stenosis. Aortic valve area by VTI is 1.2 cm². Aortic valve peak velocity is 3.0 m/s. Mean gradient is 22 mmHg. The dimensionless index is 0.35. There is mild aortic regurgitation.    Aorta: The ascending aorta is mildly dilated measuring 3.7 cm.      Results for orders placed during the hospital encounter of 06/16/25    Nuclear Stress - Cardiology Interpreted    Interpretation Summary    The patient exercised for 7 minutes 26 seconds on a Aric protocol, achieving a peak heart rate of 144 bpm, which is 99% of the age predicted maximum heart rate.    Abnormal myocardial perfusion scan.    There is a mild intensity, small sized, reversible perfusion abnormality that is  consistent with ischemia in the basal to mid inferior wall(s) in the typical distribution of the RCA territory.    There are no other significant perfusion abnormalities.    The gated perfusion images showed an ejection fraction of 59% post stress.    There is normal wall motion at post-stress.    The ECG portion of the study is suspicious for ischemia.    The patient reported no chest pain during the stress test.      Results for orders placed during the hospital encounter of 03/02/21    Cardiac catheterization    Conclusion  · The estimated blood loss was <50 mL.  · There was non-obstructive coronary artery disease..      Left main:  Distal 10% stenosis    Lad:  Luminal irregularities.  Mid 10-20% stenosis    Circumflex:  Luminal irregularities    RCA:  Luminal irregularities    Access: We accessed the right radial artery using ultrasound guidance. The vessel appeared widely patent, suitable for endovascular access. The images were interpreted by me and saved.  Access was closed with radial band.          The procedure log was documented by Documenter: Suzie Weiner RN and verified by Vladimir Avalos MD.    Date: 3/2/2021  Time: 8:07 AM        PAST MEDICAL HISTORY:     Past Medical History:   Diagnosis Date    CAD (coronary artery disease) 02/22/2021    Cath 2021  There was non-obstructive coronary artery disease..   Left main:  Distal 10% stenosis  Lad:  Luminal irregularities.  Mid 10-20% stenosis   Circumflex:  Luminal irregularities   RCA:  Luminal irregularities    Calcified granuloma of lung 02/03/2023    LLL calcified granuloma seen on CT Chest Lung Screening Low Dose 01/31/2018    Colon polyp     Erectile dysfunction 02/14/2020    Fatty liver     Generalized anxiety disorder 01/11/2017    GERD (gastroesophageal reflux disease)     Gout, unspecified     History of colonic polyps 11/16/2023    2 polyps 11/22 -5 yrs    Hyperlipidemia     Hypertension     Psychophysiological insomnia 07/24/2017    Tobacco  dependence 07/24/2017       PAST SURGICAL HISTORY:     Past Surgical History:   Procedure Laterality Date    APPENDECTOMY      BONE RESECTION, RIB      for thoracic outlet syndrome    COLONOSCOPY N/A 07/21/2017    Procedure: COLONOSCOPY;  Surgeon: Deepak Servin MD;  Location: [facility];  Service: Endoscopy;  Laterality: N/A;    COLONOSCOPY N/A 11/22/2022    Procedure: COLONOSCOPY;  Surgeon: Sam Obrien MD;  Location: Health system ENDO;  Service: Endoscopy;  Laterality: N/A;  vacc-sutab-inst portal-tb    COLONOSCOPY N/A 2/28/2025    Procedure: COLONOSCOPY;  Surgeon: Juanjose Martinez MD;  Location: Health system ENDO;  Service: Gastroenterology;  Laterality: N/A;  2/24-marcus-suprep-portal-tb  2/25 - R/S inst portal - LW    HAND SURGERY      LEFT HEART CATHETERIZATION Left 03/02/2021    Procedure: Left heart cath, radial, 730 am;  Surgeon: Vladimir Avalos MD;  Location: Health system CATH LAB;  Service: Cardiology;  Laterality: Left;  RN PREOP 2/25/2021--COVID NEGATIVE ON 3/1  H/P INCOMPLETE    SCROTAL SURGERY      mass    ULTRASOUND GUIDANCE  03/02/2021    Procedure: Ultrasound Guidance;  Surgeon: Vladimir Avalos MD;  Location: Health system CATH LAB;  Service: Cardiology;;    VASECTOMY  40 years       ALLERGIES AND MEDICATION:     Review of patient's allergies indicates:   Allergen Reactions    Codeine Itching    Ibuprofen Itching    Remeron [mirtazapine] Anxiety     Did not help the patient sleep and created anxiety.        Medication List            Accurate as of June 19, 2025 11:36 AM. If you have any questions, ask your nurse or doctor.                START taking these medications      nitroGLYCERIN 0.4 MG SL tablet  Commonly known as: NITROSTAT  Place 1 tablet (0.4 mg total) under the tongue every 5 (five) minutes as needed for Chest pain.  Started by: Vladimir Avalos MD            CONTINUE taking these medications      aspirin 81 MG EC tablet  Commonly known as: ECOTRIN  Take 1 tablet (81 mg total) by mouth once daily.     atorvastatin 40 MG  tablet  Commonly known as: LIPITOR  TAKE 1 TABLET BY MOUTH EVERY DAY     busPIRone 10 MG tablet  Commonly known as: BUSPAR  Take 1 tablet (10 mg total) by mouth 2 (two) times daily.     ferrous sulfate 325 (65 FE) MG EC tablet     isosorbide mononitrate 30 MG 24 hr tablet  Commonly known as: IMDUR  Take 1 tablet (30 mg total) by mouth once daily.     multivitamin capsule     omeprazole 40 MG capsule  Commonly known as: PRILOSEC  TAKE 1 CAPSULE BY MOUTH TWICE  DAILY     tamsulosin 0.4 mg Cap  Commonly known as: FLOMAX  Take 1 capsule (0.4 mg total) by mouth once daily.     UNABLE TO FIND            STOP taking these medications      tadalafiL 5 MG tablet  Commonly known as: CIALIS  Stopped by: Vladimir Avalos MD               Where to Get Your Medications        These medications were sent to [facility] [id number] [facility] [employer] - [place] - [address]  [address] [place] [address]      Phone: [phone]   nitroGLYCERIN 0.4 MG SL tablet         SOCIAL HISTORY:     Social History     Socioeconomic History    Marital status:    Occupational History     Employer:  [employer]   Tobacco Use    Smoking status: Some Days     Current packs/day: 0.50     Average packs/day: 0.5 packs/day for 61.8 years (30.9 ttl pk-yrs)     Types: Cigarettes     Start date: 1967     Passive exposure: Current    Smokeless tobacco: Never    Tobacco comments:     0.5 ppd or less    Substance and Sexual Activity    Alcohol use: Not Currently    Drug use: No    Sexual activity: Yes     Partners: Female     Social Drivers of Health     Financial Resource Strain: Low Risk  (11/15/2024)    Overall Financial Resource Strain (CARDI)     Difficulty of Paying Living Expenses: Not hard at all   Food Insecurity: No Food Insecurity (11/15/2024)    Hunger Vital Sign     Worried About Running Out of Food in the Last Year: Never true     Ran Out of Food in the Last Year: Never true   Transportation Needs: No Transportation Needs (4/11/2024)    PRAPARE -  "Transportation     Lack of Transportation (Medical): No     Lack of Transportation (Non-Medical): No   Physical Activity: Sufficiently Active (11/15/2024)    Exercise Vital Sign     Days of Exercise per Week: 7 days     Minutes of Exercise per Session: 60 min   Stress: No Stress Concern Present (11/15/2024)    [employer] - Occupational Stress Questionnaire     Feeling of Stress : Only a little   Housing Stability: Low Risk  (4/11/2024)    Housing Stability Vital Sign     Unable to Pay for Housing in the Last Year: No     Number of Places Lived in the Last Year: 1     Unstable Housing in the Last Year: No       FAMILY HISTORY:     Family History   Problem Relation Name Age of Onset    No Known Problems Mother      Cancer Father DARYL GLYNN     Heart disease Father DARYL GLYNN     Asthma Sister REGINA ANNA     Cancer Sister REGINA ANNA 78        Rectal cancer    Stroke Brother Daryl     Lupus Daughter x1     No Known Problems Son x3        REVIEW OF SYSTEMS:   Review of Systems   Constitutional: Negative.   HENT: Negative.     Eyes: Negative.    Respiratory: Negative.     Endocrine: Negative.    Hematologic/Lymphatic: Negative.    Skin: Negative.    Musculoskeletal: Negative.    Gastrointestinal: Negative.    Genitourinary: Negative.    Neurological: Negative.    Psychiatric/Behavioral: Negative.     Allergic/Immunologic: Negative.        A 10 point review of systems was performed and all the pertinent positives have been mentioned. Rest of review of systems was negative.        PHYSICAL EXAM:     Vitals:    06/19/25 1110   BP: 128/69   Pulse: 65   Resp: 18    Body mass index is 27.55 kg/m².  Weight: 82.2 kg (181 lb 3.5 oz)   Height: 5' 8" (172.7 cm)      Physical Exam  Vitals and nursing note reviewed.   Constitutional:       Appearance: Normal appearance. He is well-developed.   HENT:      Head: Normocephalic and atraumatic.      Right Ear: Hearing normal.      Left Ear: " Hearing normal.      Nose: Nose normal.   Eyes:      General: Lids are normal.      Conjunctiva/sclera: Conjunctivae normal.      Pupils: Pupils are equal, round, and reactive to light.   Cardiovascular:      Rate and Rhythm: Normal rate and regular rhythm.      Pulses: Normal pulses.      Heart sounds: Murmur heard.   Pulmonary:      Effort: Pulmonary effort is normal.      Breath sounds: Normal breath sounds.   Abdominal:      Palpations: Abdomen is soft.      Tenderness: There is no abdominal tenderness.   Musculoskeletal:         General: No deformity.      Cervical back: Normal range of motion and neck supple.   Skin:     General: Skin is warm and dry.   Neurological:      Mental Status: He is alert and oriented to person, place, and time.   Psychiatric:         Speech: Speech normal.              DATA:     Laboratory:  CBC:  Recent Labs   Lab 06/12/25  1427 06/16/25  1528 06/17/25  0551   WBC 6.07 6.68 5.13   HGB 11.2 L 11.0 L 10.2 L   HCT 36.1 L 36.1 L 32.4 L   Platelet Count 228 231 197       CHEMISTRIES:  Recent Labs   Lab 06/12/25  1427 06/16/25  1528 06/17/25  0551   Glucose 110 137 H 97   Sodium 142 141 140   Potassium 4.4 4.4 4.4   BUN 14 16 16   Creatinine 1.1 1.1 1.0   Calcium 9.5 9.1 8.8   Magnesium   --   --  2.0       CARDIAC BIOMARKERS:  Recent Labs   Lab 06/16/25  1828 06/17/25  0056 06/17/25  0551   Troponin-I <0.006 <0.006 0.007       COAGS:        LIPIDS/LFTS:  Recent Labs   Lab 06/02/23  1053 11/03/23  1647 02/18/25  0741 06/12/25  1427 06/16/25  1528   Cholesterol 125  --  136  --   --    Triglycerides 49  --  66  --   --    HDL 39 L  --  48  --   --    LDL Cholesterol 76.2  --  74.8  --   --    Non-HDL Cholesterol 86  --  88  --   --    AST 22   < > 20 21 16   ALT 21   < > 16 16 17    < > = values in this interval not displayed.       Hemoglobin A1C   Date Value Ref Range Status   02/18/2025 5.2 4.0 - 5.6 % Final     Comment:     ADA Screening Guidelines:  5.7-6.4%  Consistent with  prediabetes  >or=6.5%  Consistent with diabetes    High levels of fetal hemoglobin interfere with the HbA1C  assay. Heterozygous hemoglobin variants (HbS, HgC, etc)do  not significantly interfere with this assay.   However, presence of multiple variants may affect accuracy.     11/16/2023 5.3 4.0 - 5.6 % Final     Comment:     ADA Screening Guidelines:  5.7-6.4%  Consistent with prediabetes  >or=6.5%  Consistent with diabetes    High levels of fetal hemoglobin interfere with the HbA1C  assay. Heterozygous hemoglobin variants (HbS, HgC, etc)do  not significantly interfere with this assay.   However, presence of multiple variants may affect accuracy.         TSH  Recent Labs   Lab 11/16/23  1335 02/18/25  0741   TSH 0.725 1.549       The 10-year ASCVD risk score (Francis METCALF, et al., 2019) is: 26.3%    Values used to calculate the score:      Age: 75 years      Sex: Male      Is Non- : No      Diabetic: No      Tobacco smoker: Yes      Systolic Blood Pressure: 128 mmHg      Is BP treated: Yes      HDL Cholesterol: 48 mg/dL      Total Cholesterol: 136 mg/dL             ASSESSMENT AND PLAN     Patient Active Problem List   Diagnosis    Hyperlipidemia    Hypertension    GERD (gastroesophageal reflux disease)    Generalized anxiety disorder    Tobacco dependence    Psychophysiological insomnia    Atherosclerosis of aorta    Erectile dysfunction    CAD (coronary artery disease)    Fatty liver    Hepatomegaly    Calcified granuloma of lung    Purpura    Lower urinary tract symptoms (LUTS)    History of colonic polyps    Overweight (BMI 25.0-29.9)    Other emphysema    Pleural plaque    LAD (lymphadenopathy), hilar    Polyp of colon    Iron deficiency anemia    Spontaneous ecchymoses    Encounter for long-term (current) use of aspirin    Nonrheumatic aortic valve stenosis    Precordial pain     Orders Placed This Encounter   Procedures    Holter monitor - 48 hour     Assessment & Plan     IMPRESSION:  Stress test results show mild ischemia.  Initiated medical management vs. angiogram.  Moderate aortic stenosis noted on echocardiogram, requiring monitoring.    PLAN SUMMARY:  - Ordered Holter monitor for 2-day heart rate monitoring  - Started nitroglycerin pills for severe chest pain as needed  - Continued aspirin and Imdur for chest pain  - Discontinued amlodipine due to well-controlled blood pressure  - Discontinued Cialis due to interaction with Imdur  - Recommend smoking cessation  - Follow up after Holter monitor results  - Follow up in 6 months to a year for aortic valve monitoring    CORONARY ARTERY DISEASE AND ANGINA:  - Continued Imdur for chest pain.  - Started nitroglycerin pills for use as needed for severe chest pain.  - Explained interaction risk between Cialis and Imdur, emphasizing potential fatal consequences.  - Discontinued Cialis due to interaction with Imdur.  - Continued aspirin.  -patient's chest pain has gone away after initiation of Imdur.  We discussed in detail risks benefits of doing an angiogram versus continued medical management.  After detailed discussion decided to proceed with medical management.  Consider angiogram patient has lifestyle limiting angina despite maximum medical management    BRADYCARDIA:  - Ordered Holter monitor for 2-day heart rate monitoring to investigate bradycardia.    HYPERTENSION:  Patient has not been taking his amlodipine because his blood pressure has been well controlled.    AORTIC VALVE DISEASE:  Moderate aortic valve stenosis.  Continue to monitor with regular echocardiograms    PHYSICAL ACTIVITY AND CHEST PAIN MANAGEMENT:  - Continue walking and physical activity as tolerated, stop if chest pain occurs.    SMOKING CESSATION:  - Recommend smoking cessation.    FOLLOW-UP AND MONITORING:  - Follow up after Holter monitor results.  - Contact the office through the portal or call for prompt appointments if needed.         Lab Results    Component Value Date    LDLCALC 74.8 02/18/2025       Addendum: Patient called back and stated that he change his mind and would like to undergo a coronary angiography.  He continues to have symptoms despite maximum medical management.    Risks, benefits and alternatives of the catheterization procedure were discussed with the patient.The risks of coronary angiography include but are not limited to: bleeding, infection, death, heart attack, arrhythmia, kidney injury or failure, potential need for dialysis, allergic reactions, stroke, need for emergency surgery, hematoma, pseudoaneurysm etc.  Should stenting be indicated, the patient has agreed to dual anti-platelet therapy for 1-consecutive year with a drug-eluting stent and a minimum of 1-month with the use of a bare metal stent. Additionally, pt is aware that non-compliance is likely to result in stent clotting with heart attack, heart failure, and/or death  The risks of moderate sedation include hypotension, respiratory depression, arrhythmias, bronchospasm, and death. Informed consent was obtained and the  patient is agreeable to proceed with the procedure. Consent was placed on the chart.        Thank you very much for involving me in the care of your patient.  Please do not hesitate to contact me if there are any questions.      Vladimir Avalos MD, FACC, [facility]  Interventional Cardiologist, [facility].       Visit today included increased complexity associated with the care of the episodic problem dyslipidemia, hypertension, aortic valve stenosis addressed and managing the longitudinal care of the patient due to the serious and/or complex managed problem(s) Problem List[1]  .    This note was dictated with the help of speech recognition software.  There might be un-intended errors and/or substitutions.                                                   [1]   Patient Active Problem List  Diagnosis    Hyperlipidemia    Hypertension    GERD  (gastroesophageal reflux disease)    Generalized anxiety disorder    Tobacco dependence    Psychophysiological insomnia    Atherosclerosis of aorta    Erectile dysfunction    CAD (coronary artery disease)    Fatty liver    Hepatomegaly    Calcified granuloma of lung    Purpura    Lower urinary tract symptoms (LUTS)    History of colonic polyps    Overweight (BMI 25.0-29.9)    Other emphysema    Pleural plaque    LAD (lymphadenopathy), hilar    Polyp of colon    Iron deficiency anemia    Spontaneous ecchymoses    Encounter for long-term (current) use of aspirin    Nonrheumatic aortic valve stenosis    Precordial pain

## 2025-06-19 NOTE — PROGRESS NOTES
No outreaches to be attempted for this encounter only as the patient is non-applicable for a TCC post-discharge follow up call due to being admitted to ED observation.

## 2025-06-20 ENCOUNTER — OFFICE VISIT (OUTPATIENT)
Dept: FAMILY MEDICINE | Facility: CLINIC | Age: 75
End: 2025-06-20
Payer: MEDICARE

## 2025-06-20 VITALS
BODY MASS INDEX: 27.76 KG/M2 | WEIGHT: 183.19 LBS | OXYGEN SATURATION: 98 % | DIASTOLIC BLOOD PRESSURE: 62 MMHG | HEIGHT: 68 IN | RESPIRATION RATE: 18 BRPM | SYSTOLIC BLOOD PRESSURE: 110 MMHG | HEART RATE: 60 BPM

## 2025-06-20 DIAGNOSIS — K21.9 GASTROESOPHAGEAL REFLUX DISEASE WITHOUT ESOPHAGITIS: ICD-10-CM

## 2025-06-20 DIAGNOSIS — F17.200 TOBACCO DEPENDENCE: Chronic | ICD-10-CM

## 2025-06-20 DIAGNOSIS — I10 PRIMARY HYPERTENSION: ICD-10-CM

## 2025-06-20 DIAGNOSIS — D50.9 IRON DEFICIENCY ANEMIA, UNSPECIFIED IRON DEFICIENCY ANEMIA TYPE: Primary | ICD-10-CM

## 2025-06-20 PROBLEM — N52.9 ERECTILE DYSFUNCTION: Status: ACTIVE | Noted: 2020-02-14

## 2025-06-20 PROBLEM — R39.9 LOWER URINARY TRACT SYMPTOMS (LUTS): Status: ACTIVE | Noted: 2023-08-30

## 2025-06-20 PROCEDURE — 99999 PR PBB SHADOW E&M-EST. PATIENT-LVL III: CPT | Mod: PBBFAC,,,

## 2025-06-20 NOTE — PROGRESS NOTES
"HPI     Yair Owens is a 75 y.o. male with multiple medical diagnoses as listed in the medical history and problem list that presents for   Chief Complaint   Patient presents with    Follow-up     Hospital f/u        HPI  Pt known to me presents today for ER f/u for acute chest pain. Acute cardiopulm process was ruled out, was started on Imdur, and has since followed up with his cardiologist, Dr. Avalos. Since initiating Imdur, his chest pain has remained well controlled. Plans on picking up holter monitor for 48 hour monitoring due to bradycardia. Moderate aortic stenosis noted on recent echocardiogram, routinely monitored and mild ischemia on recent stress test.  He believes some of his sxs may be attributed by his MIRNA. Had an appt w/heme/onc and discussed potential treatments for his MIRNA, decided on daily iron supplements, which he's been taking for the past couple weeks now. He denies any GI side effects, such as dark stools, abdominal cramping/tenderness, and constipation.   He states that he sometimes experience mild dyspnea on exertion, though has remained at baseline and has not worsened as well. He is still very active, walks around 25-30 miles a week. Pertinent negatives listed in ROS.      ER notes from 6/16/25:  Chief Complaint   Patient presents with    Chest Pain       Pt arrived in ED, c/o intermittent midsternal CP, SOB and nausea since this morning. Pt also c/o "waves of dizziness" since last Thursday. Pt denies any HA, unilateral weakness, or vision changes. Pt reports having a virtual visit with his PCP this morning and was referred to ED. Pt denies any abd pain, vomiting or diarrhea.     Nausea      75-year-old male with past medical history of anemia, hypertension, medical history of CAD, aortic stenosis (diameter 1.8cm) now presents with a chief complaint of lightheadedness and chest pain.  Patient reports that 4 days prior he had chest pain with grossly unremarkable workup but endorses " feeling well since then.  Patient tells me he woke up and felt lightheaded with associated chest pain, nausea, shortness of breath.  Patient reports he is not currently lightheaded and denies any room spinning or vertiginous symptoms.  Patient reports scant chest pain currently present which he endorses is a pressure which is substernal but does not radiate.  Patient denies any other associated symptoms including fevers, cough, leg swelling, unilateral weakness.    Medications   ketorolac injection 15 mg (has no administration in time range)   oxyCODONE immediate release tablet 5 mg (has no administration in time range)      Medical Decision Making  Initial assessment  75-year-old male with past medical history of anemia, hypertension, medical history of CAD, aortic stenosis now presents with a chief complaint of lightheadedness and chest pain. Patient is able to vocalise, breathing spontaneously, hemodynamically stable, oriented, moving all 4 limbs spontaneously.  Examination grossly unremarkable.        Differential diagnosis  Symptomatic aortic stenosis  Symptomatic anemia  ACS  Considered life-threatening emergencies including PE and aortic dissection but lower suspicion        ED management  Patient was stable and well-appearing on arrival however symptoms were concerning given history of CAD and workup was commenced.  I have concern for aortic stenosis given his aortic root diameter of 1.8 cm.  CBC consistent with stable chronic anemia.  CMP unremarkable negative troponin and BNP.  Chest x-ray without signs concerning for fluid overload or infection.  I suspect patient's symptoms are likely secondary to symptomatic aortic stenosis.  Patient will require echo and Cardiology evaluation.  Given patient's heart score patient was discussed with Hospital Medicine and admitted to Hospital Medicine for ACS rule out in addition to Cardiology and echo.     Amount and/or Complexity of Data Reviewed  Labs:   Decision-making details documented in ED Course.  Radiology:  Decision-making details documented in ED Course.     Risk  Prescription drug management.        Additional MDM:   Heart Score:    History:          Slightly suspicious.  ECG:             Nonspecific repolarisation disturbance  Age:               >65 years  Risk factors: 1-2 risk factors  Troponin:       Less than or equal to normal limit    ED Course as of 06/16/25 1712   Mon Jun 16, 2025   1518 BP(!): 158/71 [PM]   1518 Temp: 97.8 °F (36.6 °C) [PM]   1518 Pulse: 63 [PM]   1518 Resp: 18 [PM]   1518 SpO2: 99 % [PM]   1554 WBC: 6.68 [PM]   1554 Hemoglobin(!): 11.0 [PM]   1554 Platelet Count: 231 [PM]   1612 Sodium: 141 [PM]   1612 Potassium: 4.4 [PM]   1612 BUN: 16 [PM]   1612 Creatinine: 1.1 [PM]   1612 Troponin I: <0.006 [PM]   1612 BNP: 90 [PM]   1612 X-Ray Chest AP Portable  As per my interpretation, the chest x-ray is grossly normal with no acute findings concerning for new infiltrates, new edema, new effusions, changes in cardiac silhouette.    [PM]       ED Course User Index  [PM] Rosanna Arriola MD       Resident supervising staff physician attestation note:  This patient presents emergency with a history of aortic stenosis and nonocclusive coronary artery disease.  He complains of left-sided chest pain off and on over the course of the last 2 days the patient was in the ER 4 days ago with similar complaints.  He was sent to the emergency room to be evaluated by Cardiology.  I agree with the resident documentation, EKG interpretation, diagnostic and treatment plan.  I assume responsibility for the management of this patient.  This is doctor Dennison dictating.        Assessment & Plan     1. Iron deficiency anemia, unspecified iron deficiency anemia type  - Noted low Ferritin on 5/26 labs. Pt is agreeable with continuing with his oral supplement daily. He is interested in getting a second opinion from hematologist outside of [facility] reach  back out with provider's name and I will place the referral in for him  - Otherwise, recommended to continue w/current regimen and consume high iron diet    2. Gastroesophageal reflux disease without esophagitis  - No alarming signs or symptoms of weight loss, dysphagia, hematemesis.  - Consider benefits of EGD/gastroenterology referral for persistent symptoms.  - Advised lifestyle modifications, such as identifying and avoiding dietary triggers (spicy, acidic), elevating head of bed with nocturnal symptoms, and refraining from supine positioning 2-3 hours after meals.   - Take PPI 30 minutes prior to a meal. If PPI therapy fails within 8 weeks, consider GI referral/endoscopy.     3. Primary hypertension  - Blood pressure today controlled in clinic. stable, continue with Imdur and lifestyle management  - Recommend low-sodium diet and compliance with blood pressure medication.  - Keep blood pressure goal <140/90 and f/u with clinic if persistently elevated      4. Tobacco dependence  -Counseled patient to quit tobacco usage, and advised on several options to quit and the benefits of quitting, which include but are not limited to: decreasing the risk of cancer, HTN, CVD, respiratory complications, among other diseases.  -5 minutes spent discussing cessation.   -pt is not interested in quitting.       --------------------------------------------    Health Maintenance         Date Due Completion Date    LDCT Lung Screen 03/27/2026 3/27/2025    High Dose Statin 06/19/2026 6/19/2025    Colorectal Cancer Screening 02/28/2028 2/28/2025    Lipid Panel 02/18/2030 2/18/2025    TETANUS VACCINE 07/13/2033 7/13/2023            Health maintenance reviewed    Follow Up:  Follow up if symptoms worsen or fail to improve.    Exam     Review of Systems:  (as noted above)  Review of Systems   Constitutional:  Negative for chills, fatigue and fever.   Eyes:  Negative for visual disturbance.   Respiratory:  Positive for shortness of  "breath (mild - on exertion). Negative for cough, chest tightness and wheezing.    Cardiovascular:  Negative for chest pain, palpitations and leg swelling.   Musculoskeletal:  Negative for myalgias.   Neurological:  Negative for dizziness, weakness, light-headedness and headaches.   Psychiatric/Behavioral:  The patient is not nervous/anxious.        Physical Exam  Constitutional:       General: He is not in acute distress.     Appearance: Normal appearance. He is not ill-appearing.   Cardiovascular:      Rate and Rhythm: Normal rate and regular rhythm.      Pulses: Normal pulses.      Heart sounds: Normal heart sounds. No murmur heard.     No friction rub. No gallop.   Pulmonary:      Effort: Pulmonary effort is normal.      Breath sounds: Normal breath sounds.   Musculoskeletal:      Right lower leg: No edema.      Left lower leg: No edema.   Skin:     Capillary Refill: Capillary refill takes less than 2 seconds.   Neurological:      General: No focal deficit present.      Mental Status: He is alert and oriented to person, place, and time.   Psychiatric:         Mood and Affect: Mood normal.         Behavior: Behavior normal.       Vitals:    06/20/25 0752   BP: 110/62   BP Location: Left arm   Patient Position: Sitting   Pulse: 60   Resp: 18   SpO2: 98%   Weight: 83.1 kg (183 lb 3.2 oz)   Height: 5' 8" (1.727 m)      Body mass index is 27.86 kg/m².        History     Past Medical History:   Diagnosis Date    CAD (coronary artery disease) 02/22/2021    Cath 2021  There was non-obstructive coronary artery disease..   Left main:  Distal 10% stenosis  Lad:  Luminal irregularities.  Mid 10-20% stenosis   Circumflex:  Luminal irregularities   RCA:  Luminal irregularities    Calcified granuloma of lung 02/03/2023    LLL calcified granuloma seen on CT Chest Lung Screening Low Dose 01/31/2018    Colon polyp     Erectile dysfunction 02/14/2020    Fatty liver     Generalized anxiety disorder 01/11/2017    GERD " (gastroesophageal reflux disease)     Gout, unspecified     History of colonic polyps 11/16/2023    2 polyps 11/22 -5 yrs    Hyperlipidemia     Hypertension     Psychophysiological insomnia 07/24/2017    Tobacco dependence 07/24/2017       Family History   Problem Relation Name Age of Onset    No Known Problems Mother      Cancer Father GAURAV GLYNN     Heart disease Father GAURAV GLYNN     Asthma Sister REGINA ANNA     Cancer Sister REGINA ANNA 78        Rectal cancer    Stroke Brother Gaurav     Lupus Daughter x1     No Known Problems Son x3        Allergies and Medications: (updated and reviewed)  Review of patient's allergies indicates:   Allergen Reactions    Codeine Itching    Ibuprofen Itching    Remeron [mirtazapine] Anxiety     Did not help the patient sleep and created anxiety.     Current Medications[1]    Patient Care Team:  Vick Mansfield MD as PCP - General (Internal Medicine)  Tiffany Yu LPN as Licensed Practical Nurse  Vladimir Avalos MD as Consulting Physician (Cardiology)  Caryl Vila, PharmD as Hypertension Digital Medicine Clinician (Pharmacist)  Caryl Vila PharmD as Hyperlipidemia Digital Medicine Clinician  Deysi Rader NP as Nurse Practitioner (Hepatology)  Marilu Chavez DMSc, PA-C as Physician Assistant (Psychiatry)  Chaitanya Plasencia MD as Hypertension Digital Medicine Responsible Provider (Internal Medicine)  Chaitanya Plasencia MD as Hyperlipidemia Digital Medicine Responsible Provider (Internal Medicine)  Gamaliel Quinn MD as 1st Call (Urology)  Annabel Siu MD as Consulting Physician (Orthopedic Surgery)  Marilu Chavez DMSc, PA-C as Physician Assistant (Psychiatry)  Medicare, Digital Medicine as Hypertension Digital Medicine Contract         - The patient is given an After Visit Summary that lists all medications with directions, allergies, education, orders placed during this encounter and follow-up instructions.       - I have reviewed the patient's medical information including past medical and family history sections including the medications and allergies.      - We discussed the patient's current medications.   This note was generated with the assistance of ambient listening technology. Verbal consent was obtained by the patient and accompanying visitor(s) for the recording of patient appointment to facilitate this note. I attest to having reviewed and edited the generated note for accuracy, though some syntax or spelling errors may persist. Please contact the author of this note for any clarification.          Austin Cantu NP                      [1]   Current Outpatient Medications   Medication Sig Dispense Refill    aspirin (ECOTRIN) 81 MG EC tablet Take 1 tablet (81 mg total) by mouth once daily.      atorvastatin (LIPITOR) 40 MG tablet TAKE 1 TABLET BY MOUTH EVERY DAY 90 tablet 3    busPIRone (BUSPAR) 10 MG tablet Take 1 tablet (10 mg total) by mouth 2 (two) times daily.      ferrous sulfate 325 (65 FE) MG EC tablet Take 325 mg by mouth once daily.      isosorbide mononitrate (IMDUR) 30 MG 24 hr tablet Take 1 tablet (30 mg total) by mouth once daily. 30 tablet 11    multivitamin capsule Take 1 capsule by mouth once daily.      nitroGLYCERIN (NITROSTAT) 0.4 MG SL tablet Place 1 tablet (0.4 mg total) under the tongue every 5 (five) minutes as needed for Chest pain. 30 tablet 12    omeprazole (PRILOSEC) 40 MG capsule TAKE 1 CAPSULE BY MOUTH TWICE  DAILY 180 capsule 3    tamsulosin (FLOMAX) 0.4 mg Cap Take 1 capsule (0.4 mg total) by mouth once daily. 90 capsule 3    UNABLE TO FIND OTC Daily multivitamin  OTC Probiotic       No current facility-administered medications for this visit.

## 2025-06-22 ENCOUNTER — PATIENT MESSAGE (OUTPATIENT)
Dept: CARDIOLOGY | Facility: CLINIC | Age: 75
End: 2025-06-22
Payer: MEDICARE

## 2025-06-23 ENCOUNTER — LAB VISIT (OUTPATIENT)
Dept: LAB | Facility: HOSPITAL | Age: 75
End: 2025-06-23
Attending: STUDENT IN AN ORGANIZED HEALTH CARE EDUCATION/TRAINING PROGRAM
Payer: MEDICARE

## 2025-06-23 ENCOUNTER — TELEPHONE (OUTPATIENT)
Dept: HEMATOLOGY/ONCOLOGY | Facility: CLINIC | Age: 75
End: 2025-06-23
Payer: MEDICARE

## 2025-06-23 DIAGNOSIS — D50.9 IRON DEFICIENCY ANEMIA, UNSPECIFIED IRON DEFICIENCY ANEMIA TYPE: Primary | ICD-10-CM

## 2025-06-23 DIAGNOSIS — D50.9 IRON DEFICIENCY ANEMIA, UNSPECIFIED IRON DEFICIENCY ANEMIA TYPE: ICD-10-CM

## 2025-06-23 LAB
ABSOLUTE EOSINOPHIL (OHS): 0.02 K/UL
ABSOLUTE MONOCYTE (OHS): 0.82 K/UL (ref 0.3–1)
ABSOLUTE NEUTROPHIL COUNT (OHS): 5.1 K/UL (ref 1.8–7.7)
ALBUMIN SERPL BCP-MCNC: 3.8 G/DL (ref 3.5–5.2)
ALP SERPL-CCNC: 45 UNIT/L (ref 40–150)
ALT SERPL W/O P-5'-P-CCNC: 20 UNIT/L (ref 10–44)
ANION GAP (OHS): 11 MMOL/L (ref 8–16)
AST SERPL-CCNC: 19 UNIT/L (ref 11–45)
BASOPHILS # BLD AUTO: 0.04 K/UL
BASOPHILS NFR BLD AUTO: 0.5 %
BILIRUB SERPL-MCNC: 0.8 MG/DL (ref 0.1–1)
BUN SERPL-MCNC: 16 MG/DL (ref 8–23)
CALCIUM SERPL-MCNC: 9.2 MG/DL (ref 8.7–10.5)
CHLORIDE SERPL-SCNC: 107 MMOL/L (ref 95–110)
CO2 SERPL-SCNC: 24 MMOL/L (ref 23–29)
CREAT SERPL-MCNC: 1.2 MG/DL (ref 0.5–1.4)
ERYTHROCYTE [DISTWIDTH] IN BLOOD BY AUTOMATED COUNT: 15 % (ref 11.5–14.5)
FERRITIN SERPL-MCNC: 7.5 NG/ML (ref 20–300)
GFR SERPLBLD CREATININE-BSD FMLA CKD-EPI: >60 ML/MIN/1.73/M2
GLUCOSE SERPL-MCNC: 94 MG/DL (ref 70–110)
HCT VFR BLD AUTO: 33.8 % (ref 40–54)
HGB BLD-MCNC: 10.5 GM/DL (ref 14–18)
IMM GRANULOCYTES # BLD AUTO: 0.03 K/UL (ref 0–0.04)
IMM GRANULOCYTES NFR BLD AUTO: 0.4 % (ref 0–0.5)
IRON SATN MFR SERPL: 5 % (ref 20–50)
IRON SERPL-MCNC: 22 UG/DL (ref 45–160)
LYMPHOCYTES # BLD AUTO: 1.35 K/UL (ref 1–4.8)
MCH RBC QN AUTO: 27.9 PG (ref 27–31)
MCHC RBC AUTO-ENTMCNC: 31.1 G/DL (ref 32–36)
MCV RBC AUTO: 90 FL (ref 82–98)
NUCLEATED RBC (/100WBC) (OHS): 0 /100 WBC
PLATELET # BLD AUTO: 251 K/UL (ref 150–450)
PMV BLD AUTO: 11.2 FL (ref 9.2–12.9)
POTASSIUM SERPL-SCNC: 3.9 MMOL/L (ref 3.5–5.1)
PROT SERPL-MCNC: 6.9 GM/DL (ref 6–8.4)
RBC # BLD AUTO: 3.77 M/UL (ref 4.6–6.2)
RELATIVE EOSINOPHIL (OHS): 0.3 %
RELATIVE LYMPHOCYTE (OHS): 18.3 % (ref 18–48)
RELATIVE MONOCYTE (OHS): 11.1 % (ref 4–15)
RELATIVE NEUTROPHIL (OHS): 69.4 % (ref 38–73)
SODIUM SERPL-SCNC: 142 MMOL/L (ref 136–145)
TIBC SERPL-MCNC: 463 UG/DL (ref 250–450)
TRANSFERRIN SERPL-MCNC: 313 MG/DL (ref 200–375)
WBC # BLD AUTO: 7.36 K/UL (ref 3.9–12.7)

## 2025-06-23 PROCEDURE — 85025 COMPLETE CBC W/AUTO DIFF WBC: CPT

## 2025-06-23 PROCEDURE — 84466 ASSAY OF TRANSFERRIN: CPT

## 2025-06-23 PROCEDURE — 83921 ORGANIC ACID SINGLE QUANT: CPT

## 2025-06-23 PROCEDURE — 84238 ASSAY NONENDOCRINE RECEPTOR: CPT

## 2025-06-23 PROCEDURE — 80053 COMPREHEN METABOLIC PANEL: CPT

## 2025-06-23 PROCEDURE — 82728 ASSAY OF FERRITIN: CPT

## 2025-06-23 PROCEDURE — 36415 COLL VENOUS BLD VENIPUNCTURE: CPT | Mod: PN

## 2025-06-23 NOTE — TELEPHONE ENCOUNTER
MD and lab appt scheduled.    Copied from CRM #[id number]. Topic: General Inquiry - Return Call  >> 2025  1:08 PM Swati wrote:  Pt is returning a missed call from someone in the office and is asking for a return call back soon. Thanks.         Reason for call: to r/s appt for Dr. Stanton             Patient requesting call back or [facility] [phone]

## 2025-06-25 ENCOUNTER — OFFICE VISIT (OUTPATIENT)
Dept: HEMATOLOGY/ONCOLOGY | Facility: CLINIC | Age: 75
End: 2025-06-25
Payer: MEDICARE

## 2025-06-25 ENCOUNTER — PATIENT MESSAGE (OUTPATIENT)
Dept: CARDIOLOGY | Facility: CLINIC | Age: 75
End: 2025-06-25
Payer: MEDICARE

## 2025-06-25 VITALS
BODY MASS INDEX: 26.83 KG/M2 | DIASTOLIC BLOOD PRESSURE: 75 MMHG | WEIGHT: 177 LBS | TEMPERATURE: 98 F | HEIGHT: 68 IN | OXYGEN SATURATION: 98 % | HEART RATE: 69 BPM | SYSTOLIC BLOOD PRESSURE: 138 MMHG

## 2025-06-25 DIAGNOSIS — D50.9 IRON DEFICIENCY ANEMIA, UNSPECIFIED IRON DEFICIENCY ANEMIA TYPE: Primary | ICD-10-CM

## 2025-06-25 DIAGNOSIS — Z76.89 ENCOUNTER TO ESTABLISH CARE: ICD-10-CM

## 2025-06-25 LAB — STFR SERPL-MCNC: 7.5 MG/L (ref 1.8–4.6)

## 2025-06-25 PROCEDURE — 99999 PR PBB SHADOW E&M-EST. PATIENT-LVL IV: CPT | Mod: PBBFAC,,, | Performed by: STUDENT IN AN ORGANIZED HEALTH CARE EDUCATION/TRAINING PROGRAM

## 2025-06-25 RX ORDER — EPINEPHRINE 0.3 MG/.3ML
0.3 INJECTION SUBCUTANEOUS ONCE AS NEEDED
OUTPATIENT
Start: 2025-07-09

## 2025-06-25 RX ORDER — SODIUM CHLORIDE 0.9 % (FLUSH) 0.9 %
10 SYRINGE (ML) INJECTION
OUTPATIENT
Start: 2025-07-09

## 2025-06-25 RX ORDER — HEPARIN 100 UNIT/ML
500 SYRINGE INTRAVENOUS
OUTPATIENT
Start: 2025-07-09

## 2025-06-25 NOTE — Clinical Note
-Set up for 3 doses if IV iron venofer -RTC in 3 months for MD visit with Dr. Post and labs day prior CBC, iron, ferritin

## 2025-06-25 NOTE — PROGRESS NOTES
Hematology- Oncology Clinic Note :     6/25/2025    Chief Complaint   Patient presents with    Anemia    Establish Care         HPI  Pt is a 75 y.o. male who  has a past medical history of CAD (coronary artery disease) (02/22/2021), Calcified granuloma of lung (02/03/2023), Colon polyp, Erectile dysfunction (02/14/2020), Fatty liver, Generalized anxiety disorder (01/11/2017), GERD (gastroesophageal reflux disease), Gout, unspecified, History of colonic polyps (11/16/2023), Hyperlipidemia, Hypertension, Psychophysiological insomnia (07/24/2017), and Tobacco dependence (07/24/2017).  Who presents for follow up of iron def anemia.  No signs of blood loss or issues at time of exam or symptoms.  Pt agreeable to IV iron after side effects discussed.  Will continue PO iron every other day.          Active Problem List with Overview Notes    Diagnosis Date Noted    Nonrheumatic aortic valve stenosis 06/16/2025    Precordial pain 06/16/2025    Polyp of colon 05/27/2025    Iron deficiency anemia 05/27/2025    Spontaneous ecchymoses 05/27/2025    Encounter for long-term (current) use of aspirin 05/27/2025    Pleural plaque 04/24/2025     calcified pleural plaque left anterior lung.  Present and unchanged from 2143-1191.    Unclear of etiology.    Size and stability c/w benign etiology.        LAD (lymphadenopathy), hilar 04/24/2025     left hilar calcified lad along with calcified nodules in spleen.  Stable from 2018 to 2015.  Most likely residual granulomas prior infection such as afb or fungal.  Clinically asymptomatic with excellent exercising capacity.  Given radiographic and clinical stability, further work up is not recommended.       Other emphysema 04/14/2024     Emphysematous changes noted on ct scan.  Deferred pft citing good exertional capacity.  Encourage smoking cessation.      Overweight (BMI 25.0-29.9) 01/09/2024    History of colonic polyps 11/16/2023     2 polyps 11/22 -5 yrs      Lower urinary tract symptoms  (LUTS) 08/30/2023    Calcified granuloma of lung 02/03/2023     LLL calcified granuloma seen on CT Chest Lung Screening Low Dose 01/31/2018      Purpura 02/03/2023    Hepatomegaly 12/14/2022    Fatty liver 11/03/2022    CAD (coronary artery disease) 02/22/2021     Cath 2021  There was non-obstructive coronary artery disease..   Left main:  Distal 10% stenosis  Lad:  Luminal irregularities.  Mid 10-20% stenosis   Circumflex:  Luminal irregularities   RCA:  Luminal irregularities      Erectile dysfunction 02/14/2020    Atherosclerosis of aorta 01/23/2018      08/2017.  Stable, asymptomatic chronic condition.  Will continue to maximize risk factor reduction and adjust medication as needed.       Tobacco dependence 07/24/2017     no ready to quit smoking.  Medicare guidelines recommend ldct until age of 78      Psychophysiological insomnia 07/24/2017    Generalized anxiety disorder 01/11/2017    Hyperlipidemia 10/29/2012    Hypertension 10/29/2012     Followed by digital team      GERD (gastroesophageal reflux disease) 10/29/2012       Patient Active Problem List    Diagnosis Date Noted    Nonrheumatic aortic valve stenosis 06/16/2025    Precordial pain 06/16/2025    Polyp of colon 05/27/2025    Iron deficiency anemia 05/27/2025    Spontaneous ecchymoses 05/27/2025    Encounter for long-term (current) use of aspirin 05/27/2025    Pleural plaque 04/24/2025    LAD (lymphadenopathy), hilar 04/24/2025    Other emphysema 04/14/2024    Overweight (BMI 25.0-29.9) 01/09/2024    History of colonic polyps 11/16/2023    Lower urinary tract symptoms (LUTS) 08/30/2023    Calcified granuloma of lung 02/03/2023    Purpura 02/03/2023    Hepatomegaly 12/14/2022    Fatty liver 11/03/2022    CAD (coronary artery disease) 02/22/2021    Erectile dysfunction 02/14/2020    Atherosclerosis of aorta 01/23/2018    Tobacco dependence 07/24/2017    Psychophysiological insomnia 07/24/2017    Generalized anxiety disorder 01/11/2017     Hyperlipidemia 10/29/2012    Hypertension 10/29/2012    GERD (gastroesophageal reflux disease) 10/29/2012     Past Medical History:   Diagnosis Date    CAD (coronary artery disease) 02/22/2021    Cath 2021  There was non-obstructive coronary artery disease..   Left main:  Distal 10% stenosis  Lad:  Luminal irregularities.  Mid 10-20% stenosis   Circumflex:  Luminal irregularities   RCA:  Luminal irregularities    Calcified granuloma of lung 02/03/2023    LLL calcified granuloma seen on CT Chest Lung Screening Low Dose 01/31/2018    Colon polyp     Erectile dysfunction 02/14/2020    Fatty liver     Generalized anxiety disorder 01/11/2017    GERD (gastroesophageal reflux disease)     Gout, unspecified     History of colonic polyps 11/16/2023    2 polyps 11/22 -5 yrs    Hyperlipidemia     Hypertension     Psychophysiological insomnia 07/24/2017    Tobacco dependence 07/24/2017      Past Surgical History:   Procedure Laterality Date    APPENDECTOMY      BONE RESECTION, RIB      for thoracic outlet syndrome    COLONOSCOPY N/A 07/21/2017    Procedure: COLONOSCOPY;  Surgeon: Deepak Servin MD;  Location: [facility] ENDO;  Service: Endoscopy;  Laterality: N/A;    COLONOSCOPY N/A 11/22/2022    Procedure: COLONOSCOPY;  Surgeon: Sam Obrien MD;  Location: [facility] ENDO;  Service: Endoscopy;  Laterality: N/A;  vacc-sutab-inst portal-tb    COLONOSCOPY N/A 2/28/2025    Procedure: COLONOSCOPY;  Surgeon: Juanjose Martinez MD;  Location: [facility] ENDO;  Service: Gastroenterology;  Laterality: N/A;  2/24-marcus-suprep-portal-tb  2/25 - R/S inst portal - LW    HAND SURGERY      LEFT HEART CATHETERIZATION Left 03/02/2021    Procedure: Left heart cath, radial, 730 am;  Surgeon: Vladimir Avalos MD;  Location: [facility] CATH LAB;  Service: Cardiology;  Laterality: Left;  RN PREOP 2/25/2021--COVID NEGATIVE ON 3/1  H/P INCOMPLETE    SCROTAL SURGERY      mass    ULTRASOUND GUIDANCE  03/02/2021    Procedure: Ultrasound Guidance;  Surgeon: Vladimir Avalos MD;   Location: [facility] CATH LAB;  Service: Cardiology;;    VASECTOMY  40 years      Prescriptions Prior to Admission[1]  Review of patient's allergies indicates:   Allergen Reactions    Codeine Itching    Ibuprofen Itching    Remeron [mirtazapine] Anxiety     Did not help the patient sleep and created anxiety.      Social History     Tobacco Use    Smoking status: Some Days     Current packs/day: 0.50     Average packs/day: 0.5 packs/day for 61.8 years (30.9 ttl pk-yrs)     Types: Cigarettes     Start date: 1967     Passive exposure: Current    Smokeless tobacco: Never    Tobacco comments:     0.5 ppd or less    Substance Use Topics    Alcohol use: Not Currently      Family History   Problem Relation Name Age of Onset    No Known Problems Mother      Cancer Father DARYL GLYNN     Heart disease Father DARYL GLYNN     Asthma Sister REGINA ANNA     Cancer Sister REGINA ANNA 78        Rectal cancer    Stroke Brother Daryl     Lupus Daughter x1     No Known Problems Son x3         Review of Systems :  Review of Systems   Constitutional:  Negative for fever, malaise/fatigue and weight loss.   HENT:  Negative for congestion, hearing loss and nosebleeds.    Eyes:  Negative for blurred vision and discharge.   Respiratory:  Negative for cough, sputum production and shortness of breath.    Cardiovascular:  Negative for chest pain and leg swelling.   Gastrointestinal:  Negative for abdominal pain, blood in stool, constipation, diarrhea, heartburn, melena, nausea and vomiting.   Genitourinary:  Negative for dysuria and hematuria.   Musculoskeletal:  Negative for joint pain and myalgias.   Skin:  Negative for itching and rash.   Neurological:  Negative for dizziness.   Endo/Heme/Allergies:  Does not bruise/bleed easily.   Psychiatric/Behavioral:  Negative for depression. The patient is not nervous/anxious.        Physical Exam :  Wt Readings from Last 3 Encounters:   06/20/25 83.1 kg (183 lb 3.2 oz)   06/19/25 82.2 kg (181 lb  3.5 oz)   06/16/25 80.7 kg (178 lb)     Temp Readings from Last 3 Encounters:   06/17/25 98.3 °F (36.8 °C) (Oral)   06/12/25 98 °F (36.7 °C) (Oral)   05/28/25 98 °F (36.7 °C) (Oral)     BP Readings from Last 3 Encounters:   06/20/25 110/62   06/19/25 128/69   06/17/25 (!) 145/65     Pulse Readings from Last 3 Encounters:   06/20/25 60   06/19/25 65   06/17/25 (!) 58     There is no height or weight on file to calculate BMI.    Physical Exam  Vitals reviewed.   Constitutional:       Appearance: Normal appearance.   HENT:      Head: Normocephalic and atraumatic.      Nose: Nose normal.      Mouth/Throat:      Mouth: Mucous membranes are moist.   Eyes:      Pupils: Pupils are equal, round, and reactive to light.   Cardiovascular:      Rate and Rhythm: Normal rate and regular rhythm.   Pulmonary:      Effort: Pulmonary effort is normal.      Breath sounds: Normal breath sounds.   Abdominal:      General: Abdomen is flat.   Musculoskeletal:         General: Normal range of motion.      Cervical back: Normal range of motion and neck supple.   Skin:     General: Skin is warm and dry.   Neurological:      Mental Status: He is alert. Mental status is at baseline.   Psychiatric:         Mood and Affect: Mood normal.         Behavior: Behavior normal.           Pertinent Diagnostic studies:    No results found for this or any previous visit (from the past 24 hours).    Assessment/Plan :       Iron def anemia  -last colonoscopy in 2025 significant for hemorrhoids and polpys  -Follows with GI and may need EGD or VCE if still anemic in future  -Iron deficit of 1200mg calculated  -Set up for 3 doses  of IV iron venofer prior to next visit  -RTC in 3 months for MD visit and labs CBC, iron and ferritin with Dr. Post         Time spent on case: 60 minutes    Summary of orders placed this encounter:  No orders of the defined types were placed in this encounter.      Future Appointments   Date Time Provider Department Center    6/25/2025  9:00 AM Tunde Stanton MD [facility] HEM ONC [facility]   7/21/2025 10:00 AM HOLTER [facility] EKG [facility]   8/1/2025  9:20 AM Vladimir Avalos MD [facility] CARDIO [facility]   8/27/2025 11:30 AM Marilu Chavez, Pia, PA-C [facility] PSYCH [facility]   12/8/2025  2:00 PM Vick Mansfield MD [facility] -          Tunde Stanton MD   Hematology/oncology, [facility] [place]           [1] (Not in a hospital admission)

## 2025-06-26 ENCOUNTER — TELEPHONE (OUTPATIENT)
Dept: ENDOSCOPY | Facility: HOSPITAL | Age: 75
End: 2025-06-26
Payer: MEDICARE

## 2025-06-26 DIAGNOSIS — D50.9 IRON DEFICIENCY ANEMIA, UNSPECIFIED IRON DEFICIENCY ANEMIA TYPE: Primary | ICD-10-CM

## 2025-06-26 NOTE — TELEPHONE ENCOUNTER
"CARLIE Matos sent at 6/25/2025 11:02 AM CDT -----  Regarding: EGD  Procedure: EGD     Diagnosis: Iron deficiency anemia     Procedure Timing: Within 12 weeks     #If within 4 weeks selected, please carlos as high priority#     #If greater than 12 weeks, please select "5-12 weeks" and delay sending until 3 months prior to requested date#      Location: Hospital Based ([facility],  endo, [place] [facility])     Additional Scheduling Information: No scheduling concerns     Prep Specifications:N/A     Is the patient taking a GLP-1 Agonist:no     Have you attached a patient to this message: yes        "

## 2025-06-26 NOTE — TELEPHONE ENCOUNTER
"----- Message from Joanne Matos PA-C sent at 6/25/2025 11:02 AM CDT -----  Regarding: EGD  Procedure: EGD    Diagnosis: Iron deficiency anemia    Procedure Timing: Within 12 weeks    #If within 4 weeks selected, please carlos as high priority#    #If greater than 12 weeks, please select "5-12 weeks" and delay sending until 3 months prior to requested date#     [facility]    Additional Scheduling Information: No scheduling concerns    Prep Specifications:N/A    Is the patient taking a GLP-1 Agonist:no    Have you attached a patient to this message: yes  "

## 2025-06-27 ENCOUNTER — PATIENT MESSAGE (OUTPATIENT)
Dept: PSYCHIATRY | Facility: CLINIC | Age: 75
End: 2025-06-27
Payer: MEDICARE

## 2025-06-27 LAB — W METHYLMALONIC ACID: 0.21 UMOL/L

## 2025-07-07 ENCOUNTER — HOSPITAL ENCOUNTER (OUTPATIENT)
Dept: PREADMISSION TESTING | Facility: HOSPITAL | Age: 75
Discharge: HOME | End: 2025-07-07
Attending: INTERNAL MEDICINE
Payer: MEDICARE

## 2025-07-07 VITALS
HEIGHT: 68 IN | TEMPERATURE: 98 F | HEART RATE: 61 BPM | DIASTOLIC BLOOD PRESSURE: 69 MMHG | SYSTOLIC BLOOD PRESSURE: 145 MMHG | WEIGHT: 180.31 LBS | OXYGEN SATURATION: 99 % | RESPIRATION RATE: 16 BRPM | BODY MASS INDEX: 27.33 KG/M2

## 2025-07-07 NOTE — DISCHARGE INSTRUCTIONS
YOUR PROCEDURE WILL BE AT [facility] at [address] [place] [address]                             Enter through the Main Entrance facing [address].      Your procedure  is scheduled for _7/9/2025_______________.    Call [phone] between 2pm and 5pm on __7/8/2025_____to find out your arrival time for the day of surgery.    You may have up to three visitors.  No children under 18 years old.      You will be going to the Same Day Surgery Unit on the 2nd floor of the hospital.    Report to the Same Day Surgery Registration Desk in the hallway.(Just beside the Same Day Surgery Unit)                    DO NOT PARK IN THE GARAGE.  THERE IS NO OPEN ENTRANCE TO THE HOSPITAL BUILDING AND NO ELEVATOR.      Important instructions:  Do not eat anything after midnight.  You may have plain water, non carbonated.  You may also have Gatorade or Powerade after midnight.    Stop all fluids 2 hours before your surgery.    It is okay to brush your teeth.  Do not have gum, candy or mints.    Do not take any diabetic medication on the morning of surgery unless instructed to do so by your doctor or pre op nurse.    Metformin, Invokamet and Synjardy must be stopped two days before your procedure.  Do not take on the day of procedure.  Resume when instructed.    Please shower the night before and the morning of your surgery.        Use Chlorhexidine soap as instructed by your pre op nurse.   Please place clean linens on your bed the night before surgery. Please wear fresh clean clothing after each shower.    No shaving of procedural area at least 4-5 days before surgery due to increased risk of skin irritation and/or possible infection.    Contact lenses and removable denture work may not be worn during your procedure.    You may wear deodorant only.     Do not wear powder, body lotion, perfume/cologne or make-up.    Do not wear any jewelry or have any metal on your body.    You will be  asked to remove any dentures or partials for the procedure.    If you are going home on the same day of surgery, you must arrange for a family member or a friend to drive you home.  Public transportation is prohibited.  You will not be able to drive home if you were given anesthesia or sedation.    Please leave money and valuables home.      You may bring your cell phone.    Call the doctor if fever or illness should occur before your surgery.    Call [phone] to contact us here if needed.

## 2025-07-08 ENCOUNTER — TELEPHONE (OUTPATIENT)
Dept: SURGERY | Facility: HOSPITAL | Age: 75
End: 2025-07-08
Payer: MEDICARE

## 2025-07-09 ENCOUNTER — HOSPITAL ENCOUNTER (OUTPATIENT)
Facility: HOSPITAL | Age: 75
Discharge: HOME | End: 2025-07-09
Attending: INTERNAL MEDICINE | Admitting: INTERNAL MEDICINE
Payer: MEDICARE

## 2025-07-09 ENCOUNTER — TELEPHONE (OUTPATIENT)
Dept: ADMINISTRATIVE | Facility: CLINIC | Age: 75
End: 2025-07-09
Payer: MEDICARE

## 2025-07-09 VITALS
WEIGHT: 180.5 LBS | RESPIRATION RATE: 16 BRPM | SYSTOLIC BLOOD PRESSURE: 137 MMHG | HEART RATE: 61 BPM | TEMPERATURE: 98 F | OXYGEN SATURATION: 96 % | DIASTOLIC BLOOD PRESSURE: 65 MMHG | BODY MASS INDEX: 27.44 KG/M2

## 2025-07-09 DIAGNOSIS — I10 PRIMARY HYPERTENSION: ICD-10-CM

## 2025-07-09 DIAGNOSIS — R00.1 BRADYCARDIA: ICD-10-CM

## 2025-07-09 DIAGNOSIS — I25.10 CORONARY ARTERY DISEASE INVOLVING NATIVE HEART WITHOUT ANGINA PECTORIS, UNSPECIFIED VESSEL OR LESION TYPE: ICD-10-CM

## 2025-07-09 DIAGNOSIS — I70.0 ATHEROSCLEROSIS OF AORTA: ICD-10-CM

## 2025-07-09 DIAGNOSIS — I10 ESSENTIAL (PRIMARY) HYPERTENSION: ICD-10-CM

## 2025-07-09 DIAGNOSIS — E78.5 HYPERLIPIDEMIA, UNSPECIFIED HYPERLIPIDEMIA TYPE: ICD-10-CM

## 2025-07-09 LAB
POC ACTIVATED CLOTTING TIME K: 216 SEC (ref 74–137)
SAMPLE: ABNORMAL

## 2025-07-09 PROCEDURE — 92979 ENDOLUMINL IVUS OCT C EA: CPT | Performed by: INTERNAL MEDICINE

## 2025-07-09 PROCEDURE — 92979 ENDOLUMINL IVUS OCT C EA: CPT | Mod: 26,LM,, | Performed by: INTERNAL MEDICINE

## 2025-07-09 PROCEDURE — 92978 ENDOLUMINL IVUS OCT C 1ST: CPT | Performed by: INTERNAL MEDICINE

## 2025-07-09 PROCEDURE — 25000003 PHARM REV CODE 250: Performed by: INTERNAL MEDICINE

## 2025-07-09 PROCEDURE — 25500020 PHARM REV CODE 255: Performed by: INTERNAL MEDICINE

## 2025-07-09 PROCEDURE — 99152 MOD SED SAME PHYS/QHP 5/>YRS: CPT | Mod: ,,, | Performed by: INTERNAL MEDICINE

## 2025-07-09 PROCEDURE — 63600175 PHARM REV CODE 636 W HCPCS: Performed by: INTERNAL MEDICINE

## 2025-07-09 PROCEDURE — 92978 ENDOLUMINL IVUS OCT C 1ST: CPT | Mod: 26,LD,, | Performed by: INTERNAL MEDICINE

## 2025-07-09 PROCEDURE — 93458 L HRT ARTERY/VENTRICLE ANGIO: CPT | Performed by: INTERNAL MEDICINE

## 2025-07-09 PROCEDURE — 93458 L HRT ARTERY/VENTRICLE ANGIO: CPT | Mod: 26,,, | Performed by: INTERNAL MEDICINE

## 2025-07-09 RX ORDER — FENTANYL CITRATE 50 UG/ML
INJECTION, SOLUTION INTRAMUSCULAR; INTRAVENOUS
Status: DISCONTINUED | OUTPATIENT
Start: 2025-07-09 | End: 2025-07-09 | Stop reason: HOSPADM

## 2025-07-09 RX ORDER — SODIUM CHLORIDE 9 MG/ML
INJECTION, SOLUTION INTRAVENOUS CONTINUOUS
Status: DISCONTINUED | OUTPATIENT
Start: 2025-07-09 | End: 2025-07-09 | Stop reason: HOSPADM

## 2025-07-09 RX ORDER — ACETAMINOPHEN 325 MG/1
650 TABLET ORAL EVERY 4 HOURS PRN
Status: DISCONTINUED | OUTPATIENT
Start: 2025-07-09 | End: 2025-07-09 | Stop reason: HOSPADM

## 2025-07-09 RX ORDER — MORPHINE SULFATE 4 MG/ML
3 INJECTION, SOLUTION INTRAMUSCULAR; INTRAVENOUS
Refills: 0 | Status: DISCONTINUED | OUTPATIENT
Start: 2025-07-09 | End: 2025-07-09 | Stop reason: ALTCHOICE

## 2025-07-09 RX ORDER — DIPHENHYDRAMINE HCL 25 MG
50 CAPSULE ORAL ONCE
Status: COMPLETED | OUTPATIENT
Start: 2025-07-09 | End: 2025-07-09

## 2025-07-09 RX ORDER — MIDAZOLAM HYDROCHLORIDE 1 MG/ML
INJECTION, SOLUTION INTRAMUSCULAR; INTRAVENOUS
Status: DISCONTINUED | OUTPATIENT
Start: 2025-07-09 | End: 2025-07-09 | Stop reason: HOSPADM

## 2025-07-09 RX ORDER — ONDANSETRON HYDROCHLORIDE 2 MG/ML
4 INJECTION, SOLUTION INTRAVENOUS EVERY 12 HOURS PRN
Status: DISCONTINUED | OUTPATIENT
Start: 2025-07-09 | End: 2025-07-09 | Stop reason: HOSPADM

## 2025-07-09 RX ORDER — OXYCODONE HYDROCHLORIDE 5 MG/1
5 TABLET ORAL EVERY 4 HOURS PRN
Refills: 0 | Status: DISCONTINUED | OUTPATIENT
Start: 2025-07-09 | End: 2025-07-09 | Stop reason: ALTCHOICE

## 2025-07-09 RX ORDER — HEPARIN SODIUM 1000 [USP'U]/ML
INJECTION, SOLUTION INTRAVENOUS; SUBCUTANEOUS
Status: DISCONTINUED | OUTPATIENT
Start: 2025-07-09 | End: 2025-07-09 | Stop reason: HOSPADM

## 2025-07-09 RX ORDER — NITROGLYCERIN 20 MG/100ML
INJECTION INTRAVENOUS
Status: DISCONTINUED | OUTPATIENT
Start: 2025-07-09 | End: 2025-07-09 | Stop reason: HOSPADM

## 2025-07-09 RX ADMIN — DIPHENHYDRAMINE HYDROCHLORIDE 50 MG: 25 CAPSULE ORAL at 08:07

## 2025-07-09 RX ADMIN — SODIUM CHLORIDE: 9 INJECTION, SOLUTION INTRAVENOUS at 08:07

## 2025-07-09 NOTE — TELEPHONE ENCOUNTER
Mr. Glynn currently takes atorvastatin 40 mg hs. He has a h/o fatty liver and hepatomegaly. Therefore, per protocol, patient does not meet inclusion criteria for Ambulatory Pharmacy Hyperlipidemia Treatment. Will defer to PCP.    ----- Message from MITRA Knapp sent at 2025  8:08 AM CDT -----  Regarding: Order for EASTON GLYNN    Patient Name: EASTON GLYNN([id number])  Sex: Male  : 1950      PCP: LISS ROSALES    Center: [facility] CENTRAL BILLING OFFICE     Types of orders made on 2025: Diet, IV, Medications, Nursing, Transfer    Order Date:2025  Ordering User:VLADIMIR DELGADO [[id number]]  Attending Provider:Vladimir Delgado MD [[id number]]  Authorizing Provider: Vladimir Delgado MD [[id number]]  Department:[facility] CATH LAB[[id number]]    Order Specific Information  Order: Notify [facility] Team [Custom: [id number]]  Order #: [id number]: 1    Priority: Routine  Class: Hospital Performed    Associated Diagnoses      I25.10 Coronary artery disease involving native heart without angina       pectoris, unspecified vessel or lesion type      R00.1 Bradycardia      I10 Essential (primary) hypertension      I10 Primary hypertension      E78.5 Hyperlipidemia, unspecified hyperlipidemia type      I70.0 Atherosclerosis of aorta    Released on: 2025  8:08 AM        Priority: Routine  Class: Hospital Performed    Associated Diagnoses      I25.10 Coronary artery disease involving native heart without angina       pectoris, unspecified vessel or lesion type      R00.1 Bradycardia      I10 Essential (primary) hypertension      I10 Primary hypertension      E78.5 Hyperlipidemia, unspecified hyperlipidemia type      I70.0 Atherosclerosis of aorta    Released on: 2025  8:08 AM

## 2025-07-09 NOTE — Clinical Note
The catheter was inserted into the and was inserted over the wire into the distal   left main  circumflex. Hemodynamics were performed.  and Pullback was recorded.  IVUS performed and removed

## 2025-07-09 NOTE — Clinical Note
The catheter was inserted into the and was inserted over the wire into the left ventricle. Hemodynamics were performed.  and Pullback was recorded.   And removed

## 2025-07-09 NOTE — DISCHARGE INSTRUCTIONS
Drink plenty of fluids for the next 48 hours and follow your doctor's diet orders.    Rest for the next 72 hours.     If your puncture site is on the groin, do not keep the injected leg bent for a long period of time.    Remove the dressing in 24 hours, and you may shower. Clean the area with soap and water; apply an adhesive bandage over the puncture site for the  next 5 days.    No Lifting over 5-10 lbs., that is, not more than 1 gallon of water, or straining for 72 hours.    No driving, no drinking alcohol, and no signing legal documents for the next 24 hours.    Call your doctor for elevated temperature, shortness of breath, chest pain, or cold discolored arm/leg.     If oozing occurs at the injections site, lie down. Apply pressure with a clean wash cloth for 20 to 30 minutes and call  your doctor.    If severe bleeding occurs, lie down, apply pressure. Call 911 and request an ambulance to take you to the nearest  hospital emergency room.    Continue to take your regular medications as instructed.    Follow the instructions in the handout given to you.  
stated

## 2025-07-09 NOTE — Clinical Note
The catheter was inserted into the and was inserted over the wire into the ostium   right coronary artery. An angiography was performed of the right coronary arteries. Multiple views were taken. The angiography was performed via power injection.   And removed

## 2025-07-15 ENCOUNTER — ANESTHESIA EVENT (OUTPATIENT)
Dept: ENDOSCOPY | Facility: HOSPITAL | Age: 75
End: 2025-07-15
Payer: MEDICARE

## 2025-07-16 ENCOUNTER — ANESTHESIA (OUTPATIENT)
Dept: ENDOSCOPY | Facility: HOSPITAL | Age: 75
End: 2025-07-16
Payer: MEDICARE

## 2025-07-16 ENCOUNTER — HOSPITAL ENCOUNTER (OUTPATIENT)
Facility: HOSPITAL | Age: 75
Discharge: HOME | End: 2025-07-16
Attending: INTERNAL MEDICINE | Admitting: INTERNAL MEDICINE
Payer: MEDICARE

## 2025-07-16 VITALS
SYSTOLIC BLOOD PRESSURE: 141 MMHG | RESPIRATION RATE: 15 BRPM | OXYGEN SATURATION: 97 % | HEART RATE: 70 BPM | DIASTOLIC BLOOD PRESSURE: 69 MMHG | TEMPERATURE: 98 F

## 2025-07-16 DIAGNOSIS — D50.0 IRON DEFICIENCY ANEMIA DUE TO CHRONIC BLOOD LOSS: Primary | ICD-10-CM

## 2025-07-16 PROCEDURE — 37000009 HC ANESTHESIA EA ADD 15 MINS: Performed by: INTERNAL MEDICINE

## 2025-07-16 PROCEDURE — 63600175 PHARM REV CODE 636 W HCPCS: Performed by: STUDENT IN AN ORGANIZED HEALTH CARE EDUCATION/TRAINING PROGRAM

## 2025-07-16 PROCEDURE — 25000003 PHARM REV CODE 250: Performed by: STUDENT IN AN ORGANIZED HEALTH CARE EDUCATION/TRAINING PROGRAM

## 2025-07-16 PROCEDURE — 27200997: Performed by: INTERNAL MEDICINE

## 2025-07-16 PROCEDURE — 37000008 HC ANESTHESIA 1ST 15 MINUTES: Performed by: INTERNAL MEDICINE

## 2025-07-16 PROCEDURE — 44366 SMALL BOWEL ENDOSCOPY: CPT | Mod: ,,, | Performed by: INTERNAL MEDICINE

## 2025-07-16 PROCEDURE — 44366 SMALL BOWEL ENDOSCOPY: CPT | Performed by: INTERNAL MEDICINE

## 2025-07-16 RX ORDER — GLUCAGON 1 MG
KIT INJECTION
Status: DISCONTINUED
Start: 2025-07-16 | End: 2025-07-16 | Stop reason: HOSPADM

## 2025-07-16 RX ORDER — SODIUM CHLORIDE 9 MG/ML
INJECTION, SOLUTION INTRAVENOUS CONTINUOUS
Status: DISCONTINUED | OUTPATIENT
Start: 2025-07-16 | End: 2025-07-16 | Stop reason: HOSPADM

## 2025-07-16 RX ORDER — PROPOFOL 10 MG/ML
VIAL (ML) INTRAVENOUS
Status: DISCONTINUED
Start: 2025-07-16 | End: 2025-07-16 | Stop reason: HOSPADM

## 2025-07-16 RX ORDER — SODIUM CHLORIDE 9 MG/ML
INJECTION, SOLUTION INTRAVENOUS CONTINUOUS
Status: CANCELLED | OUTPATIENT
Start: 2025-07-16

## 2025-07-16 RX ORDER — GLUCAGON 1 MG
KIT INJECTION
Status: DISCONTINUED | OUTPATIENT
Start: 2025-07-16 | End: 2025-07-16

## 2025-07-16 RX ORDER — LIDOCAINE HYDROCHLORIDE 20 MG/ML
INJECTION INTRAVENOUS
Status: DISCONTINUED | OUTPATIENT
Start: 2025-07-16 | End: 2025-07-16

## 2025-07-16 RX ORDER — LIDOCAINE HYDROCHLORIDE 20 MG/ML
INJECTION, SOLUTION EPIDURAL; INFILTRATION; INTRACAUDAL; PERINEURAL
Status: DISCONTINUED
Start: 2025-07-16 | End: 2025-07-16 | Stop reason: HOSPADM

## 2025-07-16 RX ORDER — PROPOFOL 10 MG/ML
VIAL (ML) INTRAVENOUS
Status: DISCONTINUED | OUTPATIENT
Start: 2025-07-16 | End: 2025-07-16

## 2025-07-16 RX ORDER — GLYCOPYRROLATE 0.2 MG/ML
INJECTION INTRAMUSCULAR; INTRAVENOUS
Status: DISCONTINUED
Start: 2025-07-16 | End: 2025-07-16 | Stop reason: HOSPADM

## 2025-07-16 RX ADMIN — PROPOFOL 20 MG: 10 INJECTION, EMULSION INTRAVENOUS at 03:07

## 2025-07-16 RX ADMIN — SODIUM CHLORIDE: 0.9 INJECTION, SOLUTION INTRAVENOUS at 02:07

## 2025-07-16 RX ADMIN — GLUCAGON HYDROCHLORIDE 0.5 MG: KIT at 03:07

## 2025-07-16 RX ADMIN — PROPOFOL 30 MG: 10 INJECTION, EMULSION INTRAVENOUS at 03:07

## 2025-07-16 RX ADMIN — PROPOFOL 70 MG: 10 INJECTION, EMULSION INTRAVENOUS at 03:07

## 2025-07-16 RX ADMIN — LIDOCAINE HYDROCHLORIDE 100 MG: 20 INJECTION, SOLUTION INTRAVENOUS at 03:07

## 2025-07-16 RX ADMIN — GLYCOPYRROLATE 0.2 MG: 0.2 INJECTION, SOLUTION INTRAMUSCULAR; INTRAVITREAL at 03:07

## 2025-07-16 NOTE — TRANSFER OF CARE
Anesthesia Transfer of Care Note    Patient: Yair Owens    Procedure(s) Performed: Procedure(s) (LRB):  EGD (ESOPHAGOGASTRODUODENOSCOPY) (N/A)    Patient location: GI    Anesthesia Type: general    Transport from OR: Transported from OR on room air with adequate spontaneous ventilation    Post pain: adequate analgesia    Post assessment: no apparent anesthetic complications and tolerated procedure well    Post vital signs: stable    Level of consciousness: sedated    Nausea/Vomiting: no nausea/vomiting    Complications: none    Transfer of care protocol was followed      Last vitals: Visit Vitals  BP (!) 101/55 (BP Location: Left arm, Patient Position: Lying)   Pulse 68   Temp 36.8 °C (98.2 °F) (Oral)   Resp 16   SpO2 96%

## 2025-07-16 NOTE — PROVATION PATIENT INSTRUCTIONS
Discharge Summary/Instructions after an Endoscopic Procedure  Patient Name: Yair Owens  Patient MRN: [id number]  Patient YOB: 1950 Wednesday, July 16, 2025  Librado Knight MD  Dear patient,  As a result of recent federal legislation (The Federal Cures Act), you may   receive lab or pathology results from your procedure in your [facility]   account before your physician is able to contact you. Your physician or   their representative will relay the results to you with their   recommendations at their soonest availability.  Thank you,  RESTRICTIONS:  During your procedure today, you received medications for sedation.  These   medications may affect your judgment, balance and coordination.  Therefore,   for 24 hours, you have the following restrictions:   - DO NOT drive a car, operate machinery, make legal/financial decisions,   sign important papers or drink alcohol.    ACTIVITY:  Today: no heavy lifting, straining or running due to procedural   sedation/anesthesia.  The following day: return to full activity including work.  DIET:  Eat and drink normally unless instructed otherwise.     TREATMENT FOR COMMON SIDE EFFECTS:  - Mild abdominal pain, nausea, belching, bloating or excessive gas:  rest,   eat lightly and use a heating pad.  - Sore Throat: treat with throat lozenges and/or gargle with warm salt   water.  - Because air was used during the procedure, expelling large amounts of air   from your rectum or belching is normal.  - If a bowel prep was taken, you may not have a bowel movement for 1-3 days.    This is normal.  SYMPTOMS TO WATCH FOR AND REPORT TO YOUR PHYSICIAN:  1. Abdominal pain or bloating, other than gas cramps.  2. Chest pain.  3. Back pain.  4. Signs of infection such as: chills or fever occurring within 24 hours   after the procedure.  5. Rectal bleeding, which would show as bright red, maroon, or black stools.   (A tablespoon of blood from the rectum is not serious, especially if    hemorrhoids are present.)  6. Vomiting.  7. Weakness or dizziness.  GO DIRECTLY TO THE NEAREST EMERGENCY ROOM IF YOU HAVE ANY OF THE FOLLOWING:      Difficulty breathing              Chills and/or fever over 101 F   Persistent vomiting and/or vomiting blood   Severe abdominal pain   Severe chest pain   Black, tarry stools   Bleeding- more than one tablespoon   Any other symptom or condition that you feel may need urgent attention  Your doctor recommends these additional instructions:  If any biopsies were taken, your doctors clinic will contact you in 1 to 2   weeks with any results.  - Discharge patient to home (ambulatory).   - Resume previous diet.   - Continue present medications.  For questions, problems or results please call your physician - Librado Knight MD at Work:  [phone].  [facility] can be reached at [phone]     IF A COMPLICATION OR EMERGENCY SITUATION ARISES AND YOU ARE UNABLE TO REACH   YOUR PHYSICIAN - GO DIRECTLY TO THE EMERGENCY ROOM.  Librado Knight MD  7/16/2025 3:34:55 PM  This report has been verified and signed electronically.  Dear patient,  As a result of recent federal legislation (The Federal Cures Act), you may   receive lab or pathology results from your procedure in your [facility]   account before your physician is able to contact you. Your physician or   their representative will relay the results to you with their   recommendations at their soonest availability.  Thank you,  PROVATION

## 2025-07-16 NOTE — H&P
[facility] [place] [facility]  Gastroenterology  H&P    Patient Name: Yair Owens  MRN: [id number]  Admission Date: 7/16/2025  Code Status: Prior    Attending Provider: bisi gallardo  Primary Care Physician: Vick Mansfield MD  Principal Problem:<principal problem not specified>    Subjective:     History of Present Illness: anemia, iron deficiency    Past Medical History:   Diagnosis Date    CAD (coronary artery disease) 02/22/2021    Cath 2021  There was non-obstructive coronary artery disease..   Left main:  Distal 10% stenosis  Lad:  Luminal irregularities.  Mid 10-20% stenosis   Circumflex:  Luminal irregularities   RCA:  Luminal irregularities    Calcified granuloma of lung 02/03/2023    LLL calcified granuloma seen on CT Chest Lung Screening Low Dose 01/31/2018    Colon polyp     Erectile dysfunction 02/14/2020    Fatty liver     Generalized anxiety disorder 01/11/2017    GERD (gastroesophageal reflux disease)     Gout, unspecified     History of colonic polyps 11/16/2023    2 polyps 11/22 -5 yrs    Hyperlipidemia     Hypertension     Psychophysiological insomnia 07/24/2017    Tobacco dependence 07/24/2017       Past Surgical History:   Procedure Laterality Date    APPENDECTOMY      BONE RESECTION, RIB      for thoracic outlet syndrome    COLONOSCOPY N/A 07/21/2017    Procedure: COLONOSCOPY;  Surgeon: Deepak Servin MD;  Location: [facility] ENDO;  Service: Endoscopy;  Laterality: N/A;    COLONOSCOPY N/A 11/22/2022    Procedure: COLONOSCOPY;  Surgeon: Sam Obrien MD;  Location: [facility] ENDO;  Service: Endoscopy;  Laterality: N/A;  vacc-sutab-inst portal-tb    COLONOSCOPY N/A 2/28/2025    Procedure: COLONOSCOPY;  Surgeon: Juanjose Martinez MD;  Location: [facility] ENDO;  Service: Gastroenterology;  Laterality: N/A;  2/24-marcus-suprep-portal-tb  2/25 - R/S inst portal - LW    HAND SURGERY      LEFT HEART CATHETERIZATION Left 03/02/2021    Procedure: Left heart cath, radial, 730 am;  Surgeon: Vladimir Avalos MD;  Location: [facility] CATH LAB;   Service: Cardiology;  Laterality: Left;  RN PREOP 2/25/2021--COVID NEGATIVE ON 3/1  H/P INCOMPLETE    LEFT HEART CATHETERIZATION Left 7/9/2025    Procedure: Left heart cath;  Surgeon: Vladimir Avalos MD;  Location: [facility] CATH LAB;  Service: Cardiology;  Laterality: Left;  RN PREOP 7/7/2025    SCROTAL SURGERY      mass    ULTRASOUND GUIDANCE  03/02/2021    Procedure: Ultrasound Guidance;  Surgeon: Vladimir Avalos MD;  Location: [facility] CATH LAB;  Service: Cardiology;;    VASECTOMY  40 years       Review of patient's allergies indicates:   Allergen Reactions    Codeine Itching    Ibuprofen Itching    Remeron [mirtazapine] Anxiety     Did not help the patient sleep and created anxiety.     Family History       Problem Relation (Age of Onset)    Asthma Sister    Cancer Father, Sister (78)    Heart disease Father    Lupus Daughter    No Known Problems Mother, Son    Stroke Brother          Tobacco Use    Smoking status: Some Days     Current packs/day: 0.50     Average packs/day: 0.5 packs/day for 61.8 years (30.9 ttl pk-yrs)     Types: Cigarettes     Start date: 1967     Passive exposure: Current    Smokeless tobacco: Current    Tobacco comments:     0.5 ppd or less    Vaping Use    Vaping status: Never Used   Substance and Sexual Activity    Alcohol use: Not Currently    Drug use: No    Sexual activity: Yes     Partners: Female     Review of Systems   Respiratory: Negative.     Cardiovascular: Negative.      Objective:     Vital Signs (Most Recent):  Temp: 97.9 °F (36.6 °C) (07/16/25 1351)  Pulse: 61 (07/16/25 1351)  Resp: 16 (07/16/25 1351)  BP: (!) 151/70 (07/16/25 1351)  SpO2: 98 % (07/16/25 1351) Vital Signs (24h Range):  Temp:  [97.9 °F (36.6 °C)] 97.9 °F (36.6 °C)  Pulse:  [61] 61  Resp:  [16] 16  SpO2:  [98 %] 98 %  BP: (151)/(70) 151/70        There is no height or weight on file to calculate BMI.    No intake or output data in the 24 hours ending 07/16/25 1446    Lines/Drains/Airways       Peripheral Intravenous  Line  Duration             Peripheral IV Single Lumen 07/16/25 1351 22 G Posterior;Right Hand <1 day                    Physical Exam  Cardiovascular:      Rate and Rhythm: Normal rate and regular rhythm.   Pulmonary:      Effort: Pulmonary effort is normal.      Breath sounds: Normal breath sounds.         Significant Labs:      Significant Imaging:      Assessment/Plan:     There are no hospital problems to display for this patient.      Indication for procedure:    ASA:III  Airway normal  Malampati class:    Personal and family history negative for anesthesia problems    Plan:egd  Anesthesia plan: general      Librado Knight MD  Gastroenterology  [facility] [place] - Endoscopy

## 2025-07-16 NOTE — PLAN OF CARE
Procedure and recovery complete, Awake and alert, VSS, denies complaints of pain, spouse at bedside, Dr. Knight discussed procedure findings with patient. Ambulates without assistance, gait steady. Discharge criteria met.

## 2025-07-16 NOTE — ANESTHESIA PREPROCEDURE EVALUATION
07/16/2025  Yair Owens is a 75 y.o., male.    Past Medical History:   Diagnosis Date    CAD (coronary artery disease) 02/22/2021    Cath 2021  There was non-obstructive coronary artery disease..   Left main:  Distal 10% stenosis  Lad:  Luminal irregularities.  Mid 10-20% stenosis   Circumflex:  Luminal irregularities   RCA:  Luminal irregularities    Calcified granuloma of lung 02/03/2023    LLL calcified granuloma seen on CT Chest Lung Screening Low Dose 01/31/2018    Colon polyp     Erectile dysfunction 02/14/2020    Fatty liver     Generalized anxiety disorder 01/11/2017    GERD (gastroesophageal reflux disease)     Gout, unspecified     History of colonic polyps 11/16/2023    2 polyps 11/22 -5 yrs    Hyperlipidemia     Hypertension     Psychophysiological insomnia 07/24/2017    Tobacco dependence 07/24/2017       Pre-op Assessment          Review of Systems  Social:  Smoker       Cardiovascular:     Hypertension   CAD                    Coronary Artery Disease:                            Hypertension         Pulmonary:   COPD         Chronic Obstructive Pulmonary Disease (COPD):                      Hepatic/GI:     GERD Liver Disease,        Gerd       Liver Disease        Neurological:  Neurology Normal                                      Endocrine:  Endocrine Normal            Psych:  Psychiatric History                  Physical Exam  General: Well nourished    Airway:  Mallampati: II   Mouth Opening: Normal  Tongue: Normal  Neck ROM: Normal ROM    Dental:  Intact    Chest/Lungs:  Clear to auscultation    Heart:  Rate: Normal  Rhythm: Regular Rhythm  Sounds: Normal      Lab Results   Component Value Date    WBC 7.36 06/23/2025    HGB 10.5 (L) 06/23/2025    HCT 33.8 (L) 06/23/2025    MCV 90 06/23/2025     06/23/2025     Stress 6/17/25:      The patient exercised for 7 minutes 26 seconds on a  Aric protocol, achieving a peak heart rate of 144 bpm, which is 99% of the age predicted maximum heart rate.    Abnormal myocardial perfusion scan.    There is a mild intensity, small sized, reversible perfusion abnormality that is consistent with ischemia in the basal to mid inferior wall(s) in the typical distribution of the RCA territory.    There are no other significant perfusion abnormalities.    The gated perfusion images showed an ejection fraction of 59% post stress.    There is normal wall motion at post-stress.    The ECG portion of the study is suspicious for ischemia.    The patient reported no chest pain during the stress test.          Anesthesia Plan  Type of Anesthesia, risks & benefits discussed:    Anesthesia Type: Gen Natural Airway  Intra-op Monitoring Plan: Standard ASA Monitors  Induction:  IV  Informed Consent: Informed consent signed with the Patient and all parties understand the risks and agree with anesthesia plan.  All questions answered. Patient consented to blood products? Yes  ASA Score: 3    Ready For Surgery From Anesthesia Perspective.     .

## 2025-07-17 NOTE — ANESTHESIA POSTPROCEDURE EVALUATION
Anesthesia Post Evaluation    Patient: Yair Owens    Procedure(s) Performed: Procedure(s) (LRB):  EGD (ESOPHAGOGASTRODUODENOSCOPY) (N/A)    Final Anesthesia Type: general      Patient location during evaluation: GI PACU  Patient participation: Yes- Able to Participate  Level of consciousness: awake and alert, oriented and awake  Post-procedure vital signs: reviewed and stable  Airway patency: patent    PONV status at discharge: No PONV  Anesthetic complications: no      Cardiovascular status: blood pressure returned to baseline  Respiratory status: unassisted, spontaneous ventilation and room air  Hydration status: euvolemic  Follow-up not needed.              Vitals Value Taken Time   /69 07/16/25 16:01   Temp 36.8 °C (98.2 °F) 07/16/25 15:33   Pulse 70 07/16/25 16:01   Resp 15 07/16/25 16:01   SpO2 97 % 07/16/25 16:01         Event Time   Out of Recovery 16:06:30         Pain/Sonny Score: Sonny Score: 9 (7/16/2025  3:33 PM)

## 2025-07-18 ENCOUNTER — PATIENT MESSAGE (OUTPATIENT)
Dept: CARDIOLOGY | Facility: CLINIC | Age: 75
End: 2025-07-18
Payer: MEDICARE

## 2025-07-18 ENCOUNTER — INFUSION (OUTPATIENT)
Dept: INFUSION THERAPY | Facility: HOSPITAL | Age: 75
End: 2025-07-18
Attending: STUDENT IN AN ORGANIZED HEALTH CARE EDUCATION/TRAINING PROGRAM
Payer: MEDICARE

## 2025-07-18 VITALS
DIASTOLIC BLOOD PRESSURE: 67 MMHG | SYSTOLIC BLOOD PRESSURE: 138 MMHG | TEMPERATURE: 98 F | RESPIRATION RATE: 18 BRPM | HEART RATE: 68 BPM | OXYGEN SATURATION: 99 %

## 2025-07-18 DIAGNOSIS — D50.8 IRON DEFICIENCY ANEMIA SECONDARY TO INADEQUATE DIETARY IRON INTAKE: Primary | ICD-10-CM

## 2025-07-18 PROCEDURE — 25000003 PHARM REV CODE 250: Performed by: STUDENT IN AN ORGANIZED HEALTH CARE EDUCATION/TRAINING PROGRAM

## 2025-07-18 PROCEDURE — 63600175 PHARM REV CODE 636 W HCPCS: Performed by: STUDENT IN AN ORGANIZED HEALTH CARE EDUCATION/TRAINING PROGRAM

## 2025-07-18 PROCEDURE — 96365 THER/PROPH/DIAG IV INF INIT: CPT

## 2025-07-18 PROCEDURE — 96366 THER/PROPH/DIAG IV INF ADDON: CPT

## 2025-07-18 RX ORDER — SODIUM CHLORIDE 0.9 % (FLUSH) 0.9 %
10 SYRINGE (ML) INJECTION
OUTPATIENT
Start: 2025-07-25

## 2025-07-18 RX ORDER — HEPARIN 100 UNIT/ML
500 SYRINGE INTRAVENOUS
OUTPATIENT
Start: 2025-07-25

## 2025-07-18 RX ORDER — EPINEPHRINE 0.3 MG/.3ML
0.3 INJECTION SUBCUTANEOUS ONCE AS NEEDED
OUTPATIENT
Start: 2025-07-25

## 2025-07-18 RX ORDER — EPINEPHRINE 0.3 MG/.3ML
0.3 INJECTION SUBCUTANEOUS ONCE AS NEEDED
Status: DISCONTINUED | OUTPATIENT
Start: 2025-07-18 | End: 2025-07-18 | Stop reason: HOSPADM

## 2025-07-18 RX ADMIN — IRON SUCROSE 400 MG: 20 INJECTION, SOLUTION INTRAVENOUS at 08:07

## 2025-07-18 NOTE — PLAN OF CARE
Pt arrived to the Infusion unit for initial iron infusion (1/3). Pt is awake, alert, and oriented x4. Ambulates independently with steady gait. Pt reports no new allergies, symptoms, or concerns at this time. Pt given information handouts on Venofer drug, educated on possible side effects, and adverse reactions. Pt verbalizes understanding, reports no active bleeding, and no hx of falls at this time. Pt consents to plan of care for today. PIV 22g placed in (R) upper arm. Pt administered Venofer 400mg IVPB in 0.9% NaCl 250mL @ 100mL/hr over 2.5 hours. Pt monitored for 30min post treatment and reports no adverse reactions. Vital signs reassessed and stable. Pt aware of next scheduled appointments and verbalizes no additional needs at this time. Pt ambulatory at discharge in no acute distress.     Problem: Anemia  Goal: Anemia Symptom Improvement  Outcome: Progressing

## 2025-07-21 ENCOUNTER — OFFICE VISIT (OUTPATIENT)
Dept: ORTHOPEDICS | Facility: CLINIC | Age: 75
End: 2025-07-21
Payer: MEDICARE

## 2025-07-21 ENCOUNTER — PATIENT MESSAGE (OUTPATIENT)
Dept: HEMATOLOGY/ONCOLOGY | Facility: CLINIC | Age: 75
End: 2025-07-21
Payer: MEDICARE

## 2025-07-21 DIAGNOSIS — M25.512 CHRONIC LEFT SHOULDER PAIN: Primary | ICD-10-CM

## 2025-07-21 DIAGNOSIS — G89.29 CHRONIC LEFT SHOULDER PAIN: Primary | ICD-10-CM

## 2025-07-21 PROCEDURE — 99213 OFFICE O/P EST LOW 20 MIN: CPT | Mod: S$GLB,,, | Performed by: ORTHOPAEDIC SURGERY

## 2025-07-21 PROCEDURE — 1101F PT FALLS ASSESS-DOCD LE1/YR: CPT | Mod: CPTII,S$GLB,, | Performed by: ORTHOPAEDIC SURGERY

## 2025-07-21 PROCEDURE — 4010F ACE/ARB THERAPY RXD/TAKEN: CPT | Mod: CPTII,S$GLB,, | Performed by: ORTHOPAEDIC SURGERY

## 2025-07-21 PROCEDURE — 1160F RVW MEDS BY RX/DR IN RCRD: CPT | Mod: CPTII,S$GLB,, | Performed by: ORTHOPAEDIC SURGERY

## 2025-07-21 PROCEDURE — 1159F MED LIST DOCD IN RCRD: CPT | Mod: CPTII,S$GLB,, | Performed by: ORTHOPAEDIC SURGERY

## 2025-07-21 PROCEDURE — 3044F HG A1C LEVEL LT 7.0%: CPT | Mod: CPTII,S$GLB,, | Performed by: ORTHOPAEDIC SURGERY

## 2025-07-21 PROCEDURE — 99999 PR PBB SHADOW E&M-EST. PATIENT-LVL III: CPT | Mod: PBBFAC,,, | Performed by: ORTHOPAEDIC SURGERY

## 2025-07-21 PROCEDURE — 1125F AMNT PAIN NOTED PAIN PRSNT: CPT | Mod: CPTII,S$GLB,, | Performed by: ORTHOPAEDIC SURGERY

## 2025-07-21 PROCEDURE — 3288F FALL RISK ASSESSMENT DOCD: CPT | Mod: CPTII,S$GLB,, | Performed by: ORTHOPAEDIC SURGERY

## 2025-07-21 NOTE — PROGRESS NOTES
Assessment: 75 y.o. male with left cuff tear, glenohumeral arthritis     I explained my diagnostic impression and the reasoning behind it in detail, using layman's terms.      Plan:   - Continue jenna and orlando benoit - discussed formal PT but has been doing Iron infusions and prefers to continue PT at home  - MRI L shoulder   - RTC after MRI complete     This note was generated with the assistance of ambient listening technology. Verbal consent was obtained by the patient and accompanying visitor(s) for the recording of patient appointment to facilitate this note. I attest to having reviewed and edited the generated note for accuracy, though some syntax or spelling errors may persist. Please contact the author of this note for any clarification.      All questions were answered in detail. The patient is in full agreement with the treatment plan and will proceed accordingly.    Chief Complaint   Patient presents with    Left Shoulder - Pain     Initial visit (5/19/25): Yair Owens is a 75 y.o. male who presents today complaining of left shoulder pain     Yair presents with ongoing left shoulder pain stemming from a partial rotator cuff tear approximately 30 years ago. Pain is localized to a specific area of the shoulder and sometimes radiates down the whole arm. He experiences pain with abduction and forward elevation - this limits motion. Pain wakes him up at night and is disruptive to ADLs.    He received a subacromial injection in March, which provided relief for about a month. He uses 10-pound dumbbells at home for exercises but finds certain motions painful.      He also reports wrist pain that occurs when sleeping on it, though it is not constant.        Previously attempted treatments:   Activity modification:  not helpful   Ice:  not attempted   Heat:  not attempted   NSAIDs: not attempted   Tylenol: not helpful   PT:  not attempted   Injections: RICKY helpful  for limited time     7/21/25  Patient returns  for left shoulder pain.   No relief with home PT program   Pain wakes him up at night   Worse with motion, lifting, leaning on left arm   Injection lasted about 6 months    This is the extent of the patient's complaints at this time.         Review of patient's allergies indicates:   Allergen Reactions    Codeine Itching    Ibuprofen Itching    Remeron [mirtazapine] Anxiety     Did not help the patient sleep and created anxiety.     Physical Exam:   Vitals:    07/21/25 1008   PainSc: 1    PainLoc: Shoulder       General: Patient is alert, awake and oriented to time, place and person. Mood and affect are appropriate.  Patient does not appear to be in any distress, denies any constitutional symptoms and appears stated age.   HEENT: Pupils are equal and round, sclera are not injected. External examination of ears and nose reveals no abnormalities. Cranial nerves II-X are grossly intact  Neck: examination demonstrates painless  active range of motion. Spurling's sign is negative  Skin: no rashes, abrasions or open wounds on the affected extremity   Resp: No respiratory distress or audible wheezing   CV: 2+  pulses, all extremities warm and well perfused   Left Shoulder    Shoulder Range of Motion    Right     Left   (Active/Passive)       Forward Elevation     165/165            165/165  External rotation (arm at side)  50/50             50/50   Internal rotation behind the back  L5             L5     Range of motion is painful   Acromioclavicular joint is not tender  Crossbody test: negative    Hawkin's positive    Fahad's positive  Drop arm negative  Belly press negative       Cuff Strength     Right     Left   Supraspinatus        5/5    4/5  Infraspinatus     5/5    4/5  Subscapularis     5/5    5/5    Deltoid testing            5/5    5/5    Speeds negative  Yergasons negative       Elbow examination demonstrates no tenderness to palpation and has normal range of motion.     ltsi C5-T1  + epl, io, fds, fdp   2+ RP       Imaging: 3 views of the left shoulder:  decreased glenohumeral joint space, humeral head osteophytes. The acromiohumeral interval is normal. The humeral head is centered on the AP view and centered on the axillary view     I personally reviewed and interpreted the patient's imaging obtained prior to visit        This note was created by combination of typed  and M-Modal dictation. Transcription and phonetic errors may be present.  If there are any questions, please contact me.    Current Medications[1]    Past Medical History:   Diagnosis Date    CAD (coronary artery disease) 02/22/2021    Cath 2021  There was non-obstructive coronary artery disease..   Left main:  Distal 10% stenosis  Lad:  Luminal irregularities.  Mid 10-20% stenosis   Circumflex:  Luminal irregularities   RCA:  Luminal irregularities    Calcified granuloma of lung 02/03/2023    LLL calcified granuloma seen on CT Chest Lung Screening Low Dose 01/31/2018    Colon polyp     Erectile dysfunction 02/14/2020    Fatty liver     Generalized anxiety disorder 01/11/2017    GERD (gastroesophageal reflux disease)     Gout, unspecified     History of colonic polyps 11/16/2023    2 polyps 11/22 -5 yrs    Hyperlipidemia     Hypertension     Psychophysiological insomnia 07/24/2017    Tobacco dependence 07/24/2017       Active Problem List with Overview Notes    Diagnosis Date Noted    Nonrheumatic aortic valve stenosis 06/16/2025    Precordial pain 06/16/2025    Polyp of colon 05/27/2025    Iron deficiency anemia 05/27/2025    Spontaneous ecchymoses 05/27/2025    Encounter for long-term (current) use of aspirin 05/27/2025    Pleural plaque 04/24/2025     calcified pleural plaque left anterior lung.  Present and unchanged from [id number].    Unclear of etiology.    Size and stability c/w benign etiology.        LAD (lymphadenopathy), hilar 04/24/2025     left hilar calcified lad along with calcified nodules in spleen.  Stable from 2018 to 2015.   Most likely residual granulomas prior infection such as afb or fungal.  Clinically asymptomatic with excellent exercising capacity.  Given radiographic and clinical stability, further work up is not recommended.       Other emphysema 04/14/2024     Emphysematous changes noted on ct scan.  Deferred pft citing good exertional capacity.  Encourage smoking cessation.      Overweight (BMI 25.0-29.9) 01/09/2024    History of colonic polyps 11/16/2023     2 polyps 11/22 -5 yrs      Lower urinary tract symptoms (LUTS) 08/30/2023    Calcified granuloma of lung 02/03/2023     LLL calcified granuloma seen on CT Chest Lung Screening Low Dose 01/31/2018      Purpura 02/03/2023    Hepatomegaly 12/14/2022    Fatty liver 11/03/2022    CAD (coronary artery disease) 02/22/2021     Cath 2021  There was non-obstructive coronary artery disease..   Left main:  Distal 10% stenosis  Lad:  Luminal irregularities.  Mid 10-20% stenosis   Circumflex:  Luminal irregularities   RCA:  Luminal irregularities      Erectile dysfunction 02/14/2020    Atherosclerosis of aorta 01/23/2018     US 08/2017.  Stable, asymptomatic chronic condition.  Will continue to maximize risk factor reduction and adjust medication as needed.       Tobacco dependence 07/24/2017     no ready to quit smoking.  Medicare guidelines recommend ldct until age of 78      Psychophysiological insomnia 07/24/2017    Generalized anxiety disorder 01/11/2017    Hyperlipidemia 10/29/2012    Hypertension 10/29/2012     Followed by digital team      GERD (gastroesophageal reflux disease) 10/29/2012       Past Surgical History:   Procedure Laterality Date    APPENDECTOMY      BONE RESECTION, RIB      for thoracic outlet syndrome    COLONOSCOPY N/A 07/21/2017    Procedure: COLONOSCOPY;  Surgeon: Deepak Servin MD;  Location: [facility] ENDO;  Service: Endoscopy;  Laterality: N/A;    COLONOSCOPY N/A 11/22/2022    Procedure: COLONOSCOPY;  Surgeon: Sam Obrien MD;  Location: [facility] ENDO;  Service:  Endoscopy;  Laterality: N/A;  vacc-sutab-inst portal-tb    COLONOSCOPY N/A 2/28/2025    Procedure: COLONOSCOPY;  Surgeon: Juanjose Martinez MD;  Location: [facility] ENDO;  Service: Gastroenterology;  Laterality: N/A;  2/24-marcus-suprep-portal-tb  2/25 - R/S inst portal - LW    ESOPHAGOGASTRODUODENOSCOPY N/A 7/16/2025    Procedure: EGD (ESOPHAGOGASTRODUODENOSCOPY);  Surgeon: Librado Knight MD;  Location: [facility] ENDO;  Service: Endoscopy;  Laterality: N/A;  referrral from alyssa connors, pending angiogram results on 7/9/25, instructions to pt portal.cf    HAND SURGERY      LEFT HEART CATHETERIZATION Left 03/02/2021    Procedure: Left heart cath, radial, 730 am;  Surgeon: Vladimir Avalos MD;  Location: [facility] CATH LAB;  Service: Cardiology;  Laterality: Left;  RN PREOP 2/25/2021--COVID NEGATIVE ON 3/1  H/P INCOMPLETE    LEFT HEART CATHETERIZATION Left 7/9/2025    Procedure: Left heart cath;  Surgeon: Vladimir Avalos MD;  Location: [facility] CATH LAB;  Service: Cardiology;  Laterality: Left;  RN PREOP 7/7/2025    SCROTAL SURGERY      mass    ULTRASOUND GUIDANCE  03/02/2021    Procedure: Ultrasound Guidance;  Surgeon: Vladimir Avalos MD;  Location: [facility] CATH LAB;  Service: Cardiology;;    VASECTOMY  40 years       Social History[2]                [1]   Current Outpatient Medications:     aspirin (ECOTRIN) 81 MG EC tablet, Take 1 tablet (81 mg total) by mouth once daily., Disp: , Rfl:     atorvastatin (LIPITOR) 40 MG tablet, TAKE 1 TABLET BY MOUTH EVERY DAY, Disp: 90 tablet, Rfl: 3    busPIRone (BUSPAR) 10 MG tablet, Take 1 tablet (10 mg total) by mouth 2 (two) times daily., Disp: , Rfl:     ferrous sulfate 325 (65 FE) MG EC tablet, Take 325 mg by mouth once daily., Disp: , Rfl:     isosorbide mononitrate (IMDUR) 30 MG 24 hr tablet, Take 1 tablet (30 mg total) by mouth once daily., Disp: 30 tablet, Rfl: 11    multivitamin capsule, Take 1 capsule by mouth once daily., Disp: , Rfl:     nitroGLYCERIN (NITROSTAT) 0.4 MG SL tablet, Place  1 tablet (0.4 mg total) under the tongue every 5 (five) minutes as needed for Chest pain., Disp: 30 tablet, Rfl: 12    omeprazole (PRILOSEC) 40 MG capsule, TAKE 1 CAPSULE BY MOUTH TWICE  DAILY, Disp: 180 capsule, Rfl: 3    tamsulosin (FLOMAX) 0.4 mg Cap, Take 1 capsule (0.4 mg total) by mouth once daily., Disp: 90 capsule, Rfl: 3    Current Facility-Administered Medications:     sodium chloride 0.9% flush 10 mL, 10 mL, Intravenous, PRN, RobbieVladimir MD  [2]   Social History  Socioeconomic History    Marital status:    Occupational History     Employer:  [employer]   Tobacco Use    Smoking status: Some Days     Current packs/day: 0.50     Average packs/day: 0.5 packs/day for 61.9 years (30.9 ttl pk-yrs)     Types: Cigarettes     Start date: 1967     Passive exposure: Current    Smokeless tobacco: Current    Tobacco comments:     0.5 ppd or less    Vaping Use    Vaping status: Never Used   Substance and Sexual Activity    Alcohol use: Not Currently    Drug use: No    Sexual activity: Yes     Partners: Female     Social Drivers of Health     Financial Resource Strain: Low Risk  (11/15/2024)    Overall Financial Resource Strain (CARDIA)     Difficulty of Paying Living Expenses: Not hard at all   Food Insecurity: No Food Insecurity (11/15/2024)    Hunger Vital Sign     Worried About Running Out of Food in the Last Year: Never true     Ran Out of Food in the Last Year: Never true   Transportation Needs: No Transportation Needs (4/11/2024)    PRAPARE - Transportation     Lack of Transportation (Medical): No     Lack of Transportation (Non-Medical): No   Physical Activity: Sufficiently Active (11/15/2024)    Exercise Vital Sign     Days of Exercise per Week: 7 days     Minutes of Exercise per Session: 60 min   Stress: No Stress Concern Present (11/15/2024)    [employer] - Occupational Stress Questionnaire     Feeling of Stress : Only a little   Housing Stability: Low Risk  (4/11/2024)     Housing Stability Vital Sign     Unable to Pay for Housing in the Last Year: No     Number of Places Lived in the Last Year: 1     Unstable Housing in the Last Year: No

## 2025-07-22 ENCOUNTER — TELEPHONE (OUTPATIENT)
Dept: CARDIOLOGY | Facility: CLINIC | Age: 75
End: 2025-07-22
Payer: MEDICARE

## 2025-07-25 ENCOUNTER — INFUSION (OUTPATIENT)
Dept: INFUSION THERAPY | Facility: HOSPITAL | Age: 75
End: 2025-07-25
Attending: STUDENT IN AN ORGANIZED HEALTH CARE EDUCATION/TRAINING PROGRAM
Payer: MEDICARE

## 2025-07-25 VITALS
DIASTOLIC BLOOD PRESSURE: 67 MMHG | HEART RATE: 63 BPM | TEMPERATURE: 98 F | OXYGEN SATURATION: 99 % | SYSTOLIC BLOOD PRESSURE: 138 MMHG

## 2025-07-25 DIAGNOSIS — D50.8 IRON DEFICIENCY ANEMIA SECONDARY TO INADEQUATE DIETARY IRON INTAKE: Primary | ICD-10-CM

## 2025-07-25 PROCEDURE — 96365 THER/PROPH/DIAG IV INF INIT: CPT

## 2025-07-25 PROCEDURE — 96366 THER/PROPH/DIAG IV INF ADDON: CPT

## 2025-07-25 PROCEDURE — 25000003 PHARM REV CODE 250: Performed by: STUDENT IN AN ORGANIZED HEALTH CARE EDUCATION/TRAINING PROGRAM

## 2025-07-25 PROCEDURE — 63600175 PHARM REV CODE 636 W HCPCS: Performed by: STUDENT IN AN ORGANIZED HEALTH CARE EDUCATION/TRAINING PROGRAM

## 2025-07-25 RX ORDER — EPINEPHRINE 0.3 MG/.3ML
0.3 INJECTION SUBCUTANEOUS ONCE AS NEEDED
OUTPATIENT
Start: 2025-08-01

## 2025-07-25 RX ORDER — SODIUM CHLORIDE 0.9 % (FLUSH) 0.9 %
10 SYRINGE (ML) INJECTION
OUTPATIENT
Start: 2025-08-01

## 2025-07-25 RX ORDER — EPINEPHRINE 0.3 MG/.3ML
0.3 INJECTION SUBCUTANEOUS ONCE AS NEEDED
Status: DISCONTINUED | OUTPATIENT
Start: 2025-07-25 | End: 2025-07-25 | Stop reason: HOSPADM

## 2025-07-25 RX ORDER — HEPARIN 100 UNIT/ML
500 SYRINGE INTRAVENOUS
OUTPATIENT
Start: 2025-08-01

## 2025-07-25 RX ADMIN — IRON SUCROSE 400 MG: 20 INJECTION, SOLUTION INTRAVENOUS at 08:07

## 2025-07-25 NOTE — PLAN OF CARE
Patient ambulated onto unit for treatment #2/3 of Venofer 400mg, VSS, pt denies any s/s of distress. Plan of care reviewed with patient. Venofer 400mg administered over 2.5 hours. Post VSS, no s/s of distress or hypersensitivity. Next appt reminder given to pt. Patient ambulated off unit.

## 2025-07-27 ENCOUNTER — HOSPITAL ENCOUNTER (OUTPATIENT)
Dept: RADIOLOGY | Facility: HOSPITAL | Age: 75
Discharge: HOME | End: 2025-07-27
Attending: ORTHOPAEDIC SURGERY
Payer: MEDICARE

## 2025-07-27 DIAGNOSIS — G89.29 CHRONIC LEFT SHOULDER PAIN: ICD-10-CM

## 2025-07-27 DIAGNOSIS — M25.512 CHRONIC LEFT SHOULDER PAIN: ICD-10-CM

## 2025-07-27 PROCEDURE — 73221 MRI JOINT UPR EXTREM W/O DYE: CPT | Mod: TC,LT

## 2025-07-27 PROCEDURE — 73221 MRI JOINT UPR EXTREM W/O DYE: CPT | Mod: 26,LT,, | Performed by: RADIOLOGY

## 2025-07-28 ENCOUNTER — OFFICE VISIT (OUTPATIENT)
Dept: ORTHOPEDICS | Facility: CLINIC | Age: 75
End: 2025-07-28
Payer: MEDICARE

## 2025-07-28 ENCOUNTER — TELEPHONE (OUTPATIENT)
Dept: CARDIOLOGY | Facility: CLINIC | Age: 75
End: 2025-07-28
Payer: MEDICARE

## 2025-07-28 ENCOUNTER — OFFICE VISIT (OUTPATIENT)
Dept: PSYCHIATRY | Facility: CLINIC | Age: 75
End: 2025-07-28
Payer: COMMERCIAL

## 2025-07-28 DIAGNOSIS — F41.0 GENERALIZED ANXIETY DISORDER WITH PANIC ATTACKS: Primary | ICD-10-CM

## 2025-07-28 DIAGNOSIS — M25.512 CHRONIC LEFT SHOULDER PAIN: Primary | ICD-10-CM

## 2025-07-28 DIAGNOSIS — F51.05 INSOMNIA DUE TO OTHER MENTAL DISORDER: ICD-10-CM

## 2025-07-28 DIAGNOSIS — F41.1 GENERALIZED ANXIETY DISORDER WITH PANIC ATTACKS: Primary | ICD-10-CM

## 2025-07-28 DIAGNOSIS — Z86.59 HISTORY OF DEPRESSION: ICD-10-CM

## 2025-07-28 DIAGNOSIS — G89.29 CHRONIC LEFT SHOULDER PAIN: Primary | ICD-10-CM

## 2025-07-28 DIAGNOSIS — F99 INSOMNIA DUE TO OTHER MENTAL DISORDER: ICD-10-CM

## 2025-07-28 PROCEDURE — 1159F MED LIST DOCD IN RCRD: CPT | Mod: CPTII,95,, | Performed by: PHYSICIAN ASSISTANT

## 2025-07-28 PROCEDURE — 99212 OFFICE O/P EST SF 10 MIN: CPT | Mod: S$GLB,,, | Performed by: ORTHOPAEDIC SURGERY

## 2025-07-28 PROCEDURE — 1126F AMNT PAIN NOTED NONE PRSNT: CPT | Mod: CPTII,S$GLB,, | Performed by: ORTHOPAEDIC SURGERY

## 2025-07-28 PROCEDURE — 4010F ACE/ARB THERAPY RXD/TAKEN: CPT | Mod: CPTII,S$GLB,, | Performed by: ORTHOPAEDIC SURGERY

## 2025-07-28 PROCEDURE — 4010F ACE/ARB THERAPY RXD/TAKEN: CPT | Mod: CPTII,95,, | Performed by: PHYSICIAN ASSISTANT

## 2025-07-28 PROCEDURE — 1101F PT FALLS ASSESS-DOCD LE1/YR: CPT | Mod: CPTII,S$GLB,, | Performed by: ORTHOPAEDIC SURGERY

## 2025-07-28 PROCEDURE — 98006 SYNCH AUDIO-VIDEO EST MOD 30: CPT | Mod: 95,,, | Performed by: PHYSICIAN ASSISTANT

## 2025-07-28 PROCEDURE — 1160F RVW MEDS BY RX/DR IN RCRD: CPT | Mod: CPTII,95,, | Performed by: PHYSICIAN ASSISTANT

## 2025-07-28 PROCEDURE — 99999 PR PBB SHADOW E&M-EST. PATIENT-LVL II: CPT | Mod: PBBFAC,,, | Performed by: ORTHOPAEDIC SURGERY

## 2025-07-28 PROCEDURE — 3288F FALL RISK ASSESSMENT DOCD: CPT | Mod: CPTII,S$GLB,, | Performed by: ORTHOPAEDIC SURGERY

## 2025-07-28 PROCEDURE — 3044F HG A1C LEVEL LT 7.0%: CPT | Mod: CPTII,S$GLB,, | Performed by: ORTHOPAEDIC SURGERY

## 2025-07-28 PROCEDURE — 3044F HG A1C LEVEL LT 7.0%: CPT | Mod: CPTII,95,, | Performed by: PHYSICIAN ASSISTANT

## 2025-07-28 PROCEDURE — G2211 COMPLEX E/M VISIT ADD ON: HCPCS | Mod: 95,,, | Performed by: PHYSICIAN ASSISTANT

## 2025-07-28 RX ORDER — BUSPIRONE HYDROCHLORIDE 15 MG/1
TABLET ORAL
Qty: 180 TABLET | Refills: 1
Start: 2025-07-28

## 2025-07-28 NOTE — PROGRESS NOTES
Assessment: 75 y.o. male with left cuff tear, glenohumeral arthritis     I explained my diagnostic impression and the reasoning behind it in detail, using layman's terms.      Plan:   - Reviewed MRI   - He is doing well, not interested in surgery at this time   - C PRN    This note was generated with the assistance of ambient listening technology. Verbal consent was obtained by the patient and accompanying visitor(s) for the recording of patient appointment to facilitate this note. I attest to having reviewed and edited the generated note for accuracy, though some syntax or spelling errors may persist. Please contact the author of this note for any clarification.      All questions were answered in detail. The patient is in full agreement with the treatment plan and will proceed accordingly.    Chief Complaint   Patient presents with    Left Shoulder - Pain     Initial visit (5/19/25): Yair Owens is a 75 y.o. male who presents today complaining of left shoulder pain     Yair presents with ongoing left shoulder pain stemming from a partial rotator cuff tear approximately 30 years ago. Pain is localized to a specific area of the shoulder and sometimes radiates down the whole arm. He experiences pain with abduction and forward elevation - this limits motion. Pain wakes him up at night and is disruptive to ADLs.    He received a subacromial injection in March, which provided relief for about a month. He uses 10-pound dumbbells at home for exercises but finds certain motions painful.      He also reports wrist pain that occurs when sleeping on it, though it is not constant.        Previously attempted treatments:   Activity modification:  not helpful   Ice:  not attempted   Heat:  not attempted   NSAIDs: not attempted   Tylenol: not helpful   PT:  not attempted   Injections: RICKY helpful  for limited time     7/21/25  Patient returns for left shoulder pain.   No relief with home PT program   Pain wakes him up at  night   Worse with motion, lifting, leaning on left arm   Injection lasted about 6 months    7/28/25  Patient returns for MRI results.        This is the extent of the patient's complaints at this time.         Review of patient's allergies indicates:   Allergen Reactions    Codeine Itching    Ibuprofen Itching    Remeron [mirtazapine] Anxiety     Did not help the patient sleep and created anxiety.     Physical Exam:   There were no vitals filed for this visit.      General: Patient is alert, awake and oriented to time, place and person. Mood and affect are appropriate.  Patient does not appear to be in any distress, denies any constitutional symptoms and appears stated age.   HEENT: Pupils are equal and round, sclera are not injected. External examination of ears and nose reveals no abnormalities. Cranial nerves II-X are grossly intact  Neck: examination demonstrates painless  active range of motion. Spurling's sign is negative  Skin: no rashes, abrasions or open wounds on the affected extremity   Resp: No respiratory distress or audible wheezing   CV: 2+  pulses, all extremities warm and well perfused   Left Shoulder    Shoulder Range of Motion    Right     Left   (Active/Passive)       Forward Elevation     165/165            165/165  External rotation (arm at side)  50/50             50/50   Internal rotation behind the back  L5             L5     Range of motion is painful   Acromioclavicular joint is not tender  Crossbody test: negative    Hawkin's positive    Fahad's positive  Drop arm negative  Belly press negative       Cuff Strength     Right     Left   Supraspinatus        5/5    4/5  Infraspinatus     5/5    4/5  Subscapularis     5/5    5/5    Deltoid testing            5/5    5/5    Speeds negative  Yergasons negative       Elbow examination demonstrates no tenderness to palpation and has normal range of motion.     ltsi C5-T1  + epl, io, fds, fdp   2+ RP      Imaging: 3 views of the left shoulder:   decreased glenohumeral joint space, humeral head osteophytes. The acromiohumeral interval is normal. The humeral head is centered on the AP view and centered on the axillary view     L shoulder 7/27/25:   Full-thickness tear of the supraspinatus tendon with mild retraction.   Partial thickness tear of the infraspinatus tendon.   DJD of the AC joint.   Subacromial-subdeltoid bursitis.   Chronic superior posterior labral tear.   Thinning of the biceps tendon with biceps tendon sheath fluid.     I personally reviewed and interpreted the patient's imaging obtained prior to visit        This note was created by combination of typed  and M-Modal dictation. Transcription and phonetic errors may be present.  If there are any questions, please contact me.    Current Medications[1]    Past Medical History:   Diagnosis Date    CAD (coronary artery disease) 02/22/2021    Cath 2021  There was non-obstructive coronary artery disease..   Left main:  Distal 10% stenosis  Lad:  Luminal irregularities.  Mid 10-20% stenosis   Circumflex:  Luminal irregularities   RCA:  Luminal irregularities    Calcified granuloma of lung 02/03/2023    LLL calcified granuloma seen on CT Chest Lung Screening Low Dose 01/31/2018    Colon polyp     Erectile dysfunction 02/14/2020    Fatty liver     Generalized anxiety disorder 01/11/2017    GERD (gastroesophageal reflux disease)     Gout, unspecified     History of colonic polyps 11/16/2023    2 polyps 11/22 -5 yrs    Hyperlipidemia     Hypertension     Psychophysiological insomnia 07/24/2017    Tobacco dependence 07/24/2017       Active Problem List with Overview Notes    Diagnosis Date Noted    Nonrheumatic aortic valve stenosis 06/16/2025    Precordial pain 06/16/2025    Polyp of colon 05/27/2025    Iron deficiency anemia 05/27/2025    Spontaneous ecchymoses 05/27/2025    Encounter for long-term (current) use of aspirin 05/27/2025    Pleural plaque 04/24/2025     calcified pleural plaque  left anterior lung.  Present and unchanged from [id number].    Unclear of etiology.    Size and stability c/w benign etiology.        LAD (lymphadenopathy), hilar 04/24/2025     left hilar calcified lad along with calcified nodules in spleen.  Stable from 2018 to 2015.  Most likely residual granulomas prior infection such as afb or fungal.  Clinically asymptomatic with excellent exercising capacity.  Given radiographic and clinical stability, further work up is not recommended.       Other emphysema 04/14/2024     Emphysematous changes noted on ct scan.  Deferred pft citing good exertional capacity.  Encourage smoking cessation.      Overweight (BMI 25.0-29.9) 01/09/2024    History of colonic polyps 11/16/2023     2 polyps 11/22 -5 yrs      Lower urinary tract symptoms (LUTS) 08/30/2023    Calcified granuloma of lung 02/03/2023     LLL calcified granuloma seen on CT Chest Lung Screening Low Dose 01/31/2018      Purpura 02/03/2023    Hepatomegaly 12/14/2022    Fatty liver 11/03/2022    CAD (coronary artery disease) 02/22/2021     Cath 2021  There was non-obstructive coronary artery disease..   Left main:  Distal 10% stenosis  Lad:  Luminal irregularities.  Mid 10-20% stenosis   Circumflex:  Luminal irregularities   RCA:  Luminal irregularities      Erectile dysfunction 02/14/2020    Atherosclerosis of aorta 01/23/2018     US 08/2017.  Stable, asymptomatic chronic condition.  Will continue to maximize risk factor reduction and adjust medication as needed.       Tobacco dependence 07/24/2017     no ready to quit smoking.  Medicare guidelines recommend ldct until age of 78      Psychophysiological insomnia 07/24/2017    Generalized anxiety disorder 01/11/2017    Hyperlipidemia 10/29/2012    Hypertension 10/29/2012     Followed by digital team      GERD (gastroesophageal reflux disease) 10/29/2012       Past Surgical History:   Procedure Laterality Date    APPENDECTOMY      BONE RESECTION, RIB      for thoracic outlet  syndrome    COLONOSCOPY N/A 07/21/2017    Procedure: COLONOSCOPY;  Surgeon: Deepak Servin MD;  Location: [facility] ENDO;  Service: Endoscopy;  Laterality: N/A;    COLONOSCOPY N/A 11/22/2022    Procedure: COLONOSCOPY;  Surgeon: Sam Obrien MD;  Location: [facility] ENDO;  Service: Endoscopy;  Laterality: N/A;  vacc-sutab-inst portal-tb    COLONOSCOPY N/A 2/28/2025    Procedure: COLONOSCOPY;  Surgeon: Juanjose Martinez MD;  Location: [facility] ENDO;  Service: Gastroenterology;  Laterality: N/A;  2/24-marcus-suprep-portal-tb  2/25 - R/S inst portal - LW    ESOPHAGOGASTRODUODENOSCOPY N/A 7/16/2025    Procedure: EGD (ESOPHAGOGASTRODUODENOSCOPY);  Surgeon: Librado Knight MD;  Location: [facility] ENDO;  Service: Endoscopy;  Laterality: N/A;  referrral from layssa connors, pending angiogram results on 7/9/25, instructions to pt [email or web address]    HAND SURGERY      LEFT HEART CATHETERIZATION Left 03/02/2021    Procedure: Left heart cath, radial, 730 am;  Surgeon: Vladimir Avalos MD;  Location: [facility] CATH LAB;  Service: Cardiology;  Laterality: Left;  RN PREOP 2/25/2021--COVID NEGATIVE ON 3/1  H/P INCOMPLETE    LEFT HEART CATHETERIZATION Left 7/9/2025    Procedure: Left heart cath;  Surgeon: Vladimir Avalos MD;  Location: [facility] CATH LAB;  Service: Cardiology;  Laterality: Left;  RN PREOP 7/7/2025    SCROTAL SURGERY      mass    ULTRASOUND GUIDANCE  03/02/2021    Procedure: Ultrasound Guidance;  Surgeon: Vladimir Avalos MD;  Location: [facility] CATH LAB;  Service: Cardiology;;    VASECTOMY  40 years       Social History[2]                  [1]   Current Outpatient Medications:     aspirin (ECOTRIN) 81 MG EC tablet, Take 1 tablet (81 mg total) by mouth once daily., Disp: , Rfl:     atorvastatin (LIPITOR) 40 MG tablet, TAKE 1 TABLET BY MOUTH EVERY DAY, Disp: 90 tablet, Rfl: 3    busPIRone (BUSPAR) 10 MG tablet, Take 1 tablet (10 mg total) by mouth 2 (two) times daily., Disp: , Rfl:     ferrous sulfate 325 (65 FE) MG EC tablet, Take 325 mg by mouth once daily.  (Patient not taking: Reported on 7/21/2025), Disp: , Rfl:     isosorbide mononitrate (IMDUR) 30 MG 24 hr tablet, Take 1 tablet (30 mg total) by mouth once daily., Disp: 30 tablet, Rfl: 11    multivitamin capsule, Take 1 capsule by mouth once daily., Disp: , Rfl:     nitroGLYCERIN (NITROSTAT) 0.4 MG SL tablet, Place 1 tablet (0.4 mg total) under the tongue every 5 (five) minutes as needed for Chest pain., Disp: 30 tablet, Rfl: 12    omeprazole (PRILOSEC) 40 MG capsule, TAKE 1 CAPSULE BY MOUTH TWICE  DAILY, Disp: 180 capsule, Rfl: 3    tamsulosin (FLOMAX) 0.4 mg Cap, Take 1 capsule (0.4 mg total) by mouth once daily., Disp: 90 capsule, Rfl: 3    Current Facility-Administered Medications:     sodium chloride 0.9% flush 10 mL, 10 mL, Intravenous, PRN, RobbieVladimir MD  [2]   Social History  Socioeconomic History    Marital status:    Occupational History     Employer: [employer]   Tobacco Use    Smoking status: Some Days     Current packs/day: 0.50     Average packs/day: 0.5 packs/day for 61.9 years (30.9 ttl pk-yrs)     Types: Cigarettes     Start date: 1967     Passive exposure: Current    Smokeless tobacco: Current    Tobacco comments:     0.5 ppd or less    Vaping Use    Vaping status: Never Used   Substance and Sexual Activity    Alcohol use: Not Currently    Drug use: No    Sexual activity: Yes     Partners: Female     Social Drivers of Health     Financial Resource Strain: Low Risk  (11/15/2024)    Overall Financial Resource Strain (CARDIA)     Difficulty of Paying Living Expenses: Not hard at all   Food Insecurity: No Food Insecurity (11/15/2024)    Hunger Vital Sign     Worried About Running Out of Food in the Last Year: Never true     Ran Out of Food in the Last Year: Never true   Transportation Needs: No Transportation Needs (4/11/2024)    PRAPARE - Transportation     Lack of Transportation (Medical): No     Lack of Transportation (Non-Medical): No   Physical Activity: Sufficiently Active (11/15/2024)     Exercise Vital Sign     Days of Exercise per Week: 7 days     Minutes of Exercise per Session: 60 min   Stress: No Stress Concern Present (11/15/2024)    [employer] [employer] - Occupational Stress Questionnaire     Feeling of Stress : Only a little   Housing Stability: Low Risk  (4/11/2024)    Housing Stability Vital Sign     Unable to Pay for Housing in the Last Year: No     Number of Places Lived in the Last Year: 1     Unstable Housing in the Last Year: No

## 2025-07-30 ENCOUNTER — INFUSION (OUTPATIENT)
Dept: INFUSION THERAPY | Facility: HOSPITAL | Age: 75
End: 2025-07-30
Attending: STUDENT IN AN ORGANIZED HEALTH CARE EDUCATION/TRAINING PROGRAM
Payer: MEDICARE

## 2025-07-30 VITALS
OXYGEN SATURATION: 98 % | RESPIRATION RATE: 18 BRPM | SYSTOLIC BLOOD PRESSURE: 128 MMHG | DIASTOLIC BLOOD PRESSURE: 68 MMHG | HEART RATE: 58 BPM | TEMPERATURE: 98 F

## 2025-07-30 DIAGNOSIS — D50.8 IRON DEFICIENCY ANEMIA SECONDARY TO INADEQUATE DIETARY IRON INTAKE: Primary | ICD-10-CM

## 2025-07-30 PROCEDURE — 96366 THER/PROPH/DIAG IV INF ADDON: CPT

## 2025-07-30 PROCEDURE — 96365 THER/PROPH/DIAG IV INF INIT: CPT

## 2025-07-30 PROCEDURE — 25000003 PHARM REV CODE 250: Performed by: STUDENT IN AN ORGANIZED HEALTH CARE EDUCATION/TRAINING PROGRAM

## 2025-07-30 PROCEDURE — 63600175 PHARM REV CODE 636 W HCPCS: Performed by: STUDENT IN AN ORGANIZED HEALTH CARE EDUCATION/TRAINING PROGRAM

## 2025-07-30 RX ORDER — EPINEPHRINE 0.3 MG/.3ML
0.3 INJECTION SUBCUTANEOUS ONCE AS NEEDED
Status: CANCELLED | OUTPATIENT
Start: 2025-08-01

## 2025-07-30 RX ORDER — SODIUM CHLORIDE 0.9 % (FLUSH) 0.9 %
10 SYRINGE (ML) INJECTION
Status: CANCELLED | OUTPATIENT
Start: 2025-08-01

## 2025-07-30 RX ORDER — EPINEPHRINE 0.3 MG/.3ML
0.3 INJECTION SUBCUTANEOUS ONCE AS NEEDED
Status: DISCONTINUED | OUTPATIENT
Start: 2025-07-30 | End: 2025-07-30

## 2025-07-30 RX ORDER — HEPARIN 100 UNIT/ML
500 SYRINGE INTRAVENOUS
Status: CANCELLED | OUTPATIENT
Start: 2025-08-01

## 2025-07-30 RX ADMIN — IRON SUCROSE 400 MG: 20 INJECTION, SOLUTION INTRAVENOUS at 09:07

## 2025-07-30 NOTE — PLAN OF CARE
Ambulated to [facility] with steady gait. AA&Ox4, denies falls, verbalized has minimal discomforts to Left shoulder and is possibly considering left rotator cuff surgery in future. Scheduled to receive 3rd dose Venofer 400mg. Reviewed possible side effects and adverse rx's with pt. Verbalized understanding and mentioned he has tolerated previous infusions well in past. Administered Venofer 300mg IV infusion; tolerated well with no ill effects. Pt uses portal for appt schedule.

## 2025-07-30 NOTE — PROGRESS NOTES
"The patient location is: At home in [place]  The chief complaint leading to consultation is: f/u    Visit type: audiovisual    Face to Face time with patient: 22 min  27 minutes of total time spent on the encounter, which includes face to face time and non-face to face time preparing to see the patient (eg, review of tests), Obtaining and/or reviewing separately obtained history, Documenting clinical information in the electronic or other health record, Independently interpreting results (not separately reported) and communicating results to the patient/family/caregiver, or Care coordination (not separately reported).         Each patient to whom he or she provides medical services by telemedicine is:  (1) informed of the relationship between the physician and patient and the respective role of any other health care provider with respect to management of the patient; and (2) notified that he or she may decline to receive medical services by telemedicine and may withdraw from such care at any time.    Notes:     ICD-10-CM ICD-9-CM    1. Generalized anxiety disorder with panic attacks  F41.1 300.02     F41.0 300.01       2. Insomnia due to other mental disorder  F51.05 300.9     F99 327.02       3. History of depression  Z86.59 V11.8         Patient ID: Yair Owens is a 75 y.o. male.    Chief Complaint: No chief complaint on file.    History of Present Illness    MEDICATIONS:  Mr. Owens is on Buspirone 10 mg tablets, taking 3 tablets daily orally for anxiety.    HPI:  Mr. Owens recently underwent an angiogram which revealed stenoses less than 50%, with no stents required. He still experiences chest pain, possibly attributed to anxiety. A "very mild" heart valve issue was noted by the cardiologist. Mr. Owens has undergone two iron infusions, with one more scheduled. A bleed was identified and treated during an endoscopy. His ferritin levels were low (between 20 and 40), and hemoglobin had been declining over " "three years due to intermittent bleeding. Despite low hemoglobin, the patient reports no significant difference in energy levels.    Mr. Owens reports elevated anxiety levels due to ongoing medical issues and family concerns. Sleep problems led to discontinuation of hydroxyzine. He has increased buspirone dosage from 2-3 tablets daily. An MRI revealed a torn rotator cuff, but surgery is not currently recommended due to the absence of significant pain.    Despite health issues, the patient maintains an active lifestyle, walking between 20 to 30 miles per week. He walked six miles the day before the appointment, noting that walking helps clear his mind. Mr. Owens expresses worry about his sister, who is over 85 years old and has been told she is dying. His wife has also recently returned to work as a , contributing to ongoing life changes.    Mr. Owens reports good memory function, able to recall multiple passwords without difficulty. He expresses awareness and acceptance of mortality, stating, "I know my lifespan is shorter than it used to be. And I'm accepting that more and more every day." Mr. Owens denies shortness of breath when walking.    FAMILY HISTORY:  Family history is significant for sister, who is over 85 years old and reported to be dying.    LIFESTYLE:  Mr. Owens's wife is a  who has recently returned to work. He enjoys walking regularly, often early in the morning due to heat. Mr. Owens walks between 20 and 30 miles a week and recently walked six miles in a day.      ROS:  General: -fatigue  Cardiovascular: +chest pain  Psychiatric: +anxiety, +sleep difficulty    All other review of systems negative unless otherwise noted in the HPI.         Physical Exam    Appearance: Appears stated age. Well-groomed. Well-nourished.  Speech: Normal rate. Normal volume. Spontaneous and fluid.  Affect: Appropriate.  Thought Content: No evidence of aggression. No evidence of homicidal " ideation. No evidence of homicidal plan. No evidence of homicidal intent. No evidence of suicidal ideation. No evidence of suicidal plan. No evidence of suicidal intent. No evidence of delusions.  Memory: Recent memory intact. Remote memory intact.  Behavior: Cooperative. Good eye contact. Engaged. Pleasant.  Mood: Euthymic.  Thought Form: Linear thinking. Goal oriented and directed.  Perception: No perceptual abnormalities noted.  Judgement: Intact as evidenced by decision making in the recent past.  Insight: Good insight into symptoms. Good insight into treatment options.  Cognition: A&Ox3. Normal attention span. Average fund of knowledge.  Motor: No gross motor abnormalities.      Examination findings limited by telemedicine platform.         Assessment & Plan        ANXIETY DISORDER:  1. Increased buspirone from 10 mg tablets to 15 mg tablets, to be taken 2-3 times daily (maximum 45 mg/day) to address ongoing anxiety and sleep issues.  2. Discontinued hydroxyzine.  3. Mr. Owens to continue current walking regimen, which helps clear mind.    FOLLOW-UP VISIT:  1. Follow up on October 28th at 11:00 AM as scheduled.           This note was generated with the assistance of ambient listening technology. Verbal consent was obtained by the patient and accompanying visitor(s) for the recording of patient appointment to facilitate this note. I attest to having reviewed and edited the generated note for accuracy, though some syntax or spelling errors may persist. Please contact the author of this note for any clarification.          ICD-10-CM ICD-9-CM    1. Generalized anxiety disorder with panic attacks  F41.1 300.02     F41.0 300.01       2. Insomnia due to other mental disorder  F51.05 300.9     F99 327.02       3. History of depression  Z86.59 V11.8              Visit today included increased complexity associated with the care of the episodic problem anxiety addressed and managing the longitudinal care of the patient  due to the serious and/or complex managed problem(s) diagnoses as listed above.

## 2025-08-01 ENCOUNTER — OFFICE VISIT (OUTPATIENT)
Dept: CARDIOLOGY | Facility: CLINIC | Age: 75
End: 2025-08-01
Payer: MEDICARE

## 2025-08-01 VITALS
DIASTOLIC BLOOD PRESSURE: 60 MMHG | OXYGEN SATURATION: 98 % | SYSTOLIC BLOOD PRESSURE: 132 MMHG | HEART RATE: 63 BPM | HEIGHT: 68 IN | WEIGHT: 181.13 LBS | BODY MASS INDEX: 27.45 KG/M2 | RESPIRATION RATE: 16 BRPM

## 2025-08-01 DIAGNOSIS — R00.1 BRADYCARDIA: ICD-10-CM

## 2025-08-01 DIAGNOSIS — R07.9 CHEST PAIN, UNSPECIFIED TYPE: ICD-10-CM

## 2025-08-01 DIAGNOSIS — I10 ESSENTIAL HYPERTENSION: ICD-10-CM

## 2025-08-01 DIAGNOSIS — E78.5 HYPERLIPIDEMIA, UNSPECIFIED HYPERLIPIDEMIA TYPE: ICD-10-CM

## 2025-08-01 DIAGNOSIS — I25.10 CORONARY ARTERY DISEASE INVOLVING NATIVE HEART WITHOUT ANGINA PECTORIS, UNSPECIFIED VESSEL OR LESION TYPE: ICD-10-CM

## 2025-08-01 DIAGNOSIS — I10 ESSENTIAL (PRIMARY) HYPERTENSION: ICD-10-CM

## 2025-08-01 DIAGNOSIS — I70.0 ATHEROSCLEROSIS OF AORTA: Primary | ICD-10-CM

## 2025-08-01 PROCEDURE — 99999 PR PBB SHADOW E&M-EST. PATIENT-LVL IV: CPT | Mod: PBBFAC,,, | Performed by: INTERNAL MEDICINE

## 2025-08-01 RX ORDER — EZETIMIBE 10 MG/1
10 TABLET ORAL DAILY
Qty: 90 TABLET | Refills: 3 | Status: SHIPPED | OUTPATIENT
Start: 2025-08-01

## 2025-08-01 RX ORDER — ATORVASTATIN CALCIUM 80 MG/1
80 TABLET, FILM COATED ORAL NIGHTLY
Qty: 90 TABLET | Refills: 3 | Status: SHIPPED | OUTPATIENT
Start: 2025-08-01

## 2025-08-01 RX ORDER — AMLODIPINE BESYLATE 5 MG/1
5 TABLET ORAL DAILY
Qty: 90 TABLET | Refills: 3 | Status: SHIPPED | OUTPATIENT
Start: 2025-08-01

## 2025-08-01 NOTE — PROGRESS NOTES
CARDIOVASCULAR CONSULTATION    REASON FOR CONSULT:   Yair Owens is a 75 y.o. male who presents for evaluation of chest pressure.      HISTORY OF PRESENT ILLNESS:     Patient is a pleasant 61-year-old man.  Came here because evaluation of chest pressure to the emergency room.  Was ruled out.  EKG was done which showed normal sinus rhythm.  States the pressure was substernal, and was much worse than the usual pressure which he feels.  States that he usually feels chest pain and tightness.  Unrelated to activity.  Sometimes can come with activity other times at rest.  Denies orthopnea, PND.  Does have mild dyspnea on exertion.  Used to smoke, but cutting down.    Notes from January 2020:    Patient doing fine.  Denies any further episodes of chest pains, orthopnea, PND.  States that he thinks it is his anxiety.  Stress testing did not reveal any significant issues apart from mild aortic valve stenosis.      The perfusion scan is free of evidence from myocardial ischemia or injury.    There is a  mild intensity fixed defect in the inferior wall of the left ventricle secondary to diaphragm attenuation.    Gated perfusion images showed an ejection fraction of 60% post stress.    The EKG portion of this study is negative for ischemia.    The patient reported no chest pain during the stress test.    There were no arrhythmias during stress.      Normal left ventricular systolic function. The estimated ejection fraction is 60%.  Concentric left ventricular hypertrophy.  Normal LV diastolic function.  Normal right ventricular systolic function.  Mild-to-moderate aortic valve stenosis.  Aortic valve area is 1.80 cm2; peak velocity is 2.60 m/s; mean gradient is 18 mmHg.  Mild aortic regurgitation.    Notes from may 2020:    The patient location is: his home   The chief complaint leading to consultation is: chest pain    Visit type: audiovisual    Face to Face time with patient: 15 mins   25 minutes of total time spent on  the encounter, which includes face to face time and non-face to face time preparing to see the patient (eg, review of tests), Obtaining and/or reviewing separately obtained history, Documenting clinical information in the electronic or other health record, Independently interpreting results (not separately reported) and communicating results to the patient/family/caregiver, or Care coordination (not separately reported).         Each patient to whom he or she provides medical services by telemedicine is:  (1) informed of the relationship between the physician and patient and the respective role of any other health care provider with respect to management of the patient; and (2) notified that he or she may decline to receive medical services by telemedicine and may withdraw from such care at any time.    Notes:  Patient here for follow-up.  Denies any chest pain at rest on exertion, orthopnea, PND.  States that he has been feeling great.  However he has started smoking more again because of the pandemic and the stress related with the pandemic.        Jan 2021:      The patient location is: his home  The chief complaint leading to consultation is: chest pain    Visit type: audiovisual    Face to Face time with patient: 15 mins  25 minutes of total time spent on the encounter, which includes face to face time and non-face to face time preparing to see the patient (eg, review of tests), Obtaining and/or reviewing separately obtained history, Documenting clinical information in the electronic or other health record, Independently interpreting results (not separately reported) and communicating results to the patient/family/caregiver, or Care coordination (not separately reported).         Each patient to whom he or she provides medical services by telemedicine is:  (1) informed of the relationship between the physician and patient and the respective role of any other health care provider with respect to management of  the patient; and (2) notified that he or she may decline to receive medical services by telemedicine and may withdraw from such care at any time.    Notes:  Patient here via audiovisual visit.  Had an episode of chest pain which brought him to the ER.  Describes it as left-sided.  At rest.  EKG was done which showed normal sinus rhythm with nonspecific ST changes and some mild ST depressions.  Since then has not had any further chest pains.  States that that day his blood pressure was high around 170 mm of mercury.  Doing fine.      Notes from feb 2021    The patient location is:  his home  The chief complaint leading to consultation is:  Chest pain    Visit type: audiovisual    Face to Face time with patient: 20 mins  30  minutes of total time spent on the encounter, which includes face to face time and non-face to face time preparing to see the patient (eg, review of tests), Obtaining and/or reviewing separately obtained history, Documenting clinical information in the electronic or other health record, Independently interpreting results (not separately reported) and communicating results to the patient/family/caregiver, or Care coordination (not separately reported).         Each patient to whom he or she provides medical services by telemedicine is:  (1) informed of the relationship between the physician and patient and the respective role of any other health care provider with respect to management of the patient; and (2) notified that he or she may decline to receive medical services by telemedicine and may withdraw from such care at any time.    Notes:       Patient here follow-up via audiovisual visit.  Occasional chest pain.  Really in the center of the chest and feels like tightness.  Sometimes in the left side and sometimes in the left shoulder.  Feels the left-sided pain goes to his left shoulder.  No relation with activity.  Can occur at rest or on exertion.  Stress test showed normal myocardial  perfusion.  Although the EKG portion was positive for ischemia.    Normal myocardial perfusion scan. There is no evidence of myocardial ischemia or infarction.    There is a  mild intensity fixed defect in the inferobasilar wall of the left ventricle secondary to diaphragm attenuation.    The gated perfusion images showed an ejection fraction of 68% post stress.    There is normal wall motion post stress.    The EKG portion of this study is positive for ischemia.      The left ventricle is normal in size with mild concentric hypertrophy and normal systolic function. The estimated ejection fraction is 60%  Normal right ventricular size with normal right ventricular systolic function.  There is mild aortic valve stenosis.  Aortic valve area is 1.87 cm2; peak velocity is 2.19 m/s; mean gradient is 10 mmHg.    Notes from March 2021:  Patient here for follow-up after angiogram.  Mild nonobstructive CAD on angiogram.  No complications from angiogram.  Doing fine.  Denies any chest pains at rest on exertion.  States he thinks it is his anxiety which is bothering him.    The estimated blood loss was <50 mL.  There was non-obstructive coronary artery disease..        Left main:  Distal 10% stenosis     Lad:  Luminal irregularities.  Mid 10-20% stenosis     Circumflex:  Luminal irregularities     RCA:  Luminal irregularities     Access: We accessed the right radial artery using ultrasound guidance. The vessel appeared widely patent, suitable for endovascular access. The images were interpreted by me and saved.  Access was closed with radial band.           June 21:    The patient location is: his home  The chief complaint leading to consultation is: fu    Visit type: audiovisual    Face to Face time with patient: 15 min  25  minutes of total time spent on the encounter, which includes face to face time and non-face to face time preparing to see the patient (eg, review of tests), Obtaining and/or reviewing separately obtained  history, Documenting clinical information in the electronic or other health record, Independently interpreting results (not separately reported) and communicating results to the patient/family/caregiver, or Care coordination (not separately reported).         Each patient to whom he or she provides medical services by telemedicine is:  (1) informed of the relationship between the physician and patient and the respective role of any other health care provider with respect to management of the patient; and (2) notified that he or she may decline to receive medical services by telemedicine and may withdraw from such care at any time.    Notes:   Has been having left sided chest pain, not related to exertion, pin point in the left of the chest. Does not occur when he is cutting his lawn.  Denies orthopnea, PND, swelling of feet.  States blood pressure is usually well controlled at home.      September 2022: Patient here for follow-up.  Denies any chest pains at rest on exertion, orthopnea, PND.  Has been doing fine.    MARCH 23:  Follow-up.  Denies any chest pains at rest on exertion, orthopnea, PND, swelling of feet.      Notes from October 2023: Patient here for follow-up.  Doing fine.  Walks about 30 miles a week.  Denies chest pains at rest on exertion, orthopnea, PND.    Notes from August 24: Patient here for follow-up.  Denies any anginal sounding chest pains.  Occasional burping.  On omeprazole.  Which helps.  Denies orthopnea PND swelling of feet.  No chest pains on exertion      Sept 24:  Patient here for follow-up.  Denies chest pains at rest on exertion orthopnea or PND.  Walks 35 miles a week.  Without any issues.    Results for orders placed during the hospital encounter of 08/27/24    Echo    Interpretation Summary    Left Ventricle: The left ventricle is normal in size. There is mild concentric hypertrophy. There is normal systolic function with a visually estimated ejection fraction of 65 - 70%.    Right  Ventricle: Normal right ventricular cavity size. Systolic function is normal.    Aortic Valve: There is mild to moderate stenosis. Aortic valve area by VTI is 1.54 cm². Aortic valve peak velocity is 2.77 m/s. Mean gradient is 19 mmHg. The dimensionless index is 0.37. There is mild aortic regurgitation.    Pulmonary Artery: The estimated pulmonary artery systolic pressure is 24 mmHg.    IVC/SVC: Normal venous pressure at 3 mmHg.      March 25:    History of Present Illness    CHIEF COMPLAINT:  Yair presents today for cardiac follow up    CARDIOVASCULAR:  Heart rate occasionally drops into the 50s, which he wonders if could be attributed to his regular exercise routine. He denies chest pain, tightness, shortness of breath, or palpitations. Echo from August showed mild to moderate aortic valve stenosis.    EXERCISE:  He walks 30-35 miles weekly without any problems.    SLEEP:  He reports difficulty sleeping at night, which he has not yet discussed with his primary care physician.    MEDICATIONS:  His cardiac medications include amlodipine 10 mg daily, aspirin, atorvastatin, and losartan.         Notes from June 25    The patient location is: [place]  The chief complaint leading to consultation is:  Chest pains    Visit type: audiovisual    Face to Face time with patient: 10 min  25 minutes of total time spent on the encounter, which includes face to face time and non-face to face time preparing to see the patient (eg, review of tests), Obtaining and/or reviewing separately obtained history, Documenting clinical information in the electronic or other health record, Independently interpreting results (not separately reported) and communicating results to the patient/family/caregiver, or Care coordination (not separately reported).         Each patient to whom he or she provides medical services by telemedicine is:  (1) informed of the relationship between the physician and patient and the respective role of any other  health care provider with respect to management of the patient; and (2) notified that he or she may decline to receive medical services by telemedicine and may withdraw from such care at any time.    Notes:  Patient complaining of on and off chest pains all morning.  Also complains of occasional bradycardia.  Recently came to the ER for chest pain also.  States today chest pain has been going on and off and he feels nauseated also.        Notes from June 19, 2025:  Patient here for follow-up after recent hospitalization for chest pain.    CHIEF COMPLAINT:  Yair presents today for follow up after recent hospitalization with medication changes    CARDIOVASCULAR:  He reports a 20-year history of chest pain with family history of angina in his father. He currently experiences bradycardia with heart rate between 40-60s without associated dizziness. Stress test was mildly abnormal showing a small area of ischemia. He is aware of potential cardiac artery stenosis and is scheduled for a Holter monitor to investigate heart rate variability.    PHYSICAL ACTIVITY:  He walked 2 miles today without chest pain. Yesterday, he used a push mower and became slightly short of breath, which he attributes to heat. He demonstrates improved functional capacity and tolerates moderate physical exertion without cardiac symptoms.    MEDICATIONS:  He discontinued amlodipine due to well-controlled BP. He continues Imdur for well-controlled chest pain, aspirin. He discontinued Cialis. He has discussed medication changes with digital medicine pharmacist regarding BP management.    SOCIAL HISTORY:  He has history of heavy smoking but has recently reduced consumption to 6 cigarettes over the past 3-4 days following two medical episodes. He expresses intention to quit smoking.    HEMATOLOGIC:  He reports concerns about anemia with extremely low ferritin levels and questions if this could be contributing to his current health issues.         Results  for orders placed or performed during the hospital encounter of 06/16/25   EKG 12-lead    Collection Time: 06/16/25  3:07 PM   Result Value Ref Range    QRS Duration 78 ms    [facility] QTC Calculation 376 ms    Narrative    Test Reason : R07.9,    Vent. Rate :  59 BPM     Atrial Rate :  59 BPM     P-R Int : 152 ms          QRS Dur :  78 ms      QT Int : 380 ms       P-R-T Axes :  68  39  59 degrees    QTcB Int : 376 ms    Sinus bradycardia  Otherwise normal ECG  When compared with ECG of 12-Jun-2025 14:01,  No significant change was found  Confirmed by Yunier Kimbrough ([id number]) on 6/17/2025 3:15:54 PM    Referred By: AAAREFERRAL SELF           Confirmed By: Yunier Kimbrough       Results for orders placed during the hospital encounter of 06/16/25    Echo    Interpretation Summary    Left Ventricle: The left ventricle is normal in size. Mildly increased wall thickness. There is mild concentric hypertrophy. There is normal systolic function with a visually estimated ejection fraction of 55 - 60%. There is normal diastolic function.    Right Ventricle: The right ventricle is normal in size Systolic function is normal.    Aortic Valve: The aortic valve is a trileaflet valve. There is moderate aortic valve sclerosis. Mildly restricted motion. There is moderate stenosis. Aortic valve area by VTI is 1.2 cm². Aortic valve peak velocity is 3.0 m/s. Mean gradient is 22 mmHg. The dimensionless index is 0.35. There is mild aortic regurgitation.    Aorta: The ascending aorta is mildly dilated measuring 3.7 cm.      Results for orders placed during the hospital encounter of 06/16/25    Nuclear Stress - Cardiology Interpreted    Interpretation Summary    The patient exercised for 7 minutes 26 seconds on a Aric protocol, achieving a peak heart rate of 144 bpm, which is 99% of the age predicted maximum heart rate.    Abnormal myocardial perfusion scan.    There is a mild intensity, small sized, reversible perfusion abnormality that is  consistent with ischemia in the basal to mid inferior wall(s) in the typical distribution of the RCA territory.    There are no other significant perfusion abnormalities.    The gated perfusion images showed an ejection fraction of 59% post stress.    There is normal wall motion at post-stress.    The ECG portion of the study is suspicious for ischemia.    The patient reported no chest pain during the stress test.          August 25    History of Present Illness    CHIEF COMPLAINT:  Yair presents today for follow up of cardiac issues    CHEST PAIN:  He reports current pinpoint chest pain localized to a specific area, which is mildly painful with palpation. He denies classic symptoms of cardiac pain such as chest pressure or heaviness and remains physically active, having walked 45 minutes this morning without difficulty.    CARDIOVASCULAR:  He has 40-50% coronary stenosis that do not require stent placement. His heart rate consistently ranges between 54-58 BPM without associated dizziness. He had a previously elevated ACT clotting level, which may be related to anemia.    RECENT PROCEDURES:  He underwent endoscopy since his last visit on June 19th and received three iron infusions totaling 1200 mg ordered by Dr. Moy.    MUSCULOSKELETAL:  He has a torn rotator cuff but remains physically active and able to perform yard work including cutting grass recently.    MEDICATIONS:  He currently takes atorvastatin 40 mg and has amlodipine at home. He desires to restart tadalafil 5 mg for enlarged prostate. Losartan was discontinued during recent hospitalization due to potential BP complications.    SOCIAL HISTORY:  He is a current smoker who reports actively trying to reduce cigarette consumption.       Results for orders placed during the hospital encounter of 07/09/25    Cardiac catheterization    Conclusion    The pre-procedure left ventricular end diastolic pressure was 16.    The estimated blood loss was <50  mL.    Procedures performed:    1. Coronary angiography and left heart catheterization    2. IVUS of left main and proximal LAD    Coronary angiography    Left main:  Distal 40-50% stenosis.  IVUS was performed on this lesion for further assessment.  Minimal lumen area in distal left main was 7.6 millimeter sq.  Continue medical management    Lad: Multiple 30- 40% lesions in LAD    Circumflex: Mid 20-30% lesion    RCA: Mid 30-40% lesion    Access: Right radial artery access    Assessment plan    Aggressive risk factor modification.                The procedure log was documented by Documenter: Torrey Donnelly RN and verified by Vladimir Avalos MD.    Date: 7/9/2025  Time: 12:16 PM    PAST MEDICAL HISTORY:     Past Medical History:   Diagnosis Date    CAD (coronary artery disease) 02/22/2021    Cath 2021  There was non-obstructive coronary artery disease..   Left main:  Distal 10% stenosis  Lad:  Luminal irregularities.  Mid 10-20% stenosis   Circumflex:  Luminal irregularities   RCA:  Luminal irregularities    Calcified granuloma of lung 02/03/2023    LLL calcified granuloma seen on CT Chest Lung Screening Low Dose 01/31/2018    Colon polyp     Erectile dysfunction 02/14/2020    Fatty liver     Generalized anxiety disorder 01/11/2017    GERD (gastroesophageal reflux disease)     Gout, unspecified     History of colonic polyps 11/16/2023    2 polyps 11/22 -5 yrs    Hyperlipidemia     Hypertension     Psychophysiological insomnia 07/24/2017    Tobacco dependence 07/24/2017       PAST SURGICAL HISTORY:     Past Surgical History:   Procedure Laterality Date    APPENDECTOMY      BONE RESECTION, RIB      for thoracic outlet syndrome    COLONOSCOPY N/A 07/21/2017    Procedure: COLONOSCOPY;  Surgeon: Deepak Servin MD;  Location: [facility] ENDO;  Service: Endoscopy;  Laterality: N/A;    COLONOSCOPY N/A 11/22/2022    Procedure: COLONOSCOPY;  Surgeon: Sam Obrien MD;  Location: [facility] ENDO;  Service: Endoscopy;  Laterality: N/A;   vacc-sutab-inst portal-tb    COLONOSCOPY N/A 2/28/2025    Procedure: COLONOSCOPY;  Surgeon: Juanjose Martinez MD;  Location: [facility] ENDO;  Service: Gastroenterology;  Laterality: N/A;  2/24-marcus-suprep-portal-tb  2/25 - R/S inst portal - LW    ESOPHAGOGASTRODUODENOSCOPY N/A 7/16/2025    Procedure: EGD (ESOPHAGOGASTRODUODENOSCOPY);  Surgeon: Librado Knight MD;  Location: [facility] ENDO;  Service: Endoscopy;  Laterality: N/A;  referrral from alyssa connors, pending angiogram results on 7/9/25, instructions to pt [email or web address]    HAND SURGERY      LEFT HEART CATHETERIZATION Left 03/02/2021    Procedure: Left heart cath, radial, 730 am;  Surgeon: Vladimir Avalos MD;  Location: [facility] CATH LAB;  Service: Cardiology;  Laterality: Left;  RN PREOP 2/25/2021--COVID NEGATIVE ON 3/1  H/P INCOMPLETE    LEFT HEART CATHETERIZATION Left 7/9/2025    Procedure: Left heart cath;  Surgeon: Vladimir Avalos MD;  Location: [facility] CATH LAB;  Service: Cardiology;  Laterality: Left;  RN PREOP 7/7/2025    SCROTAL SURGERY      mass    ULTRASOUND GUIDANCE  03/02/2021    Procedure: Ultrasound Guidance;  Surgeon: Vladimir Avalos MD;  Location: [facility] CATH LAB;  Service: Cardiology;;    VASECTOMY  40 years       ALLERGIES AND MEDICATION:     Review of patient's allergies indicates:   Allergen Reactions    Codeine Itching    Ibuprofen Itching    Remeron [mirtazapine] Anxiety     Did not help the patient sleep and created anxiety.        Medication List            Accurate as of August 1, 2025  1:18 PM. If you have any questions, ask your nurse or doctor.                START taking these medications      amLODIPine 5 MG tablet  Commonly known as: NORVASC  Take 1 tablet (5 mg total) by mouth once daily.  Started by: Vladimir Avalos MD     ezetimibe 10 mg tablet  Commonly known as: ZETIA  Take 1 tablet (10 mg total) by mouth once daily.  Started by: Vladimir Avalos MD            CHANGE how you take these medications      atorvastatin 80 MG tablet  Commonly known as:  LIPITOR  Take 1 tablet (80 mg total) by mouth every evening.  What changed:   medication strength  how much to take  when to take this  Changed by: Vladimir Avalos MD            CONTINUE taking these medications      aspirin 81 MG EC tablet  Commonly known as: ECOTRIN  Take 1 tablet (81 mg total) by mouth once daily.     busPIRone 15 MG tablet  Commonly known as: BUSPAR  Take 1 tablet 2-3 times per day.     ferrous sulfate 325 (65 FE) MG EC tablet     multivitamin capsule     nitroGLYCERIN 0.4 MG SL tablet  Commonly known as: NITROSTAT  Place 1 tablet (0.4 mg total) under the tongue every 5 (five) minutes as needed for Chest pain.     omeprazole 40 MG capsule  Commonly known as: PRILOSEC  TAKE 1 CAPSULE BY MOUTH TWICE  DAILY     tamsulosin 0.4 mg Cap  Commonly known as: FLOMAX  Take 1 capsule (0.4 mg total) by mouth once daily.            STOP taking these medications      isosorbide mononitrate 30 MG 24 hr tablet  Commonly known as: IMDUR  Stopped by: Vladimir Avalos MD               Where to Get Your Medications        These medications were sent to [facility] [id number] [facility] [employer] - [place] - [address]  [address] [place] [address]      Phone: [phone]   amLODIPine 5 MG tablet  atorvastatin 80 MG tablet  ezetimibe 10 mg tablet         SOCIAL HISTORY:     Social History     Socioeconomic History    Marital status:    Occupational History     Employer: [employer]   Tobacco Use    Smoking status: Some Days     Current packs/day: 0.50     Average packs/day: 0.5 packs/day for 61.9 years (30.9 ttl pk-yrs)     Types: Cigarettes     Start date: 1967     Passive exposure: Current    Smokeless tobacco: Current    Tobacco comments:     0.5 ppd or less    Vaping Use    Vaping status: Never Used   Substance and Sexual Activity    Alcohol use: Not Currently    Drug use: No    Sexual activity: Yes     Partners: Female     Social Drivers of Health     Financial Resource Strain: Low Risk  (11/15/2024)    Overall  Financial Resource Strain (CARDIA)     Difficulty of Paying Living Expenses: Not hard at all   Food Insecurity: No Food Insecurity (11/15/2024)    Hunger Vital Sign     Worried About Running Out of Food in the Last Year: Never true     Ran Out of Food in the Last Year: Never true   Transportation Needs: No Transportation Needs (4/11/2024)    [facility] - Transportation     Lack of Transportation (Medical): No     Lack of Transportation (Non-Medical): No   Physical Activity: Sufficiently Active (11/15/2024)    Exercise Vital Sign     Days of Exercise per Week: 7 days     Minutes of Exercise per Session: 60 min   Stress: No Stress Concern Present (11/15/2024)    [employer] - Occupational Stress Questionnaire     Feeling of Stress : Only a little   Housing Stability: Low Risk  (4/11/2024)    Housing Stability Vital Sign     Unable to Pay for Housing in the Last Year: No     Number of Places Lived in the Last Year: 1     Unstable Housing in the Last Year: No       FAMILY HISTORY:     Family History   Problem Relation Name Age of Onset    No Known Problems Mother      Cancer Father DARYL GLYNN     Heart disease Father DARYL GLYNN     Asthma Sister REGINA ANNA     Cancer Sister REGINA ANNA 78        Rectal cancer    Stroke Brother Daryl     Lupus Daughter x1     No Known Problems Son x3        REVIEW OF SYSTEMS:   Review of Systems   Constitutional: Negative.   HENT: Negative.     Eyes: Negative.    Respiratory: Negative.     Endocrine: Negative.    Hematologic/Lymphatic: Negative.    Skin: Negative.    Musculoskeletal: Negative.    Gastrointestinal: Negative.    Genitourinary: Negative.    Neurological: Negative.    Psychiatric/Behavioral: Negative.     Allergic/Immunologic: Negative.        A 10 point review of systems was performed and all the pertinent positives have been mentioned. Rest of review of systems was negative.        PHYSICAL EXAM:     Vitals:    08/01/25 0929   BP:  "132/60   Pulse: 63   Resp: 16    Body mass index is 27.54 kg/m².  Weight: 82.2 kg (181 lb 1.7 oz)   Height: 5' 8" (172.7 cm)      Physical Exam  Vitals and nursing note reviewed.   Constitutional:       Appearance: Normal appearance. He is well-developed.   HENT:      Head: Normocephalic and atraumatic.      Right Ear: Hearing normal.      Left Ear: Hearing normal.      Nose: Nose normal.   Eyes:      General: Lids are normal.      Conjunctiva/sclera: Conjunctivae normal.      Pupils: Pupils are equal, round, and reactive to light.   Cardiovascular:      Rate and Rhythm: Normal rate and regular rhythm.      Pulses: Normal pulses.      Heart sounds: Murmur heard.   Pulmonary:      Effort: Pulmonary effort is normal.      Breath sounds: Normal breath sounds.   Abdominal:      Palpations: Abdomen is soft.      Tenderness: There is no abdominal tenderness.   Musculoskeletal:         General: No deformity.      Cervical back: Normal range of motion and neck supple.   Skin:     General: Skin is warm and dry.   Neurological:      Mental Status: He is alert and oriented to person, place, and time.   Psychiatric:         Speech: Speech normal.              DATA:     Laboratory:  CBC:  Recent Labs   Lab 06/16/25  1528 06/17/25  0551 06/23/25  1345   WBC 6.68 5.13 7.36   HGB 11.0 L 10.2 L 10.5 L   HCT 36.1 L 32.4 L 33.8 L   Platelet Count 231 197 251       CHEMISTRIES:  Recent Labs   Lab 06/16/25  1528 06/17/25  0551 06/23/25  1345   Glucose 137 H 97 94   Sodium 141 140 142   Potassium 4.4 4.4 3.9   BUN 16 16 16   Creatinine 1.1 1.0 1.2   Calcium 9.1 8.8 9.2   Magnesium   --  2.0  --        CARDIAC BIOMARKERS:  Recent Labs   Lab 06/16/25  1828 06/17/25  0056 06/17/25  0551   Troponin-I <0.006 <0.006 0.007       COAGS:        LIPIDS/LFTS:  Recent Labs   Lab 06/02/23  1053 11/03/23  1647 02/18/25  0741 06/12/25  1427 06/16/25  1528 06/23/25  1345   Cholesterol 125  --  136  --   --   --    Triglycerides 49  --  66  --   --   --  "   HDL 39 L  --  48  --   --   --    LDL Cholesterol 76.2  --  74.8  --   --   --    Non-HDL Cholesterol 86  --  88  --   --   --    AST 22   < > 20 21 16 19   ALT 21   < > 16 16 17 20    < > = values in this interval not displayed.       Hemoglobin A1C   Date Value Ref Range Status   02/18/2025 5.2 4.0 - 5.6 % Final     Comment:     ADA Screening Guidelines:  5.7-6.4%  Consistent with prediabetes  >or=6.5%  Consistent with diabetes    High levels of fetal hemoglobin interfere with the HbA1C  assay. Heterozygous hemoglobin variants (HbS, HgC, etc)do  not significantly interfere with this assay.   However, presence of multiple variants may affect accuracy.     11/16/2023 5.3 4.0 - 5.6 % Final     Comment:     ADA Screening Guidelines:  5.7-6.4%  Consistent with prediabetes  >or=6.5%  Consistent with diabetes    High levels of fetal hemoglobin interfere with the HbA1C  assay. Heterozygous hemoglobin variants (HbS, HgC, etc)do  not significantly interfere with this assay.   However, presence of multiple variants may affect accuracy.         TSH  Recent Labs   Lab 11/16/23  1335 02/18/25  0741   TSH 0.725 1.549       The 10-year ASCVD risk score (Francis DK, et al., 2019) is: 27.5%    Values used to calculate the score:      Age: 75 years      Sex: Male      Is Non- : No      Diabetic: No      Tobacco smoker: Yes      Systolic Blood Pressure: 132 mmHg      Is BP treated: Yes      HDL Cholesterol: 48 mg/dL      Total Cholesterol: 136 mg/dL             ASSESSMENT AND PLAN     Patient Active Problem List   Diagnosis    Hyperlipidemia    Hypertension    GERD (gastroesophageal reflux disease)    Generalized anxiety disorder    Tobacco dependence    Psychophysiological insomnia    Atherosclerosis of aorta    Erectile dysfunction    CAD (coronary artery disease)    Fatty liver    Hepatomegaly    Calcified granuloma of lung    Purpura    Lower urinary tract symptoms (LUTS)    History of colonic polyps     Overweight (BMI 25.0-29.9)    Other emphysema    Pleural plaque    LAD (lymphadenopathy), hilar    Polyp of colon    Iron deficiency anemia    Spontaneous ecchymoses    Encounter for long-term (current) use of aspirin    Nonrheumatic aortic valve stenosis    Precordial pain     No orders of the defined types were placed in this encounter.    Assessment & Plan    IMPRESSION:  Stress test results show mild ischemia.  40-50% stenosis in left main coronary artery noted, not requiring  revascularization at this time.  Minimal lumen area 7.6 millimeters squared  Aortic valve is mild and does not currently need replacement.  Initiated more aggressive management of LDL levels due to existing stenosis.  Chest pain determined to be musculoskeletal.  Addressed continued smoking habit.    PLAN SUMMARY:  - Started Zetia 10 mg daily to lower LDL  - Started amlodipine 5 mg daily for blood pressure and chest pain relief  - Increased atorvastatin from 40 mg to 80 mg daily  - Discontinued Imdur   at patient request.  He wants to restart Cialis  - Apply heat to area of muscular chest pain  - Quit smoking immediately  - Continue daily 45-minute walking routine  - Follow up in 6 months if feeling well    CORONARY ARTERY DISEASE:  - Clarified the difference between muscular chest pain and cardiac-related pain.  - Discontinued Imdur as patient reported it was not helping with chest pain  and he wants to restart Cialis  - Started amlodipine 5 mg daily for blood pressure and potential chest pain relief; cut dose in half if blood pressure drops too low.  - Continued aspirin.  - Echocardiogram ordered to be performed every 1-2 years to monitor aortic valve.  - Contact the office if experiencing severe chest pain, pressure, or heaviness in chest.    HYPERTENSION:  - Started amlodipine 5 mg daily for blood pressure and potential chest pain relief; cut dose in half if blood pressure drops too low.    HYPERLIPIDEMIA:  - Started Zetia 10 mg daily  to further lower LDL.  - Increased atorvastatin from 40 mg to 80 mg daily to lower LDL.    MUSCULOSKELETAL PAIN:  - Yair to apply heat to area of muscular chest pain.    NICOTINE DEPENDENCE:  - Yair to quit smoking immediately.    GENERAL HEALTH MAINTENANCE:  - Continue daily walking routine (45 minutes of walking).    FOLLOW-UP:  - Follow up in 6 months if feeling well.  - Contact the office if experiencing any medication side effects.         AORTIC VALVE DISEASE:  Moderate aortic valve stenosis.  Continue to monitor with regular echocardiograms          Lab Results   Component Value Date    LDLCALC 74.8 02/18/2025         Thank you very much for involving me in the care of your patient.  Please do not hesitate to contact me if there are any questions.      Vladimir Avalos MD, FAC, [facility]  Interventional Cardiologist, [facility].       Visit today included increased complexity associated with the care of the episodic problem dyslipidemia, hypertension, aortic valve stenosis addressed and managing the longitudinal care of the patient due to the serious and/or complex managed problem(s) Problem List[1]  .    This note was dictated with the help of speech recognition software.  There might be un-intended errors and/or substitutions.            This note was generated with the assistance of ambient listening technology. Verbal consent was obtained by the patient and accompanying visitor(s) for the recording of patient appointment to facilitate this note. I attest to having reviewed and edited the generated note for accuracy, though some syntax or spelling errors may persist. Please contact the author of this note for any clarification.                                           [1]   Patient Active Problem List  Diagnosis    Hyperlipidemia    Hypertension    GERD (gastroesophageal reflux disease)    Generalized anxiety disorder    Tobacco dependence    Psychophysiological insomnia    Atherosclerosis of aorta     Erectile dysfunction    CAD (coronary artery disease)    Fatty liver    Hepatomegaly    Calcified granuloma of lung    Purpura    Lower urinary tract symptoms (LUTS)    History of colonic polyps    Overweight (BMI 25.0-29.9)    Other emphysema    Pleural plaque    LAD (lymphadenopathy), hilar    Polyp of colon    Iron deficiency anemia    Spontaneous ecchymoses    Encounter for long-term (current) use of aspirin    Nonrheumatic aortic valve stenosis    Precordial pain

## 2025-08-15 ENCOUNTER — PATIENT MESSAGE (OUTPATIENT)
Dept: PSYCHIATRY | Facility: CLINIC | Age: 75
End: 2025-08-15
Payer: MEDICARE

## 2025-08-18 ENCOUNTER — PATIENT MESSAGE (OUTPATIENT)
Dept: CARDIOLOGY | Facility: CLINIC | Age: 75
End: 2025-08-18
Payer: MEDICARE

## 2025-08-25 ENCOUNTER — OFFICE VISIT (OUTPATIENT)
Dept: CARDIOLOGY | Facility: CLINIC | Age: 75
End: 2025-08-25
Payer: MEDICARE

## 2025-08-25 ENCOUNTER — OFFICE VISIT (OUTPATIENT)
Dept: PSYCHIATRY | Facility: CLINIC | Age: 75
End: 2025-08-25
Payer: COMMERCIAL

## 2025-08-25 DIAGNOSIS — F41.1 GENERALIZED ANXIETY DISORDER WITH PANIC ATTACKS: Primary | ICD-10-CM

## 2025-08-25 DIAGNOSIS — I25.10 CORONARY ARTERY DISEASE INVOLVING NATIVE HEART WITHOUT ANGINA PECTORIS, UNSPECIFIED VESSEL OR LESION TYPE: Primary | ICD-10-CM

## 2025-08-25 DIAGNOSIS — I10 ESSENTIAL (PRIMARY) HYPERTENSION: ICD-10-CM

## 2025-08-25 DIAGNOSIS — F51.05 INSOMNIA DUE TO OTHER MENTAL DISORDER: ICD-10-CM

## 2025-08-25 DIAGNOSIS — F99 INSOMNIA DUE TO OTHER MENTAL DISORDER: ICD-10-CM

## 2025-08-25 DIAGNOSIS — I10 PRIMARY HYPERTENSION: ICD-10-CM

## 2025-08-25 DIAGNOSIS — Z86.59 HISTORY OF DEPRESSION: ICD-10-CM

## 2025-08-25 DIAGNOSIS — F41.0 GENERALIZED ANXIETY DISORDER WITH PANIC ATTACKS: Primary | ICD-10-CM

## 2025-08-25 DIAGNOSIS — R07.9 CHEST PAIN, UNSPECIFIED TYPE: ICD-10-CM

## 2025-08-25 PROCEDURE — G2211 COMPLEX E/M VISIT ADD ON: HCPCS | Mod: 95,,, | Performed by: INTERNAL MEDICINE

## 2025-08-25 PROCEDURE — 3044F HG A1C LEVEL LT 7.0%: CPT | Mod: CPTII,95,, | Performed by: PHYSICIAN ASSISTANT

## 2025-08-25 PROCEDURE — 98006 SYNCH AUDIO-VIDEO EST MOD 30: CPT | Mod: 95,,, | Performed by: INTERNAL MEDICINE

## 2025-08-25 PROCEDURE — 1159F MED LIST DOCD IN RCRD: CPT | Mod: CPTII,95,, | Performed by: PHYSICIAN ASSISTANT

## 2025-08-25 PROCEDURE — 3044F HG A1C LEVEL LT 7.0%: CPT | Mod: CPTII,95,, | Performed by: INTERNAL MEDICINE

## 2025-08-25 PROCEDURE — G2211 COMPLEX E/M VISIT ADD ON: HCPCS | Mod: 95,,, | Performed by: PHYSICIAN ASSISTANT

## 2025-08-25 PROCEDURE — 98006 SYNCH AUDIO-VIDEO EST MOD 30: CPT | Mod: 95,,, | Performed by: PHYSICIAN ASSISTANT

## 2025-08-25 PROCEDURE — 4010F ACE/ARB THERAPY RXD/TAKEN: CPT | Mod: CPTII,95,, | Performed by: PHYSICIAN ASSISTANT

## 2025-08-25 PROCEDURE — 1160F RVW MEDS BY RX/DR IN RCRD: CPT | Mod: CPTII,95,, | Performed by: PHYSICIAN ASSISTANT

## 2025-08-25 PROCEDURE — 4010F ACE/ARB THERAPY RXD/TAKEN: CPT | Mod: CPTII,95,, | Performed by: INTERNAL MEDICINE

## 2025-08-25 RX ORDER — BUSPIRONE HYDROCHLORIDE 15 MG/1
15 TABLET ORAL 3 TIMES DAILY
Qty: 270 TABLET | Refills: 0 | Status: SHIPPED | OUTPATIENT
Start: 2025-08-25 | End: 2025-11-23

## (undated) DEVICE — KIT MANIFOLD LOW PRESS TUBING

## (undated) DEVICE — WIRE GUIDE SAFE-T-J .035 260CM

## (undated) DEVICE — KIT HAND CONTROL HIGH PRESSUR

## (undated) DEVICE — PAD DEFIB CADENCE ADULT R2

## (undated) DEVICE — CATH DXTERITY JL35 100CM 5FR

## (undated) DEVICE — CATH EMPULSE ANGLED 5FR PIGTAI

## (undated) DEVICE — CATH DXTERITY JR40 100CM 5FR

## (undated) DEVICE — HEMOSTAT VASC BAND REG 24CM

## (undated) DEVICE — KIT GLIDESHEATH SLEND 6FR 10CM

## (undated) DEVICE — KIT SYR REUSABLE

## (undated) DEVICE — PACK CATH LAB

## (undated) DEVICE — PAD RADI FEMORAL

## (undated) DEVICE — OMNIPAQUE 350MG 150ML VIAL